# Patient Record
Sex: MALE | Race: WHITE | Employment: OTHER | URBAN - METROPOLITAN AREA
[De-identification: names, ages, dates, MRNs, and addresses within clinical notes are randomized per-mention and may not be internally consistent; named-entity substitution may affect disease eponyms.]

---

## 2017-02-01 ENCOUNTER — TRANSFERRED RECORDS (OUTPATIENT)
Dept: HEALTH INFORMATION MANAGEMENT | Facility: CLINIC | Age: 82
End: 2017-02-01

## 2017-02-02 ENCOUNTER — TELEPHONE (OUTPATIENT)
Dept: FAMILY MEDICINE | Facility: CLINIC | Age: 82
End: 2017-02-02

## 2017-02-02 ENCOUNTER — TRANSFERRED RECORDS (OUTPATIENT)
Dept: FAMILY MEDICINE | Facility: CLINIC | Age: 82
End: 2017-02-02

## 2017-02-17 ENCOUNTER — OFFICE VISIT (OUTPATIENT)
Dept: FAMILY MEDICINE | Facility: CLINIC | Age: 82
End: 2017-02-17
Payer: MEDICARE

## 2017-02-17 VITALS
WEIGHT: 193 LBS | TEMPERATURE: 97 F | BODY MASS INDEX: 28.58 KG/M2 | SYSTOLIC BLOOD PRESSURE: 124 MMHG | DIASTOLIC BLOOD PRESSURE: 70 MMHG | HEART RATE: 60 BPM | HEIGHT: 69 IN

## 2017-02-17 DIAGNOSIS — I10 ESSENTIAL HYPERTENSION: Primary | ICD-10-CM

## 2017-02-17 DIAGNOSIS — R30.0 DYSURIA: ICD-10-CM

## 2017-02-17 DIAGNOSIS — N18.30 CKD (CHRONIC KIDNEY DISEASE) STAGE 3, GFR 30-59 ML/MIN (H): ICD-10-CM

## 2017-02-17 DIAGNOSIS — E11.21 TYPE 2 DIABETES MELLITUS WITH DIABETIC NEPHROPATHY, UNSPECIFIED LONG TERM INSULIN USE STATUS: ICD-10-CM

## 2017-02-17 DIAGNOSIS — Z12.5 SCREENING FOR PROSTATE CANCER: ICD-10-CM

## 2017-02-17 LAB
ALBUMIN UR-MCNC: 30 MG/DL
ALT SERPL W P-5'-P-CCNC: 29 U/L (ref 0–70)
ANION GAP SERPL CALCULATED.3IONS-SCNC: 10 MMOL/L (ref 3–14)
APPEARANCE UR: CLEAR
BACTERIA #/AREA URNS HPF: ABNORMAL /HPF
BILIRUB UR QL STRIP: NEGATIVE
BUN SERPL-MCNC: 18 MG/DL (ref 7–30)
CALCIUM SERPL-MCNC: 8.5 MG/DL (ref 8.5–10.1)
CHLORIDE SERPL-SCNC: 115 MMOL/L (ref 94–109)
CO2 SERPL-SCNC: 19 MMOL/L (ref 20–32)
COLOR UR AUTO: YELLOW
CREAT SERPL-MCNC: 1.34 MG/DL (ref 0.66–1.25)
CREAT UR-MCNC: 222 MG/DL
GFR SERPL CREATININE-BSD FRML MDRD: 51 ML/MIN/1.7M2
GLUCOSE SERPL-MCNC: 128 MG/DL (ref 70–99)
GLUCOSE UR STRIP-MCNC: NEGATIVE MG/DL
HBA1C MFR BLD: 6.7 % (ref 4.3–6)
HGB UR QL STRIP: NEGATIVE
KETONES UR STRIP-MCNC: NEGATIVE MG/DL
LDLC SERPL DIRECT ASSAY-MCNC: 80 MG/DL
LEUKOCYTE ESTERASE UR QL STRIP: NEGATIVE
MICROALBUMIN UR-MCNC: 92 MG/L
MICROALBUMIN/CREAT UR: 41.31 MG/G CR (ref 0–17)
MUCOUS THREADS #/AREA URNS LPF: PRESENT /LPF
NITRATE UR QL: NEGATIVE
PH UR STRIP: 5.5 PH (ref 5–7)
POTASSIUM SERPL-SCNC: 4.2 MMOL/L (ref 3.4–5.3)
PSA SERPL-ACNC: 0.03 UG/L (ref 0–4)
RBC #/AREA URNS AUTO: ABNORMAL /HPF (ref 0–2)
SODIUM SERPL-SCNC: 144 MMOL/L (ref 133–144)
SP GR UR STRIP: 1.02 (ref 1–1.03)
URN SPEC COLLECT METH UR: ABNORMAL
UROBILINOGEN UR STRIP-ACNC: 0.2 EU/DL (ref 0.2–1)
WBC #/AREA URNS AUTO: ABNORMAL /HPF (ref 0–2)

## 2017-02-17 PROCEDURE — 83721 ASSAY OF BLOOD LIPOPROTEIN: CPT | Performed by: FAMILY MEDICINE

## 2017-02-17 PROCEDURE — 99214 OFFICE O/P EST MOD 30 MIN: CPT | Performed by: FAMILY MEDICINE

## 2017-02-17 PROCEDURE — G0103 PSA SCREENING: HCPCS | Performed by: FAMILY MEDICINE

## 2017-02-17 PROCEDURE — 36415 COLL VENOUS BLD VENIPUNCTURE: CPT | Performed by: FAMILY MEDICINE

## 2017-02-17 PROCEDURE — 84460 ALANINE AMINO (ALT) (SGPT): CPT | Performed by: FAMILY MEDICINE

## 2017-02-17 PROCEDURE — 83036 HEMOGLOBIN GLYCOSYLATED A1C: CPT | Performed by: FAMILY MEDICINE

## 2017-02-17 PROCEDURE — 81001 URINALYSIS AUTO W/SCOPE: CPT | Performed by: FAMILY MEDICINE

## 2017-02-17 PROCEDURE — 80048 BASIC METABOLIC PNL TOTAL CA: CPT | Performed by: FAMILY MEDICINE

## 2017-02-17 PROCEDURE — 82043 UR ALBUMIN QUANTITATIVE: CPT | Performed by: FAMILY MEDICINE

## 2017-02-17 NOTE — MR AVS SNAPSHOT
"              After Visit Summary   2/17/2017    Elias Mckee    MRN: [id number]           Patient Information     Date Of Birth          8/10/1931        Visit Information        Provider Department      2/17/2017 9:00 AM Shen Huff MD [facility] [place]        Today's Diagnoses     Essential hypertension    -  1    CKD (chronic kidney disease) stage 3, GFR 30-59 ml/min        Type 2 diabetes mellitus with diabetic nephropathy, unspecified long term insulin use status (H)        Dysuria        Screening for prostate cancer           Follow-ups after your visit        Who to contact     If you have questions or need follow up information about today's clinic visit or your schedule please contact [facility] directly at [phone].  Normal or non-critical lab and imaging results will be communicated to you by [employer], letter or phone within 4 business days after the clinic has received the results. If you do not hear from us within 7 days, please contact the clinic through [employer] or phone. If you have a critical or abnormal lab result, we will notify you by phone as soon as possible.  Submit refill requests through [employer] or call your pharmacy and they will forward the refill request to us. Please allow 3 business days for your refill to be completed.          Additional Information About Your Visit        [employer] Information     [employer] lets you send messages to your doctor, view your test results, renew your prescriptions, schedule appointments and more. To sign up, go to [email or web address] . Click on \"Log in\" on the left side of the screen, which will take you to the Welcome page. Then click on \"Sign up Now\" on the right side of the page.     You will be asked to enter the access code listed below, as well as some personal information. Please follow the directions to create your username and password.     Your access code is: [email or web address]  Expires: 5/18/2017  9:41 AM   " "  Your access code will  in 90 days. If you need help or a new code, please call your [facility] clinic or [phone].        Care EveryWhere ID     This is your Care EveryWhere ID. This could be used by other organizations to access your [facility] medical records  [id number]        Your Vitals Were     Pulse Temperature Height BMI (Body Mass Index)          60 97  F (36.1  C) (Tympanic) 5' 9\" (1.753 m) 28.5 kg/m2         Blood Pressure from Last 3 Encounters:   17 124/70   16 124/62   16 110/62    Weight from Last 3 Encounters:   17 193 lb (87.5 kg)   16 187 lb (84.8 kg)   16 188 lb (85.3 kg)              We Performed the Following     Albumin Random Urine Quantitative     ALT     Basic metabolic panel  (Ca, Cl, CO2, Creat, Gluc, K, Na, BUN)     Hemoglobin A1c     LDL cholesterol direct     PSA, screen     UA with Microscopic reflex to Culture        Primary Care Provider Office Phone # Fax #    Shen Huff [phone]       [address] [address]        Thank you!     Thank you [place] [facility] [place]. Our goal is always to provide you with excellent care. Hearing back from our patients is one way we can continue to improve our services. Please take a few minutes to complete the written survey that you may receive in the mail after your visit with us. Thank you!             Your Updated Medication List - Protect others around you: Learn how to safely use, store and throw away your medicines at www.disposemymeds.org.          This list is accurate as of: 17  9:41 AM.  Always use your most recent med list.                   Brand Name Dispense Instructions for use    acetaZOLAMIDE 500 MG 12 hr capsule    DIAMOX SEQUEL     Take 500 mg by mouth 2 times daily       amoxicillin 250 MG capsule    AMOXIL     Take 1,000 mg by mouth once Prior to dental work       ASPIR-LOW 81 MG " EC tablet   Generic drug:  aspirin      Take 81 mg by mouth daily       blood glucose lancets standard    no brand specified    1 box    appropriate to strips and machine       BLOOD GLUCOSE TEST STRIPS STRP     99 strip    66 strips continuous.       ferrous gluconate 324 (38 FE) MG tablet    FERGON    90 tablet    Take 1 tablet (324 mg) by mouth daily (with breakfast)       fluticasone 50 MCG/ACT spray    FLONASE    1 Package    Spray 1-2 sprays into both nostrils daily       * glimepiride 2 MG tablet    AMARYL    90 tablet    Take 1 tablet (2 mg) by mouth every morning (before breakfast)       * glimepiride 1 MG tablet    AMARYL    90 tablet    Take 1 tablet (1 mg) by mouth every morning (before breakfast)       lisinopril 10 MG tablet    PRINIVIL/ZESTRIL    60 tablet    Take 1 tablet by mouth 2 times daily.       melatonin 1 MG Tabs tablet     30 tablet    Take 1 tablet (1 mg) by mouth At Bedtime       metoprolol 50 MG tablet    LOPRESSOR     Take 150 mg by mouth 2 times daily       multivitamin per tablet     100 tablet    Take 1 tablet by mouth daily.       nitroglycerin 0.4 MG sublingual tablet    NITROSTAT    1 BOTTLE    ONE TABLET UNDER TONGUE, FOR CHEST PAIN, AS DIRECTED       * order for DME     1 Device    Equipment being ordered: Wheelchair       * order for DME     1 Device    Equipment being ordered:  transport wheelchair       * order for DME     1 Device    Equipment being ordered: transport wheelchair.  It has been ruled out that a walker and cane are not sufficient.       * order for DME     1 Device    Equipment being ordered: Walker - basic black non collapseable walker       * order for DME     1 each    Equipment being ordered: basic black non - collapesable cane       * order for DME     1 Device    Equipment being ordered: Cane       PLAVIX 75 MG tablet   Generic drug:  clopidogrel     30    ONE DAILY       polyethylene glycol powder    MIRALAX    510 g    Take 1 tsp with 7-12 oz of water  every 2-3 days for now       simvastatin 20 MG tablet    ZOCOR    90 tablet    1 TABLET AT BEDTIME       tamsulosin 0.4 MG capsule    FLOMAX         TYLENOL 325 MG tablet   Generic drug:  acetaminophen     100 tablet    Take 1-2 tablets (325-650 mg) by mouth every 6 hours as needed for mild pain       vitamin D 2000 UNITS tablet     100 tablet    Take 2,000 Units by mouth daily       * Notice:  This list has 8 medication(s) that are the same as other medications prescribed for you. Read the directions carefully, and ask your doctor or other care provider to review them with you.

## 2017-02-17 NOTE — LETTER
[facility]   [address] [place] [address]   [phone]      February 21, 2017    Elias Mckee  [address] [place] [address] [address] [place] [address]                Dear Elias,     Your lab results including electrolyte balance and glucose/liver and kidney function/LDL cholesterol/prostate specific antigen were all stable at your baseline without clinical deterioration.   Your A1C for diabetes is slightly elevated compared to last year but is still below(7.0). Please keep watching your diet and working on exercise routinely.    Thank you,   Sincerely,   Shen Huff M.D.

## 2017-02-17 NOTE — PROGRESS NOTES
SUBJECTIVE:                                                    Elias Mckee is a 85 year old male who presents to clinic today for the following health issues:      Diabetes Follow-up    Patient is checking blood sugars: once daily.  Results are as follows:         am - 130-150    Diabetic concerns: None     Symptoms of hypoglycemia (low blood sugar): none     Paresthesias (numbness or burning in feet) or sores: No     Date of last diabetic eye exam:        Amount of exercise or physical activity: walk    Problems taking medications regularly: No    Medication side effects: none  Diet: regular (no restrictions)      Problem list and histories reviewed & adjusted, as indicated.  Additional history: as documented    Patient Active Problem List   Diagnosis     Chronic ischemic heart disease     Malignant neoplasm of prostate (H)     Essential hypertension     Personal history of other diseases of circulatory system     HYPERLIPIDEMIA LDL GOAL <100     Type 2 diabetes, HbA1C goal < 8% (H)     Advanced directives, counseling/discussion     CKD (chronic kidney disease) stage 3, GFR 30-59 ml/min     Leg weakness     Ataxia     BPH (benign prostatic hyperplasia)     Type 2 diabetes mellitus with diabetic nephropathy (H)     History of actinic keratoses     History of squamous cell carcinoma     S/P Mohs surgery for basal cell carcinoma     Past Surgical History   Procedure Laterality Date     Seed implantation  2002     prostate cancer     Phacoemulsification clear cornea with standard intraocular lens implant Right 10/7/2014     Procedure: PHACOEMULSIFICATION CLEAR CORNEA WITH STANDARD INTRAOCULAR LENS IMPLANT;  Surgeon: German Thomas MD;  Location: [facility]     Vitrectomy anterior Right 10/7/2014     Procedure: VITRECTOMY ANTERIOR;  Surgeon: German Thomas MD;  Location: [facility]       Social History   Substance Use Topics     Smoking status: Never Smoker     Smokeless tobacco: Never Used     Alcohol use No      No family history on file.      Current Outpatient Prescriptions   Medication Sig Dispense Refill     order for DME Equipment being ordered: Cane 1 Device 0     melatonin 1 MG TABS Take 1 tablet (1 mg) by mouth At Bedtime 30 tablet 1     order for DME Equipment being ordered: basic black non - collapesable cane 1 each 0     order for DME Equipment being ordered: Walker - basic black non collapseable walker 1 Device 0     polyethylene glycol (MIRALAX) powder Take 1 tsp with 7-12 oz of water every 2-3 days for now 510 g 1     acetaminophen (TYLENOL) 325 MG tablet Take 1-2 tablets (325-650 mg) by mouth every 6 hours as needed for mild pain 100 tablet      order for DME Equipment being ordered: transport wheelchair.   It has been ruled out that a walker and cane are not sufficient. 1 Device 0     acetaZOLAMIDE (DIAMOX SEQUEL) 500 MG capsule Take 500 mg by mouth 2 times daily       ferrous gluconate (FERGON) 324 (38 FE) MG tablet Take 1 tablet (324 mg) by mouth daily (with breakfast) 90 tablet 1     ORDER FOR DME Equipment being ordered:  transport wheelchair 1 Device 0     ORDER FOR DME Equipment being ordered: Wheelchair 1 Device 0     Cholecalciferol (VITAMIN D) 2000 UNITS tablet Take 2,000 Units by mouth daily 100 tablet 3     glimepiride (AMARYL) 1 MG tablet Take 1 tablet (1 mg) by mouth every morning (before breakfast) 90 tablet 3     fluticasone (FLONASE) 50 MCG/ACT nasal spray Spray 1-2 sprays into both nostrils daily 1 Package 3     amoxicillin (AMOXIL) 250 MG capsule Take 1,000 mg by mouth once Prior to dental work       tamsulosin (FLOMAX) 0.4 MG 24 hr capsule        glimepiride (AMARYL) 2 MG tablet Take 1 tablet (2 mg) by mouth every morning (before breakfast) 90 tablet 1     simvastatin (ZOCOR) 20 MG tablet 1 TABLET AT BEDTIME 90 tablet 1     aspirin (ASPIR-LOW) 81 MG EC tablet Take 81 mg by mouth daily       metoprolol (LOPRESSOR) 50 MG tablet Take 150 mg by mouth 2 times daily       lisinopril  "(PRINIVIL,ZESTRIL) 10 MG tablet Take 1 tablet by mouth 2 times daily. 60 tablet 1     Multiple Vitamin (MULTIVITAMIN) per tablet Take 1 tablet by mouth daily. 100 tablet 12     Blood Glucose Monitoring Suppl (BLOOD GLUCOSE TEST STRIPS STRP) 66 strips continuous. 99 strip 1 year     NITROGLYCERIN 0.4 MG SL SUBL ONE TABLET UNDER TONGUE, FOR CHEST PAIN, AS DIRECTED 1 BOTTLE 3     PLAVIX 75 MG OR TABS ONE DAILY 30 4     LANCETS MISC. KIT appropriate to strips and machine 1 box 1 year     Allergies   Allergen Reactions     No Known Allergies      Recent Labs   Lab Test  08/11/16   0930  01/12/16   1022  07/13/15   1150  04/20/15   0913  11/28/14   1425  05/29/14   0945  05/01/13   0944   01/21/13   1627   A1C  6.0  6.4*  6.6*   --   7.1*  7.5*  8.2*   < >   --    LDL   --   80   --   73   --   81  83   --   67   HDL   --    --    --    --    --   29*  33*   --   34*   TRIG   --    --    --    --    --   208*  179*   --   178*   ALT  24  25   --   23  36  21  31   --    --    CR  1.23  1.24   --   1.32*  1.31*  1.45*  1.36*   < >   --    GFRESTIMATED  56*  55*   --   52*  52*  47*  50*   < >   --    GFRESTBLACK  68  67   --   63  63  56*  61   < >   --    POTASSIUM  4.0  4.2   --   4.1  4.1  4.9  4.5   < >   --    TSH   --    --    --    --   3.06  4.34   --    --   3.31    < > = values in this interval not displayed.      BP Readings from Last 3 Encounters:   02/17/17 124/70   08/11/16 124/62   01/12/16 110/62    Wt Readings from Last 3 Encounters:   02/17/17 193 lb (87.5 kg)   08/11/16 187 lb (84.8 kg)   01/12/16 188 lb (85.3 kg)                    ROS:  Constitutional, HEENT, cardiovascular, pulmonary, gi and gu systems are negative, except as otherwise noted.    OBJECTIVE:                                                    /70 (Cuff Size: Adult Large)  Pulse 60  Temp 97  F (36.1  C) (Tympanic)  Ht 5' 9\" (1.753 m)  Wt 193 lb (87.5 kg)  BMI 28.5 kg/m2  Body mass index is 28.5 kg/(m^2).  GENERAL: healthy, alert and " no distress  NECK: no adenopathy, no asymmetry, masses, or scars and thyroid normal to palpation  RESP: lungs clear to auscultation - no rales, rhonchi or wheezes  CV: regular rate and rhythm, normal S1 S2, no S3 or S4, no murmur, click or rub, no peripheral edema and peripheral pulses strong  ABDOMEN: soft, nontender, no hepatosplenomegaly, no masses and bowel sounds normal  MS: no gross musculoskeletal defects noted, no edema         ASSESSMENT/PLAN:                                                    ASSESSMENT / PLAN:  (I10) Essential hypertension  (primary encounter diagnosis)  Comment: has been stable with current dose of meds, has no side effect from it, will keep watching sx   Plan: LDL cholesterol direct, Basic metabolic panel          (Ca, Cl, CO2, Creat, Gluc, K, Na, BUN), ALT            (N18.3) CKD (chronic kidney disease) stage 3, GFR 30-59 ml/min  Comment: stable, will keep watching sx   Plan: Albumin Random Urine Quantitative, Hemoglobin         A1c, Basic metabolic panel  (Ca, Cl, CO2,         Creat, Gluc, K, Na, BUN)            (E11.21) Type 2 diabetes mellitus with diabetic nephropathy, unspecified long term insulin use status (H)  Comment: stable   Plan: Albumin Random Urine Quantitative, Hemoglobin         A1c, Basic metabolic panel  (Ca, Cl, CO2,         Creat, Gluc, K, Na, BUN)            (R30.0) Dysuria  Comment: has been having frequency at night and not able to sleep well,   Plan: PSA, screen, UA with Microscopic reflex to         Culture        Encouraged him to limit drinking liquid at evening and night,   Encouraged him to check on his actual med if he is taking flomax or not(seems it hasn't been given from us for last 4-5 years). If not taking, will resume at 0.4mg. If has been taking, will double up the dose of flomax to 0.8mg    (Z12.5) Screening for prostate cancer  Comment: mentioned above   Plan: PSA, screen              FUTURE APPOINTMENTS:       - Follow-up visit in 6 months      Shen Huff MD  [facility] [place]

## 2017-02-17 NOTE — NURSING NOTE
"Chief Complaint   Patient presents with     Diabetes     is fasting       Initial /70 (Cuff Size: Adult Large)  Pulse 60  Temp 97  F (36.1  C) (Tympanic)  Ht 5' 9\" (1.753 m)  Wt 193 lb (87.5 kg)  BMI 28.5 kg/m2 Estimated body mass index is 28.5 kg/(m^2) as calculated from the following:    Height as of this encounter: 5' 9\" (1.753 m).    Weight as of this encounter: 193 lb (87.5 kg).  Medication Reconciliation: complete     Maria A Geronimo CMA      "

## 2017-02-20 ENCOUNTER — APPOINTMENT (OUTPATIENT)
Dept: CT IMAGING | Facility: CLINIC | Age: 82
DRG: 024 | End: 2017-02-20
Attending: EMERGENCY MEDICINE
Payer: MEDICARE

## 2017-02-20 ENCOUNTER — APPOINTMENT (OUTPATIENT)
Dept: INTERVENTIONAL RADIOLOGY/VASCULAR | Facility: CLINIC | Age: 82
DRG: 024 | End: 2017-02-20
Attending: EMERGENCY MEDICINE
Payer: MEDICARE

## 2017-02-20 ENCOUNTER — HOSPITAL ENCOUNTER (INPATIENT)
Facility: CLINIC | Age: 82
LOS: 7 days | Discharge: NURSING, REHAB OR LONG-TERM CARE | DRG: 024 | End: 2017-02-27
Attending: EMERGENCY MEDICINE | Admitting: INTERNAL MEDICINE
Payer: MEDICARE

## 2017-02-20 ENCOUNTER — APPOINTMENT (OUTPATIENT)
Dept: CT IMAGING | Facility: CLINIC | Age: 82
DRG: 024 | End: 2017-02-20
Attending: INTERNAL MEDICINE
Payer: MEDICARE

## 2017-02-20 DIAGNOSIS — I63.411 CEREBRAL INFARCTION DUE TO EMBOLISM OF RIGHT MIDDLE CEREBRAL ARTERY (H): ICD-10-CM

## 2017-02-20 DIAGNOSIS — E11.21 TYPE 2 DIABETES MELLITUS WITH DIABETIC NEPHROPATHY, UNSPECIFIED LONG TERM INSULIN USE STATUS: Primary | ICD-10-CM

## 2017-02-20 DIAGNOSIS — I10 ESSENTIAL HYPERTENSION: ICD-10-CM

## 2017-02-20 PROBLEM — I63.9 CVA (CEREBRAL VASCULAR ACCIDENT) (H): Status: ACTIVE | Noted: 2017-02-20

## 2017-02-20 LAB
ANION GAP SERPL CALCULATED.3IONS-SCNC: 8 MMOL/L (ref 3–14)
APTT PPP: 31 SEC (ref 22–37)
BASOPHILS # BLD AUTO: 0 10E9/L (ref 0–0.2)
BASOPHILS NFR BLD AUTO: 0.3 %
BUN SERPL-MCNC: 28 MG/DL (ref 7–30)
CALCIUM SERPL-MCNC: 8.4 MG/DL (ref 8.5–10.1)
CHLORIDE SERPL-SCNC: 113 MMOL/L (ref 94–109)
CO2 SERPL-SCNC: 20 MMOL/L (ref 20–32)
CREAT SERPL-MCNC: 1.28 MG/DL (ref 0.66–1.25)
DIFFERENTIAL METHOD BLD: ABNORMAL
EOSINOPHIL # BLD AUTO: 0.2 10E9/L (ref 0–0.7)
EOSINOPHIL NFR BLD AUTO: 3.1 %
ERYTHROCYTE [DISTWIDTH] IN BLOOD BY AUTOMATED COUNT: 15.2 % (ref 10–15)
GFR SERPL CREATININE-BSD FRML MDRD: 53 ML/MIN/1.7M2
GLUCOSE BLDC GLUCOMTR-MCNC: 113 MG/DL (ref 70–99)
GLUCOSE SERPL-MCNC: 187 MG/DL (ref 70–99)
HCT VFR BLD AUTO: 36.3 % (ref 40–53)
HGB BLD-MCNC: 12.2 G/DL (ref 13.3–17.7)
IMM GRANULOCYTES # BLD: 0 10E9/L (ref 0–0.4)
IMM GRANULOCYTES NFR BLD: 0.6 %
INR PPP: 0.99 (ref 0.86–1.14)
INTERPRETATION ECG - MUSE: NORMAL
LYMPHOCYTES # BLD AUTO: 1.5 10E9/L (ref 0.8–5.3)
LYMPHOCYTES NFR BLD AUTO: 20.4 %
MCH RBC QN AUTO: 31.4 PG (ref 26.5–33)
MCHC RBC AUTO-ENTMCNC: 33.6 G/DL (ref 31.5–36.5)
MCV RBC AUTO: 93 FL (ref 78–100)
MONOCYTES # BLD AUTO: 1.4 10E9/L (ref 0–1.3)
MONOCYTES NFR BLD AUTO: 19 %
NEUTROPHILS # BLD AUTO: 4.1 10E9/L (ref 1.6–8.3)
NEUTROPHILS NFR BLD AUTO: 56.6 %
NRBC # BLD AUTO: 0 10*3/UL
NRBC BLD AUTO-RTO: 0 /100
PLATELET # BLD AUTO: 136 10E9/L (ref 150–450)
POTASSIUM SERPL-SCNC: 3.9 MMOL/L (ref 3.4–5.3)
RADIOLOGIST FLAGS: ABNORMAL
RBC # BLD AUTO: 3.89 10E12/L (ref 4.4–5.9)
SODIUM SERPL-SCNC: 141 MMOL/L (ref 133–144)
WBC # BLD AUTO: 7.2 10E9/L (ref 4–11)

## 2017-02-20 PROCEDURE — 85025 COMPLETE CBC W/AUTO DIFF WBC: CPT | Performed by: EMERGENCY MEDICINE

## 2017-02-20 PROCEDURE — 85730 THROMBOPLASTIN TIME PARTIAL: CPT | Performed by: EMERGENCY MEDICINE

## 2017-02-20 PROCEDURE — 3E03317 INTRODUCTION OF OTHER THROMBOLYTIC INTO PERIPHERAL VEIN, PERCUTANEOUS APPROACH: ICD-10-PCS | Performed by: EMERGENCY MEDICINE

## 2017-02-20 PROCEDURE — 70450 CT HEAD/BRAIN W/O DYE: CPT | Mod: 77

## 2017-02-20 PROCEDURE — 25000128 H RX IP 250 OP 636: Performed by: EMERGENCY MEDICINE

## 2017-02-20 PROCEDURE — 80048 BASIC METABOLIC PNL TOTAL CA: CPT | Performed by: EMERGENCY MEDICINE

## 2017-02-20 PROCEDURE — 03CG3ZZ EXTIRPATION OF MATTER FROM INTRACRANIAL ARTERY, PERCUTANEOUS APPROACH: ICD-10-PCS | Performed by: RADIOLOGY

## 2017-02-20 PROCEDURE — 25000128 H RX IP 250 OP 636: Performed by: RADIOLOGY

## 2017-02-20 PROCEDURE — C1887 CATHETER, GUIDING: HCPCS

## 2017-02-20 PROCEDURE — 27210804 ZZH SHEATH CR3

## 2017-02-20 PROCEDURE — 27210737 ZZH ACCESSORY CR14

## 2017-02-20 PROCEDURE — 25000125 ZZHC RX 250: Performed by: EMERGENCY MEDICINE

## 2017-02-20 PROCEDURE — C1769 GUIDE WIRE: HCPCS

## 2017-02-20 PROCEDURE — 85610 PROTHROMBIN TIME: CPT | Performed by: EMERGENCY MEDICINE

## 2017-02-20 PROCEDURE — 25000128 H RX IP 250 OP 636: Performed by: INTERNAL MEDICINE

## 2017-02-20 PROCEDURE — 25000128 H RX IP 250 OP 636

## 2017-02-20 PROCEDURE — 27210906 ZZH KIT CR8

## 2017-02-20 PROCEDURE — 25500064 ZZH RX 255 OP 636: Performed by: RADIOLOGY

## 2017-02-20 PROCEDURE — 00000146 ZZHCL STATISTIC GLUCOSE BY METER IP

## 2017-02-20 PROCEDURE — 27210742 ZZH CATH CR1

## 2017-02-20 PROCEDURE — 27210738 ZZH ACCESSORY CR2

## 2017-02-20 PROCEDURE — 70498 CT ANGIOGRAPHY NECK: CPT

## 2017-02-20 PROCEDURE — 99223 1ST HOSP IP/OBS HIGH 75: CPT | Mod: AI | Performed by: INTERNAL MEDICINE

## 2017-02-20 PROCEDURE — 27211192 ZZ H SHEATH CR14

## 2017-02-20 PROCEDURE — 27210886 ZZH ACCESSORY CR5

## 2017-02-20 PROCEDURE — 25500064 ZZH RX 255 OP 636: Performed by: EMERGENCY MEDICINE

## 2017-02-20 PROCEDURE — 99153 MOD SED SAME PHYS/QHP EA: CPT

## 2017-02-20 PROCEDURE — 27210806 ZZH SHEATH CR5

## 2017-02-20 PROCEDURE — 70460 CT HEAD/BRAIN W/DYE: CPT

## 2017-02-20 PROCEDURE — 70470 CT HEAD/BRAIN W/O & W/DYE: CPT | Mod: XS

## 2017-02-20 PROCEDURE — 25000125 ZZHC RX 250: Performed by: RADIOLOGY

## 2017-02-20 PROCEDURE — 27210732 ZZH ACCESSORY CR1

## 2017-02-20 PROCEDURE — 25000125 ZZHC RX 250: Performed by: INTERNAL MEDICINE

## 2017-02-20 PROCEDURE — 20000003 ZZH R&B ICU

## 2017-02-20 RX ORDER — POTASSIUM CHLORIDE 7.45 MG/ML
10 INJECTION INTRAVENOUS
Status: DISCONTINUED | OUTPATIENT
Start: 2017-02-20 | End: 2017-02-27 | Stop reason: HOSPADM

## 2017-02-20 RX ORDER — IOPAMIDOL 755 MG/ML
120 INJECTION, SOLUTION INTRAVASCULAR ONCE
Status: COMPLETED | OUTPATIENT
Start: 2017-02-20 | End: 2017-02-20

## 2017-02-20 RX ORDER — ONDANSETRON 2 MG/ML
4 INJECTION INTRAMUSCULAR; INTRAVENOUS EVERY 6 HOURS PRN
Status: DISCONTINUED | OUTPATIENT
Start: 2017-02-20 | End: 2017-02-27 | Stop reason: HOSPADM

## 2017-02-20 RX ORDER — LABETALOL HYDROCHLORIDE 5 MG/ML
10-20 INJECTION, SOLUTION INTRAVENOUS EVERY 10 MIN PRN
Status: DISCONTINUED | OUTPATIENT
Start: 2017-02-20 | End: 2017-02-20

## 2017-02-20 RX ORDER — NALOXONE HYDROCHLORIDE 0.4 MG/ML
.1-.4 INJECTION, SOLUTION INTRAMUSCULAR; INTRAVENOUS; SUBCUTANEOUS
Status: DISCONTINUED | OUTPATIENT
Start: 2017-02-20 | End: 2017-02-20

## 2017-02-20 RX ORDER — ACETAMINOPHEN 325 MG/1
650 TABLET ORAL EVERY 4 HOURS PRN
Status: DISCONTINUED | OUTPATIENT
Start: 2017-02-20 | End: 2017-02-23

## 2017-02-20 RX ORDER — NICOTINE POLACRILEX 4 MG
15-30 LOZENGE BUCCAL
Status: DISCONTINUED | OUTPATIENT
Start: 2017-02-20 | End: 2017-02-27 | Stop reason: HOSPADM

## 2017-02-20 RX ORDER — POTASSIUM CHLORIDE 1.5 G/1.58G
20-40 POWDER, FOR SOLUTION ORAL
Status: DISCONTINUED | OUTPATIENT
Start: 2017-02-20 | End: 2017-02-27 | Stop reason: HOSPADM

## 2017-02-20 RX ORDER — LABETALOL HYDROCHLORIDE 5 MG/ML
INJECTION, SOLUTION INTRAVENOUS
Status: COMPLETED
Start: 2017-02-20 | End: 2017-02-20

## 2017-02-20 RX ORDER — POTASSIUM CHLORIDE 1500 MG/1
20-40 TABLET, EXTENDED RELEASE ORAL
Status: DISCONTINUED | OUTPATIENT
Start: 2017-02-20 | End: 2017-02-27 | Stop reason: HOSPADM

## 2017-02-20 RX ORDER — ONDANSETRON 4 MG/1
4 TABLET, ORALLY DISINTEGRATING ORAL EVERY 6 HOURS PRN
Status: DISCONTINUED | OUTPATIENT
Start: 2017-02-20 | End: 2017-02-27 | Stop reason: HOSPADM

## 2017-02-20 RX ORDER — POTASSIUM CHLORIDE 29.8 MG/ML
20 INJECTION INTRAVENOUS
Status: DISCONTINUED | OUTPATIENT
Start: 2017-02-20 | End: 2017-02-27 | Stop reason: HOSPADM

## 2017-02-20 RX ORDER — SODIUM CHLORIDE 9 MG/ML
INJECTION, SOLUTION INTRAVENOUS ONCE
Status: COMPLETED | OUTPATIENT
Start: 2017-02-20 | End: 2017-02-20

## 2017-02-20 RX ORDER — PROCHLORPERAZINE MALEATE 5 MG
5 TABLET ORAL EVERY 6 HOURS PRN
Status: DISCONTINUED | OUTPATIENT
Start: 2017-02-20 | End: 2017-02-27 | Stop reason: HOSPADM

## 2017-02-20 RX ORDER — FLUMAZENIL 0.1 MG/ML
0.2 INJECTION, SOLUTION INTRAVENOUS
Status: DISCONTINUED | OUTPATIENT
Start: 2017-02-20 | End: 2017-02-23

## 2017-02-20 RX ORDER — LABETALOL HYDROCHLORIDE 5 MG/ML
20 INJECTION, SOLUTION INTRAVENOUS ONCE
Status: COMPLETED | OUTPATIENT
Start: 2017-02-20 | End: 2017-02-20

## 2017-02-20 RX ORDER — TAMSULOSIN HYDROCHLORIDE 0.4 MG/1
0.8 CAPSULE ORAL DAILY
Status: ON HOLD | COMMUNITY
End: 2017-03-21

## 2017-02-20 RX ORDER — SODIUM CHLORIDE 9 MG/ML
INJECTION, SOLUTION INTRAVENOUS CONTINUOUS
Status: DISCONTINUED | OUTPATIENT
Start: 2017-02-20 | End: 2017-02-21

## 2017-02-20 RX ORDER — SODIUM CHLORIDE 9 MG/ML
INJECTION, SOLUTION INTRAVENOUS CONTINUOUS
Status: DISCONTINUED | OUTPATIENT
Start: 2017-02-20 | End: 2017-02-22

## 2017-02-20 RX ORDER — IOPAMIDOL 612 MG/ML
150 INJECTION, SOLUTION INTRAVASCULAR ONCE
Status: COMPLETED | OUTPATIENT
Start: 2017-02-20 | End: 2017-02-20

## 2017-02-20 RX ORDER — DEXTROSE MONOHYDRATE 25 G/50ML
25-50 INJECTION, SOLUTION INTRAVENOUS
Status: DISCONTINUED | OUTPATIENT
Start: 2017-02-20 | End: 2017-02-27 | Stop reason: HOSPADM

## 2017-02-20 RX ORDER — PROCHLORPERAZINE 25 MG
12.5 SUPPOSITORY, RECTAL RECTAL EVERY 12 HOURS PRN
Status: DISCONTINUED | OUTPATIENT
Start: 2017-02-20 | End: 2017-02-27 | Stop reason: HOSPADM

## 2017-02-20 RX ORDER — LIDOCAINE HYDROCHLORIDE 10 MG/ML
1-30 INJECTION, SOLUTION EPIDURAL; INFILTRATION; INTRACAUDAL; PERINEURAL
Status: COMPLETED | OUTPATIENT
Start: 2017-02-20 | End: 2017-02-20

## 2017-02-20 RX ORDER — FENTANYL CITRATE 50 UG/ML
25-50 INJECTION, SOLUTION INTRAMUSCULAR; INTRAVENOUS EVERY 5 MIN PRN
Status: DISCONTINUED | OUTPATIENT
Start: 2017-02-20 | End: 2017-02-20

## 2017-02-20 RX ORDER — FENTANYL CITRATE 50 UG/ML
INJECTION, SOLUTION INTRAMUSCULAR; INTRAVENOUS
Status: DISCONTINUED
Start: 2017-02-20 | End: 2017-02-20 | Stop reason: HOSPADM

## 2017-02-20 RX ORDER — ACETAMINOPHEN 650 MG/1
650 SUPPOSITORY RECTAL EVERY 4 HOURS PRN
Status: DISCONTINUED | OUTPATIENT
Start: 2017-02-20 | End: 2017-02-27 | Stop reason: HOSPADM

## 2017-02-20 RX ORDER — POLYETHYLENE GLYCOL 3350 17 G/17G
17 POWDER, FOR SOLUTION ORAL DAILY PRN
COMMUNITY

## 2017-02-20 RX ORDER — NALOXONE HYDROCHLORIDE 0.4 MG/ML
.1-.4 INJECTION, SOLUTION INTRAMUSCULAR; INTRAVENOUS; SUBCUTANEOUS
Status: DISCONTINUED | OUTPATIENT
Start: 2017-02-20 | End: 2017-02-27 | Stop reason: HOSPADM

## 2017-02-20 RX ORDER — MAGNESIUM SULFATE HEPTAHYDRATE 40 MG/ML
4 INJECTION, SOLUTION INTRAVENOUS EVERY 4 HOURS PRN
Status: DISCONTINUED | OUTPATIENT
Start: 2017-02-20 | End: 2017-02-27 | Stop reason: HOSPADM

## 2017-02-20 RX ADMIN — LIDOCAINE HYDROCHLORIDE 100 MG: 10 INJECTION, SOLUTION INFILTRATION; PERINEURAL at 21:21

## 2017-02-20 RX ADMIN — SODIUM CHLORIDE 100 ML: 9 INJECTION, SOLUTION INTRAVENOUS at 19:22

## 2017-02-20 RX ADMIN — ALTEPLASE 66.91 MG: KIT at 20:02

## 2017-02-20 RX ADMIN — SODIUM CHLORIDE: 9 INJECTION, SOLUTION INTRAVENOUS at 23:43

## 2017-02-20 RX ADMIN — LABETALOL HYDROCHLORIDE 20 MG: 5 INJECTION, SOLUTION INTRAVENOUS at 19:46

## 2017-02-20 RX ADMIN — SODIUM CHLORIDE 2.5 MG/HR: 900 INJECTION, SOLUTION INTRAVENOUS at 23:40

## 2017-02-20 RX ADMIN — FENTANYL CITRATE 25 MCG: 50 INJECTION, SOLUTION INTRAMUSCULAR; INTRAVENOUS at 20:54

## 2017-02-20 RX ADMIN — MIDAZOLAM HYDROCHLORIDE 0.5 MG: 1 INJECTION, SOLUTION INTRAMUSCULAR; INTRAVENOUS at 21:19

## 2017-02-20 RX ADMIN — FENTANYL CITRATE 25 MCG: 50 INJECTION, SOLUTION INTRAMUSCULAR; INTRAVENOUS at 21:19

## 2017-02-20 RX ADMIN — ALTEPLASE 7.43 MG: KIT at 20:02

## 2017-02-20 RX ADMIN — MIDAZOLAM HYDROCHLORIDE 0.5 MG: 1 INJECTION, SOLUTION INTRAMUSCULAR; INTRAVENOUS at 20:50

## 2017-02-20 RX ADMIN — IOPAMIDOL 65 ML: 612 INJECTION, SOLUTION INTRAVENOUS at 22:14

## 2017-02-20 RX ADMIN — IOPAMIDOL 120 ML: 755 INJECTION, SOLUTION INTRAVENOUS at 19:22

## 2017-02-20 RX ADMIN — SODIUM CHLORIDE: 9 INJECTION, SOLUTION INTRAVENOUS at 19:35

## 2017-02-20 ASSESSMENT — ENCOUNTER SYMPTOMS
WEAKNESS: 1
FACIAL ASYMMETRY: 1

## 2017-02-21 ENCOUNTER — APPOINTMENT (OUTPATIENT)
Dept: SPEECH THERAPY | Facility: CLINIC | Age: 82
DRG: 024 | End: 2017-02-21
Attending: INTERNAL MEDICINE
Payer: MEDICARE

## 2017-02-21 ENCOUNTER — APPOINTMENT (OUTPATIENT)
Dept: CARDIOLOGY | Facility: CLINIC | Age: 82
DRG: 024 | End: 2017-02-21
Attending: INTERNAL MEDICINE
Payer: MEDICARE

## 2017-02-21 ENCOUNTER — APPOINTMENT (OUTPATIENT)
Dept: INTERVENTIONAL RADIOLOGY/VASCULAR | Facility: CLINIC | Age: 82
DRG: 024 | End: 2017-02-21
Payer: MEDICARE

## 2017-02-21 ENCOUNTER — APPOINTMENT (OUTPATIENT)
Dept: MRI IMAGING | Facility: CLINIC | Age: 82
DRG: 024 | End: 2017-02-21
Attending: PSYCHIATRY & NEUROLOGY
Payer: MEDICARE

## 2017-02-21 LAB
ANION GAP SERPL CALCULATED.3IONS-SCNC: 7 MMOL/L (ref 3–14)
BUN SERPL-MCNC: 23 MG/DL (ref 7–30)
CALCIUM SERPL-MCNC: 7.5 MG/DL (ref 8.5–10.1)
CHLORIDE SERPL-SCNC: 117 MMOL/L (ref 94–109)
CHOLEST SERPL-MCNC: 105 MG/DL
CO2 SERPL-SCNC: 19 MMOL/L (ref 20–32)
CREAT SERPL-MCNC: 1.05 MG/DL (ref 0.66–1.25)
CRP SERPL-MCNC: 4.6 MG/L (ref 0–8)
DEPRECATED CALCIDIOL+CALCIFEROL SERPL-MC: 30 UG/L (ref 20–75)
ERYTHROCYTE [DISTWIDTH] IN BLOOD BY AUTOMATED COUNT: 15 % (ref 10–15)
ERYTHROCYTE [SEDIMENTATION RATE] IN BLOOD BY WESTERGREN METHOD: 17 MM/H (ref 0–20)
GFR SERPL CREATININE-BSD FRML MDRD: 67 ML/MIN/1.7M2
GLUCOSE BLDC GLUCOMTR-MCNC: 117 MG/DL (ref 70–99)
GLUCOSE BLDC GLUCOMTR-MCNC: 124 MG/DL (ref 70–99)
GLUCOSE BLDC GLUCOMTR-MCNC: 142 MG/DL (ref 70–99)
GLUCOSE BLDC GLUCOMTR-MCNC: 146 MG/DL (ref 70–99)
GLUCOSE BLDC GLUCOMTR-MCNC: 147 MG/DL (ref 70–99)
GLUCOSE SERPL-MCNC: 149 MG/DL (ref 70–99)
HBA1C MFR BLD: 6.8 % (ref 4.3–6)
HCT VFR BLD AUTO: 31.3 % (ref 40–53)
HDLC SERPL-MCNC: 28 MG/DL
HGB BLD-MCNC: 10.7 G/DL (ref 13.3–17.7)
HGB BLD-MCNC: 11.3 G/DL (ref 13.3–17.7)
LDLC SERPL CALC-MCNC: 49 MG/DL
MAGNESIUM SERPL-MCNC: 1.8 MG/DL (ref 1.6–2.3)
MCH RBC QN AUTO: 31.2 PG (ref 26.5–33)
MCHC RBC AUTO-ENTMCNC: 34.2 G/DL (ref 31.5–36.5)
MCV RBC AUTO: 91 FL (ref 78–100)
NONHDLC SERPL-MCNC: 77 MG/DL
PHOSPHATE SERPL-MCNC: 3.1 MG/DL (ref 2.5–4.5)
PLATELET # BLD AUTO: 124 10E9/L (ref 150–450)
POTASSIUM SERPL-SCNC: 3.7 MMOL/L (ref 3.4–5.3)
RBC # BLD AUTO: 3.43 10E12/L (ref 4.4–5.9)
SODIUM SERPL-SCNC: 143 MMOL/L (ref 133–144)
TRIGL SERPL-MCNC: 140 MG/DL
TROPONIN I SERPL-MCNC: NORMAL UG/L (ref 0–0.04)
TROPONIN I SERPL-MCNC: NORMAL UG/L (ref 0–0.04)
TSH SERPL DL<=0.05 MIU/L-ACNC: 5.96 MU/L (ref 0.4–4)
VIT B12 SERPL-MCNC: 368 PG/ML (ref 193–986)
WBC # BLD AUTO: 8.3 10E9/L (ref 4–11)

## 2017-02-21 PROCEDURE — 84484 ASSAY OF TROPONIN QUANT: CPT | Performed by: INTERNAL MEDICINE

## 2017-02-21 PROCEDURE — 84100 ASSAY OF PHOSPHORUS: CPT | Performed by: INTERNAL MEDICINE

## 2017-02-21 PROCEDURE — 83036 HEMOGLOBIN GLYCOSYLATED A1C: CPT | Performed by: INTERNAL MEDICINE

## 2017-02-21 PROCEDURE — 80061 LIPID PANEL: CPT | Performed by: INTERNAL MEDICINE

## 2017-02-21 PROCEDURE — 85018 HEMOGLOBIN: CPT | Performed by: INTERNAL MEDICINE

## 2017-02-21 PROCEDURE — 83735 ASSAY OF MAGNESIUM: CPT | Performed by: INTERNAL MEDICINE

## 2017-02-21 PROCEDURE — 82306 VITAMIN D 25 HYDROXY: CPT | Performed by: INTERNAL MEDICINE

## 2017-02-21 PROCEDURE — 93306 TTE W/DOPPLER COMPLETE: CPT | Mod: 26 | Performed by: INTERNAL MEDICINE

## 2017-02-21 PROCEDURE — 40000333 IR DISCONTINUE SHEATH

## 2017-02-21 PROCEDURE — 82607 VITAMIN B-12: CPT | Performed by: INTERNAL MEDICINE

## 2017-02-21 PROCEDURE — 25000125 ZZHC RX 250: Performed by: INTERNAL MEDICINE

## 2017-02-21 PROCEDURE — 92610 EVALUATE SWALLOWING FUNCTION: CPT | Mod: GN | Performed by: SPEECH-LANGUAGE PATHOLOGIST

## 2017-02-21 PROCEDURE — 85652 RBC SED RATE AUTOMATED: CPT | Performed by: INTERNAL MEDICINE

## 2017-02-21 PROCEDURE — 80048 BASIC METABOLIC PNL TOTAL CA: CPT | Performed by: INTERNAL MEDICINE

## 2017-02-21 PROCEDURE — 20000003 ZZH R&B ICU

## 2017-02-21 PROCEDURE — 86140 C-REACTIVE PROTEIN: CPT | Performed by: INTERNAL MEDICINE

## 2017-02-21 PROCEDURE — 00000146 ZZHCL STATISTIC GLUCOSE BY METER IP

## 2017-02-21 PROCEDURE — 25000131 ZZH RX MED GY IP 250 OP 636 PS 637: Mod: GY | Performed by: INTERNAL MEDICINE

## 2017-02-21 PROCEDURE — 25000125 ZZHC RX 250

## 2017-02-21 PROCEDURE — 84443 ASSAY THYROID STIM HORMONE: CPT | Performed by: INTERNAL MEDICINE

## 2017-02-21 PROCEDURE — 99232 SBSQ HOSP IP/OBS MODERATE 35: CPT | Performed by: INTERNAL MEDICINE

## 2017-02-21 PROCEDURE — 70551 MRI BRAIN STEM W/O DYE: CPT

## 2017-02-21 PROCEDURE — 25000132 ZZH RX MED GY IP 250 OP 250 PS 637: Mod: GY | Performed by: INTERNAL MEDICINE

## 2017-02-21 PROCEDURE — A9270 NON-COVERED ITEM OR SERVICE: HCPCS | Mod: GY | Performed by: INTERNAL MEDICINE

## 2017-02-21 PROCEDURE — 99232 SBSQ HOSP IP/OBS MODERATE 35: CPT | Performed by: PSYCHIATRY & NEUROLOGY

## 2017-02-21 PROCEDURE — 40000225 ZZH STATISTIC SLP WARD VISIT: Performed by: SPEECH-LANGUAGE PATHOLOGIST

## 2017-02-21 PROCEDURE — 85027 COMPLETE CBC AUTOMATED: CPT | Performed by: INTERNAL MEDICINE

## 2017-02-21 PROCEDURE — 93306 TTE W/DOPPLER COMPLETE: CPT | Mod: TC

## 2017-02-21 PROCEDURE — 36415 COLL VENOUS BLD VENIPUNCTURE: CPT | Performed by: INTERNAL MEDICINE

## 2017-02-21 PROCEDURE — 25000128 H RX IP 250 OP 636: Performed by: INTERNAL MEDICINE

## 2017-02-21 RX ORDER — HYDRALAZINE HYDROCHLORIDE 20 MG/ML
INJECTION INTRAMUSCULAR; INTRAVENOUS
Status: COMPLETED
Start: 2017-02-21 | End: 2017-02-21

## 2017-02-21 RX ORDER — HYDRALAZINE HYDROCHLORIDE 20 MG/ML
10 INJECTION INTRAMUSCULAR; INTRAVENOUS EVERY 4 HOURS PRN
Status: DISCONTINUED | OUTPATIENT
Start: 2017-02-21 | End: 2017-02-27 | Stop reason: HOSPADM

## 2017-02-21 RX ORDER — MAGNESIUM HYDROXIDE 1200 MG/15ML
30 LIQUID ORAL ONCE
Status: DISCONTINUED | OUTPATIENT
Start: 2017-02-21 | End: 2017-02-21 | Stop reason: CLARIF

## 2017-02-21 RX ORDER — HYDRALAZINE HYDROCHLORIDE 20 MG/ML
10 INJECTION INTRAMUSCULAR; INTRAVENOUS EVERY 4 HOURS PRN
Status: DISCONTINUED | OUTPATIENT
Start: 2017-02-21 | End: 2017-02-21

## 2017-02-21 RX ORDER — ATORVASTATIN CALCIUM 40 MG/1
40 TABLET, FILM COATED ORAL DAILY
Status: DISCONTINUED | OUTPATIENT
Start: 2017-02-21 | End: 2017-02-23

## 2017-02-21 RX ORDER — ENALAPRILAT 1.25 MG/ML
1.25 INJECTION INTRAVENOUS EVERY 6 HOURS PRN
Status: DISCONTINUED | OUTPATIENT
Start: 2017-02-21 | End: 2017-02-21

## 2017-02-21 RX ORDER — ENALAPRILAT 1.25 MG/ML
1.25 INJECTION INTRAVENOUS EVERY 6 HOURS PRN
Status: DISCONTINUED | OUTPATIENT
Start: 2017-02-21 | End: 2017-02-27 | Stop reason: HOSPADM

## 2017-02-21 RX ADMIN — HYDRALAZINE HYDROCHLORIDE 10 MG: 20 INJECTION INTRAMUSCULAR; INTRAVENOUS at 22:44

## 2017-02-21 RX ADMIN — HYDRALAZINE HYDROCHLORIDE 10 MG: 20 INJECTION INTRAMUSCULAR; INTRAVENOUS at 11:58

## 2017-02-21 RX ADMIN — INSULIN ASPART 1 UNITS: 100 INJECTION, SOLUTION INTRAVENOUS; SUBCUTANEOUS at 09:17

## 2017-02-21 RX ADMIN — HYDRALAZINE HYDROCHLORIDE 10 MG: 20 INJECTION INTRAMUSCULAR; INTRAVENOUS at 17:09

## 2017-02-21 RX ADMIN — SODIUM CHLORIDE: 9 INJECTION, SOLUTION INTRAVENOUS at 14:27

## 2017-02-21 RX ADMIN — INSULIN ASPART 1 UNITS: 100 INJECTION, SOLUTION INTRAVENOUS; SUBCUTANEOUS at 12:17

## 2017-02-21 RX ADMIN — ACETAMINOPHEN 650 MG: 650 SUPPOSITORY RECTAL at 12:23

## 2017-02-21 NOTE — PROGRESS NOTES
7fr arterial sheath removed. Manual pressure was held for 15 minutes. Site was dry with no hematoma. Sterile guaze and Tegaderm was placed on the site. Pt was given instructions and report was given to  RN. 20 min face to face time with pt.

## 2017-02-21 NOTE — PROGRESS NOTES
Hospitalist update note:    Called regarding 10/10 back pain and concern for bleed. Patient seen at bedside. Lying comfortably in bed. Had received tylenol and pain 3/10 during my evaluation.  Vitals are stable. On exam, abdomen is soft, non-tender, back: no flank tenderness.  At this point, suspicion for retroperitoneal bleed low. Suspect this could be related to him lying on his back for prolonged period of time following procedure. Per daughter who was at bedside reported, he sometimes c/o back pain.  However, will check hemoglobin. If worsening pain or change in hemodynamics, will obtain imaging with non-contrast CT of the abdomen.      Ruben Rosen MD  Hospitalist

## 2017-02-21 NOTE — PROGRESS NOTES
See dictation. Acute stroke with significant weakness left and sensory loss. tpa given and IR coming in for right M2 clot. Risks reviewed with patient and daughter

## 2017-02-21 NOTE — ED NOTES
"[facility]  ED Nurse Handoff Report    ED Chief complaint: One-sided Weakness (Was eating normal and left arm became limp.  last normal 1822.  Also left side facial droop.)      ED Diagnosis:   Final diagnoses:   None       Code Status: Full Code    Allergies:   Allergies   Allergen Reactions     No Known Allergies        Activity level:  Stand with Assist-pt uses cane at NH     Needed?: No    Isolation: No  Infection: Not Applicable    Bariatric?: No      Vital Signs:   Vitals:    02/20/17 1925 02/20/17 1938 02/20/17 1944   BP: (!) 166/110 171/77 174/89   Pulse: 64     Resp: 18 22 14   Temp: 98.7  F (37.1  C)     TempSrc: Oral     SpO2: 98% 99% 99%   Weight: 82.6 kg (182 lb)     Height: 1.778 m (5' 10\")         Cardiac Rhythm: ,        Pain level:      Is this patient confused?: No    Patient Report: Initial Complaint: pt was at NH eating dinner was noted to be \"choking\".  Pt also noted to have left sided facial droop and left arm weakness. Pt continues to have symptoms on arrival to   ED by EMS.  Pt has left arm numbness and weakness.  Pt also has left leg weakness.  Pt has slightly slurred speech.    Focused Assessment: see flowsheets  Tests Performed: CT, labs  Abnormal Results:clot in brain on CT  Treatments provided: labetolol, TPA, will go to IR    Family Comments: pt daughter here    OBS brochure/video discussed/provided to patient: N/A    ED Medications:   Medications   sodium chloride 0.9 % for CT scan flush dose 100 mL (100 mLs Intravenous Given 2/20/17 1922)   iopamidol (ISOVUE-370) solution 120 mL (120 mLs Intravenous Given 2/20/17 1922)   0.9% sodium chloride infusion ( Intravenous New Bag 2/1935)   labetalol (NORMODYNE/TRANDATE) injection 20 mg (20 mg Intravenous Given 2/20/17 1946)       Drips infusing?:  Yes- NS, TPA      ED NURSE PHONE NUMBER: [phone]         "

## 2017-02-21 NOTE — PROVIDER NOTIFICATION
Dr. Boyd notified of neuro changes post IR procedure.  No movement in left upper extremity.  Complete sensation loss on left side including face, arm and leg.  No further orders received.

## 2017-02-21 NOTE — PLAN OF CARE
Problem: Goal Outcome Summary  Goal: Goal Outcome Summary  SLP: Bedside swallow evaluation completed. Patient presents with severe oral and pharyngeal dysphagia at bedside secondary to right MCA stroke. He was able to follow 1 step directions, moderate to severe dysarthria. Deficits include; left facial droop with decreased oral sensation, decreased labial seal, lingual ROM, strength. He demonstrated an incomplete swallow response with small teaspoon amount of water and suspect silent aspiration. Poor bolus contol/coordination and sensation to propel the bolus posteriorly most spilled out of oral cavity or spitting it out. Overt Sx of penetration/aspiration given incomplete and delayed swallow response. Patient is considered a high risk for aspiration. Recommend: 1. NPO overnight and will re-assess swallow function on 2/22/17. 2. Discharge to be determined following further work up.

## 2017-02-21 NOTE — PROGRESS NOTES
02/21/17 1435   General Information   Onset Date 02/20/17   Start of Care Date 02/21/17   Referring Physician    Patient Profile Review/OT: Additional Occupational Profile Info See Profile for full history and prior level of function   Patient/Family Goals Statement Patient did not state.   Swallowing Evaluation Bedside swallow evaluation   Behaviorial Observations Lethargic   Mode of current nutrition NPO   Respiratory Status Room air   Comments Right MCA diffusion restriction area and left cerebellar small area of diffusion restriction. A Mechanical thrombectomy was unsucessful.    Clinical Swallow Evaluation   Oral Musculature anomalies present   Structural Abnormalities present  (Left facial droop/weakness. )   Dentition upper and lower dentures   Secretion Management problems swallowing secretions   Mucosal Quality sticky   Mandibular Strength and Mobility impaired   Oral Labial Strength and Mobility impaired retraction;impaired pursing;impaired seal;impaired coordination   Lingual Strength and Mobility impaired protrusion;impaired anterior elevation;impaired left lateral movement;impaired right lateral movement;impaired coordination   Velar Elevation impaired   Buccal Strength and Mobility impaired   Laryngeal Function Voicing initiated   Oral Musculature Comments Moderate to severe imapirment.    Additional Documentation Yes   Additional evaluation(s) completed today Recommended   Rationale for completing additional evaluation Will likely need a video swallow study.   Swallow Eval   Feeding Assistance dependent   Clinical Swallow Eval: Thin Liquid Texture Trial   Mode of Presentation, Thin Liquids spoon;fed by clinician   Volume of Liquid or Food Presented 3 1/2 teaspoons.   Oral Phase of Swallow Poor AP movement;Residue in oral cavity;Premature pharyngeal entry   Oral Residue left anterior lateral sulci   Pharyngeal Phase of Swallow impaired;reduction in laryngeal movement   Diagnostic Statement  Incomplete swallow response with likely silent aspiration.   Clinical Swallow Eval: Nectar Thick Liquid Texture Trial   Mode of Presentation, Nectar spoon;self-fed   Volume of Nectar Presented 1 teaspoon   Oral Phase, Nectar Premature pharyngeal entry;Residue in oral cavity;Poor AP movement   Oral Residue, Nectar left anterior lateral sulci;left lip drooling   Pharyngeal Phase, Nectar impaired;reduction in laryngeal movement;throat clearing  (Decreased sensation. Spitting it out.)   Diagnostic Statement Suspect silent aspiration.   Clinical Swallow Eval: Honey Thick Liquid Texture Trial   Mode of Presentation, Honey spoon;fed by clinician   Volume of Honey Presented 1 teaspoon   Oral Phase, Honey Poor AP movement;Residue in oral cavity;Premature pharyngeal entry   Oral Residue, Honey mid posterior tongue;left anterior lateral sulci   Pharyngeal Phase, Honey impaired;coughing/choking;reduction in laryngeal movement   Diagnostic Statement Overt Sx of aspiration.   Swallow Compensations   Swallow Compensations Pacing;Reduce amounts;Multiple swallow   Results Suspect silent aspiration;Oral difficulties only   Swallow Eval: Clinical Impressions   Skilled Criteria for Therapy Intervention Skilled criteria met.  Treatment indicated.   Functional Assessment Scale (FAS) 1   Treatment Diagnosis Severe oral and pharyngeal dysphagia   Diet texture recommendations NPO   Therapy Frequency daily   Predicted Duration of Therapy Intervention (days/wks) 1 week.   Anticipated Discharge Disposition inpatient rehabilitation facility   Risks and Benefits of Treatment have been explained. Yes   Patient, family and/or staff in agreement with Plan of Care Yes   Clinical Impression Comments Patient presents with severe oral and pharyngeal dysphagia at bedside secondary to right MCA stroke. He was able to follow 1 step directions, moderate to severe dysarthria. Deficits include; left facial droop with decreased oral sensation, decreased  labial seal, lingual ROM, strength. He demonstrated an incomplete swallow response with small teaspoon amount of water and suspect silent aspiration. Poor bolus contol/coordination and sensation to propel the bolus posteriorly most spilled out of oral cavity or spitting it out. Overt Sx of penetration/aspiration given incomplete and delayed swallow response. Patient is considered a high risk for aspiration. Recommend: 1. NPO overnight and will re-assess swallow function on 2/22/17.    Total Evaluation Time   Total Evaluation Time (Minutes) 20

## 2017-02-21 NOTE — PROGRESS NOTES
SPIRITUAL HEALTH SERVICES  Spiritual Assessment Progress Note  [facility] ICU  PT was in good spirits and named being very pleased with his care. Pt appeared to be coping ok and was comfortable.   Pt is [employer] but is not connected to a [employer]ast[employer] anymore.  Pt requested prayer.     provided encouragement and prayer.     SH continues to be available for support as needed.      Addendum:   came back and met with pt's daughter and  while pt slept.  They processed the incident and pt's health history.  They commented that pt has stated that he doesn't like being old.  He was quite athletic in his younger days and limitations are hard on him.    Pt has 3 sons that live in [place] and one daughter that lives here. Daughter named that she is feeling the weight of pt condition on her since her brothers are not here and involved.      listened and provided emotional support.      SH will continue to follow as needed for support.                                                                                                                                                   Juani Regalado M.Div., [facility]  Staff    Pager [phone]

## 2017-02-21 NOTE — CONSULTS
REQUESTING PHYSICIAN:  None stated.      REASON FOR CONSULTATION:  Mr. Elias Mckee is a pleasant 85-year-old gentleman I am asked to evaluate urgently for code stroke.  He is accompanied by his daughter.  His symptoms began around 6:22 p.m. this evening at his care center.  He developed the acute onset of left-sided weakness.  Apparently he was fine earlier in the day.  When he presented to the emergency room, he was noted to have significant left lower facial droop and severe weakness of his left upper extremity.  He also has some left lower extremity weakness, although his daughter had indicated some of this could be a residual of prior stroke from 9 years earlier.  The facial weakness and upper extremity weakness are clearly new.  As part of his evaluation for code stroke, he did go for a noncontrast head CT which does show several chronic infarcts but no acute abnormalities.  CT angiography revealed occlusion of the right M2 branch of the middle cerebral artery and perfusion deficit corresponding in the right MCA territory as well consistent with ischemia.  Following his imaging after he came back to the emergency room, Dr. Bradshaw noted he initially worsened and actually had more severe dysarthria which did somewhat wax and wane.      PHYSICAL EXAMINATION:  At the time of my evaluation, he is alert and fully oriented.  He does have a blood pressure 160/100, temperature 98.7, pulse 64 and he is in sinus rhythm, respirations 16.  He is alert and oriented.  He demonstrated normal extraocular movements and no definite visual field cut.  He has severe left lower facial weakness.  Extremity examination reveals moderately severe weakness of his left upper extremity diffusely as well as moderately severe left lower extremity weakness.  He did not appreciate light touch on his left arm or left leg, although he did appreciate it in his face.  Finger-to-nose testing was weak and unsteady with his left upper  extremity.      LABORATORY DATA:  Reveal a creatinine of 1.28, and he does have baseline kidney disease.  Glucose was 187, hemoglobin 12.2, platelet count 136, INR 0.99.      PAST MEDICAL HISTORY:  Notable for a stroke 9 years ago with some residual left leg weakness.  He has a history of chronic ischemic heart disease, prostate cancer, hypertension, hyperlipidemia, type 2 diabetes, chronic kidney disease stage III.  He is a never smoker and does not use alcohol.      MEDICATIONS:   1.  Melatonin.   2.  MiraLax.   3.  Acetazolamide b.i.d.    4.  Iron.   5.  Amaryl 1 mg q.a.m.   6.  Flonase.   7.  Tamsulosin 0.4 mg per day.   8.  Simvastatin.   9.  81 mg Aspirin   10.  Metoprolol 50 mcg.   11.  Lisinopril 10 mg.   12.  Multivitamin.   13.  Plavix 75 mg.      ALLERGIES:  No drug allergies.      REVIEW OF SYSTEMS:  He denies any headache or discomfort at this time.      IMPRESSION:  Acute stroke which started approximately 6:22 p.m.  He has significant deficits including left facial weakness, left upper extremity weakness, and sensory loss contributing to an NIH stroke scale of at least 5. There is also left lower extremity weakness, although some of that may be residual from an earlier stroke.  Therapeutic options were reviewed with the patient and daughter prior to my arrival.  When I had spoken to Dr. Bradshaw, on my way in, I had indicated I felt there may be a bleeding risk of approximately 5% given his age, and I did indicate that on rare occasion this can be severe or potentially even fatal.  Due to the severity of his deficits and being well within time window for tPA, the patient and his daughter elected to proceed with tPA infusion.  In addition, because of the presence of clot in his right M2 territory, Interventional Radiology was contacted and they are coming in for clot retrieval.  The patient will be hospitalized in the Intensive Care Unit and watched closely afterwards.      I did spend 45 minutes  critical care time with code stroke and this gentleman is at risk for severe neurological deterioration and the treatment modalities employed are also significant in terms of risks associated with these interventions.         JUSTIN ESPINOZA MD             D: 2017 20:31   T: 2017 21:26   MT: LQ      Name:     MIRANDA BEGUM   MRN:      [id number]        Account:       [id number]   :      08/10/1931           Consult Date:  2017      Document: [id number]       cc: Shen Huff MD

## 2017-02-21 NOTE — PROCEDURES
Interventional Neuroradiology Post Procedure    Patient Name: Elias Mckee  MRN: [id number]  Date of Procedure: February 20, 2017    Procedure: Stroke mechanical thrombectomy  Radiologist: Matthew Sehets    Contrast: 65 ml Isovue  Fluoro Time: 32.6 minutes  Air Kerma: 2825 mGy  Medications: Versed 1 mg IV                       Fentanyl 50 mcg IV  Sedation Time: 86 minutes    EBL: 50 cc  Complications: None evident    Preliminary Report:   (See dictation for full detail)  - TICI1 occlusion of R-MCA M2 branch  - Failure of recanalization due to acute angle of branch and markedly firm/calcified nature of clot    Assess/Plan:  Routine post-procedure monitoring  Sheath out tomorrow (tPA), 4 hours leg straight/bedrest afterwards  Report to ordering    Matthew Sheets MD  [phone]

## 2017-02-21 NOTE — H&P
DATE OF ADMISSION:  02/20/2017      PRIMARY CARE  PHYSICIAN:  Dr. Shen Huff.      CODE STATUS:  Full code which was discussed with the patient and his daughter at bedside.      CHIEF COMPLAINT:  Difficulty eating and a left-sided facial droop and weakness.      HISTORY OF PRESENT ILLNESS:  Mr. Elias Mckee is an 85-year-old male with a past medical history significant for stroke about 9 years ago, coronary artery disease with multiple stents and cardiac arrest, hypertension, dyslipidemia, diabetes and atrial fibrillation who presents to the Emergency Department with the above concerns.  History is obtained through discussion with the ED physician, the patient and his daughter at bedside.  The patient was normal up until about 1820, or about 2 hours ago, when he was eating dinner and was noted to be choking by people he was with.  They noted that he had some left-sided facial droop and he had acute onset of left-sided weakness.  EMS was summoned and the patient was brought to the Emergency Department where he was diagnosed with acute stroke on CTA.  Neurology was summoned and the patient was given IV TPA.  There is evidence of a clot in the right M2 segment and so IR has been asked to come in to perform thrombectomy.      The patient has had a stroke in the past leaving him with some mild left leg weakness.  He does have a history of heart disease as well and has had 10 stents placed at different times and actually had a cardiac arrest during his last stroke.  The patient is not having any chest discomfort or shortness of breath, actually has no other symptoms other than the left-sided weakness at this point.  He has not had any chest discomfort over the preceding weeks or months and has otherwise been in good state of health.  No recent infections.  His PCP recently doubled his Flomax per report, but otherwise no medication changes.  He has been diligent taking the remainder of his medications.  No abdominal  discomfort, nausea, diarrhea or urinary symptoms.      PAST MEDICAL HISTORY:   1.  CVA about 9 years ago with residual left lower extremity weakness.   2.  Coronary artery disease:  The patient has a total of 10 stents placed with last heart catheterization in 2008,  per our records.  At that point he had stenting of the in-stent stenosis of the LAD and distal circumflex with angioplasty of a septal .  Eight stents have been noted prior to that.  Last echocardiogram was in 2014 showing an EF of 55% to 60% with inferoseptal hypokinesis.   3.  Hypertension.   4.  Dyslipidemia.   5.  Atrial fibrillation, not on anticoagulation.   6.  Diabetes type 2.   7.  Chronic kidney disease, stage 3, with a baseline creatinine of 1.2.   8.  BPH.      MEDICATIONS:    Prior to Admission medications    Medication Sig Last Dose Taking? Auth Provider   GLIMEPIRIDE PO Take 3 mg by mouth daily (with breakfast) 2/20/2017 at am Yes Unknown, Entered By History   polyethylene glycol (MIRALAX/GLYCOLAX) powder Take 17 g by mouth every 48 hours as needed for constipation prn Yes Unknown, Entered By History   tamsulosin (FLOMAX) 0.4 MG capsule Take 0.8 mg by mouth daily 2/20/2017 at am Yes Unknown, Entered By History   melatonin 1 MG TABS Take 1 tablet (1 mg) by mouth At Bedtime 2/19/2017 at hs Yes Shen Huff MD   acetaminophen (TYLENOL) 325 MG tablet Take 650 mg by mouth every 4 hours as needed for mild pain  prn Yes Tabatha Cho MD   acetaZOLAMIDE (DIAMOX SEQUEL) 500 MG capsule Take 500 mg by mouth 2 times daily 2/20/2017 at am Yes Reported, Patient   ferrous gluconate (FERGON) 324 (38 FE) MG tablet Take 1 tablet (324 mg) by mouth daily (with breakfast) 2/20/2017 at am Yes Shen Huff MD   Cholecalciferol (VITAMIN D) 2000 UNITS tablet Take 2,000 Units by mouth daily 2/20/2017 at am Yes Shen Huff MD   fluticasone (FLONASE) 50 MCG/ACT nasal spray Spray 1-2 sprays into both nostrils daily 2/20/2017 at am Yes Shen Huff  MD   amoxicillin (AMOXIL) 250 MG capsule Take 2,000 mg by mouth once Prior to dental work  prn Yes Reported, Patient   aspirin (ASPIR-LOW) 81 MG EC tablet Take 81 mg by mouth daily 2/20/2017 at am Yes Reported, Patient   metoprolol (LOPRESSOR) 50 MG tablet Take 150 mg by mouth 2 times daily 2/20/2017 at am Yes Reported, Patient   lisinopril (PRINIVIL,ZESTRIL) 10 MG tablet Take 1 tablet by mouth 2 times daily. 2/20/2017 at am Yes Elijah Ramos MD   Multiple Vitamin (MULTIVITAMIN) per tablet Take 1 tablet by mouth daily. 2/20/2017 at am Yes Elijah Ramos MD   PLAVIX 75 MG OR TABS ONE DAILY 2/20/2017 at am Yes Elijah Ramos MD   order for DME Equipment being ordered: Shen Gray MD   order for DME Equipment being ordered: basic black non - collapesable chhayae   Shen Huff MD   order for DME Equipment being ordered: Walker - basic black non collapseable walker   Shen Huff MD   order for DME Equipment being ordered: transport wheelchair.   It has been ruled out that a walker and cane are not sufficient.   Shen Huff MD   ORDER FOR DME Equipment being ordered:  transport wheelchair   Shen Huff MD   ORDER FOR DME Equipment being ordered: Shen Velazquez MD   Blood Glucose Monitoring Suppl (BLOOD GLUCOSE TEST STRIPS STRP) 66 strips continuous.   Elijah Ramos MD   NITROGLYCERIN 0.4 MG SL SUBL ONE TABLET UNDER TONGUE, FOR CHEST PAIN, AS DIRECTED   Elijah Ramos MD   LANCETS MISC. KIT appropriate to strips and machine   Loli Cotton NP           SOCIAL HISTORY:  The patient denies any use of tobacco or alcohol.      FAMILY HISTORY:  Mother had a stroke.      ALLERGIES:  No known drug allergies.      REVIEW OF SYSTEMS:  The complete review of systems was reviewed and negative, save for the pertinent positives, which are recorded in the HPI.      PHYSICAL EXAMINATION:   VITAL SIGNS:  Show blood pressure of 179/92, heart rate 70, respirations 18, satting 98% on nasal cannula oxygen with a  temperature of 98.7 degrees Fahrenheit.   GENERAL:  The patient is lying in bed and resting comfortably.   HEENT:  Pupils equal, round, reactive to light, extraocular muscle function intact.  No scleral icterus.  Oropharynx is clear.   NECK:  No lymphadenopathy or thyromegaly.   CARDIOVASCULAR:  Heart is regular rate and rhythm without any murmur, rub or gallop.   PULMONARY:  Clear to auscultation bilaterally without wheeze or rhonchi.   GASTROINTESTINAL:  Positive bowel sounds, soft, nontender and nondistended.   SKIN:  No rashes or lesions.   LYMPHATICS:  Trace edema of bilateral extremities.   PSYCHIATRIC:  Alert and oriented x3, normal affect.   NEUROLOGIC:  The patient has left-sided deficits of both the upper and lower extremity.  He has loss of sensation in the arm and the leg and has discoordination of the arm and leg on the left side.  Muscle strength is diminished.      LABORATORY DATA:  CBC shows WBC count of 7.2, hemoglobin 12.2 and platelets of 136.  Sodium 141, potassium 3.9, chloride 113, CO2 20, BUN 28, creatinine 1.28 with a glucose of 187.      CT angiography of the head shows occluded right M2 segment shortly after the origin, correlating with a perfusion defect seen earlier.  There is a chronically diseased left vertebral artery with incomplete filling and a dominant right vertebral artery.      ASSESSMENT AND PLAN:  Mr. Mckee is an 85-year-old male with a past medical history significant for previous stroke, coronary artery disease with cardiac arrest, diabetes, atrial fibrillation, chronic kidney disease who presents to the Emergency Department with left-sided weakness consistent with acute stroke.   1.  Acute right M2 cerebrovascular accident:  The patient is currently getting TPA, will be going to  for thrombectomy.  Neurology has already evaluated the patient.  The patient was in the ICU for close monitoring following the cerebral angiogram with thrombectomy.  We will plan for blood  pressure control, avoid any other anticoagulants, frequent neuro checks per TPA protocol.  Echocardiogram has been ordered.  Neurology will continue to follow.   2.  Coronary artery disease with history of cardiac arrest:  The patient is asymptomatic.  Will monitor on telemetry and follow serial troponins.  Echocardiogram has been ordered.   3.  Previous stroke:  He has an acute stroke and getting TPA as above.  Will be holding aspirin and Plavix given the TPA.   4.  Diabetes:  He will be n.p.o. We will have him on a sliding scale and if he is hyperglycemic, could initiate ICU insulin drip protocol.   5.  Hypertension:  Nipride has been ordered as needed.  Can resume his PTA regimen once taking p.o.   6.  Atrial fibrillation:  Heart is regular at this point.  Will monitor on telemetry.  PRN metoprolol for tachycardia.   7.  Chronic kidney disease, stage 3:  Creatinine appears to be roughly at baseline.  Will hydrate and monitor.   8.  BPH:  Can restart Flomax when taking p.o.   9.  Deep venous thrombosis prophylaxis:  SCDs.  No anticoagulants given the TPA.   10.  Disposition:  He will be admitted as an inpatient.         VILMA CLEANING DO             D: 2017 20:46   T: 2017 21:10   MT: SHERIN      Name:     MIRANDA BEGUM   MRN:      [id number]        Account:      [id number]   :      08/10/1931           Admitted:     [id number]      Document: [id number]       cc: Shen Huff MD

## 2017-02-21 NOTE — PROVIDER NOTIFICATION
"Notified Hospitalist of pt's pain. 10/10 mid back with no relief with tylenol. Repositioned- pt states the pain is \"better\". Pain 4/10 after repositioning. Paged hospitalist due to sudden increase of pain. Hospitalist to come see pt.     Dr. Rosen came to take a look at the pt- no new orders. Will continue to monitor.   "

## 2017-02-21 NOTE — PROGRESS NOTES
"Interventional Neuroradiology    S: \"I'm okay\"    O: /75  Pulse 55  Temp 97.5  F (36.4  C) (Oral)  Resp 27  Ht 1.778 m (5' 10\")  Wt 89 kg (196 lb 3.4 oz)  SpO2 97%  BMI 28.15 kg/m2    Labs: Hgb 10.7, plt 124, creat 1.05    MRI: IMPRESSION:   1. Recent moderate-sized posterior division right MCA territory  infarct corresponding to the known right M2 occlusion noted on the  recent comparison CT angiogram.  2. Tiny recent infarct in the left cerebellar hemisphere.  3. Diffuse cerebral volume loss and cerebral white matter changes  consistent with chronic small vessel ischemic disease    Neuro: A/O x4, Lt facial droop, dysarthria, no movement of LUE, LLE moves to commands but weak, decreased sensation Lt side  Rt femoral sheath site: Sheath pulled today about 0745.  Site is soft, no hematoma    A: Rt MCA stroke  Status post IV thrombolysis  Status post attempted mechanical thrombectomy. Unsuccessful due to angle of branch occlusion and calcified natrure of the clot    P: Bedrest x 4 hours post sheath removal  We will sign off. Please contact us if needed    Tootie Iyer, APRN, CNP          "

## 2017-02-21 NOTE — PROGRESS NOTES
"This a follow up regarding stroke  Patient presented to ER last night with left sided weakness IV tPA was given and mechanical thrombectomy was tried but was unsuccessful. Had some progression after angiogram.  Patient has been stable since admission to ICU  He had no complaints this morning    /75  Pulse 55  Temp 97.5  F (36.4  C) (Oral)  Resp 27  Ht 1.778 m (5' 10\")  Wt 89 kg (196 lb 3.4 oz)  SpO2 97%  BMI 28.15 kg/m2  Awake, alert, answers questions appropriately   Follows commands with no difficulty  Speech:mildly dysarthric  Language:normal  CN: Left facial weakness UMN type, VFF, EOMI, Pupils: R deformed, left pinpoint  Motor : LUE0/5 LLE 4/5 Right side 5/5  Sensory:dense sensory loss over the left side  NIHSS=9    Workup:  MRI brain reviewed: Right MCA diffusion restriction area and left cerebellar small area of diffusion restriction    Crea=1.05  Calcium 7.5  WBC=8.3 Plt 124    A/P Acute stroke:  -S/P IV tPA  -Etiology:workup in progress(Likely cardio-embolic as he has history of Afibrillation )  -TTE pending  -Aspirin to be started tonight: no need for repeat CT as MRI was done 12 hours after tpa and no bleed was seen.  -Lipitor 80 once he passes swallow eval  -Anticoagulation to be restarted before discharge  -Blood pressure parameters:Keep SBP<180 first 24 hours  -Activity:flat head of bed first 24 hours  -DVT prophylaxis:SCDs   -Imaging: will repeat CT with any change in the neuro exam or before starting anti-coagulation        "

## 2017-02-21 NOTE — PLAN OF CARE
Problem: Goal Outcome Summary  Goal: Goal Outcome Summary  PT: Pt to remain on bedrest for 24 hrs post tPA. Pt not appropriate for OOB activity until after 8pm. Will plan for evaluation 2/22.

## 2017-02-21 NOTE — PHARMACY-ADMISSION MEDICATION HISTORY
Admission medication history interview status for the 2/20/2017  admission is complete. See EPIC admission navigator for prior to admission medications     Medication history source reliability:Good    Actions taken by pharmacist (provider contacted, etc): information from [facility] med list and patient.     Additional medication history information not noted on [facility] med list :None    Medication reconciliation/reorder completed by provider prior to medication history? No    Time spent in this activity: 25 min    Prior to Admission medications    Medication Sig Last Dose Taking? Auth Provider   GLIMEPIRIDE PO Take 3 mg by mouth daily (with breakfast) 2/20/2017 at am Yes Unknown, Entered By History   polyethylene glycol (MIRALAX/GLYCOLAX) powder Take 17 g by mouth every 48 hours as needed for constipation prn Yes Unknown, Entered By History   tamsulosin (FLOMAX) 0.4 MG capsule Take 0.8 mg by mouth daily 2/20/2017 at am Yes Unknown, Entered By History   melatonin 1 MG TABS Take 1 tablet (1 mg) by mouth At Bedtime 2/19/2017 at hs Yes Shen Huff MD   acetaminophen (TYLENOL) 325 MG tablet Take 650 mg by mouth every 4 hours as needed for mild pain  prn Yes Marquis, Tabatha Bellamy MD   acetaZOLAMIDE (DIAMOX SEQUEL) 500 MG capsule Take 500 mg by mouth 2 times daily 2/20/2017 at am Yes Reported, Patient   ferrous gluconate (FERGON) 324 (38 FE) MG tablet Take 1 tablet (324 mg) by mouth daily (with breakfast) 2/20/2017 at am Yes Shen Huff MD   Cholecalciferol (VITAMIN D) 2000 UNITS tablet Take 2,000 Units by mouth daily 2/20/2017 at am Yes Shen Huff MD   fluticasone (FLONASE) 50 MCG/ACT nasal spray Spray 1-2 sprays into both nostrils daily 2/20/2017 at am Yes Shen Huff MD   amoxicillin (AMOXIL) 250 MG capsule Take 2,000 mg by mouth once Prior to dental work  prn Yes Reported, Patient   aspirin (ASPIR-LOW) 81 MG EC tablet Take 81 mg by mouth daily 2/20/2017 at am Yes Reported, Patient   metoprolol (LOPRESSOR) 50 MG tablet Take  150 mg by mouth 2 times daily 2/20/2017 at am Yes Reported, Patient   lisinopril (PRINIVIL,ZESTRIL) 10 MG tablet Take 1 tablet by mouth 2 times daily. 2/20/2017 at am Yes Elijah Ramos MD   Multiple Vitamin (MULTIVITAMIN) per tablet Take 1 tablet by mouth daily. 2/20/2017 at am Yes Elijah Ramos MD   PLAVIX 75 MG OR TABS ONE DAILY 2/20/2017 at am Yes Elijah Ramos MD   order for DME Equipment being ordered: Shen Gray MD   order for DME Equipment being ordered: basic black non - collapesable cane   Shen Huff MD   order for DME Equipment being ordered: Walker - basic black non collapseable walker   Shen Huff MD   order for DME Equipment being ordered: transport wheelchair.   It has been ruled out that a walker and cane are not sufficient.   Shen Huff MD   ORDER FOR DME Equipment being ordered:  transport wheelchair   Shen Huff MD   ORDER FOR DME Equipment being ordered: Wheelchair   Shen Huff MD   Blood Glucose Monitoring Suppl (BLOOD GLUCOSE TEST STRIPS STRP) 66 strips continuous.   Elijah Ramos MD   NITROGLYCERIN 0.4 MG SL SUBL ONE TABLET UNDER TONGUE, FOR CHEST PAIN, AS DIRECTED   Elijah Ramos MD   LANCETS MISC. KIT appropriate to strips and machine   Loli Cotton, FADIA

## 2017-02-21 NOTE — PROGRESS NOTES
[facility]    Hospitalist Progress Note      Assessment & Plan   Elias Mckee is a 85 year old male past medical history significant for previous stroke with residual LLE weakness, coronary artery disease with hx of cardiac arrest, diabetes, atrial fibrillation, chronic kidney disease who presents to the Emergency Department with left-sided weakness consistent with acute stroke.     Acute right MCA territory stroke:  CTA showed occluded right M2 segment He received tPA.  Attempt mechanical thrombectomy by IR however, failure of recanalization due to actue angle of branch and markedly frim/calicified nature of clot.    - MRI 2/21 shows recent moderate sized posterior division right MCA territory infarct corresponding to the known right M2 occlusion and tiny recent infarct int he left cerebellar hemisphere  - echo pending  - Lipid panel: HDL 28, LDL 49,   - neurology following  - PT/OT and SLP evaluation    CAD with hx of multiple stent placements and hx of cardiac arrest:  - troponin x 1 negative  - no complaint of chest pain  - resume BB and ACEi once able to take PO  - continue telemetry    Hx of Atrial fibrillation  - admission EKG shows NSR. Not on anticoagulation prior to admission  - continue telemetry  - metoprolol IV prn until PO intake is resumed    Previous stroke:   - management of acute stroke as above.  ASA and Plavix on hold due to tPA    Type II DM:adeqautely controlled at baseline.  A1c 6.8.    - on Glimepride PTA  - hold sulfonylurea until PO intake is resumed and stable  - continue with SSI    Hypertension:   - Nicardipine drip prn  - resume his PTA regimen once taking p.o.      Chronic kidney disease, stage 3:   - Creatinine baseline1.3-1.4  - montior    Subclinical hypothyroidism:  TSH 5.96. Will need follow up TSH in 6-8 weeks once acute issues stable.     BPH: Can restart Flomax when taking p.o.     Deep venous thrombosis prophylaxis: SCDs. No anticoagulants given  the TPA.   Code Status: Full Code     Disposition: Expected discharge in 2-3 days pending hospital course    Ruben Rosen    Interval History   No acute events overnight. No changed in LUE weakness overnight.  Denies chest pain or shortness of breath. He is afebrile.     -Data reviewed today: I reviewed all new labs and imaging results over the last 24 hours. I personally reviewed the brain MRI image(s) showing right MCA stroke.    Physical Exam   Temp: 97.5  F (36.4  C) Temp src: Oral BP: 170/75 Pulse: 55 Heart Rate: 59 Resp: 27 SpO2: 97 % O2 Device: None (Room air) Oxygen Delivery: 2 LPM  Vitals:    02/20/17 1925 02/20/17 2300 02/21/17 0430   Weight: 82.6 kg (182 lb) 89 kg (196 lb 3.4 oz) 89 kg (196 lb 3.4 oz)     Vital Signs with Ranges  Temp:  [97.1  F (36.2  C)-98.7  F (37.1  C)] 97.5  F (36.4  C)  Pulse:  [55-75] 55  Heart Rate:  [47-73] 59  Resp:  [10-27] 27  BP: (118-179)/() 170/75  MAP:  [82 mmHg-121 mmHg] 101 mmHg  Arterial Line BP: (130-189)/(50-82) 160/65  SpO2:  [95 %-99 %] 97 %  I/O last 3 completed shifts:  In: 492.5 [I.V.:492.5]  Out: 1500 [Urine:1500]    Constitutional: Alert, awake and no apparent distress  Respiratory: Clear to ausculation  Cardiovascular: regular rate and rhythm  GI: soft, NT, ND  Skin/Integumen: warm and dry  Neuro:  dysarthria, Left facial droop, significant left sided weakness UE worse than LE    Medications     - MEDICATION INSTRUCTIONS -       - MEDICATION INSTRUCTIONS -       - MEDICATION INSTRUCTIONS -       - MEDICATION INSTRUCTIONS -       NaCl 75 mL/hr at 02/21/17 0905     niCARdipine 40 mg in 200 mL 0.9% NaCl Stopped (02/21/17 0543)     NaCl         insulin aspart  1-6 Units Subcutaneous Q4H       Data     Recent Labs  Lab 02/21/17  0410 02/20/17  1905 02/17/17  0940   WBC 8.3 7.2  --    HGB 10.7* 12.2*  --    MCV 91 93  --    * 136*  --    INR  --  0.99  --     141 144   POTASSIUM 3.7 3.9 4.2   CHLORIDE 117* 113* 115*   CO2 19* 20 19*   BUN  23 28 18   CR 1.05 1.28* 1.34*   ANIONGAP 7 8 10   TRENT 7.5* 8.4* 8.5   * 187* 128*   ALT  --   --  29   TROPI <0.015The 99th percentile for upper reference range is 0.045 ug/L.  Troponin values in the range of 0.045 - 0.120 ug/L may be associated with risks of adverse clinical events.  --   --        Recent Results (from the past 24 hour(s))   CT Head w/o Contrast    Narrative    CT HEAD WITHOUT CONTRAST  2/20/2017 7:14 PM    HISTORY: Stroke.    TECHNIQUE: Scans were obtained through the head without IV contrast.   Radiation dose for this scan was reduced using automated exposure  control, adjustment of the mA and/or kV according to patient size, or  iterative reconstruction technique.    COMPARISON: 1/21/2013.    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 There are  old infarcts in the left mid frontal periventricular region unchanged  in the interval.  No hemorrhage, mass lesion, or focal area of acute  infarction identified. Paranasal sinuses are normal. No bony  abnormality. Mild atrophy.      Impression    IMPRESSION:   1. Old infarcts as described.  2. Atrophy.  3. Nothing clearly acute.  4. CT angiogram will follow.    LEO BENSON MD   CTA Angiogram Head Neck   Result Value    Radiologist flags Right M2 occlusion (AA)    Narrative    CT ANGIOGRAM OF THE HEAD AND NECK WITHOUT AND WITH CONTRAST  2/20/2017  7:23 PM     HISTORY: Right hemispheric stroke symptoms.    TECHNIQUE:  Precontrast localizing scans were followed by CT  angiography with an injection of 70 mL nonionic contrast material IV  with scans through the head and neck.  Images were transferred to a  separate 3-D workstation where multiplanar reformations and 3-D images  were created.  Estimates of carotid stenoses are made relative to the  distal internal carotid artery diameters except as noted.   Radiation  dose for this scan was reduced using  automated exposure control,  adjustment of the mA and/or kV according to patient size, or iterative  reconstruction technique.    COMPARISON: Head CT today.    FINDINGS:  A right M2 branch is occluded shortly after its origin.  Right A1 segment has a short segment moderate stenosis. [place] otherwise normal.    Both carotid arteries appear widely patent. Right vertebral artery is  dominant and patent. The left vertebral artery is incomplete with  portions of it occluded and portions of it minimally patent from  collaterals. This is likely chronic.    Aortic arch vessels not optimally seen but appear patent on source  images.      Impression    IMPRESSION:  1. Occluded right M2 segment shortly after origin, correlating with  the perfusion defect seen on today's perfusion scan.  2. Stenosis right A1 segment.  3. Chronically diseased left vertebral artery with incomplete filling.  Dominant right vertebral artery.  4.   [Critical Result: Right M2 occlusion]    Finding was identified on 2/20/2017 7:35 PM.     Dr. Bradshaw was contacted by me on 2/20/2017 7:36 PM and verbalized  understanding of the critical result.       LEO BENSON MD   CT Head w Contrast    Narrative    CT BRAIN PERFUSION 2/20/2017 7:36 PM    HISTORY: Stroke.    TECHNIQUE: Time sequential axial CT images of the head were acquired  during the administration of intravenous contrast 50 mL [id number]-370.  CTA images of the [place] as well as color perfusion maps of  the brain were created from this time sequential axial source data.  Radiation dose for this scan was reduced using automated exposure  control, adjustment of the mA and/or kV according to patient size, or  iterative reconstruction technique.    COMPARISON: CT angiogram today.    FINDINGS: Small to moderate perfusion abnormality right perisylvian  region best seen on TTP images. This suggests ischemia. Correlates  with the area of small M2 branch asymmetric filling as seen  on the  source images of the CT angiogram. This information was conveyed to  Dr. Bradshaw.      Impression    IMPRESSION: Area of ischemia right perisylvian region in the right M2  branch distribution.     LEO BENSON MD   CT Head w/o Contrast    Narrative    CT HEAD WITHOUT CONTRAST  2/20/2017 10:53 PM    HISTORY: CVA, post intervention.    TECHNIQUE: Scans were obtained through the head without IV contrast.  Radiation dose for this scan was reduced using automated exposure  control, adjustment of the mA and/or kV according to patient size, or  iterative reconstruction technique.    COMPARISON: CT earlier today.    FINDINGS: No hemorrhage post thrombectomy. Vessels are opacified due  to the presence of contrast material. Focal high density area within  an M2 vessel on the anterior right sylvian fissure on series 2 image  13 of indeterminate origin, but it could be intraluminal clot.    Old infarcts right occipital lobe and both mid frontal regions again  noted. Mild atrophy. Mild membrane thickening left antrum. No bony  abnormality.      Impression    IMPRESSION:   1. No intraparenchymal hemorrhage or definite recent infarct.  2. Linear high density area which appears to be intravascular in the  right M2 region could be intraluminal clot.  3. Atrophy and old infarcts as described.   MR Brain w/o Contrast    Narrative    MRI OF THE BRAIN WITHOUT CONTRAST  2/21/2017 8:05 AM     COMPARISON:  Head CT 2/20/2017. Head and neck CT angiogram 2/20/2017.  Head CT perfusion 2/20/2017.    HISTORY:  Stroke.    TECHNIQUE:  Axial diffusion-weighted with ADC map, T2-weighted with  fat saturation, T1-weighted and turboFLAIR and coronal T1-weighted  images of the brain were obtained without intravenous contrast.     FINDINGS: There is a moderate-sized posterior division right MCA  territory infarct corresponding to the known right M2 occlusion noted  on the comparison CT angiogram. There is a tiny infarct in the  posterolateral  aspect of the left cerebellar hemisphere. No other  recent infarcts.    There is moderate diffuse cerebral volume loss. There are patchy  periventricular areas of abnormal T2 signal hyperintensity in the  cerebral white matter bilaterally that are consistent with sequela of  chronic small vessel ischemic disease.     The ventricles and basal cisterns are within normal limits in  configuration given the degree of cerebral volume loss.  There is no  midline shift.  There are no extra-axial fluid collections.  There is  no evidence for acute intracranial hemorrhage.     There is no sinusitis or mastoiditis.       Impression    IMPRESSION:    1. Recent moderate-sized posterior division right MCA territory  infarct corresponding to the known right M2 occlusion noted on the  recent comparison CT angiogram.  2. Tiny recent infarct in the left cerebellar hemisphere.  3. Diffuse cerebral volume loss and cerebral white matter changes  consistent with chronic small vessel ischemic disease.    IR Discontinue Sheath    Narrative    This exam was marked as non-reportable because it will not be read by a   radiologist or a [facility] non-radiologist provider.

## 2017-02-21 NOTE — PLAN OF CARE
Problem: Goal Outcome Summary  Goal: Goal Outcome Summary  Outcome: No Change  Neuro: alert and oriented x 4.  Slurred speech noted.  Left facial droop.  Tongue extension deviates slightly to left.  No movement in left upper extremity even to painful stimuli.  Will move left leg spontaneously but significantly weaker than right leg.  Sensation absent on left side (leg, arm and face).  Right pupil irregular and non-reactive to light (base line) and left pupil 2mm and brisk reaction to light.  Next NIHSS due at 1822.    CV: SB with first degree AV block.  No ectopy noted.  SBP maintained between 100-180.  Right femoral sheath in place-no hematoma and 2+ pedal pulses.  IR to come and pull sheath around 0800 this morning.   Pulm: clear lung sounds, 2 L nc.  GI/: NPO pending swallow evaluation.  Use urinal with assistance-good urine output.   Other: denies pain.

## 2017-02-21 NOTE — ED PROVIDER NOTES
History     Chief Complaint:  One-sided Weakness     HPI   Elias Mckee is a 85 year old male with a past medical history of stroke with minor left sided weakness for which he uses a cane, atrial fibrillation on Plavix which he takes in the morning, diabetes, CAD who presents via EMS for evaluation of left sided weakness. The patient was last seen normal at 1822 in the dinning room at [facility] where he lives, when a resident noticed that the patient was choking on his food. Staff became concerned when the patient's left arm became limp and he was having left sided facial droop and so 911 was called. EMS state the patient had blood glucose of 187 and  systolic and they could notice the left sided weakness, but are not aware of any speech difficulty. The patient denies any shortness of breath, headache, chest pain, abdominal pain, visual deficit, and  is aware of the year and who the patient is.  Although the patient does have a history of atrial fibrillation, he is not in this rhythm at this time and he is not on any anticoagulation.       Allergies:  NKDA     Medications:    Lancets  Plavix  Nitroglycerin  Multivitamins  Lisinopril  Metoprolol  Aspirin  Simvastatin  Flomax  Amaryl  Amoxicillin  Vitamin D  Fergon  Diamox  Tylenol  Miralax     Past Medical History:    Acute  ill-defined cerebrovascular disease  Atrial fibrillation  CAD  Chronic ischemic disease  CVA  Elevated cholesterol  Essential hypertension  Hyperlipidemia  MI  Diabetes    Past Surgical History:    Seed implantation  Phacoemulsification clear cornea with standard intraocular lens implant  Vitrectomy anterior     Family History:    History reviewed.  No significant family history.     Social History:  Marital Status:     Smoking status: Never smoker  Alcohol use: No      Review of Systems   Neurological: Positive for facial asymmetry and weakness.   All other systems reviewed and are negative.    Physical Exam   /77   "Pulse 64  Temp 98.7  F (37.1  C) (Oral)  Resp 22  Ht 1.778 m (5' 10\")  Wt 82.6 kg (182 lb)  SpO2 99%  BMI 26.11 kg/m2         Physical Exam    Physical Exam   Constitutional:  Patient is oriented to person, place, and time. They appear well-developed and well-nourished. Mild distress secondary to left side weakness.   HENT:   Mouth/Throat:   Oropharynx is clear and moist.   Eyes:    Conjunctivae normal and EOM are normal. Pupils are equal, round, and reactive to light.   Neck:    Normal range of motion.   Cardiovascular: Normal rate, regular rhythm and normal heart sounds.  Exam reveals no gallop and no friction rub.  No murmur heard.  Pulmonary/Chest:  Effort normal and breath sounds normal. Patient has no wheezes. Patient has no rales.   Abdominal:   Soft. Bowel sounds are normal. Patient exhibits no mass. There is no tenderness. There is no rebound and no guarding.   Musculoskeletal:  Normal range of motion. Patient exhibits no edema.   Neurological:   Patient is alert and oriented to person, place, and time. Patient has left sided weakness, left sided facial droop. No confusion or speech deficit.  No cranial nerve deficit or sensory deficit. GCS 15  Skin:   Skin is warm and dry. No rash noted. No erythema.   Psychiatric:   Patient has a normal mood and affect. Patient's behavior is normal. Judgment and thought content normal.       National Institutes of Health Stroke Scale  Exam Interval: Baseline   Score    Level of consciousness: (0)   Alert, keenly responsive    LOC questions: (0)   Answers both questions correctly    LOC commands: (0)   Performs both tasks correctly    Best gaze: (0)   Normal    Visual: (0)   No visual loss    Facial palsy: (2)   Partial paralysis (total/near total of lower face)    Motor arm (left): (2)   Some effort against gravity    Motor arm (right): (0)   No drift    Motor leg (left): (0)   No drift    Motor leg (right): (0)   No drift    Limb ataxia: (0)   Absent    Sensory: (0)   " Normal- no sensory loss    Best language: (0)   Normal- no aphasia    Dysarthria: (0)   Normal    Extinction and inattention: (0)   No abnormality        Total Score:  4       Emergency Department Course   ECG @ 1904  Indication: Facial droop  Rate 62 bpm.   CO interval 324 ms.   QRS duration 88 ms.   QT/QTc 420/426 ms.   P-R-T axes 55.  Notes: Sinus rhythm with 1st degree with AV block.   Time read 1922      Imaging:  Radiographic findings were communicated with the patient who voiced understanding of the findings.    CTA Angiogram Head Neck  Final Result  Abnormal  IMPRESSION:  1. Occluded right M2 segment shortly after origin, correlating with  the perfusion defect seen on today's perfusion scan.  2. Stenosis right A1 segment.  3. Chronically diseased left vertebral artery with incomplete filling.  Dominant right vertebral artery.  4.   [Critical Result: Right M2 occlusion]    Finding was identified on 2/20/2017 7:35 PM.     Dr. Bradshaw was contacted by me on 2/20/2017 7:36 PM and verbalized  understanding of the critical result.       LEO BENSON MD    CT Head w Contrast  Final Result  IMPRESSION: Area of ischemia right perisylvian region in the right M2  branch distribution.     LEO BENSON MD    CT Head w/o Contrast  Final Result  IMPRESSION:   1. Old infarcts as described.  2. Atrophy.  3. Nothing clearly acute.  4. CT angiogram will follow.    LEO BENSON MD    IR carotid cerebral angiogram right: Results pending on admission      Laboratory:  CBC: WBC 7.2 (WNL) HGB 12.2 (L)  (L)   BMP: Cr 1.28 (H) Glucose 187 (H) Chloride 113 (H) GFR 53 (L) Calcium 8.4 (L) Rest WNL  INR: 0.99 (WNL)  PTT: 31 (WNL)    Interventions:  1935: Normal saline, 125 ml/hr, IV  1946: Labetalol, 20 mg, IV  2002: Alteplase, 7.43 mg, IV  2002: Activase, 66.91 mg, IV    ED Course:  The patient arrived by ambulance. He was escorted to the ED where his vitals were measured and recorded.   Nursing notes and past medical  history reviewed.   I performed a physical examination of the patient as documented above.  I explained the plan with the patient who consents to this.   The above workups were undertaken.    1901: The patient was evaluated at bedside.  1902: Code stroke was called.  1907: The patient was sent to CT and discussed with Dr. Boyd of neurology. Report was that he was on coumadin by medics, making TPA contraindicated.  1920: Dr. Boyd was called again about the patient being on Plavix and being a candidate for TPA.   1923: The patient returned from CT and was reevaluated. He was having worsening left sided facial droop as well as dysarthria. NIHSS 5  1924: His dysarthria resolved and worsening facial droop improved. He is back to his NIH stroke scale of 4.   1932: Neurology radiology called and state there is no acute findings on imaging, but they could not visualize the left vertebral artery. There is no evidence of dissection.  1940: Results were discussed with Dr. Boyd. TPA is indicated as is IR.  1942: The patient and daughter who is at bedside were updated and I discussed the risks and benefits of TPA. He has an approximately 5% risk for intracranial bleeding/detorioration. They understand these risk factor and did consent to this.   1944: The patient was discussed with Dr. Sheets, neuro IR.   1950: The patient was discussed with Dr. Branham of hospitalist service.   1954: The patient and daughter were updated.   2001: Dr. Boyd presents to the ED to evaluate the patient.   2002: TPA started  2015: Stable neuro exam  I personally reviewed the laboratory and imaging results with the Patient and daugther and answered all related questions prior to admission.  Findings and plan explained to the Patient and daughter who consents to admission. Discussed the patient with Dr. Branham, who will admit the patient to an ICU bed for further monitoring, evaluation, and treatment.     Impression & Plan      CMS Diagnoses: The  patient has stroke symptoms:           ED Stroke specific documentation           NIHSS PDF          Protocol PDF     Patient last known well time: 1822  ED Provider first to bedside at: 1901  CT Results received at: 2012  Patient was treated with TPA.  The risks and benefits of TPA were reviewed using the risk information document.  These risks included bleeding complications such as intracranial bleeding and death. The patient or patient s surrogate s decisional capacity was assessed to be intact. TPA decision was made after this informed consent.    Stroke Mimics were considered (including migraine headache, seizure disorder, hypoglycemia (or hyperglycemia), head or spinal trauma, CNS infection, Toxin ingestion and shock state (e.g. sepsis) .      Medical Decision Making:  Elias Mckee is a 85 year old male presenting with acute onset of stroke symptoms at 1822. He was in a dinning room of his facility when this was noted, primarily because he had some dysphagia. On his examination, he had a left sided facial droop, left sided arm drift, he had no evidence of neglect. He has trace left lower extremity weakness from prior stroke, he had no sensory deficit initially. The patient went to CT as a code stroke was called. I spoke with Dr. Boyd throughout the patient's early course here. The patient does appear to have a right M2 clot. After reviewing these findings, again I spoke with the neurologist and he agreed TPA and neuro interventional both be appropriate. I then spoke with Dr. Sheets of Neurointerventional, reviewed the patient's physical exam and CT results and he agrees this is an interventional candidate. He was made aware that TPA had been consented to and will be started shortly. The patient received TPA however, just before starting TPA, he began to have some sensory deficit on his upper and lower left extremity. His facial droop has remained the same, no further dysarthria. Prior to the TPA being  administered, he was given 20 mg of labetalol as his blood pressure was in the 170s. At this time the patient remains stable, no acute detorioration, no significant change in his symptoms. He was transferred neuro interventional.    Critical care time: 60 minutes.    Diagnosis:    ICD-10-CM   1. 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) I63.411         Disposition:   Admit to an ICU bed under the care of Dr. Branham.       Yuri PHOENIX, am serving as a scribe on 2/20/2017 at 7:15 PM to personally document services performed by Crystal Bradshaw MD, based on my observations and the provider's statements to me.         Crystal Bradshaw MD  02/20/17 2142

## 2017-02-21 NOTE — PLAN OF CARE
Problem: Goal Outcome Summary  Goal: Goal Outcome Summary  OT: Sheath pulled this AM, on 4 hours bedrest after. Cancel OT this AM.

## 2017-02-22 ENCOUNTER — APPOINTMENT (OUTPATIENT)
Dept: OCCUPATIONAL THERAPY | Facility: CLINIC | Age: 82
DRG: 024 | End: 2017-02-22
Attending: INTERNAL MEDICINE
Payer: MEDICARE

## 2017-02-22 ENCOUNTER — APPOINTMENT (OUTPATIENT)
Dept: PHYSICAL THERAPY | Facility: CLINIC | Age: 82
DRG: 024 | End: 2017-02-22
Payer: MEDICARE

## 2017-02-22 ENCOUNTER — APPOINTMENT (OUTPATIENT)
Dept: PHYSICAL THERAPY | Facility: CLINIC | Age: 82
DRG: 024 | End: 2017-02-22
Attending: INTERNAL MEDICINE
Payer: MEDICARE

## 2017-02-22 ENCOUNTER — APPOINTMENT (OUTPATIENT)
Dept: CT IMAGING | Facility: CLINIC | Age: 82
DRG: 024 | End: 2017-02-22
Attending: PSYCHIATRY & NEUROLOGY
Payer: MEDICARE

## 2017-02-22 ENCOUNTER — APPOINTMENT (OUTPATIENT)
Dept: SPEECH THERAPY | Facility: CLINIC | Age: 82
DRG: 024 | End: 2017-02-22
Payer: MEDICARE

## 2017-02-22 LAB
ANION GAP SERPL CALCULATED.3IONS-SCNC: 10 MMOL/L (ref 3–14)
BUN SERPL-MCNC: 16 MG/DL (ref 7–30)
CALCIUM SERPL-MCNC: 7.9 MG/DL (ref 8.5–10.1)
CHLORIDE SERPL-SCNC: 117 MMOL/L (ref 94–109)
CO2 SERPL-SCNC: 16 MMOL/L (ref 20–32)
CREAT SERPL-MCNC: 1.04 MG/DL (ref 0.66–1.25)
ERYTHROCYTE [DISTWIDTH] IN BLOOD BY AUTOMATED COUNT: 15.2 % (ref 10–15)
GFR SERPL CREATININE-BSD FRML MDRD: 68 ML/MIN/1.7M2
GLUCOSE BLDC GLUCOMTR-MCNC: 131 MG/DL (ref 70–99)
GLUCOSE BLDC GLUCOMTR-MCNC: 144 MG/DL (ref 70–99)
GLUCOSE BLDC GLUCOMTR-MCNC: 148 MG/DL (ref 70–99)
GLUCOSE BLDC GLUCOMTR-MCNC: 152 MG/DL (ref 70–99)
GLUCOSE BLDC GLUCOMTR-MCNC: 164 MG/DL (ref 70–99)
GLUCOSE BLDC GLUCOMTR-MCNC: 182 MG/DL (ref 70–99)
GLUCOSE SERPL-MCNC: 177 MG/DL (ref 70–99)
HCT VFR BLD AUTO: 34.9 % (ref 40–53)
HGB BLD-MCNC: 11.9 G/DL (ref 13.3–17.7)
MAGNESIUM SERPL-MCNC: 1.8 MG/DL (ref 1.6–2.3)
MCH RBC QN AUTO: 31.1 PG (ref 26.5–33)
MCHC RBC AUTO-ENTMCNC: 34.1 G/DL (ref 31.5–36.5)
MCV RBC AUTO: 91 FL (ref 78–100)
PHOSPHATE SERPL-MCNC: 2.3 MG/DL (ref 2.5–4.5)
PLATELET # BLD AUTO: 142 10E9/L (ref 150–450)
POTASSIUM SERPL-SCNC: 3.9 MMOL/L (ref 3.4–5.3)
RBC # BLD AUTO: 3.83 10E12/L (ref 4.4–5.9)
SODIUM SERPL-SCNC: 143 MMOL/L (ref 133–144)
WBC # BLD AUTO: 11 10E9/L (ref 4–11)

## 2017-02-22 PROCEDURE — 92526 ORAL FUNCTION THERAPY: CPT | Mod: GN | Performed by: SPEECH-LANGUAGE PATHOLOGIST

## 2017-02-22 PROCEDURE — 97112 NEUROMUSCULAR REEDUCATION: CPT | Mod: GO

## 2017-02-22 PROCEDURE — 93010 ELECTROCARDIOGRAM REPORT: CPT | Performed by: INTERNAL MEDICINE

## 2017-02-22 PROCEDURE — 97530 THERAPEUTIC ACTIVITIES: CPT | Mod: GP | Performed by: PHYSICAL THERAPIST

## 2017-02-22 PROCEDURE — 97530 THERAPEUTIC ACTIVITIES: CPT | Mod: GO

## 2017-02-22 PROCEDURE — 12000000 ZZH R&B MED SURG/OB

## 2017-02-22 PROCEDURE — 40000193 ZZH STATISTIC PT WARD VISIT: Performed by: PHYSICAL THERAPIST

## 2017-02-22 PROCEDURE — 97161 PT EVAL LOW COMPLEX 20 MIN: CPT | Mod: GP | Performed by: PHYSICAL THERAPIST

## 2017-02-22 PROCEDURE — 99231 SBSQ HOSP IP/OBS SF/LOW 25: CPT | Performed by: PSYCHIATRY & NEUROLOGY

## 2017-02-22 PROCEDURE — 93005 ELECTROCARDIOGRAM TRACING: CPT

## 2017-02-22 PROCEDURE — 84100 ASSAY OF PHOSPHORUS: CPT | Performed by: INTERNAL MEDICINE

## 2017-02-22 PROCEDURE — 25000125 ZZHC RX 250: Performed by: INTERNAL MEDICINE

## 2017-02-22 PROCEDURE — 97110 THERAPEUTIC EXERCISES: CPT | Mod: GP | Performed by: PHYSICAL THERAPIST

## 2017-02-22 PROCEDURE — 97167 OT EVAL HIGH COMPLEX 60 MIN: CPT | Mod: GO

## 2017-02-22 PROCEDURE — A9270 NON-COVERED ITEM OR SERVICE: HCPCS | Mod: GY | Performed by: PSYCHIATRY & NEUROLOGY

## 2017-02-22 PROCEDURE — 80048 BASIC METABOLIC PNL TOTAL CA: CPT | Performed by: INTERNAL MEDICINE

## 2017-02-22 PROCEDURE — 85027 COMPLETE CBC AUTOMATED: CPT | Performed by: INTERNAL MEDICINE

## 2017-02-22 PROCEDURE — 40000225 ZZH STATISTIC SLP WARD VISIT: Performed by: SPEECH-LANGUAGE PATHOLOGIST

## 2017-02-22 PROCEDURE — 25000132 ZZH RX MED GY IP 250 OP 250 PS 637: Mod: GY | Performed by: PSYCHIATRY & NEUROLOGY

## 2017-02-22 PROCEDURE — 83735 ASSAY OF MAGNESIUM: CPT | Performed by: INTERNAL MEDICINE

## 2017-02-22 PROCEDURE — 36415 COLL VENOUS BLD VENIPUNCTURE: CPT | Performed by: INTERNAL MEDICINE

## 2017-02-22 PROCEDURE — 00000146 ZZHCL STATISTIC GLUCOSE BY METER IP

## 2017-02-22 PROCEDURE — 40000133 ZZH STATISTIC OT WARD VISIT

## 2017-02-22 PROCEDURE — 97535 SELF CARE MNGMENT TRAINING: CPT | Mod: GO

## 2017-02-22 PROCEDURE — 70450 CT HEAD/BRAIN W/O DYE: CPT

## 2017-02-22 PROCEDURE — 27210995 ZZH RX 272: Performed by: INTERNAL MEDICINE

## 2017-02-22 PROCEDURE — 99233 SBSQ HOSP IP/OBS HIGH 50: CPT | Performed by: INTERNAL MEDICINE

## 2017-02-22 RX ORDER — SODIUM CHLORIDE 450 MG/100ML
INJECTION, SOLUTION INTRAVENOUS CONTINUOUS
Status: DISCONTINUED | OUTPATIENT
Start: 2017-02-22 | End: 2017-02-26

## 2017-02-22 RX ORDER — ASPIRIN 300 MG/1
300 SUPPOSITORY RECTAL DAILY
Status: DISCONTINUED | OUTPATIENT
Start: 2017-02-22 | End: 2017-02-26

## 2017-02-22 RX ADMIN — SODIUM CHLORIDE: 4.5 INJECTION, SOLUTION INTRAVENOUS at 17:02

## 2017-02-22 RX ADMIN — INSULIN ASPART 1 UNITS: 100 INJECTION, SOLUTION INTRAVENOUS; SUBCUTANEOUS at 08:02

## 2017-02-22 RX ADMIN — METOPROLOL TARTRATE 5 MG: 5 INJECTION INTRAVENOUS at 17:03

## 2017-02-22 RX ADMIN — INSULIN ASPART 1 UNITS: 100 INJECTION, SOLUTION INTRAVENOUS; SUBCUTANEOUS at 12:28

## 2017-02-22 RX ADMIN — INSULIN ASPART 1 UNITS: 100 INJECTION, SOLUTION INTRAVENOUS; SUBCUTANEOUS at 00:01

## 2017-02-22 RX ADMIN — INSULIN ASPART 1 UNITS: 100 INJECTION, SOLUTION INTRAVENOUS; SUBCUTANEOUS at 04:55

## 2017-02-22 RX ADMIN — ASPIRIN 300 MG: 300 SUPPOSITORY RECTAL at 12:28

## 2017-02-22 RX ADMIN — METOPROLOL TARTRATE 5 MG: 5 INJECTION INTRAVENOUS at 22:13

## 2017-02-22 RX ADMIN — INSULIN ASPART 1 UNITS: 100 INJECTION, SOLUTION INTRAVENOUS; SUBCUTANEOUS at 17:05

## 2017-02-22 ASSESSMENT — ACTIVITIES OF DAILY LIVING (ADL): PREVIOUS_RESPONSIBILITIES: MEAL PREP

## 2017-02-22 NOTE — PROGRESS NOTES
02/22/17 1345   Quick Adds   Type of Visit Initial Occupational Therapy Evaluation   Living Environment   Lives With alone   Living Arrangements assisted living   Home Accessibility no concerns   Transportation Available car;family or friend will provide;agency transportation  (Pt no longer drives. Family or [employer])   Self-Care   Dominant Hand right   Usual Activity Tolerance good   Current Activity Tolerance fair   Equipment Currently Used at Home cane, straight;grab bar;raised toilet;shower chair   Functional Level Prior   Ambulation 1-->assistive equipment   Transferring 1-->assistive equipment   Toileting 1-->assistive equipment   Bathing 1-->assistive equipment   Dressing 0-->independent   Eating 0-->independent   Communication 0-->understands/communicates without difficulty   Swallowing 0-->swallows foods/liquids without difficulty   Cognition 0 - no cognition issues reported   Fall history within last six months no   General Information   Onset of Illness/Injury or Date of Surgery - Date 02/20/17   Referring Physician BLANE Branham DO   Patient/Family Goals Statement Pt plans to discharge home, but open to rehab   Additional Occupational Profile Info/Pertinent History of Current Problem Admitted for L sided weakness due to R M2 CVA. Received IV tPA at admit and unsuccessful thrombectomy. PMH of CVA with mild L sided weakness.    Precautions/Limitations swallowing precautions;fall precautions   Cognitive Status Examination   Orientation person;time   Level of Consciousness alert;confused   Able to Follow Commands WNL/WFL   Personal Safety (Cognitive) impulsive;decreased insight to deficits   Memory impaired   Visual Perception   Visual Perception Wears glasses  (for reading only)   Visual Perception Comments Denies blurred or doubled vision. Strong R gaze preference.    Sensory Examination   Sensory Comments Denies B UE numbness and tingling. Difficulty to assess due to current cognitive deficit.    Pain  Assessment   Patient Currently in Pain No   Range of Motion (ROM)   ROM Quick Adds Other (describe)   ROM Comment R UE WFL; No active L UE ROM noted. Full PROM of L UE.    Strength   Manual Muscle Testing Quick Adds Other   Strength Comments R UE 5/5 and L UE 2/5 on MMT   Coordination   Upper Extremity Coordination Left UE impaired   Mobility   Bed Mobility Bed mobility skill: Rolling/Turning;Bed mobility skill: Scooting/Bridging;Bed mobility skill: Sit to supine;Bed mobility analysis;Bed mobility skill: Supine to sit   Bed Mobility Skill: Rolling/Turning   Level of [place] - Bed Mobility Skill Rolling Turning moderate assist (50% patients effort)   Physical Assist/Nonphysical Assist 2 persons   Bed Mobility Skill: Scooting/Bridging   Level of [place]: Scooting/Bridging moderate assist (50% patients effort)   Physical Assist/Nonphysical Assist: Scooting/Bridging 2 persons   Bed Mobility Skill: Sit to Supine   Level of [place]: Sit/Supine moderate assist (50% patients effort)   Physical Assist/Nonphysical Assist: Sit/Supine 2 persons   Bed Mobility Skill: Supine to Sit   Level of [place]: Supine/Sit moderate assist (50% patients effort)   Physical Assist/Nonphysical Assist: Supine/Sit 2 persons   Bed Mobility Analysis   Bed Mobility Limitations cognitive deficits;decreased ability to use arms for pushing/pulling   Impairments Contributing to Impaired Bed Mobility impaired balance   Transfer Skill: Bed to Chair/Chair to Bed   Level of [place]: Bed to Chair unable to perform   Transfer Skill: Sit to Stand   Level of [place]: Sit/Stand unable to perform   Transfer Skill: Toilet Transfer   Level of [place]: Toilet unable to perform   Upper Body Dressing   Level of [place]: Dress Upper Body moderate assist (50% patients effort)   Lower Body Dressing   Level of [place]: Dress Lower Body maximum assist (25% patients effort)   Toileting   Level of [place]: Toilet maximum  "assist (25% patients effort)   Grooming   Level of [place]: Grooming moderate assist (50% patients effort)   Instrumental Activities of Daily Living (IADL)   Previous Responsibilities meal prep   IADL Comments Pt states [facility] provides laundry services and med mgmt.    Activities of Daily Living Analysis   Impairments Contributing to Impaired Activities of Daily Living cognition impaired;balance impaired;coordination impaired;strength decreased   General Therapy Interventions   Planned Therapy Interventions ADL retraining;cognition;neuromuscular re-education;strengthening;transfer training   Clinical Impression   Criteria for Skilled Therapeutic Interventions Met yes, treatment indicated   OT Diagnosis Decreased I and safety with ADLs   Influenced by the following impairments L UE weakness and incoordination; Impaired cognitoin and safety; Decreased balance; L side neglect   Assessment of Occupational Performance 5 or more Performance Deficits   Identified Performance Deficits Dressing; Toileting; Safety issues; Previous CVA; New deficits   Clinical Decision Making (Complexity) High complexity   Therapy Frequency 2 times/day   Predicted Duration of Therapy Intervention (days/wks) 4 days   Anticipated Discharge Disposition Acute Rehabilitation Facility   Risks and Benefits of Treatment have been explained. Yes   Patient, Family & other staff in agreement with plan of care Yes   [facility] TM \"6 Clicks\"   2016, Trustees of [facility], under license to [email or web address].  All rights reserved.   6 Clicks Short Forms Daily Activity Inpatient Short Form   [facility]  \"6 Clicks\" Daily Activity Inpatient Short Form   1. Putting on and taking off regular lower body clothing? 1 - Total   2. Bathing (including washing, rinsing, drying)? 1 - Total   3. Toileting, which includes using toilet, bedpan or urinal? 1 - Total   4. Putting on and taking off regular upper body clothing? 2 - A Lot   5. " Taking care of personal grooming such as brushing teeth? 2 - A Lot   6. Eating meals? 2 - A Lot   Daily Activity Raw Score (Score out of 24.Lower scores equate to lower levels of function) 9   Total Evaluation Time   Total Evaluation Time (Minutes) 13

## 2017-02-22 NOTE — PROGRESS NOTES
"This a follow up regarding stroke  He is S/P IV tPA and mechanical thrombectomy trial  No acute events overnight  Fail swallow evaluations    /69 (BP Location: Right arm)  Pulse 55  Temp 97.5  F (36.4  C) (Axillary)  Resp 20  Ht 1.778 m (5' 10\")  Wt 89 kg (196 lb 3.4 oz)  SpO2 96%  BMI 28.15 kg/m2  Awake, alert, answers some simple questions  Follows simple commands   Speech: dysarthric  CN: Left facial weakness UMN type, VFF, EOMI, Pupils: R deformed, left pinpoint  Motor : LUE0/5 LLE 3/5 Right side 5/5      Workup:  MRI brain reviewed: Right MCA diffusion restriction area and left cerebellar small area of diffusion restriction    TTE :unremarkable    A/P Acute stroke:  -S/P IV tPA  -Etiology:workup in progress(Likely cardio-embolic as he has history of Afibrillation )  -Aspirin temporarily :anticoagulation in few days  -Lipitor 80 once he passes swallow eval  -Anticoagulation to be restarted before discharge  -Blood pressure parameters: Normotensive  -Activity:as tolerated   -DVT prophylaxis:SCDs   -Imaging: will repeat CT as clinically somewhat worse than yesterday         "

## 2017-02-22 NOTE — PLAN OF CARE
Problem: Goal Outcome Summary  Goal: Goal Outcome Summary  Outcome: Improving  Pt arrived to floor from ICU at 0245. pt A&O. neuro slurred speech, LUE hemiplegia, LLE hemiparesis, L neglect and field cut. R pupil oval/fixed, L pupil pinpoint/reactive. L facial droop with tongue extension left. NIH score 16. VSS. NPO. bedrest this shift, t/r q2h. c/o back pain, managed with repositoning. Tele Afib with CVR. szr preacutions in place, no szr activity noted. Incontinent x1. Blood sugar checks q4h. D/c pending.

## 2017-02-22 NOTE — PLAN OF CARE
Problem: Goal Outcome Summary  Goal: Goal Outcome Summary  OT: Eval complete and Tx initiated. Pt admitted with R M2 CVA. Prior to admit, pt lives in [facility] alone and reports mod I with ADLs and meal prep. Currently, pt requires mod-max A for seated ADLs. Unable to achieve full standing position with max A x2 and FWW. Pt limited by impaired cognition and safety, decreased balance, L UE weakness and incoordination, and L side neglect; however, pt remains very motivated. Pt demonstrates good participation and would benefit from twice daily OT intervention. Recommend ARU upon discharge.

## 2017-02-22 NOTE — PLAN OF CARE
Problem: Goal Outcome Summary  Goal: Goal Outcome Summary  SLP: Patient was seen swallow treatment. Oral cares complete with a fair amount of dried secretions removed. He was lethargic this morning and had difficulty maintaining alertness. He was trialed with one teaspoon of nectar thick liquid. He had decreased oral sensation, poor bolus coordination/control and AP movement. Delayed incomplete swallow response. No cough response after but can not rule out silent aspiration. Recommend: 1. Continue NPO and should consider alternate means of nutrition at least for the short term. 2. SLP will continue to follow for PO readiness/Video swallow study when warranted. 3. Discharge pending progress ARU/TCU.

## 2017-02-22 NOTE — PROGRESS NOTES
[facility]    Hospitalist Progress Note      Assessment & Plan   Elias Mckee is a 85 year old male past medical history significant for previous stroke with residual LLE weakness, coronary artery disease with hx of cardiac arrest, diabetes, atrial fibrillation, chronic kidney disease who presents to the Emergency Department with left-sided weakness consistent with acute stroke.     Acute right MCA territory stroke:  CTA showed occluded right M2 segment He received tPA.  Attempt mechanical thrombectomy by IR however, failure of recanalization due to actue angle of branch and markedly frim/calicified nature of clot.  Suspect to be cardio-embolic give hx of paroxysmal A. fib  - MRI 2/21 shows recent moderate sized posterior division right MCA territory infarct corresponding to the known right M2 occlusion and tiny recent infarct int he left cerebellar hemisphere  - repeat CT on 2/22 showed evolving mod sized posterior division right  MCA infarct, no new infarct  - TTE shows LVEF of 55-60%, bubble study is negative  - Lipid panel: HDL 28, LDL 49,   - ASA rectally and statin started (but NPO at this time). Plan to start anticoagulation prior to discharge per neurology  - neurology following  - PT/OT eval  - SLP recommends NPO and alternative means of nutrition, discussed with patient options, he would like to discuss with daughter. Also called daughter and discussed with her options of means of nutrition given he is high risk for aspiration at this time, it seems like they are leaning towards TF, however she will discuss with her dad this evening and let us know there decision. I did not place any orders of feeding tube placement and this can be done once they make the final decision    CAD with hx of multiple stent placements and hx of cardiac arrest:  - troponin x 1 negative  - no complaint of chest pain  - resume BB and ACEi once able to take PO  - continue telemetry    Hx of Atrial  fibrillation  - admission EKG shows NSR. Not on anticoagulation prior to admission  - continue telemetry  - metoprolol IV prn until PO intake is resumed    Previous stroke:   - management of acute stroke as above. ASA restarted. Plan for long term anticoagulation as above per neurology    Type II DM:adeqautely controlled at baseline.  A1c 6.8.    - on Glimepride PTA  - hold sulfonylurea until PO intake is resumed and stable  - continue with SSI    Hypertension:   - resume his PTA regimen once taking p.o. BP increasing  - will scheduled metoprolol 5mg QID while NPO   - PRN IV meds available as well     Chronic kidney disease, stage 3:   - Creatinine baseline1.3-1.4  - montior    Subclinical hypothyroidism:  TSH 5.96. Will need follow up TSH in 6-8 weeks once acute issues stable.     BPH: Can restart Flomax when taking p.o.     FEN: NPO. Change IVF to 0.45 NS due hyperchloremia and decreasing bicarb    Deep venous thrombosis prophylaxis: SCDs.   Code Status: Full Code     Disposition: Expected discharge in 2-3 days pending hospital course    Ruben Rosen    Interval History   Continues to have left sided weakness, speech remains slurred. Denies headache, chest pain or shortness of breath.   Discussed with patient regarding nutrition.     -Data reviewed today: I reviewed all new labs and imaging results over the last 24 hours. I personally reviewed the head CT image(s) showing evolving right MCA stroke as noted above and the brain MRI image(s) showing right MCA stroke.    Physical Exam   Temp: 98.5  F (36.9  C) Temp src: Oral BP: (!) 163/98   Heart Rate: 62 Resp: 20 SpO2: 97 % O2 Device: None (Room air)    Vitals:    02/20/17 1925 02/20/17 2300 02/21/17 0430   Weight: 82.6 kg (182 lb) 89 kg (196 lb 3.4 oz) 89 kg (196 lb 3.4 oz)     Vital Signs with Ranges  Temp:  [97.4  F (36.3  C)-98.8  F (37.1  C)] 98.5  F (36.9  C)  Heart Rate:  [62-87] 62  Resp:  [10-22] 20  BP: (123-185)/(64-98) 163/98  SpO2:  [95 %-97 %]  97 %  I/O last 3 completed shifts:  In: 2929 [I.V.:2929]  Out: 1425 [Urine:1425]    Constitutional: Alert, awake and no apparent distress  Respiratory: Clear to ausculation  Cardiovascular: regular rate and rhythm  GI: soft, NT, ND  Skin/Integumen: warm and dry  Neuro:  slurred speech, Left facial droop, significant left sided weakness in UE and LE    Medications     NaCl       - MEDICATION INSTRUCTIONS -       - MEDICATION INSTRUCTIONS -       - MEDICATION INSTRUCTIONS -       - MEDICATION INSTRUCTIONS -         aspirin  300 mg Rectal Daily     atorvastatin  40 mg Oral Daily     insulin aspart  1-6 Units Subcutaneous Q4H       Data     Recent Labs  Lab 02/22/17  0520 02/21/17  1700 02/21/17  1500 02/21/17  0410 02/20/17  1905  02/17/17  0940   WBC 11.0  --   --  8.3 7.2  --   --    HGB 11.9*  --  11.3* 10.7* 12.2*  < >  --    MCV 91  --   --  91 93  --   --    *  --   --  124* 136*  --   --    INR  --   --   --   --  0.99  --   --      --   --  143 141  --  144   POTASSIUM 3.9  --   --  3.7 3.9  --  4.2   CHLORIDE 117*  --   --  117* 113*  --  115*   CO2 16*  --   --  19* 20  --  19*   BUN 16  --   --  23 28  --  18   CR 1.04  --   --  1.05 1.28*  --  1.34*   ANIONGAP 10  --   --  7 8  --  10   TRENT 7.9*  --   --  7.5* 8.4*  --  8.5   *  --   --  149* 187*  --  128*   ALT  --   --   --   --   --   --  29   TROPI  --  <0.015The 99th percentile for upper reference range is 0.045 ug/L.  Troponin values in the range of 0.045 - 0.120 ug/L may be associated with risks of adverse clinical events.  --  <0.015The 99th percentile for upper reference range is 0.045 ug/L.  Troponin values in the range of 0.045 - 0.120 ug/L may be associated with risks of adverse clinical events.  --   --   --    < > = values in this interval not displayed.    Recent Results (from the past 24 hour(s))   CT Head w/o Contrast    Narrative    CT OF THE HEAD WITHOUT CONTRAST 2/22/2017 1:01 PM     COMPARISON: Brain MR  2/21/2017.    HISTORY: Mental status change in a patient with recent stroke status  post tPA.    TECHNIQUE: 5 mm thick axial CT images of the head were acquired  without IV contrast material.    FINDINGS: A chronic right occipital infarct is again noted. An  evolving posterior division right MCA territory infarct is again  noted. No definite new infarcts. There is moderate diffuse cerebral  volume loss. There are subtle patchy areas of decreased density in the  cerebral white matter bilaterally that are consistent with sequela of  chronic small vessel ischemic disease.    The ventricles and basal cisterns are within normal limits in  configuration given the degree of cerebral volume loss.  There is no  midline shift. There are no extra-axial fluid collections.    No intracranial hemorrhage or mass.    The visualized paranasal sinuses are well-aerated. There is no  mastoiditis. There are no fractures of the visualized bones.      Impression    IMPRESSION:   1. Evolving moderate-sized posterior division right MCA territory  infarct again noted.  2. Chronic right occipital infarct again noted.  3. No new infarcts. No intracranial hemorrhage.  4. Diffuse volume loss and cerebral white matter changes consistent  with chronic small vessel ischemic disease.      Radiation dose for this scan was reduced using automated exposure  control, adjustment of the mA and/or kV according to patient size, or  iterative reconstruction technique.    DANIEL PORTILLO MD

## 2017-02-22 NOTE — PROGRESS NOTES
02/22/17 0800   Quick Adds   Type of Visit Initial PT Evaluation   Living Environment   Lives With alone   Living Arrangements house  (Townhouse)   Home Accessibility no concerns   Number of Stairs to Enter Home (Elevator available)   Transportation Available family or friend will provide   Living Environment Comment Per patient report; may be inaccurate secondary to confusion, cognitive deficits.   Self-Care   Usual Activity Tolerance moderate   Current Activity Tolerance fair   Regular Exercise no   Equipment Currently Used at Home cane, straight   Functional Level Prior   Ambulation 1-->assistive equipment   Transferring 1-->assistive equipment   Toileting 1-->assistive equipment   Bathing 1-->assistive equipment   Dressing 0-->independent   Eating 0-->independent   Communication 0-->understands/communicates without difficulty   Swallowing 0-->swallows foods/liquids without difficulty   Cognition 0 - no cognition issues reported   Fall history within last six months no   Which of the above functional risks had a recent onset or change? ambulation;transferring;toileting   General Information   Onset of Illness/Injury or Date of Surgery - Date 02/20/17   Referring Physician Ruben Rosen MD   Patient/Family Goals Statement Patient unable to state PT goal.   Pertinent History of Current Problem (include personal factors and/or comorbidities that impact the POC) Patient is a 85 year old male with past medical history significant for previous stroke with residual LLE weakness, coronary artery disease with hx of cardiac arrest, diabetes, atrial fibrillation, chronic kidney disease who presented to the Emergency Department with left-sided weakness consistent with acute stroke. CTA showed occluded right M2 segment; he is s/p tPA. Attempted mechanical thrombectomy by IR; however, failure of recanalization due to actue angle of branch and markedly frim/calicified nature of clot. MRI on 2/21/17 showed recent  moderate sized posterior division right MCA territory infarct corresponding to the known right M2 occlusion and tiny recent infarct in the left cerebellar hemisphere.   Precautions/Limitations fall precautions   General Info Comments Spoke with RN prior to initiating therapy as patient's most recent activity orders (from admission date) indicate bed rest; she gave approval to proceed with transfer training.   Cognitive Status Examination   Orientation person   Level of Consciousness alert   Follows Commands and Answers Questions 50% of the time;able to follow single-step instructions   Personal Safety and Judgment impaired;at risk behaviors demonstrated   Memory impaired   Pain Assessment   Patient Currently in Pain No   Posture    Posture Forward head position;Protracted shoulders   Range of Motion (ROM)   ROM Comment LE PROM within functional limits.   Strength   Strength Comments Patient demonstrated difficulty following commands for active movements of LEs, left>right. Able to flex/extend right knee and move right ankle, toes through small range of motion. Trace muscle contraction noted with verbal cues to move left ankle, knee, hip; spontaneously noted to flex left knee.   Bed Mobility   Bed Mobility Comments Patient required min to moderate assist x2, particularly at trunk, to transition from supine to sitting.   Transfer Skills   Transfer Comments Patient transferred sit <> stand with moderate assist x2 and FWW.   Gait   Gait Comments Deferred. Patient unable to weight shift over to left LE in standing so did not attempt ambulation.   Balance   Balance Comments Fair sitting balance. With verbal and tactile cues, patient initially able to maintain sitting balance with CGA; as he fatigued, he required moderate assist x1, with a left lateral and forward lean noted. Fair to poor standing balance, relying heavily upon right LE and UE for stability.   Sensory Examination   Sensory Perception no deficits were  "identified   General Therapy Interventions   Planned Therapy Interventions balance training;bed mobility training;gait training;motor coordination training;neuromuscular re-education;ROM;strengthening;stretching;transfer training;home program guidelines;progressive activity/exercise   Clinical Impression   Criteria for Skilled Therapeutic Intervention yes, treatment indicated   PT Diagnosis Impaired functional mobility secondary to decreased strength, balance deficits   Functional limitations due to impairments Impaired bed mobility, transfers, and ambulation   Clinical Presentation Stable/Uncomplicated   Clinical Presentation Rationale Patient's condition is stable, improving.   Clinical Decision Making (Complexity) Low complexity   Therapy Frequency` 2 times/day   Predicted Duration of Therapy Intervention (days/wks) 3 days   Anticipated Equipment Needs at Discharge front wheeled walker  (Will defer to TCU)   Anticipated Discharge Disposition Transitional Care Facility   Risk & Benefits of therapy have been explained Yes   Patient, Family & other staff in agreement with plan of care Yes   Clinical Impression Comments Patient would benefit from skilled PT services to address impairments in functional mobility.   [facility] TM \"6 Clicks\"   2016, Trustees of [facility], under license to [employer].  All rights reserved.   6 Clicks Short Forms Basic Mobility Inpatient Short Form   [facility] AM-PAC  \"6 Clicks\" V.2 Basic Mobility Inpatient Short Form   1. Turning from your back to your side while in a flat bed without using bedrails? 2 - A Lot   2. Moving from lying on your back to sitting on the side of a flat bed without using bedrails? 2 - A Lot   3. Moving to and from a bed to a chair (including a wheelchair)? 1 - Total   4. Standing up from a chair using your arms (e.g., wheelchair, or bedside chair)? 2 - A Lot   5. To walk in hospital room? 1 - Total   6. Climbing 3-5 steps with a " railing? 1 - Total   Basic Mobility Raw Score (Score out of 24.Lower scores equate to lower levels of function) 9   Total Evaluation Time   Total Evaluation Time (Minutes) 8

## 2017-02-22 NOTE — PLAN OF CARE
Problem: Goal Outcome Summary  Goal: Goal Outcome Summary     PT: Order received. Evaluation completed; treatment initiated. Patient is a 85 year old male admitted with left-sided weakness consistent with acute stroke. CTA showed occluded right M2 segment; he is s/p tPA. Attempted mechanical thrombectomy by IR; however, failure of recanalization due to actue angle of branch and markedly frim/calicified nature of clot. At baseline, patient reports that he lives alone in an accessible townhouse and uses a cane with ambulation.     At present, patient transfers supine to sit with HOB elevated and bed rail with min to moderate assist x2, first to bring LEs over edge of bed but primarily at trunk. He sat at EOB for 7 minutes total during session. At beginning of session, with verbal and tactile cues, able to maintain sitting balance with CGA. As he fatigued, patient exhibited increased left lateral and forward lean, requiring moderate assist x1 at trunk to remain upright. Completed sit <> stand transition from bed with moderate assist x2 and FWW. Patient's left UE flaccid; unable to grasp walker or assist with left LE; patient pushed to standing primarily through right side. Patient complete 2 bouts of standing with min to moderate assist x2 and FWW, x2 minutes each. At end of session, utilized universal sling and ceiling lift to transfer patient from bed to chair. PT recommendation: discharge to TCU.     Recommend that nursing staff utilize ceiling lift and universal sling to transfer patient to/from chair.

## 2017-02-22 NOTE — PLAN OF CARE
Problem: Stroke (Ischemic) (Adult)  Goal: Signs and Symptoms of Listed Potential Problems Will be Absent or Manageable (Stroke)  Signs and symptoms of listed potential problems will be absent or manageable by discharge/transition of care (reference Stroke (Ischemic) (Adult) CPG).   Outcome: No Change  A & O x4. Left sided neglect- LUE does not withdraw with pain. Visual neglects in left field. No sensation in LUE and LLE. Pt waxes and wens from having normal sensation on left side of face to no sensation. Pt on RA. Lungs clear diminished throughout. Bedrest till 2/21 2002. Pt urinates per urinal. Hydralazine given x2 for elevated SBP. Pt failed swallow study- remains NPO. Pt's family visiting throughout the day. Pt to transfer to neuroscience floor later this evening.

## 2017-02-22 NOTE — PLAN OF CARE
Problem: Goal Outcome Summary  Goal: Goal Outcome Summary  Outcome: No Change  Nursing note  Pt alert to self and place this morning, at noon pt alert to self only. MD aware and ordered head CT stat. Pt up with ceiling lift. Pt denies any pain. Incontinent to bladder and bowel. Pt had small BM this morning. Neuro wise pt have LUE hemiplegia, LLE weaker than the RLE, L facial droop, and slurred speech. Tele is a-fib/flutter w/cvr. No sz activities on this shift. R pupil is fixed and oval. Will continue to monitor.

## 2017-02-23 ENCOUNTER — APPOINTMENT (OUTPATIENT)
Dept: GENERAL RADIOLOGY | Facility: CLINIC | Age: 82
DRG: 024 | End: 2017-02-23
Attending: INTERNAL MEDICINE
Payer: MEDICARE

## 2017-02-23 ENCOUNTER — APPOINTMENT (OUTPATIENT)
Dept: PHYSICAL THERAPY | Facility: CLINIC | Age: 82
DRG: 024 | End: 2017-02-23
Payer: MEDICARE

## 2017-02-23 ENCOUNTER — APPOINTMENT (OUTPATIENT)
Dept: OCCUPATIONAL THERAPY | Facility: CLINIC | Age: 82
DRG: 024 | End: 2017-02-23
Payer: MEDICARE

## 2017-02-23 ENCOUNTER — APPOINTMENT (OUTPATIENT)
Dept: SPEECH THERAPY | Facility: CLINIC | Age: 82
DRG: 024 | End: 2017-02-23
Payer: MEDICARE

## 2017-02-23 LAB
ANION GAP SERPL CALCULATED.3IONS-SCNC: 11 MMOL/L (ref 3–14)
BUN SERPL-MCNC: 15 MG/DL (ref 7–30)
CALCIUM SERPL-MCNC: 8 MG/DL (ref 8.5–10.1)
CHLORIDE SERPL-SCNC: 115 MMOL/L (ref 94–109)
CO2 SERPL-SCNC: 15 MMOL/L (ref 20–32)
CREAT SERPL-MCNC: 0.93 MG/DL (ref 0.66–1.25)
ERYTHROCYTE [DISTWIDTH] IN BLOOD BY AUTOMATED COUNT: 15.3 % (ref 10–15)
GFR SERPL CREATININE-BSD FRML MDRD: 77 ML/MIN/1.7M2
GLUCOSE BLDC GLUCOMTR-MCNC: 118 MG/DL (ref 70–99)
GLUCOSE BLDC GLUCOMTR-MCNC: 121 MG/DL (ref 70–99)
GLUCOSE BLDC GLUCOMTR-MCNC: 126 MG/DL (ref 70–99)
GLUCOSE BLDC GLUCOMTR-MCNC: 134 MG/DL (ref 70–99)
GLUCOSE BLDC GLUCOMTR-MCNC: 147 MG/DL (ref 70–99)
GLUCOSE BLDC GLUCOMTR-MCNC: 151 MG/DL (ref 70–99)
GLUCOSE BLDC GLUCOMTR-MCNC: 152 MG/DL (ref 70–99)
GLUCOSE SERPL-MCNC: 143 MG/DL (ref 70–99)
HCT VFR BLD AUTO: 33.6 % (ref 40–53)
HGB BLD-MCNC: 11.6 G/DL (ref 13.3–17.7)
MAGNESIUM SERPL-MCNC: 1.8 MG/DL (ref 1.6–2.3)
MCH RBC QN AUTO: 31.3 PG (ref 26.5–33)
MCHC RBC AUTO-ENTMCNC: 34.5 G/DL (ref 31.5–36.5)
MCV RBC AUTO: 91 FL (ref 78–100)
PHOSPHATE SERPL-MCNC: 1.6 MG/DL (ref 2.5–4.5)
PLATELET # BLD AUTO: 127 10E9/L (ref 150–450)
POTASSIUM SERPL-SCNC: 3.5 MMOL/L (ref 3.4–5.3)
RBC # BLD AUTO: 3.71 10E12/L (ref 4.4–5.9)
SODIUM SERPL-SCNC: 141 MMOL/L (ref 133–144)
WBC # BLD AUTO: 10.5 10E9/L (ref 4–11)

## 2017-02-23 PROCEDURE — 27210429 ZZH NUTRITION PRODUCT INTERMEDIATE LITER

## 2017-02-23 PROCEDURE — 25000128 H RX IP 250 OP 636: Performed by: INTERNAL MEDICINE

## 2017-02-23 PROCEDURE — 27211246 XR FEEDING TUBE PLACEMENT

## 2017-02-23 PROCEDURE — 25000125 ZZHC RX 250: Performed by: INTERNAL MEDICINE

## 2017-02-23 PROCEDURE — 40000133 ZZH STATISTIC OT WARD VISIT: Performed by: OCCUPATIONAL THERAPY ASSISTANT

## 2017-02-23 PROCEDURE — 40000193 ZZH STATISTIC PT WARD VISIT: Performed by: PHYSICAL THERAPIST

## 2017-02-23 PROCEDURE — 92526 ORAL FUNCTION THERAPY: CPT | Mod: GN | Performed by: SPEECH-LANGUAGE PATHOLOGIST

## 2017-02-23 PROCEDURE — 83735 ASSAY OF MAGNESIUM: CPT | Performed by: INTERNAL MEDICINE

## 2017-02-23 PROCEDURE — 25000132 ZZH RX MED GY IP 250 OP 250 PS 637: Mod: GY | Performed by: PSYCHIATRY & NEUROLOGY

## 2017-02-23 PROCEDURE — 80048 BASIC METABOLIC PNL TOTAL CA: CPT | Performed by: INTERNAL MEDICINE

## 2017-02-23 PROCEDURE — 25000132 ZZH RX MED GY IP 250 OP 250 PS 637: Mod: GY | Performed by: INTERNAL MEDICINE

## 2017-02-23 PROCEDURE — 97110 THERAPEUTIC EXERCISES: CPT | Mod: GP | Performed by: PHYSICAL THERAPIST

## 2017-02-23 PROCEDURE — 85027 COMPLETE CBC AUTOMATED: CPT | Performed by: INTERNAL MEDICINE

## 2017-02-23 PROCEDURE — 99233 SBSQ HOSP IP/OBS HIGH 50: CPT | Performed by: INTERNAL MEDICINE

## 2017-02-23 PROCEDURE — 00000146 ZZHCL STATISTIC GLUCOSE BY METER IP

## 2017-02-23 PROCEDURE — 97530 THERAPEUTIC ACTIVITIES: CPT | Mod: GP | Performed by: PHYSICAL THERAPIST

## 2017-02-23 PROCEDURE — A9270 NON-COVERED ITEM OR SERVICE: HCPCS | Mod: GY | Performed by: INTERNAL MEDICINE

## 2017-02-23 PROCEDURE — 40000225 ZZH STATISTIC SLP WARD VISIT: Performed by: SPEECH-LANGUAGE PATHOLOGIST

## 2017-02-23 PROCEDURE — 12000000 ZZH R&B MED SURG/OB

## 2017-02-23 PROCEDURE — A9270 NON-COVERED ITEM OR SERVICE: HCPCS | Mod: GY | Performed by: PSYCHIATRY & NEUROLOGY

## 2017-02-23 PROCEDURE — 99231 SBSQ HOSP IP/OBS SF/LOW 25: CPT | Performed by: PSYCHIATRY & NEUROLOGY

## 2017-02-23 PROCEDURE — 36415 COLL VENOUS BLD VENIPUNCTURE: CPT | Performed by: INTERNAL MEDICINE

## 2017-02-23 PROCEDURE — 84100 ASSAY OF PHOSPHORUS: CPT | Performed by: INTERNAL MEDICINE

## 2017-02-23 PROCEDURE — 97110 THERAPEUTIC EXERCISES: CPT | Mod: GO | Performed by: OCCUPATIONAL THERAPY ASSISTANT

## 2017-02-23 PROCEDURE — 27210995 ZZH RX 272: Performed by: INTERNAL MEDICINE

## 2017-02-23 RX ORDER — ATORVASTATIN CALCIUM 40 MG/1
40 TABLET, FILM COATED ORAL DAILY
Status: DISCONTINUED | OUTPATIENT
Start: 2017-02-24 | End: 2017-02-27 | Stop reason: HOSPADM

## 2017-02-23 RX ADMIN — ACETAMINOPHEN 650 MG: 650 SUPPOSITORY RECTAL at 08:13

## 2017-02-23 RX ADMIN — POTASSIUM PHOSPHATE, MONOBASIC AND POTASSIUM PHOSPHATE, DIBASIC 20 MMOL: 224; 236 INJECTION, SOLUTION INTRAVENOUS at 11:56

## 2017-02-23 RX ADMIN — INSULIN ASPART 1 UNITS: 100 INJECTION, SOLUTION INTRAVENOUS; SUBCUTANEOUS at 05:17

## 2017-02-23 RX ADMIN — METOPROLOL TARTRATE 5 MG: 5 INJECTION INTRAVENOUS at 10:14

## 2017-02-23 RX ADMIN — METOPROLOL TARTRATE 5 MG: 5 INJECTION INTRAVENOUS at 22:20

## 2017-02-23 RX ADMIN — SODIUM CHLORIDE: 4.5 INJECTION, SOLUTION INTRAVENOUS at 05:52

## 2017-02-23 RX ADMIN — INSULIN ASPART 1 UNITS: 100 INJECTION, SOLUTION INTRAVENOUS; SUBCUTANEOUS at 01:07

## 2017-02-23 RX ADMIN — LIDOCAINE HYDROCHLORIDE 4 ML: 20 JELLY TOPICAL at 16:10

## 2017-02-23 RX ADMIN — LIDOCAINE HYDROCHLORIDE 4 ML: 20 JELLY TOPICAL at 16:04

## 2017-02-23 RX ADMIN — METOPROLOL TARTRATE 5 MG: 5 INJECTION INTRAVENOUS at 16:38

## 2017-02-23 RX ADMIN — METOPROLOL TARTRATE 5 MG: 5 INJECTION INTRAVENOUS at 05:52

## 2017-02-23 RX ADMIN — ASPIRIN 300 MG: 300 SUPPOSITORY RECTAL at 10:14

## 2017-02-23 NOTE — PROGRESS NOTES
RADIOLOGY PROCEDURE NOTE  Patient name: Elias Mckee  MRN: [id number]  : 8/10/1931    Pre-procedure diagnosis: Dysphagia  Post-procedure diagnosis: Same    Procedure Date/Time: 2017  3:48 PM  Procedure: NJ feeding tube placement  Estimated blood loss: None  Specimen(s) collected with description: none  The patient tolerated the procedure well with no immediate complications.  Significant findings:ready for immediate use    See imaging dictation for procedural details.    Provider name: Sergo Ring  Assistant(s):None

## 2017-02-23 NOTE — PROGRESS NOTES
"This a follow up regarding stroke  He is S/P IV tPA and mechanical thrombectomy trial  Fail swallow evaluation  Did not sleep well last night  Very sleepy this morning    /84 (BP Location: Right arm)  Pulse 55  Temp 98.3  F (36.8  C) (Oral)  Resp 18  Ht 1.778 m (5' 10\")  Wt 83 kg (182 lb 15.7 oz)  SpO2 97%  BMI 26.26 kg/m2  Sleepy, opens eyes briefly to verbal commands  Was able to say his name then fell asleep immediately afterwards  Follows simple commands   Speech: dysarthric  CN: Left facial weakness UMN type, VFF, EOMI, Pupils: R deformed, left pinpoint  Motor : LUE0/5 LLE 3/5 Right side 5/5        A/P Acute stroke:  -S/P IV tPA  -Etiology:workup in progress(Likely cardio-embolic as he has history of Afibrillation )  -Aspirin temporarily :anticoagulation in few days  -Lipitor 80 once he passes swallow eval  -Anticoagulation to be restarted before discharge:coumadin vs NOACs  -Blood pressure parameters: Normotensive  -Activity:as tolerated   -DVT prophylaxis:SCDs     2)sleepy:  -Will try provigil if he sleeps well tonight and stays sleepy tomorrow.         "

## 2017-02-23 NOTE — PLAN OF CARE
Problem: Goal Outcome Summary  Goal: Goal Outcome Summary  PT: Patient lethargic, able to awaken to voice. Pt able to roll to L with Karey, requires maxA to roll to R. Patient able to perform bridging with modA to reposition. Pt participated in LLE ex/ROM, falling asleep at times during exercise. Continue to recommend pt discharge to ARU as pt participates well when able to maintain alertness.

## 2017-02-23 NOTE — PLAN OF CARE
Problem: Goal Outcome Summary  Goal: Goal Outcome Summary  Outcome: No Change  VSS and WNL for this pt. Pt transfers with assist of 2 with lift, LS diminished and tele is Afib with CVR. Pt is A &O, has hemiparesis to LUE and weakness to LLE. Pt denies pain, safety checks completed and has attempted to get out of bed frequently. Sticky note sent to MD regarding presedex and pt having s/s of thrush. Continue with POC, will pass on and continue to monitor.

## 2017-02-23 NOTE — PLAN OF CARE
Problem: Goal Outcome Summary  Goal: Goal Outcome Summary  OT: Attempted to see pt for OT session. Pt leaving the floor for NG tube placement.

## 2017-02-23 NOTE — PLAN OF CARE
Problem: Goal Outcome Summary  Goal: Goal Outcome Summary  Outcome: Improving  A&O x3, disoriented to place, forgetful. Right pupil fixed and oval shaped, left pupil pinpoint, LUE hemiplegia, LLE weaker than right, Left facial droop, otherwise Neuros intact.  Hypertensive but within parameters, otherwise VSS. Tele A-fib with CVR. NPO due to failed swallow study. Up with lift. Incontinent. Denies pain. Plan for possible tube feeding initiation tomorrow, continue monitoring.

## 2017-02-23 NOTE — PLAN OF CARE
Problem: Goal Outcome Summary  Goal: Goal Outcome Summary  OT: Per plan established by the Occupational Therapist, the recommended discharge location is ARU. Pt dependent of 2 to reposition up in bed.  Completed LUE PROM all planes, no active movement noted.

## 2017-02-23 NOTE — CONSULTS
"CLINICAL NUTRITION SERVICES  -  ASSESSMENT NOTE      RECOMMENDATIONS FOR MD/PROVIDER TO ORDER:   No recommendation at this time.   Recommendations Ordered by Registered Dietitian (RD):   Isosource 1.5 @ 30 mL/hr, after 24 hours advance to goal of 60 mL/hr (see details below)   Malnutrition: Patient does not meet two of the criteria necessary for diagnosing malnutrition        REASON FOR ASSESSMENT  Elias Mckee is a 85 year old male seen by dietetic intern for Provider Order - Assess and order TF      NUTRITION HISTORY  - Unable to obtain nutrition history, pt not appropriate for consult.      CURRENT NUTRITION ORDERS  Diet Order:     NPO     PHYSICAL FINDINGS  Observed  No nutrition-related physical findings observed  Obtained from Chart/Interdisciplinary Team  None noted    ANTHROPOMETRICS  Height: 5' 10\"  Weight: 182 lbs 15.71 oz (83 kg)  Body mass index is 26.26 kg/(m^2).  Weight Status:  Overweight BMI 25-29.9  IBW: 75 kg  % IBW: 111%  Weight History:   Wt Readings from Last 5 Encounters:   17 83 kg (182 lb 15.7 oz)   17 87.5 kg (193 lb)   16 84.8 kg (187 lb)   16 85.3 kg (188 lb)   07/29/15 84.3 kg (185 lb 12.8 oz)       LABS  Phos: 1.6 (L)  B (H)    MEDICATIONS  Na IVF @ 75 ml/hr    Dosing Weight 83 kg ( current wt)    ASSESSED NUTRITION NEEDS:  Estimated Energy Needs: 4340-7988 kcals (25-30 Kcal/Kg)  Justification: maintenance  Estimated Protein Needs: grams protein (1-1.2 g pro/Kg)  Justification: CKD stage 3  Estimated Fluid Needs: 1689-0163  mL (1 mL/Kcal)  Justification: per provider pending fluid status    MALNUTRITION:  % Weight Loss:  None noted  % Intake:  Decreased intake does not meet criteria for malnutrition   Subcutaneous Fat Loss:  None observed  Muscle Loss:  None observed  Fluid Retention:  None noted    Malnutrition Diagnosis: Patient does not meet two of the above criteria necessary for diagnosing malnutrition      NUTRITION DIAGNOSIS:  Inadequate " oral intake related to NPO status, as evidenced by pt currently meeting 0% of assessed needs    Recommendations / Nutrition Prescription  Nutrition Support Enteral:  Type of Feeding Tube: Nasoenteric FT tube placement ordered  Enteral Frequency: Continuous  Enteral Regimen: Isosource 1.5 @ goal of 60 mL/hr  Total Enteral Provisions: 2160 kcal ( 26 kcal/kg), 98 gm pro ( 1.2 gm/kg), 253 gm CHO, 1094 mL H2O.  Free Water Flush: Std 60 mL q 4 hours while on IVF     Implementation  Nutrition education: No education needs  EN Schedule - wrote orders to start TF @ 30  mL/hr, after 24 hours advance to goal of 60 mL/hr       Nutrition Goals  TF to reach goal Isosource of 60 mL/hr 24-48 hours after FT placed    MONITORING AND EVALUATION:  Progress towards goals will be monitored and evaluated per protocol and Practice Guidelines      Leona Maria  Dietetic Intern

## 2017-02-23 NOTE — PROGRESS NOTES
[facility]    Hospitalist Progress Note    Assessment & Plan   Elias Mckee is a 85 year old male who was admitted on 2/20/2017.     Elias Mckee is a 85 year old male past medical history significant for previous stroke with residual LLE weakness, coronary artery disease with hx of cardiac arrest, diabetes, atrial fibrillation, chronic kidney disease who presented to the Emergency Department with left-sided weakness consistent with acute stroke.      Acute right MCA territory stroke:  CTA showed occluded right M2 segment He received tPA. Attempt mechanical thrombectomy by IR however, failure of recanalization due to actue angle of branch and markedly frim/calicified nature of clot. Suspect to be cardio-embolic give hx of paroxysmal A. fib  - MRI 2/21 shows recent moderate sized posterior division right MCA territory infarct corresponding to the known right M2 occlusion and tiny recent infarct int he left cerebellar hemisphere  - repeat CT on 2/22 showed evolving mod sized posterior division right MCA infarct, no new infarct  - TTE shows LVEF of 55-60%, bubble study is negative  - Lipid panel: HDL 28, LDL 49,   - ASA rectally and statin started (but NPO at this time). Plan to start anticoagulation prior to discharge per neurology  - neurology following  - PT/OT rec acute rehab  - SLP recommends NPO and alternative means of nutrition, discussed with daughter   -pt failed swallow today, speech recommends npo and possibly a video swallow tomorrow when he is more alert  -nasal feeding tube placed and he will be started on tube feeds for nutrition]         CAD with hx of multiple stent placements and hx of cardiac arrest:  - troponin x 1 negative  - no complaint of chest pain  - resume BB and ACEi once able to take PO  - continue telemetry     Hx of Atrial fibrillation  - admission EKG shows NSR. Not on anticoagulation prior to admission  - continue telemetry  - metoprolol IV prn until  PO intake is resumed     Previous stroke:   - management of acute stroke as above. ASA restarted. Plan for long term anticoagulation as above per neurology     Type II DM:adeqautely controlled at baseline.  A1c 6.8.   - on Glimepride PTA  - hold sulfonylurea until PO intake is resumed and stable  - continue with SSI  -glucose 151, yesterday 164, 144, 131     Hypertension:   - resume his PTA regimen once taking p.o. BP increasing  - will scheduled metoprolol 5mg QID while NPO   - PRN IV meds available as well     Chronic kidney disease, stage 3:   - Creatinine baseline1.3-1.4  - montior     Subclinical hypothyroidism:  TSH 5.96. Will need follow up TSH in 6-8 weeks once acute issues stable.      BPH: Can restart Flomax when taking p.o.      FEN:   -tube feeds starting today  - 2/22-IVF to 0.45 NS due hyperchloremia and decreasing bicarb     Deep venous thrombosis prophylaxis: SCDs.   Code Status: Full Code      Disposition: Expected discharge in 2-3 days pending hospital course.  Therapies rec ARU        Leona Hughes MD    Interval History   No complaints this am.   Discussed with daughter by phone and will see her with pt when she arrives from work  -Data reviewed today: I reviewed all new labs and imaging results over the last 24 hours. I personally reviewed no images or EKG's today.    Physical Exam   Temp: 98  F (36.7  C) Temp src: Oral BP: 172/59   Heart Rate: 66 Resp: 20 SpO2: 97 % O2 Device: None (Room air)    Vitals:    02/20/17 2300 02/21/17 0430 02/23/17 0554   Weight: 89 kg (196 lb 3.4 oz) 89 kg (196 lb 3.4 oz) 83 kg (182 lb 15.7 oz)     Vital Signs with Ranges  Temp:  [97.5  F (36.4  C)-98.8  F (37.1  C)] 98  F (36.7  C)  Heart Rate:  [50-72] 66  Resp:  [20] 20  BP: (138-172)/(59-98) 172/59  SpO2:  [97 %] 97 %  I/O last 3 completed shifts:  In: 1450 [I.V.:1450]  Out: 825 [Urine:825]    Constitutional: alert   Respiratory: clear to auscultation, no wheezing or rhonchi   Cardiovascular: regular  rate and rhythm without murmer or rub   GI:positive bowel sounds, non-tender   Skin/Integumen: no rashes    Other:        Medications     NaCl 75 mL/hr at 02/23/17 0552     - MEDICATION INSTRUCTIONS -       - MEDICATION INSTRUCTIONS -       - MEDICATION INSTRUCTIONS -       - MEDICATION INSTRUCTIONS -         aspirin  300 mg Rectal Daily     metoprolol  5 mg Intravenous Q6H     atorvastatin  40 mg Oral Daily     insulin aspart  1-6 Units Subcutaneous Q4H       Data     Recent Labs  Lab 02/22/17  0520 02/21/17  1700 02/21/17  1500 02/21/17  0410 02/20/17  1905  02/17/17  0940   WBC 11.0  --   --  8.3 7.2  --   --    HGB 11.9*  --  11.3* 10.7* 12.2*  < >  --    MCV 91  --   --  91 93  --   --    *  --   --  124* 136*  --   --    INR  --   --   --   --  0.99  --   --      --   --  143 141  --  144   POTASSIUM 3.9  --   --  3.7 3.9  --  4.2   CHLORIDE 117*  --   --  117* 113*  --  115*   CO2 16*  --   --  19* 20  --  19*   BUN 16  --   --  23 28  --  18   CR 1.04  --   --  1.05 1.28*  --  1.34*   ANIONGAP 10  --   --  7 8  --  10   TRENT 7.9*  --   --  7.5* 8.4*  --  8.5   *  --   --  149* 187*  --  128*   ALT  --   --   --   --   --   --  29   TROPI  --  <0.015The 99th percentile for upper reference range is 0.045 ug/L.  Troponin values in the range of 0.045 - 0.120 ug/L may be associated with risks of adverse clinical events.  --  <0.015The 99th percentile for upper reference range is 0.045 ug/L.  Troponin values in the range of 0.045 - 0.120 ug/L may be associated with risks of adverse clinical events.  --   --   --    < > = values in this interval not displayed.    Recent Results (from the past 24 hour(s))   CT Head w/o Contrast    Narrative    CT OF THE HEAD WITHOUT CONTRAST 2/22/2017 1:01 PM     COMPARISON: Brain MR 2/21/2017.    HISTORY: Mental status change in a patient with recent stroke status  post tPA.    TECHNIQUE: 5 mm thick axial CT images of the head were acquired  without IV contrast  material.    FINDINGS: A chronic right occipital infarct is again noted. An  evolving posterior division right MCA territory infarct is again  noted. No definite new infarcts. There is moderate diffuse cerebral  volume loss. There are subtle patchy areas of decreased density in the  cerebral white matter bilaterally that are consistent with sequela of  chronic small vessel ischemic disease.    The ventricles and basal cisterns are within normal limits in  configuration given the degree of cerebral volume loss.  There is no  midline shift. There are no extra-axial fluid collections.    No intracranial hemorrhage or mass.    The visualized paranasal sinuses are well-aerated. There is no  mastoiditis. There are no fractures of the visualized bones.      Impression    IMPRESSION:   1. Evolving moderate-sized posterior division right MCA territory  infarct again noted.  2. Chronic right occipital infarct again noted.  3. No new infarcts. No intracranial hemorrhage.  4. Diffuse volume loss and cerebral white matter changes consistent  with chronic small vessel ischemic disease.      Radiation dose for this scan was reduced using automated exposure  control, adjustment of the mA and/or kV according to patient size, or  iterative reconstruction technique.    DANIEL PORTILLO MD

## 2017-02-23 NOTE — PLAN OF CARE
Problem: Goal Outcome Summary  Goal: Goal Outcome Summary  Outcome: Improving  Disoriented to situation. Neuros LUE hemiplegia, LLE some movement against gravity. VSS. Tele A-fib. NPO, NG tube to be placed. Bedrest. Incontinent of bowel and bladder, uses urinal occasionally. Pt somnolent, resting most of shift. Attempts to get out of bed, redirectable when staff present. Phosporus 1.8, replacement initiated. Plan: continue to observe.

## 2017-02-23 NOTE — PLAN OF CARE
Problem: Goal Outcome Summary  Goal: Goal Outcome Summary  PT: Attempted to see patient for PT session, pt leaving the floor to get an NG tube placed.

## 2017-02-23 NOTE — PLAN OF CARE
Problem: Goal Outcome Summary  Goal: Goal Outcome Summary  SLP: Patient was seen for swallow treatment while in bedside. Removed dried oral secretions, tongue appears to have a possible thrush. He continues to have decreased awareness/sensation of liquids in his mouth. Though he swallowed an ice chip but remained in the front of his mouth. Incomplete swallow noted. He took several teaspoon amounts of nectar and honey thick liquids with decreased bolus coordination AP movement of the bolus, delayed swallow response and laryngeal elevation. 2 1/2 teaspoon amounts of apple sauce given and he was able to slowly propel the bolus posteriorly delayed swallow and reduce laryngeal elevation. Did clear most of the bolus from the oral cavity. Patient continues to be a high risk for aspiration/nutrition. Patient having difficulty with maintaining alertness. Recommend: 1. Continue NPO. 2. Complete a video swallow study once alertness is better 1-2 days. 3. Consider short term tube feeding for nutritional support. 4. Discharge to ARU pending endurance, when alert participates well.

## 2017-02-24 ENCOUNTER — APPOINTMENT (OUTPATIENT)
Dept: PHYSICAL THERAPY | Facility: CLINIC | Age: 82
DRG: 024 | End: 2017-02-24
Payer: MEDICARE

## 2017-02-24 ENCOUNTER — APPOINTMENT (OUTPATIENT)
Dept: SPEECH THERAPY | Facility: CLINIC | Age: 82
DRG: 024 | End: 2017-02-24
Payer: MEDICARE

## 2017-02-24 ENCOUNTER — APPOINTMENT (OUTPATIENT)
Dept: OCCUPATIONAL THERAPY | Facility: CLINIC | Age: 82
DRG: 024 | End: 2017-02-24
Payer: MEDICARE

## 2017-02-24 LAB
ANION GAP SERPL CALCULATED.3IONS-SCNC: 9 MMOL/L (ref 3–14)
BUN SERPL-MCNC: 17 MG/DL (ref 7–30)
CALCIUM SERPL-MCNC: 7.9 MG/DL (ref 8.5–10.1)
CHLORIDE SERPL-SCNC: 114 MMOL/L (ref 94–109)
CO2 SERPL-SCNC: 17 MMOL/L (ref 20–32)
CREAT SERPL-MCNC: 0.9 MG/DL (ref 0.66–1.25)
ERYTHROCYTE [DISTWIDTH] IN BLOOD BY AUTOMATED COUNT: 15.2 % (ref 10–15)
GFR SERPL CREATININE-BSD FRML MDRD: 80 ML/MIN/1.7M2
GLUCOSE BLDC GLUCOMTR-MCNC: 152 MG/DL (ref 70–99)
GLUCOSE BLDC GLUCOMTR-MCNC: 165 MG/DL (ref 70–99)
GLUCOSE BLDC GLUCOMTR-MCNC: 173 MG/DL (ref 70–99)
GLUCOSE BLDC GLUCOMTR-MCNC: 178 MG/DL (ref 70–99)
GLUCOSE BLDC GLUCOMTR-MCNC: 179 MG/DL (ref 70–99)
GLUCOSE BLDC GLUCOMTR-MCNC: 183 MG/DL (ref 70–99)
GLUCOSE SERPL-MCNC: 179 MG/DL (ref 70–99)
HCT VFR BLD AUTO: 31.4 % (ref 40–53)
HGB BLD-MCNC: 10.8 G/DL (ref 13.3–17.7)
MAGNESIUM SERPL-MCNC: 1.9 MG/DL (ref 1.6–2.3)
MCH RBC QN AUTO: 30.9 PG (ref 26.5–33)
MCHC RBC AUTO-ENTMCNC: 34.4 G/DL (ref 31.5–36.5)
MCV RBC AUTO: 90 FL (ref 78–100)
PHOSPHATE SERPL-MCNC: 1.7 MG/DL (ref 2.5–4.5)
PLATELET # BLD AUTO: 131 10E9/L (ref 150–450)
POTASSIUM SERPL-SCNC: 3.6 MMOL/L (ref 3.4–5.3)
RBC # BLD AUTO: 3.49 10E12/L (ref 4.4–5.9)
SODIUM SERPL-SCNC: 140 MMOL/L (ref 133–144)
WBC # BLD AUTO: 8.5 10E9/L (ref 4–11)

## 2017-02-24 PROCEDURE — 25000125 ZZHC RX 250: Performed by: INTERNAL MEDICINE

## 2017-02-24 PROCEDURE — 80048 BASIC METABOLIC PNL TOTAL CA: CPT | Performed by: INTERNAL MEDICINE

## 2017-02-24 PROCEDURE — 27210995 ZZH RX 272: Performed by: INTERNAL MEDICINE

## 2017-02-24 PROCEDURE — 97530 THERAPEUTIC ACTIVITIES: CPT | Mod: GO | Performed by: OCCUPATIONAL THERAPY ASSISTANT

## 2017-02-24 PROCEDURE — 97530 THERAPEUTIC ACTIVITIES: CPT | Mod: GP | Performed by: PHYSICAL THERAPIST

## 2017-02-24 PROCEDURE — 12000000 ZZH R&B MED SURG/OB

## 2017-02-24 PROCEDURE — 25000132 ZZH RX MED GY IP 250 OP 250 PS 637: Mod: GY | Performed by: INTERNAL MEDICINE

## 2017-02-24 PROCEDURE — A9270 NON-COVERED ITEM OR SERVICE: HCPCS | Mod: GY | Performed by: INTERNAL MEDICINE

## 2017-02-24 PROCEDURE — 99231 SBSQ HOSP IP/OBS SF/LOW 25: CPT | Performed by: PSYCHIATRY & NEUROLOGY

## 2017-02-24 PROCEDURE — 27210429 ZZH NUTRITION PRODUCT INTERMEDIATE LITER

## 2017-02-24 PROCEDURE — 84100 ASSAY OF PHOSPHORUS: CPT | Performed by: INTERNAL MEDICINE

## 2017-02-24 PROCEDURE — 40000225 ZZH STATISTIC SLP WARD VISIT: Performed by: SPEECH-LANGUAGE PATHOLOGIST

## 2017-02-24 PROCEDURE — 40000133 ZZH STATISTIC OT WARD VISIT: Performed by: OCCUPATIONAL THERAPY ASSISTANT

## 2017-02-24 PROCEDURE — 36415 COLL VENOUS BLD VENIPUNCTURE: CPT | Performed by: INTERNAL MEDICINE

## 2017-02-24 PROCEDURE — A9270 NON-COVERED ITEM OR SERVICE: HCPCS | Mod: GY | Performed by: PSYCHIATRY & NEUROLOGY

## 2017-02-24 PROCEDURE — 85027 COMPLETE CBC AUTOMATED: CPT | Performed by: INTERNAL MEDICINE

## 2017-02-24 PROCEDURE — 25000132 ZZH RX MED GY IP 250 OP 250 PS 637: Mod: GY | Performed by: PSYCHIATRY & NEUROLOGY

## 2017-02-24 PROCEDURE — 00000146 ZZHCL STATISTIC GLUCOSE BY METER IP

## 2017-02-24 PROCEDURE — 99232 SBSQ HOSP IP/OBS MODERATE 35: CPT | Performed by: INTERNAL MEDICINE

## 2017-02-24 PROCEDURE — 83735 ASSAY OF MAGNESIUM: CPT | Performed by: INTERNAL MEDICINE

## 2017-02-24 PROCEDURE — 40000193 ZZH STATISTIC PT WARD VISIT: Performed by: PHYSICAL THERAPIST

## 2017-02-24 PROCEDURE — 92526 ORAL FUNCTION THERAPY: CPT | Mod: GN | Performed by: SPEECH-LANGUAGE PATHOLOGIST

## 2017-02-24 PROCEDURE — 25000128 H RX IP 250 OP 636: Performed by: INTERNAL MEDICINE

## 2017-02-24 RX ORDER — LISINOPRIL 2.5 MG/1
5 TABLET ORAL 2 TIMES DAILY
Status: DISCONTINUED | OUTPATIENT
Start: 2017-02-24 | End: 2017-02-25

## 2017-02-24 RX ADMIN — METOPROLOL TARTRATE 5 MG: 5 INJECTION INTRAVENOUS at 22:57

## 2017-02-24 RX ADMIN — METOPROLOL TARTRATE 5 MG: 5 INJECTION INTRAVENOUS at 17:04

## 2017-02-24 RX ADMIN — INSULIN ASPART 1 UNITS: 100 INJECTION, SOLUTION INTRAVENOUS; SUBCUTANEOUS at 20:33

## 2017-02-24 RX ADMIN — SODIUM CHLORIDE: 4.5 INJECTION, SOLUTION INTRAVENOUS at 04:20

## 2017-02-24 RX ADMIN — POTASSIUM PHOSPHATE, MONOBASIC AND POTASSIUM PHOSPHATE, DIBASIC 20 MMOL: 224; 236 INJECTION, SOLUTION INTRAVENOUS at 14:36

## 2017-02-24 RX ADMIN — INSULIN ASPART 1 UNITS: 100 INJECTION, SOLUTION INTRAVENOUS; SUBCUTANEOUS at 11:43

## 2017-02-24 RX ADMIN — INSULIN ASPART 1 UNITS: 100 INJECTION, SOLUTION INTRAVENOUS; SUBCUTANEOUS at 00:37

## 2017-02-24 RX ADMIN — LISINOPRIL 5 MG: 2.5 TABLET ORAL at 14:44

## 2017-02-24 RX ADMIN — ACETAMINOPHEN 650 MG: 160 SOLUTION ORAL at 01:34

## 2017-02-24 RX ADMIN — METOPROLOL TARTRATE 5 MG: 5 INJECTION INTRAVENOUS at 04:34

## 2017-02-24 RX ADMIN — ATORVASTATIN CALCIUM 40 MG: 40 TABLET, FILM COATED ORAL at 08:07

## 2017-02-24 RX ADMIN — INSULIN ASPART 1 UNITS: 100 INJECTION, SOLUTION INTRAVENOUS; SUBCUTANEOUS at 17:07

## 2017-02-24 RX ADMIN — ASPIRIN 300 MG: 300 SUPPOSITORY RECTAL at 08:07

## 2017-02-24 RX ADMIN — INSULIN ASPART 1 UNITS: 100 INJECTION, SOLUTION INTRAVENOUS; SUBCUTANEOUS at 08:35

## 2017-02-24 RX ADMIN — INSULIN ASPART 1 UNITS: 100 INJECTION, SOLUTION INTRAVENOUS; SUBCUTANEOUS at 04:42

## 2017-02-24 RX ADMIN — LISINOPRIL 5 MG: 2.5 TABLET ORAL at 20:33

## 2017-02-24 RX ADMIN — METOPROLOL TARTRATE 5 MG: 5 INJECTION INTRAVENOUS at 10:55

## 2017-02-24 RX ADMIN — ACETAMINOPHEN 650 MG: 160 SOLUTION ORAL at 20:33

## 2017-02-24 ASSESSMENT — VISUAL ACUITY: OU: GLASSES

## 2017-02-24 NOTE — PLAN OF CARE
Problem: Goal Outcome Summary  Goal: Goal Outcome Summary  PT: Patient resting in bed upon arrival, seems more alert today. Pt transfers supine>sit with modA of 1-2. Pt required varied CGA to [place] to sit at EOB ~5 minutes, cues for awareness of LUE. Pt sit>stand with mod-maxA of 2, 1 min trials. Pt unsteady, noting B LE buckling L>R. Pt unable to take side steps to transfer despite maxA of 2. Transferred bed>chair with universal sling via ceiling lift and Ax2. Pt following commands well today. Recommend pt discharge to ARU.

## 2017-02-24 NOTE — PROVIDER NOTIFICATION
Call placed to Dr. Summers to clarify when blood pressure meds should be resumed.  Awaiting return call.    ADDENDUM: Spoke with Dr. Summers. OK to start blood pressure medication since patient is more than 24 hours post TPA.  Text page sent to Dr. Hughes.

## 2017-02-24 NOTE — PROGRESS NOTES
Social Work Progress Note  Pt chart reviewed. Pt discussed in interdisciplinary rounds.     Intervention: Sw met with pt's daughter Jazmin and Jazmin's  Brain. Sw explained that therapies are recommending ARU at this time. Jazmin stated that she would like a referral sent to [facility]U because they [place] to [place]. Sw explained that although therapies are recommending ARU, pt would still need to be assessed to ensure he meets ARU criteria. Jazmin verbalized understanding and stated that she would like [facility] as a second choice. Pt has a video swallow eval tomorrow.     Team members notified: Bedside RN, & CC    Plan:  Anticipated Discharge Placement: ? ARU  Barriers: Pending medical course.  Follow-up plan: Sw to continue to follow.     CHEVY De, [facility]  [phone] [id number]

## 2017-02-24 NOTE — PLAN OF CARE
Problem: Goal Outcome Summary  Goal: Goal Outcome Summary  OT: Pt busy with another discipline when OT attempted for session.

## 2017-02-24 NOTE — PROVIDER NOTIFICATION
Spoke with Dr. Summers, pt should go to ARU on 325mg ASA. Follow up in 1 week with neuro and heat CT. If CT ok then start Eliquis.

## 2017-02-24 NOTE — PROGRESS NOTES
[facility]    Hospitalist Progress Note    Assessment & Plan     Elias Mckee is a 85 year old male who was admitted on 2/20/2017.      Elias Mckee is a 85 year old male past medical history significant for previous stroke with residual LLE weakness, coronary artery disease with hx of cardiac arrest, diabetes, atrial fibrillation, chronic kidney disease who presented to the Emergency Department with left-sided weakness consistent with acute stroke.       Acute right MCA territory stroke:  CTA showed occluded right M2 segment He received tPA. Attempt mechanical thrombectomy by IR however, failure of recanalization due to actue angle of branch and markedly frim/calicified nature of clot. Suspect to be cardio-embolic give hx of paroxysmal A. fib  - MRI 2/21 shows recent moderate sized posterior division right MCA territory infarct corresponding to the known right M2 occlusion and tiny recent infarct int he left cerebellar hemisphere  - repeat CT on 2/22 showed evolving mod sized posterior division right MCA infarct, no new infarct  - TTE shows LVEF of 55-60%, bubble study is negative  - Lipid panel: HDL 28, LDL 49,   - ASA rectally and statin started (but NPO at this time). Plan to start anticoagulation prior to discharge per neurology  - neurology following  - PT/OT rec acute rehab  -pt failed swallow 2/23  -feeding tube placed 2/23, started on tube feeds also  -speech is possibly going to do video swallow today  -can blood pressure meds be started, advice per neurology     Addendum- ok to start BP meds per neuro, will add lisinopril 5 mg down feeding tube bid with parameters.        CAD with hx of multiple stent placements and hx of cardiac arrest:  - troponin x 1 negative  - no complaint of chest pain  - resume BB and ACEi per neurology  - continue telemetry      Hx of Atrial fibrillation  - admission EKG shows NSR. Not on anticoagulation prior to admission  - continue  telemetry  - metoprolol IV prn until PO intake is resumed      Previous stroke:   - management of acute stroke as above. ASA restarted. Plan for long term anticoagulation as above per neurology      Type II DM:adeqautely controlled at baseline.  A1c 6.8.   - on Glimepride PTA  - hold sulfonylurea until PO intake is resumed and stable  - continue with SSI  -glucose 179 today, yesterday 151, 121, 126      Hypertension:   - resume his PTA regimen once taking p.o. BP increasing  - will scheduled metoprolol 5mg QID while NPO   - PRN IV meds available as well      Chronic kidney disease, stage 3:   - Creatinine baseline1.3-1.4  -today creatinine 0.90      Subclinical hypothyroidism:  TSH 5.96. Will need follow up TSH in 6-8 weeks once acute issues stable.       BPH: Can restart Flomax when taking p.o.       FEN:   -tube feeds started 2/23        Deep venous thrombosis prophylaxis: SCDs.   Code Status: Full Code       Disposition: Expected discharge to TCU  When ok'd by neuro and bed available       Leona Hughes MD    Interval History   No new complaints except for some mild back pain.  Denies chest pain, SOb or abd pain    -Data reviewed today: I reviewed all new labs and imaging results over the last 24 hours. I personally reviewed no images or EKG's today.    Physical Exam   Temp: 99.3  F (37.4  C) Temp src: Oral BP: 161/76   Heart Rate: 77 Resp: 16 SpO2: 96 % O2 Device: None (Room air)    Vitals:    02/21/17 0430 02/23/17 0554 02/24/17 0547   Weight: 89 kg (196 lb 3.4 oz) 83 kg (182 lb 15.7 oz) 86.4 kg (190 lb 7.6 oz)     Vital Signs with Ranges  Temp:  [97.3  F (36.3  C)-99.3  F (37.4  C)] 99.3  F (37.4  C)  Heart Rate:  [50-90] 77  Resp:  [16-18] 16  BP: (137-179)/() 161/76  SpO2:  [95 %-99 %] 96 %  I/O last 3 completed shifts:  In: 1505 [I.V.:1235; NG/GT:210]  Out: 1925 [Urine:1925]    Constitutional: alert   Respiratory: clear to auscultation, no wheezing or rhonchi   Cardiovascular: regular rate and  rhythm without murmer or rub   GI:positive bowel sounds, non-tender   Skin/Integumen: no rashes    Other: Unable to move LUE      Medications     IV fluid REPLACEMENT ONLY       NaCl 75 mL/hr at 02/24/17 0803     - MEDICATION INSTRUCTIONS -       - MEDICATION INSTRUCTIONS -       - MEDICATION INSTRUCTIONS -       - MEDICATION INSTRUCTIONS -         atorvastatin  40 mg Oral or Feeding Tube Daily     aspirin  300 mg Rectal Daily     metoprolol  5 mg Intravenous Q6H     insulin aspart  1-6 Units Subcutaneous Q4H       Data     Recent Labs  Lab 02/24/17  0740 02/23/17  0805 02/22/17  0520 02/21/17  1700  02/21/17  0410 02/20/17  1905  02/17/17  0940   WBC 8.5 10.5 11.0  --   --  8.3 7.2  < >  --    HGB 10.8* 11.6* 11.9*  --   < > 10.7* 12.2*  < >  --    MCV 90 91 91  --   --  91 93  < >  --    * 127* 142*  --   --  124* 136*  < >  --    INR  --   --   --   --   --   --  0.99  --   --     141 143  --   --  143 141  --  144   POTASSIUM 3.6 3.5 3.9  --   --  3.7 3.9  --  4.2   CHLORIDE 114* 115* 117*  --   --  117* 113*  --  115*   CO2 17* 15* 16*  --   --  19* 20  --  19*   BUN 17 15 16  --   --  23 28  --  18   CR 0.90 0.93 1.04  --   --  1.05 1.28*  --  1.34*   ANIONGAP 9 11 10  --   --  7 8  --  10   TRENT 7.9* 8.0* 7.9*  --   --  7.5* 8.4*  --  8.5   * 143* 177*  --   --  149* 187*  --  128*   ALT  --   --   --   --   --   --   --   --  29   TROPI  --   --   --  <0.015The 99th percentile for upper reference range is 0.045 ug/L.  Troponin values in the range of 0.045 - 0.120 ug/L may be associated with risks of adverse clinical events.  --  <0.015The 99th percentile for upper reference range is 0.045 ug/L.  Troponin values in the range of 0.045 - 0.120 ug/L may be associated with risks of adverse clinical events.  --   --   --    < > = values in this interval not displayed.    Recent Results (from the past 24 hour(s))   XR Feeding Tube Placement    Narrative    FEEDING TUBE PLACEMENT   2/23/2017 4:16  PM    HISTORY: Nutritional needs.    COMPARISON: None    FINDINGS: 2.8 minutes of fluoroscopy were utilized for placement of a  feeding tube.  At the final position, the feeding tube tip was at the  ligament of Treitz.  25 mL of contrast was injected to confirm  placement.  The tube was marked at the level of the nose at the 104 cm  length.  There were no complications of the procedure.    Medications used: 25mL Omnipaque 240 thru Feeding Tube, 2% Lidocaine  Jelly 4ml topically.    Number of Images: 1    Impression    IMPRESSION: Successful feeding tube placement.    WALLY LANDAVERDE PA-C

## 2017-02-24 NOTE — PLAN OF CARE
Problem: Goal Outcome Summary  Goal: Goal Outcome Summary  SLP: Swallow tx completed this PM. Pt alert and upright in bed. Speech remains dysarthric; ongoing (L) facial droop. Pt assessed with ice chips, thin liquids via spoon and cup, and applesauce. No overt s/sx of aspiration with all PO trials; however, cannot rule out silent aspiration. Pt demonstrates significantly delayed swallow reflex with reduced hyolaryngeal elevation; suspect possible premature spillage over base of tongue with liquids. Mild (L)-sided oral loss with thin liquids and mild oral residue after teaspoons of purees on (L) between lateral sulci. Residue clears with liquid wash. Pt remains at high risk of aspiration given multiple risk factors. Recommend continue NPO with frequent oral cares. Pt okay for free water protocol: pt may have small sips of thin water/ice chips (limit to 5 per hour) when fully alert, sitting upright at 90 degrees, and after excellent oral cares. Plan to complete VFSS tomorrow 2/25/17. Recommend ARU at discharge given good participation and motivation.

## 2017-02-24 NOTE — PLAN OF CARE
Problem: Goal Outcome Summary  Goal: Goal Outcome Summary  Outcome: Improving  AOx4. Neuros hemiplegia LUE, hemiparesis LLE. VSS. Tele SR with 1st degree AVB. NPO, tube feeding. Suctioned oral secretions, provided oral care. Up with room lift. Incontinent of a small amount of bowel, able to use urinal. Denies pain. Plan: determine further care placement.

## 2017-02-24 NOTE — PROGRESS NOTES
"This a follow up regarding stroke  He is S/P IV tPA and mechanical thrombectomy trial  Was more awake as per nurses report this morning    /88  Pulse 55  Temp 98  F (36.7  C) (Oral)  Resp 16  Ht 1.778 m (5' 10\")  Wt 86.4 kg (190 lb 7.6 oz)  SpO2 96%  BMI 27.33 kg/m2  Sleepy, opens eyes briefly to verbal commands  Was able to say his name then fell asleep immediately afterwards  Follows simple commands   Speech: dysarthric  Motor:LUE 0/5  Left facial weakness        A/P Acute stroke:  -S/P IV tPA  -Etiology:workupLikely cardio-embolic as he has history of Afibrillation   -Aspirin temporarily :anticoagulation in few days. If discharged tomorrow would obtain a CT in  a week and needs follow up in neurology afterwards to decide on anticoagulation  -Lipitor 80 once he passes swallow eval  -Blood pressure parameters: Normotensive  -Activity:as tolerated   -DVT prophylaxis:SCDs             "

## 2017-02-24 NOTE — PLAN OF CARE
Problem: Goal Outcome Summary  Goal: Goal Outcome Summary  Outcome: No Change  A& disoriented to time, place, situation. Confuse. Left facial droop, LUE hemiplegia, LLE hemiparesis. AUGIE, some neuro due to not following command. High SBP but still within given parameter. Tele SR with first degree AVB. NPO diet. Tube feeding running at 30ml/hr with q 4hr 60ml flush. Feeding rate to be increased to 60ml/hr at 7pm today. Lift with 2 assist. Incontinent of stool. Able to ask for urinal to void. Pain decreased with tylenol. Plan possible video swallow today.

## 2017-02-24 NOTE — PLAN OF CARE
Problem: Goal Outcome Summary  Goal: Goal Outcome Summary  Outcome: No Change  Alert and oriented, slurred speech, left droop. Unable to move left arm, left leg strength 3/5, right pupil irregular. Blood pressure on the high side, scheduled lopressor and has PRN for SBP>180. Tele NSR with first degree. NPO, started isosource tube feed @ 30 cc/hr @ 1900.  Turns with assist of 2 and ceiling lift. Denies pain. Able to use urinal in bed with staff assist, has been continent.  Plan pending.

## 2017-02-24 NOTE — PLAN OF CARE
Problem: Goal Outcome Summary  Goal: Goal Outcome Summary  OT: Per plan established by the Occupational Therapist, the recommended discharge location is ARU. Pt completed supine to sit EOB mod A of 1 and cues to sequence steps.  Pt sat EOB with WB through LUE while completing ADL task, cues with hand over hand assist needed to initiate task. Pt stood with mod A of 2, pt not able to step to right to reposition up toward HOB, max A of 2 sit to supine.

## 2017-02-25 ENCOUNTER — APPOINTMENT (OUTPATIENT)
Dept: SPEECH THERAPY | Facility: CLINIC | Age: 82
DRG: 024 | End: 2017-02-25
Payer: MEDICARE

## 2017-02-25 ENCOUNTER — APPOINTMENT (OUTPATIENT)
Dept: OCCUPATIONAL THERAPY | Facility: CLINIC | Age: 82
DRG: 024 | End: 2017-02-25
Payer: MEDICARE

## 2017-02-25 ENCOUNTER — APPOINTMENT (OUTPATIENT)
Dept: PHYSICAL THERAPY | Facility: CLINIC | Age: 82
DRG: 024 | End: 2017-02-25
Payer: MEDICARE

## 2017-02-25 ENCOUNTER — APPOINTMENT (OUTPATIENT)
Dept: GENERAL RADIOLOGY | Facility: CLINIC | Age: 82
DRG: 024 | End: 2017-02-25
Attending: INTERNAL MEDICINE
Payer: MEDICARE

## 2017-02-25 LAB
ANION GAP SERPL CALCULATED.3IONS-SCNC: 7 MMOL/L (ref 3–14)
BUN SERPL-MCNC: 20 MG/DL (ref 7–30)
CALCIUM SERPL-MCNC: 7.7 MG/DL (ref 8.5–10.1)
CHLORIDE SERPL-SCNC: 110 MMOL/L (ref 94–109)
CO2 SERPL-SCNC: 21 MMOL/L (ref 20–32)
CREAT SERPL-MCNC: 0.89 MG/DL (ref 0.66–1.25)
ERYTHROCYTE [DISTWIDTH] IN BLOOD BY AUTOMATED COUNT: 15 % (ref 10–15)
GFR SERPL CREATININE-BSD FRML MDRD: 81 ML/MIN/1.7M2
GLUCOSE BLDC GLUCOMTR-MCNC: 177 MG/DL (ref 70–99)
GLUCOSE BLDC GLUCOMTR-MCNC: 196 MG/DL (ref 70–99)
GLUCOSE BLDC GLUCOMTR-MCNC: 220 MG/DL (ref 70–99)
GLUCOSE BLDC GLUCOMTR-MCNC: 221 MG/DL (ref 70–99)
GLUCOSE BLDC GLUCOMTR-MCNC: 235 MG/DL (ref 70–99)
GLUCOSE BLDC GLUCOMTR-MCNC: 264 MG/DL (ref 70–99)
GLUCOSE SERPL-MCNC: 248 MG/DL (ref 70–99)
HCT VFR BLD AUTO: 30.3 % (ref 40–53)
HGB BLD-MCNC: 10.3 G/DL (ref 13.3–17.7)
INTERPRETATION ECG - MUSE: NORMAL
MAGNESIUM SERPL-MCNC: 1.9 MG/DL (ref 1.6–2.3)
MCH RBC QN AUTO: 30.6 PG (ref 26.5–33)
MCHC RBC AUTO-ENTMCNC: 34 G/DL (ref 31.5–36.5)
MCV RBC AUTO: 90 FL (ref 78–100)
PHOSPHATE SERPL-MCNC: 2.1 MG/DL (ref 2.5–4.5)
PLATELET # BLD AUTO: 153 10E9/L (ref 150–450)
POTASSIUM SERPL-SCNC: 3.9 MMOL/L (ref 3.4–5.3)
RBC # BLD AUTO: 3.37 10E12/L (ref 4.4–5.9)
SODIUM SERPL-SCNC: 138 MMOL/L (ref 133–144)
WBC # BLD AUTO: 8.4 10E9/L (ref 4–11)

## 2017-02-25 PROCEDURE — 12000000 ZZH R&B MED SURG/OB

## 2017-02-25 PROCEDURE — 97535 SELF CARE MNGMENT TRAINING: CPT | Mod: GO

## 2017-02-25 PROCEDURE — A9270 NON-COVERED ITEM OR SERVICE: HCPCS | Mod: GY | Performed by: INTERNAL MEDICINE

## 2017-02-25 PROCEDURE — 80048 BASIC METABOLIC PNL TOTAL CA: CPT | Performed by: INTERNAL MEDICINE

## 2017-02-25 PROCEDURE — 25000125 ZZHC RX 250: Performed by: INTERNAL MEDICINE

## 2017-02-25 PROCEDURE — 00000146 ZZHCL STATISTIC GLUCOSE BY METER IP

## 2017-02-25 PROCEDURE — 84100 ASSAY OF PHOSPHORUS: CPT | Performed by: INTERNAL MEDICINE

## 2017-02-25 PROCEDURE — 97110 THERAPEUTIC EXERCISES: CPT | Mod: GP

## 2017-02-25 PROCEDURE — 40000193 ZZH STATISTIC PT WARD VISIT

## 2017-02-25 PROCEDURE — 25000132 ZZH RX MED GY IP 250 OP 250 PS 637: Mod: GY | Performed by: PSYCHIATRY & NEUROLOGY

## 2017-02-25 PROCEDURE — 25000132 ZZH RX MED GY IP 250 OP 250 PS 637: Mod: GY | Performed by: INTERNAL MEDICINE

## 2017-02-25 PROCEDURE — 97116 GAIT TRAINING THERAPY: CPT | Mod: GP

## 2017-02-25 PROCEDURE — 25000128 H RX IP 250 OP 636: Performed by: INTERNAL MEDICINE

## 2017-02-25 PROCEDURE — 97112 NEUROMUSCULAR REEDUCATION: CPT | Mod: GO

## 2017-02-25 PROCEDURE — 25000131 ZZH RX MED GY IP 250 OP 636 PS 637: Mod: GY | Performed by: INTERNAL MEDICINE

## 2017-02-25 PROCEDURE — 83735 ASSAY OF MAGNESIUM: CPT | Performed by: INTERNAL MEDICINE

## 2017-02-25 PROCEDURE — 92526 ORAL FUNCTION THERAPY: CPT | Mod: GN | Performed by: SPEECH-LANGUAGE PATHOLOGIST

## 2017-02-25 PROCEDURE — 74230 X-RAY XM SWLNG FUNCJ C+: CPT

## 2017-02-25 PROCEDURE — 36415 COLL VENOUS BLD VENIPUNCTURE: CPT | Performed by: INTERNAL MEDICINE

## 2017-02-25 PROCEDURE — A9270 NON-COVERED ITEM OR SERVICE: HCPCS | Mod: GY | Performed by: PSYCHIATRY & NEUROLOGY

## 2017-02-25 PROCEDURE — 85027 COMPLETE CBC AUTOMATED: CPT | Performed by: INTERNAL MEDICINE

## 2017-02-25 PROCEDURE — 27210995 ZZH RX 272: Performed by: INTERNAL MEDICINE

## 2017-02-25 PROCEDURE — 40000225 ZZH STATISTIC SLP WARD VISIT: Performed by: SPEECH-LANGUAGE PATHOLOGIST

## 2017-02-25 PROCEDURE — 97530 THERAPEUTIC ACTIVITIES: CPT | Mod: GO

## 2017-02-25 PROCEDURE — 97530 THERAPEUTIC ACTIVITIES: CPT | Mod: GP

## 2017-02-25 PROCEDURE — 27210429 ZZH NUTRITION PRODUCT INTERMEDIATE LITER

## 2017-02-25 PROCEDURE — 99231 SBSQ HOSP IP/OBS SF/LOW 25: CPT | Performed by: PSYCHIATRY & NEUROLOGY

## 2017-02-25 PROCEDURE — 92611 MOTION FLUOROSCOPY/SWALLOW: CPT | Mod: GN | Performed by: SPEECH-LANGUAGE PATHOLOGIST

## 2017-02-25 PROCEDURE — 99233 SBSQ HOSP IP/OBS HIGH 50: CPT | Performed by: INTERNAL MEDICINE

## 2017-02-25 PROCEDURE — 40000133 ZZH STATISTIC OT WARD VISIT

## 2017-02-25 RX ORDER — LISINOPRIL 10 MG/1
10 TABLET ORAL 2 TIMES DAILY
Status: DISCONTINUED | OUTPATIENT
Start: 2017-02-25 | End: 2017-02-25

## 2017-02-25 RX ORDER — NITROGLYCERIN 0.4 MG/1
0.4 TABLET SUBLINGUAL SEE ADMIN INSTRUCTIONS
Status: DISCONTINUED | OUTPATIENT
Start: 2017-02-25 | End: 2017-02-27 | Stop reason: HOSPADM

## 2017-02-25 RX ORDER — LISINOPRIL 2.5 MG/1
5 TABLET ORAL 2 TIMES DAILY
Status: DISCONTINUED | OUTPATIENT
Start: 2017-02-25 | End: 2017-02-26

## 2017-02-25 RX ORDER — METOPROLOL TARTRATE 25 MG/1
25 TABLET, FILM COATED ORAL 2 TIMES DAILY
Status: DISCONTINUED | OUTPATIENT
Start: 2017-02-25 | End: 2017-02-26

## 2017-02-25 RX ORDER — BARIUM SULFATE 400 MG/ML
SUSPENSION ORAL ONCE
Status: DISCONTINUED | OUTPATIENT
Start: 2017-02-25 | End: 2017-02-25 | Stop reason: CLARIF

## 2017-02-25 RX ORDER — ACETAMINOPHEN 325 MG/1
650 TABLET ORAL EVERY 4 HOURS PRN
Status: DISCONTINUED | OUTPATIENT
Start: 2017-02-25 | End: 2017-02-27 | Stop reason: HOSPADM

## 2017-02-25 RX ORDER — BARIUM SULFATE 400 MG/ML
SUSPENSION ORAL ONCE
Status: COMPLETED | OUTPATIENT
Start: 2017-02-25 | End: 2017-02-25

## 2017-02-25 RX ORDER — FERROUS GLUCONATE 324(38)MG
324 TABLET ORAL
Status: DISCONTINUED | OUTPATIENT
Start: 2017-02-26 | End: 2017-02-27 | Stop reason: HOSPADM

## 2017-02-25 RX ORDER — POLYETHYLENE GLYCOL 3350 17 G/17G
17 POWDER, FOR SOLUTION ORAL
Status: DISCONTINUED | OUTPATIENT
Start: 2017-02-25 | End: 2017-02-27 | Stop reason: HOSPADM

## 2017-02-25 RX ORDER — TAMSULOSIN HYDROCHLORIDE 0.4 MG/1
0.8 CAPSULE ORAL DAILY
Status: DISCONTINUED | OUTPATIENT
Start: 2017-02-26 | End: 2017-02-27 | Stop reason: HOSPADM

## 2017-02-25 RX ORDER — FLUTICASONE PROPIONATE 50 MCG
1-2 SPRAY, SUSPENSION (ML) NASAL DAILY
Status: DISCONTINUED | OUTPATIENT
Start: 2017-02-25 | End: 2017-02-27 | Stop reason: HOSPADM

## 2017-02-25 RX ADMIN — FLUTICASONE PROPIONATE 1 SPRAY: 50 SPRAY, METERED NASAL at 19:11

## 2017-02-25 RX ADMIN — POTASSIUM PHOSPHATE, MONOBASIC AND POTASSIUM PHOSPHATE, DIBASIC 15 MMOL: 224; 236 INJECTION, SOLUTION INTRAVENOUS at 14:24

## 2017-02-25 RX ADMIN — INSULIN ASPART 2 UNITS: 100 INJECTION, SOLUTION INTRAVENOUS; SUBCUTANEOUS at 17:11

## 2017-02-25 RX ADMIN — INSULIN ASPART 2 UNITS: 100 INJECTION, SOLUTION INTRAVENOUS; SUBCUTANEOUS at 04:42

## 2017-02-25 RX ADMIN — Medication 1 MG: at 22:06

## 2017-02-25 RX ADMIN — INSULIN ASPART 3 UNITS: 100 INJECTION, SOLUTION INTRAVENOUS; SUBCUTANEOUS at 12:22

## 2017-02-25 RX ADMIN — VITAMIN D, TAB 1000IU (100/BT) 2000 UNITS: 25 TAB at 17:42

## 2017-02-25 RX ADMIN — BARIUM SULFATE 30 ML: 400 SUSPENSION ORAL at 10:28

## 2017-02-25 RX ADMIN — INSULIN ASPART 3 UNITS: 100 INJECTION, SOLUTION INTRAVENOUS; SUBCUTANEOUS at 08:51

## 2017-02-25 RX ADMIN — ASPIRIN 300 MG: 300 SUPPOSITORY RECTAL at 07:15

## 2017-02-25 RX ADMIN — METOPROLOL TARTRATE 25 MG: 25 TABLET, FILM COATED ORAL at 20:42

## 2017-02-25 RX ADMIN — METOPROLOL TARTRATE 5 MG: 5 INJECTION INTRAVENOUS at 04:29

## 2017-02-25 RX ADMIN — SODIUM CHLORIDE: 4.5 INJECTION, SOLUTION INTRAVENOUS at 00:28

## 2017-02-25 RX ADMIN — LISINOPRIL 5 MG: 2.5 TABLET ORAL at 20:42

## 2017-02-25 RX ADMIN — ATORVASTATIN CALCIUM 40 MG: 40 TABLET, FILM COATED ORAL at 08:56

## 2017-02-25 RX ADMIN — LISINOPRIL 5 MG: 2.5 TABLET ORAL at 08:57

## 2017-02-25 RX ADMIN — METOPROLOL TARTRATE 5 MG: 5 INJECTION INTRAVENOUS at 11:11

## 2017-02-25 RX ADMIN — INSULIN ASPART 2 UNITS: 100 INJECTION, SOLUTION INTRAVENOUS; SUBCUTANEOUS at 20:36

## 2017-02-25 RX ADMIN — INSULIN GLARGINE 6 UNITS: 100 INJECTION, SOLUTION SUBCUTANEOUS at 19:11

## 2017-02-25 RX ADMIN — INSULIN ASPART 1 UNITS: 100 INJECTION, SOLUTION INTRAVENOUS; SUBCUTANEOUS at 00:29

## 2017-02-25 RX ADMIN — SODIUM CHLORIDE: 4.5 INJECTION, SOLUTION INTRAVENOUS at 14:24

## 2017-02-25 ASSESSMENT — VISUAL ACUITY
OU: GLASSES
OU: GLASSES
OU: NORMAL ACUITY;GLASSES

## 2017-02-25 NOTE — PLAN OF CARE
Problem: Goal Outcome Summary  Goal: Goal Outcome Summary  PT: Supine to sit with min to mod A x 1. Scooting to EOB in sitting with SBA and increased time. Sit to/from stand with min A x 1 and CGA of another. Ambulates 3 feet and 6 feet x 2 reps with mod A x 2 persons. Limited distance by bilateral LE buckling. Pt sits rapidly when fatigued. Recommend ARU on discharge - patient very motivated to work with therapy.

## 2017-02-25 NOTE — PLAN OF CARE
Problem: Goal Outcome Summary  Goal: Goal Outcome Summary  SLP: VFSS completed this AM. Patient presents with moderate oral and mild pharyngeal dysphagia in setting of recent CVA. Swallowing dysfunctions include reduced labial/lingual strength, coordination, and ROM, inconsistent reduced lingual control, delayed pharyngeal swallow, and mildly reduced tongue base retraction. Oral phase is marked by consistent anterior spillage from lips with liquid consistencies. Pt has slow mastication of solid textures as well as resulting mod-significant oral residue; residue increases with increased viscosity. Liquid wash and subsequent dry swallows reduce but do not completely eliminate oral residue. Pharyngeal phase is marked consistent delayed swallow response to the level of the pyriform sinuses with all liquid consistencies and the level of the valleculae with solid textures. No penetration or aspiration is seen on today's exam; however, pt is at increased risk of aspiration given significantly delayed swallow response. Improved timing of swallow is noted with nectar-thick liquids. Risk of aspiration can further be reduced with use of swallowing strategies. Recommend initiate dysphagia diet level 2 and nectar-thick liquids. Swallow precautions include pt must be awake/alert, small bites/sips, alternate solids/liquids, double swallow per bite of food, check oral cavity for leftover foods/liquid, oral cares after meals, supervision with verbal cueing for strategies. Meds in applesauce/pudding. Pt okay for free water protocol: pt may have small sips of THIN water (limit to 5-10 per hour) when fully alert, sitting upright at 90 degrees, 30 min after meals, and after excellent oral cares. SLP will continue to follow to assess tolerance of diet/appropriateness for upgrade and train on oral-pharyngeal strengthening exercises. Recommend ARU at discharge.

## 2017-02-25 NOTE — PROVIDER NOTIFICATION
ARU would like patient started on anticoagulation prior to discharge.  Call placed to Dr. Summers who ordered CT head without contrast 2/26/17 first thing in the morning.  MD will address anticoagulation after CT results received.

## 2017-02-25 NOTE — PROGRESS NOTES
Social Work Progress Note  Pt chart reviewed. Pt discussed in interdisciplinary rounds.     Intervention: López spoke with Zehra at [facility], and she informed sw that she spoke with Jazmin and she is in agreement with pt d/c to [facility]. Per Zehra, their physician did accept pt but requested that a CT scan is completed prior to pt's d/c in order to have pt's anticoagulation addressed here. Pt metoprolol will also switch from IV to PO.  Pt to tentatively d/c on Monday 2/27/17.     Team members notified: Bedside RN, Charge RN.    Plan:  Anticipated Discharge Placement: [facility]   [address] [place] [id number]  [phone]  Barriers: None identified at this time.   Follow-up plan: Sw to continue to follow. No further plans to follow. Sw available should a need arise.     CHEVY De, [facility]  [phone]  [phone]

## 2017-02-25 NOTE — PROGRESS NOTES
02/25/17 1038   General Information   Onset Date 02/20/17   Start of Care Date 02/21/17   Referring Physician    Patient Profile Review/OT: Additional Occupational Profile Info See Profile for full history and prior level of function   Patient/Family Goals Statement To eat and drink.    Swallowing Evaluation Videofluoroscopic evaluation   Behaviorial Observations WFL (within functional limits)   Mode of current nutrition NG   Respiratory Status Room air   Comments Right MCA diffusion restriction area and left cerebellar small area of diffusion restriction. A Mechanical thrombectomy was unsucessful.    VFSS Evaluation   VFSS Additional Documentation Yes   VFSS Eval: Radiology   Radiologist Dr. Nguyen   Views Taken left lateral   Physical Location of Procedure [facility]   VFSS Eval: Thin Liquid Texture Trial   Mode of Presentation, Thin Liquid spoon;cup   Order of Presentation 1, 2, 3, 4   Preparatory Phase Poor bolus control   Oral Phase, Thin Liquid Premature pharyngeal entry   Pharyngeal Phase, Thin Liquid Delayed swallow reflex   Rosenbek's Penetration Aspiration Scale: Thin Liquid Trial Results 1 - no aspiration, contrast does not enter airway   Successful Strategies Trialed During Procedure, Thin Liquid chin tuck  (reduced but did not eliminate premature spillage/delay)   Diagnostic Statement No penetration or aspiration. However, pt at increased risk of aspiration given significantly delayed swallow reflex.    VFSS Eval: Nectar Thick Liquid Texture Trial   Mode of Presentation, Nectar cup   Order of Presentation 5, 8, 10   Preparatory Phase Poor bolus control   Oral Phase, Nectar Premature pharyngeal entry  (occasional)   Pharyngeal Phase, Nectar Delayed swallow reflex   Rosenbek's Penetration Aspiration Scale: Nectar-Thick Liquid Trial Results 1 - no aspiration, contrast does not enter airway   Diagnostic Statement No penetration or aspiration. However, pt at increased risk of aspiration given delayed  swallow reflex. Delayed swallow response mildly improved compared to thin liquids.    VFSS Eval: Puree Solid Texture Trial   Mode of Presentation, Puree spoon   Order of Presentation 6   Preparatory Phase WFL   Oral Phase, Puree WFL   Pharyngeal Phase, Puree Delayed swallow reflex   Rosenbek's Penetration Aspiration Scale: Puree Food Trial Results 1 - no aspiration, contrast does not enter airway   Diagnostic Statement No penetration or aspiration. No pharyngeal stasis.    VFSS Eval: Semisolid Texture Trial   Mode of Presentation, Semisolid spoon   Order of Presentation 7   Preparatory Phase Insufficient mastication  (slow, prolonged)   Oral Phase, Semisolid Residue in oral cavity  (mod-significant)   Pharyngeal Phase, Semisolid Delayed swallow reflex   Rosenbek's Penetration Aspiration Scale: Semisolid Food Trial Results 1 - no aspiration, contrast does not enter airway   Successful Strategies Trialed During Procedure, Semisolid other (see comments)  (liquid wash - clears some but not all oral residue)   Diagnostic Statement No penetration or aspiration. Mastication slow with mod-significant oral residue. Liquid wash clears but does not eliminate oral residue. No pharyngeal stasis.    VFSS Eval: Solid Food Texture Trial   Mode of Presentation, Solid self-fed   Order of Presentation 9   Preparatory Phase Insufficient mastication   Oral Phase, Solid Residue in oral cavity  (significant)   Pharyngeal Phase, Solid Delayed swallow reflex;Residue in pyriform sinus   Rosenbek's Penetration Aspiration Scale: Solid Food Trial Results 1 - no aspiration, contrast does not enter airway   Successful Strategies Trialed During Procedure, Solid other (see comments)  (double swallow, liquid wash)   Diagnostic Statement No penetration or aspiration. Mastication slow and inadequate. Significant oral residue after swallow. Residue is partially cleared with liquid wash and subsequent dry swallow. Residue in valleculae after liquid  wash. This residue is effectively cleared with use of dry swallow.    Esophageal Phase of Swallow   Patient reports or presents with symptoms of esophageal dysphagia No   Esophageal sweep performed during today s vidofluoroscopic exam  No   General Therapy Interventions   Planned Therapy Interventions Dysphagia Treatment   Dysphagia treatment Oropharyngeal exercise training;Modified diet education;Instruction of safe swallow strategies;Compensatory strategies for swallowing   Intervention Comments Pt educated on normal swallow mechanism, results and recommendations of VFSS, safe swallowing strategies, and POC.    Swallow Eval: Clinical Impressions   Skilled Criteria for Therapy Intervention Skilled criteria met.  Treatment indicated.   Dysphagia Outcome Severity Scale (DEREK) Level 3 - DEREK   Treatment Diagnosis Moderate oral dysphagia and mild pharyngeal dysphagia   Diet texture recommendations Dysphagia diet level 2;Nectar thick liquids   Recommended Feeding/Eating Techniques alternate between small bites and sips of food/liquid;check mouth frequently for oral residue/pocketing;hard swallow w/ each bite or sip;maintain upright posture during/after eating for 30 mins;no straws;small sips/bites;tuck chin during every swallow  (oral cares after meals, alert/awake, sit upright)   Therapy Frequency daily   Predicted Duration of Therapy Intervention (days/wks) 1-2 weeks   Anticipated Discharge Disposition inpatient rehabilitation facility   Risks and Benefits of Treatment have been explained. Yes   Patient, family and/or staff in agreement with Plan of Care Yes   Clinical Impression Comments Patient presents with moderate oral and mild pharyngeal dysphagia in setting of recent CVA. Swallowing dysfunctions include reduced labial/lingual strength, coordination, and ROM, inconsistent reduced lingual control, delayed pharyngeal swallow, and mildly reduced tongue base retraction. Oral phase is marked by consistent anterior  spillage from lips with liquid consistencies. Pt has slow mastication of solid textures as well as resulting mod-significant oral residue; residue increases with increased viscosity. Liquid wash and subsequent dry swallows reduce but do not completely eliminate oral residue. Pharyngeal phase is marked consistent delayed swallow response to the level of the pyriform sinuses with all liquid consistencies and the level of the valleculae with solid textures. No penetration or aspiration is seen on today's exam; however, pt is at increased risk of aspiration given significantly delayed swallow response. Improved timing of swallow is noted with nectar-thick liquids. Risk of aspiration can further be reduced with use of swallowing strategies.     Recommend initiate dysphagia diet level 2 and nectar-thick liquids. Swallow precautions include pt must be awake/alert, small bites/sips, alternate solids/liquids, double swallow per bite of food, check oral cavity for leftover foods/liquid, oral cares after meals, supervision with verbal cueing for strategies. Meds in applesauce/pudding. Pt okay for free water protocol: pt may have small sips of THIN water (limit to 5-10 per hour) when fully alert, sitting upright at 90 degrees, 30 min after meals, and after excellent oral cares.   Total Evaluation Time   Total Evaluation Time (Minutes) 15

## 2017-02-25 NOTE — PROGRESS NOTES
[facility]    Hospitalist Progress Note    Assessment & Plan   Elias Mckee is a 85 year old male who was admitted on 2/20/2017      Elias Mckee is a 85 year old male past medical history significant for previous stroke with residual LLE weakness, coronary artery disease with hx of cardiac arrest, diabetes, atrial fibrillation, chronic kidney disease who presented to the Emergency Department with left-sided weakness consistent with acute stroke.       Acute right MCA territory stroke:  CTA showed occluded right M2 segment He received tPA. Attempt mechanical thrombectomy by IR however, failure of recanalization due to actue angle of branch and markedly frim/calicified nature of clot. Suspect to be cardio-embolic give hx of paroxysmal A. fib  - MRI 2/21 shows recent moderate sized posterior division right MCA territory infarct corresponding to the known right M2 occlusion and tiny recent infarct int he left cerebellar hemisphere  - repeat CT on 2/22 showed evolving mod sized posterior division right MCA infarct, no new infarct  - TTE shows LVEF of 55-60%, bubble study is negative  - Lipid panel: HDL 28, LDL 49,   - ASA rectally and statin started (but NPO at this time). Plan to start anticoagulation prior to discharge per neurology  - neurology following  - PT/OT rec acute rehab  -pt failed swallow 2/23,  But passed today, 2/25  -feeding tube placed 2/23, started on tube feeds  -anticipate head CT tomorrow am, then review by neurology and may be started on oral anticoagulation.            CAD with hx of multiple stent placements and hx of cardiac arrest:  - troponin x 1 negative  - no complaint of chest pain  - continue telemetry  -will recommend follow-up with cardiology after discharge from acute rehab      Hx of Atrial fibrillation  - admission EKG shows NSR. Not on anticoagulation prior to admission  - continue telemetry  - metoprolol IV prn until PO intake is  resumed      Previous stroke:   - management of acute stroke as above. ASA restarted. Plan for long term anticoagulation as above per neurology      Type II DM:adeqautely controlled at baseline.  A1c 6.8.   - on Glimepride PTA  - hold sulfonylurea until PO intake is resumed and stable  - continue with SSI  -glucose glucoses elevated today, but is also getting tube feeds  -will add 6 units of lantus  -mod carb added to dysphagia dieet      Hypertension:   - neurology ok with starting his BP meds  --currently lisinopril 5 mg po bid and add metoprolol 25 mg po bid with parameters    Chronic kidney disease, stage 3:   - Creatinine baseline1.3-1.4  -2/24 creatinine 0.90      Subclinical hypothyroidism:  TSH 5.96. Will need follow up TSH in 6-8 weeks once acute issues stable.       BPH: restart Flomax      FEN:   -tube feeds started 2/23  -now on DD2 with nectar thick liq         Deep venous thrombosis prophylaxis: SCDs.   Code Status: Full Code       Disposition: Expected discharge to ARU  When ok with neuro, bed appears available      Leona Hughes MD    Interval History   No new complaints, has had some coughing noted.  Last night was confused but much better today.  Voice stronger and more articulate    -Data reviewed today: I reviewed all new labs and imaging results over the last 24 hours. I personally reviewed no images or EKG's today.    Physical Exam   Temp: 98.1  F (36.7  C) Temp src: Oral BP: 176/79   Heart Rate: 64 Resp: 18 SpO2: 95 % O2 Device: None (Room air)    Vitals:    02/23/17 0554 02/24/17 0547 02/25/17 0451   Weight: 83 kg (182 lb 15.7 oz) 86.4 kg (190 lb 7.6 oz) 84 kg (185 lb 3 oz)     Vital Signs with Ranges  Temp:  [98.1  F (36.7  C)-100.8  F (38.2  C)] 98.1  F (36.7  C)  Heart Rate:  [64-90] 64  Resp:  [18-20] 18  BP: (135-176)/(63-88) 176/79  SpO2:  [92 %-95 %] 95 %  I/O last 3 completed shifts:  In: 3532 [I.V.:1846; NG/GT:465]  Out: 1500 [Urine:1500]    Constitutional: alert    Respiratory: clear to auscultation, no wheezing or rhonchi   Cardiovascular: regular rate and rhythm without murmer or rub   GI:positive bowel sounds, non-tender   Skin/Integumen: no rashes    Other:  unable to move left arm and left hand.  Facial droop improved today       Medications     IV fluid REPLACEMENT ONLY       NaCl 75 mL/hr at 02/25/17 1424     - MEDICATION INSTRUCTIONS -       - MEDICATION INSTRUCTIONS -       - MEDICATION INSTRUCTIONS -       - MEDICATION INSTRUCTIONS -         lisinopril  5 mg Per Feeding Tube BID     atorvastatin  40 mg Oral or Feeding Tube Daily     aspirin  300 mg Rectal Daily     metoprolol  5 mg Intravenous Q6H     insulin aspart  1-6 Units Subcutaneous Q4H       Data     Recent Labs  Lab 02/25/17  0740 02/24/17  0740 02/23/17  0805  02/21/17  1700  02/21/17  0410 02/20/17  1905   WBC 8.4 8.5 10.5  < >  --   --  8.3 7.2   HGB 10.3* 10.8* 11.6*  < >  --   < > 10.7* 12.2*   MCV 90 90 91  < >  --   --  91 93    131* 127*  < >  --   --  124* 136*   INR  --   --   --   --   --   --   --  0.99    140 141  < >  --   --  143 141   POTASSIUM 3.9 3.6 3.5  < >  --   --  3.7 3.9   CHLORIDE 110* 114* 115*  < >  --   --  117* 113*   CO2 21 17* 15*  < >  --   --  19* 20   BUN 20 17 15  < >  --   --  23 28   CR 0.89 0.90 0.93  < >  --   --  1.05 1.28*   ANIONGAP 7 9 11  < >  --   --  7 8   TRENT 7.7* 7.9* 8.0*  < >  --   --  7.5* 8.4*   * 179* 143*  < >  --   --  149* 187*   TROPI  --   --   --   --  <0.015The 99th percentile for upper reference range is 0.045 ug/L.  Troponin values in the range of 0.045 - 0.120 ug/L may be associated with risks of adverse clinical events.  --  <0.015The 99th percentile for upper reference range is 0.045 ug/L.  Troponin values in the range of 0.045 - 0.120 ug/L may be associated with risks of adverse clinical events.  --    < > = values in this interval not displayed.    No results found for this or any previous visit (from the past 24  hour(s)).

## 2017-02-25 NOTE — PROGRESS NOTES
"This a follow up regarding stroke  He is S/P IV tPA and mechanical thrombectomy trial  More awake  this morning  Passed swallow evaluation    /66 (BP Location: Left arm)  Pulse 55  Temp 100.1  F (37.8  C) (Oral)  Resp 18  Ht 1.778 m (5' 10\")  Wt 84 kg (185 lb 3 oz)  SpO2 95%  BMI 26.57 kg/m2  Awake and alert  Answers questions appropriately   Follows simple commands   Speech: dysarthric  Motor:LUE 0/5  Left facial weakness        A/P Acute stroke:  -S/P IV tPA  -Etiology:Likely cardio-embolic as he has history of atrial fibrillation   -Aspirin temporarily :anticoagulation in few days.   -Lipitor 80 once he passes swallow eval  -Blood pressure parameters: Normotensive  -Activity:as tolerated   -DVT prophylaxis:SCDs             "

## 2017-02-25 NOTE — PLAN OF CARE
Problem: Goal Outcome Summary  Goal: Goal Outcome Summary  OT: Noted improvements with OT intervention this morning.  Patient required min A for sitting balance during ADL tasks at EOB and stood at EOB x2 with min-mod A x2 and FWW. Pt tracking well towards L side by end of session; however, continues to be limited by L UE weakness, decreased balance, and impaired cognition and safety. Pt demonstrates excellent participate and motivation with OT. Recommend discharge ARU.

## 2017-02-25 NOTE — PLAN OF CARE
Problem: Goal Outcome Summary  Goal: Goal Outcome Summary  Outcome: No Change  Pt lethargic and ox4 with VSS and CMS with L side UE absent sensation and 2+ edema. Neuros with LUE hemipelegia, LE paresis, L neglect, L field cut, droop and slurred speech. On tele in SR with 1sg deg AVB. On bedrest this shift with turns q2h and voiding per urinal. TF running at goal, no residuals, tolerating with no complaints. BG covered q4h. NPO with plan for video swallow 2/25. Sz precautions with no sz activity noted. LS dim in all fields and infx loose cough, occasional drooling noted over NOC per pt report and observation with turns.  Mepilex on coccyx CDI. R groin site from thrombectomy MARCELLE with healing ecchymosis. D/c to ARU pending.

## 2017-02-26 ENCOUNTER — APPOINTMENT (OUTPATIENT)
Dept: OCCUPATIONAL THERAPY | Facility: CLINIC | Age: 82
DRG: 024 | End: 2017-02-26
Payer: MEDICARE

## 2017-02-26 ENCOUNTER — APPOINTMENT (OUTPATIENT)
Dept: CT IMAGING | Facility: CLINIC | Age: 82
DRG: 024 | End: 2017-02-26
Attending: PSYCHIATRY & NEUROLOGY
Payer: MEDICARE

## 2017-02-26 ENCOUNTER — APPOINTMENT (OUTPATIENT)
Dept: PHYSICAL THERAPY | Facility: CLINIC | Age: 82
DRG: 024 | End: 2017-02-26
Payer: MEDICARE

## 2017-02-26 ENCOUNTER — APPOINTMENT (OUTPATIENT)
Dept: SPEECH THERAPY | Facility: CLINIC | Age: 82
DRG: 024 | End: 2017-02-26
Payer: MEDICARE

## 2017-02-26 LAB
ANION GAP SERPL CALCULATED.3IONS-SCNC: 9 MMOL/L (ref 3–14)
BUN SERPL-MCNC: 22 MG/DL (ref 7–30)
CALCIUM SERPL-MCNC: 8 MG/DL (ref 8.5–10.1)
CHLORIDE SERPL-SCNC: 108 MMOL/L (ref 94–109)
CO2 SERPL-SCNC: 20 MMOL/L (ref 20–32)
CREAT SERPL-MCNC: 0.92 MG/DL (ref 0.66–1.25)
ERYTHROCYTE [DISTWIDTH] IN BLOOD BY AUTOMATED COUNT: 14.9 % (ref 10–15)
GFR SERPL CREATININE-BSD FRML MDRD: 78 ML/MIN/1.7M2
GLUCOSE BLDC GLUCOMTR-MCNC: 159 MG/DL (ref 70–99)
GLUCOSE BLDC GLUCOMTR-MCNC: 205 MG/DL (ref 70–99)
GLUCOSE BLDC GLUCOMTR-MCNC: 239 MG/DL (ref 70–99)
GLUCOSE BLDC GLUCOMTR-MCNC: 258 MG/DL (ref 70–99)
GLUCOSE BLDC GLUCOMTR-MCNC: 275 MG/DL (ref 70–99)
GLUCOSE BLDC GLUCOMTR-MCNC: 300 MG/DL (ref 70–99)
GLUCOSE SERPL-MCNC: 264 MG/DL (ref 70–99)
HCT VFR BLD AUTO: 30 % (ref 40–53)
HGB BLD-MCNC: 10.1 G/DL (ref 13.3–17.7)
MAGNESIUM SERPL-MCNC: 1.9 MG/DL (ref 1.6–2.3)
MCH RBC QN AUTO: 30.5 PG (ref 26.5–33)
MCHC RBC AUTO-ENTMCNC: 33.7 G/DL (ref 31.5–36.5)
MCV RBC AUTO: 91 FL (ref 78–100)
PHOSPHATE SERPL-MCNC: 2.3 MG/DL (ref 2.5–4.5)
PLATELET # BLD AUTO: 151 10E9/L (ref 150–450)
POTASSIUM SERPL-SCNC: 4.1 MMOL/L (ref 3.4–5.3)
RBC # BLD AUTO: 3.31 10E12/L (ref 4.4–5.9)
SODIUM SERPL-SCNC: 137 MMOL/L (ref 133–144)
WBC # BLD AUTO: 11.4 10E9/L (ref 4–11)

## 2017-02-26 PROCEDURE — 25000125 ZZHC RX 250: Performed by: PSYCHIATRY & NEUROLOGY

## 2017-02-26 PROCEDURE — 80048 BASIC METABOLIC PNL TOTAL CA: CPT | Performed by: INTERNAL MEDICINE

## 2017-02-26 PROCEDURE — A9270 NON-COVERED ITEM OR SERVICE: HCPCS | Mod: GY | Performed by: PSYCHIATRY & NEUROLOGY

## 2017-02-26 PROCEDURE — 85027 COMPLETE CBC AUTOMATED: CPT | Performed by: INTERNAL MEDICINE

## 2017-02-26 PROCEDURE — 97116 GAIT TRAINING THERAPY: CPT | Mod: GP

## 2017-02-26 PROCEDURE — 99232 SBSQ HOSP IP/OBS MODERATE 35: CPT | Performed by: INTERNAL MEDICINE

## 2017-02-26 PROCEDURE — 36415 COLL VENOUS BLD VENIPUNCTURE: CPT | Performed by: INTERNAL MEDICINE

## 2017-02-26 PROCEDURE — 25000132 ZZH RX MED GY IP 250 OP 250 PS 637: Mod: GY | Performed by: INTERNAL MEDICINE

## 2017-02-26 PROCEDURE — 92526 ORAL FUNCTION THERAPY: CPT | Mod: GN | Performed by: SPEECH-LANGUAGE PATHOLOGIST

## 2017-02-26 PROCEDURE — 27210995 ZZH RX 272: Performed by: INTERNAL MEDICINE

## 2017-02-26 PROCEDURE — 99231 SBSQ HOSP IP/OBS SF/LOW 25: CPT | Performed by: PSYCHIATRY & NEUROLOGY

## 2017-02-26 PROCEDURE — 97530 THERAPEUTIC ACTIVITIES: CPT | Mod: GP

## 2017-02-26 PROCEDURE — 25000125 ZZHC RX 250: Performed by: INTERNAL MEDICINE

## 2017-02-26 PROCEDURE — 97112 NEUROMUSCULAR REEDUCATION: CPT | Mod: GO

## 2017-02-26 PROCEDURE — 70450 CT HEAD/BRAIN W/O DYE: CPT

## 2017-02-26 PROCEDURE — 27210429 ZZH NUTRITION PRODUCT INTERMEDIATE LITER

## 2017-02-26 PROCEDURE — 25000131 ZZH RX MED GY IP 250 OP 636 PS 637: Mod: GY | Performed by: INTERNAL MEDICINE

## 2017-02-26 PROCEDURE — 25000128 H RX IP 250 OP 636: Performed by: INTERNAL MEDICINE

## 2017-02-26 PROCEDURE — 40000225 ZZH STATISTIC SLP WARD VISIT: Performed by: SPEECH-LANGUAGE PATHOLOGIST

## 2017-02-26 PROCEDURE — 40000193 ZZH STATISTIC PT WARD VISIT

## 2017-02-26 PROCEDURE — 12000000 ZZH R&B MED SURG/OB

## 2017-02-26 PROCEDURE — 40000133 ZZH STATISTIC OT WARD VISIT

## 2017-02-26 PROCEDURE — 25000132 ZZH RX MED GY IP 250 OP 250 PS 637: Mod: GY | Performed by: PSYCHIATRY & NEUROLOGY

## 2017-02-26 PROCEDURE — 84100 ASSAY OF PHOSPHORUS: CPT | Performed by: INTERNAL MEDICINE

## 2017-02-26 PROCEDURE — 97110 THERAPEUTIC EXERCISES: CPT | Mod: GP

## 2017-02-26 PROCEDURE — A9270 NON-COVERED ITEM OR SERVICE: HCPCS | Mod: GY | Performed by: INTERNAL MEDICINE

## 2017-02-26 PROCEDURE — 00000146 ZZHCL STATISTIC GLUCOSE BY METER IP

## 2017-02-26 PROCEDURE — 83735 ASSAY OF MAGNESIUM: CPT | Performed by: INTERNAL MEDICINE

## 2017-02-26 RX ORDER — METOPROLOL TARTRATE 50 MG
50 TABLET ORAL 2 TIMES DAILY
Status: DISCONTINUED | OUTPATIENT
Start: 2017-02-26 | End: 2017-02-27 | Stop reason: HOSPADM

## 2017-02-26 RX ORDER — LISINOPRIL 10 MG/1
10 TABLET ORAL 2 TIMES DAILY
Status: DISCONTINUED | OUTPATIENT
Start: 2017-02-26 | End: 2017-02-27 | Stop reason: HOSPADM

## 2017-02-26 RX ADMIN — INSULIN ASPART 2 UNITS: 100 INJECTION, SOLUTION INTRAVENOUS; SUBCUTANEOUS at 12:48

## 2017-02-26 RX ADMIN — INSULIN ASPART 2 UNITS: 100 INJECTION, SOLUTION INTRAVENOUS; SUBCUTANEOUS at 16:43

## 2017-02-26 RX ADMIN — POTASSIUM PHOSPHATE, MONOBASIC AND POTASSIUM PHOSPHATE, DIBASIC 15 MMOL: 224; 236 INJECTION, SOLUTION INTRAVENOUS at 13:57

## 2017-02-26 RX ADMIN — Medication 1 MG: at 21:04

## 2017-02-26 RX ADMIN — ACETAMINOPHEN 650 MG: 325 TABLET, FILM COATED ORAL at 21:05

## 2017-02-26 RX ADMIN — INSULIN GLARGINE 6 UNITS: 100 INJECTION, SOLUTION SUBCUTANEOUS at 09:14

## 2017-02-26 RX ADMIN — INSULIN GLARGINE 4 UNITS: 100 INJECTION, SOLUTION SUBCUTANEOUS at 14:46

## 2017-02-26 RX ADMIN — ATORVASTATIN CALCIUM 40 MG: 40 TABLET, FILM COATED ORAL at 09:06

## 2017-02-26 RX ADMIN — FERROUS GLUCONATE 324 MG: 324 TABLET ORAL at 09:06

## 2017-02-26 RX ADMIN — INSULIN ASPART 4 UNITS: 100 INJECTION, SOLUTION INTRAVENOUS; SUBCUTANEOUS at 04:28

## 2017-02-26 RX ADMIN — ASPIRIN 300 MG: 300 SUPPOSITORY RECTAL at 08:03

## 2017-02-26 RX ADMIN — LISINOPRIL 10 MG: 10 TABLET ORAL at 21:04

## 2017-02-26 RX ADMIN — VITAMIN D, TAB 1000IU (100/BT) 2000 UNITS: 25 TAB at 09:06

## 2017-02-26 RX ADMIN — LISINOPRIL 5 MG: 2.5 TABLET ORAL at 09:06

## 2017-02-26 RX ADMIN — ACETAMINOPHEN 650 MG: 160 SOLUTION ORAL at 00:39

## 2017-02-26 RX ADMIN — INSULIN ASPART 3 UNITS: 100 INJECTION, SOLUTION INTRAVENOUS; SUBCUTANEOUS at 07:38

## 2017-02-26 RX ADMIN — METOPROLOL TARTRATE 25 MG: 25 TABLET, FILM COATED ORAL at 09:06

## 2017-02-26 RX ADMIN — INSULIN ASPART 1 UNITS: 100 INJECTION, SOLUTION INTRAVENOUS; SUBCUTANEOUS at 21:04

## 2017-02-26 RX ADMIN — FLUTICASONE PROPIONATE 2 SPRAY: 50 SPRAY, METERED NASAL at 09:13

## 2017-02-26 RX ADMIN — SODIUM CHLORIDE: 4.5 INJECTION, SOLUTION INTRAVENOUS at 09:11

## 2017-02-26 RX ADMIN — METOPROLOL TARTRATE 50 MG: 50 TABLET, FILM COATED ORAL at 21:04

## 2017-02-26 RX ADMIN — INSULIN ASPART 3 UNITS: 100 INJECTION, SOLUTION INTRAVENOUS; SUBCUTANEOUS at 00:30

## 2017-02-26 RX ADMIN — TAMSULOSIN HYDROCHLORIDE 0.8 MG: 0.4 CAPSULE ORAL at 09:04

## 2017-02-26 RX ADMIN — RIVAROXABAN 20 MG: 20 TABLET, FILM COATED ORAL at 18:25

## 2017-02-26 ASSESSMENT — VISUAL ACUITY
OU: NORMAL ACUITY;GLASSES

## 2017-02-26 NOTE — PLAN OF CARE
Problem: Goal Outcome Summary  Goal: Goal Outcome Summary  Outcome: Improving  Advanced to DD2 with nectar thick with safe swallow precautions and assistance with feeding. Coughing towards the end of lunch, able to clear his throat and swallow. Ate around 50% of lunch, has not yet eaten dinner. Dietician alerted to diet orders and will make adjustments to TF rate and flushes as needed tomorrow. Voiding in urinal. Fecal incontinence x2 of small, loose stools.  Up to commode and chair with lift, stood at EOB with 2, belt, walker during therapy. Alert most of day, oriented. Forgetful at times. LUE hemiplegia, mild LLE weakness, L droop, slurred speech, no sensation in LUE. Denies pain. VSS, BP elevated but within parameters. Tele reading SR with 1st degree AV block. Visitors at bedside much of this afternoon.

## 2017-02-26 NOTE — PLAN OF CARE
Problem: Goal Outcome Summary  Goal: Goal Outcome Summary  Outcome: Improving  Pt a/o x4 with VS, runs HTN but in paramaters. CMS with absent LUE sensation and 2+ LUE edema. Neuros with L droop, L field cut and neglect, LUE hemiplegia, LLE hemiparesis, and garbled/slurred speech. On tele in SR with 1st deg AVB and occ PVCs. Sz precautions with no sz activity noted. NG in place, TF at goal, no residuals and tolerating well. BM x3 and + BS. Tolerating DD2 with nectar diet with staff supervision/feed. Up in room with pivot tx to BSC or lift. BG covered q4h,remains high. C/o neck pain relieved with tylenol x1. Mepilex on coccyx CDI and R groin site MARCELLE. Plan for f/u HCT in am 2/26. Plan for d/c to ARU pending.

## 2017-02-26 NOTE — PROGRESS NOTES
Social Work Progress Note  Pt chart reviewed. Pt discussed in interdisciplinary rounds.      Intervention: Neuro requested to know pt's coverage for Eliquis and Xralerto. Sw contacted d/c pharmacy *[id number] and was informed that pt does have coverage for Xralerto but would need prior auth. Sw updated Dr. Summers. López received a voice message from Zehra at [facility] stating that they are able to accept pt tomorrow at 1100. López contacted [facility] and arranged for a 1130 W/C transport with [facility]. Sw updated pt's daughter Jazmin and granddaughter.     Team members notified: Bedside RN, & Charge RN     Plan:  Anticipated Discharge Placement: [facility]   [address] [place] [id number]  [phone]  Barriers: None identified at this time.   Follow-up plan: López to continue to follow.     CHEVY De, LGSW  [phone]  [phone]

## 2017-02-26 NOTE — PLAN OF CARE
Problem: Goal Outcome Summary  Goal: Goal Outcome Summary  PT: Sit to/from stand x 4 reps with variable assist (min A x 2 to mod A x2). Ambulates 6 feet x 2 reps with L UE platform walker and mod A x 2. Pt continues to need constant verbal cues to sequence ambulation. Continue to recommend ARU on discharge. Patient very motivated to participate in therapy.

## 2017-02-26 NOTE — PLAN OF CARE
Problem: Goal Outcome Summary  Goal: Goal Outcome Summary  OT: Pt continues to make improvements with OT intervention; however, limited by impaired safety, L UE weakness and incoordination, and decreased balance. Pt responses well to full sized mirror during all tasks. Pt required min Ax2 with platform walker during transfers and max A for LE dressing task. Recommend ARU upon discharge.

## 2017-02-26 NOTE — PROGRESS NOTES
"CLINICAL NUTRITION SERVICES - REASSESSMENT NOTE      Recommendations Ordered by Registered Dietitian (RD):     1) Change TF to night cycle to encourage po during the day ---> Isosource 1.5 @ 60 mL/hr x 12 hrs (8pm-8am) = 1080 cals, 49 gm pro, 547 mL free water    2) Magic Cup supplement with meals     Future/Additional Recommendations:     If po intake continues to improve (75% meals) can dc TF         EVALUATION OF PROGRESS TOWARD GOALS   Diet:  DD2, NTL, Moderate CHO    Pt continues on TF via ND FT ---> Isosource 1.5 @ 60 mL/hr = 2160 cals (26 terrence/kg), 98 gm pro (1.2 gm/kg), 1094 mL free water,   Standard water flushes of 60 mL every 4 hrs    Pt had VFSS (2/25) ---> DD2, NTL diet initiated    Spoke with pt this morning  He reports eating 75% omelet and 100% fruit and cran juice for breakfast (RN confirmed)  \"Your  does a wonderful job!\"  Pt would like to try the berry Magic Cup supplement with meals    +5 BM noted past 24 hrs      Dosing Weight 83 kg ( current wt)     ASSESSED NUTRITION NEEDS:  Estimated Energy Needs: 9359-2287 kcals (25-30 Kcal/Kg)  Justification: maintenance  Estimated Protein Needs: grams protein (1-1.2 g pro/Kg)  Justification: CKD stage 3  Estimated Fluid Needs: 2632-0461 mL (1 mL/Kcal)  Justification: per provider pending fluid status    NEW FINDINGS:   2/23: FT placement --> LOT    2/26: 82.9 kg    IVF (NaCl) at 75 mL/hr    Per RN, goal is TCU tomorrow (Monday)      Previous Goals (2/23):   TF to reach goal Isosource of 60 mL/hr 24-48 hours after FT placed  Evaluation: Met    Previous Nutrition Diagnosis (2/23):   Inadequate oral intake related to NPO status, as evidenced by pt currently meeting 0% of assessed needs  Evaluation: Improving        CURRENT NUTRITION DIAGNOSIS  No nutrition diagnosis identified at this time     INTERVENTIONS  Recommendations / Nutrition Prescription  DD2, NTL diet  Nutrition supplement with meals    Change TF to night cycle to encourage po during the " day ---> Isosource 1.5 @ 60 mL/hr x 12 hrs (8pm-8am) = 1080 cals, 49 gm pro, 547 mL free water    RN to speak with MD about dc IVF      Implementation  EN Schedule ---> Isosource 1.5 @ 60 mL/hr x 12 hrs (8pm-8am)  Water flushes of 60 mL every 4 hrs  Berry Magic Cup supplement with every meal (each provides 290 terrence, 9 gm pro)    Goals  EN + po intake to meet est needs      MONITORING AND EVALUATION:  Progress towards goals will be monitored and evaluated per protocol and Practice Guidelines

## 2017-02-26 NOTE — PROGRESS NOTES
Left message with [facility] re: plan for patient to discharge to ARU on Monday, Feb. 27th.  Left writers contact info if able to call back by 4pm today or call unit SW or CC on Monday re: update on discharge plan.

## 2017-02-26 NOTE — PROGRESS NOTES
"This a follow up regarding stroke  He is S/P IV tPA and mechanical thrombectomy trial  More awake  this morning  No new complaints  Awaiting transfer to rehab.    /74 (BP Location: Right arm)  Pulse 55  Temp 98.3  F (36.8  C) (Oral)  Resp 16  Ht 1.778 m (5' 10\")  Wt 82.9 kg (182 lb 12.2 oz)  SpO2 96%  BMI 26.22 kg/m2  Awake and alert  Answers questions appropriately   Follows commands   Speech: dysarthric  Motor:LUE 0/5  Left facial weakness      CT head: Right frontal infarct no hemorrhagic transformation, old right occipital infarct, old left BG infarct.      A/P Acute R.MCA distribution stroke:  -S/P IV tPA  -Etiology:Likely cardio-embolic as he has history of atrial fibrillation   -Start Eliquis 2.5 mg BID/xralerto 20mg daily  -Aspirin to be stopped once NOACs started   -Lipitor   -Blood pressure parameters: Normotensive  -Activity:as tolerated   -DVT prophylaxis:SCDs   -Ok to transfer to rehab from neurology perspective            "

## 2017-02-26 NOTE — PLAN OF CARE
Problem: Goal Outcome Summary  Goal: Goal Outcome Summary  SLP: Pt seen for swallowing tx this AM. Pt engaged in education on results/recommendations of VFSS and training on oral strengthening exercises to improve lingual and labial strength for speech and swallowing. Pt assessed during noon meal w/ dysphagia level 2 textures and nectar-thick liquids. No overt s/sx of aspiration. He benefited from cues for use of strategies. Ongoing delayed swallow response. Oral loss noted with liquids and solids. Mild oral residue with solid textures which was generally eliminated with use of liquid wash. Recommend continue dysphagia diet level 2 and nectar-thick liquids. Swallow precautions include pt must be assistance/supervision, awake/alert, small bites/sips, alternate solids/liquids, double swallow per bite of food, check oral cavity for leftover foods/liquid, oral cares after meals, supervision with verbal cueing for strategies. Meds in applesauce/pudding. Pt okay for free water protocol: pt may have small sips of THIN water (limit to 5-10 per hour) when fully alert, sitting upright at 90 degrees, 30 min after meals, and after excellent oral cares. SLP will continue to follow to assess tolerance of diet/appropriateness for upgrade and train on oral-pharyngeal strengthening exercises. Recommend ARU at discharge.

## 2017-02-26 NOTE — PROGRESS NOTES
[facility], [place]    Hospitalist Progress Note    Assessment & Plan   Elias Mckee is a 85 year old male who was admitted on 2/20/2017    Elias Mckee is a 85 year old male past medical history significant for previous stroke with residual LLE weakness, coronary artery disease with hx of cardiac arrest, diabetes, atrial fibrillation, chronic kidney disease who presented to the Emergency Department with left-sided weakness consistent with acute stroke.       Acute right MCA territory stroke:  CTA showed occluded right M2 segment He received tPA. Attempt mechanical thrombectomy by IR however, failure of recanalization due to actue angle of branch and markedly frim/calicified nature of clot. Suspect to be cardio-embolic give hx of paroxysmal A. fib  - MRI 2/21 shows recent moderate sized posterior division right MCA territory infarct corresponding to the known right M2 occlusion and tiny recent infarct int he left cerebellar hemisphere  - repeat CT on 2/22 showed evolving mod sized posterior division right MCA infarct, no new infarct  - TTE shows LVEF of 55-60%, bubble study is negative  - Lipid panel: HDL 28, LDL 49,   - ASA rectally and statin started (but NPO at this time). Plan to start anticoagulation prior to discharge per neurology  - neurology following  - PT/OT rec acute rehab  -pt failed swallow 2/23, But passed 2/25  -feeding tube placed 2/23, started on tube feeds  -head  CT today showed evolving right MCA stroke , no hemorrhage, atrophy and chronic white matter changes, and old infarct.  -neurology has started on xaralto and plan acute rehab tomorrow             CAD with hx of multiple stent placements and hx of cardiac arrest:  - troponin x 1 negative  - no complaint of chest pain  - continue telemetry  -will recommend follow-up with cardiology after discharge from acute rehab      Hx of Atrial fibrillation  - admission EKG shows NSR. Not on anticoagulation prior to  admission  - continue telemetry  -metoprolol inc today to 50 bid  -now on xaralto      Previous stroke:   - management of acute stroke as above. ASA restarted. Plan for long term anticoagulation as above per neurology      Type II DM:adeqautely controlled at baseline.  A1c 6.8.   - on Glimepride PTA  - hold sulfonylurea until PO intake is resumed and stable  - continue with SSI  - glucoses remain elevated today, but is also getting tube feeds  - 6 units of lantus started on 2/25, inc to 10  tomorrow  -mod carb added to dysphagia diet      Hypertension:   - neurology ok with starting his BP meds  --2/25 lisinopril 5 mg po bid and add metoprolol 25 mg po bid with parameters  -today will inc lisinopril t o 10 bid and inc metoprolol to 50 bid     Chronic kidney disease, stage 3:   - Creatinine baseline1.3-1.4  -2/24 creatinine 0.90      Subclinical hypothyroidism:  TSH 5.96. Will need follow up TSH in 6-8 weeks once acute issues stable.       BPH: restart Flomax      FEN:   -tube feeds started 2/23  -now on DD2 with nectar thick liq  -tube feeds will be changed to 8pm to 8 am          Deep venous thrombosis prophylaxis: SCDs.   Code Status: Full Code       Disposition: Expected discharge to ARU  tomorrow      Leona Hughes MD    Interval History   Doing well    -Data reviewed today: I reviewed all new labs and imaging results over the last 24 hours. I personally reviewed the head CT image(s) showing evolving right MCA infarct.    Physical Exam   Temp: 99  F (37.2  C) Temp src: Oral BP: 149/78   Heart Rate: 71 Resp: 18 SpO2: 97 % O2 Device: None (Room air)    Vitals:    02/24/17 0547 02/25/17 0451 02/26/17 0500   Weight: 86.4 kg (190 lb 7.6 oz) 84 kg (185 lb 3 oz) 82.9 kg (182 lb 12.2 oz)     Vital Signs with Ranges  Temp:  [98.3  F (36.8  C)-99.3  F (37.4  C)] 99  F (37.2  C)  Heart Rate:  [71-95] 71  Resp:  [16-20] 18  BP: (146-191)/(72-97) 149/78  SpO2:  [95 %-97 %] 97 %  I/O last 3 completed shifts:  In:  3577.5 [P.O.:310; I.V.:1399.5; NG/GT:720]  Out: 1350 [Urine:1350]    Constitutional: alert   Respiratory: clear to auscultation, no wheezing or rhonchi   Cardiovascular: regular rate and rhythm without murmer or rub   GI:positive bowel sounds, non-tender   Skin/Integumen: no rashes    Other:        Medications     IV fluid REPLACEMENT ONLY       NaCl 75 mL/hr at 02/26/17 0911     - MEDICATION INSTRUCTIONS -       - MEDICATION INSTRUCTIONS -       - MEDICATION INSTRUCTIONS -       - MEDICATION INSTRUCTIONS -         rivaroxaban ANTICOAGULANT  20 mg Oral Daily with supper     cholecalciferol  2,000 Units Oral Daily     ferrous gluconate  324 mg Oral Daily with breakfast     fluticasone  1-2 spray Both Nostrils Daily     melatonin  1 mg Oral At Bedtime     nitroglycerin  0.4 mg Sublingual See Admin Instructions     tamsulosin  0.8 mg Oral Daily     lisinopril  5 mg Oral BID     metoprolol  25 mg Oral BID     insulin glargine  6 Units Subcutaneous Daily     atorvastatin  40 mg Oral or Feeding Tube Daily     insulin aspart  1-6 Units Subcutaneous Q4H       Data     Recent Labs  Lab 02/26/17  0757 02/25/17  0740 02/24/17  0740  02/21/17  1700  02/21/17  0410 02/20/17  1905   WBC 11.4* 8.4 8.5  < >  --   --  8.3 7.2   HGB 10.1* 10.3* 10.8*  < >  --   < > 10.7* 12.2*   MCV 91 90 90  < >  --   --  91 93    153 131*  < >  --   --  124* 136*   INR  --   --   --   --   --   --   --  0.99    138 140  < >  --   --  143 141   POTASSIUM 4.1 3.9 3.6  < >  --   --  3.7 3.9   CHLORIDE 108 110* 114*  < >  --   --  117* 113*   CO2 20 21 17*  < >  --   --  19* 20   BUN 22 20 17  < >  --   --  23 28   CR 0.92 0.89 0.90  < >  --   --  1.05 1.28*   ANIONGAP 9 7 9  < >  --   --  7 8   TRENT 8.0* 7.7* 7.9*  < >  --   --  7.5* 8.4*   * 248* 179*  < >  --   --  149* 187*   TROPI  --   --   --   --  <0.015The 99th percentile for upper reference range is 0.045 ug/L.  Troponin values in the range of 0.045 - 0.120 ug/L may be  associated with risks of adverse clinical events.  --  <0.015The 99th percentile for upper reference range is 0.045 ug/L.  Troponin values in the range of 0.045 - 0.120 ug/L may be associated with risks of adverse clinical events.  --    < > = values in this interval not displayed.    Recent Results (from the past 24 hour(s))   CT Head w/o Contrast    Narrative    CT HEAD WITHOUT CONTRAST  2/26/2017 8:48 AM    HISTORY: Stroke.    TECHNIQUE: Scans were obtained through the head without IV contrast.   Radiation dose for this scan was reduced using automated exposure  control, adjustment of the mA and/or kV according to patient size, or  iterative reconstruction technique.    COMPARISON: 2/22/2017. 2/20/2017.    FINDINGS: Moderate motion artifact. There is a 4 cm x 6 cm evolving  low-density nonhemorrhagic infarct in the right posterior frontal  region just superior to the sylvian fissure. It was not evident on  2/20/2017 but otherwise discernible on 2/22/2017. It is becoming more  demarcated but not appreciably larger than on 2/22/2017.  There is  moderate generalized cerebral atrophy. There is moderate low-density  change in the white matter of both hemispheres consistent with chronic  small vessel ischemic disease. Old infarcts are again noted in the  right occipital lobe and frontal white matter bilaterally.    Paranasal sinuses are normal. No bony abnormality.       Impression    IMPRESSION:   1. Evolving right MCA territory infarct with more well-defined margins  today than on 2/22/2017.  2. No hemorrhage.  3. Atrophy and chronic white matter disease.  4. Old infarcts as previously described.     LEO BENSON MD

## 2017-02-27 ENCOUNTER — APPOINTMENT (OUTPATIENT)
Dept: SPEECH THERAPY | Facility: CLINIC | Age: 82
DRG: 024 | End: 2017-02-27
Payer: MEDICARE

## 2017-02-27 ENCOUNTER — APPOINTMENT (OUTPATIENT)
Dept: PHYSICAL THERAPY | Facility: CLINIC | Age: 82
DRG: 024 | End: 2017-02-27
Payer: MEDICARE

## 2017-02-27 ENCOUNTER — HOSPITAL ENCOUNTER (INPATIENT)
Facility: CLINIC | Age: 82
LOS: 22 days | Discharge: NURSING, REHAB OR LONG-TERM CARE | DRG: 057 | End: 2017-03-21
Attending: PHYSICAL MEDICINE & REHABILITATION | Admitting: PHYSICAL MEDICINE & REHABILITATION
Payer: MEDICARE

## 2017-02-27 ENCOUNTER — APPOINTMENT (OUTPATIENT)
Dept: OCCUPATIONAL THERAPY | Facility: CLINIC | Age: 82
DRG: 024 | End: 2017-02-27
Payer: MEDICARE

## 2017-02-27 VITALS
HEART RATE: 55 BPM | DIASTOLIC BLOOD PRESSURE: 73 MMHG | WEIGHT: 179.01 LBS | SYSTOLIC BLOOD PRESSURE: 156 MMHG | OXYGEN SATURATION: 96 % | BODY MASS INDEX: 25.63 KG/M2 | TEMPERATURE: 97.9 F | HEIGHT: 70 IN | RESPIRATION RATE: 16 BRPM

## 2017-02-27 DIAGNOSIS — G47.00 INSOMNIA, UNSPECIFIED TYPE: Primary | ICD-10-CM

## 2017-02-27 DIAGNOSIS — I63.9 CEREBROVASCULAR ACCIDENT (CVA), UNSPECIFIED MECHANISM (H): ICD-10-CM

## 2017-02-27 DIAGNOSIS — M54.2 CERVICALGIA: ICD-10-CM

## 2017-02-27 DIAGNOSIS — I10 ESSENTIAL HYPERTENSION: ICD-10-CM

## 2017-02-27 DIAGNOSIS — E11.21 TYPE 2 DIABETES MELLITUS WITH DIABETIC NEPHROPATHY, UNSPECIFIED LONG TERM INSULIN USE STATUS: ICD-10-CM

## 2017-02-27 DIAGNOSIS — R21 GROIN RASH: ICD-10-CM

## 2017-02-27 DIAGNOSIS — N40.0 BENIGN PROSTATIC HYPERPLASIA, PRESENCE OF LOWER URINARY TRACT SYMPTOMS UNSPECIFIED, UNSPECIFIED MORPHOLOGY: ICD-10-CM

## 2017-02-27 DIAGNOSIS — R25.2 SPASTICITY: ICD-10-CM

## 2017-02-27 LAB
ANION GAP SERPL CALCULATED.3IONS-SCNC: 8 MMOL/L (ref 3–14)
BUN SERPL-MCNC: 22 MG/DL (ref 7–30)
CALCIUM SERPL-MCNC: 8.3 MG/DL (ref 8.5–10.1)
CHLORIDE SERPL-SCNC: 108 MMOL/L (ref 94–109)
CO2 SERPL-SCNC: 22 MMOL/L (ref 20–32)
CREAT SERPL-MCNC: 0.96 MG/DL (ref 0.66–1.25)
ERYTHROCYTE [DISTWIDTH] IN BLOOD BY AUTOMATED COUNT: 14.9 % (ref 10–15)
GFR SERPL CREATININE-BSD FRML MDRD: 74 ML/MIN/1.7M2
GLUCOSE BLDC GLUCOMTR-MCNC: 168 MG/DL (ref 70–99)
GLUCOSE BLDC GLUCOMTR-MCNC: 188 MG/DL (ref 70–99)
GLUCOSE BLDC GLUCOMTR-MCNC: 213 MG/DL (ref 70–99)
GLUCOSE BLDC GLUCOMTR-MCNC: 229 MG/DL (ref 70–99)
GLUCOSE BLDC GLUCOMTR-MCNC: 264 MG/DL (ref 70–99)
GLUCOSE BLDC GLUCOMTR-MCNC: 284 MG/DL (ref 70–99)
GLUCOSE SERPL-MCNC: 301 MG/DL (ref 70–99)
HCT VFR BLD AUTO: 28.1 % (ref 40–53)
HGB BLD-MCNC: 9.6 G/DL (ref 13.3–17.7)
MAGNESIUM SERPL-MCNC: 1.9 MG/DL (ref 1.6–2.3)
MCH RBC QN AUTO: 30.9 PG (ref 26.5–33)
MCHC RBC AUTO-ENTMCNC: 34.2 G/DL (ref 31.5–36.5)
MCV RBC AUTO: 90 FL (ref 78–100)
PHOSPHATE SERPL-MCNC: 2.5 MG/DL (ref 2.5–4.5)
PLATELET # BLD AUTO: 148 10E9/L (ref 150–450)
POTASSIUM SERPL-SCNC: 4.1 MMOL/L (ref 3.4–5.3)
RBC # BLD AUTO: 3.11 10E12/L (ref 4.4–5.9)
SODIUM SERPL-SCNC: 138 MMOL/L (ref 133–144)
WBC # BLD AUTO: 8.4 10E9/L (ref 4–11)

## 2017-02-27 PROCEDURE — 97110 THERAPEUTIC EXERCISES: CPT | Mod: GP

## 2017-02-27 PROCEDURE — 80048 BASIC METABOLIC PNL TOTAL CA: CPT | Performed by: INTERNAL MEDICINE

## 2017-02-27 PROCEDURE — 40000225 ZZH STATISTIC SLP WARD VISIT: Performed by: SPEECH-LANGUAGE PATHOLOGIST

## 2017-02-27 PROCEDURE — 85027 COMPLETE CBC AUTOMATED: CPT | Performed by: INTERNAL MEDICINE

## 2017-02-27 PROCEDURE — A9270 NON-COVERED ITEM OR SERVICE: HCPCS | Mod: GY | Performed by: INTERNAL MEDICINE

## 2017-02-27 PROCEDURE — 00000146 ZZHCL STATISTIC GLUCOSE BY METER IP

## 2017-02-27 PROCEDURE — 25000132 ZZH RX MED GY IP 250 OP 250 PS 637: Mod: GY | Performed by: INTERNAL MEDICINE

## 2017-02-27 PROCEDURE — 25000131 ZZH RX MED GY IP 250 OP 636 PS 637: Mod: GY | Performed by: INTERNAL MEDICINE

## 2017-02-27 PROCEDURE — A9270 NON-COVERED ITEM OR SERVICE: HCPCS | Mod: GY | Performed by: PHYSICAL MEDICINE & REHABILITATION

## 2017-02-27 PROCEDURE — 83735 ASSAY OF MAGNESIUM: CPT | Performed by: INTERNAL MEDICINE

## 2017-02-27 PROCEDURE — 25000132 ZZH RX MED GY IP 250 OP 250 PS 637: Mod: GY | Performed by: PHYSICAL MEDICINE & REHABILITATION

## 2017-02-27 PROCEDURE — 84100 ASSAY OF PHOSPHORUS: CPT | Performed by: INTERNAL MEDICINE

## 2017-02-27 PROCEDURE — 25000131 ZZH RX MED GY IP 250 OP 636 PS 637: Mod: GY | Performed by: PHYSICAL MEDICINE & REHABILITATION

## 2017-02-27 PROCEDURE — 92526 ORAL FUNCTION THERAPY: CPT | Mod: GN | Performed by: SPEECH-LANGUAGE PATHOLOGIST

## 2017-02-27 PROCEDURE — 40000133 ZZH STATISTIC OT WARD VISIT: Performed by: OCCUPATIONAL THERAPY ASSISTANT

## 2017-02-27 PROCEDURE — 99239 HOSP IP/OBS DSCHRG MGMT >30: CPT | Performed by: INTERNAL MEDICINE

## 2017-02-27 PROCEDURE — 97530 THERAPEUTIC ACTIVITIES: CPT | Mod: GP

## 2017-02-27 PROCEDURE — 12800006 ZZH R&B REHAB

## 2017-02-27 PROCEDURE — 27210429 ZZH NUTRITION PRODUCT INTERMEDIATE LITER

## 2017-02-27 PROCEDURE — 36415 COLL VENOUS BLD VENIPUNCTURE: CPT | Performed by: INTERNAL MEDICINE

## 2017-02-27 PROCEDURE — 97535 SELF CARE MNGMENT TRAINING: CPT | Mod: GO | Performed by: OCCUPATIONAL THERAPY ASSISTANT

## 2017-02-27 PROCEDURE — 40000193 ZZH STATISTIC PT WARD VISIT

## 2017-02-27 RX ORDER — METOPROLOL TARTRATE 50 MG
50 TABLET ORAL 2 TIMES DAILY
Qty: 60 TABLET | Refills: 1 | Status: ON HOLD | DISCHARGE
Start: 2017-02-27 | End: 2017-03-21

## 2017-02-27 RX ORDER — POLYETHYLENE GLYCOL 3350 17 G/17G
17 POWDER, FOR SOLUTION ORAL DAILY PRN
Status: DISCONTINUED | OUTPATIENT
Start: 2017-02-27 | End: 2017-03-21 | Stop reason: HOSPADM

## 2017-02-27 RX ORDER — ATORVASTATIN CALCIUM 40 MG/1
40 TABLET, FILM COATED ORAL DAILY
Qty: 30 TABLET | Refills: 1 | DISCHARGE
Start: 2017-02-27 | End: 2017-06-05

## 2017-02-27 RX ORDER — HYDROCHLOROTHIAZIDE 12.5 MG/1
12.5 CAPSULE ORAL DAILY
Status: DISCONTINUED | OUTPATIENT
Start: 2017-02-28 | End: 2017-03-05

## 2017-02-27 RX ORDER — FERROUS GLUCONATE 324(38)MG
324 TABLET ORAL
Status: DISCONTINUED | OUTPATIENT
Start: 2017-02-28 | End: 2017-03-21 | Stop reason: HOSPADM

## 2017-02-27 RX ORDER — ATORVASTATIN CALCIUM 40 MG/1
40 TABLET, FILM COATED ORAL DAILY
Status: DISCONTINUED | OUTPATIENT
Start: 2017-02-28 | End: 2017-03-21 | Stop reason: HOSPADM

## 2017-02-27 RX ORDER — TAMSULOSIN HYDROCHLORIDE 0.4 MG/1
0.8 CAPSULE ORAL DAILY
Status: DISCONTINUED | OUTPATIENT
Start: 2017-02-28 | End: 2017-03-06

## 2017-02-27 RX ORDER — METOPROLOL TARTRATE 50 MG
50 TABLET ORAL 2 TIMES DAILY
Status: DISCONTINUED | OUTPATIENT
Start: 2017-02-27 | End: 2017-03-21 | Stop reason: HOSPADM

## 2017-02-27 RX ORDER — ACETAMINOPHEN 325 MG/1
650 TABLET ORAL EVERY 4 HOURS PRN
Status: DISCONTINUED | OUTPATIENT
Start: 2017-02-27 | End: 2017-03-21 | Stop reason: HOSPADM

## 2017-02-27 RX ORDER — DEXTROSE MONOHYDRATE 25 G/50ML
25-50 INJECTION, SOLUTION INTRAVENOUS
Status: DISCONTINUED | OUTPATIENT
Start: 2017-02-27 | End: 2017-03-21 | Stop reason: HOSPADM

## 2017-02-27 RX ORDER — NICOTINE POLACRILEX 4 MG
15-30 LOZENGE BUCCAL
Status: DISCONTINUED | OUTPATIENT
Start: 2017-02-27 | End: 2017-03-21 | Stop reason: HOSPADM

## 2017-02-27 RX ORDER — FLUTICASONE PROPIONATE 50 MCG
1-2 SPRAY, SUSPENSION (ML) NASAL DAILY
Status: DISCONTINUED | OUTPATIENT
Start: 2017-02-28 | End: 2017-03-21 | Stop reason: HOSPADM

## 2017-02-27 RX ORDER — LISINOPRIL 10 MG/1
10 TABLET ORAL 2 TIMES DAILY
Status: DISCONTINUED | OUTPATIENT
Start: 2017-02-27 | End: 2017-03-01

## 2017-02-27 RX ORDER — HYDROCHLOROTHIAZIDE 12.5 MG/1
12.5 CAPSULE ORAL DAILY
Status: DISCONTINUED | OUTPATIENT
Start: 2017-02-27 | End: 2017-02-27 | Stop reason: HOSPADM

## 2017-02-27 RX ORDER — HYDROCHLOROTHIAZIDE 12.5 MG/1
12.5 CAPSULE ORAL DAILY
Qty: 30 CAPSULE | Refills: 1 | Status: ON HOLD | DISCHARGE
Start: 2017-02-27 | End: 2017-03-21

## 2017-02-27 RX ADMIN — RIVAROXABAN 20 MG: 10 TABLET, FILM COATED ORAL at 17:50

## 2017-02-27 RX ADMIN — RANITIDINE HYDROCHLORIDE 150 MG: 150 TABLET, FILM COATED ORAL at 20:34

## 2017-02-27 RX ADMIN — ACETAMINOPHEN 650 MG: 325 TABLET, FILM COATED ORAL at 21:27

## 2017-02-27 RX ADMIN — TAMSULOSIN HYDROCHLORIDE 0.8 MG: 0.4 CAPSULE ORAL at 08:38

## 2017-02-27 RX ADMIN — INSULIN ASPART 3 UNITS: 100 INJECTION, SOLUTION INTRAVENOUS; SUBCUTANEOUS at 04:25

## 2017-02-27 RX ADMIN — INSULIN ASPART 1 UNITS: 100 INJECTION, SOLUTION INTRAVENOUS; SUBCUTANEOUS at 18:37

## 2017-02-27 RX ADMIN — Medication 1 MG: at 21:27

## 2017-02-27 RX ADMIN — ACETAMINOPHEN 650 MG: 325 TABLET, FILM COATED ORAL at 16:55

## 2017-02-27 RX ADMIN — INSULIN ASPART 4 UNITS: 100 INJECTION, SOLUTION INTRAVENOUS; SUBCUTANEOUS at 08:33

## 2017-02-27 RX ADMIN — FERROUS GLUCONATE 324 MG: 324 TABLET ORAL at 08:37

## 2017-02-27 RX ADMIN — LISINOPRIL 10 MG: 10 TABLET ORAL at 20:34

## 2017-02-27 RX ADMIN — HYDROCHLOROTHIAZIDE 12.5 MG: 12.5 CAPSULE ORAL at 08:38

## 2017-02-27 RX ADMIN — INSULIN ASPART 2 UNITS: 100 INJECTION, SOLUTION INTRAVENOUS; SUBCUTANEOUS at 00:04

## 2017-02-27 RX ADMIN — VITAMIN D, TAB 1000IU (100/BT) 2000 UNITS: 25 TAB at 08:38

## 2017-02-27 RX ADMIN — METOPROLOL TARTRATE 50 MG: 50 TABLET, FILM COATED ORAL at 20:34

## 2017-02-27 RX ADMIN — FLUTICASONE PROPIONATE 2 SPRAY: 50 SPRAY, METERED NASAL at 08:37

## 2017-02-27 RX ADMIN — LISINOPRIL 10 MG: 10 TABLET ORAL at 08:38

## 2017-02-27 RX ADMIN — INSULIN GLARGINE 10 UNITS: 100 INJECTION, SOLUTION SUBCUTANEOUS at 08:33

## 2017-02-27 RX ADMIN — INSULIN ASPART 1 UNITS: 100 INJECTION, SOLUTION INTRAVENOUS; SUBCUTANEOUS at 15:08

## 2017-02-27 RX ADMIN — ATORVASTATIN CALCIUM 40 MG: 40 TABLET, FILM COATED ORAL at 08:38

## 2017-02-27 RX ADMIN — METOPROLOL TARTRATE 50 MG: 50 TABLET, FILM COATED ORAL at 08:37

## 2017-02-27 ASSESSMENT — VISUAL ACUITY
OU: NORMAL ACUITY;GLASSES

## 2017-02-27 NOTE — PROGRESS NOTES
SW  I: Pt leaving at 11:30am via wc with HE.  Spoke with pt's dtr, and she is agreeable with discharge plan, and has directions to ARU.    A: spoke with pt and pt agreeable with discharge plan.  ARU agreeable with pt leaving at 11:30am.  Brain board updated.   P: No further SW needs.

## 2017-02-27 NOTE — DISCHARGE INSTRUCTIONS
Your risk factors for stroke or TIA (transient ischemic attack):    Your Risk Factors Your Results Normal Ranges   High blood pressure  Continue to take your Blood Pressure medications as prescribed BP Readings from Last 1 Encounters:   02/27/17 156/73    Less than 120/80   Cholesterol              Total    Continue to take your simvastatin as prescribed Lab Results   Component Value Date    CHOL 105 02/21/2017      Less than 150    Triglycerides   Lab Results   Component Value Date    TRIG 140 02/21/2017    Less than 150   LDL Lab Results   Component Value Date    LDL 49 02/21/2017    LDL 80 02/17/2017       Less than 70   HDL Lab Results   Component Value Date    HDL 28 02/21/2017            Greater than 40 (men)  Greater than 50 (women)   Diabetes  Continue to eat a healthy diet and take your glimepiride as prescribed   Recent Labs  Lab 02/27/17  0746   *    Fasting blood glucose    Smoking/tobacco use  Quit smoking and tobacco   Overweight  Lose 1-2 pounds a week   Lack of exercise  30 minutes moderate activity each day   Other risk factors include carotid (neck) artery disease, atrial fibrillation and stress. You may be on new medicine to treat high blood pressure, cholesterol, diabetes or atrial fibrillation.    Understanding Stroke Booklet given to patient. Please refer to booklet for further information.    Stroke warning signs and symptoms - CALL 911 right away for:  - Sudden numbness or weakness in the face, arm or leg (often on one side of the body).  - Sudden confusion or trouble understanding what is going on.  - Sudden blurred or decreased vision in one or both eyes.  - Sudden trouble speaking, loss of balance, dizziness or problems with coordination.  - Sudden, severe headache for no reason.  - Fainting or seizures.  - Symptoms may go away then come back suddenly.

## 2017-02-27 NOTE — IP AVS SNAPSHOT
` `            [facility]: [phone]                 INTERAGENCY TRANSFER FORM - NOTES (H&P, Discharge Summary, Consults, Procedures, Therapies)   2017                    Hospital Admission Date: 2017  ELIAS BEGUM   : 8/10/1931  Sex: Male        Patient PCP Information     Provider PCP Type    Shen Huff MD General         History & Physicals      H&P signed by Kevin Bond MD at 2017  6:09 AM      Author:  Kevin Bond MD Service:  Physical Medicine and Rehabilitation Author Type:  Physician    Filed:  2017  6:09 AM Date of Service:  2017  2:24 PM Note Created:  2017  4:21 PM    Status:  Signed :  Kevin Bond MD (Physician)         DATE OF ADMISSION:  2017      REASON FOR ADMISSION:  Left hemiparesis secondary to right MCA stroke with dysphagia, dysarthria and impaired mobility and ADLs secondary to right MCA cardioembolic stroke.      HISTORY OF PRESENT ILLNESS:  Elias Begum is an 85-year-old male admitted to acute rehab unit on 2017 with a past medical history notable for a stroke 9 years ago with some residual left leg weakness.  He has a history of chronic ischemic heart disease, prostate cancer, hypertension, hyperlipidemia, type 2 diabetes, chronic kidney disease stage 3, atrial fibrillation.  He presented to have significant left lower facial droop, severe weakness of L UE extremity, some left lower extremity weakness.  CT angiography revealed occlusion of the right M2 branch of the middle cerebral artery and perfusion deficit corresponding in the right MCA territory, as well as consistent with ischemia.  MRI shows right MCA diffusion restriction area and left cerebellar small area of diffusion restriction.  The patient did receive IV tPA, and mechanical thrombectomy was attempted but unsuccessful due to angle of branch of occlusion and calcified nature of clot.        Functionally, the patient  is taking steps, 3-6 feet, with moderate assist x2.  Buckling in lower extremities present.  Supine to sit with minimal-to-moderate assist x1, scooting to edge of bed and sitting with standby assist, increased time.  Sit to and from, stand with minimal assist x1 and contact guard assist of another.  Requires minimal assist for sitting balance during ADL tasks at the edge of bed and stood at the edge of bed x2 with minimal/moderate assist x2 on platform front-wheeled walker.  Able to follow 1-step directions, moderate-to-severe dysarthria.  Has oral and pharyngeal dysphagia.  Needs further cognitive linguistic evaluation to address dysarthria and left facial droop and further cognitive evaluation and treatment.      Currently the patient presents with dysarthria, dysphagia, impaired left-sided weakness, greater weakness in left upper extremity and impaired cognition.  The patient is medically appropriate and assessed to have needs and will benefit from inpatient acute rehabilitation comprehensive program, working with Physical Therapy, Occupational Therapy and Speech Therapy and will benefit from supervision management, rehab nursing and rehab physician.      ALLERGIES:  No known drug allergies.      PAST MEDICAL AND SURGICAL HISTORY:  Please see Epic for details and as above in the HPI.      SOCIAL HISTORY:  The patient lives in an assisted-living facility, modified independent with single-end cane use for mobility and basic ADLs with no stairs.  Had some help with cleaning, meds and laundry, and meals were provided.      REVIEW OF SYSTEMS:  A 10-point review of systems negative other than symptoms noted above in the HPI and below.  Denies any headaches, lightheadedness, dizziness, palpitations, chest pain or breathing difficulties.  Has swallowing issues.  Has tube feed supplementation as of this time with NG tube.  Left upper extremity weakness with no significant movement and ongoing left lower extremity  weakness.  Unclear of incontinence with bladder.  Had bowel movements recently.      PHYSICAL EXAMINATION:   VITAL SIGNS:  Blood pressure 177/68, pulse is 72 per minute, temperature 97.4 degrees Fahrenheit, respirations 16 per minute.   GENERAL:  This is an awake individual, alert, cooperative, hard of hearing.  Unclear if some of this is cognitive on top of hard of hearing or how much of it is that.  Reports having hearing aid on 1 ear, but has not worn it today.   HEENT:  Gross EOMs are intact.   NEUROLOGIC:  Left facial symmetry noted when asked to smile with some tongue deviation to the left.  Unlabored breathing overall.     GASTROINTESTINAL:  Globular abdomen.     EXTREMITIES:  Right upper extremity and lower extremity with good strength against resistance.  Some difficulty on occasions with apraxia or motor planning/execution.  Left upper extremity with no significant movements, flaccid with no significant tone.  Nailbeds on both hands and feet have some fungal infection.  Negative Lane's, left hand.  Left lower extremity with overall decent strength, 4/5, in hip flexion, knee extension, ankle dorsiflexion and plantar flexion; however, with minimal range on active range of motion and difficulty executing movements on command with some apraxia noted.      LABORATORIES:  Please see Epic for further details, reviewed.      ASSESSMENT AND MANAGEMENT AND INITIATION PLAN OF CARE:     POST-ADMISSION EVALUATION:  I have evaluated the patient on admission to the Acute [facility] and compared it with the preadmission screen.  No significant differences are identified and remains appropriate for acute rehabilitation.  See below for more details and specifics regarding this admission that supports requiring medical therapy and testing in the acute rehab setting.      ASSESSMENT AND PLAN:  This is an 85-year-old male with a past medical history of stroke 9 years ago with some residual left leg weakness with history  of chronic ischemic heart disease, prostate cancer, hypertension, hyperlipidemia, type 2 diabetes, chronic kidney disease, atrial fibrillation.  Now presents with left hemiparesis, upper extremity, on top of residual left lower extremity weakness with dysarthria, dysphagia, concern for cognitive impairment on top of hard of hearing with impaired mobility, ADLs.  The patient will work with Physical Therapy, Occupational Therapy and Speech Therapy for 60 minutes daily for each discipline to work on mobility, exercises for strengthening, positioning, transfers and  compensatory strategies.  Also for upper and lower body self-care tasks, dressing, toileting, bathing, energy conservation techniques with use of an assistive device as needed and also for compensatory strategies .  Will need dysphagia treatment, evaluation and subsequent management thereof and also cognitive evaluation and treatment strategies with Speech Therapy.  The patient will work with rehab nursing for skin integrity monitoring, blood pressure monitoring, labs, vitals, provide education to patient's caregivers on new medications, monitor blood sugars in patient with diabetes type 2 and altered diet, provide care over new rehab techniques and to daily cares.      MEDICAL MANAGEMENT OVERALL:  Patient will continue with Xarelto for anticoagulation on a daily basis.  Blood pressure control for hypertension.  Optimize secondary stroke management includes hydrochlorothiazide, metoprolol twice a day and lisinopril.  Hyperlipidemia management to continue with Lipitor on a daily basis, also optimize secondary stroke management with vitamin D.  Diabetes type 2 management with glimepiride on a daily basis with supplemental subcutaneous insulin coverage and blood sugar monitoring.  Patient on Flomax on a daily basis.  Bowel and bladder:  Continue to monitor spontaneity of bowel and bladder.  Adjust management as needed.  On Flomax as above.  MiraLax, as-needed  basis.  DVT prophylaxis:  Patient on Xarelto as above.  Will optimize mechanical prophylaxis with PCDs and anti-embolism stockings and progressive mobilization.      CODE STATUS:  Full.      PROGNOSIS FOR MEDICAL IMPROVEMENT:  Stabilization of medical issues for discharge to home is good.        ESTIMATED LENGTH OF STAY:  About 2-3 weeks.  Progress toward making gains for modified independence.  Will likely need more assistance on discharge at assisted-living facility.      TOTAL TIME SPENT:  55 minutes with more than half the time spent in counseling and coordination of care.         KEVIN MURRIETA MD             D: 2017 14:24   T: 2017 16:13   MT: BARBARA      Name:     MIRANDA MCKEE   MRN:      [id number]        Account:      [id number]   :      08/10/1931           Admitted:     [id number]      Document: [id number][RS1.1]         Revision History        User Key Date/Time User Provider Type Action    > RS1.1 2017  6:09 AM Kevin Murrieta MD Physician Sign     [N/A] 2017  4:21 PM Kevin Murrieta MD Physician Edit            H&P by Kevin Murrieta MD at 2017  2:11 PM     Author:  Kevin Murrieta MD Service:  Physical Medicine and Rehabilitation Author Type:  Physician    Filed:  2017  2:11 PM Date of Service:  2017  2:11 PM Note Created:  2017  2:07 PM    Status:  Signed :  Kevin Murrieta MD (Physician)         Post Admission Physician Evaluation:    I have compared Miranda Mckee's condition on admission to acute rehabilitation to that outlined in the preadmission screen. History and physical exam performed by me. No significant differences are identified and the patient remains appropriate for an inpatient rehabilitation facility level of care to manage medical issues and address functional impairments due to (rehabilitation diagnosis) stroke.    Comorbid medical conditions being managed: chronic  ischemic heart disease, prostate CA, HTN, HLD, type 2 DM, CKD     Prior functional level: mod ind with SEC for basic mobility and ADLs at assistive living facility    Present function: steps 3-6 ft with mod A x 2, buckling of LEs present, min to mod A x 1, scooting EOB in sitting with SBA and increased time, sit / stand with min A x 1 and CGA of another. Min A sitting balance. Dysphagia with TF via NG tube and PO diet for now.     Anticipated rehabilitation course: ancipate to progress towards mod independent with mobility using AD and additional assistance at the assistive living facility     Will benefit from intensive rehabilitation includin minutes each of PT, OT and SLP  Rehabilitation nursing  Close management by physiatry    Prognosis: good    Estimated length of stay: 2-3 weeks[RS1.1]       Revision History        User Key Date/Time User Provider Type Action    > RS1.1 2017  2:11 PM Kevin Bond MD Physician Sign                     Discharge Summaries      Discharge Summaries by Kevin Bond MD at 3/21/2017  9:00 AM     Author:  Kevin Bond MD Service:  Physical Medicine and Rehabilitation Author Type:  Physician    Filed:  3/21/2017  9:44 AM Date of Service:  3/21/2017  9:00 AM Note Created:  3/21/2017  9:00 AM    Status:  Signed :  Kevin Bond MD (Physician)         [facility] Acute Rehabilitation Unit   Discharge Summary     Date Admitted: 2017  Date Discharge:[RS1.1] 3/21/17[RS1.2]    Discharge Diagnosis:[RS1.1] This is an 85-year-old male with a past medical history of stroke 9 years ago with some residual left leg weakness with history of chronic ischemic heart disease, prostate cancer, hypertension, hyperlipidemia, type 2 diabetes, chronic kidney disease, atrial fibrillation. Now presents with left hemiparesis, upper extremity, on top of residual left lower extremity weakness with  "dysarthria, dysphagia, concern for cognitive impairment on top of hard of hearing with impaired mobility, ADLs.[RS1.2]    Brief History of Presenting Illness and Hospital Course:  This is a 85 year old male admitted to the acute rehabilitation unit on 2/27/2017 per HPI \"[RS1.1]Elias Mckee is an 85-year-old male admitted to acute rehab unit on 02/27/2017 with a past medical history notable for a stroke 9 years ago with some residual left leg weakness. He has a history of chronic ischemic heart disease, prostate cancer, hypertension, hyperlipidemia, type 2 diabetes, chronic kidney disease stage 3, atrial fibrillation. He presented to have significant left lower facial droop, severe weakness of L UE extremity, some left lower extremity weakness. CT angiography revealed occlusion of the right M2 branch of the middle cerebral artery and perfusion deficit corresponding in the right MCA territory, as well as consistent with ischemia. MRI shows right MCA diffusion restriction area and left cerebellar small area of diffusion restriction. The patient did receive IV tPA, and mechanical thrombectomy was attempted but unsuccessful due to angle of branch of occlusion and calcified nature of clot.       Functionally, the patient is taking steps, 3-6 feet, with moderate assist x2. Buckling in lower extremities present. Supine to sit with minimal-to-moderate assist x1, scooting to edge of bed and sitting with standby assist, increased time. Sit to and from, stand with minimal assist x1 and contact guard assist of another. Requires minimal assist for sitting balance during ADL tasks at the edge of bed and stood at the edge of bed x2 with minimal/moderate assist x2 on platform front-wheeled walker. Able to follow 1-step directions, moderate-to-severe dysarthria. Has oral and pharyngeal dysphagia. Needs further cognitive linguistic evaluation to address dysarthria and left facial droop and further cognitive evaluation and " "treatment.       Currently the patient presents with dysarthria, dysphagia, impaired left-sided weakness, greater weakness in left upper extremity and impaired cognition. The patient is medically appropriate and assessed to have needs and will benefit from inpatient acute rehabilitation comprehensive program, working with Physical Therapy, Occupational Therapy and Speech \"[RS1.2]        During the acute rehab course,[RS1.1] he has made improvements and progress but still has a lot more needs for overall recovery from functional deficits.    - optimize secondary stroke management, on xarelto for anticoagulation, BP management, DM / BS control, lipid management  - HTN, on HCTZ, metoprolol and lisinopril increase dose 3/1 and 3/3, has prn hydralazine, added HCTZ 3/6 for better BP control, adjust as needed, f/u with PCP on discharge, eventual goal post stroke and those with DM < / = 130 / 80  - DM / BS, endocrinology helped with BS control and adjusting management while in rehab, now on lantus 10 units daily and glipizide 7.5 mg daily, f/u with PCP on discharge   - HLD, on lipitor  - on vit D, ferrous gluconate, flonase  - melatonin for sleep  - L ankle swelling initially, some pain, reports twisting ankle, able to move aROM on own w/o sig discomfort, fx suspicion low, did ice pack and voltaren gel now improved and better  - spasticity on L UE, cont ROM and stretching, started on baclofen 5 mg tid recently, may need to further titrate, f/u with PM&&R on discharge  - dietitian following with terrence counts now off TF since 3/7 overnight, follow po intake, d/fortino nasal feeding tube 3/8, doing well with po food intake  - at risk for dehydration d/t restricted po on thickened liquids, i/o monitoring, encourage fluids, IVF supplementation done on 3/13 and on 3/20 with improvement, consider i/o monitoring and encourage po fluid intake to maintain hydration      Bladder and Bowel - monitor spont voids and BM, on flomax decreased to " "0.4 mg 3/20 as may contribute to orthostatis, miralax prn  GI ppx - on zantac  DVT ppx - optimize mech ppx with PCDs and antiembolism stockings, on xarelto, progressive mobilization[RS1.2]          Functionally at discharge[RS1.1]:  \"Patient presenting with silent aspiration during study that was not immediately idenfied during the live study but noted upon review. Patient presenting with consistent premature spillage to the level of pyriforms and requiring a second swallow to clear remaining residue. When controlled for bolus size and with use of chin tuck patient having flash penetration. Flash penetration also noted with nectar thick liquids though signficance of swallow delay and residue not as much as with thin liquids. High level of cueing required to correctly utilize chin tuck maneuver. No difficulties noted with single solid food texture observed. Recommend continue with current NDD-3 food textures and nectar thick liquids. Increased emphasis on use of chin tuck maneuver and use of a double (\"dry\") swallow to improve safety. Ongoing therapy upon discharge to TCU to advance diet as appropriate and for additional referral for repeat VFSS if appropriate. \"  \"Patient presenting with silent aspiration during study that was not immediately idenfied during the live study but noted upon review. Patient presenting with consistent premature spillage to the level of pyriforms and requiring a second swallow to clear remaining residue. When controlled for bolus size and with use of chin tuck patient having flash penetration. Flash penetration also noted with nectar thick liquids though signficance of swallow delay and residue not as much as with thin liquids. High level of cueing required to correctly utilize chin tuck maneuver. No difficulties noted with single solid food texture observed. Recommend continue with current NDD-3 food textures and nectar thick liquids. Increased emphasis on use of chin tuck maneuver and " "use of a double (\"dry\") swallow to improve safety. Ongoing therapy upon discharge to TCU to advance diet as appropriate and for additional referral for repeat VFSS if appropriate\"  \"PT: Pt with difficulty tolerating standing position today while checking orthostatic vitals. Pt with lightheadedness, fatigue in standing, BP 84/64 (was 136/84 in supine). See vitals section for details. RN and MD notified. PT continues to need cuing and A for safe positioning and efficient use with transfers and scooting in bed. Pt needing Karey with wc mobility due to decreased awareness L side, decreased scanning. Cues to locate room on way back from loop with wc propulsion. \"  \"SLP--VFSS scheduled for 3/20/17 to rule out silent aspiration and assess upgrade to thin liquids prior to discharge on Tuesday. Family was present and educated regarding VFSS process and purpose. \"[RS1.2]  Upon discharge it is recommended to continue with PT / OT / SLP[RS1.1] at TCU[RS1.2]            Pertinent Latest Lab Results:   Last Basic Metabolic Panel:  Lab Results   Component Value Date     03/20/2017      Lab Results   Component Value Date    POTASSIUM 4.3 03/20/2017     Lab Results   Component Value Date    CHLORIDE 104 03/20/2017     Lab Results   Component Value Date    TRENT 8.9 03/20/2017     Lab Results   Component Value Date    CO2 25 03/20/2017     Lab Results   Component Value Date    BUN 23 03/20/2017     Lab Results   Component Value Date    CR 1.08 03/20/2017     Lab Results   Component Value Date     03/20/2017       Lab Results   Component Value Date    WBC 5.8 03/20/2017     Lab Results   Component Value Date    RBC 3.70 03/20/2017     Lab Results   Component Value Date    HGB 11.3 03/20/2017     Lab Results   Component Value Date    HCT 33.7 03/20/2017     No components found for: MCT  Lab Results   Component Value Date    MCV 91 03/20/2017     Lab Results   Component Value Date    MCH 30.5 03/20/2017     Lab Results "   Component Value Date    MCHC 33.5 03/20/2017     Lab Results   Component Value Date    RDW 14.2 03/20/2017     Lab Results   Component Value Date     03/20/2017           Discharge Medications:   Elias Mckee   Home Medication Instructions SCOTTIE:[id number]    Printed on:03/21/17 0900   Medication Information                      acetaminophen (TYLENOL) 325 MG tablet  Take 650 mg by mouth every 4 hours as needed for mild pain              atorvastatin (LIPITOR) 40 MG tablet  1 tablet (40 mg) by Oral or Feeding Tube route daily             baclofen (LIORESAL) 10 MG tablet  Take 0.5 tablets (5 mg) by mouth 3 times daily             Blood Glucose Monitoring Suppl (BLOOD GLUCOSE TEST STRIPS STRP)  66 strips continuous.             Cholecalciferol (VITAMIN D) 2000 UNITS tablet  Take 2,000 Units by mouth daily             ferrous gluconate (FERGON) 324 (38 FE) MG tablet  Take 1 tablet (324 mg) by mouth daily (with breakfast)             fluticasone (FLONASE) 50 MCG/ACT nasal spray  Spray 1-2 sprays into both nostrils daily             glipiZIDE (GLUCOTROL) 5 MG tablet  Take 1.5 tablets (7.5 mg) by mouth daily (with breakfast)             hydrochlorothiazide (MICROZIDE) 12.5 MG capsule  Take 1 capsule (12.5 mg) by mouth daily Hold for SBP < 110             insulin glargine (LANTUS) 100 UNIT/ML injection  Inject 10 Units Subcutaneous every 24 hours             LANCETS MISC. KIT  appropriate to strips and machine             lidocaine (LIDODERM) 5 % Patch  Place 1-2 patches onto the skin every 24 hours Apply patch(s) to neck. To prevent lidocaine toxicity, patient should be patch free for 12 hrs daily. Patches may be cut to smaller size prior to removing release liner.             lisinopril (PRINIVIL/ZESTRIL) 20 MG tablet  Take 1 tablet (20 mg) by mouth 2 times daily Hold for SBP < 100             melatonin 3 MG tablet  Take 1 tablet (3 mg) by mouth At Bedtime             metoprolol (LOPRESSOR) 50 MG  "tablet  Take 1 tablet (50 mg) by mouth 2 times daily Hold for SBP < 100 or HR < 55 / min             miconazole (MICATIN; MICRO GUARD) 2 % powder  Apply topically 2 times daily Apply to groin             ORDER FOR DME  Equipment being ordered: Wheelchair             ORDER FOR DME  Equipment being ordered:  transport wheelchair             order for DME  Equipment being ordered: transport wheelchair.   It has been ruled out that a walker and cane are not sufficient.             order for DME  Equipment being ordered: Walker - basic black non collapseable walker             order for DME  Equipment being ordered: basic black non - collapesable cane             order for DME  Equipment being ordered: Cane             polyethylene glycol (MIRALAX/GLYCOLAX) powder  Take 17 g by mouth every 48 hours as needed for constipation             ranitidine (ZANTAC) 150 MG tablet  Take 1 tablet (150 mg) by mouth 2 times daily             rivaroxaban ANTICOAGULANT (XARELTO) 20 MG TABS tablet  Take 1 tablet (20 mg) by mouth daily (with dinner)             tamsulosin (FLOMAX) 0.4 MG capsule  Take 1 capsule (0.4 mg) by mouth daily                     Physical Examination:   /55 (BP Location: Right arm)  Pulse 58  Temp 96.9  F (36.1  C) (Oral)  Resp 16  Ht 1.778 m (5' 10\")  Wt 83.4 kg (183 lb 14.4 oz)  SpO2 96%  BMI 26.39 kg/m2[RS1.1]  Sitting at chair this am   Conversant and cooperative   R pupil deformed - previously documented   L hand and toe nails with fungal infection changes appears chronic   L UE MAS 1+ to 2/4 on L elbow flexors and pronators - variable during day and before / after ROM / stretching  L LE with antigravity hip flex and knee ext but limited range 3+/5  Limited antigravity ankle DF / PF with limited range   Left sided neglect improving[RS1.2]       Active Diet Order      Diet      Dysphagia Diet Level 3 Advanced Nectar Thickened Liquids (pre-thickened or use instant food thickener)       Follow up " Appointments:[RS1.1]  Follow up   - PCP in 1-2 weeks s/p stroke with Dr Shen Huff     - Neurology with Dr Manuel Summers in 4 weeks s/p stroke     - Physical Medicine [facility] Rehabilitation Clinic in 8 weeks with Dr Kevin Bond or Dr Verenice Garcia at [phone] [facility][RS1.2]     Discharge Disposition:[RS1.1] TCU[RS1.2]    Total Discharge time spent is > 30 minutes.[RS1.1]          Revision History        User Key Date/Time User Provider Type Action    > RS1.2 3/21/2017  9:44 AM Kevin Bond MD Physician Sign     RS1.1 3/21/2017  9:00 AM Kevin Bond MD Physician                      Consult Notes      Consults by Emily Mcclure APRN CNP at 2/28/2017  9:10 AM     Author:  Emily Mcclure APRN CNP Service:  Endocrinology Author Type:  Nurse Practitioner    Filed:  2/28/2017  4:38 PM Date of Service:  2/28/2017  9:10 AM Note Created:  2/28/2017  9:09 AM    Status:  Signed :  Emily Mcclure APRN CNP (Nurse Practitioner)     Consult Orders:    1. Endocrinology IP Consult: Patient to be seen: Routine - within 24 hours; DM / BS management; Consultant may enter orders: Yes [[id number]] ordered by Kevin Bond MD at 02/28/17 0829                Diabetes/Hyperglycemia Management Consult    Chief Complaint[JM1.1] glycemic management[JM1.2]  Consult requested by: Dr. 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 Admitted to ARU for therapies     Mr. Mckee presented to the ED with difficulty eating and left-sided facial weakness and facial droop.he had TPA in  "the ED and IR for a thrombectomy, however was failure of recannulization due to acute angle of the branch and markedly calcified nature of the clot. Per documentation, neurology suspected that the stroke was cardioembolic with hx of paroxysmal atrial fibrillation.    Initially failed swallow study, but was re-evaluated by speech and was started on a dysphagia diet level 2 with nectar thick liquids along with cycled TF from 8740-5832.    He was started on lantus[JM1.3] and dose increased along with correction scale. On discharge, discharge summary recommended d/c lantus and starting metformin,  continue with sliding scale and glimepiride 3 mg daily.[JM1.2]     Cycled TF: Isosource 1.5 at 60 cc per hour from 2943-4024[JM1.4]  Last dose of Lantus 10 units (2/27, AM) blood sugars in the high 200s with cycled TF and 301 at end of cycle, glucose off cycle -188 requiring 1 unit of novolog.  Lantus 10 units d/c on (2/28) and glimepiride was given. Glucose at end of cycle , followed by glucose of 140 and 139.[JM1.2]    Mr. Mckee is limited to what he can recall regarding his past medical history regarding diabetes.  Per review of [facility], was last seen for diabetes (2/17/2016). Per documentation, was checking glucose once daily in am with readings 130-150.[JM1.4]    Recent Labs  Lab 02/28/17  0800 02/27/17  2101 02/27/17  1730 02/27/17  1437 02/27/17  0746 02/27/17  0738 02/27/17  0408  02/26/17  0757  02/25/17  0740  02/24/17  0740  02/23/17  0805  02/22/17  0520   GLC  --   --   --   --  301*  --   --   --  264*  --  248*  --  179*  --  143*  --  177*   * 229* 168* 188*  --  284* 264*  < >  --   < >  --   < >  --   < >  --   < >  --    < > = values in this interval not displayed.      Diabetes Type:[JM1.1] type 2[JM1.3]  Diabetes Duration:[JM1.1] \"years\"[JM1.4]  Usual Diabetes Regimen:[JM1.1] glimepiride[JM1.3] 3 mg am[JM1.4]  Ability to [place] Prescribed Regimen:[JM1.1] ? Lives at an assisted " living, may have had medication support[JM1.4]  Diabetes Control:   Lab Results   Component Value Date    A1C 6.8 2017    A1C 6.7 2017    A1C 6.0 2016    A1C 6.4 2016    A1C 6.6 2015     Diabetes Complications:[JM1.1] CKD, nephropathy[JM1.4]  Able to Detect Hypoglycemia:[JM1.1] unknown[JM1.4]  Usual Diabetes Care Provider:[JM1.1] Dr. Huff,  [facility][JM1.4]  Factors Impacting Glucose Control:[JM1.1] TF[JM1.3]      Review of Systems  10 point ROS completed with pertinent positives and negatives noted in the HPI    Past medical, family and social histories are reviewed and updated.    Past Medical History[JM1.1], reviewed and updated as indicated[JM1.3]  Past Medical History   Diagnosis Date     Acute, but ill-defined, cerebrovascular disease      Left hemiparesis, left side neglect     Atrial fibrillation (H)      Chronic ischemic heart disease, unspecified      Coronary artery disease      CVA (cerebral infarction)      Elevated cholesterol      Essential hypertension, benign      Hyperlipidaemia      MEDICAL HISTORY OF - 2008     V fib arrest      Myocardial infarction (H)      Other and unspecified hyperlipidemia      Type II or unspecified type diabetes mellitus without mention of complication, not stated as uncontrolled        Family History[JM1.1]  Son with type 1 diabetes[JM1.2]    Social History[JM1.1]  Lives in an assisted living[JM1.2]  Social History     Social History     Marital status: [JM1.1], wife  [JM1.2]     Spouse name: N/A     Number of children:[JM1.1] 3 sons and 1 daughter[JM1.2]     Years of education: N/A     Social History Main Topics     Smoking status: Never Smoker     Smokeless tobacco: Never Used     Alcohol use No     Drug use: No     Sexual activity: No     Other Topics Concern     Sleep Concern Yes     pt using sleep aid     Stress Concern No     Weight Concern No     Special Diet No     Exercise Yes      "everyday     Seat Belt Yes     Social History Narrative         Physical Exam[JM1.1]  /67 (BP Location: Right arm)  Pulse 77  Temp 98.3  F (36.8  C) (Oral)  Resp 18  Ht 1.778 m (5' 10\")  Wt 85 kg (187 lb 6.4 oz)  SpO2 97%  BMI 26.89 kg/m2[JM1.5]    General:  Pleasant[JM1.1],[JM1.2] resting in bed, in no distress.   HEENT: NC/AT, PER and anicteric, non-injected, oral mucous membranes moist.[JM1.1] Left facial droop, feeding tube in place[JM1.2]  Lungs:[JM1.1] unlabored[JM1.2]  ABD:[JM1.1] soft[JM1.2]  Skin: warm and dry, no obvious lesions[JM1.1] to exposed areas[JM1.2]  MSK:[JM1.1] moving right arm, left arm  without movement[JM1.2]  Mental status: alert, oriented[JM1.1] person and place ( date not assessed)[JM1.2], communicating clearly  Psych:  calm, even mood    Laboratory  Recent Labs   Lab Test  02/27/17   0746  02/26/17   0757   NA  138  137   POTASSIUM  4.1  4.1   CHLORIDE  108  108   CO2  22  20   ANIONGAP  8  9   GLC  301*  264*   BUN  22  22   CR  0.96  0.92   TRENT  8.3*  8.0*     CBC RESULTS:   Recent Labs   Lab Test  02/27/17   0746   WBC  8.4   RBC  3.11*   HGB  9.6*   HCT  28.1*   MCV  90   MCH  30.9   MCHC  34.2   RDW  14.9   PLT  148*       Liver Function Studies -   Recent Labs   Lab Test  02/17/17   0940  08/11/16   0930   PROTTOTAL   --   7.1   ALBUMIN   --   3.7   BILITOTAL   --   0.3   ALKPHOS   --   80   AST   --   13   ALT  29  24       Active Medications  Current Facility-Administered Medications   Medication     acetaminophen (TYLENOL) tablet 650 mg     atorvastatin (LIPITOR) tablet 40 mg     cholecalciferol (vitamin D) tablet 2,000 Units     ferrous gluconate (FERGON) tablet 324 mg     fluticasone (FLONASE) 50 MCG/ACT spray 1-2 spray     hydrochlorothiazide (MICROZIDE) capsule 12.5 mg     lisinopril (PRINIVIL/ZESTRIL) tablet 10 mg     melatonin tablet 1 mg     metoprolol (LOPRESSOR) tablet 50 mg     polyethylene glycol (MIRALAX/GLYCOLAX) powder 17 g     rivaroxaban ANTICOAGULANT " (XARELTO) tablet 20 mg     tamsulosin (FLOMAX) capsule 0.8 mg     glucose 40 % gel 15-30 g    Or     dextrose 50 % injection 25-50 mL    Or     glucagon injection 1 mg     insulin aspart (NovoLOG) inj (RAPID ACTING)     insulin aspart (NovoLOG) inj (RAPID ACTING)     ranitidine (ZANTAC) tablet 150 mg     No current outpatient prescriptions on file.       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 Admitted to ARU for therapies[JM1.3]     Type 2 diabetic: PTA on glimepiride 3 mg daily,   Cycled TF: Isosource 1.5 at 60 cc per hour from 4510-5985[JM1.4] ( total of 127 grams of CHO)[JM1.6]    Plan  -d/c glimepiride[JM1.1] ([JM1.4] too[JM1.6] long acting[JM1.4] of medication with risk of hypoglycemia if not eating[JM1.2])  -NPH[JM1.4] 10 units at the start of TF  -Aspart medium correction every 4 hours  -d/c HS correction  - monitor glucose every 4 hours    Will continue to follow[JM1.6]    Emily LUIS -[id number]  Diabetes Management Team job code: [id number][JM1.1]     Revision History        User Key Date/Time User Provider Type Action    > JM1.5 2/28/2017  4:38 PM Emily Mcclure APRN CNP Nurse Practitioner Sign     JM1.2 2/28/2017  4:25 PM Emily Mcclure APRN CNP Nurse Practitioner      JM1.6 2/28/2017  3:23 PM Emily Mcclure APRN CNP Nurse Practitioner      JM1.4 2/28/2017  3:09 PM Emily Mcclure APRN CNP  "Nurse Practitioner      JM1.3 2/28/2017 11:47 AM Emily Mcclure APRN CNP Nurse Practitioner      JM1.1 2/28/2017  9:09 AM Emily Mcclure APRN CNP Nurse Practitioner                      Progress Notes - Physician (Notes from 03/18/17 through 03/21/17)      Progress Notes by Kevin Bond MD at 3/20/2017  7:11 PM     Author:  Kevin Bond MD Service:  Physical Medicine and Rehabilitation Author Type:  Physician    Filed:  3/20/2017  7:20 PM Date of Service:  3/20/2017  7:11 PM Note Created:  3/20/2017  7:11 PM    Status:  Signed :  Kevin Bodn MD (Physician)         [facility] Acute Rehabilitation Unit  Progress Note    Interval Hx  TCU transfers tomorrow  Some orthostatic hypotension today  Likely not drinking enough on top of nectar thick liquid d/t dysphagia  Will give IV bolus today of 1 L and cont to encourage po fluid intake repeat labs ok  Videoswallow today         Assessment and Plan of Care: This is an 85-year-old male with a past medical history of stroke 9 years ago with some residual left leg weakness with history of chronic ischemic heart disease, prostate cancer, hypertension, hyperlipidemia, type 2 diabetes, chronic kidney disease, atrial fibrillation. Now presents with left hemiparesis, upper extremity, on top of residual left lower extremity weakness with dysarthria, dysphagia, concern for cognitive impairment on top of hard of hearing with impaired mobility, ADLs.    Functionally:  \"Patient presenting with silent aspiration during study that was not immediately idenfied during the live study but noted upon review. Patient presenting with consistent premature spillage to the level of pyriforms and requiring a second swallow to clear remaining residue. When controlled for bolus size and with use of chin tuck patient having flash penetration. Flash penetration also noted with nectar thick liquids though " "signficance of swallow delay and residue not as much as with thin liquids. High level of cueing required to correctly utilize chin tuck maneuver. No difficulties noted with single solid food texture observed. Recommend continue with current NDD-3 food textures and nectar thick liquids. Increased emphasis on use of chin tuck maneuver and use of a double (\"dry\") swallow to improve safety. Ongoing therapy upon discharge to TCU to advance diet as appropriate and for additional referral for repeat VFSS if appropriate. \"  \"Patient presenting with silent aspiration during study that was not immediately idenfied during the live study but noted upon review. Patient presenting with consistent premature spillage to the level of pyriforms and requiring a second swallow to clear remaining residue. When controlled for bolus size and with use of chin tuck patient having flash penetration. Flash penetration also noted with nectar thick liquids though signficance of swallow delay and residue not as much as with thin liquids. High level of cueing required to correctly utilize chin tuck maneuver. No difficulties noted with single solid food texture observed. Recommend continue with current NDD-3 food textures and nectar thick liquids. Increased emphasis on use of chin tuck maneuver and use of a double (\"dry\") swallow to improve safety. Ongoing therapy upon discharge to TCU to advance diet as appropriate and for additional referral for repeat VFSS if appropriate\"  \"PT: Pt with difficulty tolerating standing position today while checking orthostatic vitals. Pt with lightheadedness, fatigue in standing, BP 84/64 (was 136/84 in supine). See vitals section for details. RN and MD notified. PT continues to need cuing and A for safe positioning and efficient use with transfers and scooting in bed. Pt needing Karey with wc mobility due to decreased awareness L side, decreased scanning. Cues to locate room on way back from loop with wc " "propulsion. \"  \"SLP--VFSS scheduled for 3/20/17 to rule out silent aspiration and assess upgrade to thin liquids prior to discharge on Tuesday. Family was present and educated regarding VFSS process and purpose. \"  Continue therapies and plan of care.      Overall Management:   - optimize secondary stroke management, on xarelto for anticoagulation, BP management, DM / BS control, lipid management  - HTN, on HCTZ, metoprolol and lisinopril increase dose 3/1 and 3/3, has prn hydralazine, added HCTZ 3/6, adjust as needed  - DM / BS, endocrinology managing BS control and adjusting management as needed   - HLD, on lipitor  - on vit D, ferrous gluconate, flonase  - melatonin for sleep  - L ankle swelling, some pain, reports twisting ankle, able to move aROM on own w/o sig discomfort, fx suspicion low, will do ice pack and voltaren gel for now improved, monitor   - spasticity on L UE, cont ROM and stretching, started on baclofen 5 mg tid, may need to further titrate   - dietitian following with terrence counts now off TF since 3/7 overnight, follow po intake, d/fortino nasal feeding tube 3/8  - at risk for dehydration d/t restricted po on thickened liquids, i/o monitoring, encourage fluids, IVF supplementation done on 3/13 and on 3/20 with improvement    Bladder and Bowel - monitor spont voids and BM, on flomax decreased to 0.4 mg 3/20 as may contribute to orthostatis, miralax prn  GI ppx - on zantac  DVT ppx - optimize mech ppx with PCDs and antiembolism stockings, on xarelto, progressive mobilization         Active Diet Order      Diet      Dysphagia Diet Level 3 Advanced Nectar Thickened Liquids (pre-thickened or use instant food thickener)    Labs  Lab Results   Component Value Date    WBC 5.8 03/20/2017     Lab Results   Component Value Date    RBC 3.70 03/20/2017     Lab Results   Component Value Date    HGB 11.3 03/20/2017     Lab Results   Component Value Date    HCT 33.7 03/20/2017     No components found for: MCT  Lab " Results   Component Value Date    MCV 91 03/20/2017     Lab Results   Component Value Date    MCH 30.5 03/20/2017     Lab Results   Component Value Date    MCHC 33.5 03/20/2017     Lab Results   Component Value Date    RDW 14.2 03/20/2017     Lab Results   Component Value Date     03/20/2017       Last Basic Metabolic Panel:  Lab Results   Component Value Date     03/20/2017      Lab Results   Component Value Date    POTASSIUM 4.3 03/20/2017     Lab Results   Component Value Date    CHLORIDE 104 03/20/2017     Lab Results   Component Value Date    TRENT 8.9 03/20/2017     Lab Results   Component Value Date    CO2 25 03/20/2017     Lab Results   Component Value Date    BUN 23 03/20/2017     Lab Results   Component Value Date    CR 1.08 03/20/2017     Lab Results   Component Value Date     03/20/2017       Videoswallow 3/20  Impression: Deep laryngeal penetration with thin consistency barium  improved by chin tuck maneuver. Transient penetration with nectar  consistency barium. Please see the speech pathologist report for  further details    Medications:  Current Facility-Administered Medications   Medication     diclofenac (VOLTAREN) 1 % topical gel 2 g     melatonin tablet 3 mg     baclofen (LIORESAL) half-tab 5 mg     tamsulosin (FLOMAX) capsule 0.4 mg     hydrochlorothiazide (MICROZIDE) capsule 12.5 mg     sodium chloride (PF) 0.9% PF flush 3 mL     sodium chloride (PF) 0.9% PF flush 3 mL     lidocaine (LIDODERM) 5 % Patch 1-2 patch     lidocaine (LIDODERM) patch REMOVAL     lidocaine (LIDODERM) Patch in Place     glipiZIDE (GLUCOTROL) half-tab 7.5 mg     insulin glargine (LANTUS) injection 10 Units     insulin aspart (NovoLOG) inj (RAPID ACTING)     hydrALAZINE (APRESOLINE) tablet 10 mg     insulin aspart (NovoLOG) inj (RAPID ACTING)     lisinopril (PRINIVIL/ZESTRIL) tablet 20 mg     miconazole (MICATIN; MICRO GUARD) 2 % powder     acetaminophen (TYLENOL) tablet 650 mg     atorvastatin (LIPITOR)  "tablet 40 mg     cholecalciferol (vitamin D) tablet 2,000 Units     ferrous gluconate (FERGON) tablet 324 mg     fluticasone (FLONASE) 50 MCG/ACT spray 1-2 spray     metoprolol (LOPRESSOR) tablet 50 mg     polyethylene glycol (MIRALAX/GLYCOLAX) powder 17 g     rivaroxaban ANTICOAGULANT (XARELTO) tablet 20 mg     glucose 40 % gel 15-30 g    Or     dextrose 50 % injection 25-50 mL    Or     glucagon injection 1 mg     ranitidine (ZANTAC) tablet 150 mg         Physical Examination:   /55 (BP Location: Left arm)  Pulse 55  Temp 96.7  F (35.9  C) (Oral)  Resp 18  Ht 1.778 m (5' 10\")  Wt 83.4 kg (183 lb 14.4 oz)  SpO2 99%  BMI 26.39 kg/o8Rxepb, in room, about to eat lunch  In bed, awake, and pleasant   Conversant and cooperative   R pupil deformed - previously documented   L hand and toe nails with fungal infection changes appears chronic   L UE MAS 2/4 on L elbow flexors and pronators  L LE with antigravity hip flex and knee ext but limited range 3-/5  Limited antigravity ankle DF / PF with limited range   Left neglect improving       More than 25 minutes spent with at least half on care coordination[RS1.1]         Revision History        User Key Date/Time User Provider Type Action    > RS1.1 3/20/2017  7:20 PM Kevin Bond MD Physician Sign            Progress Notes by Rosa Meeks LSW at 3/20/2017  2:50 PM     Author:  Rosa Meeks LSW Service:  (none) Author Type:      Filed:  3/20/2017  4:47 PM Date of Service:  3/20/2017  2:50 PM Note Created:  3/20/2017  2:50 PM    Status:  Signed :  Rosa Meeks LSW ()         Discharge planning  D/I:  Patient has been confirmed by Matthew at [facility] [phone], fax [phone] to be admitted there on Tuesday 3/21.  Writer spoke with patient about having transportation arranged which he agrees.  Writer set up [facility] (borrowing wheelchair) for  Tuesday at 1:15.  Updated patient[LH1.1] and daughter, Jazmin, " by phone[LH1.2] about transport.[LH1.1]  Completed/faxed PAS to [facility], provided fax cover sheet to [facility] for faxing orders on 3/21.  Reviewed Important Message from Medicare which patient understood and signed.[LH1.3]  A/P:  SW[LH1.1] following[LH1.3].[LH1.1]     Revision History        User Key Date/Time User Provider Type Action    > LH1.2 3/20/2017  4:47 PM Held, SAL [place]  Sign     LH1.3 3/20/2017  4:43 PM Held, SAL [place]       LH1.1 3/20/2017  2:50 PM Held, SAL [place]              Progress Notes by Yvette Burks RD at 3/20/2017  2:09 PM     Author:  Yvette Burks RD Service:  Nutrition Author Type:  Registered Dietitian    Filed:  3/20/2017  4:03 PM Date of Service:  3/20/2017  2:09 PM Note Created:  3/20/2017  2:09 PM    Status:  Signed :  Yvette Burks RD (Registered Dietitian)         CLINICAL NUTRITION SERVICES - REASSESSMENT NOTE     Nutrition Prescription    RECOMMENDATIONS FOR MDs/PROVIDERS TO ORDER:[MR1.1]  None at this time.[MR1.2]    Malnutrition Status:[MR1.1]    Patient does not meet two of the above criteria necessary for diagnosing malnutrition[MR1.2]    Recommendations already ordered by Registered Dietitian (RD):[MR1.1]  -[KR1.1]Continue with DD3 + NTL per SLP recommendation.[MR1.2]    Future/Additional Recommendations:[MR1.1]  -[KR1.1]Continue to encourage PO food and fluid intake. If intake falls below 75%, would consider additional supplements to meet nutrition needs[MR1.2].  -Continued diet advancement as medically appropriate per SLP evaluations.[KR1.1]     EVALUATION OF THE PROGRESS TOWARD GOALS   Diet: DD3 + NTL, NT Cranberry juice TID  Nutrition Support: Magic Cup with lunch and dinner  Intake:[MR1.1] Per RN and Aide, patient tolerating DD3 + NTL without N/V/abdominal pain.     -Per Healtht[facility]: 7 day average intake of 2463 kcal (35 kcal/kg) and 72 gm protein (1 gm/kg) per dosing wt of 71 kg, which meets  100% of estimated energy and protein needs.[MR1.2]   -P[KR1.1]t consuming[MR1.2] % of[MR1.1] most recorded meals over past week per nursing documentation-occasionally 50% (x 2 meals).[KR1.1]       NEW FINDINGS   -Per chart review pt with hypotension during therapy x2 days and Resident ordered continuous  mL/hr for 8 hrs[MR1.1]. Last IVF received 3/18/17  - Per SLP note:[MR1.2] pt noted to have silent aspiration on VFSS-recommend continue with NDD3/Nectar Thick Liquids.[KR1.1]    MALNUTRITION  % Intake: No decreased intake noted  % Weight Loss: Unable to assess - last weight from 3/12, however[MR1.1],[KR1.1] wt stable for 7 days prior   Subcutaneous Fat Loss: None observed  Muscle Loss: Thoracic region (clavicle, acromium bone, deltoid, trapezius, pectoral):  mild  Fluid Accumulation/Edema: None noted  Malnutrition Diagnosis:[MR1.1] Patient does not meet two of the above criteria necessary for diagnosing malnutrition[MR1.2]    Previous Goals   Patient to consume % of nutritionally adequate meal trays TID, or the equivalent with supplements/snacks.  Evaluation:[MR1.1] Met[MR1.2]    Pt to remain hydration via POs alone  Evaluation:[MR1.1] Not met[MR1.2]    Previous Nutrition Diagnosis  Inadequate oral intake related to swallowing difficulties, ?reduced appetite as evidenced by terrence cts meeting <70% est needs however anticipate improvements in PO since cts taken and requiring IVF to meet hydration over past week   Evaluation:[MR1.1] Improving[MR1.2]    CURRENT NUTRITION DIAGNOSIS[MR1.1]  Predicted inadequate nutrient intake (energy/protein/fluid)[MR1.2] related to[MR1.1] swallowing difficulties and need for IVF to maintain hydration.[MR1.2]    INTERVENTIONS  Implementation[MR1.1]  Collaboration with other providers - discussed pt with RN and Aide[MR1.2]    Ordered weight recheck.[KR1.2]      Goals[MR1.1]  1. Patient to consume % of nutritionally adequate meal trays TID, or the equivalent with  supplements/snacks.  2. Pt to maintain adequate hydration by PO[MR1.2] alone[KR1.1].[MR1.2]    Monitoring/Evaluation  Progress toward goals will be monitored and evaluated per protocol.      Evan Kenny Dietetic Intern  Pager: [phone][MR1.1]    I have reviewed and agree with above nutrition assessment and plan of care.    Yvette Burks RD, LD[KR1.1]           Revision History        User Key Date/Time User Provider Type Action    > KR1.1 3/20/2017  4:03 PM Yvette Burks RD Registered Dietitian Sign     KR1.2 3/20/2017  3:54 PM Yvette Burks RD Registered Dietitian      MR1.2 3/20/2017  3:44 PM Evan Kenny Dietetic Intern Pend     MR1.1 3/20/2017  2:09 PM Evan Kenny Intern             Progress Notes by Socrates Mcmullen SLP at 3/20/2017  3:27 PM     Author:  Socrates Mcmullen SLP Service:  (none) Author Type:  Speech Therapist    Filed:  3/20/2017  3:27 PM Date of Service:  3/20/2017  3:27 PM Note Created:  3/20/2017  3:27 PM    Status:  Signed :  Socrates Mcmullen SLP (Speech Therapist)            03/20/17 1500   General Information   Onset Date 02/20/17   Start of Care Date 03/20/17   Referring Physician Dr. Bond   Patient Profile Review/OT: Additional Occupational Profile Info See Profile for full history and prior level of function   Patient/Family Goals Statement get back to thin liquids   Swallowing Evaluation Videofluoroscopic evaluation   Behaviorial Observations WFL (within functional limits)   Mode of current nutrition Oral diet   Type of oral diet Dysphagia diet level 3;Nectar - thick liquid   Respiratory Status Room air   Comments has been seen by SLP during course of ARU stay. Overall positive improvement noted in level of awareness and engagement. Swallowing function has improved and diet upgraded to NDD-3 and NTL. Found helpful strategies to include more frequent drinks ensure dentures are placed and making sure patient upright and alert for all PO intake.  "   VFSS Eval: Thin Liquid Texture Trial   Mode of Presentation, Thin Liquid cup;spoon;self-fed   Order of Presentation 1, 2, 3, 4, 9, 10, 11, 12   Preparatory Phase Poor bolus control   Oral Phase, Thin Liquid Premature pharyngeal entry   Pharyngeal Phase, Thin Liquid Delayed swallow reflex;Residue in valleculae;Residue in pyriform sinus   Rosenbek's Penetration Aspiration Scale: Thin Liquid Trial Results 8 - contrast passes glottis, visible subglottic residue remains, absent patient response (aspiration)   Response to Aspiration absent response, silent aspiration   Successful Strategies Trialed During Procedure, Thin Liquid chin tuck;other (see comments)  (double swallow)   Diagnostic Statement Patient with normal sized single swallow having silent aspiration not immediately identified during the study but observed on frame #230 of series #2. Aspiration was fully silent. Subsequent swallows controlled quantities to approximate teaspoon sized boluses. Flash penetration noted with use of chin tuck maneuver. Persistant delay in swallow trigger to the level of the pyriforms with base of tongue residue also evident with patient benefitting from a second \"dry swallow\" to clear the residue.    VFSS Eval: Nectar Thick Liquid Texture Trial   Mode of Presentation, Nectar cup;self-fed   Order of Presentation 5, 6, 7   Preparatory Phase WFL   Oral Phase, Nectar Premature pharyngeal entry   Pharyngeal Phase, Nectar Delayed swallow reflex;Residue in valleculae;Residue in pyriform sinus   Rosenbek's Penetration Aspiration Scale: Nectar-Thick Liquid Trial Results 2 - contrast enters airway, remains above the vocal cords, no residue remains (penetration)   Diagnostic Statement Flash penetration noted with swallows of NTL without any use off compensatory swallowing strategies. Very mild amount of vallecular and pyriform residue. Severity of swallow trigger delay not as severe.    VFSS Eval: Solid Food Texture Trial   Mode of " Presentation, Solid spoon;self-fed   Order of Presentation 8   Preparatory Phase WFL   Oral Phase, Solid Premature pharyngeal entry   Pharyngeal Phase, Solid WFL   Rosenbek's Penetration Aspiration Scale: Solid Food Trial Results 1 - no aspiration, contrast does not enter airway   Diagnostic Statement Tolerated solid food texture without any significant difficulties noted.    Esophageal Phase of Swallow   Patient reports or presents with symptoms of esophageal dysphagia No   Esophageal comments No deficits noted on upper esphagus and no complaints of discomfort.   General Therapy Interventions   Planned Therapy Interventions Dysphagia Treatment   Dysphagia treatment Compensatory strategies for swallowing   Swallow Eval: Clinical Impressions   Skilled Criteria for Therapy Intervention Skilled criteria met.  Treatment indicated.   Functional Assessment Scale (FAS) 4   Treatment Diagnosis mild-moderate oropharyngeal dysphagia   Diet texture recommendations Dysphagia diet level 3;Nectar thick liquids   Recommended Feeding/Eating Techniques alternate between small bites and sips of food/liquid;maintain upright posture during/after eating for 30 mins;small sips/bites;tuck chin during every swallow   Therapy Frequency daily   Predicted Duration of Therapy Intervention (days/wks) Ongoing upon facility discharge on 3/21   Anticipated Discharge Disposition inpatient rehabilitation facility   Risks and Benefits of Treatment have been explained. Yes   Patient, family and/or staff in agreement with Plan of Care Yes   Clinical Impression Comments Patient presenting with silent aspiration during study that was not immediately idenfied during the live study but noted upon review. Patient presenting with consistent premature spillage to the level of pyriforms and requiring a second swallow to clear remaining residue. When controlled for bolus size and with use of chin tuck patient having flash penetration. Flash penetration also  "noted with nectar thick liquids though signficance of swallow delay and residue not as much as with thin liquids. High level of cueing required to correctly utilize chin tuck maneuver. No difficulties noted with single solid food texture observed. Recommend continue with current NDD-3 food textures and nectar thick liquids. Increased emphasis on use of chin tuck maneuver and use of a double (\"dry\") swallow to improve safety. Ongoing therapy upon discharge to TCU to advance diet as appropriate and for additional referral for repeat VFSS if appropriate.    Total Evaluation Time   Total Evaluation Time (Minutes) 30[EG1.1]        Revision History        User Key Date/Time User Provider Type Action    > EG1.1 3/20/2017  3:27 PM Socrates Mcmullen, SLP Speech Therapist Sign            Progress Notes by Zak Fontanez MD at 3/19/2017 12:42 PM     Author:  Zak Fontanez MD Service:  Physical Medicine and Rehabilitation Author Type:  Resident    Filed:  3/19/2017 12:45 PM Date of Service:  3/19/2017 12:42 PM Note Created:  3/19/2017 12:42 PM    Status:  Attested :  Zak Fontanez MD (Resident)    Cosigner:  Rupert Rodriguez MD at 3/19/2017  6:35 PM        Attestation signed by Rupert Rodriguez MD at 3/19/2017  6:35 PM        Attestation:  Physician Attestation   IRupert, personally examined and evaluated this patient.  I discussed the patient with the resident and care team, and agree with the assessment and plan of care as documented in the resident s note.      I personally reviewed vital signs and medications.    Key findings: persisting difficulties with lower blood pressures. Some seems more random though has orthostatic component. Had lowered HCTZ. Will decrease tamsulosin dose form 0.8 to 0.4 as that in particular can exacerbate orhtostasis.  Follow for increased difficulties with voiding.  Starting low dose baclofen.    Rupert Rodriguez  Date of Service (when I saw the patient): 03/19/17      "                          [facility], [place]   Physical Medicine and Rehabilitation Daily Note         Assessment and Plan of Care:   Mr. 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 Fib who was admitted to the ARU on 2/27 after having a right MCA stroke. His new deficits include dysphagia, dysarthria, left sided weakness and coordination difficulties.    -Continue therapies and plan of care  -Encouraing PO fluids  -BP improved but still somewhat variable, orthostatics ordered  -Will start baclofen at 5mg TID as he has discomfort from spasticity in the left calf and left arm. Team to monitor for fatigue.          Interval History:   Elias Mckee was seen and examined at bedside. He is feeling well today, participated in therapies this morning without dizziness or fatigue.     Discussed muscle stiffness and cramping and he stats that this has been painful in his left arm and left leg. Discussed a trail of baclofen and he was agreeable to this.    Denies new weakness or numbness. Denies headaches, fevers, chills, vision changes, chest pain, dyspnea or nausea.         Physical Exam:     Vitals:    03/19/17 0700 03/19/17 0900 03/19/17 1019 03/19/17 1216   BP: 119/74 92/46 90/46 138/70   BP Location: Right arm Right arm Right arm Right arm   Pulse: 63      Resp: 16 16     Temp: 97.4  F (36.3  C) 96.8  F (36  C)     TempSrc: Oral Oral     SpO2:  94%     Weight:       Height:         General: awake, alert, cooperative, no apparent distress.  Pulmonary: Clear to auscultation bilaterally.  Cardiovascular: Regular rate and rhythm.  Abdominal: Normal bowel sounds, soft, non-distended, non-tender.  Neurologic: Awake, alert. Left sided hemiparesis. Spasticity noted at the left wrist, left elbow (flexion) and left ankle (dorsiflexion) 1-2/4 on MAS scale.         Data:   Scheduled meds    melatonin  3  mg Oral At Bedtime     hydrochlorothiazide  12.5 mg Oral Daily     sodium chloride (PF)  3 mL Intracatheter Q8H     lidocaine  1-2 patch Transdermal Q24H     lidocaine   Transdermal Q24h     lidocaine   Transdermal Q8H     glipiZIDE  7.5 mg Oral Daily with breakfast     tamsulosin  0.8 mg Oral Daily     insulin glargine  10 Units Subcutaneous Q24H     insulin aspart  1-5 Units Subcutaneous At Bedtime     insulin aspart  1-7 Units Subcutaneous TID AC     lisinopril  20 mg Oral BID     miconazole   Topical BID     diclofenac   Topical 4x Daily     atorvastatin  40 mg Oral or Feeding Tube Daily     cholecalciferol  2,000 Units Oral Daily     ferrous gluconate  324 mg Oral Daily with breakfast     fluticasone  1-2 spray Both Nostrils Daily     metoprolol  50 mg Oral BID     rivaroxaban ANTICOAGULANT  20 mg Oral Daily with supper     ranitidine  150 mg Oral BID       PRN meds:  sodium chloride (PF), hydrALAZINE, acetaminophen, polyethylene glycol, glucose **OR** dextrose **OR** glucagon    Zak Fontaenz DO  Resident Physician, PGY4  Physical Medicine and Rehabilitation Service      Staffed with Dr. Rodriguez[RN1.1]     Revision History        User Key Date/Time User Provider Type Action    > RN1.1 3/19/2017 12:45 PM Zak oFntanez MD Resident Sign            Progress Notes by Palma Zapata PT at 3/19/2017  9:58 AM     Author:  Palma Zapata PT Service:  (none) Author Type:  Physical Therapist    Filed:  3/19/2017 10:02 AM Date of Service:  3/19/2017  9:58 AM Note Created:  3/19/2017  9:58 AM    Status:  Signed :  Palma Zapata PT (Physical Therapist)           Skilled set up on :  Pt dependent for proper placement of electrodes on L gastroc, anterior tibialis, quad, glutes, hams for optimum muscle contraction, safe positioning on w/c within frame and cushion for prevention of skin breakdown, feet secured to foot pedals, w/c secured to  w/ Q-straints.  Passive motion assessed to ensure  proper positioning.  Determined appropriate FES parameters for each muscle group based off of strong tetanic response of muscle test. Adjusted L quad and gastroc for PW and intensity due to decreased comfort at one point.     Pt performed 14 minutes of active FES ergometry with max = min stimulation applied to above muscles at  35 pm with 0.50nm resistance.  This PT adjusted e-stim and cycling parameters in real-time to ensure palpable muscle contractions throughout session.  Please see [email or web address] for further details on patient's stimulation parameters and ergometry outcomes.  Patient ID:[id number], PIN: [id number].[PF1.1]       Revision History        User Key Date/Time User Provider Type Action    > PF1.1 3/19/2017 10:02 AM Palma Zapata, PT Physical Therapist Sign            Progress Notes by Zak Fontanez MD at 3/18/2017 12:52 PM     Author:  Zak Fontanez MD Service:  Physical Medicine and Rehabilitation Author Type:  Resident    Filed:  3/18/2017  4:21 PM Date of Service:  3/18/2017 12:52 PM Note Created:  3/18/2017 12:52 PM    Status:  Attested :  Zak Fontanez MD (Resident)    Cosigner:  Rupert Rodriguez MD at 3/18/2017  8:45 PM        Attestation signed by Rupert Rodriguez MD at 3/18/2017  8:45 PM        Attestation:  Physician Attestation   IRupert, personally examined and evaluated this patient.  I discussed the patient with the resident and care team, and agree with the assessment and plan of care as documented in the resident s note.      I personally reviewed vital signs.medications    Key findings: some lower pressures still occur though other times feeling better. Will give some additional fluids. Stop HCTZ. Continue to encourage PO fluids. Cognition and low initiation needing this to put a glass in his hand and encourage drink, not just mention to him and leave.  PASS 19/36, increased from 14.  Rupert Rodriguez  Date of Service (when I saw the patient): 03/18/17                                [facility], [place]   Physical Medicine and Rehabilitation Daily Note         Assessment and Plan of Care:   Mr. 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 Fib who was admitted to the ARU on 2/27 after having a right MCA stroke. His new deficits include dysphagia, dysarthria, left sided weakness and coordination difficulties.    -Continue therapies and plan of care  -Ordered 1 L NaCl 0.9% to run at 125ml/hr  -Patient was seen later in the day and was feeling much better, follow up BP[RN1.1] much improved[RN1.2]  -Decreased HCTZ to 12.5mg as he has been having hypotensive blood pressure readings over the last few days. Will continue to monitor bp closely[RN1.1] and consider DC'ing the HCTZ.[RN1.2]         Interval History:   Elias Mckee was seen and examined at bedside. He states that he was feeling dizzy and fatigued after therapy. His blood presure was checked and found to be hypotensive. Discussed if he was drinking enough fluids, he states that he has been trying but it was difficult with the thickened liquids. He was agreeable to getting some IV fluids today.     Denies new weakness or numbness. Denies headaches, fevers, chills, vision changes, chest pain, dyspnea or nausea.         Physical Exam:[RN1.1]     Vitals:    03/18/17 1130 03/18/17 1132 03/18/17 1136 03/18/17 1534   BP: (!) 67/38 118/47 (!) 81/29 104/50   BP Location: Right arm Right arm Right arm Right arm   Pulse:    68   Resp:    16   Temp:    97.9  F (36.6  C)   TempSrc:    Oral   SpO2:    96%   Weight:       Height:[RN1.2]         General: awake, alert, cooperative, no apparent distress, and appears stated age  Pulmonary: No increased work of breathing, good air exchange, clear to auscultation bilaterally.  Cardiovascular: Regular rate and rhythm.  Abdominal: Normal bowel sounds, soft,  non-distended, non-tender.  Neurologic: Awake, alert. Left facial droop noted.          Data:   Scheduled meds[RN1.1]    [START ON 3/19/2017] hydrochlorothiazide  12.5 mg Oral Daily     lidocaine  1-2 patch Transdermal Q24H     lidocaine   Transdermal Q24h     lidocaine   Transdermal Q8H     glipiZIDE  7.5 mg Oral Daily with breakfast     tamsulosin  0.8 mg Oral Daily     insulin glargine  10 Units Subcutaneous Q24H     insulin aspart  1-5 Units Subcutaneous At Bedtime     insulin aspart  1-7 Units Subcutaneous TID AC     lisinopril  20 mg Oral BID     miconazole   Topical BID     diclofenac   Topical 4x Daily     atorvastatin  40 mg Oral or Feeding Tube Daily     cholecalciferol  2,000 Units Oral Daily     ferrous gluconate  324 mg Oral Daily with breakfast     fluticasone  1-2 spray Both Nostrils Daily     melatonin  1 mg Oral At Bedtime     metoprolol  50 mg Oral BID     rivaroxaban ANTICOAGULANT  20 mg Oral Daily with supper     ranitidine  150 mg Oral BID[RN1.2]       PRN meds:[RN1.1]  hydrALAZINE, acetaminophen, polyethylene glycol, glucose **OR** dextrose **OR** glucagon[RN1.2]    Zak Fontanez DO  Resident Physician, PGY4  Physical Medicine and Rehabilitation Service      Staffed with Dr. Rodriguez[RN1.1]     Revision History        User Key Date/Time User Provider Type Action    > RN1.2 3/18/2017  4:21 PM Zak Fontanez MD Resident Sign     RN1.1 3/18/2017 12:52 PM Zak Fontanez MD Resident             Progress Notes by Palma Zapata PT at 3/18/2017  9:28 AM     Author:  Palma Zapata PT Service:  (none) Author Type:  Physical Therapist    Filed:  3/18/2017 10:08 AM Date of Service:  3/18/2017  9:28 AM Note Created:  3/18/2017  9:28 AM    Status:  Signed :  Palma Zapata PT (Physical Therapist)         Postural Assessment Scale for Stroke    Maintaining a posture  1. Sitting without support:3  2. Standing with support:[PF1.1]3[PF1.2]  3. Standing without  support:[PF1.1]1[PF1.2]  4. Standing on nonparetic leg:[PF1.1]0[PF1.2]  5. Standing on paretic le    Changing posture  6. Supine to affected side lateral:[PF1.1]3[PF1.2]  7. Supine to nonaffected side lateral:[PF1.1]1[PF1.2]  8. Supine to sitting up on the edge of the table:[PF1.1]1[PF1.2]  9. Sitting on the edge of the table to supine:[PF1.1]1[PF1.2]  10. Sitting to standing up:[PF1.1]2[PF1.2]  11. Standing up to sitting down:[PF1.1]2[PF1.2]  12. Standing, picking up a pencil from the floor:[PF1.1]2[PF1.2]    Total[PF1.1] 19[PF1.3]: /36[PF1.1]       Revision History        User Key Date/Time User Provider Type Action    > PF1.3 3/18/2017 10:08 AM Palma Zapata, PT Physical Therapist Sign     PF1.2 3/18/2017  9:39 AM Palma Zapata, PT Physical Therapist      PF1.1 3/18/2017  9:28 AM Palma Zapata, PT Physical Therapist                   Procedure Notes     No notes of this type exist for this encounter.         Progress Notes - Therapies (Notes from 17 through 17)      Progress Notes by Socrates Mcmullen SLP at 3/20/2017  3:27 PM     Author:  Socrates Mcmullen SLP Service:  (none) Author Type:  Speech Therapist    Filed:  3/20/2017  3:27 PM Date of Service:  3/20/2017  3:27 PM Note Created:  3/20/2017  3:27 PM    Status:  Signed :  Socrates Mcmullen SLP (Speech Therapist)            17 1500   General Information   Onset Date 17   Start of Care Date 17   Referring Physician Dr. Bond   Patient Profile Review/OT: Additional Occupational Profile Info See Profile for full history and prior level of function   Patient/Family Goals Statement get back to thin liquids   Swallowing Evaluation Videofluoroscopic evaluation   Behaviorial Observations WFL (within functional limits)   Mode of current nutrition Oral diet   Type of oral diet Dysphagia diet level 3;Nectar - thick liquid   Respiratory Status Room air   Comments has been seen by SLP during course of ARU stay. Overall  "positive improvement noted in level of awareness and engagement. Swallowing function has improved and diet upgraded to NDD-3 and NTL. Found helpful strategies to include more frequent drinks ensure dentures are placed and making sure patient upright and alert for all PO intake.    VFSS Eval: Thin Liquid Texture Trial   Mode of Presentation, Thin Liquid cup;spoon;self-fed   Order of Presentation 1, 2, 3, 4, 9, 10, 11, 12   Preparatory Phase Poor bolus control   Oral Phase, Thin Liquid Premature pharyngeal entry   Pharyngeal Phase, Thin Liquid Delayed swallow reflex;Residue in valleculae;Residue in pyriform sinus   Rosenbek's Penetration Aspiration Scale: Thin Liquid Trial Results 8 - contrast passes glottis, visible subglottic residue remains, absent patient response (aspiration)   Response to Aspiration absent response, silent aspiration   Successful Strategies Trialed During Procedure, Thin Liquid chin tuck;other (see comments)  (double swallow)   Diagnostic Statement Patient with normal sized single swallow having silent aspiration not immediately identified during the study but observed on frame #230 of series #2. Aspiration was fully silent. Subsequent swallows controlled quantities to approximate teaspoon sized boluses. Flash penetration noted with use of chin tuck maneuver. Persistant delay in swallow trigger to the level of the pyriforms with base of tongue residue also evident with patient benefitting from a second \"dry swallow\" to clear the residue.    VFSS Eval: Nectar Thick Liquid Texture Trial   Mode of Presentation, Nectar cup;self-fed   Order of Presentation 5, 6, 7   Preparatory Phase WFL   Oral Phase, Nectar Premature pharyngeal entry   Pharyngeal Phase, Nectar Delayed swallow reflex;Residue in valleculae;Residue in pyriform sinus   Rosenbek's Penetration Aspiration Scale: Nectar-Thick Liquid Trial Results 2 - contrast enters airway, remains above the vocal cords, no residue remains (penetration) "   Diagnostic Statement Flash penetration noted with swallows of NTL without any use off compensatory swallowing strategies. Very mild amount of vallecular and pyriform residue. Severity of swallow trigger delay not as severe.    VFSS Eval: Solid Food Texture Trial   Mode of Presentation, Solid spoon;self-fed   Order of Presentation 8   Preparatory Phase WFL   Oral Phase, Solid Premature pharyngeal entry   Pharyngeal Phase, Solid WFL   Rosenbek's Penetration Aspiration Scale: Solid Food Trial Results 1 - no aspiration, contrast does not enter airway   Diagnostic Statement Tolerated solid food texture without any significant difficulties noted.    Esophageal Phase of Swallow   Patient reports or presents with symptoms of esophageal dysphagia No   Esophageal comments No deficits noted on upper esphagus and no complaints of discomfort.   General Therapy Interventions   Planned Therapy Interventions Dysphagia Treatment   Dysphagia treatment Compensatory strategies for swallowing   Swallow Eval: Clinical Impressions   Skilled Criteria for Therapy Intervention Skilled criteria met.  Treatment indicated.   Functional Assessment Scale (FAS) 4   Treatment Diagnosis mild-moderate oropharyngeal dysphagia   Diet texture recommendations Dysphagia diet level 3;Nectar thick liquids   Recommended Feeding/Eating Techniques alternate between small bites and sips of food/liquid;maintain upright posture during/after eating for 30 mins;small sips/bites;tuck chin during every swallow   Therapy Frequency daily   Predicted Duration of Therapy Intervention (days/wks) Ongoing upon facility discharge on 3/21   Anticipated Discharge Disposition inpatient rehabilitation facility   Risks and Benefits of Treatment have been explained. Yes   Patient, family and/or staff in agreement with Plan of Care Yes   Clinical Impression Comments Patient presenting with silent aspiration during study that was not immediately idenfied during the live study but  "noted upon review. Patient presenting with consistent premature spillage to the level of pyriforms and requiring a second swallow to clear remaining residue. When controlled for bolus size and with use of chin tuck patient having flash penetration. Flash penetration also noted with nectar thick liquids though signficance of swallow delay and residue not as much as with thin liquids. High level of cueing required to correctly utilize chin tuck maneuver. No difficulties noted with single solid food texture observed. Recommend continue with current NDD-3 food textures and nectar thick liquids. Increased emphasis on use of chin tuck maneuver and use of a double (\"dry\") swallow to improve safety. Ongoing therapy upon discharge to TCU to advance diet as appropriate and for additional referral for repeat VFSS if appropriate.    Total Evaluation Time   Total Evaluation Time (Minutes) 30[EG1.1]        Revision History        User Key Date/Time User Provider Type Action    > EG1.1 3/20/2017  3:27 PM Socrates Mcmullen, SLP Speech Therapist Sign            Progress Notes by Palma Zapata PT at 3/19/2017  9:58 AM     Author:  Palma Zapata PT Service:  (none) Author Type:  Physical Therapist    Filed:  3/19/2017 10:02 AM Date of Service:  3/19/2017  9:58 AM Note Created:  3/19/2017  9:58 AM    Status:  Signed :  Palma Zapata PT (Physical Therapist)           Skilled set up on :  Pt dependent for proper placement of electrodes on L gastroc, anterior tibialis, quad, glutes, hams for optimum muscle contraction, safe positioning on w/c within frame and cushion for prevention of skin breakdown, feet secured to foot pedals, w/c secured to  w/ Q-straints.  Passive motion assessed to ensure proper positioning.  Determined appropriate FES parameters for each muscle group based off of strong tetanic response of muscle test. Adjusted L quad and gastroc for PW and intensity due to decreased comfort at one point. "     Pt performed 14 minutes of active FES ergometry with max = min stimulation applied to above muscles at  35 pm with 0.50nm resistance.  This PT adjusted e-stim and cycling parameters in real-time to ensure palpable muscle contractions throughout session.  Please see [email or web address] for further details on patient's stimulation parameters and ergometry outcomes.  Patient ID:[id number], PIN: [id number].[PF1.1]       Revision History        User Key Date/Time User Provider Type Action    > PF1.1 3/19/2017 10:02 AM Palma Zapata PT Physical Therapist Sign            Progress Notes by Palma Zapata PT at 3/18/2017  9:28 AM     Author:  Palma Zapata PT Service:  (none) Author Type:  Physical Therapist    Filed:  3/18/2017 10:08 AM Date of Service:  3/18/2017  9:28 AM Note Created:  3/18/2017  9:28 AM    Status:  Signed :  Palma Zapata PT (Physical Therapist)         Postural Assessment Scale for Stroke    Maintaining a posture  1. Sitting without support:3  2. Standing with support:[PF1.1]3[PF1.2]  3. Standing without support:[PF1.1]1[PF1.2]  4. Standing on nonparetic leg:[PF1.1]0[PF1.2]  5. Standing on paretic le    Changing posture  6. Supine to affected side lateral:[PF1.1]3[PF1.2]  7. Supine to nonaffected side lateral:[PF1.1]1[PF1.2]  8. Supine to sitting up on the edge of the table:[PF1.1]1[PF1.2]  9. Sitting on the edge of the table to supine:[PF1.1]1[PF1.2]  10. Sitting to standing up:[PF1.1]2[PF1.2]  11. Standing up to sitting down:[PF1.1]2[PF1.2]  12. Standing, picking up a pencil from the floor:[PF1.1]2[PF1.2]    Total[PF1.1] 19[PF1.3]: /36[PF1.1]       Revision History        User Key Date/Time User Provider Type Action    > PF1.3 3/18/2017 10:08 AM Palma Zapata, PT Physical Therapist Sign     PF1.2 3/18/2017  9:39 AM Palma Zapata, PT Physical Therapist      PF1.1 3/18/2017  9:28 AM Palma Zapata, PT Physical Therapist

## 2017-02-27 NOTE — PLAN OF CARE
Problem: Goal Outcome Summary  Goal: Goal Outcome Summary  Outcome: Adequate for Discharge Date Met:  02/27/17  A&O, forgetful. 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 Slurred speech, L facial droop. R pupil irregular and fixed-baseline. VSS. Tele SR with 1st degree AV block. DD2 diet with nectar-thick liquids. TF to run [id number], 60ml free water flush q4h. Up with assist of 2, GB, walker or sera-steady. Incontinent at times. Denies pain. Plan to d/c to  [facility] at 1130, report given to Zohra at [facility].

## 2017-02-27 NOTE — IP AVS SNAPSHOT
` `     [facility]: [phone]            Medication Administration Report for Elias Mckee as of 03/21/17 1140   Legend:    Given Hold Not Given Due Canceled Entry Other Actions    Time Time (Time) Time  Time-Action       Inactive    Active    Linked        Medications 03/15/17 03/16/17 03/17/17 03/18/17 03/19/17 03/20/17 03/21/17    acetaminophen (TYLENOL) tablet 650 mg  Dose: 650 mg Freq: EVERY 4 HOURS PRN Route: PO  PRN Reason: mild pain  Start: 02/27/17 1336   Admin Instructions: Maximum acetaminophen dose from all sources = 75 mg/kg/day not to exceed 4 grams/day.         0349 (650 mg)-Given        0212 (650 mg)-Given            atorvastatin (LIPITOR) tablet 40 mg  Dose: 40 mg Freq: DAILY Route: ORAL OR FEED  Start: 02/28/17 0800    0838 (40 mg)-Given        0914 (40 mg)-Given        0754 (40 mg)-Given        1045 (40 mg)-Given        0913 (40 mg)-Given        0859 (40 mg)-Given        0917 (40 mg)-Given           baclofen (LIORESAL) half-tab 5 mg  Dose: 5 mg Freq: 3 TIMES DAILY Route: PO  Start: 03/19/17 1400        1341 (5 mg)-Given       2053 (5 mg)-Given        0902 (5 mg)-Given       1459 (5 mg)-Given       2039 (5 mg)-Given        0917 (5 mg)-Given       [ ] 1400       [ ] 2000           cholecalciferol (vitamin D) tablet 2,000 Units  Dose: 2,000 Units Freq: DAILY Route: PO  Start: 02/28/17 0800    0837 (2,000 Units)-Given        0913 (2,000 Units)-Given        0754 (2,000 Units)-Given        0748 (2,000 Units)-Given        0913 (2,000 Units)-Given        0900 (2,000 Units)-Given        0918 (2,000 Units)-Given           diclofenac (VOLTAREN) 1 % topical gel 2 g  Dose: 2 g Freq: 4 TIMES DAILY PRN Route: Top  PRN Reason: moderate pain  PRN Comment: left ankle pain  Start: 03/20/17 1115   Admin Instructions: Apply to L ankle  Send dosing card with product.           0917 (2 g)-Given           ferrous gluconate (FERGON) tablet 324 mg  Dose: 324 mg Freq: DAILY WITH BREAKFAST Route: PO  Start:  02/28/17 0800   Admin Instructions: DO NOT CRUSH. Absorbed best on an empty stomach. If stomach upset occurs, can take with meals.     0838 (324 mg)-Given        0913 (324 mg)-Given        0754 (324 mg)-Given        0749 (324 mg)-Given        0913 (324 mg)-Given        0900 (324 mg)-Given        0918 (324 mg)-Given           fluticasone (FLONASE) 50 MCG/ACT spray 1-2 spray  Dose: 1-2 spray Freq: DAILY Route: BOTH NOSTRIL  Start: 02/28/17 0800    0836 (1 spray)-Given        0912 (1 spray)-Given        0814 (1 spray)-Given        0750 (1 spray)-Given        1031 (2 spray)-Given        0900 (2 spray)-Given        0916 (2 spray)-Given           glipiZIDE (GLUCOTROL) half-tab 7.5 mg  Dose: 7.5 mg Freq: DAILY WITH BREAKFAST Route: PO  Start: 03/11/17 0800   Admin Instructions: Take before or with meals.     0837 (7.5 mg)-Given        0914 (7.5 mg)-Given        0753 (7.5 mg)-Given        0749 (7.5 mg)-Given        0913 (7.5 mg)-Given        0900 (7.5 mg)-Given        0917 (7.5 mg)-Given           glucose 40 % gel 15-30 g  Dose: 15-30 g Freq: EVERY 15 MIN PRN Route: PO  PRN Reason: low blood sugar  Start: 02/27/17 1340   Admin Instructions: Give 15 g for BG 51 to 69 mg/dL IF patient is conscious and able to swallow. Give 30 g for BG less than or equal to 50 mg/dL IF patient is conscious and able to swallow. Do NOT give glucose gel via enteral tube.  IF patient has enteral tube: give apple juice 120 mL (4 oz or 15 g of CHO) via enteral tube for BG 51 to 69 mg/dL.  Give apple juice 240 mL (8 oz or 30 g of CHO) via enteral tube for BG less than or equal to 50 mg/dL.              Or  dextrose 50 % injection 25-50 mL  Dose: 25-50 mL Freq: EVERY 15 MIN PRN Route: IV  PRN Reason: low blood sugar  Start: 02/27/17 1340   Admin Instructions: Use if have IV access, BG less than 70 mg/dL and meet dose criteria below:  Dose if conscious and alert (or disorientated) and NPO = 25 mL  Dose if unconscious / not alert = 50 mL  Vesicant.               Or  glucagon injection 1 mg  Dose: 1 mg Freq: EVERY 15 MIN PRN Route: SC  PRN Reason: low blood sugar  PRN Comment: May repeat x 1 only  Start: 02/27/17 1340   Admin Instructions: May give SQ or IM. IF BG less than or equal to 50 mg/dL and no IV access.  ONLY use glucagon IF patient has NO IV access AND is UNABLE to swallow.               hydrALAZINE (APRESOLINE) tablet 10 mg  Dose: 10 mg Freq: 2 TIMES DAILY PRN Route: PO  PRN Comment: high blood pressure  Start: 03/04/17 1627   Admin Instructions: For SBP > 160.               hydrochlorothiazide (MICROZIDE) capsule 12.5 mg  Dose: 12.5 mg Freq: DAILY Route: PO  Start: 03/19/17 0800   Admin Instructions: Hold for SBP < 110         0913 (12.5 mg)-Given        0859 (12.5 mg)-Given        0917 (12.5 mg)-Given           insulin aspart (NovoLOG) inj (RAPID ACTING)  Dose: 1-5 Units Freq: AT BEDTIME Route: SC  Start: 03/09/17 2100   Admin Instructions: MEDIUM INSULIN RESISTANCE DOSING    Do Not give Bedtime Correction Insulin if BG less than  200.   For  - 249 give 1 units.   For  - 299 give 2 units.   For  - 349 give 3 units.   For  -399 give 4 units.   For BG greater than or equal to 400 give 5 units.  Notify provider if glucose greater than or equal to 350 mg/dL after administration of correction dose.  If given at mealtime, must be administered 5 min before meal or immediately after.     (2108)-Not Given [C]        2122 (2 Units)-Given        (2226)-Not Given [C]        (2140)-Not Given [C]        (2149)-Not Given [C]        2201 (1 Units)-Given        [ ] 2100           insulin aspart (NovoLOG) inj (RAPID ACTING)  Dose: 1-7 Units Freq: 3 TIMES DAILY BEFORE MEALS Route: SC  Start: 03/04/17 1700   Admin Instructions: Correction Scale - MEDIUM INSULIN RESISTANCE DOSING     Do Not give Correction Insulin if Pre-Meal BG less than 140.   For Pre-Meal  - 189 give 1 unit.   For Pre-Meal  - 239 give 2 units.   For Pre-Meal  -  289 give 3 units.   For Pre-Meal  - 339 give 4 units.   For Pre-Meal - 399 give 5 units.   For Pre-Meal -449 give 6 units  For Pre-Meal BG greater than or equal to 450 give 7 units.   To be given with prandial insulin, and based on pre-meal blood glucose.    Notify provider if glucose greater than or equal to 350 mg/dL after administration of correction dose.  If given at mealtime, must be administered 5 min before meal or immediately after.     0841 (1 Units)-Given [C]       1247 (1 Units)-Given [C]       (1754)-Not Given [C]        (0906)-Not Given [C]       1324 (1 Units)-Given [C]       1826 (1 Units)-Given [C]        0815 (1 Units)-Given       1242 (2 Units)-Given       1820 (2 Units)-Given [C]        0749 (1 Units)-Given       1230 (1 Units)-Given       1736 (1 Units)-Given [C]        0925 (1 Units)-Given       1223 (1 Units)-Given [C]       (1733)-Not Given [C]        (0858)-Not Given       1135 (2 Units)-Given [C]       (1930)-Not Given        (0848)-Not Given       [ ] 1200       [ ] 1730           insulin glargine (LANTUS) injection 10 Units  Dose: 10 Units Freq: EVERY 24 HOURS Route: SC  Start: 03/11/17 0900   Admin Instructions: See E MAR for dosing directions     0840 (10 Units)-Given        0909 (10 Units)-Given        0815 (10 Units)-Given        0800 (10 Units)-Given        0925 (10 Units)-Given        0901 (10 Units)-Given        0919 (10 Units)-Given           lidocaine (LIDODERM) 5 % Patch 1-2 patch  Dose: 1-2 patch Freq: EVERY 24 HOURS 0800 Route: TD  Start: 03/13/17 1045   Admin Instructions: Apply patch(s) to neck. To prevent lidocaine toxicity, patient should be patch free for 12 hrs daily. Patches may be cut to smaller size prior to removing release liner.  NEVER APPLY HEAT OVER PATCH which will increase absorption and may lead to risk of local anesthetic toxicity. Do not apply over area where liposomal bupivacaine was injected for 96 hours post injection.     0836 (2  patch)-Given [C]        0908 (2 patch)-Given [C]        0754 (2 patch)-Given        0750 (2 patch)-Given        (1031)-Not Given        0902 (2 patch)-Given [C]        0914 (2 patch)-Given           lidocaine (LIDODERM) Patch in Place  Freq: EVERY 8 HOURS Route: TD  Start: 03/13/17 1045   Admin Instructions: Chart every shift, confirming that patch is still in place on patient (no barcode scan needed). See patch order for dose information.  NEVER APPLY HEAT OVER PATCH which will increase absorption and may lead to risk of local anesthetic toxicity. Do not apply over area where liposomal bupivacaine injected for 96 hours.            1141 (2 each)-Patch in Place [C]       1754 ( )-Patch Removed [C]               0914 (2 each)-Patch in Place [C]       1806 ( )-Patch in Place        0134 ( )-Patch Removed [C]       1036 ( )-Patch in Place       1832 ( )-Positive        0219 ( )-Negative [C]       1046 ( )-Patch in Place       1802 ( )-Positive [C]        0214 ( )-Negative [C]       1031 ( )-Negative       1731 ( )-Negative        0145 ( )-Negative       0903 ( )-Patch in Place       1931 ( )-Patch in Place               0918 ( )-Patch in Place       [ ] 1800           lidocaine (LIDODERM) patch REMOVAL  Freq: EVERY 24 HOURS 2000 Route: TD  Start: 03/13/17 2000   Admin Instructions: Patient should have a 12 hour patch free interval     2110 ( )-Patch Removed        2041 ( )-Patch Removed        2150 ( )-Patch Removed        2001 ( )-Patch Removed        2054 ( )-Negative        2039 ( )-Patch Removed        [ ] 2000           lisinopril (PRINIVIL/ZESTRIL) tablet 20 mg  Dose: 20 mg Freq: 2 TIMES DAILY Route: PO  Start: 03/03/17 2000   Admin Instructions: Hold for SBP < 100     0838 (20 mg)-Given       2109 (20 mg)-Given        0912 (20 mg)-Given       2034 (20 mg)-Given        0754 (20 mg)-Given       2144 (20 mg)-Given        0749 (20 mg)-Given       1959 (20 mg)-Given        (0914)-Not Given       2053 (20 mg)-Given  [C]        0902 (20 mg)-Given       2039 (20 mg)-Given        0918 (20 mg)-Given       [ ] 2000           melatonin tablet 3 mg  Dose: 3 mg Freq: AT BEDTIME Route: PO  Start: 03/19/17 2100        2053 (3 mg)-Given        2039 (3 mg)-Given        [ ] 2100           metoprolol (LOPRESSOR) tablet 50 mg  Dose: 50 mg Freq: 2 TIMES DAILY Route: PO  Start: 02/27/17 2000   Admin Instructions: Hold for SBP < 100 or HR < 55 / min     0839 (50 mg)-Given       2109 (50 mg)-Given        0916 (50 mg)-Given       2034 (50 mg)-Given        0754 (50 mg)-Given       2144 (50 mg)-Given        0749 (50 mg)-Given       1959 (50 mg)-Given        (0914)-Not Given [C]       2053 (50 mg)-Given        0902 (50 mg)-Given       2039 (50 mg)-Given        0917 (50 mg)-Given       [ ] 2000           miconazole (MICATIN; MICRO GUARD) 2 % powder  Freq: 2 TIMES DAILY Route: Top  Start: 02/28/17 1045   Admin Instructions: Apply to groin     0840 ( )-Given       2110 ( )-Given        0908 ( )-Given       2120 ( )-Given        0804 ( )-Given       2144 ( )-Given        0800 ( )-Given       1959 ( )-Given        1032 ( )-Given       2052 ( )-Given        0902 ( )-Given       2044 ( )-Given        0917 ( )-Given       [ ] 2000           polyethylene glycol (MIRALAX/GLYCOLAX) powder 17 g  Dose: 17 g Freq: DAILY PRN Route: PO  PRN Reason: constipation  Start: 02/27/17 1338   Admin Instructions: 1 Packet = 17 grams. Mixed prescribed dose in 8 ounces of water.               ranitidine (ZANTAC) tablet 150 mg  Dose: 150 mg Freq: 2 TIMES DAILY Route: PO  Start: 02/27/17 2000    0838 (150 mg)-Given       2109 (150 mg)-Given        0914 (150 mg)-Given       2034 (150 mg)-Given        0753 (150 mg)-Given       2144 (150 mg)-Given        0749 (150 mg)-Given       1959 (150 mg)-Given        0914 (150 mg)-Given       2053 (150 mg)-Given        0859 (150 mg)-Given       2039 (150 mg)-Given        0917 (150 mg)-Given       [ ] 2000           rivaroxaban ANTICOAGULANT  (XARELTO) tablet 20 mg  Dose: 20 mg Freq: DAILY WITH SUPPER Route: PO  Start: 02/27/17 1700    1755 (20 mg)-Given        1832 (20 mg)-Given        1616 (20 mg)-Given        1735 (20 mg)-Given        1609 (20 mg)-Given        1655 (20 mg)-Given        [ ] 1700           sodium chloride (PF) 0.9% PF flush 3 mL  Dose: 3 mL Freq: EVERY 8 HOURS Route: IK  Start: 03/19/17 0600   Admin Instructions: And Q1H PRN, to lock peripheral IV dormant line.         0606 (3 mL)-Given       1225 (3 mL)-Given              2106 (3 mL)-Given        (0535)-Not Given       (1500)-Not Given       (2331)-Not Given [C]        0615 (3 mL)-Given       [ ] 1400       [ ] 2200           sodium chloride (PF) 0.9% PF flush 3 mL  Dose: 3 mL Freq: EVERY 1 HOUR PRN Route: IK  PRN Reason: line flush  PRN Comment: for peripheral IV flush post IV meds  Start: 03/18/17 2048              tamsulosin (FLOMAX) capsule 0.4 mg  Dose: 0.4 mg Freq: DAILY Route: PO  Start: 03/20/17 0800   Admin Instructions: Administer 30 minutes after the same meal each day.          0900 (0.4 mg)-Given        0917 (0.4 mg)-Given          Completed Medications  Medications 03/15/17 03/16/17 03/17/17 03/18/17 03/19/17 03/20/17 03/21/17         Dose: 1,000 mL Freq: ONCE Route: IV  Last Dose: 1,000 mL (03/20/17 1127)  Start: 03/20/17 1130   End: 03/20/17 1527         1127 (1,000 mL)-New Bag              Dose: 90 mL Freq: ONCE Route: PO  Start: 03/20/17 1430   End: 03/20/17 1417         1417 (36 g)-Given by Other Clinician [C]              Dose: 5 mL Freq: ONCE Route: PO  Start: 03/20/17 1430   End: 03/20/17 1418         1418 (5 mL)-Given by Other Clinician [C]              Freq: ONCE Route: PO  Start: 03/20/17 1430   End: 03/20/17 1418         1418 (10 mL)-Given by Other Clinician [C]           Discontinued Medications  Medications 03/15/17 03/16/17 03/17/17 03/18/17 03/19/17 03/20/17 03/21/17         Rate: 125 mL/hr Freq: CONTINUOUS Route: IV  Start: 03/18/17 1200   End: 03/18/17  1959   Admin Instructions: Total of 1 liter        1231 ( )-New Bag       1959-Med Discontinued            Freq: 4 TIMES DAILY Route: Top  Start: 02/28/17 1200   End: 03/20/17 1109   Admin Instructions: Apply to L ankle  Send dosing card with product.     0836 ( )-Given       1247 ( )-Given       1627 ( )-Given       2109 ( )-Given        0908 ( )-Given       1323 ( )-Given       (1640)-Not Given       2035 ( )-Given        0755 ( )-Given       1241 ( )-Given       1616 ( )-Given       2144 ( )-Given        0753 ( )-Given       1230 ( )-Given       1552 ( )-Given       1958 ( )-Given        1031 ( )-Given       (1207)-Not Given       1609 ( )-Given       2053 ( )-Given        0900 ( )-Given       1109-Med Discontinued          Dose: 25 mg Freq: DAILY Route: PO  Start: 03/06/17 0800   End: 03/18/17 1252   Admin Instructions: Hold for SBP < 110     0837 (25 mg)-Given        0912 (25 mg)-Given        0753-Hold [C]        0749 (25 mg)-Given       1252-Med Discontinued            Dose: 1 mg Freq: AT BEDTIME Route: PO  Start: 02/27/17 2100   End: 03/19/17 1211    2109 (1 mg)-Given        2119 (1 mg)-Given        2144 (1 mg)-Given        2000 (1 mg)-Given        1211-Med Discontinued           Dose: 0.8 mg Freq: DAILY Route: PO  Start: 03/11/17 0930   End: 03/19/17 1832   Admin Instructions: Administer 30 minutes after the same meal each day.     0838 (0.8 mg)-Given        0914 (0.8 mg)-Given        0753 (0.8 mg)-Given        0749 (0.8 mg)-Given        0913 (0.8 mg)-Given       1832-Med Discontinued      Medications 03/15/17 03/16/17 03/17/17 03/18/17 03/19/17 03/20/17 03/21/17

## 2017-02-27 NOTE — IP AVS SNAPSHOT
"    [facility]: [phone]                                              INTERAGENCY TRANSFER FORM - PHYSICIAN ORDERS   2017                    Hospital Admission Date: 2017  MIRANDA BEGUM   : 8/10/1931  Sex: Male        Attending Provider: Kevin Bond MD     Allergies:  No Known Allergies    Infection:  None   Service:  ACUTE IP TEOFILO    Ht:  1.778 m (5' 10\")   Wt:  83.4 kg (183 lb 14.4 oz)   Admission Wt:  87.1 kg (192 lb)    BMI:  26.39 kg/m 2   BSA:  2.03 m 2            Patient PCP Information     Provider PCP Type    Shen Huff MD General      ED Clinical Impression     Diagnosis Description Comment Added By Time Added    Insomnia, unspecified type [G47.00] Insomnia, unspecified type [G47.00]  Kevin Bond MD 3/21/2017  8:52 AM    Type 2 diabetes mellitus with diabetic nephropathy, unspecified long term insulin use status (H) [E11.21] Type 2 diabetes mellitus with diabetic nephropathy, unspecified long term insulin use status (H) [E11.21]  Kevin Bond MD 3/21/2017  8:52 AM    Essential hypertension [I10] Essential hypertension [I10]  Kevin Bond MD 3/21/2017  8:55 AM    Cervicalgia [M54.2] Cervicalgia [M54.2]  Kevin Bond MD 3/21/2017  8:56 AM    Groin rash [R21] Groin rash [R21]  Kevin Bond MD 3/21/2017  8:57 AM    Cerebrovascular accident (CVA), unspecified mechanism (H) [I63.9] Cerebrovascular accident (CVA), unspecified mechanism (H) [I63.9]  Kevin Bond MD 3/21/2017  8:57 AM    Benign prostatic hyperplasia, presence of lower urinary tract symptoms unspecified, unspecified morphology [N40.0] Benign prostatic hyperplasia, presence of lower urinary tract symptoms unspecified, unspecified morphology [N40.0]  Kevin Bond MD 3/21/2017  8:58 AM    Spasticity [R25.2] Spasticity [R25.2]  Kevin Bond MD 3/21/2017  8:58 AM      Hospital Problems as of 3/21/2017     "          Priority Class Noted POA    Type 2 diabetes mellitus with diabetic nephropathy (H) Medium  10/31/2015 Yes    Stroke (H) Medium  2/27/2017 Yes      Non-Hospital Problems as of 3/21/2017              Priority Class Noted    Chronic ischemic heart disease   2/26/2008    Malignant neoplasm of prostate (H)   2/26/2008    Essential hypertension   2/26/2008    Personal history of other diseases of circulatory system   2/26/2008    HYPERLIPIDEMIA LDL GOAL <100   10/31/2010    Type 2 diabetes, HbA1C goal < 8% (H)   5/26/2011    Advanced directives, counseling/discussion   11/8/2011    CKD (chronic kidney disease) stage 3, GFR 30-59 ml/min   2/29/2012    Leg weakness   1/21/2013    Ataxia   4/10/2013    BPH (benign prostatic hyperplasia)   5/29/2014    History of actinic keratoses Medium  7/6/2016    History of squamous cell carcinoma Medium  7/6/2016    S/P Mohs surgery for basal cell carcinoma Medium  7/6/2016    CVA (cerebral vascular accident) (H) Medium  2/20/2017      Code Status History     Date Active Date Inactive Code Status Order ID Comments User Context    2/20/2017 11:11 PM 2/27/2017  1:43 PM Full Code [id number]  Juan Diego Branham DO Inpatient    1/22/2013  5:24 PM 2/20/2017 11:11 PM Full Code [id number]  Maria A Moon PA Outpatient    1/21/2013  3:50 PM 1/22/2013  5:24 PM Full Code [id number]  Shaunna Woo RN Inpatient         Medication Review      START taking        Dose / Directions Comments    baclofen 10 MG tablet   Commonly known as:  LIORESAL   Used for:  Spasticity, Cerebrovascular accident (CVA), unspecified mechanism (H)        Dose:  5 mg   Take 0.5 tablets (5 mg) by mouth 3 times daily   Quantity:  90 tablet   Refills:  0        glipiZIDE 5 MG tablet   Commonly known as:  GLUCOTROL        Dose:  7.5 mg   Take 1.5 tablets (7.5 mg) by mouth daily (with breakfast)   Quantity:  60 tablet   Refills:  0        insulin glargine 100 UNIT/ML injection   Commonly known as:  LANTUS         Dose:  10 Units   Inject 10 Units Subcutaneous every 24 hours   Quantity:  50 mL   Refills:  0        lidocaine 5 % Patch   Commonly known as:  LIDODERM   Used for:  Cervicalgia        Dose:  1-2 patch   Place 1-2 patches onto the skin every 24 hours Apply patch(s) to neck. To prevent lidocaine toxicity, patient should be patch free for 12 hrs daily. Patches may be cut to smaller size prior to removing release liner.   Quantity:  60 patch   Refills:  0        miconazole 2 % powder   Commonly known as:  MICATIN; MICRO GUARD   Used for:  Groin rash        Apply topically 2 times daily Apply to groin   Quantity:  100 g   Refills:  0        ranitidine 150 MG tablet   Commonly known as:  ZANTAC   Used for:  Cerebrovascular accident (CVA), unspecified mechanism (H)        Dose:  150 mg   Take 1 tablet (150 mg) by mouth 2 times daily   Quantity:  60 tablet   Refills:  0          CONTINUE these medications which may have CHANGED, or have new prescriptions. If we are uncertain of the size of tablets/capsules you have at home, strength may be listed as something that might have changed.        Dose / Directions Comments    hydrochlorothiazide 12.5 MG capsule   Commonly known as:  MICROZIDE   This may have changed:  additional instructions   Used for:  Essential hypertension, Cerebrovascular accident (CVA), unspecified mechanism (H)        Dose:  12.5 mg   Take 1 capsule (12.5 mg) by mouth daily Hold for SBP < 110   Quantity:  30 capsule   Refills:  0        lisinopril 20 MG tablet   Commonly known as:  PRINIVIL/ZESTRIL   This may have changed:    - medication strength  - how much to take  - additional instructions   Used for:  Essential hypertension        Dose:  20 mg   Take 1 tablet (20 mg) by mouth 2 times daily Hold for SBP < 100   Quantity:  30 tablet   Refills:  0        melatonin 3 MG tablet   This may have changed:    - medication strength  - how much to take   Used for:  Insomnia, unspecified type        Dose:  3 mg    Take 1 tablet (3 mg) by mouth At Bedtime   Quantity:  60 tablet   Refills:  0        metoprolol 50 MG tablet   Commonly known as:  LOPRESSOR   This may have changed:  additional instructions   Used for:  Essential hypertension        Dose:  50 mg   Take 1 tablet (50 mg) by mouth 2 times daily Hold for SBP < 100 or HR < 55 / min   Quantity:  60 tablet   Refills:  0        tamsulosin 0.4 MG capsule   Commonly known as:  FLOMAX   This may have changed:  how much to take   Used for:  Benign prostatic hyperplasia, presence of lower urinary tract symptoms unspecified, unspecified morphology        Dose:  0.4 mg   Take 1 capsule (0.4 mg) by mouth daily   Quantity:  60 capsule   Refills:  0          CONTINUE these medications which have NOT CHANGED        Dose / Directions Comments    atorvastatin 40 MG tablet   Commonly known as:  LIPITOR   Used for:  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   Quantity:  99 strip   Refills:  1 year        ferrous gluconate 324 (38 FE) MG tablet   Commonly known as:  FERGON   Used for:  Anemia        Dose:  324 mg   Take 1 tablet (324 mg) by mouth daily (with breakfast)   Quantity:  90 tablet   Refills:  1        fluticasone 50 MCG/ACT spray   Commonly known as:  FLONASE   Used for:  Acute sinusitis with symptoms > 10 days        Dose:  1-2 spray   Spray 1-2 sprays into both  nostrils daily   Quantity:  1 Package   Refills:  3        * order for DME   Used for:  Leg weakness        Equipment being ordered: Wheelchair   Quantity:  1 Device   Refills:  0        * order for DME   Used for:  Memory loss, Leg weakness        Equipment being ordered:  transport wheelchair   Quantity:  1 Device   Refills:  0    Please fax to Boni Hassan at [phone]       * order for DME   Used for:  Memory loss, Other musculoskeletal symptoms referable to limbs(060.56)        Equipment being ordered: transport wheelchair.  It has been ruled out that a walker and cane are not sufficient.   Quantity:  1 Device   Refills:  0        * order for DME   Used for:  Weakness of both lower extremities, Imbalance, Fall, subsequent encounter        Equipment being ordered: Walker - basic black non collapseable walker   Quantity:  1 Device   Refills:  0        * order for DME   Used for:  Falls frequently, Imbalance        Equipment being ordered: basic black non - collapesable cane   Quantity:  1 each   Refills:  0        * order for DME   Used for:  Ataxia, Weakness of both lower extremities, Imbalance        Equipment being ordered: Cane   Quantity:  1 Device   Refills:  0        polyethylene glycol powder   Commonly known as:  MIRALAX/GLYCOLAX        Dose:  17 g   Take 17 g by mouth every 48 hours as needed for constipation   Refills:  0        rivaroxaban ANTICOAGULANT 20 MG Tabs tablet   Commonly known as:  XARELTO   Used for:  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)        Dose:  20 mg   Take 1 tablet (20 mg) by mouth daily (with dinner)   Quantity:  30 tablet   Refills:  1        TYLENOL 325 MG tablet   Used for:  Pain in joint involving ankle and foot, left   Generic drug:  acetaminophen        Dose:  650 mg   Take 650 mg by mouth every 4 hours as needed for mild pain   Quantity:  100 tablet   Refills:  0        vitamin D 2000 UNITS tablet   Used for:  Unspecified  vitamin D deficiency        Dose:  2000 Units   Take 2,000 Units by mouth daily   Quantity:  100 tablet   Refills:  3        * Notice:  This list has 6 medication(s) that are the same as other medications prescribed for you. Read the directions carefully, and ask your doctor or other care provider to review them with you.      STOP taking     amoxicillin 250 MG capsule   Commonly known as:  AMOXIL           GLIMEPIRIDE PO           insulin aspart 100 UNIT/ML injection   Commonly known as:  NovoLOG FLEXPEN           multivitamin per tablet           nitroglycerin 0.4 MG sublingual tablet   Commonly known as:  NITROSTAT                     Further instructions from your care team       Follow-Up Appointments:     - PCP in 1-2 weeks s/p stroke with Dr Shen Huff    - Neurology with Dr Manuel Summers in 4 weeks s/p stroke    - [facility] in 8 weeks with Dr Kevin Bond or Dr Verenice Garcia    Address  Dr. Kevin Bond/Dr. Garcia                          [facility]                          [facility]                          3rd Floor    [address] [place] [address]    Phone   [place] [phone]                          [place] [phone]                          [place] [phone]        Summary of Visit     Reason for your hospital stay       This is an 85-year-old male with a past medical history of stroke 9 years ago with some residual left leg weakness with history of chronic ischemic heart disease, prostate cancer, hypertension, hyperlipidemia, type 2 diabetes, chronic kidney disease, atrial fibrillation. Now presents with left hemiparesis, upper extremity, on top of residual left lower extremity weakness with dysarthria, dysphagia, concern for cognitive impairment on top of hard of hearing with impaired mobility, ADLs.    To reduce the risk of subsequent stroke there are several important  "factors including optimal management of anticoagulants, blood pressure, cholesterol, diabetes and smoking abstinence.    Anticoagulation:  You are on Xarelto    Blood Pressure:  Keeping your blood pressures less than 130/80 has been shown to reduce risk of recurrent stroke. Recording your blood pressure and heart rate twice daily in a log book can help you and your providers make decisions on optimal management. You are encouraged to bring your log book with you to your primary physician and/or cardiology doctor visits.    You are currently on LISINOPRIL, METOPROLOL to help control your blood pressure. Several lifestyle modifications have been associated with blood pressure reduction and are an important part of a comprehensive plan. These include: weight loss (if over-weight); a diet low in salt and cholesterol and rich in fruits and vegetables; regular aerobic physical activity and limited alcohol consumption.    Diabetes:  Optimal management of diabetes not only reduces risk of another stroke, it can help with healing process from your recent stroke. Blood glucose levels measure how well you're controlling your diabetes. An additional test \"hemoglobin A1C\" reflects how you have been overall with glucose control in the prior few months. This should be less than 7 though even lower below 6 is considered normal. Your recent Hgb A1C was [insert A1C]. This should be followed up with your primary provider and/or endocrinologist.    Your present plan is to check blood glucose consistently Before Meals and at Bedtime. These should be recorded along with date/time and any relevant notes such as food consumed, insulin/medication taken etc. This helps you and your providers make decisions on optimal management. You're encouraged to bring you log book with to your primary and/or endocrinology doctor visits.  Your current medications include Glipizide, lantus insulin. Specific doses and frequencies listed elsewhere. " "    Diet:  Diet and exercise are very important for diabetes regardless of being on medication or not. Be aware of and moderate your carbohydrate intake as instructed by doctor, dietitian or nurse.  Regular physical activity may be more difficult since your stroke though doing what you can on a daily basis is helpful.     Cholesterol:  Traditional target levels for LDL cholesterol or \"bad cholesterol\" is less than 130 however once you have had a stroke, your target LDL level is now less than 70. Additional recommendations such as increasing your HDL or \"good\" cholesterol and lowering your triglyceride level can also be important.    Your most recent lipid panel was   Lab Results       Component                Value               Date                       CHOL                     105                 02/21/2017            Lab Results       Component                Value               Date                       HDL                      28                  02/21/2017            Lab Results       Component                Value               Date                       LDL                      49                  02/21/2017                 LDL                      80                  02/17/2017            Lab Results       Component                Value               Date                       TRIG                     140                 02/21/2017            Lab Results       Component                Value               Date                       CHOLHDLRATIO             5.2                 05/29/2014              This should be followed up in 2-3 months with your primary provider.    Smoking:  Finally one of the most important modifiable risk factors is to not smoke. This includes cigarettes, pipes, cigars, chewing tobacco and second hand smoke. Support through counseling, nicotine replacement, and oral smoking-cessation medications may all be helpful. Often people have been able to quit during their hospitalization but " once returning to their familiar environment, the urges can be stronger. If this is the case, we encourage you to get support. There are numerous options, start by talking with your doctor.             After Care     Activity       Your activity upon discharge: assist with transfers and mobility using wheelchair, assist with self cares, initiation of activities       Activity - Up with assistive device           Activity - Up with nursing assistance           Diet       Follow this diet upon discharge: Dysphagia Diet Level 3 Advanced Nectar Thickened Liquids (pre-thickened or use instant food thickener)       Fall precautions           General info for SNF       Length of Stay Estimate: Short Term Care: Estimated # of Days <30  Condition at Discharge: Improving  Level of care:skilled   Rehabilitation Potential: Good  Admission H&P remains valid and up-to-date: Yes  Recent Chemotherapy: N/A  Use Nursing Home Standing Orders: Yes       Glucose monitor nursing POCT       Before meals and at bedtime       Mantoux instructions       Give two-step Mantoux (PPD) Per Facility Policy Yes             Referrals     Occupational Therapy Adult Consult       Evaluate and treat as clinically indicated.    Reason:  Impaired ADLs and IADLs s/p stroke with neglect and hemiparesis       Physical Therapy Adult Consult       Evaluate and treat as clinically indicated.    Reason:  Impaired mobility and transfers s/p stroke with hemiparesis and neglect       Speech Language Path Adult Consult       Evaluate and treat as clinically indicated.    Reason:  Dysphagia and cognition             Supplies     AntiEmbolism Stockings       Bilateral below knee length.On in the morning, off at night       Pneumatic Compression Device        Bilateral calf. Remove 30 mins BID.             Statement of Approval     Ordered          03/21/17 0900  I have reviewed and agree with all the recommendations and orders detailed in this document.  EFFECTIVE  NOW     Approved and electronically signed by:  Kevin Bond MD

## 2017-02-27 NOTE — IP AVS SNAPSHOT
"` `     [facility]: [phone]                                              INTERAGENCY TRANSFER FORM - NURSING   2017                    Hospital Admission Date: 2017  MIRANDA BEGUM   : 8/10/1931  Sex: Male        Attending Provider: Kevin Bond MD     Allergies:  No Known Allergies    Infection:  None   Service:  ACUTE IP TEOFILO    Ht:  1.778 m (5' 10\")   Wt:  83.4 kg (183 lb 14.4 oz)   Admission Wt:  87.1 kg (192 lb)    BMI:  26.39 kg/m 2   BSA:  2.03 m 2            Patient PCP Information     Provider PCP Type    Shen Huff MD General      Current Code Status     Date Active Code Status Order ID Comments User Context       2017  1:43 PM Full Code [id number]  Kevin Bodn MD Inpatient       Code Status History     Date Active Date Inactive Code Status Order ID Comments User Context    2017 11:11 PM 2017  1:43 PM Full Code [id number]  Juan Diego Branham DO Inpatient    2013  5:24 PM 2017 11:11 PM Full Code [id number]  Maria A Moon PA Outpatient    2013  3:50 PM 2013  5:24 PM Full Code [id number]  Shaunna Woo, CORNELIO Inpatient      Advance Directives        Does patient have a scanned Advance Directive/ACP document in EPIC?           No        Hospital Problems as of 3/21/2017              Priority Class Noted POA    Type 2 diabetes mellitus with diabetic nephropathy (H) Medium  10/31/2015 Yes    Stroke (H) Medium  2017 Yes      Non-Hospital Problems as of 3/21/2017              Priority Class Noted    Chronic ischemic heart disease   2008    Malignant neoplasm of prostate (H)   2008    Essential hypertension   2008    Personal history of other diseases of circulatory system   2008    HYPERLIPIDEMIA LDL GOAL <100   10/31/2010    Type 2 diabetes, HbA1C goal < 8% (H)   2011    Advanced directives, counseling/discussion   2011    CKD (chronic kidney disease) stage 3, GFR 30-59 ml/min   " 2/29/2012    Leg weakness   1/21/2013    Ataxia   4/10/2013    BPH (benign prostatic hyperplasia)   5/29/2014    History of actinic keratoses Medium  7/6/2016    History of squamous cell carcinoma Medium  7/6/2016    S/P Mohs surgery for basal cell carcinoma Medium  7/6/2016    CVA (cerebral vascular accident) (H) Medium  2/20/2017      Immunizations     Name Date      Influenza (IIV3) 01/22/13     Pneumococcal 23 valent 09/10/08     TD (ADULT, 7+) 09/10/08          END      ASSESSMENT     Discharge Profile Flowsheet     EXPECTED DISCHARGE     SKIN      Expected Discharge Date  -- (TCU) 03/15/17 0859   Inspection  Full 03/21/17 1056    DISCHARGE NEEDS ASSESSMENT     Skin areas NOT inspected  Hip, left;Elbow, left;Scapula, left;Ear, left 03/21/17 0609    Readmission Within The Last 30 Days  no previous admission in last 30 days 02/28/17 1118   Skin WDL  ex 03/21/17 1056    Equipment Currently Used at Home  cane, straight;grab bar;raised toilet;shower chair 02/28/17 1118   Skin Color/Characteristics  bruised (ecchymotic) 03/21/17 1056    Transportation Available  car;family or friend will provide;agency transportation 02/28/17 1118   Skin Temperature  warm 03/20/17 1919    Equipment Used at Home  straight cane 01/22/13 1257   Skin Moisture  dry 03/20/17 1919    GASTROINTESTINAL (ADULT,PEDIATRIC,OB)     Skin Elasticity  quick return to original state 03/20/17 1919    GI WDL  WD 03/21/17 1050   Skin Integrity  drain/device(s) 03/20/17 1919    Last Bowel Movement  03/19/17 03/20/17 1919   Additional Documentation  -- (Pruritis noted to pt's chest with irritation of the skin.) 03/09/17 2154    GI Signs/Symptoms  fecal incontinence 03/19/17 0601   SAFETY      COMMUNICATION ASSESSMENT     Safety WD  WD 03/21/17 1056    Patient's communication style  spoken language (English or Bilingual) 02/20/17 1902   Safety Factors  patient up in chair;bed in low position;wheels locked;call light in reach;upper side rails raised x  "2;ID band on 03/21/17 1056                 Assessment WDL (Within Defined Limits) Definitions           Safety WDL     Effective: 09/28/15    Row Information: <b>WDL Definition:</b> Bed in low position, wheels locked; call light in reach; upper side rails up x 2; ID band on<br> <font color=\"gray\"><i>Item=AS safety wdl>>List=AS safety wdl>>Version=F14</i></font>      Skin WDL     Effective: 09/28/15    Row Information: <b>WDL Definition:</b> Warm; dry; intact; elastic; without discoloration; pressure points without redness<br> <font color=\"gray\"><i>Item=AS skin wdl>>List=AS skin wdl>>Version=F14</i></font>      Vitals     Vital Signs Flowsheet     VITAL SIGNS     SpO2  96 % 03/21/17 0813    Temp  96.9  F (36.1  C) 03/21/17 0813   O2 Device  None (Room air) 03/21/17 0813    Temp src  Oral 03/21/17 0813   PAIN/COMFORT      Resp  16 03/21/17 0813   Patient Currently in Pain  denies 03/20/17 1731    Pulse  73 03/21/17 1041   Preferred Pain Scale  CAPA (Clinically Aligned Pain Assessment) ([facility], [facility] and [facility] Adults Only) 03/20/17 1731    Heart Rate  62 03/20/17 0901   Pain Location  Groin 03/19/17 0415    Pulse/Heart Rate Source  Brachial 03/21/17 1041   Pain Orientation  Right 03/14/17 1045    BP  99/57 03/21/17 1041   Pain Descriptors  Crushing (pat stated we were crushing his balls while doing castro cares) 03/18/17 0312    BP Location  Right arm 03/21/17 1041   Pain Intervention(s)  Medication (See eMAR) 03/19/17 0415    POSITIONING     Response to Interventions  Absence of nonverbal indicators of pain 03/19/17 0525    Body Position  -- (seated) 03/21/17 1041   Additional Documentation  -- (pt with sore lateral ankle. ) 02/28/17 0939    Head of Bed (HOB)  HOB at 15 degrees 03/21/17 0153   CLINICALLY ALIGNED PAIN ASSESSMENT (CAPA) ([facility], [facility] AND [facility] ADULTS ONLY)      Chair  Upright in chair 03/21/17 1056   Comfort  comfortably manageable 03/14/17 1605    Positioning/Transfer Devices  pillows 03/21/17 1056  " " Change in Pain  getting better 03/14/17 1605    DAILY CARE     Pain Control  partially effective 03/14/17 1605    Activity Type  activity adjusted per tolerance;up in chair 03/21/17 1056   Functioning  can do most things, but pain gets in the way of some 03/14/17 1605    Activity Level of Assistance  assistance, 1 person 03/21/17 1056   Sleep  awake with occasional pain 03/14/17 1605    Activity Assistive Device  gait belt;walker 03/21/17 1056   ANALGESIA SIDE EFFECTS MONITORING      LYING ORTHOSTATIC BP     Side Effects Monitoring: Respiratory Quality  R 03/14/17 1605    Lying Orthostatic BP  136/70 03/20/17 1059   Side Effects Monitoring: Respiratory Depth  N 03/14/17 1605    Lying Orthostatic Pulse  65 bpm 03/20/17 1059   Side Effects Monitoring: Sedation Level  1 03/14/17 1605    SITTING ORTHOSTATIC BP     HEIGHT AND WEIGHT      Sitting Orthostatic BP  113/54 03/20/17 1125   Height  1.778 m (5' 10\") 02/27/17 1222    Sitting Orthostatic Pulse  66 bpm 03/20/17 1125   Weight  83.4 kg (183 lb 14.4 oz) (bed weight) 03/12/17 0628    STANDING ORTHOSTATIC BP     BSA (Calculated - sq m)  2.07 02/27/17 1222    Standing Orthostatic BP  84/65 03/20/17 1108   BMI (Calculated)  27.61 02/27/17 1222    Standing Orthostatic Pulse  87 bpm 03/20/17 1108   COMMENTS      OXYGEN THERAPY     Comments  seated at EOB prior to therapy session with OT 03/19/17 1402            Patient Lines/Drains/Airways Status    Active LINES/DRAINS/AIRWAYS     Name: Placement date: Placement time: Site: Days: Last dressing change:    Peripheral IV 03/18/17 Right Lower forearm 03/18/17   1230   Lower forearm   2     Pressure Injury 02/23/17 Coccyx 02/23/17       26     Wound 02/27/17 Coccyx 02/27/17   1530   Coccyx   21     Wound 02/27/17 Groin 02/27/17   1530      21     Incision/Surgical Site 02/24/17 Right Groin 02/24/17 2000    24             Patient Lines/Drains/Airways Status    Active PICC/CVC     None            Intake/Output Detail Report     " Date Intake         Output   Net    Shift P.O. I.V. NG/GT IV Piggyback Enteral Total Urine Emesis/NG output Total       Day 03/20/17 0000 - 03/20/17 0659 -- -- -- -- -- -- 825 -- 825 -825    Jennifer 03/20/17 0700 - 03/20/17 1459 360 -- -- -- -- 360 525 -- 525 -165    Noc 03/20/17 1500 - 03/20/17 2359 600 -- -- -- -- 600 800 -- 800 -200    Day 03/21/17 0000 - 03/21/17 0659 -- -- -- -- -- -- 250 -- 250 -250    Jennifer 03/21/17 0700 - 03/21/17 1459 600 -- -- -- -- 600 300 -- 300 300      Last Void/BM       Most Recent Value    Urine Occurrence 1 at 03/20/2017 0400    Stool Occurrence 1 at 03/19/2017 0345      Case Management/Discharge Planning     Case Management/Discharge Planning Flowsheet     LIVING ENVIRONMENT     Readmission Within The Last 30 Days  no previous admission in last 30 days 02/28/17 1118    Lives With  alone 02/28/17 1118   Transportation Available  car;family or friend will provide;agency transportation 02/28/17 1118    Living Arrangements  assisted living 02/28/17 1118   Equipment Used at Home  straight cane 01/22/13 1257    Able to Return to Prior Living Arrangements  yes 02/28/17 1118   FINAL RESOURCES      HOME SAFETY     Equipment Currently Used at Home  cane, straight;grab bar;raised toilet;shower chair 02/28/17 1118    Patient Feels Safe Living in Home?  yes 02/28/17 1118   ABUSE RISK SCREEN      COPING/STRESS     QUESTION TO PATIENT:  Has a member of your family or a partner(now or in the past) intimidated, hurt, manipulated, or controlled you in any way?  no 02/28/17 1118    Major Change/Loss/Stressor  none 02/28/17 1118   QUESTION TO PATIENT: Do you feel safe going back to the place where you are living?  yes 02/28/17 1118    EXPECTED DISCHARGE     OBSERVATION: Is there reason to believe there has been maltreatment of a vulnerable adult (ie. Physical/Sexual/Emotional abuse, self neglect, lack of adequate food, shelter, medical care, or financial exploitation)?  no 02/28/17 1118    Expected  Discharge Date  -- (U) 03/15/17 0859   (R) MENTAL HEALTH SUICIDE RISK      DISCHARGE PLANNING     Are you depressed or being treated for depression?  No 02/27/17 7059

## 2017-02-27 NOTE — H&P
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      HISTORY OF PRESENT ILLNESS:  Elias Mckee is an 85-year-old male admitted to acute rehab unit on 02/27/2017 with a past medical history notable for a stroke 9 years ago with some residual left leg weakness.  He has a history of chronic ischemic heart disease, prostate cancer, hypertension, hyperlipidemia, type 2 diabetes, chronic kidney disease stage 3, atrial fibrillation.  He presented to have significant left lower facial droop, severe weakness of L UE extremity, some left lower extremity weakness.  CT angiography revealed occlusion of the right M2 branch of the middle cerebral artery and perfusion deficit corresponding in the right MCA territory, as well as consistent with ischemia.  MRI shows right MCA diffusion restriction area and left cerebellar small area of diffusion restriction.  The patient did receive IV tPA, and mechanical thrombectomy was attempted but unsuccessful due to angle of branch of occlusion and calcified nature of clot.        Functionally, the patient is taking steps, 3-6 feet, with moderate assist x2.  Buckling in lower extremities present.  Supine to sit with minimal-to-moderate assist x1, scooting to edge of bed and sitting with standby assist, increased time.  Sit to and from, stand with minimal assist x1 and contact guard assist of another.  Requires minimal assist for sitting balance during ADL tasks at the edge of bed and stood at the edge of bed x2 with minimal/moderate assist x2 on platform front-wheeled walker.  Able to follow 1-step directions, moderate-to-severe dysarthria.  Has oral and pharyngeal dysphagia.  Needs further cognitive linguistic evaluation to address dysarthria and left facial droop and further cognitive evaluation and treatment.      Currently the  patient presents with dysarthria, dysphagia, impaired left-sided weakness, greater weakness in left upper extremity and impaired cognition.  The patient is medically appropriate and assessed to have needs and will benefit from inpatient acute rehabilitation comprehensive program, working with Physical Therapy, Occupational Therapy and Speech Therapy and will benefit from supervision management, rehab nursing and rehab physician.      ALLERGIES:  No known drug allergies.      PAST MEDICAL AND SURGICAL HISTORY:  Please see Epic for details and as above in the HPI.      SOCIAL HISTORY:  The patient lives in an assisted-living facility, modified independent with single-end cane use for mobility and basic ADLs with no stairs.  Had some help with cleaning, meds and laundry, and meals were provided.      REVIEW OF SYSTEMS:  A 10-point review of systems negative other than symptoms noted above in the HPI and below.  Denies any headaches, lightheadedness, dizziness, palpitations, chest pain or breathing difficulties.  Has swallowing issues.  Has tube feed supplementation as of this time with NG tube.  Left upper extremity weakness with no significant movement and ongoing left lower extremity weakness.  Unclear of incontinence with bladder.  Had bowel movements recently.      PHYSICAL EXAMINATION:   VITAL SIGNS:  Blood pressure 177/68, pulse is 72 per minute, temperature 97.4 degrees Fahrenheit, respirations 16 per minute.   GENERAL:  This is an awake individual, alert, cooperative, hard of hearing.  Unclear if some of this is cognitive on top of hard of hearing or how much of it is that.  Reports having hearing aid on 1 ear, but has not worn it today.   HEENT:  Gross EOMs are intact.   NEUROLOGIC:  Left facial symmetry noted when asked to smile with some tongue deviation to the left.  Unlabored breathing overall.     GASTROINTESTINAL:  Globular abdomen.     EXTREMITIES:  Right upper extremity and lower extremity with good  strength against resistance.  Some difficulty on occasions with apraxia or motor planning/execution.  Left upper extremity with no significant movements, flaccid with no significant tone.  Nailbeds on both hands and feet have some fungal infection.  Negative Lane's, left hand.  Left lower extremity with overall decent strength, 4/5, in hip flexion, knee extension, ankle dorsiflexion and plantar flexion; however, with minimal range on active range of motion and difficulty executing movements on command with some apraxia noted.      LABORATORIES:  Please see Epic for further details, reviewed.      ASSESSMENT AND MANAGEMENT AND INITIATION PLAN OF CARE:     POST-ADMISSION EVALUATION:  I have evaluated the patient on admission to the Acute Rehab Center and compared it with the preadmission screen.  No significant differences are identified and remains appropriate for acute rehabilitation.  See below for more details and specifics regarding this admission that supports requiring medical therapy and testing in the acute rehab setting.      ASSESSMENT AND PLAN:  This is an 85-year-old male with a past medical history of stroke 9 years ago with some residual left leg weakness with history of chronic ischemic heart disease, prostate cancer, hypertension, hyperlipidemia, type 2 diabetes, chronic kidney disease, atrial fibrillation.  Now presents with left hemiparesis, upper extremity, on top of residual left lower extremity weakness with dysarthria, dysphagia, concern for cognitive impairment on top of hard of hearing with impaired mobility, ADLs.  The patient will work with Physical Therapy, Occupational Therapy and Speech Therapy for 60 minutes daily for each discipline to work on mobility, exercises for strengthening, positioning, transfers and  compensatory strategies.  Also for upper and lower body self-care tasks, dressing, toileting, bathing, energy conservation techniques with use of an assistive device as needed  and also for compensatory strategies .  Will need dysphagia treatment, evaluation and subsequent management thereof and also cognitive evaluation and treatment strategies with Speech Therapy.  The patient will work with rehab nursing for skin integrity monitoring, blood pressure monitoring, labs, vitals, provide education to patient's caregivers on new medications, monitor blood sugars in patient with diabetes type 2 and altered diet, provide care over new rehab techniques and to daily cares.      MEDICAL MANAGEMENT OVERALL:  Patient will continue with Xarelto for anticoagulation on a daily basis.  Blood pressure control for hypertension.  Optimize secondary stroke management includes hydrochlorothiazide, metoprolol twice a day and lisinopril.  Hyperlipidemia management to continue with Lipitor on a daily basis, also optimize secondary stroke management with vitamin D.  Diabetes type 2 management with glimepiride on a daily basis with supplemental subcutaneous insulin coverage and blood sugar monitoring.  Patient on Flomax on a daily basis.  Bowel and bladder:  Continue to monitor spontaneity of bowel and bladder.  Adjust management as needed.  On Flomax as above.  MiraLax, as-needed basis.  DVT prophylaxis:  Patient on Xarelto as above.  Will optimize mechanical prophylaxis with PCDs and anti-embolism stockings and progressive mobilization.      CODE STATUS:  Full.      PROGNOSIS FOR MEDICAL IMPROVEMENT:  Stabilization of medical issues for discharge to home is good.        ESTIMATED LENGTH OF STAY:  About 2-3 weeks.  Progress toward making gains for modified independence.  Will likely need more assistance on discharge at assisted-living facility.      TOTAL TIME SPENT:  55 minutes with more than half the time spent in counseling and coordination of care.         ELA MURRIETA MD             D: 02/27/2017 14:24   T: 02/27/2017 16:13   MT: BARBARA      Name:     MIRANDA BEGUM   MRN:      [id number]         Account:      [id number]   :      08/10/1931           Admitted:     [id number]      Document: [id number]

## 2017-02-27 NOTE — PLAN OF CARE
Problem: Goal Outcome Summary  Goal: Goal Outcome Summary  Outcome: Improving  Up with 2, belt, Maria A Steady to chair and commode. Also took steps with 2 and a belt during therapy. Ate 75%, 75%, and 50% of his trays; on a DD2 with nectar thick diet. Requires feeding and prompts to sweep L cheek for food and liquids. L droop, LUE hemiplegia and lack of sensation, LLE hemiparesis, slurred speech. A and O x4, forgetful at times. No periods of confusion today. VSS, tele reading SR with 1st degree AV block. Continent/incontinent of urine and small amount of soft stool. Started on Xarelto tonight, swallows pills whole with nectar thick liquid. Daughter given new medication education sheet. Will have TF running at 60 mL/hr between 2000 and 0800, continued flushes with 60 mL water q4 hours. Likely DC to ARU tomorrow

## 2017-02-27 NOTE — PLAN OF CARE
Problem: Goal Outcome Summary  Goal: Goal Outcome Summary  OT: Per plan established by the Occupational Therapist, the recommended discharge location is ARU. Pt sitting up in chair, max A of 1-2 sit to stands, mod to max A for standing balance as pt has heavy left lateral lean during clothing management up which required max A. LB dressing mod/max A except dependent socks and shoes. UB dressing with one hand dressing tech. Max A.       Pt to d/c to ARU today, GOALS NOT MET, see discharge summary

## 2017-02-27 NOTE — PLAN OF CARE
Problem: Goal Outcome Summary  Goal: Goal Outcome Summary  PT: Supine to sit with mod A x 1 person and HOB maximally elevated. Sit to/from stand with mod A x 2 persons. Ambulates 8 feet with L platform walker, mod A x1 and min A of another. Tolerates seated L LE exercises. Pt continues to be very motivated to participate in therapy. Continue to recommend discharge to ARU.     Physical Therapy Discharge Summary    Reason for therapy discharge:    Discharged to acute rehabilitation facility.    Progress towards therapy goal(s). See goals on Care Plan in [facility] electronic health record for goal details.  Goals not met.  Barriers to achieving goals:   discharge from facility.    Therapy recommendation(s):    Continued therapy is recommended.  Rationale/Recommendations:  Decreased independence with functional mobility.

## 2017-02-27 NOTE — PROGRESS NOTES
[facility]    Hospitalist Progress Note    Assessment & Plan   Elias Mckee is a 85 year old male who was admitted on 2/20/2017.    Elias Mckee is a 85 year old male past medical history significant for previous stroke with residual LLE weakness, coronary artery disease with hx of cardiac arrest, diabetes, atrial fibrillation, chronic kidney disease who presented to the Emergency Department with left-sided weakness consistent with acute stroke.       Acute right MCA territory stroke:  CTA showed occluded right M2 segment He received tPA. Attempt mechanical thrombectomy by IR however, failure of recanalization due to actue angle of branch and markedly frim/calicified nature of clot. Suspect to be cardio-embolic give hx of paroxysmal A. fib  - MRI 2/21 shows recent moderate sized posterior division right MCA territory infarct corresponding to the known right M2 occlusion and tiny recent infarct int he left cerebellar hemisphere  - repeat CT on 2/22 showed evolving mod sized posterior division right MCA infarct, no new infarct  - TTE shows LVEF of 55-60%, bubble study is negative  - Lipid panel: HDL 28, LDL 49,   - ASA rectally and statin started (but NPO at this time). Plan to start anticoagulation prior to discharge per neurology  - neurology following  - PT/OT rec acute rehab  -pt failed swallow 2/23, But passed 2/25  -feeding tube placed 2/23, started on tube feeds  -head  CT today showed evolving right MCA stroke , no hemorrhage, atrophy and chronic white matter changes, and old infarct.  -neurology has started on xaralto and plan acute rehab today               CAD with hx of multiple stent placements and hx of cardiac arrest:  - troponin x 1 negative  - no complaint of chest pain  - continue telemetry  -will recommend follow-up with cardiology after discharge from acute rehab      Hx of Atrial fibrillation  - admission EKG shows NSR. Not on anticoagulation prior to  admission  - continue telemetry  -metoprolol inc 2/26  to 50 bid  -now on xaralto      Previous stroke:   - management of acute stroke as above. ASA restarted. Plan for long term anticoagulation as above per neurology      Type II DM:adeqautely controlled at baseline.  A1c 6.8.   - on Glimepride PTA  - hold sulfonylurea until PO intake is resumed and stable  - continue with SSI  - glucoses remain elevated today, but is also getting tube feeds  - 6 units of lantus started on 2/25, inc to 10 2/26  -mod carb added to dysphagia diet    -will restart glimiparide on discharge to ARU  Hypertension:   - neurology ok with starting his BP meds  --2/25 lisinopril 5 mg po bid and add metoprolol 25 mg po bid with parameters  -2/26 inc lisinopril t o 10 bid and inc metoprolol to 50 bid  -BP still elevated at the 150-160 range, will add HCTZ        Chronic kidney disease, stage 3:   - Creatinine baseline1.3-1.4  -2/24 creatinine 0.90  Today creat 0.96      Subclinical hypothyroidism:  TSH 5.96. Will need follow up TSH in 6-8 weeks once acute issues stable.       BPH: restart Flomax      FEN:   -tube feeds started 2/23  -now on DD2 with nectar thick liq  -tube feeds will be changed to 8pm to 8 am          Deep venous thrombosis prophylaxis: SCDs.   Code Status: Full Code       Disposition: Expected discharge to ARU today    .    Leona Hughes MD    Interval History   No new problems, eager to go to acute rehab today    -Data reviewed today: I reviewed all new labs and imaging results over the last 24 hours. I personally reviewed no images or EKG's today.    Physical Exam   Temp: 97.9  F (36.6  C) Temp src: Oral BP: 161/74   Heart Rate: 69 Resp: 16 SpO2: 95 % O2 Device: None (Room air)    Vitals:    02/25/17 0451 02/26/17 0500 02/27/17 0623   Weight: 84 kg (185 lb 3 oz) 82.9 kg (182 lb 12.2 oz) 81.2 kg (179 lb 0.2 oz)     Vital Signs with Ranges  Temp:  [97.9  F (36.6  C)-99.1  F (37.3  C)] 97.9  F (36.6  C)  Heart Rate:   [69-83] 69  Resp:  [16-20] 16  BP: (145-167)/(67-90) 161/74  SpO2:  [95 %-97 %] 95 %  I/O last 3 completed shifts:  In: 2062 [P.O.:1000; I.V.:672; NG/GT:390]  Out: 1385 [Urine:1385]    Constitutional: alert   Respiratory: clear to auscultation, no wheezing or rhonchi   Cardiovascular: regular rate and rhythm without murmer or rub   GI:positive bowel sounds, non-tender   Skin/Integumen: no rashes   Other:        Medications     IV fluid REPLACEMENT ONLY       - MEDICATION INSTRUCTIONS -       - MEDICATION INSTRUCTIONS -       - MEDICATION INSTRUCTIONS -       - MEDICATION INSTRUCTIONS -         rivaroxaban ANTICOAGULANT  20 mg Oral Daily with supper     insulin glargine  10 Units Subcutaneous Daily     lisinopril  10 mg Oral BID     metoprolol  50 mg Oral BID     cholecalciferol  2,000 Units Oral Daily     ferrous gluconate  324 mg Oral Daily with breakfast     fluticasone  1-2 spray Both Nostrils Daily     melatonin  1 mg Oral At Bedtime     nitroglycerin  0.4 mg Sublingual See Admin Instructions     tamsulosin  0.8 mg Oral Daily     atorvastatin  40 mg Oral or Feeding Tube Daily     insulin aspart  1-6 Units Subcutaneous Q4H       Data     Recent Labs  Lab 02/26/17  0757 02/25/17  0740 02/24/17  0740  02/21/17  1700  02/21/17  0410 02/20/17  1905   WBC 11.4* 8.4 8.5  < >  --   --  8.3 7.2   HGB 10.1* 10.3* 10.8*  < >  --   < > 10.7* 12.2*   MCV 91 90 90  < >  --   --  91 93    153 131*  < >  --   --  124* 136*   INR  --   --   --   --   --   --   --  0.99    138 140  < >  --   --  143 141   POTASSIUM 4.1 3.9 3.6  < >  --   --  3.7 3.9   CHLORIDE 108 110* 114*  < >  --   --  117* 113*   CO2 20 21 17*  < >  --   --  19* 20   BUN 22 20 17  < >  --   --  23 28   CR 0.92 0.89 0.90  < >  --   --  1.05 1.28*   ANIONGAP 9 7 9  < >  --   --  7 8   TRENT 8.0* 7.7* 7.9*  < >  --   --  7.5* 8.4*   * 248* 179*  < >  --   --  149* 187*   TROPI  --   --   --   --  <0.015The 99th percentile for upper reference  range is 0.045 ug/L.  Troponin values in the range of 0.045 - 0.120 ug/L may be associated with risks of adverse clinical events.  --  <0.015The 99th percentile for upper reference range is 0.045 ug/L.  Troponin values in the range of 0.045 - 0.120 ug/L may be associated with risks of adverse clinical events.  --    < > = values in this interval not displayed.    Recent Results (from the past 24 hour(s))   CT Head w/o Contrast    Narrative    CT HEAD WITHOUT CONTRAST  2/26/2017 8:48 AM    HISTORY: Stroke.    TECHNIQUE: Scans were obtained through the head without IV contrast.   Radiation dose for this scan was reduced using automated exposure  control, adjustment of the mA and/or kV according to patient size, or  iterative reconstruction technique.    COMPARISON: 2/22/2017. 2/20/2017.    FINDINGS: Moderate motion artifact. There is a 4 cm x 6 cm evolving  low-density nonhemorrhagic infarct in the right posterior frontal  region just superior to the sylvian fissure. It was not evident on  2/20/2017 but otherwise discernible on 2/22/2017. It is becoming more  demarcated but not appreciably larger than on 2/22/2017.  There is  moderate generalized cerebral atrophy. There is moderate low-density  change in the white matter of both hemispheres consistent with chronic  small vessel ischemic disease. Old infarcts are again noted in the  right occipital lobe and frontal white matter bilaterally.    Paranasal sinuses are normal. No bony abnormality.       Impression    IMPRESSION:   1. Evolving right MCA territory infarct with more well-defined margins  today than on 2/22/2017.  2. No hemorrhage.  3. Atrophy and chronic white matter disease.  4. Old infarcts as previously described.     LEO BENSON MD

## 2017-02-27 NOTE — PLAN OF CARE
Problem: Goal Outcome Summary  Goal: Goal Outcome Summary  Outcome: Improving  A&Ox4. Neuros: L droop, slurred speech, LUE hemipegia with absent sensation, and LLE hemiparesis. VSS. Tele SR with 1st degree AVB. DD2/nectar thick.  Frequent oral cares. TF started at 2030 at 60ml/hr.  Up with A2-wilfrid steady. Voiding adequately. 2 BM on nights. C/o generalized discomfort, decreased with tylenol. Plan for discharge to ARU today, will continue to monitor.

## 2017-02-27 NOTE — PLAN OF CARE
Problem: Goal/Outcome  Goal: Goal Outcome Summary  FOCUS/GOAL  Bowel management, Bladder management, Nutrition/Feeding/Swallowing precautions, Pain management, Mobility, Skin integrity and Safety management     ASSESSMENT, INTERVENTIONS AND CONTINUING PLAN FOR GOAL:  New admit of today. Pt presented with stroke, left sided weakness, LUE hemiplegia, LLE hemiparesis. Hx of diabetes, CKD, A fib. Pt reported left ankle soreness. Denied tylenol, denied hot or cold pack; repositioned and elevated, decrease in pain. Pt ate 50% of lunch, , 1 unit of insulin given. Alert and oriented x4, forgetful at times per report. Slurred speech, left sided facial droop. NG tube, tube feeding running at 60 mL/hr from 8 PM - 8 AM. DD2 diet, nectar liquids. Uses urinal for voiding, can be incontinent at times of urine at night, continent of bowel. LBM 2/26. Takes pills whole with applesauce, sometimes chews pills, using nectar thickened liquids as well per nurse at [facility]. No skin issues per report, some bruising. Mepilex was on coccyx for non-blanchable redness; as of 2/26, no noted skin ussue, mepilex removed. Continue to monitor. Reposition q 2 hours. Pt uses call light appropriately to express needs, alarms on for safety purposes. Uses liko lift with nursing for transfers. Pt reported having family come to visit later today. Continue with POC.

## 2017-02-27 NOTE — IP AVS SNAPSHOT
MRN:[id number]                      After Visit Summary   2/27/2017    Elias Mckee    MRN: [id number]           Thank you!     Thank you for choosing [facility] for your care. Our goal is always to provide you with excellent care. Hearing back from our patients is one way we can continue to improve our services. Please take a few minutes to complete the written survey that you may receive in the mail after you visit with us. Thank you!        Patient Information     Date Of Birth          8/10/1931        About your hospital stay     You were admitted on:  February 27, 2017 You last received care in the:   [facility]    You were discharged on:  March 21, 2017        Reason for your hospital stay       This is an 85-year-old male with a past medical history of stroke 9 years ago with some residual left leg weakness with history of chronic ischemic heart disease, prostate cancer, hypertension, hyperlipidemia, type 2 diabetes, chronic kidney disease, atrial fibrillation. Now presents with left hemiparesis, upper extremity, on top of residual left lower extremity weakness with dysarthria, dysphagia, concern for cognitive impairment on top of hard of hearing with impaired mobility, ADLs.    To reduce the risk of subsequent stroke there are several important factors including optimal management of anticoagulants, blood pressure, cholesterol, diabetes and smoking abstinence.    Anticoagulation:  You are on Xarelto    Blood Pressure:  Keeping your blood pressures less than 130/80 has been shown to reduce risk of recurrent stroke. Recording your blood pressure and heart rate twice daily in a log book can help you and your providers make decisions on optimal management. You are encouraged to bring your log book with you to your primary physician and/or cardiology doctor visits.    You are currently on LISINOPRIL, METOPROLOL to help control your blood pressure. Several lifestyle modifications have  "been associated with blood pressure reduction and are an important part of a comprehensive plan. These include: weight loss (if over-weight); a diet low in salt and cholesterol and rich in fruits and vegetables; regular aerobic physical activity and limited alcohol consumption.    Diabetes:  Optimal management of diabetes not only reduces risk of another stroke, it can help with healing process from your recent stroke. Blood glucose levels measure how well you're controlling your diabetes. An additional test \"hemoglobin A1C\" reflects how you have been overall with glucose control in the prior few months. This should be less than 7 though even lower below 6 is considered normal. Your recent Hgb A1C was [insert A1C]. This should be followed up with your primary provider and/or endocrinologist.    Your present plan is to check blood glucose consistently Before Meals and at Bedtime. These should be recorded along with date/time and any relevant notes such as food consumed, insulin/medication taken etc. This helps you and your providers make decisions on optimal management. You're encouraged to bring you log book with to your primary and/or endocrinology doctor visits.  Your current medications include Glipizide, lantus insulin. Specific doses and frequencies listed elsewhere.     Diet:  Diet and exercise are very important for diabetes regardless of being on medication or not. Be aware of and moderate your carbohydrate intake as instructed by doctor, dietitian or nurse.  Regular physical activity may be more difficult since your stroke though doing what you can on a daily basis is helpful.     Cholesterol:  Traditional target levels for LDL cholesterol or \"bad cholesterol\" is less than 130 however once you have had a stroke, your target LDL level is now less than 70. Additional recommendations such as increasing your HDL or \"good\" cholesterol and lowering your triglyceride level can also be important.    Your most " recent lipid panel was   Lab Results       Component                Value               Date                       CHOL                     105                 02/21/2017            Lab Results       Component                Value               Date                       HDL                      28                  02/21/2017            Lab Results       Component                Value               Date                       LDL                      49                  02/21/2017                 LDL                      80                  02/17/2017            Lab Results       Component                Value               Date                       TRIG                     140                 02/21/2017            Lab Results       Component                Value               Date                       CHOLHDLRATIO             5.2                 05/29/2014              This should be followed up in 2-3 months with your primary provider.    Smoking:  Finally one of the most important modifiable risk factors is to not smoke. This includes cigarettes, pipes, cigars, chewing tobacco and second hand smoke. Support through counseling, nicotine replacement, and oral smoking-cessation medications may all be helpful. Often people have been able to quit during their hospitalization but once returning to their familiar environment, the urges can be stronger. If this is the case, we encourage you to get support. There are numerous options, start by talking with your doctor.                  Who to Call     For medical emergencies, please call 911.  For non-urgent questions about your medical care, please call your primary care provider or clinic, [phone]          Attending Provider     Provider Specialty    Kevin Bond MD [facility]       Primary Care Provider Office Phone # Fax #    Shen Huff [phone]       [address]  [place] [address]        After Care Instructions     Activity       Your activity upon discharge: assist with transfers and mobility using wheelchair, assist with self cares, initiation of activities            Activity - Up with assistive device           Activity - Up with nursing assistance           Diet       Follow this diet upon discharge: Dysphagia Diet Level 3 Advanced Nectar Thickened Liquids (pre-thickened or use instant food thickener)            Fall precautions           General info for SNF       Length of Stay Estimate: Short Term Care: Estimated # of Days <30  Condition at Discharge: Improving  Level of care:skilled   Rehabilitation Potential: Good  Admission H&P remains valid and up-to-date: Yes  Recent Chemotherapy: N/A  Use Nursing Home Standing Orders: Yes            Glucose monitor nursing POCT       Before meals and at bedtime            Mantoux instructions       Give two-step Mantoux (PPD) Per Facility Policy Yes                  Additional Services     Occupational Therapy Adult Consult       Evaluate and treat as clinically indicated.    Reason:  Impaired ADLs and IADLs s/p stroke with neglect and hemiparesis            Physical Therapy Adult Consult       Evaluate and treat as clinically indicated.    Reason:  Impaired mobility and transfers s/p stroke with hemiparesis and neglect            Speech Language Path Adult Consult       Evaluate and treat as clinically indicated.    Reason:  Dysphagia and cognition                  Future tests that were ordered for you     AntiEmbolism Stockings       Bilateral below knee length.On in the morning, off at night            Pneumatic Compression Device        Bilateral calf. Remove 30 mins BID.                  Further instructions from your care team       Follow-Up Appointments:     - PCP in 1-2 weeks s/p stroke with Dr Shen Huff    - Neurology with Dr Manuel Summers in 4 weeks s/p stroke    - [facility] in 8 weeks with  "Dr Kevin Bond or Dr Verenice Garcia    Address  Dr. Kevin Bond/Dr. Garcia                           of [facility]                          [facility]                          3rd Floor    [address] [place] [address]    Phone   [place] [phone]                          [place] [phone]                          [place] [phone]        Pending Results     No orders found from 2017 to 2017.            Statement of Approval     Ordered          17 0900  I have reviewed and agree with all the recommendations and orders detailed in this document.  EFFECTIVE NOW     Approved and electronically signed by:  Kevin Bond MD             Admission Information     Date & Time Provider Department Dept. Phone    2017 Kevin Bond MD  [facility] [phone]      Your Vitals Were     Blood Pressure Pulse Temperature Respirations Height Weight    99/57 (BP Location: Right arm) 73 96.9  F (36.1  C) (Oral) 16 1.778 m (5' 10\") 83.4 kg (183 lb 14.4 oz)    Pulse Oximetry BMI (Body Mass Index)                96% 26.39 kg/m2          [employer] Information     [employer] lets you send messages to your doctor, view your test results, renew your prescriptions, schedule appointments and more. To sign up, go to [email or web address] . Click on \"Log in\" on the left side of the screen, which will take you to the Welcome page. Then click on \"Sign up Now\" on the right side of the page.     You will be asked to enter the access code listed below, as well as some personal information. Please follow the directions to create your username and password.     Your access code is: [email or web address]  Expires: 2017 10:41 AM     Your access code will  in 90 days. If you need help or a new code, please call your [facility] clinic or [phone].        Care EveryWhere ID     This is your Care EveryWhere ID. " This could be used by other organizations to access your [facility] medical records  [id number]           Review of your medicines      START taking        Dose / Directions    baclofen 10 MG tablet   Commonly known as:  LIORESAL   Used for:  Spasticity, Cerebrovascular accident (CVA), unspecified mechanism (H)        Dose:  5 mg   Take 0.5 tablets (5 mg) by mouth 3 times daily   Quantity:  90 tablet   Refills:  0       glipiZIDE 5 MG tablet   Commonly known as:  GLUCOTROL        Dose:  7.5 mg   Take 1.5 tablets (7.5 mg) by mouth daily (with breakfast)   Quantity:  60 tablet   Refills:  0       insulin glargine 100 UNIT/ML injection   Commonly known as:  LANTUS        Dose:  10 Units   Inject 10 Units Subcutaneous every 24 hours   Quantity:  50 mL   Refills:  0       lidocaine 5 % Patch   Commonly known as:  LIDODERM   Used for:  Cervicalgia        Dose:  1-2 patch   Place 1-2 patches onto the skin every 24 hours Apply patch(s) to neck. To prevent lidocaine toxicity, patient should be patch free for 12 hrs daily. Patches may be cut to smaller size prior to removing release liner.   Quantity:  60 patch   Refills:  0       miconazole 2 % powder   Commonly known as:  MICATIN; MICRO GUARD   Used for:  Groin rash        Apply topically 2 times daily Apply to groin   Quantity:  100 g   Refills:  0       ranitidine 150 MG tablet   Commonly known as:  ZANTAC   Used for:  Cerebrovascular accident (CVA), unspecified mechanism (H)        Dose:  150 mg   Take 1 tablet (150 mg) by mouth 2 times daily   Quantity:  60 tablet   Refills:  0         CONTINUE these medicines which may have CHANGED, or have new prescriptions. If we are uncertain of the size of tablets/capsules you have at home, strength may be listed as something that might have changed.        Dose / Directions    hydrochlorothiazide 12.5 MG capsule   Commonly known as:  MICROZIDE   This may have changed:  additional instructions   Used for:  Essential hypertension,  Cerebrovascular accident (CVA), unspecified mechanism (H)        Dose:  12.5 mg   Take 1 capsule (12.5 mg) by mouth daily Hold for SBP < 110   Quantity:  30 capsule   Refills:  0       lisinopril 20 MG tablet   Commonly known as:  PRINIVIL/ZESTRIL   This may have changed:    - medication strength  - how much to take  - additional instructions   Used for:  Essential hypertension        Dose:  20 mg   Take 1 tablet (20 mg) by mouth 2 times daily Hold for SBP < 100   Quantity:  30 tablet   Refills:  0       melatonin 3 MG tablet   This may have changed:    - medication strength  - how much to take   Used for:  Insomnia, unspecified type        Dose:  3 mg   Take 1 tablet (3 mg) by mouth At Bedtime   Quantity:  60 tablet   Refills:  0       metoprolol 50 MG tablet   Commonly known as:  LOPRESSOR   This may have changed:  additional instructions   Used for:  Essential hypertension        Dose:  50 mg   Take 1 tablet (50 mg) by mouth 2 times daily Hold for SBP < 100 or HR < 55 / min   Quantity:  60 tablet   Refills:  0       tamsulosin 0.4 MG capsule   Commonly known as:  FLOMAX   This may have changed:  how much to take   Used for:  Benign prostatic hyperplasia, presence of lower urinary tract symptoms unspecified, unspecified morphology        Dose:  0.4 mg   Take 1 capsule (0.4 mg) by mouth daily   Quantity:  60 capsule   Refills:  0         CONTINUE these medicines which have NOT CHANGED        Dose / Directions    atorvastatin 40 MG tablet   Commonly known as:  LIPITOR   Used for:  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   Quantity:  99 strip   Refills:  1 year       ferrous gluconate 324 (38 FE) MG tablet   Commonly known as:  FERGON   Used for:  Anemia        Dose:  324 mg   Take 1 tablet (324 mg) by mouth daily (with breakfast)   Quantity:  90 tablet   Refills:  1       fluticasone 50 MCG/ACT spray   Commonly known as:  FLONASE   Used for:  Acute sinusitis with symptoms > 10 days        Dose:  1-2 spray   Spray 1-2 sprays into both nostrils daily   Quantity:  1 Package   Refills:  3       * order for DME   Used for:  Leg weakness        Equipment being ordered: Wheelchair   Quantity:  1 Device   Refills:  0       * order for DME   Used for:  Memory loss, Leg weakness        Equipment being ordered:  transport wheelchair   Quantity:  1 Device   Refills:  0       * order for DME   Used for:  Memory loss, Other musculoskeletal symptoms referable to limbs(729.89)        Equipment being ordered: transport wheelchair.  It has been ruled out that a walker and cane are not sufficient.   Quantity:  1 Device   Refills:  0       * order for DME   Used for:  Weakness of both lower extremities, Imbalance, Fall, subsequent encounter        Equipment being ordered: Walker - basic black non collapseable walker   Quantity:  1 Device   Refills:  0       * order for DME   Used for:  Falls frequently, Imbalance        Equipment being ordered: basic black non - collapesable cane   Quantity:  1 each   Refills:  0       * order for DME   Used for:  Ataxia, Weakness of both lower extremities, Imbalance        Equipment being ordered: Cane   Quantity:  1 Device   Refills:  0       polyethylene glycol powder   Commonly known as:  MIRALAX/GLYCOLAX        Dose:  17 g   Take 17 g by mouth every 48 hours as needed for constipation   Refills:   0       rivaroxaban ANTICOAGULANT 20 MG Tabs tablet   Commonly known as:  XARELTO   Used for:  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)        Dose:  20 mg   Take 1 tablet (20 mg) by mouth daily (with dinner)   Quantity:  30 tablet   Refills:  1       TYLENOL 325 MG tablet   Used for:  Pain in joint involving ankle and foot, left   Generic drug:  acetaminophen        Dose:  650 mg   Take 650 mg by mouth every 4 hours as needed for mild pain   Quantity:  100 tablet   Refills:  0       vitamin D 2000 UNITS tablet   Used for:  Unspecified vitamin D deficiency        Dose:  2000 Units   Take 2,000 Units by mouth daily   Quantity:  100 tablet   Refills:  3       * Notice:  This list has 6 medication(s) that are the same as other medications prescribed for you. Read the directions carefully, and ask your doctor or other care provider to review them with you.      STOP taking     amoxicillin 250 MG capsule   Commonly known as:  AMOXIL           GLIMEPIRIDE PO           insulin aspart 100 UNIT/ML injection   Commonly known as:  NovoLOG FLEXPEN           multivitamin per tablet           nitroglycerin 0.4 MG sublingual tablet   Commonly known as:  NITROSTAT                Where to get your medicines      Some of these will need a paper prescription and others can be bought over the counter. Ask your nurse if you have questions.     You don't need a prescription for these medications     baclofen 10 MG tablet    glipiZIDE 5 MG tablet    hydrochlorothiazide 12.5 MG capsule    insulin glargine 100 UNIT/ML injection    lidocaine 5 % Patch    lisinopril 20 MG tablet    melatonin 3 MG tablet    metoprolol 50 MG tablet    miconazole 2 % powder    ranitidine 150 MG tablet    tamsulosin 0.4 MG capsule                Protect others around you: Learn how to safely use, store and throw away your medicines at www.disposemymeds.org.             Medication List: This is a list of all your medications and  when to take them. Check marks below indicate your daily home schedule. Keep this list as a reference.      Medications           Morning Afternoon Evening Bedtime As Needed    atorvastatin 40 MG tablet   Commonly known as:  LIPITOR   1 tablet (40 mg) by Oral or Feeding Tube route daily   Last time this was given:  40 mg on 3/21/2017  9:17 AM                                baclofen 10 MG tablet   Commonly known as:  LIORESAL   Take 0.5 tablets (5 mg) by mouth 3 times daily   Last time this was given:  5 mg on 3/21/2017  9:17 AM                                blood glucose lancets standard   Commonly known as:  no brand specified   appropriate to strips and machine                                BLOOD GLUCOSE TEST STRIPS STRP   66 strips continuous.                                ferrous gluconate 324 (38 FE) MG tablet   Commonly known as:  FERGON   Take 1 tablet (324 mg) by mouth daily (with breakfast)   Last time this was given:  324 mg on 3/21/2017  9:18 AM                                fluticasone 50 MCG/ACT spray   Commonly known as:  FLONASE   Spray 1-2 sprays into both nostrils daily   Last time this was given:  2 sprays on 3/21/2017  9:16 AM                                glipiZIDE 5 MG tablet   Commonly known as:  GLUCOTROL   Take 1.5 tablets (7.5 mg) by mouth daily (with breakfast)   Last time this was given:  7.5 mg on 3/21/2017  9:17 AM                                hydrochlorothiazide 12.5 MG capsule   Commonly known as:  MICROZIDE   Take 1 capsule (12.5 mg) by mouth daily Hold for SBP < 110   Last time this was given:  12.5 mg on 3/21/2017  9:17 AM                                insulin glargine 100 UNIT/ML injection   Commonly known as:  LANTUS   Inject 10 Units Subcutaneous every 24 hours   Last time this was given:  10 Units on 3/21/2017  9:19 AM                                lidocaine 5 % Patch   Commonly known as:  LIDODERM   Place 1-2 patches onto the skin every 24 hours Apply patch(s) to neck.  To prevent lidocaine toxicity, patient should be patch free for 12 hrs daily. Patches may be cut to smaller size prior to removing release liner.   Last time this was given:  2 patches on 3/21/2017  9:14 AM                                lisinopril 20 MG tablet   Commonly known as:  PRINIVIL/ZESTRIL   Take 1 tablet (20 mg) by mouth 2 times daily Hold for SBP < 100   Last time this was given:  20 mg on 3/21/2017  9:18 AM                                melatonin 3 MG tablet   Take 1 tablet (3 mg) by mouth At Bedtime   Last time this was given:  3 mg on 3/20/2017  8:39 PM                                metoprolol 50 MG tablet   Commonly known as:  LOPRESSOR   Take 1 tablet (50 mg) by mouth 2 times daily Hold for SBP < 100 or HR < 55 / min   Last time this was given:  50 mg on 3/21/2017  9:17 AM                                miconazole 2 % powder   Commonly known as:  MICATIN; MICRO GUARD   Apply topically 2 times daily Apply to groin   Last time this was given:  3/21/2017  9:17 AM                                * order for DME   Equipment being ordered: Wheelchair                                * order for DME   Equipment being ordered:  transport wheelchair                                * order for DME   Equipment being ordered: transport wheelchair.  It has been ruled out that a walker and cane are not sufficient.                                * order for DME   Equipment being ordered: Walker - basic black non collapseable walker                                * order for DME   Equipment being ordered: basic black non - collapesable cane                                * order for DME   Equipment being ordered: Cane                                polyethylene glycol powder   Commonly known as:  MIRALAX/GLYCOLAX   Take 17 g by mouth every 48 hours as needed for constipation                                ranitidine 150 MG tablet   Commonly known as:  ZANTAC   Take 1 tablet (150 mg) by mouth 2 times daily   Last time  this was given:  150 mg on 3/21/2017  9:17 AM                                rivaroxaban ANTICOAGULANT 20 MG Tabs tablet   Commonly known as:  XARELTO   Take 1 tablet (20 mg) by mouth daily (with dinner)   Last time this was given:  20 mg on 3/20/2017  4:55 PM                                tamsulosin 0.4 MG capsule   Commonly known as:  FLOMAX   Take 1 capsule (0.4 mg) by mouth daily   Last time this was given:  0.4 mg on 3/21/2017  9:17 AM                                TYLENOL 325 MG tablet   Take 650 mg by mouth every 4 hours as needed for mild pain   Last time this was given:  650 mg on 3/20/2017  2:12 AM   Generic drug:  acetaminophen                                vitamin D 2000 UNITS tablet   Take 2,000 Units by mouth daily   Last time this was given:  2,000 Units on 3/21/2017  9:18 AM                                * Notice:  This list has 6 medication(s) that are the same as other medications prescribed for you. Read the directions carefully, and ask your doctor or other care provider to review them with you.

## 2017-02-27 NOTE — PLAN OF CARE
Problem: Goal Outcome Summary  Goal: Goal Outcome Summary     SLP - Swallow Tx was provided this am.  Nursing reports patient was coughing with nectar thick liquids and soft solids during breakfast this am.  Patient was seen briefly to check tolerance of nectar thick liquids prior to discharge.  Patient produced delayed swallows to presented nectar thick liquids by spoon.  Wet voice was noted after the initial trial.  Mod cues were given to use a chin tuck and double swallows with additional nectar trials.  No wet voice/signs of aspiration were observed after trials with use of strategies.  Recommend 1:1 supervision with increased use of swallow precautions/strategies with a dysphagia diet level 2 and nectar-thick liquids. Swallow precautions include: pt must be assistance/supervision, awake/alert, chin tuck with nectar by spoon, small bites/sips, alternate solids/liquids, double swallow per bite of food, check oral cavity for leftover foods/liquid, oral cares after meals, supervision with verbal cueing for strategies. Meds in applesauce/pudding. Hold po if aspiration signs are observed despite increased use of precautions. Recommend ongoing SLP swallow assessment of diet tolerance and continued Tx for strategy training and exercises upon discharge to ARU.  Recommend continue TF until diet tolerance demonstrated over multiple sessions.     Speech Language Therapy Discharge Summary     Reason for therapy discharge:    Discharged to acute rehabilitation facility.     Progress towards therapy goal(s). See goals on Care Plan in [facility] electronic health record for goal details.  Goals partially met.  Barriers to achieving goals:   discharge from facility.   Swallow Tx goals active at discharge.     Therapy recommendation(s):    Continued therapy is recommended.  Rationale/Recommendations:  See note above.

## 2017-02-27 NOTE — IP AVS SNAPSHOT
"          [facility]: [id number]                                              INTERAGENCY TRANSFER FORM - LAB / IMAGING / EKG / EMG RESULTS   2017                    Hospital Admission Date: 2017  MIRANDA BEGUM   : 8/10/1931  Sex: Male        Attending Provider: Kevin Bond MD     Allergies:  No Known Allergies    Infection:  None   Service:  ACUTE IP TEOFILO    Ht:  1.778 m (5' 10\")   Wt:  83.4 kg (183 lb 14.4 oz)   Admission Wt:  87.1 kg (192 lb)    BMI:  26.39 kg/m 2   BSA:  2.03 m 2            Patient PCP Information     Provider PCP Type    Shen Huff MD General         Lab Results - 3 Days      Glucose by meter [[id number]]  Resulted: 17 183, Result status: Final result    Ordering provider: Kevin Bond MD  17 182 Resulting lab: POINT OF CARE TEST, GLUCOSE    Specimen Information    Type Source Collected On     17 1829          Components       Value Reference Range Flag Lab   Glucose 91 70 - 99 mg/dL  170            Basic metabolic panel [[id number]] (Abnormal)  Resulted: 17 1301, Result status: Final result    Ordering provider: Kevin Bond MD  17 1117 Resulting lab: [facility]    Specimen Information    Type Source Collected On   Blood  17 1225          Components       Value Reference Range Flag Lab   Sodium 138 133 - 144 mmol/L  13   Potassium 4.3 3.4 - 5.3 mmol/L  13   Chloride 104 94 - 109 mmol/L  13   Carbon Dioxide 25 20 - 32 mmol/L  13   Anion Gap 9 3 - 14 mmol/L  13   Glucose 241 70 - 99 mg/dL H 13   Urea Nitrogen 23 7 - 30 mg/dL  13   Creatinine 1.08 0.66 - 1.25 mg/dL  13   GFR Estimate 65 >60 mL/min/1.7m2  13   Comment:  Non  GFR Calc   GFR Estimate If Black 79 >60 mL/min/1.7m2  13   Comment:  African American GFR Calc   Calcium 8.9 8.5 - 10.1 mg/dL  13            CBC with platelets [[id number]] (Abnormal)  Resulted: 17 1244, Result status: " Final result    Ordering provider: Kevin Bond MD  03/20/17 1117 Resulting lab: [facility]    Specimen Information    Type Source Collected On   Blood  03/20/17 1225          Components       Value Reference Range Flag Lab   WBC 5.8 4.0 - 11.0 10e9/L  13   RBC Count 3.70 4.4 - 5.9 10e12/L L 13   Hemoglobin 11.3 13.3 - 17.7 g/dL L 13   Hematocrit 33.7 40.0 - 53.0 % L 13   MCV 91 78 - 100 fl  13   MCH 30.5 26.5 - 33.0 pg  13   MCHC 33.5 31.5 - 36.5 g/dL  13   RDW 14.2 10.0 - 15.0 %  13   Platelet Count 139 150 - 450 10e9/L L 13            Glucose by meter [[id number]] (Abnormal)  Resulted: 03/20/17 1135, Result status: Final result    Ordering provider: Kevin Bond MD  03/20/17 1133 Resulting lab: POINT OF CARE TEST, GLUCOSE    Specimen Information    Type Source Collected On     03/20/17 1133          Components       Value Reference Range Flag Lab   Glucose 228 70 - 99 mg/dL H 170            Glucose by meter [[id number]] (Abnormal)  Resulted: 03/20/17 0816, Result status: Final result    Ordering provider: Kevin Bond MD  03/20/17 0813 Resulting lab: POINT OF CARE TEST, GLUCOSE    Specimen Information    Type Source Collected On     03/20/17 0813          Components       Value Reference Range Flag Lab   Glucose 138 70 - 99 mg/dL H 170            Glucose by meter [[id number]] (Abnormal)  Resulted: 03/20/17 0210, Result status: Final result    Ordering provider: Kevin Bond MD  03/20/17 0202 Resulting lab: POINT OF CARE TEST, GLUCOSE    Specimen Information    Type Source Collected On     03/20/17 0202          Components       Value Reference Range Flag Lab   Glucose 137 70 - 99 mg/dL H 170   Comment:  /RN Notified            Glucose by meter [[id number]] (Abnormal)  Resulted: 03/19/17 2115, Result status: Final result    Ordering provider: Kevin Bond MD  03/19/17 2110 Resulting lab: POINT OF CARE TEST, GLUCOSE     Specimen Information    Type Source Collected On     03/19/17 2110          Components       Value Reference Range Flag Lab   Glucose 150 70 - 99 mg/dL H 170            Glucose by meter [[id number]] (Abnormal)  Resulted: 03/19/17 1735, Result status: Final result    Ordering provider: Kevin Bond MD  03/19/17 1729 Resulting lab: POINT OF CARE TEST, GLUCOSE    Specimen Information    Type Source Collected On     03/19/17 1729          Components       Value Reference Range Flag Lab   Glucose 121 70 - 99 mg/dL H 170            Glucose by meter [[id number]] (Abnormal)  Resulted: 03/19/17 1720, Result status: Final result    Ordering provider: Kevin Bond MD  03/19/17 1221 Resulting lab: POINT OF CARE TEST, GLUCOSE    Specimen Information    Type Source Collected On     03/19/17 1221          Components       Value Reference Range Flag Lab   Glucose 145 70 - 99 mg/dL H 170            Glucose by meter [[id number]] (Abnormal)  Resulted: 03/19/17 1720, Result status: Final result    Ordering provider: Kevin Bond MD  03/19/17 0827 Resulting lab: POINT OF CARE TEST, GLUCOSE    Specimen Information    Type Source Collected On     03/19/17 0827          Components       Value Reference Range Flag Lab   Glucose 147 70 - 99 mg/dL H 170            Glucose by meter [[id number]] (Abnormal)  Resulted: 03/19/17 0210, Result status: Final result    Ordering provider: Kevin Bond MD  03/19/17 0145 Resulting lab: POINT OF CARE TEST, GLUCOSE    Specimen Information    Type Source Collected On     03/19/17 0145          Components       Value Reference Range Flag Lab   Glucose 142 70 - 99 mg/dL H 170            Glucose by meter [[id number]] (Abnormal)  Resulted: 03/18/17 2135, Result status: Final result    Ordering provider: Kevin Bond MD  03/18/17 2101 Resulting lab: POINT OF CARE TEST, GLUCOSE    Specimen Information    Type Source Collected On     03/18/17 2101           Components       Value Reference Range Flag Lab   Glucose 162 70 - 99 mg/dL H 170            Glucose by meter [[id number]] (Abnormal)  Resulted: 03/18/17 1720, Result status: Final result    Ordering provider: Kevin Bond MD  03/18/17 1708 Resulting lab: POINT OF CARE TEST, GLUCOSE    Specimen Information    Type Source Collected On     03/18/17 1708          Components       Value Reference Range Flag Lab   Glucose 152 70 - 99 mg/dL H 170            Glucose by meter [[id number]] (Abnormal)  Resulted: 03/18/17 1215, Result status: Final result    Ordering provider: Kevin Bond MD  03/18/17 1212 Resulting lab: POINT OF CARE TEST, GLUCOSE    Specimen Information    Type Source Collected On     03/18/17 1212          Components       Value Reference Range Flag Lab   Glucose 164 70 - 99 mg/dL H 170            Glucose by meter [[id number]] (Abnormal)  Resulted: 03/18/17 0745, Result status: Final result    Ordering provider: Kevin Bond MD  03/18/17 0740 Resulting lab: POINT OF CARE TEST, GLUCOSE    Specimen Information    Type Source Collected On     03/18/17 0740          Components       Value Reference Range Flag Lab   Glucose 163 70 - 99 mg/dL H 170            Glucose by meter [[id number]] (Abnormal)  Resulted: 03/18/17 0210, Result status: Final result    Ordering provider: Kevin Bond MD  03/18/17 0156 Resulting lab: POINT OF CARE TEST, GLUCOSE    Specimen Information    Type Source Collected On     03/18/17 0156          Components       Value Reference Range Flag Lab   Glucose 195 70 - 99 mg/dL H 170            Testing Performed By     Lab - Abbreviation Name Director Address Valid Date Range    13 - Unknown [facility] Unknown [id number] [facility] [id number] 01/15/15 0916 - Present    170 - Unknown POINT OF CARE TEST, GLUCOSE Unknown Unknown 10/31/11 1114 - Present            Unresulted Labs     None         Imaging  Results - 3 Days      XR Video Swallow w/o Esophagram [[id number]]  Resulted: 03/20/17 1740, Result status: Final result    Ordering provider: Rupert Rodriguez MD  03/19/17 1308 Resulted by: Socrates Ivory MD Faizi, Nauroze, MD    Performed: 03/20/17 1350 - 03/20/17 1416 Resulting lab: RADIOLOGY RESULTS    Narrative:       Examination:  Modified Barium Swallow Study with Speech Pathology,  3/20/2017    Comparison: 2/25/2017    History: Dysphagia    Fluoroscopy time: 2 minutes 1 second.    Findings: Under fluoroscopic guidance, the patient was given orally  administered barium of varying consistencies in the presence of the  speech pathology service including thin consistency, nectar  consistency and cracker coated with barium   The oral phase was delayed. There is deep laryngeal  penetration with  thin consistency of barium. Multiple episodes of penetration without  aspiration were demonstrated with liquid barium. With a chin tuck  maneuver, there is decrease in the penetration. No aspiration with  thin consistency, nectar thickened barium, or barium coated cracker.      Impression:       Impression: Deep laryngeal penetration with thin consistency barium  improved by chin tuck maneuver. Transient penetration with nectar  consistency barium.  Please see the speech pathologist report for  further details.    I have personally reviewed the examination and initial interpretation  and I agree with the findings.    SOCRATES IVORY MD      Testing Performed By     Lab - Abbreviation Name Director Address Valid Date Range    104 - Rad Rslts RADIOLOGY RESULTS Unknown Unknown 02/16/05 1553 - Present            Encounter-Level Documents:     There are no encounter-level documents.      Order-Level Documents:     There are no order-level documents.

## 2017-02-27 NOTE — H&P
Post Admission Physician Evaluation:    I have compared Elias Mckee's condition on admission to acute rehabilitation to that outlined in the preadmission screen. History and physical exam performed by me. No significant differences are identified and the patient remains appropriate for an inpatient rehabilitation facility level of care to manage medical issues and address functional impairments due to (rehabilitation diagnosis) stroke.    Comorbid medical conditions being managed: chronic ischemic heart disease, prostate CA, HTN, HLD, type 2 DM, CKD     Prior functional level: mod ind with SEC for basic mobility and ADLs at assistive living facility    Present function: steps 3-6 ft with mod A x 2, buckling of LEs present, min to mod A x 1, scooting EOB in sitting with SBA and increased time, sit / stand with min A x 1 and CGA of another. Min A sitting balance. Dysphagia with TF via NG tube and PO diet for now.     Anticipated rehabilitation course: ancipate to progress towards mod independent with mobility using AD and additional assistance at the assistive living facility     Will benefit from intensive rehabilitation includin minutes each of PT, OT and SLP  Rehabilitation nursing  Close management by physiatry    Prognosis: good    Estimated length of stay: 2-3 weeks

## 2017-02-27 NOTE — PLAN OF CARE
Problem: Goal Outcome Summary  Goal: Goal Outcome Summary  Occupational Therapy Discharge Summary     Reason for therapy discharge:    Discharged to acute rehabilitation facility.     Progress towards therapy goal(s). See goals on Care Plan in [facility] electronic health record for goal details.  Goals not met.  Barriers to achieving goals:   discharge from facility.     Therapy recommendation(s):    Continued therapy is recommended.  Rationale/Recommendations:  To maximize I in ADL/IADL.

## 2017-02-28 LAB
GLUCOSE BLDC GLUCOMTR-MCNC: 139 MG/DL (ref 70–99)
GLUCOSE BLDC GLUCOMTR-MCNC: 140 MG/DL (ref 70–99)
GLUCOSE BLDC GLUCOMTR-MCNC: 147 MG/DL (ref 70–99)
GLUCOSE BLDC GLUCOMTR-MCNC: 156 MG/DL (ref 70–99)
GLUCOSE BLDC GLUCOMTR-MCNC: 345 MG/DL (ref 70–99)

## 2017-02-28 PROCEDURE — 97166 OT EVAL MOD COMPLEX 45 MIN: CPT | Mod: GO

## 2017-02-28 PROCEDURE — 97535 SELF CARE MNGMENT TRAINING: CPT | Mod: GO

## 2017-02-28 PROCEDURE — 25000131 ZZH RX MED GY IP 250 OP 636 PS 637: Mod: GY | Performed by: NURSE PRACTITIONER

## 2017-02-28 PROCEDURE — 25000132 ZZH RX MED GY IP 250 OP 250 PS 637: Mod: GY | Performed by: PHYSICAL MEDICINE & REHABILITATION

## 2017-02-28 PROCEDURE — 97530 THERAPEUTIC ACTIVITIES: CPT | Mod: GP | Performed by: PHYSICAL THERAPIST

## 2017-02-28 PROCEDURE — 92610 EVALUATE SWALLOWING FUNCTION: CPT | Mod: GN

## 2017-02-28 PROCEDURE — 97163 PT EVAL HIGH COMPLEX 45 MIN: CPT | Mod: GP | Performed by: PHYSICAL THERAPIST

## 2017-02-28 PROCEDURE — 00000146 ZZHCL STATISTIC GLUCOSE BY METER IP

## 2017-02-28 PROCEDURE — 40000133 ZZH STATISTIC OT WARD VISIT

## 2017-02-28 PROCEDURE — 12800006 ZZH R&B REHAB

## 2017-02-28 PROCEDURE — 40000193 ZZH STATISTIC PT WARD VISIT: Performed by: PHYSICAL THERAPIST

## 2017-02-28 PROCEDURE — 40000225 ZZH STATISTIC SLP WARD VISIT

## 2017-02-28 PROCEDURE — A9270 NON-COVERED ITEM OR SERVICE: HCPCS | Mod: GY | Performed by: PHYSICAL MEDICINE & REHABILITATION

## 2017-02-28 PROCEDURE — 92526 ORAL FUNCTION THERAPY: CPT | Mod: GN

## 2017-02-28 PROCEDURE — 27210429 ZZH NUTRITION PRODUCT INTERMEDIATE LITER

## 2017-02-28 RX ADMIN — GLIMEPIRIDE 3 MG: 2 TABLET ORAL at 08:02

## 2017-02-28 RX ADMIN — DICLOFENAC SODIUM: 10 GEL TOPICAL at 12:53

## 2017-02-28 RX ADMIN — FERROUS GLUCONATE 324 MG: 324 TABLET ORAL at 08:02

## 2017-02-28 RX ADMIN — METOPROLOL TARTRATE 50 MG: 50 TABLET, FILM COATED ORAL at 08:02

## 2017-02-28 RX ADMIN — INSULIN ASPART 1 UNITS: 100 INJECTION, SOLUTION INTRAVENOUS; SUBCUTANEOUS at 11:59

## 2017-02-28 RX ADMIN — ACETAMINOPHEN 650 MG: 325 TABLET, FILM COATED ORAL at 15:49

## 2017-02-28 RX ADMIN — LISINOPRIL 10 MG: 10 TABLET ORAL at 20:31

## 2017-02-28 RX ADMIN — Medication 1 MG: at 21:41

## 2017-02-28 RX ADMIN — INSULIN HUMAN 10 UNITS: 100 INJECTION, SUSPENSION SUBCUTANEOUS at 20:31

## 2017-02-28 RX ADMIN — HYDROCHLOROTHIAZIDE 12.5 MG: 12.5 CAPSULE ORAL at 08:03

## 2017-02-28 RX ADMIN — TAMSULOSIN HYDROCHLORIDE 0.8 MG: 0.4 CAPSULE ORAL at 08:02

## 2017-02-28 RX ADMIN — MICONAZOLE NITRATE: 2 POWDER TOPICAL at 22:00

## 2017-02-28 RX ADMIN — DICLOFENAC SODIUM: 10 GEL TOPICAL at 15:45

## 2017-02-28 RX ADMIN — RANITIDINE HYDROCHLORIDE 150 MG: 150 TABLET, FILM COATED ORAL at 08:03

## 2017-02-28 RX ADMIN — FLUTICASONE PROPIONATE 1 SPRAY: 50 SPRAY, METERED NASAL at 08:04

## 2017-02-28 RX ADMIN — INSULIN ASPART 5 UNITS: 100 INJECTION, SOLUTION INTRAVENOUS; SUBCUTANEOUS at 08:03

## 2017-02-28 RX ADMIN — DICLOFENAC SODIUM: 10 GEL TOPICAL at 20:31

## 2017-02-28 RX ADMIN — METOPROLOL TARTRATE 50 MG: 50 TABLET, FILM COATED ORAL at 20:31

## 2017-02-28 RX ADMIN — VITAMIN D, TAB 1000IU (100/BT) 2000 UNITS: 25 TAB at 08:02

## 2017-02-28 RX ADMIN — ATORVASTATIN CALCIUM 40 MG: 40 TABLET, FILM COATED ORAL at 08:02

## 2017-02-28 RX ADMIN — RANITIDINE HYDROCHLORIDE 150 MG: 150 TABLET, FILM COATED ORAL at 20:31

## 2017-02-28 RX ADMIN — LISINOPRIL 10 MG: 10 TABLET ORAL at 08:02

## 2017-02-28 RX ADMIN — RIVAROXABAN 20 MG: 10 TABLET, FILM COATED ORAL at 17:10

## 2017-02-28 ASSESSMENT — ACTIVITIES OF DAILY LIVING (ADL): PREVIOUS_RESPONSIBILITIES: MEDICATION MANAGEMENT

## 2017-02-28 NOTE — PLAN OF CARE
Problem: Goal/Outcome  Goal: Goal Outcome Summary  PT: PT eval completed.  Pt with flaccid LUE, paretic LLE with decreased coordination, decreased activity tolerance and pain L ankle.  Will use aircast on ankle for weightbearing activities on 3/1. Pt fatigues quickly, needed A of 2 for stand pivot tx wc to bed.  Will work towards increased balance, increased independence with mobility.  Pt may need 4 weeks ELOS, and back to AL, may need increased services at d/c.  Pt owns a cane, may need a quad cane, will continue to determine equipment needs.

## 2017-02-28 NOTE — PLAN OF CARE
Problem: Goal/Outcome  Goal: Goal Outcome Summary  OT: initial eval completed and tx initiated. Pt needs to have chair and bed alarm. Pt is to sit up for all meals. Pt has flaccid LUE and an order for sling is being placed today for transfer. Pt completing simulated toilet transfer with therapist only at this time and will train CNA's as appropriate. It is estimated that pt will stay for 4 weeks in ARU and anticipation of possible TCU or back to assisted living facility with assistance as needed. PT has all DME at this time and will evaluate for further as needed.

## 2017-02-28 NOTE — PLAN OF CARE
Problem: Goal/Outcome  Goal: Goal Outcome Summary  Outcome: No Change  Pt presents as alert and oriented, intermittently confused and is [place]. Pt needs assistance with positioning urinal, staff empties. Had BM on bed pan this shift. Requires a2 for respositioning and uses LIKO for transfers.  Pt moans loudly while repositioning. Pt repositioned q 2-3 hours throughout shift. Pt has blanchable pink areas on coccyx and L abdomen.  and 140 today.  Pt c/o L foot pain, put voltaren gel on.  TF turned off at 9 am.Heels elevated off bed, continue with POC

## 2017-02-28 NOTE — PROGRESS NOTES
CLINICAL NUTRITION SERVICES - ASSESSMENT NOTE     Nutrition Prescription    RECOMMENDATIONS FOR MDs/PROVIDERS TO ORDER:  -None at this time.    Malnutrition Status:    -Unable to determine due to incomplete nutrition status validation.      Recommendations already ordered by Registered Dietitian (RD):  -Ordered Magic Cup with meals  -Continue with current TF:  Isosource 1.5@60 mL/hr x 12 hours over night (8 pm to 8 am daily) plus standard water flush 60 mLs every 4 hrs.  TF plus flushes provide:  1080 kcals, 49 g protein, 127 g CHO, 11 g fiber, 907 mLs water.      Future/Additional Recommendations:  -Encourage intakes of tid meals and supplements.  Follow on Calorie Counts to aid in TF adjustments.  -Continued diet advancement as medically appropriate per SLP evaluations.  -PO intakes to consistently meet  >2/3 assessed needs to discontinue TF (>1350 kcals and >57 g protein).     REASON FOR ASSESSMENT  Elias Mckee is a/an 85 year old male assessed by the dietitian for Provider Order - Registered Dietitian to Assess and Order TF per Medical Nutrition Therapy Protocol    NUTRITION HISTORY  Per H&P hx of stroke 9 years ago with some residual left leg weakness, chronic ischemic heart disease, prostate cancer, hypertension, hyperlipidemia, type 2 diabetes, chronic kidney disease and atrial fibrillation.    Per chart review, pt resides in an assisted living facility.  Unable to obtain nutrition hx today--pt sleeping.    Pt was followed by RD during hospitalization initiated on TF:  Isosource 1.5 on 2/24.  VFSS (2/25) with diet advancement to NDD2/NT, Moderate Consistent Carbohydrate Diet plus started on Berry Magic Cup with meals.  On 2/26, TF changed to cycled to encourage po during the day:   Isosource 1.5 at 60 mL/hr x 12 hours over night (8 pm to 8 am) daily.    CURRENT NUTRITION ORDERS  Diet: Moderate Consistent Carbohydrate, NDD2/Nectar Thick Liquids  Per  Room Service Appropriate with Assist  Calorie  "Counts    Intake/Tolerance: PO intakes 50% of lunch and dinner yesterday per nursing flow sheets.  Intakes today so far not available.  Per nursing notes, pt needs supervision with meals and to be up in chair.      Nutrition Support:  Isosource 1.5@60 mL/hr x 12 hours over night (8 pm to 8 am daily) plus standard water flush 60 mLs every 4 hrs.  TF plus flushes provide:  1080 kcals, 49 g protein, 907 mLs water.  This meets ~50% of assessed nutritional needs.    LABS  Labs reviewed  (2/27)  -301  (2/28)  345    **Endocrine consult pending-    MEDICATIONS  Medications reviewed  Novolog Sliding Scale (medium resistance dosing) for meals and HS  Vitamin D  Ferrous Gluconate    ANTHROPOMETRICS  Height: 177.8 cm (5' 10\")  Most Recent Weight (2/28): 85 kg (187 lb 6.4 oz) (bed weight, one pillow, PCDs on (PCD machine off))    ARU Admission Weight (2/27)  192 lbs  IBW: 166 lbs  BMI: Overweight BMI 25-29.9  Weight History:   Per chart review, hospital admission weight (2/20)  182 lbs vs. 196 lbs.  Last weight prior to ARU transfer (2/27) 179 lbs 0.2 oz.  ?UBW:  Unable to obtain today due to pt sleeping and no family in room.    Wt Readings from Last 10 Encounters:   02/28/17 85 kg (187 lb 6.4 oz)   02/27/17 81.2 kg (179 lb 0.2 oz)   02/17/17 87.5 kg (193 lb)   08/11/16 84.8 kg (187 lb)   01/12/16 85.3 kg (188 lb)   07/29/15 84.3 kg (185 lb 12.8 oz)   07/13/15 84.4 kg (186 lb)   07/07/15 84.4 kg (186 lb)   05/12/15 84.4 kg (186 lb)   04/20/15 84.4 kg (186 lb)       Dosing Weight: 81 kg (driest weight over hospitalization)    ASSESSED NUTRITION NEEDS  Estimated Energy Needs: 0585-6560 kcals/day (25 - 30 kcals/kg)  Justification: Maintenance  Estimated Protein Needs: 81-97 grams protein/day (1 - 1.2 grams of pro/kg)  Justification: Maintenance  Estimated Fluid Needs:  (1 mL/kcal)   Justification: Per provider pending fluid status    PHYSICAL FINDINGS  See malnutrition section below.    -dysarthria, dysphagia, impaired " left-sided weakness, greater weakness in left upper extremity and impaired cognition.  -[place]    -dentures uppers and lowers    MALNUTRITION  % Intake: No decreased intake noted (supported on TF plus po)  % Weight Loss: Unable to assess  Subcutaneous Fat Loss: Unable to assess-pt sleeping  Muscle Loss: Unable to assess-pt sleeping  Fluid Accumulation/Edema: None noted  Malnutrition Diagnosis: Unable to determine due to incomplete nutrition status validation.      NUTRITION DIAGNOSIS  Inadequate oral intakes related to dysphagia and modified diet as evidenced by po intakes 50% of meals so far, continues on TF to meet ~50% of assessed nutritional needs.    INTERVENTIONS  Implementation  Nutrition Education: Unable to complete due to pt sleeping.     Goals  1. Total avg nutritional intake to meet a minimum of 25 kcal/kg and 1.0 g PRO/kg daily (per dosing wt 81 kg).  2. PO intakes to consistently meet  >2/3 assessed needs to discontinue TF (>1350 kcals and >57 g protein).     Monitoring/Evaluation  Progress toward goals will be monitored and evaluated per protocol.    Yvette Burks RD, LD

## 2017-02-28 NOTE — PROGRESS NOTES
02/28/17 0900   General Information   Onset Date 02/20/17   Start of Care Date 02/28/17   Referring Physician Dr. Kevin Bond   Patient Profile Review/OT: Additional Occupational Profile Info See Profile for full history and prior level of function   Patient/Family Goals Statement To get back to normal.   Swallowing Evaluation Bedside swallow evaluation   Behaviorial Observations Distractible   Mode of current nutrition Oral diet;NG  (Nasoduodenal tube)   Type of oral diet Dysphagia diet level 2;Nectar - thick liquid   Respiratory Status Room air   Comments Pt was followed by speech therapy during inpatient stay. Discharged with the following recommendations: Recommend 1:1 supervision with increased use of swallow precautions/strategies with a dysphagia diet level 2 and nectar-thick liquids. Swallow precautions include: pt must be assistance/supervision, awake/alert, chin tuck with nectar by spoon, small bites/sips, alternate solids/liquids, double swallow per bite of food, check oral cavity for leftover foods/liquid, oral cares after meals, supervision with verbal cueing for strategies. Meds in applesauce/pudding. Hold po if aspiration signs are observed despite increased use of precautions. Recommend ongoing SLP swallow assessment of diet tolerance and continued Tx for strategy training and exercises upon discharge to ARU.  Recommend continue TF until diet tolerance demonstrated over multiple sessions.   Clinical Swallow Evaluation   Oral Musculature anomalies present   Structural Abnormalities present   Dentition upper dentures;lower dentures  (upper full, lower partial)   Secretion Management left corner drooling   Mucosal Quality good   Mandibular Strength and Mobility impaired  (mild-to-moderate weakness)   Oral Labial Strength and Mobility impaired retraction;impaired pursing;impaired seal  (moderate-to-severe weakness)   Lingual Strength and Mobility impaired protrusion;impaired  coordination  (tremor, mild weakness)   Velar Elevation impaired  (Drooping/weakness on left side)   Buccal Strength and Mobility impaired  (weakness, low tone on right side)   Laryngeal Function Cough;Throat clear;Swallow;Voicing initiated;Dry swallow palpated  (very slow swallow initiation, weak cough)   Oral Musculature Comments Generalized low tone and weakness on right side. Reduced sensation on L mandibular and maxillary areas.    Additional Documentation Yes   Clinical Swallow Eval: Nectar Thick Liquid Texture Trial   Mode of Presentation, Nectar cup;self-fed   Volume of Nectar Presented small sips   Oral Phase, Nectar WFL   Pharyngeal Phase, [place] intact   Diagnostic Statement No overt s/sx of aspiration with small sips of nectar-thick liquid from a cup. Adequate airway protection. Pt did not independently use chin tuck strategy, and not trialed during this session.   Clinical Swallow Eval: Puree Solid Texture Trial   Mode of Presentation, Puree spoon;self-fed   Volume of Puree Presented 1-2 tsp - medium sized bite  (Simultaneous filing. User may not have seen previous data.)   Oral Phase, Puree WFL   Pharyngeal Phase, Puree intact   Diagnostic Statement No overt s/sx of aspiration with medium-sized bites of puree.    Clinical Swallow Eval: Solid Food Texture Trial   Mode of Presentation, Solid self-fed   Volume of Solid Food Presented small bites   Oral Phase, Solid Poor AP movement;Residue in oral cavity   Oral Residue, Solid mid posterior tongue;left anterior lateral sulci  (moderate amount)   Pharyngeal Phase, Solid intact   Diagnostic Statement Moderate oral phase dysphagia. Moderate amount of oral residue that did not clear with liquid wash secondary to reduced sensation and strength on left side.    Swallow Eval: Clinical Impressions   Skilled Criteria for Therapy Intervention Skilled criteria met.  Treatment indicated.   Functional Assessment Scale (FAS) 3   Treatment Diagnosis Moderate oral  dysphagia, mild pharyngeal dysphagia   Diet texture recommendations Dysphagia diet level 2;Nectar thick liquids   Recommended Feeding/Eating Techniques alternate between small bites and sips of food/liquid;small sips/bites   Therapy Frequency daily   Predicted Duration of Therapy Intervention (days/wks) 3 weeks   Anticipated Discharge Disposition home w/ home health   Risks and Benefits of Treatment have been explained. Yes   Patient, family and/or staff in agreement with Plan of Care Yes   Clinical Impression Comments SLP: Bedside swallow evaluation completed. Pt presents with moderate oral dysphagia and mild pharyngeal dysphagia characterized by moderate amounts of oral residue/pocketing on the left side of pt's mouth secondary to weakness and reduced sensation on left side. Due to these oral-motor deficits, highly suspect premature spillage with liquids that is remediated through use of nectar-thickened liquids. Recommend cautiously continuing with DD2 texture diet and nectar-thin liquids and consider downgrade if pt demonstrates any s/sx of aspiration with DD2 textures. Also recommend upright positioning, small bites, alternating bites and sips, and thorough oral cares before and after meals. Pt is currently functioning below baseline and would benefit from skilled speech therapy to increase independence and return to baseline.    Total Evaluation Time   Total Evaluation Time (Minutes) 30

## 2017-02-28 NOTE — PLAN OF CARE
Problem: Goal/Outcome  Goal: Goal Outcome Summary     Bedside swallow evaluation completed. Pt presents with moderate oral dysphagia and mild pharyngeal dysphagia characterized by moderate amounts of oral residue/pocketing on the left side of pt's mouth secondary to weakness and reduced sensation on left side. Due to these oral-motor deficits, highly suspect premature spillage with liquids that is remediated through use of nectar-thickened liquids. Recommend cautiously continuing with DD2 texture diet and nectar-thin liquids and consider downgrade if pt demonstrates any s/sx of aspiration with DD2 textures. Also recommend upright positioning, small bites, alternating bites and sips, and thorough oral cares before and after meals.

## 2017-02-28 NOTE — PLAN OF CARE
Problem: Goal/Outcome  Goal: Goal Outcome Summary  Outcome: No Change  FOCUS/GOAL  Bladder management, Pain management and Medical management     ASSESSMENT, INTERVENTIONS AND CONTINUING PLAN FOR GOAL:  Pt appeared to sleep well during rounds and following cares. Pt is [place].  Pt used call light for needs and would shout out down the schultz loud moaning noises for staff to hear in order for staff to be prompt as pt has voiding urgency. Pt needs assistance with positioning urinal, staff empties.  Pt found to be slightly incontinent x1, also spilled cranberry cup on arm and gown, requiring a bed change, ao2.  Pt moans loudly while repositioning. Pt repositioned q 2-3 hours throughout shift. Pt has blanchable pink areas on coccyx and L abdomen.  Pt c/o L foot pain, reported ice pack to be effective, declined tylenol.  TF  running 60cc/hr, 60cc flushes q 4h, to be off at 0900 as RN reported starting an hour late.  Wore PCDs throughout shift. Pt has fair cough, productive with thin mucous.

## 2017-02-28 NOTE — PLAN OF CARE
Problem: Goal/Outcome  Goal: Goal Outcome Summary  FOCUS/GOAL  Nutrition/Feeding/Swallowing precautions, Pain management and Skin integrity     ASSESSMENT, INTERVENTIONS AND CONTINUING PLAN FOR GOAL:  Pt alert and oriented, forgetful and hard of hearing, has 1 hearing aid for right ear, poor vision with glasses, upper and lower dentures. Weak nonproductive cough, encouraging deep breathing, given IS. Liko lift and assist 2 to boost up in bed. Tube feeding started around 2100 as first pump sent from [facility] not working. ND tube to left nostril. Needs supervision with meals and to be up in chair. Left facial droop, garbled speech at times. Left sided weakness. On calorie counts. DtrJazmin, visited this evening, states he has improved, that Xarelto is new. Has been continent, needing assistance with urinal. Last BM 2/27 AM. Groin red, painful during pericares, drainage. Powder applied. Received PRN tylenol x2 and ice for left ankle pain which is swollen and red. Dtr and pt unable to explain cause. Left note for Dr. Lucio on. Continue POC.

## 2017-02-28 NOTE — PROVIDER NOTIFICATION
Social Work: Initial Assessment with Discharge Plan    Patient Name: Elias Mckee  : 8/10/1931  Age: 85 year old  MRN: [id number]  Completed assessment with: patient  Admitted to ARU: 17    Presenting Information   Date of SW assessment: 2017  Health Care Directive: Patient considering completing  Primary Health Care Agent: default to next of kin  Secondary Health Care Agent: NA  Living Situation: resides alone in an assisted living, [facility]  Previous Functional Status: previously independent of personal cares and received support with cleaning, meds, laundry and meals  DME available: see therapy notes  Patient and family understanding of hospitalization: yes  Cultural/Language/Spiritual Considerations: English speaking      Physical Health  Reason for admission: Stroke    Provider Information   Primary Care Physician:Shen Huff  : none    Mental Health/Chemical Dependency:   Diagnosis: denied  Alcohol/Tobacco/Narcotis: denied  Support/Services in Place: none  Services Needed/Recommended: none  Sexuality/Intimacy: denied concerns    Support System  Marital Status:   Family support: pt reported adult children as supportive, four children in total  Other support available: none    Community Resources  Current in home services: receiving services from the assisted living  Previous services: none    Financial/Employment/Education  Employment Status: retired; sales  Income Source:   Education: unknown  Financial Concerns:  denied  Insurance: Medicare; [employer]      Discharge Plan   Patient and family discharge goal: Goal to return home with resumption of services  Provided Education on discharge plan: Yes  Patient agreeable to discharge plan:  Yes  Provided education and attained signature for Medicare IM and IRF Patient Rights and Privacy Information provided to patient : Yes  Provided patient with [place] [facility] Resources: No  Barriers to  discharge: medically stable and progress with therapies    Discharge Recommendations   Disposition: Goal to return home with continued support  Transportation Needs: family will provide  Name of Transportation Company and Phone: NA    Additional comments   Pt is an 85 yr old male admitted for an acute rehab stay following a stroke. Pt previously resided in an [facility]. He received support with cleaning, meds, meals and laundry. Pts goal to return home. Team working towards a safe d/c plan.    Please invite to Care Conference:  Jazmin (daughter) - [phone]      CHEVY Ruff, [facility]  ARU   Phone: [phone]  Pager: [phone]         02/28/17 1116   Living Arrangements   Lives With alone   Living Arrangements assisted living   Able to Return to Prior Living Arrangements yes   Home Safety   Patient Feels Safe Living in Home? yes   Discharge Planning   Anticipated Discharge Disposition other (see comments)  ([facility])   Discharge Needs Assessment   Readmission Within The Last 30 Days no previous admission in last 30 days   Equipment Currently Used at Home cane, straight;grab bar;raised toilet;shower chair   Transportation Available car;family or friend will provide;agency transportation   Values Beliefs and Spiritual Care   C: Community: In support of your spiritual health, is there someone we may contact for you? (identify all that apply) no thank you   Abuse Risk Screen   QUESTION TO PATIENT:  Has a member of your family or a partner(now or in the past) intimidated, hurt, manipulated, or controlled you in any way? no   QUESTION TO PATIENT: Do you feel safe going back to the place where you are living? yes   OBSERVATION: Is there reason to believe there has been maltreatment of a vulnerable adult (ie. Physical/Sexual/Emotional abuse, self neglect, lack of adequate food, shelter, medical care, or financial exploitation)? no   Mental Health Suicide Risk    Have you ever thought about hurting yourself now or in the past? no   Coping/Stress   Major Change/Loss/Stressor none

## 2017-02-28 NOTE — DISCHARGE SUMMARY
DATE OF ADMISSION:  02/20/2017.      DATE OF DISCHARGE:  02/27/2017.      DISCHARGE DIAGNOSES:   1.  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   2.  Dysphagia secondary to stroke.   3.  History of coronary artery disease with history of multiple stents and history of cardiac arrest.   4.  History of atrial fibrillation.   5.  History of prior stroke.   6.  Type 2 diabetes.   7.  History of hypertension.   8.  Chronic kidney disease, stage III, with a baseline creatinine of 1.3 to 1.4.   9.  History of subclinical hypothyroidism.   10.  History of benign prostatic hypertrophy.   11.  Benign prostatic hypertrophy, on Flomax.      DISCHARGE MEDICATIONS:      NEW MEDICATIONS:   1.  Atorvastatin 40 mg p.o. daily.   2.  Hydrochlorothiazide 12.5 mg daily.   3.  Insulin sliding scale.  If glucose 140-189, 1 unit; glucose 190-239, 2 units; glucose 240-289, 3 units; glucose 290-339, 4 units; greater than 340, give 5 units.  For bedtime glucose 200-239, 1 unit, 240-289, 1.5 units; 290-339, 2 units; greater than 340, 2.5 units.   4.  Xarelto 20 mg daily with dinner.      MEDICATIONS WHICH HAVE CHANGED:  Metoprolol 50 mg p.o. b.i.d., prior dose was 150 mg p.o. b.i.d.      MEDICATIONS WHICH HAVE NOT CHANGED:   1.  Amoxicillin 2000 mg prior to dental work.   2.  Ferrous gluconate 324 mg p.o. daily.   3.  Flomax 0.8 mg daily.   4.  Flonase 50 mcg per actuation 1-2 sprays both nostrils daily.   5.  Glimepiride 3 mg p.o. daily.   6.  Lisinopril 10 mg p.o. b.i.d.   7.  Melatonin 1 mg at bedtime.   8.  Multivitamin 1 tab daily.   9.  Nitroglycerin 0.4 mg sublingually p.r.n. chest pain.   10.  MiraLax 17 grams every 48 hours for constipation.   11.  Tylenol 650 mg every 4 hours p.r.n. mild pain.   12.  Vitamin D 2000 units p.o. daily.      Elias Rylee has stopped taking Plavix, stopped taking aspirin, stopped taking Diamox.      DIET:  Isosource 1.5 at 60 mL an hour from 8 p.m. to 8 a.m.  Dysphagia diet 2 with nectar  thick and moderate carbohydrate.      The patient is to follow up with the acute rehab physician for hypertension and diabetes management.  Follow up with Neurology in 1 month on discharge from acute care facility.      PENDING TEST RESULTS:  None.      PERTINENT LABORATORY DATA AND IMAGING STUDIES:  During this hospitalization, labs on the day of discharge, sodium 138, potassium 4.1, chloride 105, bicarbonate 22, BUN 22, creatinine 0.96, calcium 8.3, magnesium 1.9, phosphorus 2.5, glucose 301, white count 8.4, hemoglobin 9.6, platelet count 148.      CT of the head 02/20/2017 showed no intraparenchymal hemorrhage or definite recent infarct, linear high density area which appears to be intravascular in the right M2 region, could be a luminal clot, atrophy and old infarcts described.  MRI of the brain 02/21/2017 showed recent moderate-sized posterior division of the right MCA territory infarct corresponding to known M2 occlusion noted on recent comparison CT angiogram.  Tiny recent infarct left cerebellar hemisphere, diffuse cerebral volume loss and cerebral white matter changes consistent with chronic small vessel ischemic disease.      CT of the head without contrast 02/22/2017 showed evolving moderate size posterior division right MCA territory infarct.  Again noted no new infarcts.  Chronic right occipital infarct again noted, diffuse volume loss with cerebral white matter changes consistent with chronic small vessel ischemic disease.  CT of the head without contrast 02/26/2017 showed evolving right MCA territory infarct with more well-defined margins than on 02/22/2017.  No hemorrhage, atrophy and chronic white matter disease, old infarcts previously described.      CONSULTATIONS DURING THIS HOSPITALIZATION:  Speech Therapy, Physical Therapy, Occupational Therapy and Neurology.      HOSPITAL COURSE:  Please see dictated history and physical for patient's initial presentation.  Elias is an 85-year-old male with a  past history significant for stroke about 9 years prior to admission, coronary artery disease with multiple stents and a history of cardiac arrest, hypertension, dyslipidemia, diabetes and atrial fibrillation who presented to the emergency room with difficulty eating and left-sided facial weakness and facial droop.  The patient does have a history of a stroke in the past which left him with some mild left leg weakness.  He has also had a history of heart disease and has had 10 stents placed at different times and actually had a cardiac arrest during his prior stroke.  He did not have any chest pain during his current admission.  The patient had TPA in the emergency room.  He did go to Interventional Radiology for a thrombectomy; however, there was a failure of recannulization due to acute angle of the branch and markedly calcified nature of the clot.  Neurology suspected that his stroke was cardioembolic and history of paroxysmal atrial fibrillation.  The patient had a transthoracic echocardiogram which showed LVEF of 55% to 60% with a negative bubble study.  His lipid panel showed an LDL of 49, HDL 28, triglycerides of 140.  The patient was started on aspirin.  The patient had his aspirin stopped when he was started on Xarelto on 02/26/2017.  Neurology did see the patient and okay the transfer to acute rehab.  The patient did have a CT of his head the day prior to discharge which did not show any evidence of hemorrhage and therefore, Neurology was okay with starting Xarelto.  Aspirin was stopped and the patient was not on Plavix during this hospitalization.      Dysphagia.  The patient did initially fail his swallowing study, but later was seen by Speech and was able to start on a dysphagia diet 2 with nectar thick.  The patient also was on tube feeds and currently getting these from 8 p.m. to 8 a.m.      History of coronary artery disease.  The patient had a negative troponin.  He will follow up with Cardiology  after discharge from acute rehab.      History of atrial fibrillation.  This is intermittent.  The patient was in sinus rhythm while he was hospitalized.  He was started on Xarelto on discharge.  It was also continued on metoprolol which was increased to 50 b.i.d. on .      History of type 2 diabetes.  The patient's last A1c was 6.8.  He was on glimepiride prior to admission.  He said his sulfanuria was held and will be restarted on discharge.  In addition, the patient is on some Lantus.  Could consider getting him off Lantus and starting him on metformin, but this can be followed up at acute rehab.      Hypertension.  The patient had elevated blood pressures on .  His lisinopril was added 5 mg b.i.d. and metoprolol 25 mg b.i.d.  On the , lisinopril was increased to 10 b.i.d. and his metoprolol was increased to 50 b.i.d.  His blood pressure on the day of transfer to acute rehab was still elevated around 156/73 and hydrochlorothiazide 12.5 mg daily was added.      Chronic kidney disease, stage III.  His baseline creatinine was about 1.3 to 1.4.  However, this improved and his creatinine was 0.96 on the day of discharge.      Subclinical hypothyroidism.  The patient's TSH was 5.96 and we recommend a followup TSH in 6-8 weeks once acute issues are resolved and stable.      Benign prostatic hypertrophy.  The patient was restarted on his Flomax.      Fluids and electrolytes.  The patient's tube feeds were started on .  He is now on a dysphagia diet 2 with nectar thick liquids and his tube feeds have been changed from 8:00 p.m. to 8:00 a.m.      CODE STATUS:  Full code.      Time for this discharge approximately 40 minutes.         ELIUD CHEN MD             D: 2017 10:54   T: 2017 06:52   MT: SK      Name:     MIRANDA BEGUM   MRN:      [id number]        Account:        [id number]   :      08/10/1931           Admit Date:                                        Discharge Date: 02/27/2017      Document: [id number]

## 2017-02-28 NOTE — PLAN OF CARE
Problem: Goal/Outcome  Goal: Goal Outcome Summary     Patient able to feed self with tray setup. Beverage was intentionally placed on his right side so he would take frequent sips. Able to tolerate NDD-2 textures and NTL without any overt s/sx of aspiration. Patient having mild-moderate amounts of oral residue but kept at a very manageable level. Encouragement needed to place his dentures for the meal. Recommend continue with NDD-2 textures and NTL.

## 2017-02-28 NOTE — CONSULTS
Diabetes/Hyperglycemia Management Consult    Chief Complaint glycemic management  Consult requested by: Dr. 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 Admitted to ARU for therapies     Mr. Mckee presented to the ED with difficulty eating and left-sided facial weakness and facial droop.he had TPA in the ED and IR for a thrombectomy, however was failure of recannulization due to acute angle of the branch and markedly calcified nature of the clot. Per documentation, neurology suspected that the stroke was cardioembolic with hx of paroxysmal atrial fibrillation.    Initially failed swallow study, but was re-evaluated by speech and was started on a dysphagia diet level 2 with nectar thick liquids along with cycled TF from 8089-4524.    He was started on lantus and dose increased along with correction scale. On discharge, discharge summary recommended d/c lantus and starting metformin,  continue with sliding scale and glimepiride 3 mg daily.     Cycled TF: Isosource 1.5 at 60 cc per hour from 2053-6078  Last dose of Lantus 10 units (2/27, AM) blood sugars in the high 200s with cycled TF and 301 at end of cycle, glucose off cycle -188 requiring 1 unit of novolog.  Lantus 10 units d/c on (2/28) and glimepiride was given. Glucose at end of cycle , followed by glucose of 140 and 139.    Mr. Mckee is limited to what he can recall regarding his past medical history regarding diabetes.  Per review of Epic, was last seen for diabetes (2/17/2016). Per documentation, was checking glucose once daily in am with readings 130-150.    Recent Labs  Lab  "02/28/17  0800 02/27/17  2101 02/27/17  1730 02/27/17  1437 02/27/17  0746 02/27/17  0738 02/27/17  0408  02/26/17  0757  02/25/17  0740  02/24/17  0740  02/23/17  0805  02/22/17  0520   GLC  --   --   --   --  301*  --   --   --  264*  --  248*  --  179*  --  143*  --  177*   * 229* 168* 188*  --  284* 264*  < >  --   < >  --   < >  --   < >  --   < >  --    < > = values in this interval not displayed.      Diabetes Type: type 2  Diabetes Duration: \"years\"  Usual Diabetes Regimen: glimepiride 3 mg am  Ability to Delta Prescribed Regimen: ? Lives at an assisted living, may have had medication support  Diabetes Control:   Lab Results   Component Value Date    A1C 6.8 02/21/2017    A1C 6.7 02/17/2017    A1C 6.0 08/11/2016    A1C 6.4 01/12/2016    A1C 6.6 07/13/2015     Diabetes Complications: CKD, nephropathy  Able to Detect Hypoglycemia: unknown  Usual Diabetes Care Provider: Dr. Huff,  [facility]  Factors Impacting Glucose Control: TF      Review of Systems  10 point ROS completed with pertinent positives and negatives noted in the HPI    Past medical, family and social histories are reviewed and updated.    Past Medical History, reviewed and updated as indicated  Past Medical History   Diagnosis Date     Acute, but ill-defined, cerebrovascular disease 1-2008     Left hemiparesis, left side neglect     Atrial fibrillation (H)      Chronic ischemic heart disease, unspecified      Coronary artery disease      CVA (cerebral infarction)      Elevated cholesterol      Essential hypertension, benign      Hyperlipidaemia      MEDICAL HISTORY OF - 1- 2008     V fib arrest      Myocardial infarction (H)      Other and unspecified hyperlipidemia      Type II or unspecified type diabetes mellitus without mention of complication, not stated as uncontrolled 1-2008       Family History  Son with type 1 diabetes    Social History  Lives in an assisted living  Social History     Social History     Marital " "status: , wife       Spouse name: N/A     Number of children: 3 sons and 1 daughter     Years of education: N/A     Social History Main Topics     Smoking status: Never Smoker     Smokeless tobacco: Never Used     Alcohol use No     Drug use: No     Sexual activity: No     Other Topics Concern     Sleep Concern Yes     pt using sleep aid     Stress Concern No     Weight Concern No     Special Diet No     Exercise Yes     everyday     Seat Belt Yes     Social History Narrative         Physical Exam  /67 (BP Location: Right arm)  Pulse 77  Temp 98.3  F (36.8  C) (Oral)  Resp 18  Ht 1.778 m (5' 10\")  Wt 85 kg (187 lb 6.4 oz)  SpO2 97%  BMI 26.89 kg/m2    General:  Pleasant, resting in bed, in no distress.   HEENT: NC/AT, PER and anicteric, non-injected, oral mucous membranes moist. Left facial droop, feeding tube in place  Lungs: unlabored  ABD: soft  Skin: warm and dry, no obvious lesions to exposed areas  MSK: moving right arm, left arm  without movement  Mental status: alert, oriented person and place ( date not assessed), communicating clearly  Psych:  calm, even mood    Laboratory  Recent Labs   Lab Test  17   0746  17   0757   NA  138  137   POTASSIUM  4.1  4.1   CHLORIDE  108  108   CO2  22  20   ANIONGAP  8  9   GLC  301*  264*   BUN  22  22   CR  0.96  0.92   TRENT  8.3*  8.0*     CBC RESULTS:   Recent Labs   Lab Test  17   0746   WBC  8.4   RBC  3.11*   HGB  9.6*   HCT  28.1*   MCV  90   MCH  30.9   MCHC  34.2   RDW  14.9   PLT  148*       Liver Function Studies -   Recent Labs   Lab Test  17   0940  16   0930   PROTTOTAL   --   7.1   ALBUMIN   --   3.7   BILITOTAL   --   0.3   ALKPHOS   --   80   AST   --   13   ALT  29  24       Active Medications  Current Facility-Administered Medications   Medication     acetaminophen (TYLENOL) tablet 650 mg     atorvastatin (LIPITOR) tablet 40 mg     cholecalciferol (vitamin D) tablet 2,000 Units     ferrous gluconate " (FERGON) tablet 324 mg     fluticasone (FLONASE) 50 MCG/ACT spray 1-2 spray     hydrochlorothiazide (MICROZIDE) capsule 12.5 mg     lisinopril (PRINIVIL/ZESTRIL) tablet 10 mg     melatonin tablet 1 mg     metoprolol (LOPRESSOR) tablet 50 mg     polyethylene glycol (MIRALAX/GLYCOLAX) powder 17 g     rivaroxaban ANTICOAGULANT (XARELTO) tablet 20 mg     tamsulosin (FLOMAX) capsule 0.8 mg     glucose 40 % gel 15-30 g    Or     dextrose 50 % injection 25-50 mL    Or     glucagon injection 1 mg     insulin aspart (NovoLOG) inj (RAPID ACTING)     insulin aspart (NovoLOG) inj (RAPID ACTING)     ranitidine (ZANTAC) tablet 150 mg     No current outpatient prescriptions on file.       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 Admitted to ARU for therapies     Type 2 diabetic: PTA on glimepiride 3 mg daily,   Cycled TF: Isosource 1.5 at 60 cc per hour from 8408-8371 ( total of 127 grams of CHO)    Plan  -d/c glimepiride ( too long acting of medication with risk of hypoglycemia if not eating)  -NPH 10 units at the start of TF  -Aspart medium correction every 4 hours  -d/c HS correction  - monitor glucose every 4 hours    Will continue to follow    Emily LUIS -[phone]  Diabetes Management Team job code: [id number]

## 2017-02-28 NOTE — PROGRESS NOTES
" 02/28/17 0741   Quick Adds   Type of Visit Initial PT Evaluation   Living Environment   Lives With alone   Living Arrangements assisted living   Home Accessibility no concerns   Number of Stairs to Enter Home (Elevator available)   Number of Stairs Within Home 0   Transportation Available car;family or friend will provide;agency transportation  (Pt no longer drives. Family or [employer])   Living Environment Comment PT: Pt lives in an assisted living facility for the last 9 years. Pt uses a straight cane in the house and uses a RW for outside mobility. Pt has a walk in shower with shower chair and grab bars.    Self-Care   Dominant Hand right   Usual Activity Tolerance good   Current Activity Tolerance fair   Regular Exercise no   Activity/Exercise Type strength training;other (see comments)   Exercise Amount/Frequency 30 mins;2 times/wk   Equipment Currently Used at Home cane, straight;grab bar;raised toilet;shower chair   Activity/Exercise/Self-Care Comment PT: pt reports being active at [facility].    Functional Level Prior   Ambulation 1-->assistive equipment   Transferring 1-->assistive equipment   Toileting 1-->assistive equipment   Bathing 1-->assistive equipment   Dressing 1-->assistive equipment   Eating 0-->independent   Communication 0-->understands/communicates without difficulty   Swallowing 0-->swallows foods/liquids without difficulty   Cognition 0 - no cognition issues reported   Fall history within last six months no   Which of the above functional risks had a recent onset or change? ambulation;transferring;toileting;bathing;dressing;eating;swallowing;cognition;communication/speech   Prior Functional Level Comment PT: pt was walking OK until 4 days prior to admit when knee buckling at times.   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    General Information   Onset of Illness/Injury or Date of Surgery - Date 02/20/17   Referring Physician " "Varun   Patient/Family Goals Statement PT: Pt wants to walk again, \"get back to normal\"   Pertinent History of Current Problem (include personal factors and/or comorbidities that impact the POC) PT: Pt admitted with L side hemipareisis, with dysphagia, dysarthria, and impaired mobility , ADLs due to R MCA cardioembolic stroke. Pt with h/o stroke 9 years ago with L side weakness. MRI R MCA territory and L cerebellar small area of diffucion restriction, Pt with chronic ischemic heart disease, prostate CA, HTN, HLD, type 2 DM, CKD .     Precautions/Limitations fall precautions;swallowing precautions   Heart Disease Risk Factors Medical history;Age   General Observations Pt with NG tube, flaccid LUE, eating breakfast. , L side facial weakness   General Info Comments PT: DD2 nectar diet.    Cognitive Status Examination   Orientation orientation to person, place and time   Level of Consciousness alert   Follows Commands and Answers Questions 75% of the time   Personal Safety and Judgment impaired   Cognitive Comment PT: pt able to state place, thought it was Feb 27 th (it is 2/28).   Pt needing cues for sequencing activity, and fatigued this am.   Pain Assessment   Patient Currently in Pain Yes, see Vital Sign flowsheet   Integumentary/Edema   Integumentary/Edema Comments PT; L arm flaccid. some redness on L arm.    Posture    Posture Forward head position   MMT: Hip, Rehab Eval   Hip Flexion - Left Side (2/5) poor, left   Hip ABduction - Left Side (2/5) poor, left   Hip ADduction - Left Side (2/5) poor, left   Hip Flexion - Right Side (4/5) good, right   Hip ABduction - Right Side (4-/5) good minus, right   Hip ADduction - Right Side (4-/5) good minus, right   MMT: Knee, Rehab Eval   Knee Flexion - Left Side (2+/5) poor plus, left   Knee Extension - Left Side (2+/5) poor plus, left   Knee Flexion - Right Side (4/5) good, right   Knee Extension - Right Side (4/5) good, right   MMT: Ankle, Rehab Eval   Ankle " Dorsiflexion - Left Side (2/5) poor, left   Ankle Plantarflexion - Left Side (2/5) poor, left   Ankle Inversion - Left Side (2/5) poor, left   Ankle Eversion - Left Side (2/5) poor, left   Ankle Dorsiflexion - Right Side 5/5) normal,left   Ankle Plantarflexion - Right Side 5/5) normal,left   Ankle Inversion - Right Side (4/5) good, left   Ankle Eversion - Right Side (4/5) good, left   ARC Assessment Only   Acute Rehab FIM See FIM scores for Mobility/ADL Assessment   Balance   Balance other (describe)   Sitting Balance: Static fair balance   Sitting Balance: Dynamic fair balance   Sit-to-Stand Balance poor balance   Standing Balance: Static other (see comments)   Standing Balance: Dynamic poor balance   Systems Impairment Contributing to Balance Disturbance cognitive;somatosensory;neuromuscular;musculoskeletal   Identified Impairments Contributing to Balance Disturbance impaired coordination;impaired motor control;pain;decreased sensation;impaired sensory feedback;decreased strength   Balance Comments PT: standing , needs support for B UEs, and pt with pain L ankle, difficulty tolerating standing, and L knee buckling in standing.    Sensory Examination   Sensory Perception other (describe)   Sensation Light Touch   LLE moderate impairment   RLE w/i normal limits   Trunk w/i normal limits   Proprioception    LLE mild impairment   RLE w/i normal limits   Coordination   Coordination other (see comments)   Coordination Comments PT: flaccid LUE, LLE with decreased coordination with ankle DF/PF and weakness noted.  Apraxia noted LLE.   Muscle Tone   Muscle Tone other (describe)   Muscle Tone Comments PT: flaccid LUE.    Modality Interventions   Planned Modality Interventions Electrical Stimulation/Russion Stimulation;Cryotherapy   Planned Modality Interventions Comments FES biking.    General Therapy Interventions   Planned Therapy Interventions ADL retraining;balance training;bed mobility training;cognition;gait  training;groups;manual therapy;motor coordination training;neuromuscular re-education;orthotic fitting/training;ROM;strengthening;stretching;transfer training;wheelchair management/propulsion training;risk factor education;home program guidelines;progressive activity/exercise   Clinical Impression   Criteria for Skilled Therapeutic Intervention yes, treatment indicated   PT Diagnosis PT: impaired mobility, ADls, swallow due to stroke.    Influenced by the following impairments PT: Pt with pain L ankle and neck this morning, decreased activity tolerance, impaired sensation and proprioception  LLE, L UE is flaccid, L LE weakness, impaired standing balance, posible cognitive impairment.   Pt with high BP and previous stroke.    Functional limitations due to impairments PT: Pt with difficulty with bed mobility, transfers and unable to ambulate household or community distances.    Clinical Presentation Evolving/Changing   Clinical Presentation Rationale Pt with significant hemiparesis and difficulty tolerating activity.    Clinical Decision Making (Complexity) High complexity   Therapy Frequency` daily   Predicted Duration of Therapy Intervention (days/wks) 4 weeks   Anticipated Equipment Needs at Discharge shower chair;wheelchair   Anticipated Discharge Disposition Home with Assist;Transitional Care Facility;Other (see comments)   Risk & Benefits of therapy have been explained Yes   Patient, Family & other staff in agreement with plan of care Yes   Clinical Impression Comments PT: Pt with significant impairements and lives alone.  Pt may need extended rehab stay , depending on progress, see impairments above.    Total Evaluation Time   Total Evaluation Time (Minutes) 37

## 2017-02-28 NOTE — PROGRESS NOTES
02/28/17 0702   Quick Adds   Type of Visit Initial Occupational Therapy Evaluation   Living Environment   Lives With alone   Living Arrangements assisted living   Home Accessibility no concerns   Number of Stairs to Enter Home 0   Number of Stairs Within Home 0   Stair Railings at Home none   Transportation Available family or friend will provide   Living Environment Comment OT: Pt lives in an assisted living facility for the last 9 years. Pt uses a straight cane in the house and uses a RW for outside mobility. Pt has a walk in shower with shower chair and grab bars.    Self-Care   Dominant Hand right   Usual Activity Tolerance fair   Current Activity Tolerance poor   Regular Exercise yes   Activity/Exercise Type strength training;other (see comments)  (Nu step)   Exercise Amount/Frequency 30 mins;2 times/wk   Equipment Currently Used at Home walker, rolling;wheelchair;cane, straight;shower chair   Activity/Exercise/Self-Care Comment OT: Pt completed NU step and theraband exercises. pt able to complete all adls with mod I, no assistance from assisted living facility staff.    Functional Level Prior   Ambulation 1-->assistive equipment   Transferring 1-->assistive equipment   Toileting 1-->assistive equipment   Bathing 1-->assistive equipment   Dressing 1-->assistive equipment   Eating 0-->independent   Communication 0-->understands/communicates without difficulty   Swallowing 0-->swallows foods/liquids without difficulty   Cognition 0 - no cognition issues reported   Fall history within last six months no   Which of the above functional risks had a recent onset or change? ambulation;transferring;toileting;bathing;dressing;eating;swallowing;cognition;communication/speech   Prior Functional Level Comment OT: Pt stated that He was mod I (using straight cane) with adls. Pt had breakfast and dinner at the facility and skipped lunch. Pt had the facility do the cleaning and laundry. Pt likes to play cards with his  "friends. 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        Present no   General Information   Onset of Illness/Injury or Date of Surgery - Date 02/20/17   Referring Physician Dr. Bond   Patient/Family Goals Statement OT: per pt \"to walk and go back home\"    Additional Occupational Profile Info/Pertinent History of Current Problem OT: Per MD chart \" This is an 85-year-old male with a past medical history of stroke 9 years ago with some residual left leg weakness with history of chronic ischemic heart disease, prostate cancer, hypertension, hyperlipidemia, type 2 diabetes, chronic kidney disease, atrial fibrillation.  Now presents with left hemiparesis, upper extremity, on top of residual left lower extremity weakness with dysarthria, dysphagia, concern for cognitive impairment on top of hard of hearing with impaired mobility, ADLs.\"   Precautions/Limitations fall precautions;swallowing precautions;other (see comments)  (LUE flaccid, LE buckling)   Weight-Bearing Status - LUE other (see comments)  (flaccid)   General Observations OT: pt is lethargic but responsive. Pt tends to drool and cough. Pt able to answer all questions and cooperative throughout the session.    Cognitive Status Examination   Orientation orientation to person, place and time   Level of Consciousness alert;lethargic/somnolent   Able to Follow Commands success, 1-step commands   Personal Safety (Cognitive) WNL/WFL  (appears aware and does not seem impulsive, monitor)   Memory intact   Attention Quiet environment required   Cognitive Comment Pt closes eyes during session but attentive when asked questions and able to respond. At this time no significant cognitive deficits noted but screen/eval needed to further assess.    Visual Perception   Visual Perception Wears glasses  (reading)   Visual Attention OT: Head turn to the R side but when cued able to stay in midline and look at therapist.   Sensory Examination "   Sensory Comments OT: not formally tested. need to assess LUE   Pain Assessment   Patient Currently in Pain No   Integumentary/Edema   Integumentary/Edema other (describe)  (L hand)   Posture   Posture forward head position;protracted shoulders   Range of Motion (ROM)   ROM Comment OT: LUE: flaccid with WFL for PROM. RUE: WFL for shoulder flexion, abduction, IR, ER and elbow flexion/extension.    Strength   Strength Comments OT: LUE: flaccid. RUE: 5/5   Hand Strength   Hand Strength Comments OT: LUE: no hand movement. RUE: grossly intact with informal testing.    Coordination   Upper Extremity Coordination Left UE impaired   Gross Motor Coordination OT: LUE impaired, flaccid   Fine Motor Coordination OT: RUE intact. LUE flaccid   Functional Limitations Fine motor ADL performance impaired;Impaired ability to perform bilateral tasks;Object transport impaired   ARC Assessment Only   Acute Rehab FIM See FIM scores for Mobility/ADL Assessment   Instrumental Activities of Daily Living (IADL)   Previous Responsibilities medication management   Activities of Daily Living Analysis   Impairments Contributing to Impaired Activities of Daily Living balance impaired;cognition impaired;coordination impaired;flexibility decreased;ROM decreased;strength decreased   General Therapy Interventions   Planned Therapy Interventions ADL retraining;IADL retraining;balance training;cognition;fine motor coordination training;ROM;strengthening;transfer training   Clinical Impression   Criteria for Skilled Therapeutic Interventions Met yes, treatment indicated   OT Diagnosis OT: generalized weakness, impaired adls, iadls   Influenced by the following impairments OT: Impaired ability to complete adls, iadls, decreased sitting balance, impaired standing tolerance and mobility. Decreased ability to use LUE fine motor/gross motor skills and for bilateral task d/t flaccidity. Possible vision deficits and cognitive deficits.    Assessment of  Occupational Performance 3-5 Performance Deficits   Identified Performance Deficits OT: Adls, unable to engage in social activity such as playing cards, mobility in the apartment, unable to use bilateral hands.    Clinical Decision Making (Complexity) Moderate complexity   Therapy Frequency daily   Predicted Duration of Therapy Intervention (days/wks) 4 weeks   Anticipated Discharge Disposition Home with Outpatient Therapy;Home with Assist   Risks and Benefits of Treatment have been explained. Yes   Patient, Family & other staff in agreement with plan of care Yes   Clinical Impression Comments OT: Pt currently demonstrates decreased ability to complete adls, iadls, decreased  Use of LUE, mobility, standing tolerance. Skilled OT to improve above mentioned deficits in order to return to assisted living facility with some assistance as needed.    Total Evaluation Time   Total Evaluation Time (Minutes) 20  (evaluation)

## 2017-03-01 LAB
GLUCOSE BLDC GLUCOMTR-MCNC: 119 MG/DL (ref 70–99)
GLUCOSE BLDC GLUCOMTR-MCNC: 179 MG/DL (ref 70–99)
GLUCOSE BLDC GLUCOMTR-MCNC: 186 MG/DL (ref 70–99)
GLUCOSE BLDC GLUCOMTR-MCNC: 203 MG/DL (ref 70–99)
GLUCOSE BLDC GLUCOMTR-MCNC: 242 MG/DL (ref 70–99)
GLUCOSE BLDC GLUCOMTR-MCNC: 277 MG/DL (ref 70–99)

## 2017-03-01 PROCEDURE — 12800006 ZZH R&B REHAB

## 2017-03-01 PROCEDURE — 40000133 ZZH STATISTIC OT WARD VISIT: Performed by: OCCUPATIONAL THERAPIST

## 2017-03-01 PROCEDURE — 00000146 ZZHCL STATISTIC GLUCOSE BY METER IP

## 2017-03-01 PROCEDURE — A9270 NON-COVERED ITEM OR SERVICE: HCPCS | Mod: GY | Performed by: PHYSICAL MEDICINE & REHABILITATION

## 2017-03-01 PROCEDURE — 40000225 ZZH STATISTIC SLP WARD VISIT: Performed by: SPEECH-LANGUAGE PATHOLOGIST

## 2017-03-01 PROCEDURE — 25000132 ZZH RX MED GY IP 250 OP 250 PS 637: Mod: GY | Performed by: PHYSICAL MEDICINE & REHABILITATION

## 2017-03-01 PROCEDURE — 97112 NEUROMUSCULAR REEDUCATION: CPT | Mod: GP

## 2017-03-01 PROCEDURE — 27210429 ZZH NUTRITION PRODUCT INTERMEDIATE LITER

## 2017-03-01 PROCEDURE — 92523 SPEECH SOUND LANG COMPREHEN: CPT | Mod: GN | Performed by: SPEECH-LANGUAGE PATHOLOGIST

## 2017-03-01 PROCEDURE — 97110 THERAPEUTIC EXERCISES: CPT | Mod: GP

## 2017-03-01 PROCEDURE — 40000193 ZZH STATISTIC PT WARD VISIT: Performed by: PHYSICAL THERAPIST

## 2017-03-01 PROCEDURE — 40000193 ZZH STATISTIC PT WARD VISIT

## 2017-03-01 PROCEDURE — 25000132 ZZH RX MED GY IP 250 OP 250 PS 637: Mod: GY | Performed by: NURSE PRACTITIONER

## 2017-03-01 PROCEDURE — A9270 NON-COVERED ITEM OR SERVICE: HCPCS | Mod: GY | Performed by: NURSE PRACTITIONER

## 2017-03-01 PROCEDURE — 97530 THERAPEUTIC ACTIVITIES: CPT | Mod: GP | Performed by: PHYSICAL THERAPIST

## 2017-03-01 PROCEDURE — 97535 SELF CARE MNGMENT TRAINING: CPT | Mod: GO | Performed by: OCCUPATIONAL THERAPIST

## 2017-03-01 PROCEDURE — 92526 ORAL FUNCTION THERAPY: CPT | Mod: GN | Performed by: SPEECH-LANGUAGE PATHOLOGIST

## 2017-03-01 RX ORDER — HYDRALAZINE HYDROCHLORIDE 25 MG/1
25 TABLET, FILM COATED ORAL EVERY 6 HOURS PRN
Status: DISCONTINUED | OUTPATIENT
Start: 2017-03-01 | End: 2017-03-04

## 2017-03-01 RX ADMIN — METOPROLOL TARTRATE 50 MG: 50 TABLET, FILM COATED ORAL at 20:29

## 2017-03-01 RX ADMIN — Medication 1 MG: at 20:29

## 2017-03-01 RX ADMIN — VITAMIN D, TAB 1000IU (100/BT) 2000 UNITS: 25 TAB at 08:02

## 2017-03-01 RX ADMIN — ATORVASTATIN CALCIUM 40 MG: 40 TABLET, FILM COATED ORAL at 08:02

## 2017-03-01 RX ADMIN — MICONAZOLE NITRATE: 2 POWDER TOPICAL at 20:34

## 2017-03-01 RX ADMIN — DICLOFENAC SODIUM: 10 GEL TOPICAL at 08:15

## 2017-03-01 RX ADMIN — ACETAMINOPHEN 650 MG: 325 TABLET, FILM COATED ORAL at 11:43

## 2017-03-01 RX ADMIN — DICLOFENAC SODIUM: 10 GEL TOPICAL at 16:53

## 2017-03-01 RX ADMIN — FLUTICASONE PROPIONATE 1 SPRAY: 50 SPRAY, METERED NASAL at 08:12

## 2017-03-01 RX ADMIN — DICLOFENAC SODIUM: 10 GEL TOPICAL at 20:33

## 2017-03-01 RX ADMIN — RANITIDINE HYDROCHLORIDE 150 MG: 150 TABLET, FILM COATED ORAL at 20:29

## 2017-03-01 RX ADMIN — TAMSULOSIN HYDROCHLORIDE 0.8 MG: 0.4 CAPSULE ORAL at 08:02

## 2017-03-01 RX ADMIN — RIVAROXABAN 20 MG: 10 TABLET, FILM COATED ORAL at 16:53

## 2017-03-01 RX ADMIN — LISINOPRIL 15 MG: 10 TABLET ORAL at 20:29

## 2017-03-01 RX ADMIN — HYDROCHLOROTHIAZIDE 12.5 MG: 12.5 CAPSULE ORAL at 08:02

## 2017-03-01 RX ADMIN — LISINOPRIL 10 MG: 10 TABLET ORAL at 08:01

## 2017-03-01 RX ADMIN — FERROUS GLUCONATE 324 MG: 324 TABLET ORAL at 08:01

## 2017-03-01 RX ADMIN — MICONAZOLE NITRATE: 2 POWDER TOPICAL at 08:14

## 2017-03-01 RX ADMIN — Medication 5 MG: at 11:43

## 2017-03-01 RX ADMIN — INSULIN HUMAN 10 UNITS: 100 INJECTION, SUSPENSION SUBCUTANEOUS at 20:30

## 2017-03-01 RX ADMIN — METOPROLOL TARTRATE 50 MG: 50 TABLET, FILM COATED ORAL at 09:30

## 2017-03-01 RX ADMIN — RANITIDINE HYDROCHLORIDE 150 MG: 150 TABLET, FILM COATED ORAL at 08:02

## 2017-03-01 RX ADMIN — DICLOFENAC SODIUM: 10 GEL TOPICAL at 11:43

## 2017-03-01 NOTE — PROGRESS NOTES
Nutrition Services Progress Note    Calorie Count  (2/28)  483 kcals and 16 g protein, 1 meal (dinner).        Yvette Burks RD, LD

## 2017-03-01 NOTE — PLAN OF CARE
"Problem: Goal/Outcome  Goal: Goal Outcome Summary  Outcome: No Change  FOCUS/GOAL  Bowel management, Bladder management, Pain management and Medical management     ASSESSMENT, INTERVENTIONS AND CONTINUING PLAN FOR GOAL:  Pt slept well during rounds and through cares. Pt was turned and repositioned.   Pt c/o pain in lower extremities, declined ice and tylenol. Stated \"there's nothing you can do.\" Writer believes repositioning helped as pt cried out less following turning.  Pt was incontinent of med, soft BM, black/green. Writer assisted pt to use urinal while in room to administer insulin. Barrier cream and powder applied to castro areas. ao2 to change and turn in bed. Pt did not use call light for any needs.  , 242, coverage given per parameters.  TF 60cc/hr, 60 flushes q 4h, 8p-8a. Tolerated well.          "

## 2017-03-01 NOTE — PROGRESS NOTES
Postural Assessment Scale for Stroke    Maintaining a posture  1. Sitting without support:3  2. Standing with support:2  3. Standing without support:0  4. Standing on nonparetic le  5. Standing on paretic le    Changing posture  6. Supine to affected side lateral:1  7. Supine to nonaffected side lateral:1  8. Supine to sitting up on the edge of the table:0  9. Sitting on the edge of the table to supine:0  10. Sitting to standing up:1  11. Standing up to sitting down:1  12. Standing, picking up a pencil from the floor:0    Total score:  9 /36

## 2017-03-01 NOTE — PLAN OF CARE
Problem: Goal/Outcome  Goal: Goal Outcome Summary  OT: Pt OK to complete STS from w/c with A x 2 for toileting with commode. Recommend Liko lift A x 2 w/c <-> bed due to safety concerns with pivot transfers.

## 2017-03-01 NOTE — PLAN OF CARE
"Problem: Goal/Outcome  Goal: Goal Outcome Summary  FOCUS/GOAL  Medication management, Pain management, Mobility and Safety management     ASSESSMENT, INTERVENTIONS AND CONTINUING PLAN FOR GOAL:  Pt reported left ankle soreness, voltaren gel x2 applied. Tylenol x1 given for soreness/discomfort right lower back from \"sitting in chair\", pt moved back into bed after finished meal with SLP. Tube feeding was stopped at 0800. Had shower this morning with OT. Bm x1. Transferred with AO2 using bed side rails for steadying assistance, removing the wc, placing bsc behind pt. Pt steady on shift, per PT having difficulty with pivot transfers, nursing can continue to use liko lift getting back into bed. Pt ate 50% of meals, continue with calorie counts. Takes pills whole one-by-one with nectar thickened liquid. Alarms on, frequent check-ins. Continent of B/B today. Continue with POC.            "

## 2017-03-01 NOTE — PLAN OF CARE
Problem: Goal/Outcome  Goal: Goal Outcome Summary  SLP:  Pt seen for meal f/u DD2/[place] and trial of DD3 item.  Pt with occasional spillage out left side and with frequent pocketing of solids, Pt not consistently checking for pocketing so required reminders to use lingual sweep and/or finger following every couple bites to clear.  Did note some additional residue on mid-tongue, liquid wash was able to clear.  Pt does eat at a slow rate with small single bites, does needed frequent reminders to use liquid wash every couple bites.  On trial of DD3 item, cough noted on 1 out of 3 bites (sugar cookie), Pt was taking small bites.  Not yet appropriate for upgrade as Pt would benefit from further education and follow through of swallow strategies.  Will plan to f/u tomorrow for tolerance application of strategies.  Observed with pills, Pt able to tolerate whole, one at a time.  Recommendations:  1. Continue DD2/Nectar, 2. Safe swallow strategies- small bites/sips, alternate consistencies, check for pocketing, sitting up in chair for meals.  Continue pills (whole) one at a time.  Will plan to f/u tomorrow for tolerance application of strategies and trial of DD3 solid.

## 2017-03-01 NOTE — PLAN OF CARE
Problem: Goal/Outcome  Goal: Goal Outcome Summary  OT: Completed full shower on dependent shower chair. Requiring A x 2 EOB <-> shower chair towards R side, assist to block L knee/ankle and support LUE. Max A TB bathing. Pt requiring Max A LB/UB dressing and Mod A g/h tasks, focusing on one handed techniques due to LUE flaccidity.

## 2017-03-01 NOTE — PHARMACY-CONSULT NOTE
Pharmacy Tube Feeding Consult    Medication reviewed for administration by feeding tube and for potential food/drug interactions.    Recommendation: No changes are needed at this time. Patient takes medications by mouth. If this changes, please let the pharmacist know.     Pharmacy will continue to follow as new medications are ordered.     Vee Boyd, Pharm.D.

## 2017-03-01 NOTE — PROGRESS NOTES
"[facility] Acute Rehabilitation Unit  Progress Note    Interval Hx  In room, sitting at chair  Working with therapies  BP elevated recently, will increase lisinopril and add prn hydralazine   Endocrinology consulted and following for BS management       Assessment and Plan of Care: This is an 85-year-old male with a past medical history of stroke 9 years ago with some residual left leg weakness with history of chronic ischemic heart disease, prostate cancer, hypertension, hyperlipidemia, type 2 diabetes, chronic kidney disease, atrial fibrillation. Now presents with left hemiparesis, upper extremity, on top of residual left lower extremity weakness with dysarthria, dysphagia, concern for cognitive impairment on top of hard of hearing with impaired mobility, ADLs.    Functionally:  \"OT: Pt OK to complete STS from w/c with A x 2 for toileting with commode. Recommend Liko lift A x 2 w/c <-> bed due to safety concerns with pivot transfers. \"  \"OT: Completed full shower on dependent shower chair. Requiring A x 2 EOB <-> shower chair towards R side, assist to block L knee/ankle and support LUE. Max A TB bathing. Pt requiring Max A LB/UB dressing and Mod A g/h tasks, focusing on one handed techniques due to LUE flaccidity.\"  PASS with PT 9 / 36.   Continue therapies and plan of care.      Overall Management:   - optimize secondary stroke management, on xarelto for anticoagulation, BP management, DM / BS control, lipid management  - HTN, on HCTZ, metoprolol and lisinopril increase dose 3/1, added prn hydralazine, adjust as needed  - DM / BS, endocrinology managing BS control and adjusting management as needed   - HLD, on lipitor  - on vit D, ferrous gluconate, flonase  - melatonin for sleep  - L ankle swelling, some pain, reports twisting ankle, able to move aROM on own w/o sig discomfort, fx suspicion low, will do ice pack and voltaren gel for now, monitor     Bladder and Bowel - " monitor spont voids and BM, on flomax, miralax prn  GI ppx - on zantac  [facility] and antiembolism stockings, on xarelto, progressive mobilization         Active Diet Order      Combination Diet 2957-2801 Calories: Moderate Consistent CHO (4-6 CHO units/meal); Dysphagia Diet Level 2: Mechan Altered; Nectar Thickened Liquids (pre-thickened or use instant food thickener)    Labs    Recent Labs  Lab 03/01/17  1150 03/01/17  0756 03/01/17  0429 03/01/17  0028 02/28/17  2044 02/28/17  1642  02/27/17  0746  02/26/17  0757  02/25/17  0740  02/24/17  0740  02/23/17  0805   GLC  --   --   --   --   --   --   --  301*  --  264*  --  248*  --  179*  --  143*   * 277* 242* 186* 156* 147*  < >  --   < >  --   < >  --   < >  --   < >  --    < > = values in this interval not displayed.      Medications:  Current Facility-Administered Medications   Medication     glipiZIDE (GLUCOTROL) half-tab 5 mg     insulin aspart (NovoLOG) inj (RAPID ACTING)     insulin aspart (NovoLOG) inj (RAPID ACTING)     lisinopril (PRINIVIL/ZESTRIL) tablet 15 mg     hydrALAZINE (APRESOLINE) tablet 25 mg     miconazole (MICATIN; MICRO GUARD) 2 % powder     diclofenac (VOLTAREN) 1 % topical gel     insulin isophane human (HumuLIN N PEN) injection 10 Units     acetaminophen (TYLENOL) tablet 650 mg     atorvastatin (LIPITOR) tablet 40 mg     cholecalciferol (vitamin D) tablet 2,000 Units     ferrous gluconate (FERGON) tablet 324 mg     fluticasone (FLONASE) 50 MCG/ACT spray 1-2 spray     hydrochlorothiazide (MICROZIDE) capsule 12.5 mg     melatonin tablet 1 mg     metoprolol (LOPRESSOR) tablet 50 mg     polyethylene glycol (MIRALAX/GLYCOLAX) powder 17 g     rivaroxaban ANTICOAGULANT (XARELTO) tablet 20 mg     tamsulosin (FLOMAX) capsule 0.8 mg     glucose 40 % gel 15-30 g    Or     dextrose 50 % injection 25-50 mL    Or     glucagon injection 1 mg     ranitidine (ZANTAC) tablet 150 mg         Physical Examination:   BP  "186/61  Pulse 67  Temp 98.3  F (36.8  C) (Oral)  Resp 18  Ht 1.778 m (5' 10\")  Wt 82.2 kg (181 lb 4.8 oz)  SpO2 97%  BMI 26.01 kg/m2  Awake, sitting at chair  Pleasant and cooperative  Some L inattention   NG tube in place  Some L facial droop  L hand and toe nails with fungal infection changes appears chronic   L UE flaccid w/o gross movement   L LE 4/5 hip flex, knee ext, but with limited aROM of ankle DF / PF     More than 25 minutes spent with at least half on care coordination       "

## 2017-03-01 NOTE — PLAN OF CARE
Problem: Goal/Outcome  Goal: Goal Outcome Summary  FOCUS/GOAL  Pain management and Skin integrity     ASSESSMENT, INTERVENTIONS AND CONTINUING PLAN FOR GOAL:  Pt alert and oriented, hard of hearing, slides down in bed needing repositioning, assist of 2 and liko lift. Received PRN tylenol x1 and voltaren x2 as scheduled for left ankle, scrotal/groin and generalized pain which helped. Groin is moist, pt has been continent of bladder using urinal. Penis has red/painful area. SBP has been elevated, received scheduled meds, updating Doctor. Incontinent of small BM and smear. Dtr visited at bedtime. TF stared around 2000. ND tube hooked behind left ear as it gets in the way when he eats/drinks, please continue to monitor skin.

## 2017-03-01 NOTE — PROGRESS NOTES
Diabetes Consult Daily  Progress Note          Assessment/Plan:   Elias Mckee is a 85 year old male with history of CAD ( stents and cardiac arrest), stroke (9 years ago), hypertension, hyperlipidemia, type 2 diabetes , CKD stage 3 ( baseline Cr 1.3-1.4), subclinical hypothyroidism, and atrial fibrillation was admitted to [facility] from (2/20-2/27/2017) with acute right middle cerebral artery territory stroke. Admitted to ARU for therapies      Type 2 diabetic: PTA on glimepiride 3 mg daily,   Cycled TF: Isosource 1.5 at 60 cc per hour from 8824-7279 ( total of 127 grams of CHO)     Plan  - glipizide 5 mg in am ( shorter half life then glimepiride)  -NPH 10 units at the start of TF  -Aspart medium correction before meals  -Aspart medium correction 2000,0000, 0400)   - monitor glucose before meals and tid with cycled TF     Will continue to follow                 Interval History:   Blood sugars improved with addition of NPH with cycled TF  Glucose during cycle 156-->186-->242  Glucose at end of cycle 277  Is on calorie counts, may need encouragement to eat  Tolerating cycled TF    Recent Labs  Lab 03/01/17  0756 03/01/17  0429 03/01/17  0028 02/28/17  2044 02/28/17  1642 02/28/17  1210  02/27/17  0746  02/26/17  0757  02/25/17  0740  02/24/17  0740  02/23/17  0805   GLC  --   --   --   --   --   --   --  301*  --  264*  --  248*  --  179*  --  143*   * 242* 186* 156* 147* 139*  < >  --   < >  --   < >  --   < >  --   < >  --    < > = values in this interval not displayed.            Review of Systems:      please see interval history       Medications:       Active Diet Order      Combination Diet [id number] Calories: Moderate Consistent CHO (4-6 CHO units/meal); Dysphagia Diet Level 2: Mechan Altered; Nectar Thickened Liquids (pre-thickened or use instant food thickener)     Physical Exam:  Gen: Alert, resting in bed, in NAD   HEENT: NC/AT, mucous membranes are  "moist  Resp: Unlabored  Ext: moving right upper arm  Neuro:oriented person and place ( date not assessed), communicating clearly  /64  Pulse 72  Temp 98.3  F (36.8  C) (Oral)  Resp 18  Ht 1.778 m (5' 10\")  Wt 82.2 kg (181 lb 4.8 oz)  SpO2 97%  BMI 26.01 kg/m2           Data:     Lab Results   Component Value Date    A1C 6.8 02/21/2017    A1C 6.7 02/17/2017    A1C 6.0 08/11/2016    A1C 6.4 01/12/2016    A1C 6.6 07/13/2015              CBC RESULTS:   Recent Labs   Lab Test  02/27/17   0746   WBC  8.4   RBC  3.11*   HGB  9.6*   HCT  28.1*   MCV  90   MCH  30.9   MCHC  34.2   RDW  14.9   PLT  148*     Recent Labs   Lab Test  02/27/17   0746  02/26/17   0757   NA  138  137   POTASSIUM  4.1  4.1   CHLORIDE  108  108   CO2  22  20   ANIONGAP  8  9   GLC  301*  264*   BUN  22  22   CR  0.96  0.92   TRENT  8.3*  8.0*     Liver Function Studies -   Recent Labs   Lab Test  02/17/17   0940  08/11/16   0930   PROTTOTAL   --   7.1   ALBUMIN   --   3.7   BILITOTAL   --   0.3   ALKPHOS   --   80   AST   --   13   ALT  29  24     Lab Results   Component Value Date    INR 0.99 02/20/2017    INR 0.97 06/13/2008           Emily Mcclure, CNP pager [phone]  Diabetes Management Job Code [id number]          "

## 2017-03-02 LAB
GLUCOSE BLDC GLUCOMTR-MCNC: 140 MG/DL (ref 70–99)
GLUCOSE BLDC GLUCOMTR-MCNC: 188 MG/DL (ref 70–99)
GLUCOSE BLDC GLUCOMTR-MCNC: 189 MG/DL (ref 70–99)
GLUCOSE BLDC GLUCOMTR-MCNC: 205 MG/DL (ref 70–99)
GLUCOSE BLDC GLUCOMTR-MCNC: 249 MG/DL (ref 70–99)
GLUCOSE BLDC GLUCOMTR-MCNC: 268 MG/DL (ref 70–99)

## 2017-03-02 PROCEDURE — A9270 NON-COVERED ITEM OR SERVICE: HCPCS | Mod: GY | Performed by: PHYSICAL MEDICINE & REHABILITATION

## 2017-03-02 PROCEDURE — 97112 NEUROMUSCULAR REEDUCATION: CPT | Mod: GP | Performed by: PHYSICAL THERAPIST

## 2017-03-02 PROCEDURE — 40000193 ZZH STATISTIC PT WARD VISIT: Performed by: PHYSICAL THERAPIST

## 2017-03-02 PROCEDURE — 12800006 ZZH R&B REHAB

## 2017-03-02 PROCEDURE — 27210429 ZZH NUTRITION PRODUCT INTERMEDIATE LITER

## 2017-03-02 PROCEDURE — 97530 THERAPEUTIC ACTIVITIES: CPT | Mod: GP | Performed by: PHYSICAL THERAPIST

## 2017-03-02 PROCEDURE — 25000132 ZZH RX MED GY IP 250 OP 250 PS 637: Mod: GY | Performed by: NURSE PRACTITIONER

## 2017-03-02 PROCEDURE — 40000225 ZZH STATISTIC SLP WARD VISIT

## 2017-03-02 PROCEDURE — 92526 ORAL FUNCTION THERAPY: CPT | Mod: GN | Performed by: SPEECH-LANGUAGE PATHOLOGIST

## 2017-03-02 PROCEDURE — 40000133 ZZH STATISTIC OT WARD VISIT: Performed by: OCCUPATIONAL THERAPIST

## 2017-03-02 PROCEDURE — 40000225 ZZH STATISTIC SLP WARD VISIT: Performed by: SPEECH-LANGUAGE PATHOLOGIST

## 2017-03-02 PROCEDURE — 92523 SPEECH SOUND LANG COMPREHEN: CPT | Mod: GN

## 2017-03-02 PROCEDURE — A9270 NON-COVERED ITEM OR SERVICE: HCPCS | Mod: GY | Performed by: NURSE PRACTITIONER

## 2017-03-02 PROCEDURE — 97535 SELF CARE MNGMENT TRAINING: CPT | Mod: GO | Performed by: OCCUPATIONAL THERAPIST

## 2017-03-02 PROCEDURE — 25000132 ZZH RX MED GY IP 250 OP 250 PS 637: Mod: GY | Performed by: PHYSICAL MEDICINE & REHABILITATION

## 2017-03-02 PROCEDURE — 00000146 ZZHCL STATISTIC GLUCOSE BY METER IP

## 2017-03-02 PROCEDURE — 97110 THERAPEUTIC EXERCISES: CPT | Mod: GP | Performed by: PHYSICAL THERAPIST

## 2017-03-02 RX ADMIN — ACETAMINOPHEN 650 MG: 325 TABLET, FILM COATED ORAL at 04:20

## 2017-03-02 RX ADMIN — METOPROLOL TARTRATE 50 MG: 50 TABLET, FILM COATED ORAL at 20:31

## 2017-03-02 RX ADMIN — DICLOFENAC SODIUM: 10 GEL TOPICAL at 20:30

## 2017-03-02 RX ADMIN — DICLOFENAC SODIUM: 10 GEL TOPICAL at 18:05

## 2017-03-02 RX ADMIN — Medication 1 MG: at 20:30

## 2017-03-02 RX ADMIN — FERROUS GLUCONATE 324 MG: 324 TABLET ORAL at 08:14

## 2017-03-02 RX ADMIN — RANITIDINE HYDROCHLORIDE 150 MG: 150 TABLET, FILM COATED ORAL at 08:13

## 2017-03-02 RX ADMIN — VITAMIN D, TAB 1000IU (100/BT) 2000 UNITS: 25 TAB at 08:13

## 2017-03-02 RX ADMIN — LISINOPRIL 15 MG: 10 TABLET ORAL at 08:13

## 2017-03-02 RX ADMIN — METOPROLOL TARTRATE 50 MG: 50 TABLET, FILM COATED ORAL at 08:13

## 2017-03-02 RX ADMIN — ATORVASTATIN CALCIUM 40 MG: 40 TABLET, FILM COATED ORAL at 08:13

## 2017-03-02 RX ADMIN — LISINOPRIL 15 MG: 10 TABLET ORAL at 20:30

## 2017-03-02 RX ADMIN — TAMSULOSIN HYDROCHLORIDE 0.8 MG: 0.4 CAPSULE ORAL at 08:13

## 2017-03-02 RX ADMIN — RANITIDINE HYDROCHLORIDE 150 MG: 150 TABLET, FILM COATED ORAL at 20:30

## 2017-03-02 RX ADMIN — FLUTICASONE PROPIONATE 1 SPRAY: 50 SPRAY, METERED NASAL at 08:10

## 2017-03-02 RX ADMIN — HYDROCHLOROTHIAZIDE 12.5 MG: 12.5 CAPSULE ORAL at 08:14

## 2017-03-02 RX ADMIN — Medication 5 MG: at 06:38

## 2017-03-02 RX ADMIN — RIVAROXABAN 20 MG: 10 TABLET, FILM COATED ORAL at 18:05

## 2017-03-02 RX ADMIN — MICONAZOLE NITRATE: 2 POWDER TOPICAL at 08:11

## 2017-03-02 RX ADMIN — DICLOFENAC SODIUM: 10 GEL TOPICAL at 13:14

## 2017-03-02 RX ADMIN — MICONAZOLE NITRATE: 2 POWDER TOPICAL at 20:56

## 2017-03-02 RX ADMIN — DICLOFENAC SODIUM: 10 GEL TOPICAL at 08:08

## 2017-03-02 NOTE — CARE CONFERENCE
"Acute Rehab Care Conference/Team Rounds      Type: Team Rounds    Present:  Dr. Kevin Bond, Keren Marti SW, Babita Rosales OT, Arlyn Grullon OT,  Vee Nice PT, Socrates Argueta SLP, Neda Bartlett , Cyndi Rocha Dietician, Flora Bustos, Radha Nathan RN.        Discharge Barriers/Treatment/Education    Rehab Diagnosis: This is an 85-year-old male with a past medical history of stroke 9 years ago with some residual left leg weakness with history of chronic ischemic heart disease, prostate cancer, hypertension, hyperlipidemia, type 2 diabetes, chronic kidney disease, atrial fibrillation. Now presents with left hemiparesis, upper extremity, on top of residual left lower extremity weakness with dysarthria, dysphagia, concern for cognitive impairment on top of hard of hearing with impaired mobility, ADLs.    Active Medical Co-morbidities/Prognosis: Prognosis for medical improvement and stabilization of the above medical issues is good.       Safety: Patient was disoriented to place and situation. Requires minimal assist with bed mobility, but total lift to boost up in bed. He  Transfers out  of bed and back using ceiling lift. Swallowing precaution.    Pain: Back and left ankle pain relieff with Tylenol and Voltaren cream to ankle.     Medications, Skin, Tubes/Lines: Assess need for MAP closer to D/C. Groin is slightly red, needs to be monitored. NG tube for tube feeding. May need education on managing this if discharging home with it. He had been managing BG checks and insulin at home but he will need his skills validated by nursing or he may need assist with managing insulin if he can't do this by discharge.    Swallowing/Nutrition: Patient tolerating current diet of NDD-2 and nectar thick liquids. Patient showing some difficulties with more solid textures due to inconsistent use of compensatory swallowing strategy (sip every 1-2 bites). Prior to hospitalization was on \"free water " "protocol\" for limited quantities. Anticipate ability to upgrade diet to NDD-3 or regular textures with thin liquids prior to discharge.     Bowel/Bladder: Continent of bowel. Using  commode. LBM 3/1. Continent and incontinent of urine, using urinal and commode.    Psychosocial: Pt resides alone in an assisted living. Pts goal to return home with a resumption or increased services. Team working towards a safe d/c plan.    ADLs/IADLs: OT: Initial OT eval completed 2/28. Pt limited by L hemiparesis, LUE flaccidity, sensory deficits, L inattention, and LE and cervical pain. Pt requiring Max A bathing, Max-Total A LB dressing, Max A UB dressing, and Mod A g/h. Pt completing STS from w/c A x 2 with commode being positioned behind for toileting, Total A clothing management and toilet hygiene. Pt requiring A x 2 squat pivot transfer w/c <-> toilet with use of grab bar. Focusing on one handed techniques, postural awareness, and L attention until pt has made more functional return in LUE. At baseline, pt living at [facility] Mod IND ADLs and mobility, receiving assist for IADLs. Pt will require extended rehab stay to address goals in POC, may benefit from TCU setting prior to return to [facility]. Recommend ongoing IP OT and will continue to assess functional progression.     Mobility: Pt with significant L side hemiparesis, L LE weakness, L flaccid UE,  sensory deficits, pain in L ankle and neck at times. Pt needing A of 2 for squat pivot to R side, with c/o L ankle pain at times. Pt seems to have decreased awareness /attention to L side at times, noted when scanning for rooms/ room #s in hallway.  Pt scored a 9/36 on the PASS on 3/1/17, needing A with all transfers and bed mobility. Aircast on L ankle for standing practice, due to soreness from acute care hospital.  Practiced bed <-> wc to R side , with use of rail or wc armrest for RUE anchor with generally modA, continuing to work on technique with therapy.  Pt has been able to stand " with RUE assist (bars or bedrail) and min to modA about 2-3 minutes at a time, cues for L knee and hip ext, fatigues quickly.  Pt has started to practice w/c mobility with use of RUE and RLE , min A , short distances. Pt will need continued rehab to improve function and activity tolerance. Pt had been living at [facility] with cane, and is far off his baseline now. Pt may need TCU for longer rehab journey, will cont to assess.     Cognition/Language: Cognitive linguistic evaluation initiated but not yet completed due to time being spent on dysphagia management. Mild dysarthria and comprehension deficits noted. Goals to be added PRN. Likely to require ongoing speech therapy at discharge to maximize cognitive linguistic function and return to baseline function.     Community Re-Entry:Pt would need w/c at this time, and pt with decreased activity, sitting tolerance at this time.     Transportation:Pt has not practiced car tx due to balance, mobility challenges at this time.     Decision maker: self    Plan of Care and goals reviewed and updated.    Discharge Plan/Recommendations    Fall Precautions: continue    Overall plan for the patient:   On TF, has po intake as well.   Nutrition following.   Still low level.   Working with therapies.   ELOS 2 weeks.       Utilization Review and Continued Stay Justification    Medical Necessity Criteria:    For any criteria that is not met, please document reason and plan for discharge, transfer, or modification of plan of care to address.    Requires intensive rehabilitation program to treat functional deficits?: Yes    Requires 3x per week or greater involvement of rehabilitation physician to oversee rehabilitation program?: Yes    Requires rehabilitation nursing interventions?: Yes    Patient is making functional progress?: Yes    There is a potential for additional functional progress? Yes    Patient is participating in therapy 3 hours per day a minimum of 5 days per week or 15  hours per week in 7 day period?:Yes    Has discharge needs that require coordinated discharge planning approach?:Yes          Final Physician Sign off    Statement of Approval: I agree with all the recommendations detailed in this document.   Kevin Bond MD      Patient Goals        1. Pt will d/c home/community setting upon completion of therapy/RN goals.  2. Pt and family will be involved in d/c planning and will be made aware of services available to meet their needs.         OT target date for goal attainment: 03/28/17  OT Frequency: daily 4 weeks  OT goal: hygiene/grooming: modified independent  OT goal: upper body dressing: Modified independent  OT goal: lower body dressing: Minimal assist  OT goal: upper body bathing: Modified independent  OT goal: lower body bathing: Minimal assist, Supervision/stand-by assist, Modified independent  OT goal: toilet transfer/toileting: Supervision/stand-by assist  OT goal: home management: Minimal assist  OT goal: perform aerobic activity with stable cardiovascular response: intermittent activity  Edema Management (OT): With caregiver assist  OT goal 1: OT: pt will complete wet walk in shower transfer supervision for safety.     PT target date for goal attainment: 03/28/17  PT Frequency: daily , 60-75 min per day.  PT goal: bed mobility: Modified independent, Supine to/from sit, Rolling, Bridging (with rail, if needed. )  PT goal: transfers: Modified independent, Sit to/from stand, Bed to/from chair, Assistive device (with cane or walker. )  PT goal: gait: Modified independent, Quad cane, 50 feet (may need L afo. )     PT goal: perform aerobic activity with stable cardiovascular response: continuous activity, NuStep, 10 minutes     PT goal 1: Pt able to perform car tx with AD and sba.   PT Goal 2: Pt able to score > 24/36 on the PASS for improved functional mobility, safety at home.  PT Goal 3: Pt able to state 3 fall prevention techniques for increased safety at home.    PT Goal 4: Floor recovery tx with furniture and sba.   PT Goal 5: Pt I with HEP for improved balance, endurance, strength for increased function a t home.       SLP target date for goal attainment: 03/21/17  SLP Frequency: daily  SLP Swallow Goal:  Safely tolerate diet without signs/symptoms of aspiration: regular diet, thin liquids, with use of swallow precautions, independently    Patient Goal:  Pain Management: Patient will advocate for pain and demonstrate understanding on nonpharmacological pain interventions before discharge.        Goal: Nutrition/Feeding/Swallowing Precautions: Patient will maintain adequate nutrition with scheduled tube feedings and  progress with diet as indicated before discharge.                 Goal: Mobility: Patient will demonstrate understanding on safe transfers before discharge.  Goal: Skin Integrity: Patient's skin will remain intact with adequate incontinence management and will demonstrate understanding on prevention of skin breakdown before discharge.   Individualized Goal 1: Pt will be able to demonstrate ability to manage BG checks using own glucometer and insulin management before discharge.  Individualized Goal 2: Pt will be able to demonstrate ability to manage TF safely if discharging home with this.   Individualized Goal 3: Pt and family member will be able to enumerate s/sx of stroke, risk factors for stroke and healthy lifestyle patterns to maintain to prevent another stroke.

## 2017-03-02 NOTE — PROGRESS NOTES
"[facility] Acute Rehabilitation Unit  Progress Note    Interval Hx  In room, working with OT  R tripp deformed, documented previously at some neurology notes at acute hospital  Some elevated BP, cont to monitor, adjusted lisinopril yesterday - may need further adjustment  Feels overall well this am, denies any needs this am   Endocrinology consulted and following for BS management       Assessment and Plan of Care: This is an 85-year-old male with a past medical history of stroke 9 years ago with some residual left leg weakness with history of chronic ischemic heart disease, prostate cancer, hypertension, hyperlipidemia, type 2 diabetes, chronic kidney disease, atrial fibrillation. Now presents with left hemiparesis, upper extremity, on top of residual left lower extremity weakness with dysarthria, dysphagia, concern for cognitive impairment on top of hard of hearing with impaired mobility, ADLs.    Functionally:  \"OT: Pt OK to complete STS from w/c with A x 2 for toileting with commode. Recommend Liko lift A x 2 w/c <-> bed due to safety concerns with pivot transfers. \"  \"OT: Completed full shower on dependent shower chair. Requiring A x 2 EOB <-> shower chair towards R side, assist to block L knee/ankle and support LUE. Max A TB bathing. Pt requiring Max A LB/UB dressing and Mod A g/h tasks, focusing on one handed techniques due to LUE flaccidity.\"  PASS with PT 9 / 36.   Continue therapies and plan of care.      Overall Management:   - optimize secondary stroke management, on xarelto for anticoagulation, BP management, DM / BS control, lipid management  - HTN, on HCTZ, metoprolol and lisinopril increase dose 3/1, added prn hydralazine, adjust as needed  - DM / BS, endocrinology managing BS control and adjusting management as needed   - HLD, on lipitor  - on vit D, ferrous gluconate, flonase  - melatonin for sleep  - L ankle swelling, some pain, reports twisting ankle, able to " move aROM on own w/o sig discomfort, fx suspicion low, will do ice pack and voltaren gel for now, monitor     Bladder and Bowel - monitor spont voids and BM, on flomax, miralax prn  GI ppx - on zantac  DVT ppx - optimize mech ppx with PCDs and antiembolism stockings, on xarelto, progressive mobilization         Active Diet Order      Combination Diet 0910-6165 Calories: Moderate Consistent CHO (4-6 CHO units/meal); Dysphagia Diet Level 2: Mechan Altered; Nectar Thickened Liquids (pre-thickened or use instant food thickener)    Labs    Recent Labs  Lab 03/02/17  0804 03/02/17  0404 03/02/17  0011 03/01/17  2028 03/01/17  1709 03/01/17  1150  02/27/17  0746  02/26/17  0757  02/25/17  0740  02/24/17  0740   GLC  --   --   --   --   --   --   --  301*  --  264*  --  248*  --  179*   * 205* 249* 179* 119* 203*  < >  --   < >  --   < >  --   < >  --    < > = values in this interval not displayed.      Medications:  Current Facility-Administered Medications   Medication     glipiZIDE (GLUCOTROL) half-tab 5 mg     insulin aspart (NovoLOG) inj (RAPID ACTING)     insulin aspart (NovoLOG) inj (RAPID ACTING)     lisinopril (PRINIVIL/ZESTRIL) tablet 15 mg     hydrALAZINE (APRESOLINE) tablet 25 mg     miconazole (MICATIN; MICRO GUARD) 2 % powder     diclofenac (VOLTAREN) 1 % topical gel     insulin isophane human (HumuLIN N PEN) injection 10 Units     acetaminophen (TYLENOL) tablet 650 mg     atorvastatin (LIPITOR) tablet 40 mg     cholecalciferol (vitamin D) tablet 2,000 Units     ferrous gluconate (FERGON) tablet 324 mg     fluticasone (FLONASE) 50 MCG/ACT spray 1-2 spray     hydrochlorothiazide (MICROZIDE) capsule 12.5 mg     melatonin tablet 1 mg     metoprolol (LOPRESSOR) tablet 50 mg     polyethylene glycol (MIRALAX/GLYCOLAX) powder 17 g     rivaroxaban ANTICOAGULANT (XARELTO) tablet 20 mg     tamsulosin (FLOMAX) capsule 0.8 mg     glucose 40 % gel 15-30 g    Or     dextrose 50 % injection 25-50 mL    Or     glucagon  "injection 1 mg     ranitidine (ZANTAC) tablet 150 mg         Physical Examination:   /76 (BP Location: Right arm)  Pulse 70  Temp 97.2  F (36.2  C) (Oral)  Resp 16  Ht 1.778 m (5' 10\")  Wt 82.2 kg (181 lb 4.8 oz)  SpO2 95%  BMI 26.01 kg/m2  In bed, working with OT this am   Some L inattention   NG tube in place  R pupil deformed - previously documented   Some L facial droop   L hand and toe nails with fungal infection changes appears chronic   L UE flaccid w/o gross movement   L LE 4/5 hip flex, knee ext, but with limited aROM of ankle DF / PF       More than 35 minutes spent with at least half on care coordination and team meeting       "

## 2017-03-02 NOTE — PROGRESS NOTES
03/01/17 0900   General Information, SLP   Type of Evaluation Speech and Language   Type of Visit Initial   Start of Care Date 03/01/17   Onset of Illness/Injury or Date of Surgery - Date 03/20/17   Referring Physician Dr. Bond   Patient/Family Goals Statement To return home.   Pertinent History of Current Problem Right ischemic MCA, Left cerebellar diffusion restriction   General Observations Pleasant and cooperative, hears better on Right side (wears HA)   General Info Comments Pt lives in assisted living center.   Oral Motor Sensory Function   Completed on Swallow Evaluation Completed Clinical Bedside Swallow Evaluation   Speech   Deficits in Articulation Flaccid dysarthria   Language: Auditory Comprehension (understanding of spoken language)   Tests were administered at the following levels Basic (rote activities);Moderate (routine daily activities)   Y/N Simple Questions (out of 10 total) 10   Two Step Commands (out of 5 total) 3   Yes/No Sentence and Simple Paragraph; [place] Diagnostic Aphasia Exam 3 short (out of 6 total) 5   Functional Assessment Scale (Auditory Comprehension) Mild Impairment   Language: Verbal Expression (use of spoken language to express information)   Tests were administered at the following levels Basic (rote activities);Moderate (routine daily activities)   Responsive Naming; [place] Diagnostic Aphasia Exam 3 (out of 20 total) 20   Generate Sentences; [place] Test for Differential Diagnosis Of Aphasia (out of 6 total) 6   Functional Assessment Scale (Verbal Expression) No Impairment   Reading Comprehension (understanding of written language)   Tests were administered at the following levels Basic (rote activities)   Match Letters/Match Words (out of 8 total) 7   Simple Phrase/Sentence (out of 15 total) 0   Sentences and Paragraphs; [place] Diagnostic Aphasia Exam (out of 10 total) 0   Functional Assessment Scale (Reading Comprehension) Moderate to Severe Impairment   Comments  (Reading Comprehension) Able to identify single words but due to neglect unable to read full portions to make any sense.   Written Expression (use of writing to express information)   Functional Assessment Scale (Written Expression) Moderate Impairment   Comments (Written Expression) Poor penmanship and multiple mispellings in context of adding extra letters at the end (repeating the last letter or number multiple times). Also writing over his own handwriting. Sentences were grammatically correct.    Cognitive Status Examination   Attention impaired   Visual Impairments Include localization of objects in the visual field   Short Term Memory impaired   Reasoning impaired   Executive Function Deficits Noted insight/awareness   Additional cognitive-linguistic evaluation indicated  yes   Cognitive Status Exam Comments May need to hold off on formal assessment to allow vision compensations to improve.    Clinical Impression, SLP Eval   Criteria for Skilled Therapeutic Interventions Met Yes   SLP Diagnosis mild dysarthria;    Therapy Frequency Daily   Predicted Duration of Therapy Intervention (days/wks) ~14 days   Anticipated Discharge Disposition Home with Assist;Home with Home Therapy   Risks and Benefits of Treatment have been explained. Yes   Patient, Family & other staff in agreement with plan of care Yes   Clinical Impression Comments Patient presenting with mild dysarthria that does impact intelligibility at times. Vision impairments significantly impacting function on reading/writing. Formal assessment may be appropriate in the future but not at this time given severity of vision deficits though will complete as compensations for vsion improve. Patient does also present with some deficits in memory, attention, insight and problem solving. Current level of function is below baseline and would benefit from skilled intervention to maximize function and level of independence for potential return home.    Total  Evaluation Time      Total Evaluation Time (Minutes) 65

## 2017-03-02 NOTE — PLAN OF CARE
Problem: Goal/Outcome  Goal: Goal Outcome Summary  FOCUS/GOAL  Bowel management, Bladder management, Mobility, Skin integrity and Safety management     ASSESSMENT, INTERVENTIONS AND CONTINUING PLAN FOR GOAL:  Pt reported no pain during shift. Disoriented to situation and place at start of shift, reoriented patient. No neuro changes present, MD completed assessment as well. Incontinence of urine x1 at start of shift. Continent of b/b the rest of the shift. Left hand  strength absent, no change. Continue to reposition pt, pink bottom, barrier cream applied. Pt working on squat pivot transfers with therapy, nursing continues using liko lift or having pt use bed side rails to stand while placing bsc behind pt, AO2 for clean-up and clothing management. Alarms on. Continue with POC.

## 2017-03-02 NOTE — PLAN OF CARE
Problem: Goal/Outcome  Goal: Goal Outcome Summary  Outcome: Therapy, progress toward functional goals as expected  SLP; pt demonstrated ability to take small bites/sips, slow rate, took drinks of nectar liquids between bites and with DD3 (cookie);  required cues to check L cheek for residue.  DD3 trial of sugar cookie - took small bite and took sip of nectar juice immediately after mastication to assist in swallowing of cookie (two times). Pt coughed once at end of meal, stated it was from his cold.

## 2017-03-02 NOTE — PLAN OF CARE
Problem: Discharge Planning  Goal: Discharge Planning (Adult, OB, Behavioral, Peds)  03/02/17 9190     Lashawn Elise-Registered Nurse (Nursing)  Patient and daughter attended stroke and anticoagulation class. Received written material related to both content taught. Pt. Answered all questions appropriately but daughter states he usually doesn't remember what was told to him. Currently lives in assisted living. Daughter hopes to discuss discharge plans for more rehab at a care conference soon.

## 2017-03-02 NOTE — PLAN OF CARE
Problem: Goal/Outcome  Goal: Goal Outcome Summary  FOCUS/GOAL  Medical management     ASSESSMENT, INTERVENTIONS AND CONTINUING PLAN FOR GOAL:  Patient was continent this shift using urinal at bedside with assistance. Minimal discomfort to left inner ankle and was tolerated by using voltaren cream. Coccyx/groin is pink. Patient did have a coughing spell during dinner and did have white sputum output. Lungs were assessed and they were more clear after coughing spell then at the beginning of the shift. Patient ceiling lift for transfers. Continue POC.

## 2017-03-02 NOTE — PROGRESS NOTES
Diabetes Consult Daily  Progress Note          Assessment/Plan:   Elias Mckee is a 85 year old male with history of CAD ( stents and cardiac arrest), stroke (9 years ago), hypertension, hyperlipidemia, type 2 diabetes , CKD stage 3 ( baseline Cr 1.3-1.4), subclinical hypothyroidism, and atrial fibrillation was admitted to [facility] from (2/20-2/27/2017) with acute right middle cerebral artery territory stroke. Admitted to ARU for therapies       Type 2 diabetic: PTA on glimepiride 3 mg daily,   Cycled TF: Isosource 1.5 at 60 cc per hour from 3773-6641 ( total of 127 grams of CHO)      Plan  - glipizide 5 mg in am ( shorter half life then glimepiride)  - increase NPH 12 units at the start of TF  - d/c Aspart medium correction before meals  -Aspart medium correction 2000, 0000, 0400)   - monitor glucose before meals and tid with cycled TF      Will continue to follow                 Interval History:     Blood sugars are moderately controlled  Glucose during cycle TF in the 200s  Glucose decreased to 140 at 1317  Oral intake is ok, is on calorie counts  Tolerating TF, no reports of N/V/D        Recent Labs  Lab 03/02/17  1317 03/02/17  0804 03/02/17  0404 03/02/17  0011 03/01/17  2028 03/01/17  1709  02/27/17  0746  02/26/17  0757  02/25/17  0740  02/24/17  0740   GLC  --   --   --   --   --   --   --  301*  --  264*  --  248*  --  179*   * 268* 205* 249* 179* 119*  < >  --   < >  --   < >  --   < >  --    < > = values in this interval not displayed.            Review of Systems:      please see interval history       Medications:       Active Diet Order      Combination Diet 3712-6453 Calories: Moderate Consistent CHO (4-6 CHO units/meal); Dysphagia Diet Level 2: Mechan Altered; Nectar Thickened Liquids (pre-thickened or use instant food thickener)     Physical Exam:  Gen:  NAD   HEENT: mucous membranes are moist  Resp: Unlabored  Neuro: sleeping  /72 (BP  "Location: Right arm)  Pulse 68  Temp 97.2  F (36.2  C) (Oral)  Resp 16  Ht 1.778 m (5' 10\")  Wt 82.2 kg (181 lb 4.8 oz)  SpO2 95%  BMI 26.01 kg/m2           Data:     Lab Results   Component Value Date    A1C 6.8 02/21/2017    A1C 6.7 02/17/2017    A1C 6.0 08/11/2016    A1C 6.4 01/12/2016    A1C 6.6 07/13/2015              CBC RESULTS:   Recent Labs   Lab Test  02/27/17   0746   WBC  8.4   RBC  3.11*   HGB  9.6*   HCT  28.1*   MCV  90   MCH  30.9   MCHC  34.2   RDW  14.9   PLT  148*     Recent Labs   Lab Test  02/27/17   0746  02/26/17   0757   NA  138  137   POTASSIUM  4.1  4.1   CHLORIDE  108  108   CO2  22  20   ANIONGAP  8  9   GLC  301*  264*   BUN  22  22   CR  0.96  0.92   TRENT  8.3*  8.0*     Liver Function Studies -   Recent Labs   Lab Test  02/17/17   0940  08/11/16   0930   PROTTOTAL   --   7.1   ALBUMIN   --   3.7   BILITOTAL   --   0.3   ALKPHOS   --   80   AST   --   13   ALT  29  24     Lab Results   Component Value Date    INR 0.99 02/20/2017    INR 0.97 06/13/2008           Emily Mcclure, CNP pager [phone]  Diabetes Management Job Code [id number]          "

## 2017-03-02 NOTE — PROGRESS NOTES
Nutrition Services    Calorie Counts  3/1: 908 kcal, 32 g protein (3 meals, 1.25 magic cup)      Jyotsna Chang, RD

## 2017-03-03 LAB
GLUCOSE BLDC GLUCOMTR-MCNC: 137 MG/DL (ref 70–99)
GLUCOSE BLDC GLUCOMTR-MCNC: 186 MG/DL (ref 70–99)
GLUCOSE BLDC GLUCOMTR-MCNC: 201 MG/DL (ref 70–99)
GLUCOSE BLDC GLUCOMTR-MCNC: 204 MG/DL (ref 70–99)
GLUCOSE BLDC GLUCOMTR-MCNC: 283 MG/DL (ref 70–99)

## 2017-03-03 PROCEDURE — 25000132 ZZH RX MED GY IP 250 OP 250 PS 637: Mod: GY | Performed by: PHYSICAL MEDICINE & REHABILITATION

## 2017-03-03 PROCEDURE — 97530 THERAPEUTIC ACTIVITIES: CPT | Mod: GP

## 2017-03-03 PROCEDURE — 97110 THERAPEUTIC EXERCISES: CPT | Mod: GP

## 2017-03-03 PROCEDURE — 40000225 ZZH STATISTIC SLP WARD VISIT: Performed by: SPEECH-LANGUAGE PATHOLOGIST

## 2017-03-03 PROCEDURE — 92526 ORAL FUNCTION THERAPY: CPT | Mod: GN | Performed by: SPEECH-LANGUAGE PATHOLOGIST

## 2017-03-03 PROCEDURE — A9270 NON-COVERED ITEM OR SERVICE: HCPCS | Mod: GY | Performed by: PHYSICAL MEDICINE & REHABILITATION

## 2017-03-03 PROCEDURE — 27210429 ZZH NUTRITION PRODUCT INTERMEDIATE LITER

## 2017-03-03 PROCEDURE — 97532 ZZHC SP COGNITIVE SKILLS EA 15 MIN: CPT | Mod: GN | Performed by: SPEECH-LANGUAGE PATHOLOGIST

## 2017-03-03 PROCEDURE — 40000133 ZZH STATISTIC OT WARD VISIT: Performed by: OCCUPATIONAL THERAPIST

## 2017-03-03 PROCEDURE — 00000146 ZZHCL STATISTIC GLUCOSE BY METER IP

## 2017-03-03 PROCEDURE — 97530 THERAPEUTIC ACTIVITIES: CPT | Mod: GO | Performed by: OCCUPATIONAL THERAPIST

## 2017-03-03 PROCEDURE — 12800006 ZZH R&B REHAB

## 2017-03-03 PROCEDURE — 25000132 ZZH RX MED GY IP 250 OP 250 PS 637: Mod: GY | Performed by: NURSE PRACTITIONER

## 2017-03-03 PROCEDURE — 40000193 ZZH STATISTIC PT WARD VISIT

## 2017-03-03 PROCEDURE — 97112 NEUROMUSCULAR REEDUCATION: CPT | Mod: GO | Performed by: OCCUPATIONAL THERAPIST

## 2017-03-03 PROCEDURE — A9270 NON-COVERED ITEM OR SERVICE: HCPCS | Mod: GY | Performed by: NURSE PRACTITIONER

## 2017-03-03 RX ORDER — LISINOPRIL 20 MG/1
20 TABLET ORAL 2 TIMES DAILY
Status: DISCONTINUED | OUTPATIENT
Start: 2017-03-03 | End: 2017-03-21 | Stop reason: HOSPADM

## 2017-03-03 RX ADMIN — METOPROLOL TARTRATE 50 MG: 50 TABLET, FILM COATED ORAL at 20:01

## 2017-03-03 RX ADMIN — DICLOFENAC SODIUM: 10 GEL TOPICAL at 20:01

## 2017-03-03 RX ADMIN — FLUTICASONE PROPIONATE 2 SPRAY: 50 SPRAY, METERED NASAL at 07:55

## 2017-03-03 RX ADMIN — DICLOFENAC SODIUM: 10 GEL TOPICAL at 12:52

## 2017-03-03 RX ADMIN — TAMSULOSIN HYDROCHLORIDE 0.8 MG: 0.4 CAPSULE ORAL at 07:55

## 2017-03-03 RX ADMIN — VITAMIN D, TAB 1000IU (100/BT) 2000 UNITS: 25 TAB at 07:55

## 2017-03-03 RX ADMIN — HYDROCHLOROTHIAZIDE 12.5 MG: 12.5 CAPSULE ORAL at 07:55

## 2017-03-03 RX ADMIN — Medication 5 MG: at 06:56

## 2017-03-03 RX ADMIN — METOPROLOL TARTRATE 50 MG: 50 TABLET, FILM COATED ORAL at 07:55

## 2017-03-03 RX ADMIN — LISINOPRIL 15 MG: 10 TABLET ORAL at 07:54

## 2017-03-03 RX ADMIN — RIVAROXABAN 20 MG: 10 TABLET, FILM COATED ORAL at 16:59

## 2017-03-03 RX ADMIN — RANITIDINE HYDROCHLORIDE 150 MG: 150 TABLET, FILM COATED ORAL at 20:01

## 2017-03-03 RX ADMIN — MICONAZOLE NITRATE: 2 POWDER TOPICAL at 08:02

## 2017-03-03 RX ADMIN — DICLOFENAC SODIUM: 10 GEL TOPICAL at 16:55

## 2017-03-03 RX ADMIN — RANITIDINE HYDROCHLORIDE 150 MG: 150 TABLET, FILM COATED ORAL at 07:54

## 2017-03-03 RX ADMIN — HYDRALAZINE HYDROCHLORIDE 25 MG: 25 TABLET ORAL at 20:06

## 2017-03-03 RX ADMIN — FERROUS GLUCONATE 324 MG: 324 TABLET ORAL at 10:47

## 2017-03-03 RX ADMIN — DICLOFENAC SODIUM: 10 GEL TOPICAL at 07:55

## 2017-03-03 RX ADMIN — Medication 1 MG: at 20:01

## 2017-03-03 RX ADMIN — LISINOPRIL 20 MG: 20 TABLET ORAL at 20:01

## 2017-03-03 RX ADMIN — ATORVASTATIN CALCIUM 40 MG: 40 TABLET, FILM COATED ORAL at 07:55

## 2017-03-03 RX ADMIN — MICONAZOLE NITRATE: 2 POWDER TOPICAL at 20:08

## 2017-03-03 NOTE — PLAN OF CARE
Problem: Goal/Outcome  Goal: Goal Outcome Summary  Outcome: Therapy, progress toward functional goals as expected  PTA: OT plans to take pictures of squat pivot transfer bed<>w/c to post for nursing to aide pt in transferring, to get away from using lift. Pt sleeping upon PT entry, agreeable to PT session. Assisted back to bed at end of session, almost sleeping immediately. Pt happy with the activity he focused on this session. Pt's daughter and grand-daughter present for PT session, reported pt is [place] with hearing aide only in R ear.

## 2017-03-03 NOTE — PLAN OF CARE
Problem: Goal/Outcome  Goal: Goal Outcome Summary  Outcome: No Change  Patient was assisted up to chair for supper using the liko lift.  He ate about 50%, calorie count completed.  Patient coughed once and he was given cues and education for safe swallow strategies.  No further coughing noted.  His daughter supervised patient while he ate.  Lung sounds clear.  HOB remained elevated after eating and while tube feeding was running.    Patient was incontinent of urine once, contained in pad.  Total assist of two staff to clean up and change into new attend.  Required assist of two to boost up in bed multiple times this shift as he scoots himself down frequently.    BP elevated 160 and 170's systolic throughout the shift.  It appears to be consistent with prior shifts and patient remains asymptomatic.  Neuros remain unchanged from previous exams.  Did not meet parameter to receive PRN hydralazine this evening.  Continue to monitor BP's.

## 2017-03-03 NOTE — PLAN OF CARE
Problem: Goal/Outcome  Goal: Goal Outcome Summary  OT: Facilitated progression with functional transfers. Recommend A x 2 squat pivot transfer towards R side EOB <-> wheelchair. Use sling to support LUE and remove wheelchair arm rest during transfer. White board updated and pictures of transfer set up/sequence posted next to white board. D/c use of liko lift.

## 2017-03-03 NOTE — PLAN OF CARE
Problem: Goal/Outcome  Goal: Goal Outcome Summary  Outcome: Therapy, progress toward functional goals as expected  SLP: pt seen for dysphagia.  Dtr and granddaughter present.  Dtr. Reported incident (Monday?) regarding pt coughing at meal. They have concerns about him eating alone, being far away from , and difficulty knowing when/how to use call light.  Discussed options - at this time recommend pt eat near /or be supervised (ok family or SLP ).  Dentures in each am, assist to add adhesive, out at night.  Pt in chair for meals. We will help monitor next few days to better determine if pt tolerating NDD2 diet, level of supervision, or if diet needs be downgraded. Pt has appeared to tolerate NDD2 within SLP sessions last few days.

## 2017-03-03 NOTE — PLAN OF CARE
Problem: Goal/Outcome  Goal: Goal Outcome Summary  Outcome: No Change  FOCUS/GOAL  Medical management     ASSESSMENT, INTERVENTIONS AND CONTINUING PLAN FOR GOAL:  Patient slept between cares. No c/o pain. He was incontinent of 1 loose stool and urine twice. He was continent of urine once, he called for assistance to use a urinal. He has NG with tube feeding infusing to be stopped at 0800.  He requires maximal assistance of 2 with bed mobility.

## 2017-03-03 NOTE — PLAN OF CARE
Problem: Goal/Outcome  Goal: Goal Outcome Summary  Pt took medications two at a time in applesauce w/no issues.  Coughed while eating a bite of a muffin, appeared to clear easily.  No more issues eating breakfast.  Devi, SLP, recommends that pt be sitting upright in w/c for meals with supervision.  If staff is not available to be present in pt's room during meals, then pt is to eat at nurse's station.  Dentures (both bottom & lower) need to be in place with adhesive.

## 2017-03-03 NOTE — PROGRESS NOTES
Diabetes Consult Daily  Progress Note          Assessment/Plan:   Elias Mckee is a 85 year old male with history of CAD ( stents and cardiac arrest), stroke (9 years ago), hypertension, hyperlipidemia, type 2 diabetes , CKD stage 3 ( baseline Cr 1.3-1.4), subclinical hypothyroidism, and atrial fibrillation was admitted to [facility] from (2/20-2/27/2017) with acute right middle cerebral artery territory stroke. Admitted to ARU for therapies       Type 2 diabetic: PTA on glimepiride 3 mg daily,   Cycled TF: Isosource 1.5 at 60 cc per hour from 7455-7172 ( total of 127 grams of CHO)      Plan  - glipizide 5 mg in am ( shorter half life then glimepiride)  -  NPH 12 units at the start of TF  -Aspart medium correction 2000, 0000, 0400)   - monitor glucose before meals and tid with cycled TF      Will continue to follow     Family will leave updates on how he ate on the white board in the room             Interval History:     Blood sugars with cycled --> 201--> 186  End of   Appetite is poor, is not hungry, ? Needing reduction in TF  Family present, reviewed plan of care.  Elias is more engaged in conversation today    Recent Labs  Lab 03/03/17  1113 03/03/17  0746 03/03/17  0413 03/03/17  0004 03/02/17  2035 03/02/17  1709  02/27/17  0746  02/26/17  0757  02/25/17  0740   GLC  --   --   --   --   --   --   --  301*  --  264*  --  248*   * 283* 186* 201* 188* 189*  < >  --   < >  --   < >  --    < > = values in this interval not displayed.            Review of Systems:             Medications:       Active Diet Order      Combination Diet 3091-6755 Calories: Moderate Consistent CHO (4-6 CHO units/meal); Dysphagia Diet Level 2: Mechan Altered; Nectar Thickened Liquids (pre-thickened or use instant food thickener)     Physical Exam:  Gen: Alert, resting in bed, in NAD   HEENT: NC/AT, mucous membranes are moist  Resp: Unlabored  Ext: No lower extremity edema  "  Neuro:oriented x3, communicating clearly  /67 (BP Location: Right arm)  Pulse 82  Temp 98.1  F (36.7  C) (Oral)  Resp 16  Ht 1.778 m (5' 10\")  Wt 85.4 kg (188 lb 4.8 oz)  SpO2 96%  BMI 27.02 kg/m2           Data:     Lab Results   Component Value Date    A1C 6.8 02/21/2017    A1C 6.7 02/17/2017    A1C 6.0 08/11/2016    A1C 6.4 01/12/2016    A1C 6.6 07/13/2015              CBC RESULTS:   Recent Labs   Lab Test  02/27/17   0746   WBC  8.4   RBC  3.11*   HGB  9.6*   HCT  28.1*   MCV  90   MCH  30.9   MCHC  34.2   RDW  14.9   PLT  148*     Recent Labs   Lab Test  02/27/17   0746  02/26/17   0757   NA  138  137   POTASSIUM  4.1  4.1   CHLORIDE  108  108   CO2  22  20   ANIONGAP  8  9   GLC  301*  264*   BUN  22  22   CR  0.96  0.92   TRENT  8.3*  8.0*     Liver Function Studies -   Recent Labs   Lab Test  02/17/17   0940  08/11/16   0930   PROTTOTAL   --   7.1   ALBUMIN   --   3.7   BILITOTAL   --   0.3   ALKPHOS   --   80   AST   --   13   ALT  29  24     Lab Results   Component Value Date    INR 0.99 02/20/2017    INR 0.97 06/13/2008           Emily Mcclure, CNP pager [phone]  Diabetes Management Job Code 0243          "

## 2017-03-03 NOTE — PROGRESS NOTES
"[facility] Acute Rehabilitation Unit  Progress Note    Interval Hx  About to work with OT this afternoon   Feels well overall, denies any needs  Some emerging tone on L UE   Endocrinology consulted and following for BS management       Assessment and Plan of Care: This is an 85-year-old male with a past medical history of stroke 9 years ago with some residual left leg weakness with history of chronic ischemic heart disease, prostate cancer, hypertension, hyperlipidemia, type 2 diabetes, chronic kidney disease, atrial fibrillation. Now presents with left hemiparesis, upper extremity, on top of residual left lower extremity weakness with dysarthria, dysphagia, concern for cognitive impairment on top of hard of hearing with impaired mobility, ADLs.    Functionally:  \"OT: Facilitated progression with functional transfers. Recommend A x 2 squat pivot transfer towards R side EOB <-> wheelchair. Use sling to support LUE and remove wheelchair arm rest during transfer. White board updated and pictures of transfer set up/sequence posted next to white board. D/c use of liko lift. \"  Continue therapies and plan of care.      Overall Management:   - optimize secondary stroke management, on xarelto for anticoagulation, BP management, DM / BS control, lipid management  - HTN, on HCTZ, metoprolol and lisinopril increase dose 3/1 and 3/3, added prn hydralazine, adjust as needed  - DM / BS, endocrinology managing BS control and adjusting management as needed   - HLD, on lipitor  - on vit D, ferrous gluconate, flonase  - melatonin for sleep  - L ankle swelling, some pain, reports twisting ankle, able to move aROM on own w/o sig discomfort, fx suspicion low, will do ice pack and voltaren gel for now, monitor     Bladder and Bowel - monitor spont voids and BM, on flomax, miralax prn  GI ppx - on zantac  DVT ppx - optimize mech ppx with PCDs and antiembolism stockings, on xarelto, progressive " "mobilization         Active Diet Order      Combination Diet 9377-1823 Calories: Moderate Consistent CHO (4-6 CHO units/meal); Dysphagia Diet Level 2: Mechan Altered; Nectar Thickened Liquids (pre-thickened or use instant food thickener)    Labs    Recent Labs  Lab 03/03/17  1113 03/03/17  0746 03/03/17  0413 03/03/17  0004 03/02/17  2035 03/02/17  1709  02/27/17  0746  02/26/17  0757  02/25/17  0740   GLC  --   --   --   --   --   --   --  301*  --  264*  --  248*   * 283* 186* 201* 188* 189*  < >  --   < >  --   < >  --    < > = values in this interval not displayed.      Medications:  Current Facility-Administered Medications   Medication     [START ON 3/4/2017] glipiZIDE (GLUCOTROL) half-tab 5 mg     insulin isophane human (HumuLIN N PEN) injection 12 Units     insulin aspart (NovoLOG) inj (RAPID ACTING)     lisinopril (PRINIVIL/ZESTRIL) tablet 15 mg     hydrALAZINE (APRESOLINE) tablet 25 mg     miconazole (MICATIN; MICRO GUARD) 2 % powder     diclofenac (VOLTAREN) 1 % topical gel     acetaminophen (TYLENOL) tablet 650 mg     atorvastatin (LIPITOR) tablet 40 mg     cholecalciferol (vitamin D) tablet 2,000 Units     ferrous gluconate (FERGON) tablet 324 mg     fluticasone (FLONASE) 50 MCG/ACT spray 1-2 spray     hydrochlorothiazide (MICROZIDE) capsule 12.5 mg     melatonin tablet 1 mg     metoprolol (LOPRESSOR) tablet 50 mg     polyethylene glycol (MIRALAX/GLYCOLAX) powder 17 g     rivaroxaban ANTICOAGULANT (XARELTO) tablet 20 mg     tamsulosin (FLOMAX) capsule 0.8 mg     glucose 40 % gel 15-30 g    Or     dextrose 50 % injection 25-50 mL    Or     glucagon injection 1 mg     ranitidine (ZANTAC) tablet 150 mg         Physical Examination:   /67 (BP Location: Right arm)  Pulse 82  Temp 98.1  F (36.7  C) (Oral)  Resp 16  Ht 1.778 m (5' 10\")  Wt 85.4 kg (188 lb 4.8 oz)  SpO2 96%  BMI 27.02 kg/m2  In bed, feels well overlal  NG tube in place  R pupil deformed - previously documented   Some L facial droop "   L hand and toe nails with fungal infection changes appears chronic   L UE with emerging tone on L elbow flex MAS 1+/4  L LE 4/5 hip flex, knee ext, but with limited aROM of ankle DF / PF       More than 25 minutes spent with at least half on care coordination

## 2017-03-04 LAB
GLUCOSE BLDC GLUCOMTR-MCNC: 198 MG/DL (ref 70–99)
GLUCOSE BLDC GLUCOMTR-MCNC: 240 MG/DL (ref 70–99)
GLUCOSE BLDC GLUCOMTR-MCNC: 244 MG/DL (ref 70–99)
GLUCOSE BLDC GLUCOMTR-MCNC: 252 MG/DL (ref 70–99)
GLUCOSE BLDC GLUCOMTR-MCNC: 257 MG/DL (ref 70–99)
GLUCOSE BLDC GLUCOMTR-MCNC: 260 MG/DL (ref 70–99)
GLUCOSE BLDC GLUCOMTR-MCNC: 291 MG/DL (ref 70–99)

## 2017-03-04 PROCEDURE — 25000132 ZZH RX MED GY IP 250 OP 250 PS 637: Mod: GY | Performed by: PHYSICAL MEDICINE & REHABILITATION

## 2017-03-04 PROCEDURE — 97112 NEUROMUSCULAR REEDUCATION: CPT | Mod: GO

## 2017-03-04 PROCEDURE — 12800006 ZZH R&B REHAB

## 2017-03-04 PROCEDURE — A9270 NON-COVERED ITEM OR SERVICE: HCPCS | Mod: GY | Performed by: NURSE PRACTITIONER

## 2017-03-04 PROCEDURE — A9270 NON-COVERED ITEM OR SERVICE: HCPCS | Mod: GY | Performed by: PHYSICAL MEDICINE & REHABILITATION

## 2017-03-04 PROCEDURE — 25000132 ZZH RX MED GY IP 250 OP 250 PS 637: Mod: GY | Performed by: NURSE PRACTITIONER

## 2017-03-04 PROCEDURE — 97530 THERAPEUTIC ACTIVITIES: CPT | Mod: GP

## 2017-03-04 PROCEDURE — 92526 ORAL FUNCTION THERAPY: CPT | Mod: GN | Performed by: SPEECH-LANGUAGE PATHOLOGIST

## 2017-03-04 PROCEDURE — 40000193 ZZH STATISTIC PT WARD VISIT

## 2017-03-04 PROCEDURE — 40000133 ZZH STATISTIC OT WARD VISIT

## 2017-03-04 PROCEDURE — 97532 ZZHC SP COGNITIVE SKILLS EA 15 MIN: CPT | Mod: GN | Performed by: SPEECH-LANGUAGE PATHOLOGIST

## 2017-03-04 PROCEDURE — 97110 THERAPEUTIC EXERCISES: CPT | Mod: GO

## 2017-03-04 PROCEDURE — 97535 SELF CARE MNGMENT TRAINING: CPT | Mod: GO

## 2017-03-04 PROCEDURE — 40000225 ZZH STATISTIC SLP WARD VISIT: Performed by: SPEECH-LANGUAGE PATHOLOGIST

## 2017-03-04 PROCEDURE — 00000146 ZZHCL STATISTIC GLUCOSE BY METER IP

## 2017-03-04 RX ORDER — HYDRALAZINE HYDROCHLORIDE 10 MG/1
10 TABLET, FILM COATED ORAL 2 TIMES DAILY PRN
Status: DISCONTINUED | OUTPATIENT
Start: 2017-03-04 | End: 2017-03-21 | Stop reason: HOSPADM

## 2017-03-04 RX ADMIN — HYDROCHLOROTHIAZIDE 12.5 MG: 12.5 CAPSULE ORAL at 07:59

## 2017-03-04 RX ADMIN — METOPROLOL TARTRATE 50 MG: 50 TABLET, FILM COATED ORAL at 20:01

## 2017-03-04 RX ADMIN — Medication 1 MG: at 20:01

## 2017-03-04 RX ADMIN — MICONAZOLE NITRATE: 2 POWDER TOPICAL at 08:00

## 2017-03-04 RX ADMIN — LISINOPRIL 20 MG: 20 TABLET ORAL at 07:59

## 2017-03-04 RX ADMIN — RANITIDINE HYDROCHLORIDE 150 MG: 150 TABLET, FILM COATED ORAL at 07:58

## 2017-03-04 RX ADMIN — LISINOPRIL 20 MG: 20 TABLET ORAL at 20:01

## 2017-03-04 RX ADMIN — METOPROLOL TARTRATE 50 MG: 50 TABLET, FILM COATED ORAL at 07:59

## 2017-03-04 RX ADMIN — DICLOFENAC SODIUM: 10 GEL TOPICAL at 16:08

## 2017-03-04 RX ADMIN — FERROUS GLUCONATE 324 MG: 324 TABLET ORAL at 07:59

## 2017-03-04 RX ADMIN — ATORVASTATIN CALCIUM 40 MG: 40 TABLET, FILM COATED ORAL at 07:58

## 2017-03-04 RX ADMIN — DICLOFENAC SODIUM: 10 GEL TOPICAL at 08:00

## 2017-03-04 RX ADMIN — MICONAZOLE NITRATE: 2 POWDER TOPICAL at 20:11

## 2017-03-04 RX ADMIN — Medication 5 MG: at 07:58

## 2017-03-04 RX ADMIN — DICLOFENAC SODIUM: 10 GEL TOPICAL at 20:01

## 2017-03-04 RX ADMIN — HYDRALAZINE HYDROCHLORIDE 10 MG: 10 TABLET, FILM COATED ORAL at 20:05

## 2017-03-04 RX ADMIN — DICLOFENAC SODIUM: 10 GEL TOPICAL at 12:01

## 2017-03-04 RX ADMIN — RANITIDINE HYDROCHLORIDE 150 MG: 150 TABLET, FILM COATED ORAL at 20:01

## 2017-03-04 RX ADMIN — VITAMIN D, TAB 1000IU (100/BT) 2000 UNITS: 25 TAB at 07:58

## 2017-03-04 RX ADMIN — RIVAROXABAN 20 MG: 10 TABLET, FILM COATED ORAL at 16:08

## 2017-03-04 RX ADMIN — TAMSULOSIN HYDROCHLORIDE 0.8 MG: 0.4 CAPSULE ORAL at 07:58

## 2017-03-04 RX ADMIN — FLUTICASONE PROPIONATE 2 SPRAY: 50 SPRAY, METERED NASAL at 07:59

## 2017-03-04 NOTE — PROGRESS NOTES
IRF-RAFFAELE CLARIFICATION NOTE FOR ADMIT ASSESSMENT PERIOD  Lowest score for each FIM item supported by available charting    Eating: FIM 1: Pt on dysphagia diet requires cues for safety with precautions; tube feeding managed by RN  Grooming: FIM 5: Charting describes set up /supervision for grooming with use of R UE  Bathing: FIM 1: Charting describes pt requiring assist for approx 80% of bathing task  Upper Body Dressing: FIM 1: Charting indicates pt required assist of 2 people to complete upper body dressing on day 1  Lower Body Dressing: FIM  1: Charting indicates pt required assist of 2 people to complete lower body dressing on day 1  Toileting: FIM 1: Charting indicates pt required total assist for clothing mgmt and hygiene  Bladder:  FIM 1: Charting describes pt requiring assist for positioning of urinal; assist of 2 for change of brief and one episode/accident requiring total assist linen change  Bladder Number of Accidents: 1  Bowel: FIM 1: Charting indicates pt used bed pan and required assist of 2 staff for positioning; total assist to change brief (assist of 2 staff).   Bowel Number of Accidents: 0  Transfer: FIM 1: Assist of 2 people or mechanical lift  Toilet transfer: FIM 1: Assist of 2 people for transfer to toilet  Tub/shower transfer: FIM 1: Use of dependent shower chair for safety  Locomotion distance:  150 ft via w/c  Locomotion: FIM 1: Charting indicates pt unsafe for ambulation; use of w/c and staff provide total assist for to/from destination during therapy.  Stairs: FIM 0: Charting indicates unsafe for stairs at this time  Comprehension: FIM 5: charting describes pt requiring repetition for basic comprehension   Expression: FIM 5: Charting indicates pt with mod dysarthria, able to make basic needs known  Social Interaction: FIM 6: Charting describes pt calling out for assist due to urgency to void, and not using call light for assist; encouragement required; pleasant and cooperative.   Problem  solving: FIM 4: Charting describes pt requiring assist due to L inattention, cues provided throughout.  Assist for basic problem solving noted approx 25% of time  Memory: FIM 5: Charting indicates pt forgetful at times; assist provided less than 10% of time.    Section GG Clarification:  Pt has goals for short distance ambulation (50 feet) but currently is unsafe.  Pt uses w/c for locomotion and requires total assist from staff for all w/c locomotion at this time of admission assessment.

## 2017-03-04 NOTE — PLAN OF CARE
Problem: Goal/Outcome  Goal: Goal Outcome Summary  Outcome: No Change  Pt took medications two at a time in applesauce without issues, but coughed up phlegm during ST while on DD2 diet. Diet changed to DD1. If staff is not available to be present in pt's room during meals, then pt is to eat at nurse's station. Dentures (both bottom & lower) need to be in place with adhesive. BG was 291 this morning. 1200 BG did not upload but was around 250.  Pt's daughter randee would like to speak with MD about BGs. Her phone number is [phone]. Coccyx looks good, no intervention needed, continue with POC

## 2017-03-04 NOTE — PROGRESS NOTES
Diabetes Consult Daily  Progress Note          Assessment/Plan:   Elias Mckee is a 85 year old male with history of CAD ( stents and cardiac arrest), stroke (9 years ago), hypertension, hyperlipidemia, type 2 diabetes , CKD stage 3 ( baseline Cr 1.3-1.4), subclinical hypothyroidism, and atrial fibrillation was admitted to [facility] from (2/20-2/27/2017) with acute right middle cerebral artery territory stroke. Admitted to ARU for therapies       Type 2 diabetic: PTA on glimepiride 3 mg daily,   Cycled TF: Isosource 1.5 at 60 cc per hour from 7782-5161 ( total of 127 grams of CHO)<--- decreased to 41 cc per hr tonight (86g CHO)    Glucoses continue to run in the mid to high 200s overnight and in the morning.      Plan  - glipizide 5 mg in am ( shorter half life then glimepiride)  -  NPH adjusted to 10 units qPM- to be given at start of TFs.  -Aspart medium correction 2000, 0000, 0400)   - monitor glucose before meals and tid with cycled TF      Will continue to follow                Interval History:     Elias feels like appetite is improving. RD reviewed calorie counts and is reducing rate of TFs: he was on Isosource 1.5 at 60ml/h x 12h, now decreasing rate to ~41ml/h x 12h (2 cans) starting tonight.    Recent Labs  Lab 03/04/17  0757 03/04/17  0418 03/04/17  0014 03/03/17  2116 03/03/17  1708 03/03/17  1113  02/27/17  0746  02/26/17  0757   GLC  --   --   --   --   --   --   --  301*  --  264*   * 252* 244* 257* 137* 204*  < >  --   < >  --    < > = values in this interval not displayed.            Review of Systems:      see interval hx       Medications:       Active Diet Order      Combination Diet [id number] Calories: Moderate Consistent CHO (4-6 CHO units/meal); Dysphagia Diet Level 2: Mechan Altered; Nectar Thickened Liquids (pre-thickened or use instant food thickener)     Physical Exam:  Gen: Alert, sitting up in chair, in NAD   HEENT: NC/AT, mucous  "membranes are moist, NJ tube   Resp: Unlabored  Neuro:oriented x3, communicating clearly  /65 (BP Location: Right arm)  Pulse 63  Temp 97.6  F (36.4  C) (Oral)  Resp 16  Ht 1.778 m (5' 10\")  Wt 85 kg (187 lb 6.4 oz)  SpO2 96%  BMI 26.89 kg/m2           Data:     Lab Results   Component Value Date    A1C 6.8 02/21/2017    A1C 6.7 02/17/2017    A1C 6.0 08/11/2016    A1C 6.4 01/12/2016    A1C 6.6 07/13/2015            Recent Labs   Lab Test  02/27/17   0746  02/26/17   0757   NA  138  137   POTASSIUM  4.1  4.1   CHLORIDE  108  108   CO2  22  20   ANIONGAP  8  9   GLC  301*  264*   BUN  22  22   CR  0.96  0.92   TRENT  8.3*  8.0*     CBC RESULTS:   Recent Labs   Lab Test  02/27/17   0746   WBC  8.4   RBC  3.11*   HGB  9.6*   HCT  28.1*   MCV  90   MCH  30.9   MCHC  34.2   RDW  14.9   PLT  148*     Kerri Moon PA-C [phone]  Diabetes Management Job Code [id number]          "

## 2017-03-04 NOTE — PLAN OF CARE
Problem: Goal/Outcome  Goal: Goal Outcome Summary  Outcome: No Change  FOCUS/GOAL  Bladder management, Nutrition/Feeding/Swallowing precautions and Mobility     ASSESSMENT, INTERVENTIONS AND CONTINUING PLAN FOR GOAL:  Pt a&ox2 this shift. Denied pain. Pt was found trying to get out of bed. AO2 from bed to wc. Golvo lift from wc to shower chair. Ate dinner at nurses station to be observed. Pt received a shower. Lotion and creams all applied per orders. TF running at 60mL/hr until 0800. BP was elevated at HS-Hydralazine administered, BP lowered when rechecked. Hearing aids and dentures removed at hs.

## 2017-03-04 NOTE — PLAN OF CARE
"Problem: Goal/Outcome  Goal: Goal Outcome Summary  Outcome: No Change  FOCUS/GOAL  Bowel management, Bladder management and Medical management     ASSESSMENT, INTERVENTIONS AND CONTINUING PLAN FOR GOAL:  Pt appeared to sleep well between cares and rounds. Pt did not use call light for any needs, is disoriented to time and place.  Pt had 2 incontinent BMs, one requiring a total bed change. Pt was incontinent of urine x1, requiring bed change.  Pt was repo q 2-3hrs. Ao2 for cares and repo.  Pt on TF 60ccs q hr, 60cc flushes q 4h, to be off at 0800. Pt tolerated well.  Pt voiced much discomfort during pericares, scrotal area red. Barrier cream and powder applied each incontinent episode.  After one care, pt reached out to touch writer on leg, writer inquired what pt needed. Pt stated, \"I want to touch your butt.\" Writer told pt that was inappropriate to which pt replied, \"I don't care. It will make me feel better.\"  Pt later asked writer and aide for a hug, which both declined. Continue to monitor for inappropriate behavior and following POC.          "

## 2017-03-04 NOTE — PROGRESS NOTES
"Nutrition Services Brief Note  Received MD consult \"please reassess TF amount, patient reports he is not hungy, thank you\" 3/4    New Findings:  -Simone cts 772 kcal / 26 gm pro over past 3 days meeting 38% est kcal needs, 32% est pro needs.  Pt reports lack of appetite limiting intakes   -TF providing only 1080 kcal / 49 gm pro (~50% est needs)    Interventions  -Discussed current intakes and goal PO intakes to allow for d/c of TF.  Reviewed that if TF vols are reduced, pt must consume more with meals to meet needs.  Pt agreeable to eating 2 magic cups daily vs just 1  -Reviewed POC with endocrinology.  Agreeable to changes to TF rate and will adjust insulin accordingly  -Decrease TF to 2 cans Isosource 1.5 @ ~41 ml/hr x 12 hrs overnight (or until full vol depleted).  This will provide 750 kcal (9 kcal/kg), 34 gm pro (0.4 gm/kg) and 88 gm CHO daily    RD to continue to follow per protocol    Neda Watson RD LD  Wkd Pgr [phone]    "

## 2017-03-04 NOTE — PLAN OF CARE
Problem: Goal/Outcome  Goal: Goal Outcome Summary  Swallow:  Pt seen for meal f/u DD2/nectar with dentures in.  Pt had significant coughing episode on 3rd bite of ground chicken and macaronit and cheese, Pt ended up coughing up food.  Reportedly no difficulty with breakfast tray DD2 items this morning, SLP was able to observe final bites and no overt s/sx of aspiration were observed.  Family did report coughing episode earlier in the week and since Pt has had 1:1 supervision (OK with family, SLP, or staff) with meals.  Recommendations:  1. Downgrade to DD1 textures, continue [place] liquids.  2.  Safe swallow strategies- Dentures in for all meals (use adhesive), 1:1 supervison either at desk or in room with Pt upright in chair, small bites/sips, alternate consistencies.  SLP to f/u tomorrow for diet tolerance.

## 2017-03-04 NOTE — PROGRESS NOTES
[facility], [place]   Physical Medicine and Rehabilitation Daily Note           Assessment and Plan of Care:   This is an 85-year-old male with a past medical history of stroke 9 years ago with some residual left leg weakness with history of chronic ischemic heart disease, prostate cancer, hypertension, hyperlipidemia, type 2 diabetes, chronic kidney disease, atrial fibrillation. Now presents with left hemiparesis, upper extremity, on top of residual left lower extremity weakness with dysarthria, dysphagia, concern for cognitive impairment on top of hard of hearing with impaired mobility, ADLs. Admitted to ARU on 2/27.    --Vitals: BP elevated. lisinopril dose was increased yesterday; added hydralazine prn and will monitor.   --No lab today.  --Continue ongoing medical management.    -Ordered heating pad for neck pain.    -TF rate was decreased to help with appetite; magic cups were ordered.    -appreciate endo team assistance; insulin dose was adjusted based on TF rate as above.   -his diet was down graded to DD1 due to cough and signs of aspiration with DD2.   --Continue therapies and plan of care. Making slow and steady progress. Transfers with SB and needs A for most of his ADLs.              Review of Systems:   Couldn't sleep well last night. Denies fever, chills, CP, SOB, N/V, or abdominal pain. Reports right sided neck pain.             Physical Exam:     Vitals:    03/03/17 2214 03/04/17 0600 03/04/17 0742 03/04/17 1531   BP: 142/66  165/65 170/64   BP Location: Right arm  Right arm Right arm   Pulse: 85  63 75   Resp:   16 16   Temp:   97.6  F (36.4  C) 98  F (36.7  C)   TempSrc:   Oral Oral   SpO2:   96% 96%   Weight:  85 kg (187 lb 6.4 oz)     Height:         Gen: NAD, sitting in WC - forward flexion posture, neck rotated to the left   Heart: RRR  Lungs: clear breath sounds b/l  Abd: soft and non-tender  Ext: wwp, trace edema in BLE, no tenderness in calves  MSK/neuro: alert  and oriented. speech is slow and mildly dysarthric. Left hemiparesis.          Data:   Scheduled meds    insulin isophane human  10 Units Subcutaneous Q24H     glipiZIDE  5 mg Oral Daily with breakfast     lisinopril  20 mg Oral BID     insulin aspart  1-6 Units Subcutaneous TID     miconazole   Topical BID     diclofenac   Topical 4x Daily     atorvastatin  40 mg Oral or Feeding Tube Daily     cholecalciferol  2,000 Units Oral Daily     ferrous gluconate  324 mg Oral Daily with breakfast     fluticasone  1-2 spray Both Nostrils Daily     hydrochlorothiazide  12.5 mg Oral Daily     melatonin  1 mg Oral At Bedtime     metoprolol  50 mg Oral BID     rivaroxaban ANTICOAGULANT  20 mg Oral Daily with supper     tamsulosin  0.8 mg Oral Daily     ranitidine  150 mg Oral BID       PRN meds:  hydrALAZINE, acetaminophen, polyethylene glycol, glucose **OR** dextrose **OR** glucagon      Bianca Ho MD  Physical Medicine and Rehabilitation     I spent a total of 15 minutes face-to-face or managing the care of Elias Mckee. Over 50% of my time on the unit was spent counseling the patient and coordinating care. See note for details.

## 2017-03-04 NOTE — PROGRESS NOTES
Brief Diabetes Management Team note    Full daily progress note completed earlier today.  To update: I called pt's daughter, Jazmin, who requested we call regarding glucoses.  She expressed concern about overall higher glucoses.  I reviewed the changes I made today: increasing ratio of units insulin per gram carbohydrate for cycled TFs tonight.  I will also restart medium intensity correction during the day, TID before meals.  Recommend we continue with glipizide 5mg qAM at this time.    Kerri Moon PA-C  [phone]

## 2017-03-04 NOTE — PLAN OF CARE
Problem: Goal/Outcome  Goal: Goal Outcome Summary  Outcome: Therapy, progress toward functional goals as expected   OT: While seated in w/c pt focused on L UE neuro re-education. Pt. daughter present throughout therapy session. Pt also completed transfer from stand pivot transfer from w/c to bed.      See POC.

## 2017-03-05 LAB
GLUCOSE BLDC GLUCOMTR-MCNC: 226 MG/DL (ref 70–99)
GLUCOSE BLDC GLUCOMTR-MCNC: 240 MG/DL (ref 70–99)
GLUCOSE BLDC GLUCOMTR-MCNC: 242 MG/DL (ref 70–99)
GLUCOSE BLDC GLUCOMTR-MCNC: 251 MG/DL (ref 70–99)
GLUCOSE BLDC GLUCOMTR-MCNC: 258 MG/DL (ref 70–99)
GLUCOSE BLDC GLUCOMTR-MCNC: 299 MG/DL (ref 70–99)
GLUCOSE BLDC GLUCOMTR-MCNC: 312 MG/DL (ref 70–99)

## 2017-03-05 PROCEDURE — 40000133 ZZH STATISTIC OT WARD VISIT

## 2017-03-05 PROCEDURE — 25000132 ZZH RX MED GY IP 250 OP 250 PS 637: Mod: GY | Performed by: PHYSICAL MEDICINE & REHABILITATION

## 2017-03-05 PROCEDURE — 97532 ZZHC SP COGNITIVE SKILLS EA 15 MIN: CPT | Mod: GN | Performed by: SPEECH-LANGUAGE PATHOLOGIST

## 2017-03-05 PROCEDURE — 97530 THERAPEUTIC ACTIVITIES: CPT | Mod: GO

## 2017-03-05 PROCEDURE — 40000225 ZZH STATISTIC SLP WARD VISIT: Performed by: SPEECH-LANGUAGE PATHOLOGIST

## 2017-03-05 PROCEDURE — 92526 ORAL FUNCTION THERAPY: CPT | Mod: GN | Performed by: SPEECH-LANGUAGE PATHOLOGIST

## 2017-03-05 PROCEDURE — 97535 SELF CARE MNGMENT TRAINING: CPT | Mod: GO

## 2017-03-05 PROCEDURE — 25000132 ZZH RX MED GY IP 250 OP 250 PS 637: Mod: GY | Performed by: NURSE PRACTITIONER

## 2017-03-05 PROCEDURE — 40000193 ZZH STATISTIC PT WARD VISIT

## 2017-03-05 PROCEDURE — 97112 NEUROMUSCULAR REEDUCATION: CPT | Mod: GO

## 2017-03-05 PROCEDURE — A9270 NON-COVERED ITEM OR SERVICE: HCPCS | Mod: GY | Performed by: NURSE PRACTITIONER

## 2017-03-05 PROCEDURE — A9270 NON-COVERED ITEM OR SERVICE: HCPCS | Mod: GY | Performed by: PHYSICAL MEDICINE & REHABILITATION

## 2017-03-05 PROCEDURE — 12800006 ZZH R&B REHAB

## 2017-03-05 PROCEDURE — 97530 THERAPEUTIC ACTIVITIES: CPT | Mod: GP

## 2017-03-05 PROCEDURE — 00000146 ZZHCL STATISTIC GLUCOSE BY METER IP

## 2017-03-05 PROCEDURE — 25000131 ZZH RX MED GY IP 250 OP 636 PS 637: Mod: GY | Performed by: PHYSICIAN ASSISTANT

## 2017-03-05 RX ORDER — HYDROCHLOROTHIAZIDE 12.5 MG/1
25 CAPSULE ORAL DAILY
Status: DISCONTINUED | OUTPATIENT
Start: 2017-03-06 | End: 2017-03-18

## 2017-03-05 RX ADMIN — DICLOFENAC SODIUM: 10 GEL TOPICAL at 19:19

## 2017-03-05 RX ADMIN — METOPROLOL TARTRATE 50 MG: 50 TABLET, FILM COATED ORAL at 08:09

## 2017-03-05 RX ADMIN — TAMSULOSIN HYDROCHLORIDE 0.8 MG: 0.4 CAPSULE ORAL at 08:09

## 2017-03-05 RX ADMIN — FERROUS GLUCONATE 324 MG: 324 TABLET ORAL at 08:08

## 2017-03-05 RX ADMIN — DICLOFENAC SODIUM: 10 GEL TOPICAL at 16:09

## 2017-03-05 RX ADMIN — RANITIDINE HYDROCHLORIDE 150 MG: 150 TABLET, FILM COATED ORAL at 19:19

## 2017-03-05 RX ADMIN — VITAMIN D, TAB 1000IU (100/BT) 2000 UNITS: 25 TAB at 08:08

## 2017-03-05 RX ADMIN — LISINOPRIL 20 MG: 20 TABLET ORAL at 19:19

## 2017-03-05 RX ADMIN — RIVAROXABAN 20 MG: 10 TABLET, FILM COATED ORAL at 16:09

## 2017-03-05 RX ADMIN — HYDROCHLOROTHIAZIDE 12.5 MG: 12.5 CAPSULE ORAL at 08:09

## 2017-03-05 RX ADMIN — LISINOPRIL 20 MG: 20 TABLET ORAL at 08:09

## 2017-03-05 RX ADMIN — Medication 1 MG: at 20:38

## 2017-03-05 RX ADMIN — ATORVASTATIN CALCIUM 40 MG: 40 TABLET, FILM COATED ORAL at 08:09

## 2017-03-05 RX ADMIN — METOPROLOL TARTRATE 50 MG: 50 TABLET, FILM COATED ORAL at 19:19

## 2017-03-05 RX ADMIN — FLUTICASONE PROPIONATE 2 SPRAY: 50 SPRAY, METERED NASAL at 08:09

## 2017-03-05 RX ADMIN — Medication 5 MG: at 08:09

## 2017-03-05 RX ADMIN — RANITIDINE HYDROCHLORIDE 150 MG: 150 TABLET, FILM COATED ORAL at 08:09

## 2017-03-05 RX ADMIN — INSULIN ASPART 5 UNITS: 100 INJECTION, SOLUTION INTRAVENOUS; SUBCUTANEOUS at 18:26

## 2017-03-05 NOTE — PROGRESS NOTES
Diabetes Consult Daily  Progress Note          Assessment/Plan:   Elias Mckee is a 85 year old male with history of CAD ( stents and cardiac arrest), stroke (9 years ago), hypertension, hyperlipidemia, type 2 diabetes , CKD stage 3 ( baseline Cr 1.3-1.4), subclinical hypothyroidism, and atrial fibrillation was admitted to [facility] from (2/20-2/27/2017) with acute right middle cerebral artery territory stroke. Admitted to ARU for therapies       Type 2 diabetic: PTA on glimepiride 3 mg daily,   Cycled TF: Isosource 1.5 at 41 cc per hr x 12h (86g CHO)    Glucoses still in the 200s overnight and stayed in the 200s for much of the day yesterday.      Plan  - glipizide 5 mg in am -will increased to 7.5mg tomorrow morning if glucoses during the day today stay high  -  NPH increased from 10 to 13 units qPM- please give 1 hour prior to start of cycled TFs  -Aspart correction increased to high intensity at 2000, 0000, 0400.  Medium intensity started TID with meals yesterday evening.  - monitor glucose before meals and tid with cycled TF    White board updated with these changes so Elias's daughter, Jazmin, is aware.      Will continue to follow   ADDENDUM: Glucose midday up to 312.  Glucoses all high but not consistent on when they are trending higher or coming down, which I think is likely due to variable carb intake.  Per RNFarhana, pt is eating very well.  We will start aspart 1unit/15g CHO now, glipizide dc'd (he got this morning, but dc'd for tomorrow), we will likely need to increase insulin to carb ratio tomorrow.  I am increasing evening NPH further to 15 units qPM.             Interval History:     Cycled TFs: Isosource 1.5 at 41cc/h x 12h (8-8)  Elias has no complaints today, he reports that his appetite is good.    Only minimal improvement in glucoses overnight with increase in insulin to carb ratio for TFs.  BG stayed elevated during the day yesterday- no correction  "insulin ordered until the evening.    Recent Labs  Lab 03/05/17  0807 03/05/17  0416 03/04/17  2358 03/04/17  2059 03/04/17  1653 03/04/17  1204  02/27/17  0746   GLC  --   --   --   --   --   --   --  301*   * 258* 299* 260* 198* 240*  < >  --    < > = values in this interval not displayed.            Review of Systems:      see interval hx       Medications:       Active Diet Order      Dysphagia Diet Level 1 Pureed Nectar Thickened Liquids (pre-thickened or use instant food thickener)     Physical Exam:  Gen: Alert, sitting up in chair, in NAD   HEENT: NC/AT, mucous membranes are moist, NJ tube   Resp: Unlabored  Neuro:oriented x3, communicating clearly  /59 (BP Location: Right arm)  Pulse 74  Temp 98.1  F (36.7  C) (Oral)  Resp 16  Ht 1.778 m (5' 10\")  Wt 81.9 kg (180 lb 9.6 oz)  SpO2 96%  BMI 25.91 kg/m2           Data:     Lab Results   Component Value Date    A1C 6.8 02/21/2017    A1C 6.7 02/17/2017    A1C 6.0 08/11/2016    A1C 6.4 01/12/2016    A1C 6.6 07/13/2015            Recent Labs   Lab Test  02/27/17   0746  02/26/17   0757   NA  138  137   POTASSIUM  4.1  4.1   CHLORIDE  108  108   CO2  22  20   ANIONGAP  8  9   GLC  301*  264*   BUN  22  22   CR  0.96  0.92   TRENT  8.3*  8.0*     CBC RESULTS:   Recent Labs   Lab Test  02/27/17   0746   WBC  8.4   RBC  3.11*   HGB  9.6*   HCT  28.1*   MCV  90   MCH  30.9   MCHC  34.2   RDW  14.9   PLT  148*       Kerri Moon PA-C [phone]  Diabetes Management Job Code [id number]          "

## 2017-03-05 NOTE — PLAN OF CARE
Problem: Goal/Outcome  Goal: Goal Outcome Summary  Outcome: No Change  Pt took meds crushed in applesauce, is on DD1 diet.  If staff is not available to be present in pt's room during meals, then pt is to eat at nurse's station. Dentures (both bottom & lower) need to be in place with adhesive. BGs have been high; 240 and 312 today. Spoke with Kerri from endocrine about starting carb coverage which will begin tonight. PT recommended squat pivot transfer to  and commode, this writer needed A2 and sandoval rodriguezo to move pt who appeared very tired today. Discussed this is in rounds.

## 2017-03-05 NOTE — PLAN OF CARE
Problem: Goal/Outcome  Goal: Goal Outcome Summary  SLP:  Shortened session as Pt needed 2 staff to transfer and 3 to reposition.  Pt was quite fatigued at initial part of session.  Pt seen for meal f/u DD1/nectar, downgraded yesterday.  Pt observed with mild cough x1 during course of the meal, otherwise no difficulty noted.  Pt took appropriate sized bites and alternated consistencies every couple bites.  Recommendations:  1. Continue DD1/nectar, 2. Safe swallow strategies- all meals upright in chair, sitting near /be supervised (OK with family or SLP), dentures in each AM (will need assist to add adhesive).  SLP will plan to see for DD2 snack tomorrow.

## 2017-03-05 NOTE — PLAN OF CARE
Problem: Goal/Outcome  Goal: Goal Outcome Summary  Outcome: No Change  FOCUS/GOAL  Bowel management, Bladder management and Medical management     ASSESSMENT, INTERVENTIONS AND CONTINUING PLAN FOR GOAL:  Pt appeared to sleep well during rounds and between cares. Pt repositioned self slightly after being repositioned by staff.  Pt was incontinent of bowel x1, requiring total bed change. Pt used call light to void in urinal, continues to have urgency.  Groin area very tender, barrier cream and powder applied.  TF running at 41cc/hr, 60cc flushes q 4h, to be off at 0800. Pt tolerated well.  , 258 coverage given per parameter orders.

## 2017-03-05 NOTE — PLAN OF CARE
Problem: Goal/Outcome  Goal: Goal Outcome Summary  Outcome: No Change  FOCUS/GOAL  Bladder management, Nutrition/Feeding/Swallowing precautions and Mobility     ASSESSMENT, INTERVENTIONS AND CONTINUING PLAN FOR GOAL:  Pt a&ox2. Ao2 squat pivot from wc,<>bed. [place]- only has 1 hearing aid (removed at hs). Elevated Bp's-received Hydralazine x1. Lowered below parameters. Pt had no coughing incidents at dinner per pt's daughter. Ate 100%. Complained of back discomfort at hs, repositioned and offered heat/cold. [place] cream applied to feet, complains of some tenderness on left ankle, Voltaren gel applied per MAR. Alarms on. Continue with poc.

## 2017-03-05 NOTE — PLAN OF CARE
Problem: Goal/Outcome  Goal: Goal Outcome Summary  OT: Continuing to work on squat pivot transfer from bed to chair and back. Pt. Demos carryover from previous session for hand placement. Required Mod A x2 this date. Continuing to address UE neuro-re ed and therapeutic activities to promote coordination.

## 2017-03-06 LAB
GLUCOSE BLDC GLUCOMTR-MCNC: 183 MG/DL (ref 70–99)
GLUCOSE BLDC GLUCOMTR-MCNC: 193 MG/DL (ref 70–99)
GLUCOSE BLDC GLUCOMTR-MCNC: 220 MG/DL (ref 70–99)
GLUCOSE BLDC GLUCOMTR-MCNC: 224 MG/DL (ref 70–99)
GLUCOSE BLDC GLUCOMTR-MCNC: 228 MG/DL (ref 70–99)
GLUCOSE BLDC GLUCOMTR-MCNC: 231 MG/DL (ref 70–99)

## 2017-03-06 PROCEDURE — 97112 NEUROMUSCULAR REEDUCATION: CPT | Mod: GO

## 2017-03-06 PROCEDURE — 97032 APPL MODALITY 1+ESTIM EA 15: CPT | Mod: GP | Performed by: PHYSICAL THERAPIST

## 2017-03-06 PROCEDURE — 97530 THERAPEUTIC ACTIVITIES: CPT | Mod: GO

## 2017-03-06 PROCEDURE — 40000133 ZZH STATISTIC OT WARD VISIT

## 2017-03-06 PROCEDURE — 40000193 ZZH STATISTIC PT WARD VISIT: Performed by: PHYSICAL THERAPIST

## 2017-03-06 PROCEDURE — 00000146 ZZHCL STATISTIC GLUCOSE BY METER IP

## 2017-03-06 PROCEDURE — 97530 THERAPEUTIC ACTIVITIES: CPT | Mod: GP | Performed by: PHYSICAL THERAPIST

## 2017-03-06 PROCEDURE — A9270 NON-COVERED ITEM OR SERVICE: HCPCS | Mod: GY | Performed by: PHYSICAL MEDICINE & REHABILITATION

## 2017-03-06 PROCEDURE — 97535 SELF CARE MNGMENT TRAINING: CPT | Mod: GO

## 2017-03-06 PROCEDURE — 92526 ORAL FUNCTION THERAPY: CPT | Mod: GN

## 2017-03-06 PROCEDURE — 12800006 ZZH R&B REHAB

## 2017-03-06 PROCEDURE — 25000132 ZZH RX MED GY IP 250 OP 250 PS 637: Mod: GY | Performed by: PHYSICAL MEDICINE & REHABILITATION

## 2017-03-06 PROCEDURE — 92507 TX SP LANG VOICE COMM INDIV: CPT | Mod: GN

## 2017-03-06 PROCEDURE — 97110 THERAPEUTIC EXERCISES: CPT | Mod: GP | Performed by: PHYSICAL THERAPIST

## 2017-03-06 PROCEDURE — 40000225 ZZH STATISTIC SLP WARD VISIT

## 2017-03-06 RX ADMIN — DICLOFENAC SODIUM: 10 GEL TOPICAL at 16:28

## 2017-03-06 RX ADMIN — VITAMIN D, TAB 1000IU (100/BT) 2000 UNITS: 25 TAB at 08:23

## 2017-03-06 RX ADMIN — FERROUS GLUCONATE 324 MG: 324 TABLET ORAL at 08:21

## 2017-03-06 RX ADMIN — MICONAZOLE NITRATE: 2 POWDER TOPICAL at 20:05

## 2017-03-06 RX ADMIN — RIVAROXABAN 20 MG: 10 TABLET, FILM COATED ORAL at 16:28

## 2017-03-06 RX ADMIN — LISINOPRIL 20 MG: 20 TABLET ORAL at 08:23

## 2017-03-06 RX ADMIN — LISINOPRIL 20 MG: 20 TABLET ORAL at 20:06

## 2017-03-06 RX ADMIN — MICONAZOLE NITRATE: 2 POWDER TOPICAL at 08:21

## 2017-03-06 RX ADMIN — DICLOFENAC SODIUM: 10 GEL TOPICAL at 08:26

## 2017-03-06 RX ADMIN — METOPROLOL TARTRATE 50 MG: 50 TABLET, FILM COATED ORAL at 20:05

## 2017-03-06 RX ADMIN — Medication 1 MG: at 20:05

## 2017-03-06 RX ADMIN — ATORVASTATIN CALCIUM 40 MG: 40 TABLET, FILM COATED ORAL at 08:23

## 2017-03-06 RX ADMIN — FLUTICASONE PROPIONATE 2 SPRAY: 50 SPRAY, METERED NASAL at 08:26

## 2017-03-06 RX ADMIN — TAMSULOSIN HYDROCHLORIDE 800 MCG: 0.4 CAPSULE ORAL at 09:42

## 2017-03-06 RX ADMIN — RANITIDINE HYDROCHLORIDE 150 MG: 150 TABLET, FILM COATED ORAL at 08:23

## 2017-03-06 RX ADMIN — RANITIDINE HYDROCHLORIDE 150 MG: 150 TABLET, FILM COATED ORAL at 20:06

## 2017-03-06 RX ADMIN — HYDROCHLOROTHIAZIDE 25 MG: 12.5 CAPSULE ORAL at 08:23

## 2017-03-06 RX ADMIN — DICLOFENAC SODIUM: 10 GEL TOPICAL at 12:24

## 2017-03-06 RX ADMIN — METOPROLOL TARTRATE 50 MG: 50 TABLET, FILM COATED ORAL at 08:23

## 2017-03-06 NOTE — PLAN OF CARE
Problem: Goal/Outcome  Goal: Goal Outcome Summary  OT: pt engaged in transfers from bed to chair to mat, as well as neuro re-ed, standing tolerance.

## 2017-03-06 NOTE — PLAN OF CARE
Problem: Goal/Outcome  Goal: Goal Outcome Summary     During lunch meal, beverage placed on his right side and then patient was independent in taking frequent drinks with the meal which was helpful for swallowing function. Tolerated NDD-1 and NDD-2 textures without any overt s/sx of aspiration for the duration of the meal and showing overall improved appetite. Recommend continue with current NDD-1/NTL due to inconsistencies with diet tolerance.      Timo to respond to direct verbal cues to use visual scanning strategies but without the max verbal cues, not utilizing the strategies and not carrying them over.

## 2017-03-06 NOTE — PLAN OF CARE
Problem: Goal/Outcome  Goal: Goal Outcome Summary  Outcome: No Change  FOCUS/GOAL  Bladder management, Medication management, Pain management and Mobility     ASSESSMENT, INTERVENTIONS AND CONTINUING PLAN FOR GOAL:  Pt a&o to person and situation. Elevated Bgs this shift-received insulin coverage for dinner sliding scale, carb counts, tube feeding, and hs sliding scale. Pt up in chair while eating. Daughter sat with patient-reports no coughing, ate 90% of meal. Requesting to be off TF due to appetite returning. Pt utilized hot packs for back/neck discomfort. Uses urinal with staff assist. Dentures and HA removed at hs. Alarms on, Continue with poc.

## 2017-03-06 NOTE — PROGRESS NOTES
Diabetes Consult Daily  Progress Note          Assessment/Plan:   Elias Mckee is a 85 year old male with history of CAD ( stents and cardiac arrest), stroke (9 years ago), hypertension, hyperlipidemia, type 2 diabetes , CKD stage 3 ( baseline Cr 1.3-1.4), subclinical hypothyroidism, and atrial fibrillation was admitted to [facility] from (-2017) with acute right middle cerebral artery territory stroke. Admitted to ARU for therapies       Type 2 diabetic: PTA on glimepiride 3 mg daily,   Cycled TF: Isosource 1.5 at 41 cc per hr x 12h (86g CHO)    Glucoses slightly improved, but still consistently in the 200s with switch to meal aspart.      Plan  - glipizide 5 mg dc'd this morning  -  Continue evening NPH 15 units qPM - please give 1 hr prior to start of cycled TFs  -Tomorrow we will start some NPH in the mornin units qAM  -meal aspart increased from 1 unit/10g CHO to 1unit/7g CHO today.  -Aspart correction increased to high intensity at 2000, 0000, 0400.  Medium intensity started TID with meals.  - monitor glucose before meals and tid with cycled TF    White board updated with these changes so Elias's daughter, Jazmin, is aware.      Will continue to follow              Interval History:     Cycled TFs: Isosource 1.5 at 41cc/h x 12h (8-8)  Elias was sleeping soundly when I visited.  Yesterday glucoses stayed in the 200s despite adding 1 unit/15g CHO with meals in addition to the glipzide 5mg he got in the morning.  This morning I dc'd glipizide and increased meal aspart to 1unit/10g.  On this pt stayed in the 220s midday.    Recent Labs  Lab 17  1213 17  0815 17  0359 17  0009 17  1953 17  1608   * 224* 193* 183* 242* 226*               Review of Systems:      see interval hx       Medications:       Active Diet Order      Dysphagia Diet Level 1 Pureed Nectar Thickened Liquids (pre-thickened or use instant food  "thickener)     Physical Exam:  Gen: Sleeping soundly, in NAD   HEENT: NC/AT, mucous membranes are moist, NJ tube   Resp: Unlabored  Neuro:sleeping  /73 (BP Location: Right arm)  Pulse 82  Temp 97.5  F (36.4  C) (Oral)  Resp 18  Ht 1.778 m (5' 10\")  Wt 83.7 kg (184 lb 9.6 oz)  SpO2 95%  BMI 26.49 kg/m2           Data:     Lab Results   Component Value Date    A1C 6.8 02/21/2017    A1C 6.7 02/17/2017    A1C 6.0 08/11/2016    A1C 6.4 01/12/2016    A1C 6.6 07/13/2015            Recent Labs   Lab Test  02/27/17   0746  02/26/17   0757   NA  138  137   POTASSIUM  4.1  4.1   CHLORIDE  108  108   CO2  22  20   ANIONGAP  8  9   GLC  301*  264*   BUN  22  22   CR  0.96  0.92   TRENT  8.3*  8.0*     CBC RESULTS:   Recent Labs   Lab Test  02/27/17   0746   WBC  8.4   RBC  3.11*   HGB  9.6*   HCT  28.1*   MCV  90   MCH  30.9   MCHC  34.2   RDW  14.9   PLT  148*       Kerri Moon PA-C [phone]  Diabetes Management Job Code [id number]          "

## 2017-03-06 NOTE — PROGRESS NOTES
Nutrition Services Progress Note    Calorie Count  (3/3)  925 kcals and 47 g protein, 2 meals (breakfast intake not recorded).  (3/4)  1238 kcals and 46 g protein, 2 meals (lunch intake not recorded).  (3/5) 1411 kcals and 50 g protein, 2 meals and 3/4 Magic Cup.  PO intake 50% of lunch per nursing flow sheets which was not included in totals.      Incomplete calorie counts over the past 3 days, but appear to be improving since TF decrease on 3/4 (see RD note for details).  Average per available records:  1191 kcals and 48 g protein with goal of >1350 kcals and >37 g protein to discontinue TF.      Interventions:  -Discussed with RN need for complete calorie count records to aid in TF weaning.  -Discussed Calorie Count results with MD.  -Reordered Calorie Counts.    Follow Up/Monitoring:  Will continue to follow per nutrition plan of care.    Yvette Burks, RD, LD

## 2017-03-06 NOTE — PROGRESS NOTES
Skilled set up on :  Pt dependent for proper placement of electrodes on  L quads, hamstrings, anterior tibialis, gastrocs, gluts for optimum muscle contraction, safe positioning on w/c within frame and cushion for prevention of skin breakdown, feet secured to foot pedals, w/c secured to  w/ Q-straints.  Passive motion assessed to ensure proper positioning.  Determined appropriate FES parameters for each muscle group based off of strong tetanic response of muscle test.    Pt performed 13 minutes of active FES ergometry with 100% stimulation applied to above muscles at  35 rpm with  0.5 nm resistance.  This PT adjusted e-stim and cycling parameters in real-time to ensure palpable muscle contractions throughout session.  Please see [email or web address] for further details on patient's stimulation parameters and ergometry outcomes.  Patient ID:[id number], PIN: [phone].

## 2017-03-06 NOTE — PROGRESS NOTES
"[facility] Acute Rehabilitation Unit  Progress Note    Interval Hx  Working with RN and PT this am getting ready for the day   Working with PT for LE FES at gym   Following BP, further adjustment recently with addition of HCTZ, follow   Endocrinology following for BS management   Dietitian following with terrence counts and TF management - discuss if we can cut back further vs dc pending adequacy of po intake       Assessment and Plan of Care: This is an 85-year-old male with a past medical history of stroke 9 years ago with some residual left leg weakness with history of chronic ischemic heart disease, prostate cancer, hypertension, hyperlipidemia, type 2 diabetes, chronic kidney disease, atrial fibrillation. Now presents with left hemiparesis, upper extremity, on top of residual left lower extremity weakness with dysarthria, dysphagia, concern for cognitive impairment on top of hard of hearing with impaired mobility, ADLs.    Functionally:  \"OT: Continuing to work on squat pivot transfer from bed to chair and back. Pt. Hannahos carryover from previous session for hand placement. Required Mod A x2 this date. Continuing to address UE neuro-re ed and therapeutic activities to promote coordination.  \"  Continue therapies and plan of care.      Overall Management:   - optimize secondary stroke management, on xarelto for anticoagulation, BP management, DM / BS control, lipid management  - HTN, on HCTZ, metoprolol and lisinopril increase dose 3/1 and 3/3, has prn hydralazine, added HCTZ 3/6, adjust as needed  - DM / BS, endocrinology managing BS control and adjusting management as needed   - HLD, on lipitor  - on vit D, ferrous gluconate, flonase  - melatonin for sleep  - L ankle swelling, some pain, reports twisting ankle, able to move aROM on own w/o sig discomfort, fx suspicion low, will do ice pack and voltaren gel for now, monitor   - dietitian following with terrence counts and TF management " "- discuss if we can cut back further vs dc pending adequacy of po intake     Bladder and Bowel - monitor spont voids and BM, on flomax, miralax prn  GI ppx - on zantac  DVT ppx - optimize mech ppx with PCDs and antiembolism stockings, on xarelto, progressive mobilization         Active Diet Order      Dysphagia Diet Level 1 Pureed Nectar Thickened Liquids (pre-thickened or use instant food thickener)    Labs    Recent Labs  Lab 03/06/17  0815 03/06/17  0359 03/06/17  0009 03/05/17  1953 03/05/17  1608 03/05/17  1504   * 193* 183* 242* 226* 251*         Medications:  Current Facility-Administered Medications   Medication     insulin aspart (NovoLOG) inj (RAPID ACTING)     tamsulosin (FLOMAX) suspension 800 mcg     insulin aspart (NovoLOG) inj (RAPID ACTING)     insulin aspart (NovoLOG) inj (RAPID ACTING)     insulin isophane human (HumuLIN N PEN) injection 15 Units     hydrochlorothiazide (MICROZIDE) capsule 25 mg     hydrALAZINE (APRESOLINE) tablet 10 mg     insulin aspart (NovoLOG) inj (RAPID ACTING)     lisinopril (PRINIVIL/ZESTRIL) tablet 20 mg     miconazole (MICATIN; MICRO GUARD) 2 % powder     diclofenac (VOLTAREN) 1 % topical gel     acetaminophen (TYLENOL) tablet 650 mg     atorvastatin (LIPITOR) tablet 40 mg     cholecalciferol (vitamin D) tablet 2,000 Units     ferrous gluconate (FERGON) tablet 324 mg     fluticasone (FLONASE) 50 MCG/ACT spray 1-2 spray     melatonin tablet 1 mg     metoprolol (LOPRESSOR) tablet 50 mg     polyethylene glycol (MIRALAX/GLYCOLAX) powder 17 g     rivaroxaban ANTICOAGULANT (XARELTO) tablet 20 mg     glucose 40 % gel 15-30 g    Or     dextrose 50 % injection 25-50 mL    Or     glucagon injection 1 mg     ranitidine (ZANTAC) tablet 150 mg         Physical Examination:   /73 (BP Location: Right arm)  Pulse 82  Temp 97.5  F (36.4  C) (Oral)  Resp 18  Ht 1.778 m (5' 10\")  Wt 83.7 kg (184 lb 9.6 oz)  SpO2 95%  BMI 26.49 kg/m2  At chair this am, working with PT and RN " staff  NG tube in place  R pupil deformed - previously documented   Some L facial droop   L hand and toe nails with fungal infection changes appears chronic   L UE with emerging tone on L elbow flex, cont to follow  L LE limited aROM of ankle DF / PF       More than 15 minutes spent with at least half on care coordination

## 2017-03-06 NOTE — PROGRESS NOTES
[facility], [place]   Physical Medicine and Rehabilitation Daily Note           Assessment and Plan of Care:   This is an 85-year-old male with a past medical history of stroke 9 years ago with some residual left leg weakness with history of chronic ischemic heart disease, prostate cancer, hypertension, hyperlipidemia, type 2 diabetes, chronic kidney disease, atrial fibrillation. Now presents with left hemiparesis, upper extremity, on top of residual left lower extremity weakness with dysarthria, dysphagia, concern for cognitive impairment on top of hard of hearing with impaired mobility, ADLs. Admitted to ARU on 2/27.    --Vitals: BP elevated.   --No lab today. BGs in high range 200+.   --Continue ongoing medical management.    -Ordered heating pad and aqua-k for neck and upper back pain.    -lisinopril dose was increased 3/3 per primary team; added hydralazine prn on 3/4 and increased HCTZ to 25 on 3/5 as BP remained persistently elevated.    -TF rate was decreased on 3/4 to help with appetite; magic cups were ordered. Consider to hold TF.    -appreciate endo team assistance; insulin dose was adjusted based on TF rate as above.   -his diet was down graded to DD1 due to cough and signs of aspiration with DD2.   --Continue therapies and plan of care. Making slow and steady progress. Transfers with SB and needs A for most of his ADLs. His function seems to be variable based on his fatigue.              Review of Systems:   Slept better last night. Neck pain is the same. Denies any CP, SOB or palpitation. Likes to try aqua-K pad for pain.             Physical Exam:     Vitals:    03/05/17 0150 03/05/17 0630 03/05/17 1543 03/05/17 1814   BP: 150/59  161/73 126/66   BP Location: Right arm  Right arm Right arm   Pulse: 74  77 88   Resp:   18    Temp:   97.5  F (36.4  C)    TempSrc:   Oral    SpO2:   98%    Weight:  81.9 kg (180 lb 9.6 oz)     Height:         Gen: NAD, sitting in WC - forward  flexion posture, neck rotated to the left   Heart: RRR  Lungs: clear breath sounds b/l  Abd: soft and non-tender  Ext: wwp, trace edema in BLE, no tenderness in calves  MSK/neuro: alert and oriented. speech is slow and mildly dysarthric. Left hemiparesis, worse in LUE.          Data:   Scheduled meds    insulin aspart  1-9 Units Subcutaneous TID     insulin aspart   Subcutaneous TID w/meals     insulin isophane human  15 Units Subcutaneous Q24H     [START ON 3/6/2017] hydrochlorothiazide  25 mg Oral Daily     insulin aspart  1-7 Units Subcutaneous TID AC     lisinopril  20 mg Oral BID     miconazole   Topical BID     diclofenac   Topical 4x Daily     atorvastatin  40 mg Oral or Feeding Tube Daily     cholecalciferol  2,000 Units Oral Daily     ferrous gluconate  324 mg Oral Daily with breakfast     fluticasone  1-2 spray Both Nostrils Daily     melatonin  1 mg Oral At Bedtime     metoprolol  50 mg Oral BID     rivaroxaban ANTICOAGULANT  20 mg Oral Daily with supper     tamsulosin  0.8 mg Oral Daily     ranitidine  150 mg Oral BID       PRN meds:  insulin aspart, hydrALAZINE, acetaminophen, polyethylene glycol, glucose **OR** dextrose **OR** glucagon      Bianca Ho MD  Physical Medicine and Rehabilitation     I spent a total of 15 minutes face-to-face or managing the care of Elias Mckee. Over 50% of my time on the unit was spent counseling the patient and coordinating care. See note for details.

## 2017-03-06 NOTE — PLAN OF CARE
Problem: Goal/Outcome  Goal: Goal Outcome Summary  Outcome: Therapy, progress toward functional goals as expected  Pt is on cycled NG tube feeding, 8 pm to 8 am, he has been tolerating that. He also eats by mouth and needs meal set up. We need to nuria and save his meal tickets as he is on calorie count. He had about 50% breakfast and 25% lunch this shift. His flomax was changed to liquid as the capsule can not be opened or crushed. His blood sugar was 224 in the morning and 228 at lunch, he was covered with Keily log insulin based on SSI and carb count order. We will continue to monitor his blood sugar closely while assisting with activity of daily livings.

## 2017-03-06 NOTE — PLAN OF CARE
"Problem: Goal/Outcome  Goal: Goal Outcome Summary  Outcome: No Change  FOCUS/GOAL  Bowel management, Bladder management and Medical management     ASSESSMENT, INTERVENTIONS AND CONTINUING PLAN FOR GOAL:  Pt was repositioned, used call light at times for needs. Pt had 2 large BMs, incontinent, requiring 2 total bed changes. Last BM was with incontinent of urine. Pt stated \"I needed to let it go\" when writer questioned if pt called staff.  Pt tolerated TF well, 41cc/hr, to be turned off at 0800. , 193, coverage given per orders.          "

## 2017-03-06 NOTE — PLAN OF CARE
Problem: Goal/Outcome  Goal: Goal Outcome Summary  PT: Pt with intermittent R sided upper trap and neck discomfort.  SPoke with nursing about using heating pad, and will continue with ROM, stretching, STM to neck, may add k-tape on 3/7/17. Pt participated in squat pivot tx to wc to R and FES biking today.  Cont to work towards improving strength LLE and improving safety , independence with transfers.

## 2017-03-07 LAB
GLUCOSE BLDC GLUCOMTR-MCNC: 138 MG/DL (ref 70–99)
GLUCOSE BLDC GLUCOMTR-MCNC: 170 MG/DL (ref 70–99)
GLUCOSE BLDC GLUCOMTR-MCNC: 187 MG/DL (ref 70–99)
GLUCOSE BLDC GLUCOMTR-MCNC: 224 MG/DL (ref 70–99)
GLUCOSE BLDC GLUCOMTR-MCNC: 242 MG/DL (ref 70–99)
GLUCOSE BLDC GLUCOMTR-MCNC: 254 MG/DL (ref 70–99)

## 2017-03-07 PROCEDURE — 40000193 ZZH STATISTIC PT WARD VISIT

## 2017-03-07 PROCEDURE — 25000132 ZZH RX MED GY IP 250 OP 250 PS 637: Mod: GY | Performed by: PHYSICAL MEDICINE & REHABILITATION

## 2017-03-07 PROCEDURE — 92526 ORAL FUNCTION THERAPY: CPT | Mod: GN

## 2017-03-07 PROCEDURE — A9270 NON-COVERED ITEM OR SERVICE: HCPCS | Mod: GY | Performed by: PHYSICAL MEDICINE & REHABILITATION

## 2017-03-07 PROCEDURE — 97112 NEUROMUSCULAR REEDUCATION: CPT | Mod: GO | Performed by: OCCUPATIONAL THERAPIST

## 2017-03-07 PROCEDURE — 97530 THERAPEUTIC ACTIVITIES: CPT | Mod: GP

## 2017-03-07 PROCEDURE — 00000146 ZZHCL STATISTIC GLUCOSE BY METER IP

## 2017-03-07 PROCEDURE — 97532 ZZHC SP COGNITIVE SKILLS EA 15 MIN: CPT | Mod: GN

## 2017-03-07 PROCEDURE — 40000133 ZZH STATISTIC OT WARD VISIT: Performed by: OCCUPATIONAL THERAPIST

## 2017-03-07 PROCEDURE — 40000225 ZZH STATISTIC SLP WARD VISIT

## 2017-03-07 PROCEDURE — 12800006 ZZH R&B REHAB

## 2017-03-07 RX ADMIN — MICONAZOLE NITRATE: 2 POWDER TOPICAL at 08:42

## 2017-03-07 RX ADMIN — HYDROCHLOROTHIAZIDE 25 MG: 12.5 CAPSULE ORAL at 08:42

## 2017-03-07 RX ADMIN — MICONAZOLE NITRATE: 2 POWDER TOPICAL at 20:30

## 2017-03-07 RX ADMIN — FERROUS GLUCONATE 324 MG: 324 TABLET ORAL at 08:41

## 2017-03-07 RX ADMIN — RANITIDINE HYDROCHLORIDE 150 MG: 150 TABLET, FILM COATED ORAL at 08:41

## 2017-03-07 RX ADMIN — DICLOFENAC SODIUM: 10 GEL TOPICAL at 13:59

## 2017-03-07 RX ADMIN — TAMSULOSIN HYDROCHLORIDE 800 MCG: 0.4 CAPSULE ORAL at 08:41

## 2017-03-07 RX ADMIN — ACETAMINOPHEN 650 MG: 325 TABLET, FILM COATED ORAL at 13:59

## 2017-03-07 RX ADMIN — ATORVASTATIN CALCIUM 40 MG: 40 TABLET, FILM COATED ORAL at 08:31

## 2017-03-07 RX ADMIN — METOPROLOL TARTRATE 50 MG: 50 TABLET, FILM COATED ORAL at 20:29

## 2017-03-07 RX ADMIN — VITAMIN D, TAB 1000IU (100/BT) 2000 UNITS: 25 TAB at 08:39

## 2017-03-07 RX ADMIN — LISINOPRIL 20 MG: 20 TABLET ORAL at 08:42

## 2017-03-07 RX ADMIN — METOPROLOL TARTRATE 50 MG: 50 TABLET, FILM COATED ORAL at 08:31

## 2017-03-07 RX ADMIN — DICLOFENAC SODIUM: 10 GEL TOPICAL at 08:41

## 2017-03-07 RX ADMIN — FLUTICASONE PROPIONATE 2 SPRAY: 50 SPRAY, METERED NASAL at 08:41

## 2017-03-07 RX ADMIN — RANITIDINE HYDROCHLORIDE 150 MG: 150 TABLET, FILM COATED ORAL at 20:29

## 2017-03-07 RX ADMIN — RIVAROXABAN 20 MG: 10 TABLET, FILM COATED ORAL at 16:35

## 2017-03-07 RX ADMIN — DICLOFENAC SODIUM: 10 GEL TOPICAL at 16:35

## 2017-03-07 RX ADMIN — Medication 1 MG: at 20:30

## 2017-03-07 RX ADMIN — LISINOPRIL 20 MG: 20 TABLET ORAL at 20:29

## 2017-03-07 NOTE — PLAN OF CARE
Problem: Goal/Outcome  Goal: Goal Outcome Summary     Showing improvement in level of independence for utilizing frequent sips during his meal. Cough x2 during the meal with both occurrences happening when distracted. Continue to recommend NDD-1 with NTL but likely close to returning diet to NDD-2 textures.      During PM session, very fatigued and difficult to get needed time. Able to complete a connect the dot activity with mod-max cues with improvement noted with repetition. Most difficulty when task required attention to the left.

## 2017-03-07 NOTE — PLAN OF CARE
FOCUS/GOAL  Bowel management, Bladder management, Skin integrity and Safety management    ASSESSMENT, INTERVENTIONS AND CONTINUING PLAN FOR GOAL:  incontinent bowel and bladder did use urinal when placed not putting on call light have to anticipate needs. Tolerating tube feeding. Resting between cares. BG Q 4 hours and coverage given per parameters. Turned Q 2 hours has pink area on coccyx and barrier cream has been applied

## 2017-03-07 NOTE — PROGRESS NOTES
"[facility] Acute Rehabilitation Unit  Progress Note    Interval Hx  BP overall better recently, cont to follow  Awake and in bed this am, looking forward to therapies this am   Feels well overall  Discussed about plan for TF / and increase in po intake  Endocrinology following for BS management   Dietitian following with terrence counts and TF management, likely can cut back TF soon     Calorie Count  (3/3) 925 kcals and 47 g protein, 2 meals (breakfast intake not recorded).  (3/4) 1238 kcals and 46 g protein, 2 meals (lunch intake not recorded).  (3/5) 1411 kcals and 50 g protein, 2 meals and 3/4 Magic Cup. PO intake 50% of lunch per nursing flow sheets which was not included in totals.      Assessment and Plan of Care: This is an 85-year-old male with a past medical history of stroke 9 years ago with some residual left leg weakness with history of chronic ischemic heart disease, prostate cancer, hypertension, hyperlipidemia, type 2 diabetes, chronic kidney disease, atrial fibrillation. Now presents with left hemiparesis, upper extremity, on top of residual left lower extremity weakness with dysarthria, dysphagia, concern for cognitive impairment on top of hard of hearing with impaired mobility, ADLs.    Functionally:  \"OT: pt engaged in transfers from bed to chair to mat, as well as neuro re-ed, standing tolerance.  \"  \"During lunch meal, beverage placed on his right side and then patient was independent in taking frequent drinks with the meal which was helpful for swallowing function. Tolerated NDD-1 and NDD-2 textures without any overt s/sx of aspiration for the duration of the meal and showing overall improved appetite. Recommend continue with current NDD-1/NTL due to inconsistencies with diet tolerance.       Timo to respond to direct verbal cues to use visual scanning strategies but without the max verbal cues, not utilizing the strategies and not carrying them over.\"  \"PT: Pt " "with intermittent R sided upper trap and neck discomfort. SPoke with nursing about using heating pad, and will continue with ROM, stretching, STM to neck, may add k-tape on 3/7/17. Pt participated in squat pivot tx to wc to R and FES biking today. Cont to work towards improving strength LLE and improving safety , independence with transfers. \"  Continue therapies and plan of care.      Overall Management:   - optimize secondary stroke management, on xarelto for anticoagulation, BP management, DM / BS control, lipid management  - HTN, on HCTZ, metoprolol and lisinopril increase dose 3/1 and 3/3, has prn hydralazine, added HCTZ 3/6, adjust as needed  - DM / BS, endocrinology managing BS control and adjusting management as needed   - HLD, on lipitor  - on vit D, ferrous gluconate, flonase  - melatonin for sleep  - L ankle swelling, some pain, reports twisting ankle, able to move aROM on own w/o sig discomfort, fx suspicion low, will do ice pack and voltaren gel for now, monitor   - dietitian following with terrence counts and TF management - likely can cut back TF soon     Bladder and Bowel - monitor spont voids and BM, on flomax, miralax prn  GI ppx - on zantac  DVT ppx - optimize mech ppx with PCDs and antiembolism stockings, on xarelto, progressive mobilization         Active Diet Order      Dysphagia Diet Level 1 Pureed Nectar Thickened Liquids (pre-thickened or use instant food thickener)    Labs    Recent Labs  Lab 03/07/17  0752 03/07/17  0416 03/07/17  0035 03/06/17 2003 03/06/17  1642 03/06/17  1213   * 242* 254* 220* 231* 228*         Medications:  Current Facility-Administered Medications   Medication     insulin aspart (NovoLOG) inj (RAPID ACTING)     tamsulosin (FLOMAX) suspension 800 mcg     insulin isophane human (HumuLIN N PEN) injection 5 Units     insulin aspart (NovoLOG) inj (RAPID ACTING)     insulin aspart (NovoLOG) inj (RAPID ACTING)     insulin isophane human (HumuLIN N PEN) injection 15 Units " "    hydrochlorothiazide (MICROZIDE) capsule 25 mg     hydrALAZINE (APRESOLINE) tablet 10 mg     insulin aspart (NovoLOG) inj (RAPID ACTING)     lisinopril (PRINIVIL/ZESTRIL) tablet 20 mg     miconazole (MICATIN; MICRO GUARD) 2 % powder     diclofenac (VOLTAREN) 1 % topical gel     acetaminophen (TYLENOL) tablet 650 mg     atorvastatin (LIPITOR) tablet 40 mg     cholecalciferol (vitamin D) tablet 2,000 Units     ferrous gluconate (FERGON) tablet 324 mg     fluticasone (FLONASE) 50 MCG/ACT spray 1-2 spray     melatonin tablet 1 mg     metoprolol (LOPRESSOR) tablet 50 mg     polyethylene glycol (MIRALAX/GLYCOLAX) powder 17 g     rivaroxaban ANTICOAGULANT (XARELTO) tablet 20 mg     glucose 40 % gel 15-30 g    Or     dextrose 50 % injection 25-50 mL    Or     glucagon injection 1 mg     ranitidine (ZANTAC) tablet 150 mg         Physical Examination:   /55  Pulse 89  Temp 98.2  F (36.8  C) (Oral)  Resp 16  Ht 1.778 m (5' 10\")  Wt 83.7 kg (184 lb 8 oz)  SpO2 95%  BMI 26.47 kg/m2  Upright in bed, cooperative   NG tube in place  R pupil deformed - previously documented   L hand and toe nails with fungal infection changes appears chronic   L UE with emerging tone on L elbow flexors MAS 2/4  L LE with 4/5 hip flex and knee ext but with limited range, limited some antigravity ankle DF / PF with limited range       More than 25 minutes spent with at least half on care coordination      "

## 2017-03-07 NOTE — PROGRESS NOTES
Nutrition Services Progress Note    Calorie Count  (3/6/17)  1913 kcals and 70 g protein, 3 meals and 1.5 Magic Cups.    Discussed  Calorie Count results with MD and Endocrine. Per agreement, plan to hold tube feeding for now, but leave nasal feeding tube in place and continue Calorie Counts.   Endocrine will adjust insulin accordingly.      Yvette Burks RD, LD

## 2017-03-07 NOTE — PLAN OF CARE
Problem: Goal/Outcome  Goal: Goal Outcome Summary  PT: pt reports heating pad is helping with back/upper trap pain. Continued with squat pivot transfers to the R, needing mod A.

## 2017-03-07 NOTE — PROGRESS NOTES
CLINICAL NUTRITION SERVICES - REASSESSMENT NOTE     Nutrition Prescription    RECOMMENDATIONS FOR MDs/PROVIDERS TO ORDER:  -None at this time.      Malnutrition Status:    -Unable to determine due to incomplete nutrition status validation.    Recommendations already ordered by Registered Dietitian (RD):  -Continue Calorie Counts  -Continue Magic Cups with lunch and dinner daily.    Future/Additional Recommendations:  -Diet advancement as medically appropriate per SLP evaluations.  -Continue to encourage intakes of tid meals and supplements.  -PO intakes to consistently meet >2/3 assessed needs to remain off TF (>1350 kcals and >57 g protein).  -Monitor weight trends.     EVALUATION OF THE PROGRESS TOWARD GOALS   Diet: NDD1/Nectar Thick Liquids  Magic Cup with lunch and dinner daily  Calorie Counts  Nutrition Support:  TF now on hold due to improving oral intakes.      Intake: Per Calorie Counts 3/4-3/6 average intakes 1521 kcals and 55 g protein meeting 75% of assessed calorie needs and 67% of protein needs.  This meets calorie goal and nearly meets protein goal of  >2/3 assessed needs to discontinue TF (>1350 kcals and >57 g protein).    Per discussion with MD will leave nasal tube in for now and continue Calorie Counts to ensure continued good intakes on modified diet.       NEW FINDINGS   Current Weight (2/7)  184 lbs 8 oz  ARU Admission Weight (2/22)  192 lbs    Per chart review, hospital admission weight (2/20) 182 lbs vs. 196 lbs. Last weight prior to ARU transfer (2/27) 179 lbs 0.2 oz which is not consistent with ARU admission weight.  ? Accuracy of weights over LOS vs. True loss of LBM.  Will continue to monitor.      Endocrine continues to follow-adjusting insulin for holding TF beginning tonight.    SLP trialing NDD2 textures.  Per last note recommend continuing with NDD1/Nectar Thick liquids due to inconsistencies with diet tolerance.      pink area on coccyx and barrier cream has been  applied      MALNUTRITION  % Intake: No decreased intake noted-supported on TF plus oral intakes.  % Weight Loss: unable to determine due to varying weights over LOS  Subcutaneous Fat Loss: unable to assess-pt currently meeting with SLP  Muscle Loss: unable to assess  Fluid Accumulation/Edema: None noted  Malnutrition Diagnosis: Unable to determine due to incomplete nutrition status validation.    Previous Goals   1. Total avg nutritional intake to meet a minimum of 25 kcal/kg and 1.0 g PRO/kg daily (per dosing wt 81 kg).  Evaluation: Met    2. PO intakes to consistently meet >2/3 assessed needs to discontinue TF (>1350 kcals and >57 g protein).  Evaluation: Met    Previous Nutrition Diagnosis  Inadequate oral intakes related to dysphagia and modified diet as evidenced by po intakes 50% of meals so far, continues on TF to meet ~50% of assessed nutritional needs.     Evaluation: Improving/updated below    CURRENT NUTRITION DIAGNOSIS  Inadequate oral intake related to only recently improving appetite as evidenced by 3 day Calorie Counts show average intakes 1521 kcals and 55 g protein with assessed needs of 0999-6792 kcals/day and 81-97 g protein.    INTERVENTIONS  Implementation  Nutrition Education:  Encouraged intakes of tid meals and supplements.      Goals  Total average nutrition intake to meet a minimum of 25 kcals per kg and 1.0 g protein per kg (per dose wt 81 kg).    Monitoring/Evaluation  Progress toward goals will be monitored and evaluated per protocol.    Yvette Burks RD, TINA

## 2017-03-07 NOTE — PLAN OF CARE
Problem: Goal/Outcome  Goal: Goal Outcome Summary  Occupational Therapy treatment session with use of the FES UE Ergometer :   Session # 1  Skilled set-up on the : patient dependent for proper placement of electrodes on left wrist flexors/extensors, biceps, triceps, deltoids, and scapular musculature for optimum muscle contraction, safe positioning in WC and alignment of patient to  ergometer. Passive motion was assessed to ensure proper positioning and joint integrity during cycling. Determined appropriate FES parameters for each muscle group based off of strong tetanic response of muscle test.  Pt tolerating 25 min of active FES ergometry with 0-100% stimulation applied to above muscles at 30-35 rpm with .5 nm resistance. Good muscle contraction was observed in all muscle groups (please refer to [email or web address] for stimulation parameters). OT adjusted e-stim and cycling parameters in real-time to ensure appropriate targeted muscle contraction/response throughout session. Patient tolerated cycling well with no adverse response noted. Utilizing  aid and arm trough to support LUE during cyling. Functionally, pt able to demonstrate minimal shoulder elevation but not able to purposely utilize UE for tasks or mobility.   Plan for future use: 3/9/17-3/10/17  For full cycling report, please refer to [facility] ([facility]), patient ID [id number] and pin [id number].

## 2017-03-07 NOTE — PLAN OF CARE
Problem: Goal/Outcome  Goal: Goal Outcome Summary  Outcome: Improving  FOCUS/GOAL  Bladder management and Medical management     ASSESSMENT, INTERVENTIONS AND CONTINUING PLAN FOR GOAL:  Pt c/o pain on his rt neck and shoulder, Aqua K applied with some relief and Tylenol given with some relief. Pt used the urinal and staff assisted with holding the urinal and emptying it . Pt was inct of bladder this AM, assist of 2 done with cleaning and adjusting his pants. Pt ate good and TF on hold for now, flushed per order and his BG coverage done. Pt needs assist with meal set ups.   Nursing will cont POC

## 2017-03-07 NOTE — PROGRESS NOTES
"                     Diabetes Consult Daily  Progress Note          Assessment/Plan:      Elias Mckee is a 85 year old male with history of CAD ( stents and cardiac arrest), stroke (9 years ago), hypertension, hyperlipidemia, type 2 diabetes , CKD stage 3 ( baseline Cr 1.3-1.4), subclinical hypothyroidism, and atrial fibrillation was admitted to [facility] from (2/20-2/27/2017) with acute right middle cerebral artery territory stroke. Admitted to ARU for therapies       Type 2 diabetic: PTA on glimepiride 3 mg daily,   Cycled TF: Isosource 1.5 at 41 cc per hr x 12h (86g CHO)        Plan  - NPH 5 units AM  -will reassess in am if patient will need basal insulin at HS  - d/c Continue evening NPH 15 units qPM - please give 1 hr prior to start of cycled TFs  -meal aspart 1unit/7g CHO .  -Aspart before meals and HS  - monitor glucose before meals, HS and 0200     .      Will continue to follow            Interval History:   Discussed with RD, TF discontinued  Patient seen in therapy  Appetite is good, denies nausea and or vomiting    Recent Labs  Lab 03/07/17  0752 03/07/17  0416 03/07/17  0035 03/06/17  2003 03/06/17  1642 03/06/17  1213   * 242* 254* 220* 231* 228*               Review of Systems:      please see interval history       Medications:       Active Diet Order      Dysphagia Diet Level 1 Pureed Nectar Thickened Liquids (pre-thickened or use instant food thickener)     Physical Exam:  Gen: Alert,  NAD   HEENT: NC/AT, mucous membranes are moist  Resp: Unlabored  Ext: moving right upper extremity, minimal movement with left upper extremity   Neuro:oriented x3, communicating clearly, has left neglect  /55  Pulse 89  Temp 98.2  F (36.8  C) (Oral)  Resp 16  Ht 1.778 m (5' 10\")  Wt 83.7 kg (184 lb 8 oz)  SpO2 95%  BMI 26.47 kg/m2           Data:     Lab Results   Component Value Date    A1C 6.8 02/21/2017    A1C 6.7 02/17/2017    A1C 6.0 08/11/2016    A1C 6.4 01/12/2016    A1C " 6.6 07/13/2015              CBC RESULTS:   Recent Labs   Lab Test  02/27/17   0746   WBC  8.4   RBC  3.11*   HGB  9.6*   HCT  28.1*   MCV  90   MCH  30.9   MCHC  34.2   RDW  14.9   PLT  148*     Recent Labs   Lab Test  02/27/17   0746  02/26/17   0757   NA  138  137   POTASSIUM  4.1  4.1   CHLORIDE  108  108   CO2  22  20   ANIONGAP  8  9   GLC  301*  264*   BUN  22  22   CR  0.96  0.92   TRENT  8.3*  8.0*     Liver Function Studies -   Recent Labs   Lab Test  02/17/17   0940  08/11/16   0930   PROTTOTAL   --   7.1   ALBUMIN   --   3.7   BILITOTAL   --   0.3   ALKPHOS   --   80   AST   --   13   ALT  29  24     Lab Results   Component Value Date    INR 0.99 02/20/2017    INR 0.97 06/13/2008           Emily Mcclure, CNP pager [phone]  Diabetes Management Job Code [id number]

## 2017-03-07 NOTE — PLAN OF CARE
Problem: Goal/Outcome  Goal: Goal Outcome Summary  Outcome: No Change  FOCUS/GOAL  Bowel management, Bladder management, Nutrition/Feeding/Swallowing precautions, Pain management and Mobility     ASSESSMENT, INTERVENTIONS AND CONTINUING PLAN FOR GOAL:  Pt confused at times, oriented to person and situation this shift. Complains of back and neck discomfort repositioned and utilized Aqua K pad. Pt refused Voltaren gel, heat, and cold packs for Left ankle but complained of discomfort- especially in middle toe.  TF running through NJ at 41mL/hr through 0800. BG elevated-received insulin throughout shift. Ate 90%-states appetite is increasing. On carb and calorie counts. Uses urinal with staff placement. Ao2 pivot squat to bsc. Bm this shift. Repositioned q2h, alarms on continue with poc.

## 2017-03-08 LAB
GLUCOSE BLDC GLUCOMTR-MCNC: 111 MG/DL (ref 70–99)
GLUCOSE BLDC GLUCOMTR-MCNC: 117 MG/DL (ref 70–99)
GLUCOSE BLDC GLUCOMTR-MCNC: 162 MG/DL (ref 70–99)
GLUCOSE BLDC GLUCOMTR-MCNC: 170 MG/DL (ref 70–99)
GLUCOSE BLDC GLUCOMTR-MCNC: 212 MG/DL (ref 70–99)

## 2017-03-08 PROCEDURE — 00000146 ZZHCL STATISTIC GLUCOSE BY METER IP

## 2017-03-08 PROCEDURE — 92526 ORAL FUNCTION THERAPY: CPT | Mod: GN

## 2017-03-08 PROCEDURE — 92507 TX SP LANG VOICE COMM INDIV: CPT | Mod: GN

## 2017-03-08 PROCEDURE — 97032 APPL MODALITY 1+ESTIM EA 15: CPT | Mod: GP | Performed by: PHYSICAL THERAPIST

## 2017-03-08 PROCEDURE — 97530 THERAPEUTIC ACTIVITIES: CPT | Mod: GP | Performed by: PHYSICAL THERAPIST

## 2017-03-08 PROCEDURE — 25000132 ZZH RX MED GY IP 250 OP 250 PS 637: Mod: GY | Performed by: PHYSICAL MEDICINE & REHABILITATION

## 2017-03-08 PROCEDURE — 40000193 ZZH STATISTIC PT WARD VISIT: Performed by: PHYSICAL THERAPIST

## 2017-03-08 PROCEDURE — 40000225 ZZH STATISTIC SLP WARD VISIT

## 2017-03-08 PROCEDURE — 97535 SELF CARE MNGMENT TRAINING: CPT | Mod: GO

## 2017-03-08 PROCEDURE — 40000133 ZZH STATISTIC OT WARD VISIT

## 2017-03-08 PROCEDURE — A9270 NON-COVERED ITEM OR SERVICE: HCPCS | Mod: GY | Performed by: PHYSICAL MEDICINE & REHABILITATION

## 2017-03-08 PROCEDURE — 25000131 ZZH RX MED GY IP 250 OP 636 PS 637: Mod: GY | Performed by: NURSE PRACTITIONER

## 2017-03-08 PROCEDURE — 12800006 ZZH R&B REHAB

## 2017-03-08 PROCEDURE — 97110 THERAPEUTIC EXERCISES: CPT | Mod: GP | Performed by: PHYSICAL THERAPIST

## 2017-03-08 RX ADMIN — DICLOFENAC SODIUM 4 G: 10 GEL TOPICAL at 16:33

## 2017-03-08 RX ADMIN — ATORVASTATIN CALCIUM 40 MG: 40 TABLET, FILM COATED ORAL at 10:27

## 2017-03-08 RX ADMIN — DICLOFENAC SODIUM: 10 GEL TOPICAL at 12:40

## 2017-03-08 RX ADMIN — HYDROCHLOROTHIAZIDE 25 MG: 12.5 CAPSULE ORAL at 10:27

## 2017-03-08 RX ADMIN — FERROUS GLUCONATE 324 MG: 324 TABLET ORAL at 10:28

## 2017-03-08 RX ADMIN — LISINOPRIL 20 MG: 20 TABLET ORAL at 10:31

## 2017-03-08 RX ADMIN — DICLOFENAC SODIUM: 10 GEL TOPICAL at 10:27

## 2017-03-08 RX ADMIN — HYDRALAZINE HYDROCHLORIDE 10 MG: 10 TABLET, FILM COATED ORAL at 16:32

## 2017-03-08 RX ADMIN — VITAMIN D, TAB 1000IU (100/BT) 2000 UNITS: 25 TAB at 10:27

## 2017-03-08 RX ADMIN — INSULIN GLARGINE 10 UNITS: 100 INJECTION, SOLUTION SUBCUTANEOUS at 14:31

## 2017-03-08 RX ADMIN — TAMSULOSIN HYDROCHLORIDE 800 MCG: 0.4 CAPSULE ORAL at 10:29

## 2017-03-08 RX ADMIN — DICLOFENAC SODIUM: 10 GEL TOPICAL at 21:18

## 2017-03-08 RX ADMIN — RANITIDINE HYDROCHLORIDE 150 MG: 150 TABLET, FILM COATED ORAL at 10:30

## 2017-03-08 RX ADMIN — LISINOPRIL 20 MG: 20 TABLET ORAL at 21:18

## 2017-03-08 RX ADMIN — RIVAROXABAN 20 MG: 10 TABLET, FILM COATED ORAL at 16:33

## 2017-03-08 RX ADMIN — METOPROLOL TARTRATE 50 MG: 50 TABLET, FILM COATED ORAL at 21:18

## 2017-03-08 RX ADMIN — RANITIDINE HYDROCHLORIDE 150 MG: 150 TABLET, FILM COATED ORAL at 21:18

## 2017-03-08 RX ADMIN — METOPROLOL TARTRATE 50 MG: 50 TABLET, FILM COATED ORAL at 10:30

## 2017-03-08 RX ADMIN — FLUTICASONE PROPIONATE 2 SPRAY: 50 SPRAY, METERED NASAL at 10:29

## 2017-03-08 RX ADMIN — Medication 1 MG: at 21:18

## 2017-03-08 RX ADMIN — ACETAMINOPHEN 650 MG: 325 TABLET, FILM COATED ORAL at 00:19

## 2017-03-08 RX ADMIN — MICONAZOLE NITRATE: 2 POWDER TOPICAL at 21:18

## 2017-03-08 RX ADMIN — MICONAZOLE NITRATE: 2 POWDER TOPICAL at 10:29

## 2017-03-08 NOTE — PLAN OF CARE
Problem: Goal/Outcome  Goal: Goal Outcome Summary  Outcome: Therapy, progress towards functional goals is fair     Patient improved level of awareness this date as compared to yesterday but patient having difficulties completing attention tasks as well as basic reading tasks. During meal observation, cough x1 with carrots but no other overt s/sx of aspiration. Discussed with patient upgrading his diet but in conversation with RN shortly afterwards, patient was having hard coughing episode. Will hold off on diet upgraded.

## 2017-03-08 NOTE — PROGRESS NOTES
Postural Assessment Scale for Stroke    Maintaining a posture  1. Sitting without support:3  2. Standing with support:3  3. Standing without support:1  4. Standing on nonparetic le  5. Standing on paretic le    Changing posture  6. Supine to affected side lateral:2  7. Supine to nonaffected side lateral:1  8. Supine to sitting up on the edge of the table:1  9. Sitting on the edge of the table to supine:1  10. Sitting to standing up:1  11. Standing up to sitting down:1  12. Standing, picking up a pencil from the floor:0  Total score:  14  /36

## 2017-03-08 NOTE — PLAN OF CARE
Problem: Goal/Outcome  Goal: Goal Outcome Summary  Outcome: No Change  FOCUS/GOAL  Bowel management, Nutrition/Feeding/Swallowing precautions and Medication management     ASSESSMENT, INTERVENTIONS AND CONTINUING PLAN FOR GOAL:  Pt has been continent and incontinent of bladder. Continent of BM x1 this shift using BSC. Pt continues on calorie counts. NJ tube flushed, patent, and clamped. Pt's Humulin was discontinued this morning, and Lantus was started this afternoon. Pt c/o R neck pain this morning and was given scheduled voltaren gel with some relief. Denies pain at this time. Continue with POC.

## 2017-03-08 NOTE — PROGRESS NOTES
Nutrition Services Progress Note    Calorie Count  (3/8/17)  1971 kcals and 68 g protein, 2 meals and 1 Magic Cup.  Per nursing flow sheets, po intake 75% of lunch not included in total.      Yvette Burks RD, LD

## 2017-03-08 NOTE — PROGRESS NOTES
Skilled set up on :  Pt dependent for proper placement of electrodes on L quad, hams, glutes, anterior tibialis, gastroc  for optimum muscle contraction, safe positioning on w/c within frame and cushion for prevention of skin breakdown, feet secured to foot pedals, w/c secured to  w/ Q-straints.  Passive motion assessed to ensure proper positioning.  Determined appropriate FES parameters for each muscle group based off of strong tetanic response of muscle test.    Pt performed 17 minutes of active FES ergometry with max = min% stimulation applied to above muscles at 35 rpm with  0.5nm resistance.  This PT adjusted e-stim and cycling parameters in real-time to ensure palpable muscle contractions throughout session.  Please see [email or web address] for further details on patient's stimulation parameters and ergometry outcomes.  Patient ID:[id number], PIN: [id number].

## 2017-03-08 NOTE — PLAN OF CARE
Problem: Goal/Outcome  Goal: Goal Outcome Summary  PT: Pt still with decreased awareness L side, and decreased awareness of environment in general.  PASS completed , 14/36 today, was 9/36 on 3/1. Pt still needing A with all mobility. Will cont to work on squat pivot transfers to improve consistency. FES biking with stim to LLE in am, see progress note.

## 2017-03-08 NOTE — PLAN OF CARE
Problem: Goal/Outcome  Goal: Goal Outcome Summary  Outcome: No Change  Patient is here with cardio embolic stroke, disoriented to place and time, Use the urinal x 1, staff assist with positioning the urinal and voiding, also incontinent of bladder contained in his diaiper. Assist of 2 to  Change diaper and to boost up in bed, slept most of the night, will continue POC

## 2017-03-08 NOTE — PROGRESS NOTES
"                     Diabetes Consult Daily  Progress Note          Assessment/Plan:      Elias Mckee is a 85 year old male with history of CAD ( stents and cardiac arrest), stroke (9 years ago), hypertension, hyperlipidemia, type 2 diabetes , CKD stage 3 ( baseline Cr 1.3-1.4), subclinical hypothyroidism, and atrial fibrillation was admitted to [facility] from (2/20-2/27/2017) with acute right middle cerebral artery territory stroke. Admitted to ARU for therapies       Type 2 diabetic: PTA on glimepiride 3 mg daily,     Plan  - d/c NPH 5 units AM  -meal aspart 1unit/7g CHO .  -lantus 10 units every 24 hours ( started 3/8)  -Aspart before meals and HS  - monitor glucose before meals, HS and 0200      Will continue to follow              Interval History:       NPH d/c last evening when TF were d/c  Glucose at 0200 162 and fasting 173  Held morning NPH to see if needing basal insulin, glucose at lunch 212  Blood sugars in better control with NPH 5 units in am, will add small amount of basal insulin  Appetite is great, ate 107 grams of CHO for breakfast  Is working with therapies      Recent Labs  Lab 03/08/17  0202 03/07/17  2056 03/07/17  1720 03/07/17  1153 03/07/17  0752 03/07/17  0416   * 138* 224* 187* 170* 242*               Review of Systems:      please see interval history       Medications:       Active Diet Order      Dysphagia Diet Level 1 Pureed Nectar Thickened Liquids (pre-thickened or use instant food thickener)     Physical Exam:  Gen: Alert,  NAD   HEENT: NC/AT, mucous membranes are moist  Resp: Unlabored  Ext: moving right upper arm , left upper arm in a sling  Neuro:oriented x3, communicating clearly  /61 (BP Location: Right arm)  Pulse 68  Temp 96.1  F (35.6  C) (Oral)  Resp 16  Ht 1.778 m (5' 10\")  Wt 83.7 kg (184 lb 8 oz)  SpO2 91%  BMI 26.47 kg/m2           Data:     Lab Results   Component Value Date    A1C 6.8 02/21/2017    A1C 6.7 02/17/2017    A1C 6.0 " 08/11/2016    A1C 6.4 01/12/2016    A1C 6.6 07/13/2015              CBC RESULTS:   Recent Labs   Lab Test  02/27/17   0746   WBC  8.4   RBC  3.11*   HGB  9.6*   HCT  28.1*   MCV  90   MCH  30.9   MCHC  34.2   RDW  14.9   PLT  148*     Recent Labs   Lab Test  02/27/17   0746  02/26/17   0757   NA  138  137   POTASSIUM  4.1  4.1   CHLORIDE  108  108   CO2  22  20   ANIONGAP  8  9   GLC  301*  264*   BUN  22  22   CR  0.96  0.92   TRENT  8.3*  8.0*     Liver Function Studies -   Recent Labs   Lab Test  02/17/17   0940  08/11/16   0930   PROTTOTAL   --   7.1   ALBUMIN   --   3.7   BILITOTAL   --   0.3   ALKPHOS   --   80   AST   --   13   ALT  29  24     Lab Results   Component Value Date    INR 0.99 02/20/2017    INR 0.97 06/13/2008           Emily Mcclure, CNP pager [phone]  Diabetes Management Job Code [id number]

## 2017-03-08 NOTE — PLAN OF CARE
Problem: Goal/Outcome  Goal: Goal Outcome Summary  Pt completed x3 stands at sink at min A for sit to stand and CGA while standing to progress standing balance/tolerance to prepare for grooming tasks. Pt completed stand pivot w/c < > toilet at min A with max verbal/tactile cues for hand placement. Pt pleased with transfers today however presented with fatigue at end of session.

## 2017-03-08 NOTE — PROGRESS NOTES
"[facility] Unit  Progress Note    Interval Hx  Now off TF since overnight  Feels well overall  Trying to call daughter but unable to recall number  Endocrinology following for BS management   Dietitian following with terrence shavon  Will remove nasal feeding tube as po intake ok    Calorie Count  (3/8/17) 1971 kcals and 68 g protein, 2 meals and 1 Magic Cup. Per nursing flow sheets, po intake 75% of lunch not included in total.    Assessment and Plan of Care: This is an 85-year-old male with a past medical history of stroke 9 years ago with some residual left leg weakness with history of chronic ischemic heart disease, prostate cancer, hypertension, hyperlipidemia, type 2 diabetes, chronic kidney disease, atrial fibrillation. Now presents with left hemiparesis, upper extremity, on top of residual left lower extremity weakness with dysarthria, dysphagia, concern for cognitive impairment on top of hard of hearing with impaired mobility, ADLs.    Functionally:  PASS 14 / 36 with PT.     \"PT: Pt still with decreased awareness L side, and decreased awareness of environment in general. PASS completed , 14/36 today, was 9/36 on 3/1. Pt still needing A with all mobility. Will cont to work on squat pivot transfers to improve consistency. FES biking with stim to LLE in am, see progress note.\"  \"Patient improved level of awareness this date as compared to yesterday but patient having difficulties completing attention tasks as well as basic reading tasks. During meal observation, cough x1 with carrots but no other overt s/sx of aspiration. Discussed with patient upgrading his diet but in conversation with RN shortly afterwards, patient was having hard coughing episode. Will hold off on diet upgraded. \"  Continue therapies and plan of care.      Overall Management:   - optimize secondary stroke management, on xarelto for anticoagulation, BP management, DM / BS control, lipid " management  - HTN, on HCTZ, metoprolol and lisinopril increase dose 3/1 and 3/3, has prn hydralazine, added HCTZ 3/6, adjust as needed  - DM / BS, endocrinology managing BS control and adjusting management as needed   - HLD, on lipitor  - on vit D, ferrous gluconate, flonase  - melatonin for sleep  - L ankle swelling, some pain, reports twisting ankle, able to move aROM on own w/o sig discomfort, fx suspicion low, will do ice pack and voltaren gel for now, monitor   - dietitian following with terrence counts now off TF since 3/7 overnight, follow po intake, d/fortino nasal feeding tube 3/8    Bladder and Bowel - monitor spont voids and BM, on flomax, miralax prn  GI ppx - on zantac  DVT ppx - optimize mech ppx with PCDs and antiembolism stockings, on xarelto, progressive mobilization         Active Diet Order      Dysphagia Diet Level 1 Pureed Nectar Thickened Liquids (pre-thickened or use instant food thickener)    Labs    Recent Labs  Lab 03/08/17  1243 03/08/17  0753 03/08/17  0202 03/07/17  2056 03/07/17  1720 03/07/17  1153   * 170* 162* 138* 224* 187*         Medications:  Current Facility-Administered Medications   Medication     insulin glargine (LANTUS) injection 10 Units     insulin aspart (NovoLOG) inj (RAPID ACTING)     insulin aspart (NovoLOG) inj (RAPID ACTING)     tamsulosin (FLOMAX) suspension 800 mcg     insulin aspart (NovoLOG) inj (RAPID ACTING)     hydrochlorothiazide (MICROZIDE) capsule 25 mg     hydrALAZINE (APRESOLINE) tablet 10 mg     insulin aspart (NovoLOG) inj (RAPID ACTING)     lisinopril (PRINIVIL/ZESTRIL) tablet 20 mg     miconazole (MICATIN; MICRO GUARD) 2 % powder     diclofenac (VOLTAREN) 1 % topical gel     acetaminophen (TYLENOL) tablet 650 mg     atorvastatin (LIPITOR) tablet 40 mg     cholecalciferol (vitamin D) tablet 2,000 Units     ferrous gluconate (FERGON) tablet 324 mg     fluticasone (FLONASE) 50 MCG/ACT spray 1-2 spray     melatonin tablet 1 mg     metoprolol (LOPRESSOR)  "tablet 50 mg     polyethylene glycol (MIRALAX/GLYCOLAX) powder 17 g     rivaroxaban ANTICOAGULANT (XARELTO) tablet 20 mg     glucose 40 % gel 15-30 g    Or     dextrose 50 % injection 25-50 mL    Or     glucagon injection 1 mg     ranitidine (ZANTAC) tablet 150 mg         Physical Examination:   /61 (BP Location: Right arm)  Pulse 72  Temp 97  F (36.1  C) (Oral)  Resp 16  Ht 1.778 m (5' 10\")  Wt 83.7 kg (184 lb 8 oz)  SpO2 91%  BMI 26.47 kg/m2  In bed, cooperative   R pupil deformed - previously documented   L hand and toe nails with fungal infection changes appears chronic   L UE with emerging tone on L elbow flexors MAS 2/4  L LE with 4/5 hip flex and knee ext but with limited range, limited some antigravity ankle DF / PF with limited range       More than 25 minutes spent with at least half on care coordination      "

## 2017-03-09 LAB
GLUCOSE BLDC GLUCOMTR-MCNC: 117 MG/DL (ref 70–99)
GLUCOSE BLDC GLUCOMTR-MCNC: 161 MG/DL (ref 70–99)
GLUCOSE BLDC GLUCOMTR-MCNC: 200 MG/DL (ref 70–99)
GLUCOSE BLDC GLUCOMTR-MCNC: 204 MG/DL (ref 70–99)
GLUCOSE BLDC GLUCOMTR-MCNC: 95 MG/DL (ref 70–99)

## 2017-03-09 PROCEDURE — 25000132 ZZH RX MED GY IP 250 OP 250 PS 637: Mod: GY | Performed by: NURSE PRACTITIONER

## 2017-03-09 PROCEDURE — 40000193 ZZH STATISTIC PT WARD VISIT: Performed by: PHYSICAL THERAPIST

## 2017-03-09 PROCEDURE — A9270 NON-COVERED ITEM OR SERVICE: HCPCS | Mod: GY | Performed by: PHYSICAL MEDICINE & REHABILITATION

## 2017-03-09 PROCEDURE — 92526 ORAL FUNCTION THERAPY: CPT | Mod: GN | Performed by: SPEECH-LANGUAGE PATHOLOGIST

## 2017-03-09 PROCEDURE — 97112 NEUROMUSCULAR REEDUCATION: CPT | Mod: GP | Performed by: PHYSICAL THERAPIST

## 2017-03-09 PROCEDURE — 40000133 ZZH STATISTIC OT WARD VISIT

## 2017-03-09 PROCEDURE — 12800006 ZZH R&B REHAB

## 2017-03-09 PROCEDURE — 25000132 ZZH RX MED GY IP 250 OP 250 PS 637: Mod: GY | Performed by: PHYSICAL MEDICINE & REHABILITATION

## 2017-03-09 PROCEDURE — A9270 NON-COVERED ITEM OR SERVICE: HCPCS | Mod: GY | Performed by: NURSE PRACTITIONER

## 2017-03-09 PROCEDURE — 97535 SELF CARE MNGMENT TRAINING: CPT | Mod: GO

## 2017-03-09 PROCEDURE — 40000225 ZZH STATISTIC SLP WARD VISIT

## 2017-03-09 PROCEDURE — 97110 THERAPEUTIC EXERCISES: CPT | Mod: GP | Performed by: PHYSICAL THERAPIST

## 2017-03-09 PROCEDURE — 00000146 ZZHCL STATISTIC GLUCOSE BY METER IP

## 2017-03-09 PROCEDURE — 97532 ZZHC SP COGNITIVE SKILLS EA 15 MIN: CPT | Mod: GN

## 2017-03-09 PROCEDURE — 97530 THERAPEUTIC ACTIVITIES: CPT | Mod: GO

## 2017-03-09 PROCEDURE — 97530 THERAPEUTIC ACTIVITIES: CPT | Mod: GP | Performed by: PHYSICAL THERAPIST

## 2017-03-09 PROCEDURE — 40000225 ZZH STATISTIC SLP WARD VISIT: Performed by: SPEECH-LANGUAGE PATHOLOGIST

## 2017-03-09 RX ADMIN — METOPROLOL TARTRATE 50 MG: 50 TABLET, FILM COATED ORAL at 20:59

## 2017-03-09 RX ADMIN — DICLOFENAC SODIUM: 10 GEL TOPICAL at 20:58

## 2017-03-09 RX ADMIN — Medication 1 MG: at 20:59

## 2017-03-09 RX ADMIN — ATORVASTATIN CALCIUM 40 MG: 40 TABLET, FILM COATED ORAL at 08:11

## 2017-03-09 RX ADMIN — HYDROCHLOROTHIAZIDE 25 MG: 12.5 CAPSULE ORAL at 08:12

## 2017-03-09 RX ADMIN — ACETAMINOPHEN 650 MG: 325 TABLET, FILM COATED ORAL at 23:59

## 2017-03-09 RX ADMIN — FLUTICASONE PROPIONATE 2 SPRAY: 50 SPRAY, METERED NASAL at 08:12

## 2017-03-09 RX ADMIN — RANITIDINE HYDROCHLORIDE 150 MG: 150 TABLET, FILM COATED ORAL at 08:09

## 2017-03-09 RX ADMIN — MICONAZOLE NITRATE: 2 POWDER TOPICAL at 08:09

## 2017-03-09 RX ADMIN — DICLOFENAC SODIUM: 10 GEL TOPICAL at 12:33

## 2017-03-09 RX ADMIN — DICLOFENAC SODIUM: 10 GEL TOPICAL at 16:50

## 2017-03-09 RX ADMIN — RIVAROXABAN 20 MG: 10 TABLET, FILM COATED ORAL at 17:17

## 2017-03-09 RX ADMIN — RANITIDINE HYDROCHLORIDE 150 MG: 150 TABLET, FILM COATED ORAL at 20:58

## 2017-03-09 RX ADMIN — LISINOPRIL 20 MG: 20 TABLET ORAL at 08:11

## 2017-03-09 RX ADMIN — VITAMIN D, TAB 1000IU (100/BT) 2000 UNITS: 25 TAB at 08:11

## 2017-03-09 RX ADMIN — DICLOFENAC SODIUM: 10 GEL TOPICAL at 08:09

## 2017-03-09 RX ADMIN — FERROUS GLUCONATE 324 MG: 324 TABLET ORAL at 08:09

## 2017-03-09 RX ADMIN — LISINOPRIL 20 MG: 20 TABLET ORAL at 20:59

## 2017-03-09 RX ADMIN — METOPROLOL TARTRATE 50 MG: 50 TABLET, FILM COATED ORAL at 08:11

## 2017-03-09 RX ADMIN — TAMSULOSIN HYDROCHLORIDE 800 MCG: 0.4 CAPSULE ORAL at 08:12

## 2017-03-09 RX ADMIN — Medication 5 MG: at 08:24

## 2017-03-09 RX ADMIN — MICONAZOLE NITRATE: 2 POWDER TOPICAL at 20:58

## 2017-03-09 NOTE — PLAN OF CARE
Problem: Goal/Outcome  Goal: Goal Outcome Summary  FOCUS/GOAL  Nutrition/Feeding/Swallowing precautions, Mobility and Reinforcement of self-care/ADL     ASSESSMENT, INTERVENTIONS AND CONTINUING PLAN FOR GOAL:  Pt is alert and cooperative on this shift, uses call light appropriately. Up in chair for meal, tolerates well. Eats 100% of meal without incident. Pt pivot transfers from bed to chair, and chair to bed with x2 assist. Pt is given shower on this shift, tolerates activity well. Family member in for visit, patient interacts clearly and appropriately with her. Hydralazine given per PRN orders x1 for BP of 163/70, was 114/53 upon followup. Will continue POC.

## 2017-03-09 NOTE — PROGRESS NOTES
Nutrition Services    Calorie Counts  3/8: 970 kcal, 36 g protein (3 meals, 0 magic cup)      Jyotsna Rocha RD

## 2017-03-09 NOTE — PLAN OF CARE
Problem: Goal/Outcome  Goal: Goal Outcome Summary  Outcome: Therapy, progress toward functional goals as expected  SLP: pt seen at meal, trialed NDD2 diet again .  Did not note any episodes of coughing on semi solids or nectar.  Pt frequently reported neck pain.  Will need further assess across several meals before advancing again to NDD2.  Noted pt did not have dentures in his mouth, assisted with adhesive and placing in mouth.  PT should have dentures in am, out at bedtime , recommend sitting up in chair, and at this time does not appear to need 1:1 supervision at meals (since he has been downgraded).

## 2017-03-09 NOTE — PLAN OF CARE
Acute Rehab Care Conference/Team Rounds      Type: Team Rounds    Present: Dr. Flor, Keren Marti SW, Babita Rosales OT, Liliana Grullon OT,  Vee Nice PT, Socrates Mcmullen SLP, Neda Bartlett , Cyndi Rocha Dietician, Jaun-DINH Bustos, Lilo Figueroa RN.      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    Swallowing/Nutrition: Patient's diet has been downgraded to NDD-1 and NTL due to positive s/sx of aspiration. Patient having inconsistent tolerance of NDD-2 food textures. NG tube has been removed as patient having good appetite regardless of food consistency being provided. Patient likely on verge of being able to upgrade to NDD-2 food textures. Does best with placement of liquid textures on his right side. Patient currently does not require direct supervision but if diet is upgraded, will likely recommend direct supervision to monitor safety.     Bowel/Bladder:Continent and incontinent of bowel and bladder, need max assist of 1-2 for castro-cares and to turn him from side to side when incontinence occurs. LB 3/9/2017    Psychosocial: Pt resides alone in an assisted living, [place]. Disposition is unclear due to level of support needed at this time. Team working towards a safe d/c plan.    ADLs/IADLs: Pt is progressing nicely in therapy. Continuing to work on toilet transfer and squat pivot transfers in therapy. LUE continues to be flaccid without any indication of subluxation. Continues to improve with standing at the sink or grab bar with min A for sit to  stand and while sitting CGA with intermittent standing unsupported. Pt continues to demonstrate decreased ability to complete adls, iadls, decreased standing tolerance, endurance, decreased ability to use LUE for functional activities, L sided neglect. Continued skilled OT needed to address above mentioned deficits in order for a safe return home to assisted living and caregiver assistance. It is recommended that pt have commode over the toilet, grab bars in shower and by the toilet. Pt will need occupational therapy services upon d/c.     Mobility: Pt has made gradual progress with PT. Pt with intermittent c/o L ankle and R sided neck discomfort. TIme has been spent addressing neck pain with STM, trigger point release, postural caro, AROM, AAROM and heat. .  Pt tends to gaze R, hold neck in rotation, SB towards R due to L neglect which seems to be causing this neck pain.  Pt with L side decreased attention, decreased sensation L LE,  decreased problem solving, awareness of environment. Pt's PASS has improved from 9/36 on 3/1/17 to 14/36 on 3/8/17. Pt now performing squat pivot transfers to the R with generally modA, cues and A to set up the transfers. Pt needing min to modA with transfers generally and cues for set up and awareness of LUE and LLE positioning.  Pt has been able to propel w/c short distances with Karey LEs and RUE.  Pt has bene participating with FES biking for LLE for coordinaiton and stimulation to LLE.  Pt will benefit from continued rehab in ARU, although rehab journey will likely be long, may need TCU stay prior to dc to  longterm.Pt's cognitive impairments and L neglect impair his safety.  Pt may need wc, cane at d/c, home d/c if safe to return to [facility].     Cognition/Language: Overall very lethargic during sessions limiting success during structured tasks. Very limited insight into attending to his left side. Patient having significant deficits with reading/writing, problem solving, memory and  attention. Currently would require 24-7 support for safety and anticipation of needs. Ongoing therapy with potential discharge to TCU setting.     Community Re-Entry:Pt     Transportation: Car tx NT yet due to hemiparesis, difficulty with transfers.     Decision maker: self and child    Plan of Care and goals reviewed and updated.    Discharge Plan/Recommendations    Fall Precautions: continue    Overall plan for the patient: Elias has been participating in therapies but has not had any significant and measurable functional gains since admission. Anticipate that he will most likely require A for mobility and most of his ADLs at discharge. Will call her daughter and clarify any available services at his [facility]; if not might need TCU placement. Medically remains stable; working on neck pain, nutrition (now off TF and NJ was removed), and neurogenic bowel/bladder. Will schedule a CC next week to discuss discharge planning.         Utilization Review and Continued Stay Justification    Medical Necessity Criteria:    For any criteria that is not met, please document reason and plan for discharge, transfer, or modification of plan of care to address.    Requires intensive rehabilitation program to treat functional deficits?: Yes    Requires 3x per week or greater involvement of rehabilitation physician to oversee rehabilitation program?: Yes    Requires rehabilitation nursing interventions?: Yes    Patient is making functional progress?: Yes    There is a potential for additional functional progress? Yes    Patient is participating in therapy 3 hours per day a minimum of 5 days per week or 15 hours per week in 7 day period?:Yes    Has discharge needs that require coordinated discharge planning approach?:Yes      Final Physician Sign off    Statement of Approval: I agree with all the recommendations detailed in this document.      Patient Goals     1. Pt will d/c home/community setting upon completion of therapy/RN goals.  2.  Pt and family will be involved in d/c planning and will be made aware of services available to meet pts needs.         OT target date for goal attainment: 03/28/17  OT Frequency: daily 4 weeks  OT goal: hygiene/grooming: modified independent  OT goal: upper body dressing: Modified independent  OT goal: lower body dressing: Minimal assist  OT goal: upper body bathing: Modified independent  OT goal: lower body bathing: Minimal assist, Supervision/stand-by assist, Modified independent  OT goal: toilet transfer/toileting: Supervision/stand-by assist  OT goal: home management: Minimal assist  OT goal: perform aerobic activity with stable cardiovascular response: intermittent activity  Edema Management (OT): With caregiver assist  OT goal 1: OT: pt will complete wet walk in shower transfer supervision for safety.      PT target date for goal attainment: 03/28/17  PT Frequency: daily , 60-75 min per day.  PT goal: bed mobility: Modified independent, Supine to/from sit, Rolling, Bridging (with rail, if needed. )  PT goal: transfers: Modified independent, Sit to/from stand, Bed to/from chair, Assistive device (with cane or walker. )  PT goal: gait: Modified independent, Quad cane, 50 feet (may need L afo. )  PT goal: perform aerobic activity with stable cardiovascular response: continuous activity, NuStep, 10 minutes  PT goal 1: Pt able to perform car tx with AD and sba.   PT Goal 2: Pt able to score > 24/36 on the PASS for improved functional mobility, safety at home.  PT Goal 3: Pt able to state 3 fall prevention techniques for increased safety at home.   PT Goal 4: Floor recovery tx with furniture and sba.   PT Goal 5: pT and caregiver I with HEP for balance, endurance, and strength for increased function at home.     SLP target date for goal attainment: 03/21/17  SLP Frequency: daily  SLP Swallow Goal:  Safely tolerate diet without signs/symptoms of aspiration: regular diet, thin liquids, with use of swallow precautions,  independently  SLP goal 1: SLP: pt will complete multi sentence reading, min assist for visual perceptual compensations 90% accuracy  SLP goal 2: SLP: pt will complete written tasks - short paragraphs 90% accuracy (min cues for visual perceptual deficits/compensations.  SLP goal 3: SLP: pt will complete verbal/written reasoning tasks moderate complex level 90% accuracy  SLP goal 4: SLP: pt will complete moderate complex attention, recent/working memory tasks 90% accuracy, min assist.    Nursing Goal: Patient will advocate for pain and demonstrate understanding on nonpharmacological pain interventions before discharge.  Nursing Goal: Patient will maintain adequate nutrition with scheduled tube feedings and  progress with diet as indicated before discharge.  Nursing Goal: Patient will demonstrate understanding on safe transfers before discharge.  Nursing Goal: Patient's skin will remain intact with adequate incontinence management and will demonstrate understanding on prevention of skin breakdown before discharge.  Nursing Goal: Pt will be able to demonstrate ability to manage BG checks using own glucometer and insulin management before discharge.  Nursing Goal: Pt will be able to demonstrate ability to manage TF safely if discharging home with this.   Nursing Goal: Pt and family member will be able to enumerate s/sx of stroke, risk factors for stroke and healthy lifestyle patterns to maintain to prevent another stroke.

## 2017-03-09 NOTE — PROGRESS NOTES
"                     Diabetes Consult Daily  Progress Note          Assessment/Plan:   Elias Mckee is a 85 year old male with history of CAD ( stents and cardiac arrest), stroke (9 years ago), hypertension, hyperlipidemia, type 2 diabetes , CKD stage 3 ( baseline Cr 1.3-1.4), subclinical hypothyroidism, and atrial fibrillation was admitted to [facility] from (2/20-2/27/2017) with acute right middle cerebral artery territory stroke. Admitted to ARU for therapies       Type 2 diabetic: PTA on glimepiride 3 mg daily,      Plan  -lantus 10 units every 24 hours ( adjusting time to be given in am, 1200 today)  - d/c meal aspart from  1unit/7g CHO   -Glipizide 5 mg ( 3/9 ).  -Aspart medium correction before meals and HS  - monitor glucose before meals, HS and 0200      Will continue to follow               Interval History:   Glucose at ~ 0200, 117  Glucose at 0814, 161  Blood sugars have improved with the addition of basal insulin  Will adjust dose to be given in am  Unclear what/when discharge will take place, but will start moving in that direction with diabetes regime.  Previously was on Amaryl, would prefer not to resume 2/2 long half life.  Appetite is good        Recent Labs  Lab 03/09/17  0158 03/08/17  2208 03/08/17  1714 03/08/17  1243 03/08/17  0753 03/08/17  0202   * 117* 111* 212* 170* 162*               Review of Systems:      please see interval history       Medications:       Active Diet Order      Dysphagia Diet Level 1 Pureed Nectar Thickened Liquids (pre-thickened or use instant food thickener)     Physical Exam:  Gen: Alert, resting in bed, in NAD   HEENT: NC/AT, mucous membranes are moist  Resp: Unlabored  Ext: moving right arm and right leg   Neuro:oriented x3, communicating clearly. Left neglect  /53 (BP Location: Right arm)  Pulse 68  Temp 97.1  F (36.2  C) (Oral)  Resp 18  Ht 1.778 m (5' 10\")  Wt 83.6 kg (184 lb 4.8 oz)  SpO2 95%  BMI 26.44 kg/m2           Data: "     Lab Results   Component Value Date    A1C 6.8 02/21/2017    A1C 6.7 02/17/2017    A1C 6.0 08/11/2016    A1C 6.4 01/12/2016    A1C 6.6 07/13/2015              CBC RESULTS:   Recent Labs   Lab Test  02/27/17   0746   WBC  8.4   RBC  3.11*   HGB  9.6*   HCT  28.1*   MCV  90   MCH  30.9   MCHC  34.2   RDW  14.9   PLT  148*     Recent Labs   Lab Test  02/27/17   0746  02/26/17   0757   NA  138  137   POTASSIUM  4.1  4.1   CHLORIDE  108  108   CO2  22  20   ANIONGAP  8  9   GLC  301*  264*   BUN  22  22   CR  0.96  0.92   TRENT  8.3*  8.0*     Liver Function Studies -   Recent Labs   Lab Test  02/17/17   0940  08/11/16   0930   PROTTOTAL   --   7.1   ALBUMIN   --   3.7   BILITOTAL   --   0.3   ALKPHOS   --   80   AST   --   13   ALT  29  24     Lab Results   Component Value Date    INR 0.99 02/20/2017    INR 0.97 06/13/2008           Emily Mcclure, CNP pager [phone]  Diabetes Management Job Code [id number]

## 2017-03-09 NOTE — PLAN OF CARE
Problem: Goal/Outcome  Goal: Goal Outcome Summary  Outcome: No Change  FOCUS/GOAL  Nutrition/Feeding/Swallowing precautions and Medication management     ASSESSMENT, INTERVENTIONS AND CONTINUING PLAN FOR GOAL:  Pt continent and incontinent of bladder. No BM this shift. LBM 3/9. Pt's carb coverage insulin was discontinued this shift, and an order was placed to continue to document pt's carbohydrate intake. Pt was also started on glipizide. Educated pt on new medication. No PRN hydralazine was needed this shift. Per Rounds, care conference will be scheduled for Tuesday 3/14. See Rounds note for more information. Denies pain at this time. Continue with POC.

## 2017-03-10 LAB
GLUCOSE BLDC GLUCOMTR-MCNC: 137 MG/DL (ref 70–99)
GLUCOSE BLDC GLUCOMTR-MCNC: 163 MG/DL (ref 70–99)
GLUCOSE BLDC GLUCOMTR-MCNC: 171 MG/DL (ref 70–99)
GLUCOSE BLDC GLUCOMTR-MCNC: 211 MG/DL (ref 70–99)
GLUCOSE BLDC GLUCOMTR-MCNC: 231 MG/DL (ref 70–99)

## 2017-03-10 PROCEDURE — 97530 THERAPEUTIC ACTIVITIES: CPT | Mod: GP

## 2017-03-10 PROCEDURE — 40000225 ZZH STATISTIC SLP WARD VISIT: Performed by: SPEECH-LANGUAGE PATHOLOGIST

## 2017-03-10 PROCEDURE — 40000225 ZZH STATISTIC SLP WARD VISIT

## 2017-03-10 PROCEDURE — A9270 NON-COVERED ITEM OR SERVICE: HCPCS | Mod: GY | Performed by: NURSE PRACTITIONER

## 2017-03-10 PROCEDURE — 97532 ZZHC SP COGNITIVE SKILLS EA 15 MIN: CPT | Mod: GN

## 2017-03-10 PROCEDURE — 97112 NEUROMUSCULAR REEDUCATION: CPT | Mod: GO

## 2017-03-10 PROCEDURE — 92526 ORAL FUNCTION THERAPY: CPT | Mod: GN | Performed by: SPEECH-LANGUAGE PATHOLOGIST

## 2017-03-10 PROCEDURE — 25000132 ZZH RX MED GY IP 250 OP 250 PS 637: Mod: GY | Performed by: NURSE PRACTITIONER

## 2017-03-10 PROCEDURE — 40000133 ZZH STATISTIC OT WARD VISIT

## 2017-03-10 PROCEDURE — 97530 THERAPEUTIC ACTIVITIES: CPT | Mod: GO

## 2017-03-10 PROCEDURE — 40000193 ZZH STATISTIC PT WARD VISIT

## 2017-03-10 PROCEDURE — 12800006 ZZH R&B REHAB

## 2017-03-10 PROCEDURE — 97535 SELF CARE MNGMENT TRAINING: CPT | Mod: GO

## 2017-03-10 PROCEDURE — A9270 NON-COVERED ITEM OR SERVICE: HCPCS | Mod: GY | Performed by: PHYSICAL MEDICINE & REHABILITATION

## 2017-03-10 PROCEDURE — 25000132 ZZH RX MED GY IP 250 OP 250 PS 637: Mod: GY | Performed by: PHYSICAL MEDICINE & REHABILITATION

## 2017-03-10 PROCEDURE — 00000146 ZZHCL STATISTIC GLUCOSE BY METER IP

## 2017-03-10 RX ADMIN — DICLOFENAC SODIUM: 10 GEL TOPICAL at 09:07

## 2017-03-10 RX ADMIN — VITAMIN D, TAB 1000IU (100/BT) 2000 UNITS: 25 TAB at 09:08

## 2017-03-10 RX ADMIN — FLUTICASONE PROPIONATE 2 SPRAY: 50 SPRAY, METERED NASAL at 09:06

## 2017-03-10 RX ADMIN — LISINOPRIL 20 MG: 20 TABLET ORAL at 20:49

## 2017-03-10 RX ADMIN — MICONAZOLE NITRATE: 2 POWDER TOPICAL at 09:06

## 2017-03-10 RX ADMIN — DICLOFENAC SODIUM: 10 GEL TOPICAL at 16:29

## 2017-03-10 RX ADMIN — DICLOFENAC SODIUM: 10 GEL TOPICAL at 20:48

## 2017-03-10 RX ADMIN — RANITIDINE HYDROCHLORIDE 150 MG: 150 TABLET, FILM COATED ORAL at 20:48

## 2017-03-10 RX ADMIN — METOPROLOL TARTRATE 50 MG: 50 TABLET, FILM COATED ORAL at 20:49

## 2017-03-10 RX ADMIN — ATORVASTATIN CALCIUM 40 MG: 40 TABLET, FILM COATED ORAL at 09:07

## 2017-03-10 RX ADMIN — RANITIDINE HYDROCHLORIDE 150 MG: 150 TABLET, FILM COATED ORAL at 09:07

## 2017-03-10 RX ADMIN — RIVAROXABAN 20 MG: 10 TABLET, FILM COATED ORAL at 16:29

## 2017-03-10 RX ADMIN — DICLOFENAC SODIUM: 10 GEL TOPICAL at 11:50

## 2017-03-10 RX ADMIN — FERROUS GLUCONATE 324 MG: 324 TABLET ORAL at 09:07

## 2017-03-10 RX ADMIN — MICONAZOLE NITRATE: 2 POWDER TOPICAL at 20:48

## 2017-03-10 RX ADMIN — ACETAMINOPHEN 650 MG: 325 TABLET, FILM COATED ORAL at 22:09

## 2017-03-10 RX ADMIN — TAMSULOSIN HYDROCHLORIDE 800 MCG: 0.4 CAPSULE ORAL at 09:07

## 2017-03-10 RX ADMIN — Medication 1 MG: at 20:48

## 2017-03-10 RX ADMIN — Medication 5 MG: at 09:07

## 2017-03-10 NOTE — PLAN OF CARE
Problem: Goal/Outcome  Goal: Goal Outcome Summary  -40 mins PT this afternoon d/t somnolence. Pt refused to participate further after initial transfer training.

## 2017-03-10 NOTE — PLAN OF CARE
Problem: Goal/Outcome  Goal: Goal Outcome Summary  OT: pt engaged in toilet transfer using weighted jaclyn cane multiple trials with min A to mod A. Stand pivot from w.c to edge of mat CGA/min A.

## 2017-03-10 NOTE — PLAN OF CARE
Problem: Goal/Outcome  Goal: Goal Outcome Summary  FOCUS/GOAL  Nutrition/Feeding/Swallowing precautions, Mobility and Skin integrity     ASSESSMENT, INTERVENTIONS AND CONTINUING PLAN FOR GOAL:  Pt is alert and cooperative. Denies c/o pain. Pt transfers from bed to w/c with gait belt and x2 assist, tolerates well. VS WNL, no PRN hydralazine administered on this shift. BG 95 and 200. Tray was removed from room before carbs and calories could be documented, family reports that patient ate most of his meal. Sween cream applied to bilateral lower extremities, skin on bottom of feet is dry and cracked. Will continue POC.

## 2017-03-10 NOTE — PLAN OF CARE
Problem: Goal/Outcome  Goal: Goal Outcome Summary  Outcome: Improving  SLP: pt seen for meal, did well with NDD2 diet, no episodes of coughing on semi solids or nectar fluids.  Will plan one more meal assessment, and anticipate if doing well, to advance again to NDD2.  Also will discuss with nursing regarding trying medications whole, if diet advanced.

## 2017-03-10 NOTE — PROGRESS NOTES
Nutrition Services    Calorie Counts  3/9: 1334 kcal, 46 g protein (3 meals)      Jyotsna Rocha, RD

## 2017-03-10 NOTE — PROGRESS NOTES
"                     Diabetes Consult Daily  Progress Note          Assessment/Plan:   Elias Mckee is a 85 year old male with history of CAD ( stents and cardiac arrest), stroke (9 years ago), hypertension, hyperlipidemia, type 2 diabetes , CKD stage 3 ( baseline Cr 1.3-1.4), subclinical hypothyroidism, and atrial fibrillation was admitted to [facility] from (2/20-2/27/2017) with acute right middle cerebral artery territory stroke. Admitted to ARU for therapies       Type 2 diabetic: PTA on glimepiride 3 mg daily.    BG 90s-200s with glipizide restarted yesterday.  Anticipate more fluctuations in glucoses on glipizide given that pt's carb intake will vary between meals.     Plan- continue current plan.  -lantus 10 units every 24 hours - will move up to 1100 today.  -Glipizide 5 mg ( 3/9 ).  -Aspart medium correction before meals and HS  - monitor glucose before meals, HS and 0200      Will continue to follow               Interval History:   Elias has no complaints or concerns today.  Glipizide was restarted yesteday-- daytime BG fluctuating between 90s and low 200s on this.        Recent Labs  Lab 03/10/17  0813 03/10/17  0159 03/09/17  2059 03/09/17  1719 03/09/17  1115 03/09/17  0814   * 163* 200* 95 204* 161*               Review of Systems:      please see interval history       Medications:       Active Diet Order      Dysphagia Diet Level 1 Pureed Nectar Thickened Liquids (pre-thickened or use instant food thickener)     Physical Exam:  Gen: Alert, sitting up in chair playing cards with SLP, in NAD   HEENT: NC/AT, mucous membranes are moist  Resp: Unlabored  Neuro:oriented x3, communicating clearly. Left neglect  BP 90/57 (BP Location: Right arm)  Pulse 68  Temp 96.9  F (36.1  C) (Oral)  Resp 18  Ht 1.778 m (5' 10\")  Wt 78.7 kg (173 lb 6.4 oz)  SpO2 96%  BMI 24.88 kg/m2           Data:     Lab Results   Component Value Date    A1C 6.8 02/21/2017    A1C 6.7 02/17/2017    A1C 6.0 " 08/11/2016    A1C 6.4 01/12/2016    A1C 6.6 07/13/2015            Recent Labs   Lab Test  02/27/17   0746  02/26/17   0757   NA  138  137   POTASSIUM  4.1  4.1   CHLORIDE  108  108   CO2  22  20   ANIONGAP  8  9   GLC  301*  264*   BUN  22  22   CR  0.96  0.92   TRENT  8.3*  8.0*     CBC RESULTS:   Recent Labs   Lab Test  02/27/17   0746   WBC  8.4   RBC  3.11*   HGB  9.6*   HCT  28.1*   MCV  90   MCH  30.9   MCHC  34.2   RDW  14.9   PLT  148*     Kerri Moon PA-C [phone]  Diabetes Management Job Code [id number]

## 2017-03-10 NOTE — PROGRESS NOTES
[facility]   Acute Rehabilitation Unit  Daily progress note    interval history  Elias Mckee was seen and examined at bedside. No issues overnight; neck pain improved with aqua-k.     Continues to be incontinent with bowel and bladder; pt notes that he has no control on bladder function and usually does not feel any urge to void prior to incontinent episodes. Feels the urge with bowel movements but limited control. Discussed with nursing; will start time toileting and repeat PVRs to ensure emptying. Consider to check UA if symptomatic.     Team rounds held today; please see separate document in Plan of Care tab for full details. Briefly, Elias has been participating in therapies but has not had any significant and measurable functional gains since admission. Anticipate that he will most likely require A for mobility and most of his ADLs at discharge. Will call her daughter and clarify any available services at his detention; if not might need TCU placement. Medically remains stable; working on neck pain, nutrition (now off TF and NJ was removed), and neurogenic bowel/bladder. Will schedule a CC next week to discuss discharge planning.       medications  Scheduled meds    insulin glargine  10 Units Subcutaneous Q24H     glipiZIDE  5 mg Oral Daily with breakfast     insulin aspart  1-5 Units Subcutaneous At Bedtime     tamsulosin  800 mcg Oral Daily     hydrochlorothiazide  25 mg Oral Daily     insulin aspart  1-7 Units Subcutaneous TID AC     lisinopril  20 mg Oral BID     miconazole   Topical BID     diclofenac   Topical 4x Daily     atorvastatin  40 mg Oral or Feeding Tube Daily     cholecalciferol  2,000 Units Oral Daily     ferrous gluconate  324 mg Oral Daily with breakfast     fluticasone  1-2 spray Both Nostrils Daily     melatonin  1 mg Oral At Bedtime     metoprolol  50 mg Oral BID     rivaroxaban ANTICOAGULANT  20 mg Oral Daily with supper     ranitidine  150 mg Oral  "BID       PRN meds:  hydrALAZINE, acetaminophen, polyethylene glycol, glucose **OR** dextrose **OR** glucagon      physical exam  /63  Pulse 68  Temp 97.8  F (36.6  C) (Oral)  Resp 18  Ht 1.778 m (5' 10\")  Wt 83.6 kg (184 lb 4.8 oz)  SpO2 96%  BMI 26.44 kg/m2  Gen: NAD, resting in bed   HEENT: left facial drop   Cardio: RRR  Pulm: clear breath sounds anteriorly   Abd: soft and non-tender   Ext: wwp, no tenderness   Neuro/MSK: left facial droop, left neglect, left hemiparesis with no volitional movement - mild dysarthria     labs  BGs variable     assessment and plan  This is an 85-year-old male with a past medical history of stroke 9 years ago with some residual left leg weakness with history of chronic ischemic heart disease, prostate cancer, hypertension, hyperlipidemia, type 2 diabetes, chronic kidney disease, and atrial fibrillation, who was admitted on 2/20 with acute right MCA stroke. Functional deficits include dysarthria, dysphagia, aphasia, left neglect, left hemiparesis, and cognitive deficits. Admitted to ARU on 2/27.     Rehabilitation - continue comprehensive acute inpatient rehabilitation stay from a multidisciplinary approach with all modalities involved including therapies, rehab nursing, and physiatry following. See interval history for updates.       Medical Conditions  - optimize secondary stroke management, on xarelto for anticoagulation, BP management, DM / BS control, lipid management  - HTN, on HCTZ, metoprolol and lisinopril increase dose 3/1 and 3/3, has prn hydralazine, added HCTZ 3/6, adjust as needed.  - DM / BS, endocrinology managing BS control and adjusting management as needed   - HLD, on lipitor  - on vit D, ferrous gluconate, flonase  - melatonin for sleep  - L ankle swelling, some pain, reports twisting ankle, able to move aROM on own w/o sig discomfort, fx suspicion low, will do ice pack and voltaren gel for now, monitor   - dietitian following with terrence counts now off TF " since 3/7 overnight, follow po intake, d/fortino nasal feeding tube 3/8  - neurogenic bowel/bladder: incontinent with both. Will start time toileting and check PVRs again.      GI ppx - on zantac  DVT ppx - optimize mech ppx with PCDs and antiembolism stockings, on xarelto, progressive mobilization        Bainca Ho MD  Physical Medicine & Rehabilitation    I spent a total of 30 minutes face-to-face or managing the care of Elias Mckee. Over 50% of my time on the unit was spent counseling the patient and coordinating care. See note for details.

## 2017-03-10 NOTE — PLAN OF CARE
Problem: Goal/Outcome  Goal: Goal Outcome Summary     Patient engaged in familiar card game task ([email or web address]). Patient was much more alert in the AM session than he has been in previous days and completed the task WFL. Difficulties with short-term memory with ability to keep track of points being assigned for the winner. Able to independently insert his hearing aids though reminded of need for it.

## 2017-03-10 NOTE — PLAN OF CARE
Problem: Goal/Outcome  Goal: Goal Outcome Summary  Outcome: No Change  Alert to self. Disoriented to place and time.  Does not use call light, calls out. [place] IRA. At beginning of shift was moaning and calling out. C/O neck pain. Repositioned for comfort and received Tylenol 650 mg PO with + relief. Able to swallow whole pills in applesauce. BG at 0200 was 163. Continent of bowel and bladder so far this shift.  Up to BSC with assist of 1 and gait belt. Had a medium soft formed  Using urinal with staff assist. Appears to be sleeping during rounds. Bed alarm on for safety. Continue with plan of care.

## 2017-03-10 NOTE — PLAN OF CARE
Problem: Goal/Outcome  Goal: Goal Outcome Summary  Outcome: Improving  Alert & oriented x4.  BP 90/57.  Fluids encouraged.  New orders to initiate bladder program: toilet every 3 hours.  Called for assistance to use bathroom. Large BM.  Using call light & makes needs known.

## 2017-03-10 NOTE — PLAN OF CARE
Problem: Goal/Outcome  Goal: Goal Outcome Summary  Outcome: No Change  Alert, oriented to self,  disoriented to time and place. [place]. Was moaning and calling out at beginning of shift. . C/O neck pain. Received Tylenol and repositioned for comfort. Has been continent of urine. Staff assist with urinal.. No BM this shift. BG at 0200 was 163.  Appears to be sleeping during rounds. Bed alarm on. Continue with plan of care.

## 2017-03-11 LAB
GLUCOSE BLDC GLUCOMTR-MCNC: 146 MG/DL (ref 70–99)
GLUCOSE BLDC GLUCOMTR-MCNC: 156 MG/DL (ref 70–99)
GLUCOSE BLDC GLUCOMTR-MCNC: 158 MG/DL (ref 70–99)
GLUCOSE BLDC GLUCOMTR-MCNC: 159 MG/DL (ref 70–99)
GLUCOSE BLDC GLUCOMTR-MCNC: 160 MG/DL (ref 70–99)
GLUCOSE BLDC GLUCOMTR-MCNC: 218 MG/DL (ref 70–99)

## 2017-03-11 PROCEDURE — A9270 NON-COVERED ITEM OR SERVICE: HCPCS | Mod: GY | Performed by: PHYSICIAN ASSISTANT

## 2017-03-11 PROCEDURE — 40000133 ZZH STATISTIC OT WARD VISIT: Performed by: OCCUPATIONAL THERAPIST

## 2017-03-11 PROCEDURE — A9270 NON-COVERED ITEM OR SERVICE: HCPCS | Mod: GY | Performed by: PHYSICAL MEDICINE & REHABILITATION

## 2017-03-11 PROCEDURE — 97530 THERAPEUTIC ACTIVITIES: CPT | Mod: GP | Performed by: PHYSICAL THERAPIST

## 2017-03-11 PROCEDURE — 97112 NEUROMUSCULAR REEDUCATION: CPT | Mod: GO | Performed by: OCCUPATIONAL THERAPIST

## 2017-03-11 PROCEDURE — 25000132 ZZH RX MED GY IP 250 OP 250 PS 637: Mod: GY | Performed by: PHYSICAL MEDICINE & REHABILITATION

## 2017-03-11 PROCEDURE — 00000146 ZZHCL STATISTIC GLUCOSE BY METER IP

## 2017-03-11 PROCEDURE — 40000225 ZZH STATISTIC SLP WARD VISIT

## 2017-03-11 PROCEDURE — 97532 ZZHC SP COGNITIVE SKILLS EA 15 MIN: CPT | Mod: GN

## 2017-03-11 PROCEDURE — 25000132 ZZH RX MED GY IP 250 OP 250 PS 637: Mod: GY | Performed by: PHYSICIAN ASSISTANT

## 2017-03-11 PROCEDURE — 97110 THERAPEUTIC EXERCISES: CPT | Mod: GP | Performed by: PHYSICAL THERAPIST

## 2017-03-11 PROCEDURE — 40000193 ZZH STATISTIC PT WARD VISIT: Performed by: PHYSICAL THERAPIST

## 2017-03-11 PROCEDURE — 12800006 ZZH R&B REHAB

## 2017-03-11 PROCEDURE — 92526 ORAL FUNCTION THERAPY: CPT | Mod: GN

## 2017-03-11 RX ORDER — TAMSULOSIN HYDROCHLORIDE 0.4 MG/1
0.8 CAPSULE ORAL DAILY
Status: DISCONTINUED | OUTPATIENT
Start: 2017-03-11 | End: 2017-03-19

## 2017-03-11 RX ADMIN — DICLOFENAC SODIUM: 10 GEL TOPICAL at 08:57

## 2017-03-11 RX ADMIN — LISINOPRIL 20 MG: 20 TABLET ORAL at 08:46

## 2017-03-11 RX ADMIN — Medication 7.5 MG: at 08:43

## 2017-03-11 RX ADMIN — METOPROLOL TARTRATE 50 MG: 50 TABLET, FILM COATED ORAL at 21:01

## 2017-03-11 RX ADMIN — RIVAROXABAN 20 MG: 10 TABLET, FILM COATED ORAL at 16:40

## 2017-03-11 RX ADMIN — Medication 1 MG: at 21:01

## 2017-03-11 RX ADMIN — MICONAZOLE NITRATE: 2 POWDER TOPICAL at 21:04

## 2017-03-11 RX ADMIN — HYDROCHLOROTHIAZIDE 25 MG: 12.5 CAPSULE ORAL at 08:43

## 2017-03-11 RX ADMIN — MICONAZOLE NITRATE: 2 POWDER TOPICAL at 12:21

## 2017-03-11 RX ADMIN — FLUTICASONE PROPIONATE 1 SPRAY: 50 SPRAY, METERED NASAL at 08:46

## 2017-03-11 RX ADMIN — DICLOFENAC SODIUM: 10 GEL TOPICAL at 16:41

## 2017-03-11 RX ADMIN — DICLOFENAC SODIUM: 10 GEL TOPICAL at 12:27

## 2017-03-11 RX ADMIN — LISINOPRIL 20 MG: 20 TABLET ORAL at 21:01

## 2017-03-11 RX ADMIN — HYDRALAZINE HYDROCHLORIDE 10 MG: 10 TABLET, FILM COATED ORAL at 21:47

## 2017-03-11 RX ADMIN — VITAMIN D, TAB 1000IU (100/BT) 2000 UNITS: 25 TAB at 08:46

## 2017-03-11 RX ADMIN — RANITIDINE HYDROCHLORIDE 150 MG: 150 TABLET, FILM COATED ORAL at 08:43

## 2017-03-11 RX ADMIN — FERROUS GLUCONATE 324 MG: 324 TABLET ORAL at 08:43

## 2017-03-11 RX ADMIN — RANITIDINE HYDROCHLORIDE 150 MG: 150 TABLET, FILM COATED ORAL at 21:01

## 2017-03-11 RX ADMIN — TAMSULOSIN HYDROCHLORIDE 800 MCG: 0.4 CAPSULE ORAL at 08:57

## 2017-03-11 RX ADMIN — DICLOFENAC SODIUM: 10 GEL TOPICAL at 21:04

## 2017-03-11 RX ADMIN — TAMSULOSIN HYDROCHLORIDE 0.8 MG: 0.4 CAPSULE ORAL at 10:49

## 2017-03-11 RX ADMIN — ATORVASTATIN CALCIUM 40 MG: 40 TABLET, FILM COATED ORAL at 08:43

## 2017-03-11 RX ADMIN — METOPROLOL TARTRATE 50 MG: 50 TABLET, FILM COATED ORAL at 08:43

## 2017-03-11 NOTE — PROGRESS NOTES
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 Admitted to ARU for therapies       Type 2 diabetic: PTA on glimepiride 3 mg daily.    Glucoses still up to the 200s yesterday midday and at HS.  Otherwise running in the mid 100s.     Plan- continue current plan.  -lantus 10 units every 24 hours - will move up to 0900 today.  -Glipizide increased from 5 mg to 7.5mg qAM starting this morning..  -Aspart medium correction before meals and HS  - monitor glucose before meals, HS and 0200      Will continue to follow               Interval History:   Elias reports feeling tired midday following therapy sessions this morning.  Somewhat forgetful- thought he had already eaten lunch but RN reminded him that he hadn't yet.  Care conference planned for Monday.        Recent Labs  Lab 03/11/17  1153 03/11/17  0750 03/11/17  0241 03/10/17  2154 03/10/17  1726 03/10/17  1146   * 160* 156* 231* 137* 211*               Review of Systems:      please see interval history       Medications:       Active Diet Order      Dysphagia Diet Level 1 Pureed Nectar Thickened Liquids (pre-thickened or use instant food thickener)     Physical Exam:  Gen: Alert, sitting up in bed, NAD.  HEENT: NC/AT, mucous membranes are moist  Resp: Unlabored  Neuro:oriented x3, communicating clearly. Left neglect  /68 (BP Location: Right arm)  Pulse 92  Temp 95.8  F (35.4  C) (Oral)  Resp 18  Ht 1.778 m (5' 10\")  Wt 78.7 kg (173 lb 6.4 oz)  SpO2 96%  BMI 24.88 kg/m2           Data:     Lab Results   Component Value Date    A1C 6.8 02/21/2017    A1C " 6.7 02/17/2017    A1C 6.0 08/11/2016    A1C 6.4 01/12/2016    A1C 6.6 07/13/2015            Recent Labs   Lab Test  02/27/17   0746  02/26/17   0757   NA  138  137   POTASSIUM  4.1  4.1   CHLORIDE  108  108   CO2  22  20   ANIONGAP  8  9   GLC  301*  264*   BUN  22  22   CR  0.96  0.92   TRENT  8.3*  8.0*     CBC RESULTS:   Recent Labs   Lab Test  02/27/17   0746   WBC  8.4   RBC  3.11*   HGB  9.6*   HCT  28.1*   MCV  90   MCH  30.9   MCHC  34.2   RDW  14.9   PLT  148*     Kerri Moon PA-C [phone]  Diabetes Management Job Code [id number]

## 2017-03-11 NOTE — PLAN OF CARE
Problem: Goal/Outcome  Goal: Goal Outcome Summary  FOCUS/GOAL  Bladder management, Nutrition/Feeding/Swallowing precautions, Pain management and Mobility     ASSESSMENT, INTERVENTIONS AND CONTINUING PLAN FOR GOAL:     Pt used the urinal and was also incontinent of urine contained on his pad. Took meds whole with applesauce and nectar thick liquid, bigger pills need to be cut into half. C/o back pain, gave PRN Tylenol and back rub was done per pt request. Using Aqua k pad also. Up with assist of 2 with transfers from bed <> w/c.

## 2017-03-11 NOTE — PLAN OF CARE
Problem: Goal/Outcome  Goal: Goal Outcome Summary     As compared to yesterday's session, patient having more fatigue very evident. More errors in judgment this session as compared to yesterday during identical task. Better performance this date in ability to attend to his left side though does continue to benefit from occasional verbal cues. Daughter present and able to see the increased difficulties he was having.

## 2017-03-11 NOTE — PLAN OF CARE
Problem: Patient Care: Nursing Focus  Goal: Pain Management  FOCUS/GOAL  Bladder management     ASSESSMENT, INTERVENTIONS AND CONTINUING PLAN FOR GOAL:  Does not use call light even when placed in hand.Needs anticipated et met. Requested urinal x 3 et voided in urinal. Up to commode for BM. Transfers with 2 staff with belt. Food et fluids encouraged et takes nectar thick liquid without difficulty.

## 2017-03-11 NOTE — PLAN OF CARE
Problem: Goal/Outcome  Goal: Goal Outcome Summary  Outcome: Therapy, progress toward functional goals as expected     Pt seen for dysphagia tx at meal. Pt with left neglect during meal, required 1x visual/verbal cue to locate items on left. Pt attempted to put vanilla Magic Cup on vegetables as butter, redirected easily with verbal cues. Pt independently alternated textures and solids and liquids, and took appropriate bite sizes consuming at an appropriate rate. Pt demonstrated 2x weak cough during 45 minute session. Pt with moderately wet vocal quality with consecutive bites of solids that cleared with liquid wash. Recommend upgrading diet to DD2/nectar-thick liquids. Patient to be up in chair for all meals.

## 2017-03-11 NOTE — PLAN OF CARE
Problem: Goal/Outcome  Goal: Goal Outcome Summary  Outcome: No Change  FOCUS/GOAL  Medical management     ASSESSMENT, INTERVENTIONS AND CONTINUING PLAN FOR GOAL:  Patient was tasteless at times. He did not uses call light, he was calling out. No c/o pain. Incontinent of urine once and used urinal once. He needs bed and chair alarm at all times.

## 2017-03-11 NOTE — PLAN OF CARE
Problem: Goal/Outcome  Goal: Goal Outcome Summary  Occupational Therapy treatment session with use of the FES UE Ergometer :   Session # 2  Skilled set-up on the : patient dependent for proper placement of electrodes on left wrist flexors/extensors, biceps, triceps, deltoids, and scapular musculature for optimum muscle contraction, safe positioning in WC and alignment of patient to  ergometer. Passive motion was assessed to ensure proper positioning and joint integrity during cycling. Determined appropriate FES parameters for each muscle group based off of strong tetanic response of muscle test.  Pt tolerating 30 min of active FES ergometry with 0-100% stimulation applied to above muscles at 35 rpm with .5-.91 nm resistance. Improving with tolerance, resistance, and rate. Good muscle contraction was observed in all muscle groups (please refer to [email or web address] for stimulation parameters). OT adjusted e-stim and cycling parameters in real-time to ensure appropriate targeted muscle contraction/response throughout session. Patient tolerated cycling well with no adverse response noted. Utilizing  aid and arm trough to support LUE during cyling. Functionally, pt demonstrating increased sh elevation, internal rotation, and scapular retraction. Limited elbow and wrist movements at this time.  Plan for future use: 3/13/17-3/14/17  For full cycling report, please refer to [email or web address], patient ID [id number] and pin [phone].

## 2017-03-11 NOTE — PROGRESS NOTES
"  [facility]   Acute Rehabilitation Unit  Daily progress note    interval history  Elias Mckee was seen and examined at bedside. No issues overnight; ongoing bladder incontinence but improved with timed toileting. Denied any pain this morning; nursing reports intermittent low back and neck pain.     BP remains well controlled; BGs variable - appreciate endo team recs.     Called his daughter but no answer; CC is scheduled on Monday. Should clarify available services at his longterm; can potentially go home but will require A for some ADLs and medication management vs TCU to continue his rehab course.       medications  Scheduled meds    insulin glargine  10 Units Subcutaneous Q24H     glipiZIDE  5 mg Oral Daily with breakfast     insulin aspart  1-5 Units Subcutaneous At Bedtime     tamsulosin  800 mcg Oral Daily     hydrochlorothiazide  25 mg Oral Daily     insulin aspart  1-7 Units Subcutaneous TID AC     lisinopril  20 mg Oral BID     miconazole   Topical BID     diclofenac   Topical 4x Daily     atorvastatin  40 mg Oral or Feeding Tube Daily     cholecalciferol  2,000 Units Oral Daily     ferrous gluconate  324 mg Oral Daily with breakfast     fluticasone  1-2 spray Both Nostrils Daily     melatonin  1 mg Oral At Bedtime     metoprolol  50 mg Oral BID     rivaroxaban ANTICOAGULANT  20 mg Oral Daily with supper     ranitidine  150 mg Oral BID       PRN meds:  hydrALAZINE, acetaminophen, polyethylene glycol, glucose **OR** dextrose **OR** glucagon      physical exam  /43 (BP Location: Right arm)  Pulse 78  Temp 96.4  F (35.8  C) (Oral)  Resp 18  Ht 1.778 m (5' 10\")  Wt 78.7 kg (173 lb 6.4 oz)  SpO2 96%  BMI 24.88 kg/m2  Gen: NAD, resting in bed   HEENT: left facial drop   Cardio: RRR  Pulm: clear breath sounds anteriorly   Abd: soft and non-tender   Ext: wwp, no tenderness   Neuro/MSK: left facial droop, left neglect, left hemiparesis with no volitional movement in LUE " but 3-4/5 in LLE - mild dysarthria     labs  BGs variable     assessment and plan  This is an 85-year-old male with a past medical history of stroke 9 years ago with some residual left leg weakness with history of chronic ischemic heart disease, prostate cancer, hypertension, hyperlipidemia, type 2 diabetes, chronic kidney disease, and atrial fibrillation, who was admitted on 2/20 with acute right MCA stroke. Functional deficits include dysarthria, dysphagia, aphasia, left neglect, left hemiparesis, and cognitive deficits. Admitted to ARU on 2/27.     Rehabilitation - continue comprehensive acute inpatient rehabilitation stay from a multidisciplinary approach with all modalities involved including therapies, rehab nursing, and physiatry following. See interval history for updates.       Medical Conditions  - optimize secondary stroke management, on xarelto for anticoagulation, BP management, DM / BS control, lipid management  - HTN, on HCTZ, metoprolol and lisinopril increase dose 3/1 and 3/3, has prn hydralazine, added HCTZ 3/6, adjust as needed. 3/10 NP well controlled.   - DM / BS, endocrinology managing BS control and adjusting management as needed   - HLD, on lipitor  - on vit D, ferrous gluconate, flonase  - melatonin for sleep  - L ankle swelling, some pain, reports twisting ankle, able to move aROM on own w/o sig discomfort, fx suspicion low, will do ice pack and voltaren gel for now, monitor. 3/10 no pain or swelling reported.   - dietitian following with terrence counts now off TF since 3/7 overnight, follow po intake, d/fortino nasal feeding tube 3/8. 3/10 on calorie counts and doing well.   - neurogenic bowel/bladder: incontinent with both. 3/9 started timed toileting and will check PVRs again.    Diet: DD1 with nectar thick liquids   GI ppx - on zantac  DVT ppx - [facility] p[facility] with PCDs and antiembolism stockings, on xarelto, progressive mobilization        Bianca Ho MD  Physical Medicine &  Rehabilitation    I spent a total of 15 minutes face-to-face or managing the care of Elias Mckee. Over 50% of my time on the unit was spent counseling the patient and coordinating care. See note for details.

## 2017-03-12 LAB
ANION GAP SERPL CALCULATED.3IONS-SCNC: 8 MMOL/L (ref 3–14)
BUN SERPL-MCNC: 28 MG/DL (ref 7–30)
CALCIUM SERPL-MCNC: 9.2 MG/DL (ref 8.5–10.1)
CHLORIDE SERPL-SCNC: 102 MMOL/L (ref 94–109)
CO2 SERPL-SCNC: 29 MMOL/L (ref 20–32)
CREAT SERPL-MCNC: 1.18 MG/DL (ref 0.66–1.25)
GFR SERPL CREATININE-BSD FRML MDRD: 59 ML/MIN/1.7M2
GLUCOSE BLDC GLUCOMTR-MCNC: 117 MG/DL (ref 70–99)
GLUCOSE BLDC GLUCOMTR-MCNC: 154 MG/DL (ref 70–99)
GLUCOSE BLDC GLUCOMTR-MCNC: 165 MG/DL (ref 70–99)
GLUCOSE BLDC GLUCOMTR-MCNC: 179 MG/DL (ref 70–99)
GLUCOSE BLDC GLUCOMTR-MCNC: 190 MG/DL (ref 70–99)
GLUCOSE SERPL-MCNC: 196 MG/DL (ref 70–99)
POTASSIUM SERPL-SCNC: 4.7 MMOL/L (ref 3.4–5.3)
SODIUM SERPL-SCNC: 139 MMOL/L (ref 133–144)

## 2017-03-12 PROCEDURE — A9270 NON-COVERED ITEM OR SERVICE: HCPCS | Mod: GY | Performed by: PHYSICIAN ASSISTANT

## 2017-03-12 PROCEDURE — 12800006 ZZH R&B REHAB

## 2017-03-12 PROCEDURE — 25000132 ZZH RX MED GY IP 250 OP 250 PS 637: Mod: GY | Performed by: PHYSICAL MEDICINE & REHABILITATION

## 2017-03-12 PROCEDURE — 97532 ZZHC SP COGNITIVE SKILLS EA 15 MIN: CPT | Mod: GN

## 2017-03-12 PROCEDURE — 97530 THERAPEUTIC ACTIVITIES: CPT | Mod: GP | Performed by: PHYSICAL THERAPIST

## 2017-03-12 PROCEDURE — 40000193 ZZH STATISTIC PT WARD VISIT

## 2017-03-12 PROCEDURE — 36415 COLL VENOUS BLD VENIPUNCTURE: CPT | Performed by: PHYSICAL MEDICINE & REHABILITATION

## 2017-03-12 PROCEDURE — 40000225 ZZH STATISTIC SLP WARD VISIT

## 2017-03-12 PROCEDURE — 97110 THERAPEUTIC EXERCISES: CPT | Mod: GP

## 2017-03-12 PROCEDURE — 97112 NEUROMUSCULAR REEDUCATION: CPT | Mod: GO | Performed by: OCCUPATIONAL THERAPIST

## 2017-03-12 PROCEDURE — 25000132 ZZH RX MED GY IP 250 OP 250 PS 637: Mod: GY | Performed by: PHYSICIAN ASSISTANT

## 2017-03-12 PROCEDURE — 40000133 ZZH STATISTIC OT WARD VISIT: Performed by: OCCUPATIONAL THERAPIST

## 2017-03-12 PROCEDURE — 97535 SELF CARE MNGMENT TRAINING: CPT | Mod: GO | Performed by: OCCUPATIONAL THERAPIST

## 2017-03-12 PROCEDURE — A9270 NON-COVERED ITEM OR SERVICE: HCPCS | Mod: GY | Performed by: PHYSICAL MEDICINE & REHABILITATION

## 2017-03-12 PROCEDURE — 97530 THERAPEUTIC ACTIVITIES: CPT | Mod: GP

## 2017-03-12 PROCEDURE — 80048 BASIC METABOLIC PNL TOTAL CA: CPT | Performed by: PHYSICAL MEDICINE & REHABILITATION

## 2017-03-12 PROCEDURE — 00000146 ZZHCL STATISTIC GLUCOSE BY METER IP

## 2017-03-12 PROCEDURE — 40000193 ZZH STATISTIC PT WARD VISIT: Performed by: PHYSICAL THERAPIST

## 2017-03-12 PROCEDURE — 92507 TX SP LANG VOICE COMM INDIV: CPT | Mod: GN

## 2017-03-12 RX ADMIN — RIVAROXABAN 20 MG: 10 TABLET, FILM COATED ORAL at 18:53

## 2017-03-12 RX ADMIN — LISINOPRIL 20 MG: 20 TABLET ORAL at 20:26

## 2017-03-12 RX ADMIN — DICLOFENAC SODIUM: 10 GEL TOPICAL at 20:25

## 2017-03-12 RX ADMIN — VITAMIN D, TAB 1000IU (100/BT) 2000 UNITS: 25 TAB at 08:47

## 2017-03-12 RX ADMIN — METOPROLOL TARTRATE 50 MG: 50 TABLET, FILM COATED ORAL at 20:26

## 2017-03-12 RX ADMIN — DICLOFENAC SODIUM: 10 GEL TOPICAL at 12:23

## 2017-03-12 RX ADMIN — RANITIDINE HYDROCHLORIDE 150 MG: 150 TABLET, FILM COATED ORAL at 08:48

## 2017-03-12 RX ADMIN — Medication 7.5 MG: at 08:47

## 2017-03-12 RX ADMIN — RANITIDINE HYDROCHLORIDE 150 MG: 150 TABLET, FILM COATED ORAL at 20:26

## 2017-03-12 RX ADMIN — MICONAZOLE NITRATE: 2 POWDER TOPICAL at 08:47

## 2017-03-12 RX ADMIN — ACETAMINOPHEN 650 MG: 325 TABLET, FILM COATED ORAL at 12:20

## 2017-03-12 RX ADMIN — TAMSULOSIN HYDROCHLORIDE 0.8 MG: 0.4 CAPSULE ORAL at 08:47

## 2017-03-12 RX ADMIN — DICLOFENAC SODIUM 4 G: 10 GEL TOPICAL at 16:24

## 2017-03-12 RX ADMIN — Medication 1 MG: at 20:26

## 2017-03-12 RX ADMIN — METOPROLOL TARTRATE 50 MG: 50 TABLET, FILM COATED ORAL at 08:47

## 2017-03-12 RX ADMIN — DICLOFENAC SODIUM: 10 GEL TOPICAL at 08:47

## 2017-03-12 RX ADMIN — MICONAZOLE NITRATE: 2 POWDER TOPICAL at 20:27

## 2017-03-12 RX ADMIN — LISINOPRIL 20 MG: 20 TABLET ORAL at 08:48

## 2017-03-12 RX ADMIN — HYDROCHLOROTHIAZIDE 25 MG: 12.5 CAPSULE ORAL at 08:48

## 2017-03-12 RX ADMIN — ATORVASTATIN CALCIUM 40 MG: 40 TABLET, FILM COATED ORAL at 08:47

## 2017-03-12 RX ADMIN — FLUTICASONE PROPIONATE 2 SPRAY: 50 SPRAY, METERED NASAL at 08:47

## 2017-03-12 RX ADMIN — FERROUS GLUCONATE 324 MG: 324 TABLET ORAL at 08:47

## 2017-03-12 NOTE — PLAN OF CARE
Problem: Goal/Outcome  Goal: Goal Outcome Summary  FOCUS/GOAL  Nutrition/Feeding/Swallowing precautions, Pain management and Mobility     ASSESSMENT, INTERVENTIONS AND CONTINUING PLAN FOR GOAL:  Pt Alert & oriented, Bed alarm is ON and active for safety. Pt takes medications with apple souse. /67 and hydralazine given then BP to 136/64. Lungs- Clear bilaterally with both anterior and posterior. Bowels- Hyperactive in all four quadrants. CMS and Neuro's are intact. Denies nausea, shortness of breath, and chest pain. No c/o of pain. Voids spontaneously without difficulty in urinal, had BM this shift. Pt up to the chair with Assist X 2 for dinner, appetite was fair. PCD on both the RLE ad LLE. Bilateral heels are elevated off the bed. Pt is able to make needs known and the call light is within the pt's reach. Continue to monitor.

## 2017-03-12 NOTE — PROGRESS NOTES
"                     Diabetes Consult Daily  Progress Note          Assessment/Plan:   Elias Mckee is a 85 year old male with history of CAD ( stents and cardiac arrest), stroke (9 years ago), hypertension, hyperlipidemia, type 2 diabetes , CKD stage 3 ( baseline Cr 1.3-1.4), subclinical hypothyroidism, and atrial fibrillation was admitted to [facility] from (2/20-2/27/2017) with acute right middle cerebral artery territory stroke. Admitted to ARU for therapies       Type 2 diabetic: PTA on glimepiride 3 mg daily.    Glucoses mostly in the mid 100s- did come up to low 200s at bedtime last night.  Occasional BG in 200s acceptable given pt's age.     Plan- continue current plan.  -lantus 10 units every 24 hours - will move up to 0900 today.  -Glipizide 7.5mg qAM  -Aspart medium correction before meals and HS  - monitor glucose before meals, HS and 0200      Will continue to follow     Notes from ARU team and RN staff from past 24 h reviewed.              Interval History:   Elias has no complaints today.  Appetite remains good.  CC planned for tomorrow.        Recent Labs  Lab 03/12/17  0744 03/12/17  0317 03/11/17  2103 03/11/17  1727 03/11/17  1640 03/11/17  1153   * 154* 218* 158* 146* 159*               Review of Systems:      please see interval history       Medications:       Active Diet Order      Dysphagia Diet Level 2 [facility] Altered Nectar Thickened Liquids (pre-thickened or use instant food thickener)     Physical Exam:  Gen: Alert, sitting up in bed, NAD.  HEENT: NC/AT, mucous membranes are moist  Resp: Unlabored  Neuro:oriented x3, communicating clearly. Left neglect  /74 (BP Location: Right arm)  Pulse 67  Temp 96.4  F (35.8  C) (Oral)  Resp 18  Ht 1.778 m (5' 10\")  Wt 83.4 kg (183 lb 14.4 oz)  SpO2 95%  BMI 26.39 kg/m2           Data:     Lab Results   Component Value Date    A1C 6.8 02/21/2017    A1C 6.7 02/17/2017    A1C 6.0 08/11/2016    A1C 6.4 01/12/2016    A1C 6.6 " 07/13/2015            Recent Labs   Lab Test  02/27/17   0746  02/26/17   0757   NA  138  137   POTASSIUM  4.1  4.1   CHLORIDE  108  108   CO2  22  20   ANIONGAP  8  9   GLC  301*  264*   BUN  22  22   CR  0.96  0.92   TRENT  8.3*  8.0*     CBC RESULTS:   Recent Labs   Lab Test  02/27/17   0746   WBC  8.4   RBC  3.11*   HGB  9.6*   HCT  28.1*   MCV  90   MCH  30.9   MCHC  34.2   RDW  14.9   PLT  148*     Kerri Moon PA-C [phone]  Diabetes Management Job Code [id number]

## 2017-03-12 NOTE — PLAN OF CARE
Problem: Goal/Outcome  Goal: Goal Outcome Summary  PT- pt very sleepy at start of session-pt's daughter present and able to rouse him.  Pt agreeable to nu-step activity and did well with SPT.

## 2017-03-12 NOTE — PLAN OF CARE
Problem: Goal/Outcome  Goal: Goal Outcome Summary  OT: Facilitated toilet transfer with use of jaclyn cane. Pt completing transfer with Mod A and step by step cues to sequence. Incorporating clothing management with RUE, needing assist to manage L side and back of clothing.   Discussion with patient s daughter regarding CC Tues and d/c planning. Pt is making slow gains in rehab and will benefit from an extended rehab course to reach goals in POC. Daughter strongly prefers discharge to TCU, pt has been to TCU in past and had a good experience. Daughter also planning to call [facility] to find out what increased services they can provide in future.

## 2017-03-12 NOTE — PLAN OF CARE
Problem: Goal/Outcome  Goal: Goal Outcome Summary  Outcome: Therapy, progress toward functional goals as expected  Patient fully participant in therapy today, able to perform stand pivot transfers with jaclyn walker and min-A. Will continue to work on stand balance activities in therapy tomorrow.

## 2017-03-12 NOTE — PLAN OF CARE
Problem: Goal/Outcome  Goal: Goal Outcome Summary  Outcome: Improving  FOCUS/GOAL  Medical management     ASSESSMENT, INTERVENTIONS AND CONTINUING PLAN FOR GOAL:  Patient slept well. No c/o pain. He was assisted to use a urinal but was incontinent of a small BM. He requires maximal assistance of 2 with bed mobility and transfers. He used call light and  called   for assistance, and some other times he yelled out, staff anticipated his needs. Bed and chair alarm on at all times. He has an indentation on his lower back and hips from using a bedpan. Next time, please use the large pink bedpan.

## 2017-03-12 NOTE — PLAN OF CARE
Problem: Patient Care: Nursing Focus  Goal: Mobility  FOCUS/GOAL  Bladder management and Nutrition/Feeding/Swallowing precautions     ASSESSMENT, INTERVENTIONS AND CONTINUING PLAN FOR GOAL:  Continent of bladder this shift, requesting urinal. C/o scrotum discomfort from padding, underwear applied et patient stating is better when sitting in chair. Taking food et fluids well. Calls nurse by name et has not set off alarms. Had medium BM in toilet.

## 2017-03-12 NOTE — PLAN OF CARE
Problem: Goal/Outcome  Goal: Goal Outcome Summary  Outcome: Therapy, progress toward functional goals as expected     Pt seen during DD2/NTL meal, pt's daughter present t/o. Pt required assistance to open Magic Cup, but independent with remainder of meal. Pt's daughter verbalized concern that he takes large bites and recalled event prior to stroke where large bites led to possible aspiration/choking event. During this meal, pt taking medium-large sized bites with no over s/sx of aspiration but large bite sizes should be monitored in considering diet upgrade. Pt appropriately alternating between bites of solids and bites of liquids with min verbal cues. Recommend continuing DD2 diet with trials of DD3 with SLP.

## 2017-03-12 NOTE — PROGRESS NOTES
Patient Active Problem List   Diagnosis     Chronic ischemic heart disease     Malignant neoplasm of prostate (H)     Essential hypertension     Personal history of other diseases of circulatory system     HYPERLIPIDEMIA LDL GOAL <100     Type 2 diabetes, HbA1C goal < 8% (H)     Advanced directives, counseling/discussion     CKD (chronic kidney disease) stage 3, GFR 30-59 ml/min     Leg weakness     Ataxia     BPH (benign prostatic hyperplasia)     Type 2 diabetes mellitus with diabetic nephropathy (H)     History of actinic keratoses     History of squamous cell carcinoma     S/P Mohs surgery for basal cell carcinoma     CVA (cerebral vascular accident) (H)     Stroke (H)       Patient seen and examined today. Accompanied by his daughter Laurie today, who is very involved in his care. Coordinated with team.      HPI/ACTIVE ISSUES   Elias Mckee is a 85 year old male, admitted to [facility] ARU on 2/27/2017    Main issues today are   Left hemiparesis from new stroke in the back ground of old stroke. He resides in a AL, where he was getting assist with medications/housekeepoing. Discussed at length with daughter Laurie, who states that she was given the impression before admit to the ARU that he would be discharged to another facility following ARU stay. Clarified services available at the AL with the daughter, who stated that she will look at it however seemed somewhat reluctant. She asked what kind of services would be expected, the daughter was counseled on these services likely services at the time of discharge.     Secondary to the new stroke, he has new onset Left UE weakness which on my exam is quitye flaccid, there is an element of neglect as well as sensory deficits. Discussed the approach f providing sensory and proprioception input with the daughter which is based jaqueline new research being done at [employer]. She is encouraged to participate init after 6 months following the stroke. I also encouraged her to  "have her father follow up with a PM&R doctor. He has had a previous stroke, but at that time, he had LLE weakness, but was able to walk with a cane, and could perform adl's independently.   Dysphagia on nectar thickened liquids: He has not been drinking fluids much. Will check a stat BMP to assess BUN/Cr ratio. Orders placed.   Tone: he does mild spasticity in the LLE hamstrings though I am able to bring the knee to full extension. Recommend closely monitor the patient mobility and transfers in the therapy sessions, and initiate baclofen if needed. He is fatigued, and thus caution should be exercised.     REVIEW OF THE SYSTEMS   Total of ten systems reviewed, pertinent positives and negatives as follows  Denies chest pain fever chills rigors  Denies any shortness of breath   Denies any nausea or vomiting   Appetite poor  Denies any lightheadedness or dizziness   Reports left sided weakness   On dysphagia diet with nectar thickened liquids   Denies any pain   Denies any sob or cough   Denies any new rashes   Fatigued   Incontinent with bowel and bladder  Slept well last night   Remainder of the review of the systems was negative      Physical exam    Vital signs:  Temp: 96.4  F (35.8  C) Temp src: Oral BP: 127/74 Pulse: 67   Resp: 18 SpO2: 95 % O2 Device: None (Room air)   Height: 177.8 cm (5' 10\") Weight: 83.4 kg (183 lb 14.4 oz) (bed weight)  Estimated body mass index is 26.39 kg/(m^2) as calculated from the following:    Height as of this encounter: 1.778 m (5' 10\").    Weight as of this encounter: 83.4 kg (183 lb 14.4 oz).  HEENT NC AT PRTL EOM good  Left facial weakness   Fatigued   Left neglect   Left flaccid UE   Left LE weakness antigravity, but there is tone at 1+/4 in the hamstrings    Decreased sensation to light touch on the left UE   Neck supple  Heart S1S2  Lungs CTA  Abdomen  benign BS positive NT NR   LE no edema            REVIEWED THE MEDICATIONS BELOW   Current Facility-Administered Medications "   Medication     glipiZIDE (GLUCOTROL) half-tab 7.5 mg     tamsulosin (FLOMAX) capsule 0.8 mg     insulin glargine (LANTUS) injection 10 Units     insulin aspart (NovoLOG) inj (RAPID ACTING)     hydrochlorothiazide (MICROZIDE) capsule 25 mg     hydrALAZINE (APRESOLINE) tablet 10 mg     insulin aspart (NovoLOG) inj (RAPID ACTING)     lisinopril (PRINIVIL/ZESTRIL) tablet 20 mg     miconazole (MICATIN; MICRO GUARD) 2 % powder     diclofenac (VOLTAREN) 1 % topical gel     acetaminophen (TYLENOL) tablet 650 mg     atorvastatin (LIPITOR) tablet 40 mg     cholecalciferol (vitamin D) tablet 2,000 Units     ferrous gluconate (FERGON) tablet 324 mg     fluticasone (FLONASE) 50 MCG/ACT spray 1-2 spray     melatonin tablet 1 mg     metoprolol (LOPRESSOR) tablet 50 mg     polyethylene glycol (MIRALAX/GLYCOLAX) powder 17 g     rivaroxaban ANTICOAGULANT (XARELTO) tablet 20 mg     glucose 40 % gel 15-30 g    Or     dextrose 50 % injection 25-50 mL    Or     glucagon injection 1 mg     ranitidine (ZANTAC) tablet 150 mg         Total of 35 min spent in the encounter, more than 50% in coordination and counseling on topics listed in the HPI

## 2017-03-13 LAB
ANION GAP SERPL CALCULATED.3IONS-SCNC: 10 MMOL/L (ref 3–14)
BUN SERPL-MCNC: 33 MG/DL (ref 7–30)
CALCIUM SERPL-MCNC: 8.7 MG/DL (ref 8.5–10.1)
CHLORIDE SERPL-SCNC: 101 MMOL/L (ref 94–109)
CO2 SERPL-SCNC: 26 MMOL/L (ref 20–32)
CREAT SERPL-MCNC: 1.46 MG/DL (ref 0.66–1.25)
GFR SERPL CREATININE-BSD FRML MDRD: 46 ML/MIN/1.7M2
GLUCOSE BLDC GLUCOMTR-MCNC: 135 MG/DL (ref 70–99)
GLUCOSE BLDC GLUCOMTR-MCNC: 155 MG/DL (ref 70–99)
GLUCOSE BLDC GLUCOMTR-MCNC: 181 MG/DL (ref 70–99)
GLUCOSE BLDC GLUCOMTR-MCNC: 189 MG/DL (ref 70–99)
GLUCOSE SERPL-MCNC: 228 MG/DL (ref 70–99)
POTASSIUM SERPL-SCNC: 4.8 MMOL/L (ref 3.4–5.3)
SODIUM SERPL-SCNC: 137 MMOL/L (ref 133–144)

## 2017-03-13 PROCEDURE — 25000132 ZZH RX MED GY IP 250 OP 250 PS 637: Mod: GY | Performed by: PHYSICIAN ASSISTANT

## 2017-03-13 PROCEDURE — 80048 BASIC METABOLIC PNL TOTAL CA: CPT | Performed by: PHYSICAL MEDICINE & REHABILITATION

## 2017-03-13 PROCEDURE — 25000132 ZZH RX MED GY IP 250 OP 250 PS 637: Mod: GY | Performed by: PHYSICAL MEDICINE & REHABILITATION

## 2017-03-13 PROCEDURE — 97112 NEUROMUSCULAR REEDUCATION: CPT | Mod: GP | Performed by: PHYSICAL THERAPIST

## 2017-03-13 PROCEDURE — 40000225 ZZH STATISTIC SLP WARD VISIT

## 2017-03-13 PROCEDURE — 97112 NEUROMUSCULAR REEDUCATION: CPT | Mod: GO

## 2017-03-13 PROCEDURE — 25000128 H RX IP 250 OP 636: Performed by: PHYSICAL MEDICINE & REHABILITATION

## 2017-03-13 PROCEDURE — 97535 SELF CARE MNGMENT TRAINING: CPT | Mod: GO

## 2017-03-13 PROCEDURE — 92526 ORAL FUNCTION THERAPY: CPT | Mod: GN

## 2017-03-13 PROCEDURE — A9270 NON-COVERED ITEM OR SERVICE: HCPCS | Mod: GY | Performed by: PHYSICAL MEDICINE & REHABILITATION

## 2017-03-13 PROCEDURE — 36415 COLL VENOUS BLD VENIPUNCTURE: CPT | Performed by: PHYSICAL MEDICINE & REHABILITATION

## 2017-03-13 PROCEDURE — 97530 THERAPEUTIC ACTIVITIES: CPT | Mod: GP | Performed by: PHYSICAL THERAPIST

## 2017-03-13 PROCEDURE — 97116 GAIT TRAINING THERAPY: CPT | Mod: GP | Performed by: PHYSICAL THERAPIST

## 2017-03-13 PROCEDURE — 40000193 ZZH STATISTIC PT WARD VISIT: Performed by: PHYSICAL THERAPIST

## 2017-03-13 PROCEDURE — 40000133 ZZH STATISTIC OT WARD VISIT

## 2017-03-13 PROCEDURE — 92507 TX SP LANG VOICE COMM INDIV: CPT | Mod: GN

## 2017-03-13 PROCEDURE — 97530 THERAPEUTIC ACTIVITIES: CPT | Mod: GO

## 2017-03-13 PROCEDURE — 97110 THERAPEUTIC EXERCISES: CPT | Mod: GP | Performed by: PHYSICAL THERAPIST

## 2017-03-13 PROCEDURE — A9270 NON-COVERED ITEM OR SERVICE: HCPCS | Mod: GY | Performed by: PHYSICIAN ASSISTANT

## 2017-03-13 PROCEDURE — 00000146 ZZHCL STATISTIC GLUCOSE BY METER IP

## 2017-03-13 PROCEDURE — 12800006 ZZH R&B REHAB

## 2017-03-13 RX ORDER — LIDOCAINE 50 MG/G
1-2 PATCH TOPICAL
Status: DISCONTINUED | OUTPATIENT
Start: 2017-03-13 | End: 2017-03-21 | Stop reason: HOSPADM

## 2017-03-13 RX ORDER — SODIUM CHLORIDE 9 MG/ML
INJECTION, SOLUTION INTRAVENOUS CONTINUOUS
Status: DISCONTINUED | OUTPATIENT
Start: 2017-03-13 | End: 2017-03-14

## 2017-03-13 RX ADMIN — MICONAZOLE NITRATE: 2 POWDER TOPICAL at 20:50

## 2017-03-13 RX ADMIN — LIDOCAINE 1 PATCH: 50 PATCH CUTANEOUS at 12:18

## 2017-03-13 RX ADMIN — TAMSULOSIN HYDROCHLORIDE 0.8 MG: 0.4 CAPSULE ORAL at 10:16

## 2017-03-13 RX ADMIN — Medication 1 MG: at 20:49

## 2017-03-13 RX ADMIN — RANITIDINE HYDROCHLORIDE 150 MG: 150 TABLET, FILM COATED ORAL at 20:50

## 2017-03-13 RX ADMIN — FLUTICASONE PROPIONATE 2 SPRAY: 50 SPRAY, METERED NASAL at 10:23

## 2017-03-13 RX ADMIN — METOPROLOL TARTRATE 50 MG: 50 TABLET, FILM COATED ORAL at 20:49

## 2017-03-13 RX ADMIN — FERROUS GLUCONATE 324 MG: 324 TABLET ORAL at 10:17

## 2017-03-13 RX ADMIN — ATORVASTATIN CALCIUM 40 MG: 40 TABLET, FILM COATED ORAL at 10:16

## 2017-03-13 RX ADMIN — RANITIDINE HYDROCHLORIDE 150 MG: 150 TABLET, FILM COATED ORAL at 10:16

## 2017-03-13 RX ADMIN — DICLOFENAC SODIUM: 10 GEL TOPICAL at 12:19

## 2017-03-13 RX ADMIN — DICLOFENAC SODIUM: 10 GEL TOPICAL at 10:23

## 2017-03-13 RX ADMIN — RIVAROXABAN 20 MG: 10 TABLET, FILM COATED ORAL at 17:31

## 2017-03-13 RX ADMIN — VITAMIN D, TAB 1000IU (100/BT) 2000 UNITS: 25 TAB at 10:17

## 2017-03-13 RX ADMIN — Medication 7.5 MG: at 10:16

## 2017-03-13 RX ADMIN — LISINOPRIL 20 MG: 20 TABLET ORAL at 20:50

## 2017-03-13 RX ADMIN — MICONAZOLE NITRATE: 2 POWDER TOPICAL at 10:28

## 2017-03-13 RX ADMIN — SODIUM CHLORIDE: 9 INJECTION, SOLUTION INTRAVENOUS at 17:30

## 2017-03-13 NOTE — PLAN OF CARE
Problem: Goal/Outcome  Goal: Goal Outcome Summary  Outcome: Therapy, progress toward functional goals as expected     Addressed reading during this session. Pt initially scanning from bottom to top, right to left likely secondary to left neglect and resulting in reading single words incorrectly x3. Provided salient visual cues (green corner, purple border) and verbal cues pt demonstrated top-bottom, left-right scanning. Pt with accurate performance that continued with successive fading of cues. Note that task also required pt to recall a word and match it to a category (i.e. higher-level scanning).

## 2017-03-13 NOTE — PLAN OF CARE
Problem: Goal/Outcome  Goal: Goal Outcome Summary  FOCUS/GOAL  Safety management     ASSESSMENT, INTERVENTIONS AND CONTINUING PLAN FOR GOAL:  Pt here w/ stroke and Lt sided body involvement especially in LLE.  He requires A2 for pivot transfers and set-up and min assist/supervision for meals.  He ate 100% of his dinner and consumed over 1500cc's during adelia shift.  He appeared very thirsty as he would consume 480cc's within 5min on several occasions.  He received a shower this evening.  I'm not sure if pt was just tired but he was not himself this evening.  He is usually energetic and joking with writer but almost lethargic and somnolent during evening shift.  The only abnormal thing noted was severe pain to scrotum (unusual pain as I've never got this response from a fungal rash).  He got a shower this adelia and after writer applied lotion to bilateral feet (peeling).

## 2017-03-13 NOTE — PLAN OF CARE
Problem: Goal/Outcome  Goal: Goal Outcome Summary  Pt experiencing hypotension in the AM.  BP meds held.  Fluids encouraged. After pt drank 300cc of water, BP improved. Provider notified.  IV fluids ordered, sodium chloride 0.9% at 100/mL hour.   Pt cued to drink fluids. Needs prompting to drink fluids.

## 2017-03-13 NOTE — PLAN OF CARE
Problem: Goal/Outcome  Goal: Goal Outcome Summary  PT: Pt is continuing to need verbal , manual cues to turn head, and gaze to L side to see people/signs/ his room number in schultz. Adjusted wc for better back support and lower cushion so pt can propel wc, RUE, RLE Karey , about 50 ft x 2, aguayo by fatigue, dec L side awareness.Pt amb about 7 ft x 2 with mod to max A, wc follow rail on R side, A to help unweight LUE and for advancing LLE/awareness L LE positioning.  Cont toward goals.

## 2017-03-13 NOTE — PROGRESS NOTES
"[facility] Acute Rehabilitation Unit  Progress Note    Interval Hx  Some low BP this am   Not quite drinking well   I/o monitoring, BMP today with elevated BUN / Crea  Will start IVF for today, recheck BMP tomorrow   Get lidoderm patch at neck as well         Assessment and Plan of Care: This is an 85-year-old male with a past medical history of stroke 9 years ago with some residual left leg weakness with history of chronic ischemic heart disease, prostate cancer, hypertension, hyperlipidemia, type 2 diabetes, chronic kidney disease, atrial fibrillation. Now presents with left hemiparesis, upper extremity, on top of residual left lower extremity weakness with dysarthria, dysphagia, concern for cognitive impairment on top of hard of hearing with impaired mobility, ADLs.    Functionally:    \"Pt seen for dysphagia tx during meal. Pt tolerated DD2 texture foods with nectar-thick liquids with 1x coughing episode, no increase in wet vocal quality noted after coughing spell. Pt independently using strategy of alternating sips of liquid with bites of food, with moderately large bite sizes noted. Trialed small bites of DD3 texture (sugar cookie), pt tolerated with adequate mastication, no excess residue, and no overt s/sx of aspiration. Considering pt's history of variable performance, recommend continuing with DD2/NTL diet, trialing DD3 with SLP only. \"  \"PT- pt very sleepy at start of session-pt's daughter present and able to rouse him.  Pt agreeable to nu-step activity and did well with SPT.\"  Continue therapies and plan of care.      Overall Management:   - optimize secondary stroke management, on xarelto for anticoagulation, BP management, DM / BS control, lipid management  - HTN, on HCTZ, metoprolol and lisinopril increase dose 3/1 and 3/3, has prn hydralazine, added HCTZ 3/6, adjust as needed  - DM / BS, endocrinology managing BS control and adjusting management as needed   - HLD, " on lipitor  - on vit D, ferrous gluconate, flonase  - melatonin for sleep  - L ankle swelling, some pain, reports twisting ankle, able to move aROM on own w/o sig discomfort, fx suspicion low, will do ice pack and voltaren gel for now, monitor   - dietitian following with terrence counts now off TF since 3/7 overnight, follow po intake, d/fortino nasal feeding tube 3/8  - at risk for dehydration d/t restricted po on thickened liquids, i/o monitoring, encourage fluids, IVF supplementation 3/13    Bladder and Bowel - monitor spont voids and BM, on flomax, miralax prn  GI ppx - on zantac  DVT ppx - optimize [facility] ppx with PCDs and antiembolism stockings, on xarelto, progressive mobilization         Active Diet Order      Dysphagia Diet Level 2 [facility] Altered Nectar Thickened Liquids (pre-thickened or use instant food thickener)    Labs    Recent Labs  Lab 03/13/17  1111 03/13/17  0807 03/13/17  0204 03/12/17  2207 03/12/17  1722 03/12/17  1209 03/12/17  1121 03/12/17  0744   *  --   --   --   --   --  196*  --    BGM  --  155* 135* 117* 190* 179*  --  165*     Last Basic Metabolic Panel:  Lab Results   Component Value Date     03/13/2017      Lab Results   Component Value Date    POTASSIUM 4.8 03/13/2017     Lab Results   Component Value Date    CHLORIDE 101 03/13/2017     Lab Results   Component Value Date    TERRENCE 8.7 03/13/2017     Lab Results   Component Value Date    CO2 26 03/13/2017     Lab Results   Component Value Date    BUN 33 03/13/2017     Lab Results   Component Value Date    CR 1.46 03/13/2017     Lab Results   Component Value Date     03/13/2017             Medications:  Current Facility-Administered Medications   Medication     lidocaine (LIDODERM) 5 % Patch 1-2 patch     lidocaine (LIDODERM) patch REMOVAL     lidocaine (LIDODERM) Patch in Place     0.9% sodium chloride infusion     glipiZIDE (GLUCOTROL) half-tab 7.5 mg     tamsulosin (FLOMAX) capsule 0.8 mg     insulin glargine (LANTUS)  "injection 10 Units     insulin aspart (NovoLOG) inj (RAPID ACTING)     hydrochlorothiazide (MICROZIDE) capsule 25 mg     hydrALAZINE (APRESOLINE) tablet 10 mg     insulin aspart (NovoLOG) inj (RAPID ACTING)     lisinopril (PRINIVIL/ZESTRIL) tablet 20 mg     miconazole (MICATIN; MICRO GUARD) 2 % powder     diclofenac (VOLTAREN) 1 % topical gel     acetaminophen (TYLENOL) tablet 650 mg     atorvastatin (LIPITOR) tablet 40 mg     cholecalciferol (vitamin D) tablet 2,000 Units     ferrous gluconate (FERGON) tablet 324 mg     fluticasone (FLONASE) 50 MCG/ACT spray 1-2 spray     melatonin tablet 1 mg     metoprolol (LOPRESSOR) tablet 50 mg     polyethylene glycol (MIRALAX/GLYCOLAX) powder 17 g     rivaroxaban ANTICOAGULANT (XARELTO) tablet 20 mg     glucose 40 % gel 15-30 g    Or     dextrose 50 % injection 25-50 mL    Or     glucagon injection 1 mg     ranitidine (ZANTAC) tablet 150 mg         Physical Examination:   /58  Pulse 69  Temp 96.6  F (35.9  C) (Oral)  Resp 16  Ht 1.778 m (5' 10\")  Wt 83.4 kg (183 lb 14.4 oz)  SpO2 100%  BMI 26.39 kg/m2  Awake, sitting at chair, transferred to bed, cooperative  R pupil deformed - previously documented   L hand and toe nails with fungal infection changes appears chronic   L UE with tone on L elbow flexors  L LE with 4/5 hip flex and knee ext but with limited range  limited antigravity ankle DF / PF with limited range       More than 25 minutes spent with at least half on care coordination      "

## 2017-03-13 NOTE — PLAN OF CARE
Problem: Goal/Outcome  Goal: Goal Outcome Summary  OT: pt engaged in jaclyn technique dressing, toilet transfer. Soft cervical collar has been ordered.

## 2017-03-13 NOTE — PROGRESS NOTES
"                     Diabetes Consult Daily  Progress Note          Assessment/Plan:   Elias Mckee is a 85 year old male with history of CAD ( stents and cardiac arrest), stroke (9 years ago), hypertension, hyperlipidemia, type 2 diabetes , CKD stage 3 ( baseline Cr 1.3-1.4), subclinical hypothyroidism, and atrial fibrillation was admitted to [facility] from (2/20-2/27/2017) with acute right middle cerebral artery territory stroke. Admitted to ARU for therapies       Type 2 diabetic: PTA on glimepiride 3 mg daily.    Glucoses remain in acceptable range.     Plan- continue current plan.  -lantus 10 units every 24 hours - at 0900 (got late today)  -Glipizide 7.5mg qAM (recommend not going back on glimepiride given duration of action and risk for hypoglycemia)  -Aspart medium correction before meals and HS- this can discontinue at discharge to simplify plan.  - monitor glucose before meals, HS and 0200      Given adequate glucose control, DM team will sign off.   Please contact our team if further assistance needed with glucose management.              Interval History:   Elias lutz has no complaints.  CC now scheduled for Tuesday.  Creatinine up a little today.        Recent Labs  Lab 03/13/17  1111 03/13/17  0807 03/13/17  0204 03/12/17  2207 03/12/17  1722 03/12/17  1209 03/12/17  1121 03/12/17  0744   *  --   --   --   --   --  196*  --    BGM  --  155* 135* 117* 190* 179*  --  165*               Review of Systems:      please see interval history       Medications:       Active Diet Order      Dysphagia Diet Level 2 [facility] Altered Nectar Thickened Liquids (pre-thickened or use instant food thickener)     Physical Exam:  Gen: Alert, sitting up in chair, NAD.  HEENT: NC/AT, mucous membranes are moist  Resp: Unlabored  Neuro:oriented x3, communicating clearly. Left neglect  /56 (BP Location: Right arm)  Pulse 69  Temp 96.6  F (35.9  C) (Oral)  Resp 16  Ht 1.778 m (5' 10\")  Wt 83.4 kg " (183 lb 14.4 oz)  SpO2 100%  BMI 26.39 kg/m2           Data:     Lab Results   Component Value Date    A1C 6.8 02/21/2017    A1C 6.7 02/17/2017    A1C 6.0 08/11/2016    A1C 6.4 01/12/2016    A1C 6.6 07/13/2015            Recent Labs   Lab Test  03/13/17   1111  03/12/17   1121   NA  137  139   POTASSIUM  4.8  4.7   CHLORIDE  101  102   CO2  26  29   ANIONGAP  10  8   GLC  228*  196*   BUN  33*  28   CR  1.46*  1.18   TRENT  8.7  9.2     CBC RESULTS:   Recent Labs   Lab Test  02/27/17   0746   WBC  8.4   RBC  3.11*   HGB  9.6*   HCT  28.1*   MCV  90   MCH  30.9   MCHC  34.2   RDW  14.9   PLT  148*         Kerri Moon PA-C [phone]  Diabetes Management Job Code [id number]

## 2017-03-13 NOTE — PLAN OF CARE
Problem: Goal/Outcome  Goal: Goal Outcome Summary  Outcome: Improving  FOCUS/GOAL  Medical management     ASSESSMENT, INTERVENTIONS AND CONTINUING PLAN FOR GOAL:  Patient slept off and on between cares. He used call light for assistance and able to verbalize his needs, he did not yell out tonight, he was appropriate. He used urinal, no bladder incontinence. He was incontinent of BM once at the beginning of the shift, later on he felt he was going to have a BM, he called, placed a bedpan but he had no BM. Slightly red coccyx. applied barrier cream.  He denied pain except very sensitive scrotum.  Continue bed and chair alarm.

## 2017-03-14 LAB
ANION GAP SERPL CALCULATED.3IONS-SCNC: 7 MMOL/L (ref 3–14)
BUN SERPL-MCNC: 26 MG/DL (ref 7–30)
CALCIUM SERPL-MCNC: 8 MG/DL (ref 8.5–10.1)
CHLORIDE SERPL-SCNC: 105 MMOL/L (ref 94–109)
CO2 SERPL-SCNC: 27 MMOL/L (ref 20–32)
CREAT SERPL-MCNC: 1.22 MG/DL (ref 0.66–1.25)
GFR SERPL CREATININE-BSD FRML MDRD: 56 ML/MIN/1.7M2
GLUCOSE BLDC GLUCOMTR-MCNC: 134 MG/DL (ref 70–99)
GLUCOSE BLDC GLUCOMTR-MCNC: 135 MG/DL (ref 70–99)
GLUCOSE BLDC GLUCOMTR-MCNC: 138 MG/DL (ref 70–99)
GLUCOSE BLDC GLUCOMTR-MCNC: 144 MG/DL (ref 70–99)
GLUCOSE BLDC GLUCOMTR-MCNC: 177 MG/DL (ref 70–99)
GLUCOSE SERPL-MCNC: 117 MG/DL (ref 70–99)
POTASSIUM SERPL-SCNC: 4.3 MMOL/L (ref 3.4–5.3)
SODIUM SERPL-SCNC: 139 MMOL/L (ref 133–144)

## 2017-03-14 PROCEDURE — 25000132 ZZH RX MED GY IP 250 OP 250 PS 637: Mod: GY | Performed by: PHYSICAL MEDICINE & REHABILITATION

## 2017-03-14 PROCEDURE — 12800006 ZZH R&B REHAB

## 2017-03-14 PROCEDURE — 99366 TEAM CONF W/PAT BY HC PROF: CPT

## 2017-03-14 PROCEDURE — 80048 BASIC METABOLIC PNL TOTAL CA: CPT | Performed by: PHYSICAL MEDICINE & REHABILITATION

## 2017-03-14 PROCEDURE — 00000146 ZZHCL STATISTIC GLUCOSE BY METER IP

## 2017-03-14 PROCEDURE — 99366 TEAM CONF W/PAT BY HC PROF: CPT | Performed by: PHYSICAL THERAPIST

## 2017-03-14 PROCEDURE — 97530 THERAPEUTIC ACTIVITIES: CPT | Mod: GO

## 2017-03-14 PROCEDURE — 25000128 H RX IP 250 OP 636: Performed by: PHYSICAL MEDICINE & REHABILITATION

## 2017-03-14 PROCEDURE — A9270 NON-COVERED ITEM OR SERVICE: HCPCS | Mod: GY | Performed by: PHYSICAL MEDICINE & REHABILITATION

## 2017-03-14 PROCEDURE — 92526 ORAL FUNCTION THERAPY: CPT | Mod: GN | Performed by: SPEECH-LANGUAGE PATHOLOGIST

## 2017-03-14 PROCEDURE — 40000225 ZZH STATISTIC SLP WARD VISIT: Performed by: SPEECH-LANGUAGE PATHOLOGIST

## 2017-03-14 PROCEDURE — 36415 COLL VENOUS BLD VENIPUNCTURE: CPT | Performed by: PHYSICAL MEDICINE & REHABILITATION

## 2017-03-14 PROCEDURE — 97532 ZZHC SP COGNITIVE SKILLS EA 15 MIN: CPT | Mod: GN | Performed by: SPEECH-LANGUAGE PATHOLOGIST

## 2017-03-14 PROCEDURE — 40000133 ZZH STATISTIC OT WARD VISIT

## 2017-03-14 PROCEDURE — 97530 THERAPEUTIC ACTIVITIES: CPT | Mod: GP | Performed by: PHYSICAL THERAPIST

## 2017-03-14 PROCEDURE — 40000225 ZZH STATISTIC SLP WARD VISIT

## 2017-03-14 PROCEDURE — 25000132 ZZH RX MED GY IP 250 OP 250 PS 637: Mod: GY | Performed by: PHYSICIAN ASSISTANT

## 2017-03-14 PROCEDURE — 97535 SELF CARE MNGMENT TRAINING: CPT | Mod: GO

## 2017-03-14 PROCEDURE — 92526 ORAL FUNCTION THERAPY: CPT | Mod: GN

## 2017-03-14 PROCEDURE — 97110 THERAPEUTIC EXERCISES: CPT | Mod: GP | Performed by: PHYSICAL THERAPIST

## 2017-03-14 PROCEDURE — 40000193 ZZH STATISTIC PT WARD VISIT: Performed by: PHYSICAL THERAPIST

## 2017-03-14 PROCEDURE — A9270 NON-COVERED ITEM OR SERVICE: HCPCS | Mod: GY | Performed by: PHYSICIAN ASSISTANT

## 2017-03-14 PROCEDURE — 97112 NEUROMUSCULAR REEDUCATION: CPT | Mod: GO

## 2017-03-14 RX ORDER — SODIUM CHLORIDE 9 MG/ML
INJECTION, SOLUTION INTRAVENOUS
Status: DISPENSED
Start: 2017-03-14 | End: 2017-03-14

## 2017-03-14 RX ADMIN — MICONAZOLE NITRATE: 2 POWDER TOPICAL at 08:10

## 2017-03-14 RX ADMIN — SODIUM CHLORIDE: 9 INJECTION, SOLUTION INTRAVENOUS at 03:42

## 2017-03-14 RX ADMIN — VITAMIN D, TAB 1000IU (100/BT) 2000 UNITS: 25 TAB at 08:09

## 2017-03-14 RX ADMIN — LIDOCAINE 2 PATCH: 50 PATCH CUTANEOUS at 08:03

## 2017-03-14 RX ADMIN — LISINOPRIL 20 MG: 20 TABLET ORAL at 08:09

## 2017-03-14 RX ADMIN — Medication 1 MG: at 20:41

## 2017-03-14 RX ADMIN — MICONAZOLE NITRATE: 2 POWDER TOPICAL at 20:30

## 2017-03-14 RX ADMIN — ACETAMINOPHEN 650 MG: 325 TABLET, FILM COATED ORAL at 01:41

## 2017-03-14 RX ADMIN — FERROUS GLUCONATE 324 MG: 324 TABLET ORAL at 08:08

## 2017-03-14 RX ADMIN — RANITIDINE HYDROCHLORIDE 150 MG: 150 TABLET, FILM COATED ORAL at 08:09

## 2017-03-14 RX ADMIN — METOPROLOL TARTRATE 50 MG: 50 TABLET, FILM COATED ORAL at 08:09

## 2017-03-14 RX ADMIN — RIVAROXABAN 20 MG: 10 TABLET, FILM COATED ORAL at 16:30

## 2017-03-14 RX ADMIN — FLUTICASONE PROPIONATE 1 SPRAY: 50 SPRAY, METERED NASAL at 08:08

## 2017-03-14 RX ADMIN — Medication 7.5 MG: at 08:09

## 2017-03-14 RX ADMIN — LISINOPRIL 20 MG: 20 TABLET ORAL at 20:38

## 2017-03-14 RX ADMIN — ATORVASTATIN CALCIUM 40 MG: 40 TABLET, FILM COATED ORAL at 08:09

## 2017-03-14 RX ADMIN — DICLOFENAC SODIUM: 10 GEL TOPICAL at 08:07

## 2017-03-14 RX ADMIN — METOPROLOL TARTRATE 50 MG: 50 TABLET, FILM COATED ORAL at 20:38

## 2017-03-14 RX ADMIN — RANITIDINE HYDROCHLORIDE 150 MG: 150 TABLET, FILM COATED ORAL at 20:30

## 2017-03-14 RX ADMIN — DICLOFENAC SODIUM: 10 GEL TOPICAL at 12:44

## 2017-03-14 RX ADMIN — HYDROCHLOROTHIAZIDE 25 MG: 12.5 CAPSULE ORAL at 08:08

## 2017-03-14 RX ADMIN — DICLOFENAC SODIUM: 10 GEL TOPICAL at 16:26

## 2017-03-14 RX ADMIN — DICLOFENAC SODIUM: 10 GEL TOPICAL at 20:30

## 2017-03-14 RX ADMIN — TAMSULOSIN HYDROCHLORIDE 0.8 MG: 0.4 CAPSULE ORAL at 08:08

## 2017-03-14 NOTE — CARE CONFERENCE
Acute Rehab Care Conference/Team Rounds      Type: Patient Conference    Present: Dr Kevin Bond, Keren Marti LICSW, Juanita Zapata PT, Liliana Grullon OT, Socrates Mcmullen SLP, Lilo Figueroa RN.      Discharge Barriers/Treatment/Education    Rehab Diagnosis: This is an 85-year-old male with a past medical history of stroke 9 years ago with some residual left leg weakness with history of chronic ischemic heart disease, prostate cancer, hypertension, hyperlipidemia, type 2 diabetes, chronic kidney disease, atrial fibrillation. Now presents with left hemiparesis, upper extremity, on top of residual left lower extremity weakness with dysarthria, dysphagia, concern for cognitive impairment on top of hard of hearing with impaired mobility, ADLs.    Active Medical Co-morbidities/Prognosis: Prognosis for medical improvement and stabilization of the above medical issues is good.       Safety: Bed alarm activated, not consistent with using the call light, calls out when he needs help.    Pain:had been c/o right toe pain, no redness or signs of trauma noted, tylenol effective.    Medications, Skin, Tubes/Lines: Takes med with applesauce and with oversight, Piv intact on left arm and patent    Swallowing/Nutrition: Pt is currently tolerating DD2 diet with nectar-thick liquids with no signs or symptoms of aspiration or airway compromise. Note that pt has varied in tolerance for DD2 textures, demonstrating s/sx of aspiration with food textures that have improved since removal of NG tube. Pt benefits from min verbal cues to alternate textures and take smaller bites. No overt s/sx of aspiration with NTL. Pt's performance has been influenced by fatigue. Recommend ongoing speech therapy to assist with determining when upgrades to diet may be appropriate.     Bowel/Bladder: Had episodes of both continent and incontinence  Bowel and  bladder, continent of 3 loose BM so far, LBM 3/14, needs max assist castro-cares after bowel movements.  Patient had an episode of being inappropriate with female caregiver, asking caregiver to get in bed with him.    Psychosocial: Pt resides alone in an assisted living. Disposition unclear due to level of support needed upon d/c. Team working towards a safe d/c plan.    ADLs/IADLs: Pt is making progress in therapy. Pt is able to use a weighted jaclyn cane to complete stand pivot transfer to toilet, stable to stand for 3-4 minutes supported and unsupported with assistance. Pt has a strong preference to the R side and requires max vcs to incorporate L side of the body.  Pt requires assistance for other aspects of dressing d/t decreased ability to use LUE as well as neglect. A soft collar has been ordered to manage cervical pain and for positioning.  Pt continues to demonstrate decreased ability to complete adls, iadls, decreased standing tolerance, decreased mobility, decreased use of RUE. Skilled OT needed to improve above mentioned deficits in order for a safe d/c. It is recommended that pt eventually transfer to TCU and continued services. DME: commode, shower chair.     Mobility: Pt is continuing to need A for all bed mobility, transfers, and has initiated short distance amb with A.  Pt still with flaccid LUE, significant L neglect, needing cuing and manual guidance to turn head and gaze towards L side to see people or room number in schultz. Pt with decreased sensation and awareness LLE and hemiparesis LLE.  Pt's PASS 3/17 was 9/36, increased to 14/36 on 3/8/17. Pt has been able to perform stand pivot transfer with sling for LUE, hemicane and modA. Pt also c/o R sided neck pain, and pt with strong preference for positioning neck rotated and SB to R side, so needing A with stretching and postural awareness, STM, heat to help manage tight musculature and postural deficits. We are continuing to work on increasing use/weight bearing through LLE in stance and with transfers. Pt will need more time for rehabilitation due to  significant impairments s/p stroke.     Cognition/Language: Pt currently completes moderate level complex attention/working memory tasks with min-mod assist. Pt is progressing towards writing/reading goals, which again are impacted by left neglect in addition to pt's compromised vision. Pt has demonstrated learning for compensatory strategies to re-establish appropriate scanning patterns for reading. Performance on tasks has been influenced by left neglect and fatigue. Patient currently would require higher level of supervision for safety and anticipation of needs. May benefit from TCU stay prior to return to [facility]. Recommend ongoing speech therapy to address moderate level cognition (attention, memory, problem solving), and strategies to complete writing/reading/visual tasks in presence of left neglect.     Community Re-Entry: Pt is not ambulatory, will need w/c for locomotion at this time.     Transportation: Pt needs to continue to work on car tx, would need significant assistance to complete.     Decision maker: self and family     Plan of Care and goals reviewed and updated.    Discharge Plan/Recommendations    Fall Precautions: continue    Overall plan for the patient:   Making gains and progress with ongoing deficits and will need more time at TCU for recovery prior to d/c to home setting eventually.       Utilization Review and Continued Stay Justification    Medical Necessity Criteria:    For any criteria that is not met, please document reason and plan for discharge, transfer, or modification of plan of care to address.    Requires intensive rehabilitation program to treat functional deficits?: Yes    Requires 3x per week or greater involvement of rehabilitation physician to oversee rehabilitation program?: Yes    Requires rehabilitation nursing interventions?: Yes    Patient is making functional progress?: Yes    There is a potential for additional functional progress? Yes    Patient is participating in  therapy 3 hours per day a minimum of 5 days per week or 15 hours per week in 7 day period?:Yes    Has discharge needs that require coordinated discharge planning approach?:Yes          Final Physician Sign off    Statement of Approval:   I agree with all the recommendations detailed in this document.   Kevin Bond MD      Patient Goals             OT target date for goal attainment: 03/28/17  OT Frequency: daily 4 weeks  OT goal: hygiene/grooming: modified independent  OT goal: upper body dressing: Modified independent  OT goal: lower body dressing: Minimal assist  OT goal: upper body bathing: Modified independent  OT goal: lower body bathing: Minimal assist, Supervision/stand-by assist, Modified independent  OT goal: toilet transfer/toileting: Supervision/stand-by assist  OT goal: home management: Minimal assist  OT goal: perform aerobic activity with stable cardiovascular response: intermittent activity  Edema Management (OT): With caregiver assist  OT goal 1: OT: pt will complete wet walk in shower transfer supervision for safety.                 PT target date for goal attainment: 03/28/17  PT Frequency: daily , 60-75 min per day.  PT goal: bed mobility: Modified independent, Supine to/from sit, Rolling, Bridging (with rail, if needed. )  PT goal: transfers: Modified independent, Sit to/from stand, Bed to/from chair, Assistive device (with cane or walker. )  PT goal: gait: Modified independent, Quad cane, 50 feet (may need L afo. )     PT goal: perform aerobic activity with stable cardiovascular response: continuous activity, NuStep, 10 minutes     PT goal 1: Pt able to perform car tx with AD and sba.   PT Goal 2: Pt able to score > 24/36 on the PASS for improved functional mobility, safety at home.  PT Goal 3: Pt able to state 3 fall prevention techniques for increased safety at home.   PT Goal 4: Floor recovery tx with furniture and sba.   PT Goal 5: pT and caregiver I with HEP for balance, endurance,  and strength for increased function at home.       SLP target date for goal attainment: 03/21/17  SLP Frequency: daily  SLP Swallow Goal:  Safely tolerate diet without signs/symptoms of aspiration: regular diet, thin liquids, with use of swallow precautions, independently. GOAL PROGRESSING.   SLP goal 1: SLP: pt will complete multi sentence reading, min assist for visual perceptual compensations 90% accuracy. GOAL PROGRESSING.  SLP goal 2: SLP: pt will complete written tasks - short paragraphs 90% accuracy (min cues for visual perceptual deficits/compensations. GOAL PROGRESSING.   SLP goal 3: SLP: pt will complete verbal/written reasoning tasks moderate complex level 90% accuracy. GOAL PROGRESSING.   SLP goal 4: SLP: pt will complete moderate complex attention, recent/working memory tasks 90% accuracy, min assist. GOAL PROGRESSING.      Nursing Goal: Patient will advocate for pain and demonstrate understanding on nonpharmacological pain interventions before discharge.  Nursing Goal: Patient will maintain adequate nutrition with scheduled tube feedings and  progress with diet as indicated before discharge.  Nursing Goal: Patient will demonstrate understanding on safe transfers before discharge.  Nursing Goal: Patient's skin will remain intact with adequate incontinence management and will demonstrate understanding on prevention of skin breakdown before discharge.  Nursing Goal: Pt will be able to demonstrate ability to manage BG checks using own glucometer and insulin management before discharge.  Nursing Goal: Pt will be able to demonstrate ability to manage TF safely if discharging home with this.   Nursing Goal: Pt and family member will be able to enumerate s/sx of stroke, risk factors for stroke and healthy lifestyle patterns to maintain to prevent another stroke.

## 2017-03-14 NOTE — PLAN OF CARE
Problem: Goal/Outcome  Goal: Goal Outcome Summary  Outcome: No Change  Patient is A&O, [place],  able to make needs known, using call light appropriately. Patient was up with an assist of 1, gait belt to pivot transfer. Manages clothing and castro cares with assist of 1. Bowel sounds present, last BM 03/13, incontinent bm soiling linen, also continent of small loose bm on BSC. Voiding spontaneously in the urinal, called for assistance. No complaint of dizziness, chest pain or SOB. Tolerating DD2 diet, no Nausea, drinking nectar thick liquids, PO intake good, encouraged fluids. IV fluids running @ 100ml/hr, blood pressures good, lisinopril given as within orders BP improved. Continue with bed alarms for safety. Continue POC.

## 2017-03-14 NOTE — PROGRESS NOTES
"[facility] Acute Rehabilitation Unit  Progress Note    Interval Hx  BP and bun / crea better with IVF  D/c IVF today  Cont to encourage po fluids  Care conference today, will need more time for overall recovery and rec for TCU as next steps        Assessment and Plan of Care: This is an 85-year-old male with a past medical history of stroke 9 years ago with some residual left leg weakness with history of chronic ischemic heart disease, prostate cancer, hypertension, hyperlipidemia, type 2 diabetes, chronic kidney disease, atrial fibrillation. Now presents with left hemiparesis, upper extremity, on top of residual left lower extremity weakness with dysarthria, dysphagia, concern for cognitive impairment on top of hard of hearing with impaired mobility, ADLs.    Functionally:    \"ADLs/IADLs: Pt is making progress in therapy. Pt is able to use a weighted jaclyn cane to complete stand pivot transfer to toilet, stable to stand for 3-4 minutes supported and unsupported with assistance. Pt has a strong preference to the R side and requires max vcs to incorporate L side of the body. Pt requires assistance for other aspects of dressing d/t decreased ability to use LUE as well as neglect. A soft collar has been ordered to manage cervical pain and for positioning. Pt continues to demonstrate decreased ability to complete adls, iadls, decreased standing tolerance, decreased mobility, decreased use of RUE. Skilled OT needed to improve above mentioned deficits in order for a safe d/c. It is recommended that pt eventually transfer to TCU and continued services. DME: commode, shower chair.      Mobility: Pt is continuing to need A for all bed mobility, transfers, and has initiated short distance amb with A. Pt still with flaccid LUE, significant L neglect, needing cuing and manual guidance to turn head and gaze towards L side to see people or room number in schultz. Pt with decreased sensation and " "awareness LLE and hemiparesis LLE. Pt's PASS 3/17 was 9/36, increased to 14/36 on 3/8/17. Pt has been able to perform stand pivot transfer with sling for LUE, hemicane and modA. Pt also c/o R sided neck pain, and pt with strong preference for positioning neck rotated and SB to R side, so needing A with stretching and postural awareness, STM, heat to help manage tight musculature and postural deficits. We are continuing to work on increasing use/weight bearing through LLE in stance and with transfers. Pt will need more time for rehabilitation due to significant impairments s/p stroke.      Cognition/Language: Pt currently completes moderate level complex attention/working memory tasks with min-mod assist. Pt is progressing towards writing/reading goals, which again are impacted by left neglect in addition to pt's compromised vision. Pt has demonstrated learning for compensatory strategies to re-establish appropriate scanning patterns for reading. Performance on tasks has been influenced by left neglect and fatigue. Patient currently would require higher level of supervision for safety and anticipation of needs. May benefit from TCU stay prior to return to [facility]. Recommend ongoing speech therapy to address moderate level cognition (attention, memory, problem solving), and strategies to complete writing/reading/visual tasks in presence of left neglect. \"  Continue therapies and plan of care.      Overall Management:   - optimize secondary stroke management, on xarelto for anticoagulation, BP management, DM / BS control, lipid management  - HTN, on HCTZ, metoprolol and lisinopril increase dose 3/1 and 3/3, has prn hydralazine, added HCTZ 3/6, adjust as needed  - DM / BS, endocrinology managing BS control and adjusting management as needed   - HLD, on lipitor  - on vit D, ferrous gluconate, flonase  - melatonin for sleep  - L ankle swelling, some pain, reports twisting ankle, able to move aROM on own w/o sig discomfort, fx " suspicion low, will do ice pack and voltaren gel for now, monitor   - dietitian following with terrence counts now off TF since 3/7 overnight, follow po intake, d/fortino nasal feeding tube 3/8  - at risk for dehydration d/t restricted po on thickened liquids, i/o monitoring, encourage fluids, IVF supplementation 3/13 with improvement     Bladder and Bowel - monitor spont voids and BM, on flomax, miralax prn  GI ppx - on zantac  DVT ppx - optimize mech ppx with PCDs and antiembolism stockings, on xarelto, progressive mobilization         Active Diet Order      Dysphagia Diet Level 2 [facility] Altered Nectar Thickened Liquids (pre-thickened or use instant food thickener)    Labs    Recent Labs  Lab 03/14/17  0828 03/14/17  0730 03/14/17  0203 03/13/17  2127 03/13/17  1716 03/13/17  1111 03/13/17  0807 03/13/17  0204  03/12/17  1121   GLC  --  117*  --   --   --  228*  --   --   --  196*   *  --  134* 181* 189*  --  155* 135*  < >  --    < > = values in this interval not displayed.      Last Basic Metabolic Panel:  Lab Results   Component Value Date     03/14/2017      Lab Results   Component Value Date    POTASSIUM 4.3 03/14/2017     Lab Results   Component Value Date    CHLORIDE 105 03/14/2017     Lab Results   Component Value Date    TERRENCE 8.0 03/14/2017     Lab Results   Component Value Date    CO2 27 03/14/2017     Lab Results   Component Value Date    BUN 26 03/14/2017     Lab Results   Component Value Date    CR 1.22 03/14/2017     Lab Results   Component Value Date     03/14/2017             Medications:  Current Facility-Administered Medications   Medication     NaCl 0.9 % infusion     lidocaine (LIDODERM) 5 % Patch 1-2 patch     lidocaine (LIDODERM) patch REMOVAL     lidocaine (LIDODERM) Patch in Place     glipiZIDE (GLUCOTROL) half-tab 7.5 mg     tamsulosin (FLOMAX) capsule 0.8 mg     insulin glargine (LANTUS) injection 10 Units     insulin aspart (NovoLOG) inj (RAPID ACTING)     hydrochlorothiazide  "(MICROZIDE) capsule 25 mg     hydrALAZINE (APRESOLINE) tablet 10 mg     insulin aspart (NovoLOG) inj (RAPID ACTING)     lisinopril (PRINIVIL/ZESTRIL) tablet 20 mg     miconazole (MICATIN; MICRO GUARD) 2 % powder     diclofenac (VOLTAREN) 1 % topical gel     acetaminophen (TYLENOL) tablet 650 mg     atorvastatin (LIPITOR) tablet 40 mg     cholecalciferol (vitamin D) tablet 2,000 Units     ferrous gluconate (FERGON) tablet 324 mg     fluticasone (FLONASE) 50 MCG/ACT spray 1-2 spray     melatonin tablet 1 mg     metoprolol (LOPRESSOR) tablet 50 mg     polyethylene glycol (MIRALAX/GLYCOLAX) powder 17 g     rivaroxaban ANTICOAGULANT (XARELTO) tablet 20 mg     glucose 40 % gel 15-30 g    Or     dextrose 50 % injection 25-50 mL    Or     glucagon injection 1 mg     ranitidine (ZANTAC) tablet 150 mg         Physical Examination:   /59 (BP Location: Right arm)  Pulse 64  Temp 96.9  F (36.1  C) (Oral)  Resp 20  Ht 1.778 m (5' 10\")  Wt 83.4 kg (183 lb 14.4 oz)  SpO2 96%  BMI 26.39 kg/m2  Sitting at chair, feels well overall   R pupil deformed - previously documented   L hand and toe nails with fungal infection changes appears chronic   L UE with tone on L elbow flexors  L LE with antigravity hip flex and knee ext some limited range  Limited antigravity ankle DF / PF with limited range   Some left sided neglect       More than 45 minutes spent with at least half on care coordination and care conference, see separate note for details      "

## 2017-03-14 NOTE — PLAN OF CARE
Problem: Goal/Outcome  Goal: Goal Outcome Summary  Outcome: No Change  FOCUS/GOAL  Bowel management, Bladder management, Medication management and Medical management     ASSESSMENT, INTERVENTIONS AND CONTINUING PLAN FOR GOAL:  Pt able to be continent of bladder with q2h toileting offered. No BM this shift. LBM 3/14 with NOC shift. Continuous NS discontinued this morning. Educated pt on this change. L PIV remains patent and saline locked. Per care conference, pt will discharge to TCU pending placement. C/o R neck pain and was given scheduled pain management with some relief. Denies pain at this time. Continue with POC.

## 2017-03-14 NOTE — PLAN OF CARE
Problem: Goal/Outcome  Goal: Goal Outcome Summary  Following pts care conference, pt unable to return to Assisted Living due to level of support needed. Placed referral to [facility] for TCU placement. Currently awaiting an available bed. Sw will continue to assist as needed.

## 2017-03-14 NOTE — PLAN OF CARE
Problem: Goal/Outcome  Goal: Goal Outcome Summary  Pt needed increasd cueing- mod assist for visual attentin and problem solving- categorizing words into correct categoires- needed mod cues to aid in visual scanning to the left- used a fold left upper corner of page and a purple border to try to aid in visual scanning but even with this- pt was very slow to scan and needed increased cueing to aid in where to place the words as well as to remember where he was in the task.      Completed oral cares with pt and trialed thin water in isolation- had pt use chin tuck strategy- pt had mild wet vocal quaolity 1x out of 4 trials but he other 3 were clear of aspiration s/s. for now to continue iwth DD2 with nectar but will continue ot trial thin water in isolation- may consider free water program following review- but 2/2 comprehension/memory lmitiations - would need to have 1;1 supervision if this is implemented

## 2017-03-14 NOTE — PLAN OF CARE
Problem: Goal/Outcome  Goal: Goal Outcome Summary  OT: facilitated CNA training using jaclyn cane for stand pivot transfer. Care conference completed and plan is TCU after acute rehab for further care. Family has a  Facility picked out.

## 2017-03-14 NOTE — DISCHARGE INSTRUCTIONS
Follow-Up Appointments:     - PCP in 1-2 weeks s/p stroke with Dr Shen Huff    - Neurology with Dr Manuel Summers in 4 weeks s/p stroke    - Physical Medicine & Rehabilitation Clinic in 8 weeks with Dr Kevin Bond or Dr Verenice Garcia    Address  Dr. Kevin Bond/Dr. Garcia                          [facility]                          Physical Medicine and Rehabilitation Clinic                          3rd Floor    [address] [place] [address]    Phone   [place] [phone]                          [place] [phone]                          [place] [phone]

## 2017-03-14 NOTE — PLAN OF CARE
Problem: Goal/Outcome  Goal: Goal Outcome Summary  Outcome: Therapy, progress toward functional goals as expected     Pt seen for dysphagia tx during breakfast. Pt observed taking medications in applesauce with nursing, no overt s/sx of aspiration noted. Pt demonstrating increased sensation on left side of face evidenced by asking nursing to help clean L side of face/lips. Pt required assistance with opening and spreading butter/syrup, as well as cutting up [place] toast. Pt tolerated small-medium bites of DD2 solid as well as puree with no overt s/sx of aspiration. Trialed DD3 chopped peaches - with small bites no overt s/sx of aspiration, with large bite towards end of meal pt demonstrated overt s/sx of aspiration. Pt has reduced strength in cough leading to extended period of coughing, pt expectorated small amounts of peach x2. Also trialed thin ice water with cues to take small sips. Pt tolerated in isolation with no overt s/sx of aspiration, as well as 1x with food. Recommend continuing DD2 texture with NTL.

## 2017-03-14 NOTE — PROGRESS NOTES
CLINICAL NUTRITION SERVICES - REASSESSMENT NOTE     Nutrition Prescription    RECOMMENDATIONS FOR MDs/PROVIDERS TO ORDER:  None at this time    Malnutrition Status:    Non-severe malnutrition in the context of acute illness    Recommendations already ordered by Registered Dietitian (RD):  DD2/NTL + magic cups per SLP discretion    NT Cran juice TID in between meals     Future/Additional Recommendations:  None at this time    Neda Walter RD LD  Unit Pgr [phone]     EVALUATION OF THE PROGRESS TOWARD GOALS   Diet: DD2/NTL (adv 3/11), magic cups with meals    Intake: Simone cts 3/7-9  avg 1425 kcal / 50 gm pro meeting only 70% est kcal needs an d62% est pro needs.  Pt reports eating % of meals TID and supplements (some of which were not included in above cts).  RN documentation showing good appetite and intakes of meals >75% over past 3 days.  Per [facility] review, would anticipate Pos adequate to meet needs at this time.  Did receive IVF 3/13-14 d/t concern for dehydration     NEW FINDINGS   -Hypotension noted 3/13, rise in Cr and BUN - improved following IVF infusion  -SLP trying DD3/thins today  -Weight decreasing to 78.7 kg 3/10 however question accuracy of this as previous weights before and after this trending ~83 kg.  Admission weight 2/20 of 82.7 kg    MALNUTRITION  % Intake: < 75% for > 7 days (non-severe)  % Weight Loss: None noted  Subcutaneous Fat Loss: None observed  Muscle Loss: Thoracic region (clavicle, acromium bone, deltoid, trapezius, pectoral):  mild  Fluid Accumulation/Edema: None noted  Malnutrition Diagnosis: Non-severe malnutrition in the context of acute illness    Previous Goals   Total average nutrition intake to meet a minimum of 25 kcals per kg and 1.0 g protein per kg (per dose wt 81 kg).  Evaluation: Not met per simone cts however suspect met over past 3-4 days    Previous Nutrition Diagnosis  Inadequate oral intake related to only recently improving appetite as evidenced by 3 day  Calorie Counts show average intakes 1521 kcals and 55 g protein with assessed needs of 9950-5623 kcals/day and 81-97 g protein.  Evaluation: Improving    CURRENT NUTRITION DIAGNOSIS  Inadequate oral intake related to swallowing difficulties, ?reduced appetite as evidenced by terrence cts meeting <70% est needs however anticipate improvements in PO since cts taken and requiring IVF to meet hydration over past week      INTERVENTIONS  Implementation  Nutrition education - pt reports good appetite and intakes of meals.  Encouraged fluid intakes and suggested NTL between meals as reminder to drink.  Pt agreeable    Goals  Patient to consume % of nutritionally adequate meal trays TID, or the equivalent with supplements/snacks.  Pt to remain hydration via POs alone    Monitoring/Evaluation  Progress toward goals will be monitored and evaluated per protocol.

## 2017-03-15 LAB
GLUCOSE BLDC GLUCOMTR-MCNC: 110 MG/DL (ref 70–99)
GLUCOSE BLDC GLUCOMTR-MCNC: 113 MG/DL (ref 70–99)
GLUCOSE BLDC GLUCOMTR-MCNC: 125 MG/DL (ref 70–99)
GLUCOSE BLDC GLUCOMTR-MCNC: 162 MG/DL (ref 70–99)
GLUCOSE BLDC GLUCOMTR-MCNC: 163 MG/DL (ref 70–99)
GLUCOSE BLDC GLUCOMTR-MCNC: 182 MG/DL (ref 70–99)
GLUCOSE BLDC GLUCOMTR-MCNC: 191 MG/DL (ref 70–99)

## 2017-03-15 PROCEDURE — 25000132 ZZH RX MED GY IP 250 OP 250 PS 637: Mod: GY | Performed by: PHYSICIAN ASSISTANT

## 2017-03-15 PROCEDURE — 00000146 ZZHCL STATISTIC GLUCOSE BY METER IP

## 2017-03-15 PROCEDURE — 40000133 ZZH STATISTIC OT WARD VISIT: Performed by: OCCUPATIONAL THERAPIST

## 2017-03-15 PROCEDURE — 97116 GAIT TRAINING THERAPY: CPT | Mod: GP | Performed by: PHYSICAL THERAPIST

## 2017-03-15 PROCEDURE — 97112 NEUROMUSCULAR REEDUCATION: CPT | Mod: GP | Performed by: PHYSICAL THERAPIST

## 2017-03-15 PROCEDURE — A9270 NON-COVERED ITEM OR SERVICE: HCPCS | Mod: GY | Performed by: PHYSICIAN ASSISTANT

## 2017-03-15 PROCEDURE — A9270 NON-COVERED ITEM OR SERVICE: HCPCS | Mod: GY | Performed by: PHYSICAL MEDICINE & REHABILITATION

## 2017-03-15 PROCEDURE — 97112 NEUROMUSCULAR REEDUCATION: CPT | Mod: GO | Performed by: OCCUPATIONAL THERAPIST

## 2017-03-15 PROCEDURE — 40000225 ZZH STATISTIC SLP WARD VISIT: Performed by: SPEECH-LANGUAGE PATHOLOGIST

## 2017-03-15 PROCEDURE — 25000132 ZZH RX MED GY IP 250 OP 250 PS 637: Mod: GY | Performed by: PHYSICAL MEDICINE & REHABILITATION

## 2017-03-15 PROCEDURE — 97530 THERAPEUTIC ACTIVITIES: CPT | Mod: GP | Performed by: PHYSICAL THERAPIST

## 2017-03-15 PROCEDURE — 40000193 ZZH STATISTIC PT WARD VISIT: Performed by: PHYSICAL THERAPIST

## 2017-03-15 PROCEDURE — 97110 THERAPEUTIC EXERCISES: CPT | Mod: GP | Performed by: PHYSICAL THERAPIST

## 2017-03-15 PROCEDURE — 12800006 ZZH R&B REHAB

## 2017-03-15 PROCEDURE — 92526 ORAL FUNCTION THERAPY: CPT | Mod: GN | Performed by: SPEECH-LANGUAGE PATHOLOGIST

## 2017-03-15 PROCEDURE — 92507 TX SP LANG VOICE COMM INDIV: CPT | Mod: GN | Performed by: SPEECH-LANGUAGE PATHOLOGIST

## 2017-03-15 PROCEDURE — 97532 ZZHC SP COGNITIVE SKILLS EA 15 MIN: CPT | Mod: GN | Performed by: SPEECH-LANGUAGE PATHOLOGIST

## 2017-03-15 RX ADMIN — TAMSULOSIN HYDROCHLORIDE 0.8 MG: 0.4 CAPSULE ORAL at 08:38

## 2017-03-15 RX ADMIN — FLUTICASONE PROPIONATE 1 SPRAY: 50 SPRAY, METERED NASAL at 08:36

## 2017-03-15 RX ADMIN — DICLOFENAC SODIUM: 10 GEL TOPICAL at 21:09

## 2017-03-15 RX ADMIN — FERROUS GLUCONATE 324 MG: 324 TABLET ORAL at 08:38

## 2017-03-15 RX ADMIN — METOPROLOL TARTRATE 50 MG: 50 TABLET, FILM COATED ORAL at 08:39

## 2017-03-15 RX ADMIN — DICLOFENAC SODIUM: 10 GEL TOPICAL at 08:36

## 2017-03-15 RX ADMIN — DICLOFENAC SODIUM: 10 GEL TOPICAL at 16:27

## 2017-03-15 RX ADMIN — DICLOFENAC SODIUM: 10 GEL TOPICAL at 12:47

## 2017-03-15 RX ADMIN — MICONAZOLE NITRATE: 2 POWDER TOPICAL at 08:40

## 2017-03-15 RX ADMIN — VITAMIN D, TAB 1000IU (100/BT) 2000 UNITS: 25 TAB at 08:37

## 2017-03-15 RX ADMIN — RIVAROXABAN 20 MG: 10 TABLET, FILM COATED ORAL at 17:55

## 2017-03-15 RX ADMIN — RANITIDINE HYDROCHLORIDE 150 MG: 150 TABLET, FILM COATED ORAL at 21:09

## 2017-03-15 RX ADMIN — Medication 7.5 MG: at 08:37

## 2017-03-15 RX ADMIN — RANITIDINE HYDROCHLORIDE 150 MG: 150 TABLET, FILM COATED ORAL at 08:38

## 2017-03-15 RX ADMIN — MICONAZOLE NITRATE: 2 POWDER TOPICAL at 21:10

## 2017-03-15 RX ADMIN — LISINOPRIL 20 MG: 20 TABLET ORAL at 21:09

## 2017-03-15 RX ADMIN — LISINOPRIL 20 MG: 20 TABLET ORAL at 08:38

## 2017-03-15 RX ADMIN — HYDROCHLOROTHIAZIDE 25 MG: 12.5 CAPSULE ORAL at 08:37

## 2017-03-15 RX ADMIN — ATORVASTATIN CALCIUM 40 MG: 40 TABLET, FILM COATED ORAL at 08:38

## 2017-03-15 RX ADMIN — METOPROLOL TARTRATE 50 MG: 50 TABLET, FILM COATED ORAL at 21:09

## 2017-03-15 RX ADMIN — Medication 1 MG: at 21:09

## 2017-03-15 RX ADMIN — LIDOCAINE 2 PATCH: 50 PATCH CUTANEOUS at 08:36

## 2017-03-15 NOTE — PROGRESS NOTES
"[facility] Acute Rehabilitation Unit  Progress Note    Interval Hx  At gym, working with PT   Feels well overall  [place]  Cont to encourage po fluids  Still pending TCU     Assessment and Plan of Care: This is an 85-year-old male with a past medical history of stroke 9 years ago with some residual left leg weakness with history of chronic ischemic heart disease, prostate cancer, hypertension, hyperlipidemia, type 2 diabetes, chronic kidney disease, atrial fibrillation. Now presents with left hemiparesis, upper extremity, on top of residual left lower extremity weakness with dysarthria, dysphagia, concern for cognitive impairment on top of hard of hearing with impaired mobility, ADLs.    Functionally:  \"PT: Pt participating with neuro caro for increased use/stability of LLE with standing activities (likes beach ball volleyball activity in stance) needing A and cues to maintain balance at times. Pt amb with rail on R and mod A, wc follow. COnt toward goals.  \"  \"SLP: Pt seen for meal f/u DD2/nectar + trials of DD3 textured item. Pt taking larger bites of sandwich (DD3 item) resulting in significantly longer mastication time and moderate amount of oral residue. Delayed mild cough (x1) following 4th bite of sandwich. Continue DD2/nectar and will f/u tomorrow for tolerance and additional trials of DD3.   Cognition: Pt quite somnolent for PM session. Completed visual scanning/reading with reasoning task. pt able to complete with 90% accuracy given minimal verbal cues for scanning. \"  Continue therapies and plan of care.      Overall Management:   - optimize secondary stroke management, on xarelto for anticoagulation, BP management, DM / BS control, lipid management  - HTN, on HCTZ, metoprolol and lisinopril increase dose 3/1 and 3/3, has prn hydralazine, added HCTZ 3/6, adjust as needed  - DM / BS, endocrinology managing BS control and adjusting management as needed   - HLD, on lipitor  - " on vit D, ferrous gluconate, flonase  - melatonin for sleep  - L ankle swelling, some pain, reports twisting ankle, able to move aROM on own w/o sig discomfort, fx suspicion low, will do ice pack and voltaren gel for now, monitor   - dietitian following with terrence counts now off TF since 3/7 overnight, follow po intake, d/fortino nasal feeding tube 3/8  - at risk for dehydration d/t restricted po on thickened liquids, i/o monitoring, encourage fluids, IVF supplementation done on 3/13 with improvement     Bladder and Bowel - monitor spont voids and BM, on flomax, miralax prn  GI ppx - on zantac  DVT ppx - optimize [facility] ppx with PCDs and antiembolism stockings, on xarelto, progressive mobilization         Active Diet Order      Dysphagia Diet Level 2 [facility] Altered Nectar Thickened Liquids (pre-thickened or use instant food thickener)      Diet    Labs    Recent Labs  Lab 03/15/17  1705 03/15/17  1138 03/15/17  0834 03/15/17  0210 03/14/17  2103 03/14/17  1740  03/14/17  0730  03/13/17  1111  03/12/17  1121   GLC  --   --   --   --   --   --   --  117*  --  228*  --  196*   * 163* 162* 125* 177* 144*  < >  --   < >  --   < >  --    < > = values in this interval not displayed.          Medications:  Current Facility-Administered Medications   Medication     lidocaine (LIDODERM) 5 % Patch 1-2 patch     lidocaine (LIDODERM) patch REMOVAL     lidocaine (LIDODERM) Patch in Place     glipiZIDE (GLUCOTROL) half-tab 7.5 mg     tamsulosin (FLOMAX) capsule 0.8 mg     insulin glargine (LANTUS) injection 10 Units     insulin aspart (NovoLOG) inj (RAPID ACTING)     hydrochlorothiazide (MICROZIDE) capsule 25 mg     hydrALAZINE (APRESOLINE) tablet 10 mg     insulin aspart (NovoLOG) inj (RAPID ACTING)     lisinopril (PRINIVIL/ZESTRIL) tablet 20 mg     miconazole (MICATIN; MICRO GUARD) 2 % powder     diclofenac (VOLTAREN) 1 % topical gel     acetaminophen (TYLENOL) tablet 650 mg     atorvastatin (LIPITOR) tablet 40 mg      "cholecalciferol (vitamin D) tablet 2,000 Units     ferrous gluconate (FERGON) tablet 324 mg     fluticasone (FLONASE) 50 MCG/ACT spray 1-2 spray     melatonin tablet 1 mg     metoprolol (LOPRESSOR) tablet 50 mg     polyethylene glycol (MIRALAX/GLYCOLAX) powder 17 g     rivaroxaban ANTICOAGULANT (XARELTO) tablet 20 mg     glucose 40 % gel 15-30 g    Or     dextrose 50 % injection 25-50 mL    Or     glucagon injection 1 mg     ranitidine (ZANTAC) tablet 150 mg         Physical Examination:   BP (!) 84/44 (BP Location: Right arm)  Pulse 80  Temp 97.3  F (36.3  C) (Oral)  Resp 18  Ht 1.778 m (5' 10\")  Wt 83.4 kg (183 lb 14.4 oz)  SpO2 96%  BMI 26.39 kg/m2  At wheelchair, propels independently  R pupil deformed - previously documented   L hand and toe nails with fungal infection changes appears chronic   L UE with tone on L elbow flexors  L LE with antigravity hip flex and knee ext some limited range  Limited antigravity ankle DF / PF with limited range   Some left sided neglect       More than 15 minutes spent with at least half on care coordination      "

## 2017-03-15 NOTE — PLAN OF CARE
Problem: Goal/Outcome  Goal: Goal Outcome Summary  PT: Pt participating with neuro caro for increased use/stability of LLE with standing activities (likes beach ball volleyball activity in stance) needing A and cues to maintain balance at times. Pt amb with rail on R and mod A, wc follow.  COnt toward goals.

## 2017-03-15 NOTE — PLAN OF CARE
Problem: Goal/Outcome  Goal: Goal Outcome Summary  SLP:  Pt seen for meal f/u DD2/nectar + trials of DD3 textured item.  Pt taking larger bites of sandwich (DD3 item) resulting in significantly longer mastication time and moderate amount of oral residue.  Delayed mild cough (x1) following 4th bite of sandwich.  Continue DD2/nectar and will f/u tomorrow for tolerance and additional trials of DD3.    Cognition:  Pt quite somnolent for PM session.  Completed visual scanning/reading with reasoning task.  pt able to complete with 90% accuracy given minimal verbal cues for scanning.

## 2017-03-15 NOTE — PLAN OF CARE
Problem: Goal/Outcome  Goal: Goal Outcome Summary  Occupational Therapy treatment session with use of the FES UE Ergometer :   Session # 3  Skilled set-up on the : patient dependent for proper placement of electrodes on left wrist flexors/extensors, biceps, triceps, deltoids, and scapular musculature for optimum muscle contraction, safe positioning in WC and alignment of patient to  ergometer. Passive motion was assessed to ensure proper positioning and joint integrity during cycling. Pt tolerating 20 min of active FES ergometry with 0-100% stimulation applied to above muscles at 30-35 rpm with .5-.91 nm resistance. Good muscle contraction was observed in all muscle groups (please refer to [email or web address] for stimulation parameters). OT adjusted e-stim and cycling parameters in real-time to ensure appropriate targeted muscle contraction/response throughout session. Pt very fatigued today, requiring 2 rest breaks throughout FES ergometry, first at 10 min and then again at 15 min. Unable to reach full 30 min due to fatigue. Completed cervical stretches and R upper trapezius trigger pt release with good results noted. Utilizing  aid and arm trough to support LUE during cyling. Functionally, pt demonstrating increased sh elevation, internal rotation, and scapular retraction. Continues to have limited elbow and wrist movements at this time.  Plan for future use: 3/17/17-3/19/17  For full cycling report, please refer to MessageOne, patient ID [id number] and pin [id number].

## 2017-03-15 NOTE — PLAN OF CARE
Problem: Goal/Outcome  Goal: Goal Outcome Summary  Outcome: No Change  FOCUS/GOAL  Bowel management, Bladder management and Medical management     ASSESSMENT, INTERVENTIONS AND CONTINUING PLAN FOR GOAL:  Pt continent of bowel and bladder this shift. Toileting offered q2h. No PRN hydralazine needed this shift. Pt denies pain at this time. Continue with POC.

## 2017-03-15 NOTE — PLAN OF CARE
Problem: Goal/Outcome  Goal: Goal Outcome Summary  Outcome: No Change  FOCUS/GOAL  Medical management     ASSESSMENT, INTERVENTIONS AND CONTINUING PLAN FOR GOAL:  Patient is alert and oriented, he used call light appropriately for assistance, able to verbalize his needs. Painful rash on his back, right leg and feet, some relief with massage. He requires maximal assistance of one with turning and repositioning, and two to boost him up in bed.  Continent of urine, used a urinal, staff assisted him to hold urinal and emptied it. No BM.

## 2017-03-15 NOTE — PROGRESS NOTES
Pt up in chair for supper. Daughter here to visit around that time and went to [employer] and they played cards. Using urinal with placement by nurse but at times incontinent. Sonja area sl reddened. Has medicated powder which was applied. Had BM this shift. Transfers with 2 assist to wheelchair. Pneumoboots applied at hs but pt shortly after asked me to remove them saying he couldn't sleep with them on.

## 2017-03-16 LAB
GLUCOSE BLDC GLUCOMTR-MCNC: 136 MG/DL (ref 70–99)
GLUCOSE BLDC GLUCOMTR-MCNC: 142 MG/DL (ref 70–99)
GLUCOSE BLDC GLUCOMTR-MCNC: 175 MG/DL (ref 70–99)
GLUCOSE BLDC GLUCOMTR-MCNC: 180 MG/DL (ref 70–99)
GLUCOSE BLDC GLUCOMTR-MCNC: 273 MG/DL (ref 70–99)

## 2017-03-16 PROCEDURE — 12800006 ZZH R&B REHAB

## 2017-03-16 PROCEDURE — A9270 NON-COVERED ITEM OR SERVICE: HCPCS | Mod: GY | Performed by: PHYSICIAN ASSISTANT

## 2017-03-16 PROCEDURE — 40000133 ZZH STATISTIC OT WARD VISIT

## 2017-03-16 PROCEDURE — 00000146 ZZHCL STATISTIC GLUCOSE BY METER IP

## 2017-03-16 PROCEDURE — 40000133 ZZH STATISTIC OT WARD VISIT: Performed by: OCCUPATIONAL THERAPIST

## 2017-03-16 PROCEDURE — 97532 ZZHC SP COGNITIVE SKILLS EA 15 MIN: CPT | Mod: GN

## 2017-03-16 PROCEDURE — A9270 NON-COVERED ITEM OR SERVICE: HCPCS | Mod: GY | Performed by: PHYSICAL MEDICINE & REHABILITATION

## 2017-03-16 PROCEDURE — 25000132 ZZH RX MED GY IP 250 OP 250 PS 637: Mod: GY | Performed by: PHYSICIAN ASSISTANT

## 2017-03-16 PROCEDURE — 40000225 ZZH STATISTIC SLP WARD VISIT

## 2017-03-16 PROCEDURE — 97530 THERAPEUTIC ACTIVITIES: CPT | Mod: GP | Performed by: PHYSICAL THERAPIST

## 2017-03-16 PROCEDURE — 97150 GROUP THERAPEUTIC PROCEDURES: CPT | Mod: GO | Performed by: OCCUPATIONAL THERAPIST

## 2017-03-16 PROCEDURE — 25000132 ZZH RX MED GY IP 250 OP 250 PS 637: Mod: GY | Performed by: PHYSICAL MEDICINE & REHABILITATION

## 2017-03-16 PROCEDURE — 92526 ORAL FUNCTION THERAPY: CPT | Mod: GN

## 2017-03-16 PROCEDURE — 40000193 ZZH STATISTIC PT WARD VISIT: Performed by: PHYSICAL THERAPIST

## 2017-03-16 PROCEDURE — 97116 GAIT TRAINING THERAPY: CPT | Mod: GP | Performed by: PHYSICAL THERAPIST

## 2017-03-16 PROCEDURE — 97535 SELF CARE MNGMENT TRAINING: CPT | Mod: GO

## 2017-03-16 RX ADMIN — DICLOFENAC SODIUM: 10 GEL TOPICAL at 20:35

## 2017-03-16 RX ADMIN — VITAMIN D, TAB 1000IU (100/BT) 2000 UNITS: 25 TAB at 09:13

## 2017-03-16 RX ADMIN — HYDROCHLOROTHIAZIDE 25 MG: 12.5 CAPSULE ORAL at 09:12

## 2017-03-16 RX ADMIN — RIVAROXABAN 20 MG: 10 TABLET, FILM COATED ORAL at 18:32

## 2017-03-16 RX ADMIN — FERROUS GLUCONATE 324 MG: 324 TABLET ORAL at 09:13

## 2017-03-16 RX ADMIN — ATORVASTATIN CALCIUM 40 MG: 40 TABLET, FILM COATED ORAL at 09:14

## 2017-03-16 RX ADMIN — Medication 1 MG: at 21:19

## 2017-03-16 RX ADMIN — FLUTICASONE PROPIONATE 1 SPRAY: 50 SPRAY, METERED NASAL at 09:12

## 2017-03-16 RX ADMIN — MICONAZOLE NITRATE: 2 POWDER TOPICAL at 21:20

## 2017-03-16 RX ADMIN — Medication 7.5 MG: at 09:14

## 2017-03-16 RX ADMIN — LISINOPRIL 20 MG: 20 TABLET ORAL at 09:12

## 2017-03-16 RX ADMIN — LIDOCAINE 2 PATCH: 50 PATCH CUTANEOUS at 09:08

## 2017-03-16 RX ADMIN — MICONAZOLE NITRATE: 2 POWDER TOPICAL at 09:08

## 2017-03-16 RX ADMIN — DICLOFENAC SODIUM: 10 GEL TOPICAL at 13:23

## 2017-03-16 RX ADMIN — METOPROLOL TARTRATE 50 MG: 50 TABLET, FILM COATED ORAL at 20:34

## 2017-03-16 RX ADMIN — DICLOFENAC SODIUM: 10 GEL TOPICAL at 09:08

## 2017-03-16 RX ADMIN — TAMSULOSIN HYDROCHLORIDE 0.8 MG: 0.4 CAPSULE ORAL at 09:14

## 2017-03-16 RX ADMIN — LISINOPRIL 20 MG: 20 TABLET ORAL at 20:34

## 2017-03-16 RX ADMIN — METOPROLOL TARTRATE 50 MG: 50 TABLET, FILM COATED ORAL at 09:16

## 2017-03-16 RX ADMIN — RANITIDINE HYDROCHLORIDE 150 MG: 150 TABLET, FILM COATED ORAL at 20:34

## 2017-03-16 RX ADMIN — RANITIDINE HYDROCHLORIDE 150 MG: 150 TABLET, FILM COATED ORAL at 09:14

## 2017-03-16 NOTE — PLAN OF CARE
"Problem: Goal/Outcome  Goal: Goal Outcome Summary  OT: Pt participated in the established \"Transitions Group\" for stroke pts. Pt late to group due to toileting needs -30 min OT. Provided some of information 1:1. Also issued  Understanding Stroke  Booklet. Highlighting topics such as return to meaningful activities, health/wellness, fatigue, depression/anxiety, bowel/bladder considerations w/ community re-integration and caregiver wellness/support. Pt reports personal goals after discharge are to improve mobility, increase strength, and eventually return home. Pt planning to d/c to TCU.       "

## 2017-03-16 NOTE — PLAN OF CARE
Problem: Goal/Outcome  Goal: Goal Outcome Summary  Outcome: No Change  FOCUS/GOAL  Medical management     ASSESSMENT, INTERVENTIONS AND CONTINUING PLAN FOR GOAL:  Patient slept between cares. He used call light for assistance, and also yelled out for assistance. He was continent of urine, used urinal, incontinent of BM once. He required total assistance with incontinence cares. Plan to d/c to a TCU.

## 2017-03-16 NOTE — PROGRESS NOTES
"[facility] Acute Rehabilitation Unit  Progress Note    Interval Hx  Feels well overall   Cont to participate in therapies  [facility] [place]  Cont to encourage po fluids  Pending TCU, team meeting today       Assessment and Plan of Care: This is an 85-year-old male with a past medical history of stroke 9 years ago with some residual left leg weakness with history of chronic ischemic heart disease, prostate cancer, hypertension, hyperlipidemia, type 2 diabetes, chronic kidney disease, atrial fibrillation. Now presents with left hemiparesis, upper extremity, on top of residual left lower extremity weakness with dysarthria, dysphagia, concern for cognitive impairment on top of hard of hearing with impaired mobility, ADLs.    Functionally:  \"SLP: Pt seen for dysphagia tx during meal. Trial of dysphagia diet level 3 textures, nectar thick liquids and thin liquid trials. Pt demonstrated adequate tolerance of advanced textures. Pt exhibited x1 cough following thin liquid trial from cup, pt noted to also be still consuming previous bite of solids during this sip. Cued to have one bite or sip at a time and ensure clear oral cavity each time prior to having additional bite/sip. No other overt s/s of aspiration presented. Continue to trial thin liquid at meals/isolation prior to advancing liquids Recommend advance to dysphagia diet level 3 and nectar thick liquids. Pt should sit upright, take small bites/sips and alternate consistencies.\"  \"OT: Pt participated in the established \"Transitions Group\" for stroke pts. Pt late to group due to toileting needs -30 min OT. Provided some of information 1:1. Also issued  Understanding Stroke  Booklet. Highlighting topics such as return to meaningful activities, health/wellness, fatigue, depression/anxiety, bowel/bladder considerations w/ community re-integration and caregiver wellness/support. Pt reports personal goals after discharge are to improve mobility, " "increase strength, and eventually return home. Pt planning to d/c to TCU. \"  Continue therapies and plan of care.      Overall Management:   - optimize secondary stroke management, on xarelto for anticoagulation, BP management, DM / BS control, lipid management  - HTN, on HCTZ, metoprolol and lisinopril increase dose 3/1 and 3/3, has prn hydralazine, added HCTZ 3/6, adjust as needed  - DM / BS, endocrinology managing BS control and adjusting management as needed   - HLD, on lipitor  - on vit D, ferrous gluconate, flonase  - melatonin for sleep  - L ankle swelling, some pain, reports twisting ankle, able to move aROM on own w/o sig discomfort, fx suspicion low, will do ice pack and voltaren gel for now, monitor   - dietitian following with terrence counts now off TF since 3/7 overnight, follow po intake, d/fortino nasal feeding tube 3/8  - at risk for dehydration d/t restricted po on thickened liquids, i/o monitoring, encourage fluids, IVF supplementation done on 3/13 with improvement     Bladder and Bowel - monitor spont voids and BM, on flomax, miralax prn  GI ppx - on zantac  [facility] with [facility] and antiembolism stockings, on xarelto, progressive mobilization         Active Diet Order      Dysphagia Diet Level 2 [facility] Altered Nectar Thickened Liquids (pre-thickened or use instant food thickener)      Diet    Labs    Recent Labs  Lab 03/16/17  1227 03/16/17  0839 03/16/17  0206 03/15/17  2129 03/15/17  2104 03/15/17  1751  03/14/17  0730  03/13/17  1111  03/12/17  1121   GLC  --   --   --   --   --   --   --  117*  --  228*  --  196*   * 136* 142* 191* 182* 110*  < >  --   < >  --   < >  --    < > = values in this interval not displayed.          Medications:  Current Facility-Administered Medications   Medication     lidocaine (LIDODERM) 5 % Patch 1-2 patch     lidocaine (LIDODERM) patch REMOVAL     lidocaine (LIDODERM) Patch in Place     glipiZIDE (GLUCOTROL) half-tab 7.5 mg     tamsulosin (FLOMAX) " "capsule 0.8 mg     insulin glargine (LANTUS) injection 10 Units     insulin aspart (NovoLOG) inj (RAPID ACTING)     hydrochlorothiazide (MICROZIDE) capsule 25 mg     hydrALAZINE (APRESOLINE) tablet 10 mg     insulin aspart (NovoLOG) inj (RAPID ACTING)     lisinopril (PRINIVIL/ZESTRIL) tablet 20 mg     miconazole (MICATIN; MICRO GUARD) 2 % powder     diclofenac (VOLTAREN) 1 % topical gel     acetaminophen (TYLENOL) tablet 650 mg     atorvastatin (LIPITOR) tablet 40 mg     cholecalciferol (vitamin D) tablet 2,000 Units     ferrous gluconate (FERGON) tablet 324 mg     fluticasone (FLONASE) 50 MCG/ACT spray 1-2 spray     melatonin tablet 1 mg     metoprolol (LOPRESSOR) tablet 50 mg     polyethylene glycol (MIRALAX/GLYCOLAX) powder 17 g     rivaroxaban ANTICOAGULANT (XARELTO) tablet 20 mg     glucose 40 % gel 15-30 g    Or     dextrose 50 % injection 25-50 mL    Or     glucagon injection 1 mg     ranitidine (ZANTAC) tablet 150 mg         Physical Examination:   /57 (BP Location: Right arm)  Pulse 55  Temp 97.4  F (36.3  C) (Oral)  Resp 18  Ht 1.778 m (5' 10\")  Wt 83.4 kg (183 lb 14.4 oz)  SpO2 95%  BMI 26.39 kg/m2  Taking nap, arousable, feels well overall   R pupil deformed - previously documented   L hand and toe nails with fungal infection changes appears chronic   L UE with tone on L elbow flexors  L LE with antigravity hip flex and knee ext but limited range 3-/5  Limited antigravity ankle DF / PF with limited range   Left neglect       More than 25 minutes spent with at least half on care coordination and team meeting       "

## 2017-03-16 NOTE — PLAN OF CARE
Problem: Goal/Outcome  Goal: Goal Outcome Summary  Outcome: No Change  Pt was incontinent of bowel during and after taking a shower this evening, incontinent pad applied afterwards. Urinal offered, staff helps to hold urinal. Assist of 1 transfer from chair to bed and vice versa. Ambulated short distance within the room with a jaclyn-walker and 1 assist. from the commod Pt consumed 100% of dinner by self after set up. Daughter came to visit during dinner. Microguard powder applied to groin area, minimal redness noted. Rash on upper mid back, no complaints of itching.

## 2017-03-16 NOTE — PLAN OF CARE
Problem: Goal/Outcome  Goal: Goal Outcome Summary     SLP: Pt seen for dysphagia tx during meal. Trial of dysphagia diet level 3 textures, nectar thick liquids and thin liquid trials. Pt demonstrated adequate tolerance of advanced textures. Pt exhibited x1 cough following thin liquid trial from cup, pt noted to also be still consuming previous bite of solids during this sip. Cued to have one bite or sip at a time and ensure clear oral cavity each time prior to having additional bite/sip. No other overt s/s of aspiration presented. Continue to trial thin liquid at meals/isolation prior to advancing liquids Recommend advance to dysphagia diet level 3 and nectar thick liquids. Pt should sit upright, take small bites/sips and alternate consistencies.

## 2017-03-16 NOTE — PROGRESS NOTES
Acute Rehab Care Conference/Team Rounds      Type: Team Rounds    Present: Dr. Kevin Bond, Babita Rosales OT, Vee Nice PT, Neda Catalan , Cyndi Rocha Dietician, lFora Bustos,  Lilo Figueroa RN.      Discharge Barriers/Treatment/Education    Rehab Diagnosis: This is an 85-year-old male with a past medical history of stroke 9 years ago with some residual left leg weakness with history of chronic ischemic heart disease, prostate cancer, hypertension, hyperlipidemia, type 2 diabetes, chronic kidney disease, atrial fibrillation. Now presents with left hemiparesis, upper extremity, on top of residual left lower extremity weakness with dysarthria, dysphagia, concern for cognitive impairment on top of hard of hearing with impaired mobility, ADLs.    Active Medical Co-morbidities/Prognosis: Prognosis for medical improvement and stabilization of the above medical issues is good.       Safety: pt does not consistently use call light. Bed and chair alarms on at all times.     Pain: lidoderm patches for R neck and R shoulder pain. Voltaren gel for L ankle pain.    Medications, Skin, Tubes/Lines: Pt able to take pills whole with applesauce.     Swallowing/Nutrition: Pt is currently tolerating DD2/NTL with no overt s/sx of aspiration. With trials of DD3, pt tends to take large bites and show s/sx of aspiration. Majority of limited trials of thin liquid with no s/sx of aspiration, but due to occasional s/sx of aspiration and pt's compromised cognitive status recommend continuing NTL. Note that pt has had improved sensation on left side of face as demonstrated by asking for help with wiping lips, and that patient has increased independence with finding items on the left side of tray. Anticipate pt will require continuing speech therapy to assist with upgrading diet as tolerated.     Bowel/Bladder: Continent and incontinent of bowel. LBM 3/16. Continent of bladder with q2h  toileting offered. Pt does experience some dribbling and has had incontinence of bladder without scheduled toileting.     Psychosocial:  TCU placement pending    ADLs/IADLs: Pt is making progress in therapy. Pt requiring Mod A UB dressing, Max A LB dressing, Max-Total A toilet hygiene, and Min A g/h. Pt requiring Mod A toilet transfer with weighted jaclyn cane. Mod A shower transfer and Mod-Max A bathing. Pt limited by LUE/LLE hemiparesis (minimal sh movements, flaccid elbow/wrist/hand), L neglect, R gaze preference, fatigue, and cervical tightness/pain. Treatment focus on one handed ADLs, LUE neuro re-ed, visual/perceptual attention, and functional transfers.  At baseline, pt lives at [facility] with [facility] support for IADLs. Family unable to provide the care he currently needs. Anticipate pt will require extended rehab course to address goals in POC and to safely discharge back to [facility]. Recommend ongoing skilled OT in IP ARU and discharge to TCU setting.     Mobility: Pt with significant L hemiparesis, yanci L UE, with decreased sensation/awareness LUe and LLE.  Pt is needing min to modA with sit ot sup and stand pivot transfers, with weighted hemicane.  Pt is amb in schultz with rail on R side and modA of 1, A and cuing, facilitation for LLe advancement and stability LLE with stepping RLE, about 20 ft, aguayo by fatigue. Wc follow from another helper with amb. Pt is propelling wc with RUE and RLE sba to K[place]y for awareness L side, about 100-150 ft, cues for path finding.  Pt with intermittent c/o R neck pain, tends to keep head/neck/gaze towards R side, working on ROM, posture, support for neck when resting and use of heat , STM as pt can tolerate. Plan for TCU for longer term rehab.     Cognition/Language: Pt is making steady gains with utilizing compensatory strategies during reading/scanning tasks, although performance varies with fatigue. Pt is currently writing single words accurately, although occasionally requires reminders as  to which word he is writing and where he is to write it. Pt has demonstrated good gains during moderate level card game tasks designed to require attention to left hemispace, attention to process of game, mathematical reasoning, and problem solving. Currently require oversight/anticipation of needs for all moderate and higher level cognitive tasks. Anticipate that pt will require ongoing speech therapy to address perceptual and cognitive deficits as related to language. Anticipate d/c to TCU.    Progress has been limited by pt's compromised vision     Community Re-Entry: Pt would need manual wc at this time. Decreased activity tolerance.     Transportation: WIll work on car tx with quad cane.     Decision maker: self and family    Plan of Care and goals reviewed and updated.    Discharge Plan/Recommendations    Fall Precautions: continue    Overall plan for the patient:   Making gradual gains, still pending TCU transfer.   Continent of bladder as of late.         Utilization Review and Continued Stay Justification    Medical Necessity Criteria:    For any criteria that is not met, please document reason and plan for discharge, transfer, or modification of plan of care to address.    Requires intensive rehabilitation program to treat functional deficits?: Yes    Requires 3x per week or greater involvement of rehabilitation physician to oversee rehabilitation program?: Yes    Requires rehabilitation nursing interventions?: Yes    Patient is making functional progress?: Yes    There is a potential for additional functional progress? Yes    Patient is participating in therapy 3 hours per day a minimum of 5 days per week or 15 hours per week in 7 day period?:Yes    Has discharge needs that require coordinated discharge planning approach?:Yes        Final Physician Sign off    Statement of Approval: I agree with all the recommendations detailed in this document.   Kevin Bond MD      Patient Goals             OT  target date for goal attainment: 03/28/17  OT Frequency: daily 4 weeks  OT goal: hygiene/grooming: modified independent  OT goal: upper body dressing: Modified independent  OT goal: lower body dressing: Minimal assist  OT goal: upper body bathing: Modified independent  OT goal: lower body bathing: Minimal assist, Supervision/stand-by assist, Modified independent  OT goal: toilet transfer/toileting: Supervision/stand-by assist  OT goal: home management: Minimal assist  OT goal: perform aerobic activity with stable cardiovascular response: intermittent activity  Edema Management (OT): With caregiver assist  OT goal 1: OT: pt will complete wet walk in shower transfer supervision for safety.      PT target date for goal attainment: 03/28/17  PT Frequency: daily , 60-75 min per day.  PT goal: bed mobility: Modified independent, Supine to/from sit, Rolling, Bridging (with rail, if needed. )  PT goal: transfers: Modified independent, Sit to/from stand, Bed to/from chair, Assistive device (with cane or walker. )  PT goal: gait: Modified independent, Quad cane, 50 feet (may need L afo. )     PT goal: perform aerobic activity with stable cardiovascular response: continuous activity, NuStep, 10 minutes     PT goal 1: Pt able to perform car tx with AD and sba.   PT Goal 2: Pt able to score > 24/36 on the PASS for improved functional mobility, safety at home.  PT Goal 3: Pt able to state 3 fall prevention techniques for increased safety at home.   PT Goal 4: Floor recovery tx with furniture and sba.   PT Goal 5: pT and caregiver I with HEP for balance, endurance, and strength for increased function at home.     SLP target date for goal attainment: 03/21/17  SLP Frequency: daily  SLP Swallow Goal:  Safely tolerate diet without signs/symptoms of aspiration: regular diet, thin liquids, with use of swallow precautions, independently. GOAL PROGRESSING.   SLP goal 1: SLP: pt will complete multi sentence reading, min assist for visual  perceptual compensations 90% accuracy. GOAL PROGRESSING.  SLP goal 2: SLP: pt will complete written tasks - short paragraphs 90% accuracy (min cues for visual perceptual deficits/compensations.). GOAL PROGRESSING.  SLP goal 3: SLP: pt will complete verbal/written reasoning tasks moderate complex level 90% accuracy. GOAL PROGRESSING.   SLP goal 4: SLP: pt will complete moderate complex attention, recent/working memory tasks 90% accuracy, min assist. GOAL PROGRESSING.      Nursing Goal: Patient will advocate for pain and demonstrate understanding on nonpharmacological pain interventions before discharge.  Nursing Goal: Patient will maintain adequate nutrition with scheduled tube feedings and  progress with diet as indicated before discharge.  Nursing Goal: Patient will demonstrate understanding on safe transfers before discharge.  Nursing Goal: Patient's skin will remain intact with adequate incontinence management and will demonstrate understanding on prevention of skin breakdown before discharge.  Nursing Goal: Pt will be able to demonstrate ability to manage BG checks using own glucometer and insulin management before discharge.  Nursing Goal: Pt will be able to demonstrate ability to manage TF safely if discharging home with this.   Nursing Goal: Pt and family member will be able to enumerate s/sx of stroke, risk factors for stroke and healthy lifestyle patterns to maintain to prevent another stroke.

## 2017-03-16 NOTE — PLAN OF CARE
Problem: Goal/Outcome  Goal: Goal Outcome Summary  Outcome: No Change  FOCUS/GOAL  Bowel management, Bladder management and Pain management     ASSESSMENT, INTERVENTIONS AND CONTINUING PLAN FOR GOAL:  Pt able to be continent of bowel and bladder this shift with toileting offered q2h. Pt c/o R neck and shoulder pain and was given scheduled lidoderm patches with some relief. Continue with POC.

## 2017-03-17 LAB
GLUCOSE BLDC GLUCOMTR-MCNC: 150 MG/DL (ref 70–99)
GLUCOSE BLDC GLUCOMTR-MCNC: 180 MG/DL (ref 70–99)
GLUCOSE BLDC GLUCOMTR-MCNC: 191 MG/DL (ref 70–99)
GLUCOSE BLDC GLUCOMTR-MCNC: 199 MG/DL (ref 70–99)
GLUCOSE BLDC GLUCOMTR-MCNC: 231 MG/DL (ref 70–99)

## 2017-03-17 PROCEDURE — 12800006 ZZH R&B REHAB

## 2017-03-17 PROCEDURE — 40000193 ZZH STATISTIC PT WARD VISIT: Performed by: STUDENT IN AN ORGANIZED HEALTH CARE EDUCATION/TRAINING PROGRAM

## 2017-03-17 PROCEDURE — 40000225 ZZH STATISTIC SLP WARD VISIT

## 2017-03-17 PROCEDURE — A9270 NON-COVERED ITEM OR SERVICE: HCPCS | Mod: GY | Performed by: PHYSICIAN ASSISTANT

## 2017-03-17 PROCEDURE — 92526 ORAL FUNCTION THERAPY: CPT | Mod: GN

## 2017-03-17 PROCEDURE — A9270 NON-COVERED ITEM OR SERVICE: HCPCS | Mod: GY | Performed by: PHYSICAL MEDICINE & REHABILITATION

## 2017-03-17 PROCEDURE — 40000133 ZZH STATISTIC OT WARD VISIT

## 2017-03-17 PROCEDURE — 97535 SELF CARE MNGMENT TRAINING: CPT | Mod: GO

## 2017-03-17 PROCEDURE — 00000146 ZZHCL STATISTIC GLUCOSE BY METER IP

## 2017-03-17 PROCEDURE — 97110 THERAPEUTIC EXERCISES: CPT | Mod: GP | Performed by: STUDENT IN AN ORGANIZED HEALTH CARE EDUCATION/TRAINING PROGRAM

## 2017-03-17 PROCEDURE — 25000132 ZZH RX MED GY IP 250 OP 250 PS 637: Mod: GY | Performed by: PHYSICAL MEDICINE & REHABILITATION

## 2017-03-17 PROCEDURE — 97530 THERAPEUTIC ACTIVITIES: CPT | Mod: GP | Performed by: STUDENT IN AN ORGANIZED HEALTH CARE EDUCATION/TRAINING PROGRAM

## 2017-03-17 PROCEDURE — 25000132 ZZH RX MED GY IP 250 OP 250 PS 637: Mod: GY | Performed by: PHYSICIAN ASSISTANT

## 2017-03-17 PROCEDURE — 97532 ZZHC SP COGNITIVE SKILLS EA 15 MIN: CPT | Mod: GN

## 2017-03-17 RX ADMIN — DICLOFENAC SODIUM: 10 GEL TOPICAL at 21:44

## 2017-03-17 RX ADMIN — LIDOCAINE 2 PATCH: 50 PATCH CUTANEOUS at 07:54

## 2017-03-17 RX ADMIN — METOPROLOL TARTRATE 50 MG: 50 TABLET, FILM COATED ORAL at 21:44

## 2017-03-17 RX ADMIN — FLUTICASONE PROPIONATE 1 SPRAY: 50 SPRAY, METERED NASAL at 08:14

## 2017-03-17 RX ADMIN — RANITIDINE HYDROCHLORIDE 150 MG: 150 TABLET, FILM COATED ORAL at 21:44

## 2017-03-17 RX ADMIN — TAMSULOSIN HYDROCHLORIDE 0.8 MG: 0.4 CAPSULE ORAL at 07:53

## 2017-03-17 RX ADMIN — VITAMIN D, TAB 1000IU (100/BT) 2000 UNITS: 25 TAB at 07:54

## 2017-03-17 RX ADMIN — FERROUS GLUCONATE 324 MG: 324 TABLET ORAL at 07:54

## 2017-03-17 RX ADMIN — Medication 7.5 MG: at 07:53

## 2017-03-17 RX ADMIN — LISINOPRIL 20 MG: 20 TABLET ORAL at 21:44

## 2017-03-17 RX ADMIN — MICONAZOLE NITRATE: 2 POWDER TOPICAL at 21:44

## 2017-03-17 RX ADMIN — MICONAZOLE NITRATE: 2 POWDER TOPICAL at 08:04

## 2017-03-17 RX ADMIN — LISINOPRIL 20 MG: 20 TABLET ORAL at 07:54

## 2017-03-17 RX ADMIN — RIVAROXABAN 20 MG: 10 TABLET, FILM COATED ORAL at 16:16

## 2017-03-17 RX ADMIN — DICLOFENAC SODIUM: 10 GEL TOPICAL at 12:41

## 2017-03-17 RX ADMIN — DICLOFENAC SODIUM: 10 GEL TOPICAL at 16:16

## 2017-03-17 RX ADMIN — ATORVASTATIN CALCIUM 40 MG: 40 TABLET, FILM COATED ORAL at 07:54

## 2017-03-17 RX ADMIN — RANITIDINE HYDROCHLORIDE 150 MG: 150 TABLET, FILM COATED ORAL at 07:53

## 2017-03-17 RX ADMIN — METOPROLOL TARTRATE 50 MG: 50 TABLET, FILM COATED ORAL at 07:54

## 2017-03-17 RX ADMIN — Medication 1 MG: at 21:44

## 2017-03-17 RX ADMIN — DICLOFENAC SODIUM: 10 GEL TOPICAL at 07:55

## 2017-03-17 NOTE — PLAN OF CARE
Problem: Goal/Outcome  Goal: Goal Outcome Summary  Outcome: Therapy, progress toward functional goals as expected     Pt seen for dysphagia tx during meal. After thorough oral cares, trialed thin liquids. Verbally cued pt to drink in small sips, pt took 5x small sips with no overt s/sx of aspiration, but ~2 minutes later pt had delayed cough and complained of pain in lower left side. Suspect aspiration with thin liquids. Pt required assistance to open syrup and juice packets, and to cut up Icelandic toast, but pt self-fed independently with setup. No overt s/sx of aspiration with nectar-thick liquids, 1x throat clear with chopped peaches. Recommend continuing DD3/NTL diet.

## 2017-03-17 NOTE — PLAN OF CARE
Problem: Goal/Outcome  Goal: Goal Outcome Summary  Talked with pts sister regarding TCU options. Haven't heard back yet from [facility]. [facility], [facility] is now families first choice. Spoke with Jaya at [facility], he shared that they may have a bed available early next week. Awaiting a return call from Jaya after he has reviewed pts chart and made a decision. Sw will continue to assist as needed.

## 2017-03-17 NOTE — PROGRESS NOTES
"[facility] Acute Rehabilitation Unit  Progress Note    Interval Hx  In room  About to eat lunch  Feels well overall  Pending TCU      Assessment and Plan of Care: This is an 85-year-old male with a past medical history of stroke 9 years ago with some residual left leg weakness with history of chronic ischemic heart disease, prostate cancer, hypertension, hyperlipidemia, type 2 diabetes, chronic kidney disease, atrial fibrillation. Now presents with left hemiparesis, upper extremity, on top of residual left lower extremity weakness with dysarthria, dysphagia, concern for cognitive impairment on top of hard of hearing with impaired mobility, ADLs.    Functionally:  \"Pt seen for dysphagia tx during meal. After thorough oral cares, trialed thin liquids. Verbally cued pt to drink in small sips, pt took 5x small sips with no overt s/sx of aspiration, but ~2 minutes later pt had delayed cough and complained of pain in lower left side. Suspect aspiration with thin liquids. Pt required assistance to open syrup and juice packets, and to cut up french toast, but pt self-fed independently with setup. No overt s/sx of aspiration with nectar-thick liquids, 1x throat clear with chopped peaches. Recommend continuing DD3/NTL diet. \"  \"OT: pt had BM during session completing functional transfers with min A and quad cane including ambulating into bathroom though significantly unsteady, pt was Mod A for toilet cares. Pt was motivated to participate in session.\"  Continue therapies and plan of care.      Overall Management:   - optimize secondary stroke management, on xarelto for anticoagulation, BP management, DM / BS control, lipid management  - HTN, on HCTZ, metoprolol and lisinopril increase dose 3/1 and 3/3, has prn hydralazine, added HCTZ 3/6, adjust as needed  - DM / BS, endocrinology managing BS control and adjusting management as needed   - HLD, on lipitor  - on vit D, ferrous gluconate, " flonase  - melatonin for sleep  - L ankle swelling, some pain, reports twisting ankle, able to move aROM on own w/o sig discomfort, fx suspicion low, will do ice pack and voltaren gel for now, monitor   - dietitian following with terrence counts now off TF since 3/7 overnight, follow po intake, d/fortino nasal feeding tube 3/8  - at risk for dehydration d/t restricted po on thickened liquids, i/o monitoring, encourage fluids, IVF supplementation done on 3/13 with improvement     Bladder and Bowel - monitor spont voids and BM, on flomax, miralax prn  GI ppx - on zantac  DVT ppx - optimize mech ppx with PCDs and antiembolism stockings, on xarelto, progressive mobilization         Active Diet Order      Diet      Dysphagia Diet Level 3 Advanced Nectar Thickened Liquids (pre-thickened or use instant food thickener)    Labs    Recent Labs  Lab 03/17/17  1209 03/17/17  0801 03/17/17  0155 03/16/17  2107 03/16/17  1700 03/16/17  1227  03/14/17  0730  03/13/17  1111  03/12/17  1121   GLC  --   --   --   --   --   --   --  117*  --  228*  --  196*   * 150* 180* 273* 180* 175*  < >  --   < >  --   < >  --    < > = values in this interval not displayed.          Medications:  Current Facility-Administered Medications   Medication     lidocaine (LIDODERM) 5 % Patch 1-2 patch     lidocaine (LIDODERM) patch REMOVAL     lidocaine (LIDODERM) Patch in Place     glipiZIDE (GLUCOTROL) half-tab 7.5 mg     tamsulosin (FLOMAX) capsule 0.8 mg     insulin glargine (LANTUS) injection 10 Units     insulin aspart (NovoLOG) inj (RAPID ACTING)     hydrochlorothiazide (MICROZIDE) capsule 25 mg     hydrALAZINE (APRESOLINE) tablet 10 mg     insulin aspart (NovoLOG) inj (RAPID ACTING)     lisinopril (PRINIVIL/ZESTRIL) tablet 20 mg     miconazole (MICATIN; MICRO GUARD) 2 % powder     diclofenac (VOLTAREN) 1 % topical gel     acetaminophen (TYLENOL) tablet 650 mg     atorvastatin (LIPITOR) tablet 40 mg     cholecalciferol (vitamin D) tablet 2,000 Units  "    ferrous gluconate (FERGON) tablet 324 mg     fluticasone (FLONASE) 50 MCG/ACT spray 1-2 spray     melatonin tablet 1 mg     metoprolol (LOPRESSOR) tablet 50 mg     polyethylene glycol (MIRALAX/GLYCOLAX) powder 17 g     rivaroxaban ANTICOAGULANT (XARELTO) tablet 20 mg     glucose 40 % gel 15-30 g    Or     dextrose 50 % injection 25-50 mL    Or     glucagon injection 1 mg     ranitidine (ZANTAC) tablet 150 mg         Physical Examination:   /50  Pulse 71  Temp 97.2  F (36.2  C) (Oral)  Resp 18  Ht 1.778 m (5' 10\")  Wt 83.4 kg (183 lb 14.4 oz)  SpO2 96%  BMI 26.39 kg/y9Uilet, in room, about to eat lunch  Feels well overall  Conversant and cooperative   R pupil deformed - previously documented   L hand and toe nails with fungal infection changes appears chronic   L UE with tone on L elbow flexors  L LE with antigravity hip flex and knee ext but limited range 3-/5  Limited antigravity ankle DF / PF with limited range   Left neglect       More than 15 minutes spent with at least half on care coordination      "

## 2017-03-17 NOTE — PLAN OF CARE
Problem: Goal/Outcome  Goal: Goal Outcome Summary  Outcome: Therapy, progress toward functional goals as expected     Pt continues to make progress regarding neglect with functional reading tasks - pt independently located first word on page. Deficits remain in allocentric neglect, e.g. The first half of a word or the left half of an item, but pt with 33/40 accuracy overall in reading single words. Pt required mod-max cues to complete verbal memory task.

## 2017-03-17 NOTE — PLAN OF CARE
Problem: Goal/Outcome  Goal: Goal Outcome Summary  Outcome: Improving  Patient has been alert and oriented, but forgetful. He has been utilizing call light appropriately this shift and has been able to make his needs known. He transfers with assist of one staff, jaclyn walker, and LUE sling via SPT to wheelchair from bed. He needs minimal cueing to perform this. He has been continent of bowel and bladder and did have a bowel movement this shift. Patient's blood glucose levels were 150 and 231, both requiring sliding scale insulin. Patient stated he had some pain in his left ankle this morning, but after Lidocaine patch was applied, denied pain the rest of the afternoon. Patient also receives scheduled Voltaren gel to this area. Patient denied further pain anywhere else. Lidocaine patch placement to lower back and left ankle at this time. Patient states he has numbness present to LUE, which is not new for him. Patient takes all medications whole in applesauce without difficulty.

## 2017-03-17 NOTE — PLAN OF CARE
Problem: Goal/Outcome  Goal: Goal Outcome Summary  [facility]. Left a message for pts daughter, Jazmin to inform her of plan. Also Sw will arrange a w/c transport for pt. Sw will continue to assist as needed.

## 2017-03-17 NOTE — PLAN OF CARE
Problem: Goal/Outcome  Goal: Goal Outcome Summary  Outcome: Improving  Patient is A&iO x 3, able to make needs known.VS'S. Bed alarm on. Had a BM this shift. Transfers with assist of oneto two. Continent this shift. Nursing will continue to monitor.

## 2017-03-17 NOTE — PLAN OF CARE
Problem: Goal/Outcome  Goal: Goal Outcome Summary  Outcome: No Change  FOCUS/GOAL  Medical management     ASSESSMENT, INTERVENTIONS AND CONTINUING PLAN FOR GOAL:  Patient slept all night, denies pain or discomfort. Continent of bladder, used urinal x1. Turned and reposition every two hours. Rash to middle of back and feet remains unchanged.

## 2017-03-18 LAB
GLUCOSE BLDC GLUCOMTR-MCNC: 152 MG/DL (ref 70–99)
GLUCOSE BLDC GLUCOMTR-MCNC: 162 MG/DL (ref 70–99)
GLUCOSE BLDC GLUCOMTR-MCNC: 163 MG/DL (ref 70–99)
GLUCOSE BLDC GLUCOMTR-MCNC: 164 MG/DL (ref 70–99)
GLUCOSE BLDC GLUCOMTR-MCNC: 195 MG/DL (ref 70–99)

## 2017-03-18 PROCEDURE — A9270 NON-COVERED ITEM OR SERVICE: HCPCS | Mod: GY | Performed by: PHYSICAL MEDICINE & REHABILITATION

## 2017-03-18 PROCEDURE — A9270 NON-COVERED ITEM OR SERVICE: HCPCS | Mod: GY | Performed by: PHYSICIAN ASSISTANT

## 2017-03-18 PROCEDURE — 00000146 ZZHCL STATISTIC GLUCOSE BY METER IP

## 2017-03-18 PROCEDURE — 25000128 H RX IP 250 OP 636: Performed by: PHYSICAL MEDICINE & REHABILITATION

## 2017-03-18 PROCEDURE — 25000132 ZZH RX MED GY IP 250 OP 250 PS 637: Mod: GY | Performed by: PHYSICIAN ASSISTANT

## 2017-03-18 PROCEDURE — 40000193 ZZH STATISTIC PT WARD VISIT: Performed by: PHYSICAL THERAPIST

## 2017-03-18 PROCEDURE — 40000225 ZZH STATISTIC SLP WARD VISIT: Performed by: SPEECH-LANGUAGE PATHOLOGIST

## 2017-03-18 PROCEDURE — 97530 THERAPEUTIC ACTIVITIES: CPT | Mod: GP | Performed by: PHYSICAL THERAPIST

## 2017-03-18 PROCEDURE — 97530 THERAPEUTIC ACTIVITIES: CPT | Mod: GO | Performed by: OCCUPATIONAL THERAPIST

## 2017-03-18 PROCEDURE — 25000132 ZZH RX MED GY IP 250 OP 250 PS 637: Mod: GY | Performed by: PHYSICAL MEDICINE & REHABILITATION

## 2017-03-18 PROCEDURE — 92526 ORAL FUNCTION THERAPY: CPT | Mod: GN | Performed by: SPEECH-LANGUAGE PATHOLOGIST

## 2017-03-18 PROCEDURE — 92507 TX SP LANG VOICE COMM INDIV: CPT | Mod: GN | Performed by: SPEECH-LANGUAGE PATHOLOGIST

## 2017-03-18 PROCEDURE — 12800006 ZZH R&B REHAB

## 2017-03-18 PROCEDURE — 97110 THERAPEUTIC EXERCISES: CPT | Mod: GP | Performed by: PHYSICAL THERAPIST

## 2017-03-18 PROCEDURE — 40000133 ZZH STATISTIC OT WARD VISIT: Performed by: OCCUPATIONAL THERAPIST

## 2017-03-18 RX ORDER — SODIUM CHLORIDE 9 MG/ML
INJECTION, SOLUTION INTRAVENOUS CONTINUOUS
Status: DISPENSED | OUTPATIENT
Start: 2017-03-18 | End: 2017-03-18

## 2017-03-18 RX ORDER — HYDROCHLOROTHIAZIDE 12.5 MG/1
12.5 CAPSULE ORAL DAILY
Status: DISCONTINUED | OUTPATIENT
Start: 2017-03-19 | End: 2017-03-21 | Stop reason: HOSPADM

## 2017-03-18 RX ADMIN — Medication 7.5 MG: at 07:49

## 2017-03-18 RX ADMIN — DICLOFENAC SODIUM: 10 GEL TOPICAL at 19:58

## 2017-03-18 RX ADMIN — LISINOPRIL 20 MG: 20 TABLET ORAL at 07:49

## 2017-03-18 RX ADMIN — ATORVASTATIN CALCIUM 40 MG: 40 TABLET, FILM COATED ORAL at 10:45

## 2017-03-18 RX ADMIN — MICONAZOLE NITRATE: 2 POWDER TOPICAL at 08:00

## 2017-03-18 RX ADMIN — METOPROLOL TARTRATE 50 MG: 50 TABLET, FILM COATED ORAL at 19:59

## 2017-03-18 RX ADMIN — FERROUS GLUCONATE 324 MG: 324 TABLET ORAL at 07:49

## 2017-03-18 RX ADMIN — RIVAROXABAN 20 MG: 10 TABLET, FILM COATED ORAL at 17:35

## 2017-03-18 RX ADMIN — VITAMIN D, TAB 1000IU (100/BT) 2000 UNITS: 25 TAB at 07:48

## 2017-03-18 RX ADMIN — FLUTICASONE PROPIONATE 1 SPRAY: 50 SPRAY, METERED NASAL at 07:50

## 2017-03-18 RX ADMIN — RANITIDINE HYDROCHLORIDE 150 MG: 150 TABLET, FILM COATED ORAL at 07:49

## 2017-03-18 RX ADMIN — MICONAZOLE NITRATE: 2 POWDER TOPICAL at 19:59

## 2017-03-18 RX ADMIN — LISINOPRIL 20 MG: 20 TABLET ORAL at 19:59

## 2017-03-18 RX ADMIN — DICLOFENAC SODIUM: 10 GEL TOPICAL at 12:30

## 2017-03-18 RX ADMIN — LIDOCAINE 2 PATCH: 50 PATCH CUTANEOUS at 07:50

## 2017-03-18 RX ADMIN — DICLOFENAC SODIUM: 10 GEL TOPICAL at 15:52

## 2017-03-18 RX ADMIN — HYDROCHLOROTHIAZIDE 25 MG: 12.5 CAPSULE ORAL at 07:49

## 2017-03-18 RX ADMIN — SODIUM CHLORIDE: 9 INJECTION, SOLUTION INTRAVENOUS at 12:31

## 2017-03-18 RX ADMIN — Medication 1 MG: at 20:00

## 2017-03-18 RX ADMIN — METOPROLOL TARTRATE 50 MG: 50 TABLET, FILM COATED ORAL at 07:49

## 2017-03-18 RX ADMIN — DICLOFENAC SODIUM: 10 GEL TOPICAL at 07:53

## 2017-03-18 RX ADMIN — RANITIDINE HYDROCHLORIDE 150 MG: 150 TABLET, FILM COATED ORAL at 19:59

## 2017-03-18 RX ADMIN — TAMSULOSIN HYDROCHLORIDE 0.8 MG: 0.4 CAPSULE ORAL at 07:49

## 2017-03-18 NOTE — PLAN OF CARE
Problem: Goal/Outcome  Goal: Goal Outcome Summary  OT: Session limited by dizziness and decreased BP with sitting EOB 67/38 and HOB elevated 81/29. MD made aware and following up with patient.

## 2017-03-18 NOTE — PLAN OF CARE
"Problem: Goal/Outcome  Goal: Goal Outcome Summary  Outcome: No Change  FOCUS/GOAL  Bowel management, Bladder management and Medical management     ASSESSMENT, INTERVENTIONS AND CONTINUING PLAN FOR GOAL:     Patient has been sexual improper x 1 to staff (GORDO). Requested staff to rub his penis \"so he could void.\" Staff refused. Patient also stated staff was \"crushing his balls\" while doing castro cares. Patient incontinent of large amount of loose stool. Patient sleeping between cares. Continue plan of care.           "

## 2017-03-18 NOTE — PLAN OF CARE
Problem: Goal/Outcome  Goal: Goal Outcome Summary  FOCUS/GOAL  Bowel management     ASSESSMENT, INTERVENTIONS AND CONTINUING PLAN FOR GOAL:  Pt pleasant, alert, recognized nurse from weeks ago. Daughter present, voiced concern of his ability to chew ham sandwich but he ate 100% no swallowing difficulty noted. Encouraging nectar thick fluids. Continent of urine, clear, light yellow, was up to toilet and urinal with assistance. Extra large incontinent soft BM, linens changed. Denies pain, yelled out when pericares done. Wears sling to LUE and jaclyn walker assist of 1. Alarms on.

## 2017-03-18 NOTE — PROGRESS NOTES
Postural Assessment Scale for Stroke    Maintaining a posture  1. Sitting without support:3  2. Standing with support:3  3. Standing without support:1  4. Standing on nonparetic le  5. Standing on paretic le    Changing posture  6. Supine to affected side lateral:3  7. Supine to nonaffected side lateral:1  8. Supine to sitting up on the edge of the table:1  9. Sitting on the edge of the table to supine:1  10. Sitting to standing up:2  11. Standing up to sitting down:2  12. Standing, picking up a pencil from the floor:2    Total 19: /36

## 2017-03-18 NOTE — PLAN OF CARE
Problem: Goal/Outcome  Goal: Goal Outcome Summary  Outcome: No Change  Patient is alert and oriented and able to make needs known. He utilizes call light appropriately and has been continent of bladder this shift. This morning, patient was physically inappropriate with writer. Patient was attempting to touch, hug, and kiss writer. Writer was direct with patient and asked him to remove his hands. This did not deter patient. Patient transfers with jaclyn walker, gait belt, CGA, and LUE sling. He sits up in wheelchair for meals and ate well this shift. Blood glucose levels were 163 and 164 with sliding scale Novolog coverage. Patient needs encouragement to drink fluids. Patient had episode of orthostatic hypotension with therapy today, as well as yesterday, so Resident ordered patient to be on continuous fluids of 125cc/hr for 8 hours. Fellow RN inserted PIV in patient's RUE and patient tolerated well. IV site WNL and tegaderm dressing in place. Patient denied any difficulty with pain this shift. Lidocaine patches to left ankle and lower, mid back. Patient remains on NDD3 with nectar thickened liquids and took all medications orally with applesauce without difficulty. No other concerns noted at this time. Patient is scheduled to discharge on 3/21.

## 2017-03-18 NOTE — PLAN OF CARE
Problem: Goal/Outcome  Goal: Goal Outcome Summary  PT: Pt participated in transfers, stance with weight shifting to music, and completed PASS again.  Pt scored 19/36 today, up from 14/36 on 3/8. Pt also worked on neck ROM, tolerated stretching, STM to R upper trap, continued education regarding posture, ROM to avoid tightness, discomfort.  Cont toward goals.

## 2017-03-18 NOTE — PLAN OF CARE
Problem: Goal/Outcome  Goal: Goal Outcome Summary  SLP--Pt seen for dysphagia test tray of current diet DD3 with nectar thick liquids.  Oral cares and denture placement completed prior to trials.  Thin liquid trials x3 by spoon and x3 by side of cup.  Pt required reminders for chin tuck however no overt s/sx of aspiration.  Pt required assistance with food set-up for meal as well as cueing to use tongue sweep to removed pocketed bolus in left cheek.  Began paying more attention to left side pocketing after first reminder.  DD3 diet level appears appropriate at this time.  Continue to monitor and apply safe swallow strategies.      Pt followed one-step direct and explicit directives (e.g., Open your mouth,  your spoon, wipe your mouth).  Pt had more difficulty following casual/conversational directions or implied questions (e.g., Would you like to open the blinds?).       Pt had difficulty visually interpreting real picture cards as well as answering basic WH questions about the picture scenes or function real life scenarios.

## 2017-03-18 NOTE — PLAN OF CARE
Problem: Goal/Outcome  Goal: Goal Outcome Summary  SLP--Pt expressive/receptive language task performance improved this PM session with real objects rather than pictures used.  At the end of the session pt required clues to recall each of the 5 real object items.  Pt's family was educated as to home exercises to increase communication outside of session.  Family also observed cueing strategies.

## 2017-03-18 NOTE — PROGRESS NOTES
[facility], [place]   Physical Medicine and Rehabilitation Daily Note         Assessment and Plan of Care:   Mr. 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 Fib who was admitted to the ARU on 2/27 after having a right MCA stroke. His new deficits include dysphagia, dysarthria, left sided weakness and coordination difficulties.    -Continue therapies and plan of care  -Ordered 1 L NaCl 0.9% to run at 125ml/hr  -Patient was seen later in the day and was feeling much better, follow up BP much improved  -Decreased HCTZ to 12.5mg as he has been having hypotensive blood pressure readings over the last few days. Will continue to monitor bp closely and consider DC'ing the HCTZ.         Interval History:   Elias Mckee was seen and examined at bedside. He states that he was feeling dizzy and fatigued after therapy. His blood presure was checked and found to be hypotensive. Discussed if he was drinking enough fluids, he states that he has been trying but it was difficult with the thickened liquids. He was agreeable to getting some IV fluids today.     Denies new weakness or numbness. Denies headaches, fevers, chills, vision changes, chest pain, dyspnea or nausea.         Physical Exam:     Vitals:    03/18/17 1130 03/18/17 1132 03/18/17 1136 03/18/17 1534   BP: (!) 67/38 118/47 (!) 81/29 104/50   BP Location: Right arm Right arm Right arm Right arm   Pulse:    68   Resp:    16   Temp:    97.9  F (36.6  C)   TempSrc:    Oral   SpO2:    96%   Weight:       Height:         General: awake, alert, cooperative, no apparent distress, and appears stated age  Pulmonary: No increased work of breathing, good air exchange, clear to auscultation bilaterally.  Cardiovascular: Regular rate and rhythm.  Abdominal: Normal bowel sounds, soft, non-distended, non-tender.  Neurologic: Awake, alert. Left facial droop  noted.          Data:   Scheduled meds    [START ON 3/19/2017] hydrochlorothiazide  12.5 mg Oral Daily     lidocaine  1-2 patch Transdermal Q24H     lidocaine   Transdermal Q24h     lidocaine   Transdermal Q8H     glipiZIDE  7.5 mg Oral Daily with breakfast     tamsulosin  0.8 mg Oral Daily     insulin glargine  10 Units Subcutaneous Q24H     insulin aspart  1-5 Units Subcutaneous At Bedtime     insulin aspart  1-7 Units Subcutaneous TID AC     lisinopril  20 mg Oral BID     miconazole   Topical BID     diclofenac   Topical 4x Daily     atorvastatin  40 mg Oral or Feeding Tube Daily     cholecalciferol  2,000 Units Oral Daily     ferrous gluconate  324 mg Oral Daily with breakfast     fluticasone  1-2 spray Both Nostrils Daily     melatonin  1 mg Oral At Bedtime     metoprolol  50 mg Oral BID     rivaroxaban ANTICOAGULANT  20 mg Oral Daily with supper     ranitidine  150 mg Oral BID       PRN meds:  hydrALAZINE, acetaminophen, polyethylene glycol, glucose **OR** dextrose **OR** glucagon    Zak Fontanez DO  Resident Physician, PGY4  Physical Medicine and Rehabilitation Service      Staffed with Dr. Rodriguez

## 2017-03-19 LAB
GLUCOSE BLDC GLUCOMTR-MCNC: 121 MG/DL (ref 70–99)
GLUCOSE BLDC GLUCOMTR-MCNC: 142 MG/DL (ref 70–99)
GLUCOSE BLDC GLUCOMTR-MCNC: 145 MG/DL (ref 70–99)
GLUCOSE BLDC GLUCOMTR-MCNC: 147 MG/DL (ref 70–99)
GLUCOSE BLDC GLUCOMTR-MCNC: 150 MG/DL (ref 70–99)

## 2017-03-19 PROCEDURE — 00000146 ZZHCL STATISTIC GLUCOSE BY METER IP

## 2017-03-19 PROCEDURE — 97530 THERAPEUTIC ACTIVITIES: CPT | Mod: GO | Performed by: OCCUPATIONAL THERAPIST

## 2017-03-19 PROCEDURE — 25000132 ZZH RX MED GY IP 250 OP 250 PS 637: Mod: GY | Performed by: PHYSICIAN ASSISTANT

## 2017-03-19 PROCEDURE — 25000132 ZZH RX MED GY IP 250 OP 250 PS 637: Mod: GY | Performed by: PHYSICAL MEDICINE & REHABILITATION

## 2017-03-19 PROCEDURE — 12800006 ZZH R&B REHAB

## 2017-03-19 PROCEDURE — 97032 APPL MODALITY 1+ESTIM EA 15: CPT | Mod: GP | Performed by: PHYSICAL THERAPIST

## 2017-03-19 PROCEDURE — 97110 THERAPEUTIC EXERCISES: CPT | Mod: GP | Performed by: PHYSICAL THERAPIST

## 2017-03-19 PROCEDURE — A9270 NON-COVERED ITEM OR SERVICE: HCPCS | Mod: GY | Performed by: PHYSICAL MEDICINE & REHABILITATION

## 2017-03-19 PROCEDURE — 40000225 ZZH STATISTIC SLP WARD VISIT: Performed by: SPEECH-LANGUAGE PATHOLOGIST

## 2017-03-19 PROCEDURE — A9270 NON-COVERED ITEM OR SERVICE: HCPCS | Mod: GY | Performed by: PHYSICIAN ASSISTANT

## 2017-03-19 PROCEDURE — 97530 THERAPEUTIC ACTIVITIES: CPT | Mod: GP | Performed by: PHYSICAL THERAPIST

## 2017-03-19 PROCEDURE — 40000193 ZZH STATISTIC PT WARD VISIT: Performed by: PHYSICAL THERAPIST

## 2017-03-19 PROCEDURE — 40000133 ZZH STATISTIC OT WARD VISIT: Performed by: OCCUPATIONAL THERAPIST

## 2017-03-19 PROCEDURE — 92526 ORAL FUNCTION THERAPY: CPT | Mod: GN | Performed by: SPEECH-LANGUAGE PATHOLOGIST

## 2017-03-19 PROCEDURE — 97112 NEUROMUSCULAR REEDUCATION: CPT | Mod: GP | Performed by: PHYSICAL THERAPIST

## 2017-03-19 RX ORDER — BACLOFEN 10 MG/1
5 TABLET ORAL 3 TIMES DAILY
Status: DISCONTINUED | OUTPATIENT
Start: 2017-03-19 | End: 2017-03-21 | Stop reason: HOSPADM

## 2017-03-19 RX ORDER — TAMSULOSIN HYDROCHLORIDE 0.4 MG/1
0.4 CAPSULE ORAL DAILY
Status: DISCONTINUED | OUTPATIENT
Start: 2017-03-20 | End: 2017-03-21 | Stop reason: HOSPADM

## 2017-03-19 RX ORDER — LANOLIN ALCOHOL/MO/W.PET/CERES
3 CREAM (GRAM) TOPICAL AT BEDTIME
Status: DISCONTINUED | OUTPATIENT
Start: 2017-03-19 | End: 2017-03-21 | Stop reason: HOSPADM

## 2017-03-19 RX ADMIN — FERROUS GLUCONATE 324 MG: 324 TABLET ORAL at 09:13

## 2017-03-19 RX ADMIN — DICLOFENAC SODIUM: 10 GEL TOPICAL at 16:09

## 2017-03-19 RX ADMIN — DICLOFENAC SODIUM: 10 GEL TOPICAL at 20:53

## 2017-03-19 RX ADMIN — ATORVASTATIN CALCIUM 40 MG: 40 TABLET, FILM COATED ORAL at 09:13

## 2017-03-19 RX ADMIN — RIVAROXABAN 20 MG: 10 TABLET, FILM COATED ORAL at 16:09

## 2017-03-19 RX ADMIN — MICONAZOLE NITRATE: 2 POWDER TOPICAL at 20:52

## 2017-03-19 RX ADMIN — METOPROLOL TARTRATE 50 MG: 50 TABLET, FILM COATED ORAL at 20:53

## 2017-03-19 RX ADMIN — LISINOPRIL 20 MG: 20 TABLET ORAL at 20:53

## 2017-03-19 RX ADMIN — HYDROCHLOROTHIAZIDE 12.5 MG: 12.5 CAPSULE ORAL at 09:13

## 2017-03-19 RX ADMIN — DICLOFENAC SODIUM: 10 GEL TOPICAL at 10:31

## 2017-03-19 RX ADMIN — ACETAMINOPHEN 650 MG: 325 TABLET, FILM COATED ORAL at 03:49

## 2017-03-19 RX ADMIN — RANITIDINE HYDROCHLORIDE 150 MG: 150 TABLET, FILM COATED ORAL at 09:14

## 2017-03-19 RX ADMIN — RANITIDINE HYDROCHLORIDE 150 MG: 150 TABLET, FILM COATED ORAL at 20:53

## 2017-03-19 RX ADMIN — Medication 5 MG: at 20:53

## 2017-03-19 RX ADMIN — FLUTICASONE PROPIONATE 2 SPRAY: 50 SPRAY, METERED NASAL at 10:31

## 2017-03-19 RX ADMIN — TAMSULOSIN HYDROCHLORIDE 0.8 MG: 0.4 CAPSULE ORAL at 09:13

## 2017-03-19 RX ADMIN — Medication 5 MG: at 13:41

## 2017-03-19 RX ADMIN — VITAMIN D, TAB 1000IU (100/BT) 2000 UNITS: 25 TAB at 09:13

## 2017-03-19 RX ADMIN — MELATONIN TAB 3 MG 3 MG: 3 TAB at 20:53

## 2017-03-19 RX ADMIN — Medication 7.5 MG: at 09:13

## 2017-03-19 RX ADMIN — MICONAZOLE NITRATE: 2 POWDER TOPICAL at 10:32

## 2017-03-19 NOTE — PLAN OF CARE
Problem: Goal/Outcome  Goal: Goal Outcome Summary  SLP--VFSS scheduled for 3/20/17 to rule out silent aspiration and assess upgrade to thin liquids prior to discharge on Tuesday.  Family was present and educated regarding VFSS process and purpose.

## 2017-03-19 NOTE — PLAN OF CARE
"Problem: Goal/Outcome  Goal: Goal Outcome Summary  Outcome: No Change  FOCUS/GOAL  Bowel management, Bladder management and Pain management     ASSESSMENT, INTERVENTIONS AND CONTINUING PLAN FOR GOAL:     Patient pounds on bedside table when he can not find his call light. Call light was clipped to gown and on his chest. Patient incontinent of urine x 2 and stool x 1. Patient yells when staff does castro cares, stated staff was\" squashing his balls.\" Patient was aware he had been incontinent of stool prior to using call light to call staff. Given Tylenol x 1 per request for groin, penis pain. Continue plan of care.       "

## 2017-03-19 NOTE — PLAN OF CARE
Problem: Goal/Outcome  Goal: Goal Outcome Summary  OT: Continued to focus on strengthening with standing activity and core activity Edge of Mat. Pt. BP's remained stable throughout OT session and no C/o dizziness during session.

## 2017-03-19 NOTE — PLAN OF CARE
"Problem: Goal/Outcome  Goal: Goal Outcome Summary  FOCUS/GOAL  Bowel management, Bladder management and Pain management     ASSESSMENT, INTERVENTIONS AND CONTINUING PLAN FOR GOAL:  Pt alert, disoriented to time and situation, asks what time it is, is it AM or PM. No difficulty swallowing noted. Pt expressed his thankfulness, complimenting nurse, and asking for a hug. Continent BM in toilet. At bedtime pt moaning loudly, staff ran into room, pt states his face hurts from shaving, also his groin. Lotion applied to face, attends dry no redness noted. He states he has no more needs at this time. Dim light left on. Several minutes later pt moaning again, eyes closed stating, \"come on, come on, you're hurting me.\" Nurse asked pt if he is ok, he states, \"No i'm miserable I'm wet.\" Gown, briefs, and pad wet. Assist of 2 with cares. Moaning during cares stating groin hurts, refused any prn pain meds. Pt reminded to use call light and clipped on gown. Able to recall nurses assisting, neuros appear intact at baseline. Alarms on. Continue to monitor.      "

## 2017-03-19 NOTE — PROGRESS NOTES
Skilled set up on :  Pt dependent for proper placement of electrodes on L gastroc, anterior tibialis, quad, glutes, hams for optimum muscle contraction, safe positioning on w/c within frame and cushion for prevention of skin breakdown, feet secured to foot pedals, w/c secured to  w/ Q-straints.  Passive motion assessed to ensure proper positioning.  Determined appropriate FES parameters for each muscle group based off of strong tetanic response of muscle test. Adjusted L quad and gastroc for PW and intensity due to decreased comfort at one point.     Pt performed 14 minutes of active FES ergometry with max = min stimulation applied to above muscles at  35 pm with 0.50nm resistance.  This PT adjusted e-stim and cycling parameters in real-time to ensure palpable muscle contractions throughout session.  Please see [email or web address] for further details on patient's stimulation parameters and ergometry outcomes.  Patient ID:[id number], PIN: [id number].

## 2017-03-19 NOTE — PLAN OF CARE
"Problem: Goal/Outcome  Goal: Goal Outcome Summary  FOCUS/GOAL  Bladder management, Medication management, Medical management and Safety management     ASSESSMENT, INTERVENTIONS AND CONTINUING PLAN FOR GOAL:  Pt denied pain during shift. Baclofen started per Resident, muscle stiffness noted. Pt reported \"not getting a lot of sleep\", per Resident melatonin increased to 3 mg tablet before bedtime. BP 92/46 at start of shift, encouraged/pushed oral fluids, 520 mL shift total intake; /70 after recheck. No reports of dizziness during shift.  and 145 during shift. Encouraged pt to use call light, used call light appropriately to express needs. Continent of urine during shift. Alarms on. Continue with POC.       "

## 2017-03-19 NOTE — PLAN OF CARE
Problem: Goal/Outcome  Goal: Goal Outcome Summary  SLP--Pt seen for breakfast DD3 and nectar thick.  Prior to meal, oral cares (denture cleaning) was completed.  Dentures were left in over night.  Thin water trials toleterated well.  Pt drank ~6 oz of water.  4 tsp and the remaining from the side of the cup.  Consistent reminders to use a chin tuck across trials.  One mild throat clear ~30 sec after water trials were complete.  Recommendation to have VFSS this coming week to rule out silent aspiration.  Pt is not an accurate  at this time and hydration is a significant medical concern.   Last VFSS complete 2/25/17.

## 2017-03-19 NOTE — PROGRESS NOTES
[facility], [place]   Physical Medicine and Rehabilitation Daily Note         Assessment and Plan of Care:   Mr. 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 Fib who was admitted to the ARU on 2/27 after having a right MCA stroke. His new deficits include dysphagia, dysarthria, left sided weakness and coordination difficulties.    -Continue therapies and plan of care  -Encouraing PO fluids  -BP improved but still somewhat variable, orthostatics ordered  -Will start baclofen at 5mg TID as he has discomfort from spasticity in the left calf and left arm. Team to monitor for fatigue.          Interval History:   Elias Mckee was seen and examined at bedside. He is feeling well today, participated in therapies this morning without dizziness or fatigue.     Discussed muscle stiffness and cramping and he stats that this has been painful in his left arm and left leg. Discussed a trail of baclofen and he was agreeable to this.    Denies new weakness or numbness. Denies headaches, fevers, chills, vision changes, chest pain, dyspnea or nausea.         Physical Exam:     Vitals:    03/19/17 0700 03/19/17 0900 03/19/17 1019 03/19/17 1216   BP: 119/74 92/46 90/46 138/70   BP Location: Right arm Right arm Right arm Right arm   Pulse: 63      Resp: 16 16     Temp: 97.4  F (36.3  C) 96.8  F (36  C)     TempSrc: Oral Oral     SpO2:  94%     Weight:       Height:         General: awake, alert, cooperative, no apparent distress.  Pulmonary: Clear to auscultation bilaterally.  Cardiovascular: Regular rate and rhythm.  Abdominal: Normal bowel sounds, soft, non-distended, non-tender.  Neurologic: Awake, alert. Left sided hemiparesis. Spasticity noted at the left wrist, left elbow (flexion) and left ankle (dorsiflexion) 1-2/4 on MAS scale.         Data:   Scheduled meds    melatonin  3 mg Oral At Bedtime      hydrochlorothiazide  12.5 mg Oral Daily     sodium chloride (PF)  3 mL Intracatheter Q8H     lidocaine  1-2 patch Transdermal Q24H     lidocaine   Transdermal Q24h     lidocaine   Transdermal Q8H     glipiZIDE  7.5 mg Oral Daily with breakfast     tamsulosin  0.8 mg Oral Daily     insulin glargine  10 Units Subcutaneous Q24H     insulin aspart  1-5 Units Subcutaneous At Bedtime     insulin aspart  1-7 Units Subcutaneous TID AC     lisinopril  20 mg Oral BID     miconazole   Topical BID     diclofenac   Topical 4x Daily     atorvastatin  40 mg Oral or Feeding Tube Daily     cholecalciferol  2,000 Units Oral Daily     ferrous gluconate  324 mg Oral Daily with breakfast     fluticasone  1-2 spray Both Nostrils Daily     metoprolol  50 mg Oral BID     rivaroxaban ANTICOAGULANT  20 mg Oral Daily with supper     ranitidine  150 mg Oral BID       PRN meds:  sodium chloride (PF), hydrALAZINE, acetaminophen, polyethylene glycol, glucose **OR** dextrose **OR** glucagon    Zak Fontanez DO  Resident Physician, PGY4  Physical Medicine and Rehabilitation Service      Staffed with Dr. Rodriguez

## 2017-03-20 ENCOUNTER — APPOINTMENT (OUTPATIENT)
Dept: GENERAL RADIOLOGY | Facility: CLINIC | Age: 82
DRG: 057 | End: 2017-03-20
Attending: PHYSICAL MEDICINE & REHABILITATION
Payer: MEDICARE

## 2017-03-20 ENCOUNTER — TELEPHONE (OUTPATIENT)
Dept: FAMILY MEDICINE | Facility: CLINIC | Age: 82
End: 2017-03-20

## 2017-03-20 LAB
ANION GAP SERPL CALCULATED.3IONS-SCNC: 9 MMOL/L (ref 3–14)
BUN SERPL-MCNC: 23 MG/DL (ref 7–30)
CALCIUM SERPL-MCNC: 8.9 MG/DL (ref 8.5–10.1)
CHLORIDE SERPL-SCNC: 104 MMOL/L (ref 94–109)
CO2 SERPL-SCNC: 25 MMOL/L (ref 20–32)
CREAT SERPL-MCNC: 1.08 MG/DL (ref 0.66–1.25)
ERYTHROCYTE [DISTWIDTH] IN BLOOD BY AUTOMATED COUNT: 14.2 % (ref 10–15)
GFR SERPL CREATININE-BSD FRML MDRD: 65 ML/MIN/1.7M2
GLUCOSE BLDC GLUCOMTR-MCNC: 137 MG/DL (ref 70–99)
GLUCOSE BLDC GLUCOMTR-MCNC: 138 MG/DL (ref 70–99)
GLUCOSE BLDC GLUCOMTR-MCNC: 228 MG/DL (ref 70–99)
GLUCOSE BLDC GLUCOMTR-MCNC: 91 MG/DL (ref 70–99)
GLUCOSE SERPL-MCNC: 241 MG/DL (ref 70–99)
HCT VFR BLD AUTO: 33.7 % (ref 40–53)
HGB BLD-MCNC: 11.3 G/DL (ref 13.3–17.7)
MCH RBC QN AUTO: 30.5 PG (ref 26.5–33)
MCHC RBC AUTO-ENTMCNC: 33.5 G/DL (ref 31.5–36.5)
MCV RBC AUTO: 91 FL (ref 78–100)
PLATELET # BLD AUTO: 139 10E9/L (ref 150–450)
POTASSIUM SERPL-SCNC: 4.3 MMOL/L (ref 3.4–5.3)
RBC # BLD AUTO: 3.7 10E12/L (ref 4.4–5.9)
SODIUM SERPL-SCNC: 138 MMOL/L (ref 133–144)
WBC # BLD AUTO: 5.8 10E9/L (ref 4–11)

## 2017-03-20 PROCEDURE — 74230 X-RAY XM SWLNG FUNCJ C+: CPT

## 2017-03-20 PROCEDURE — 25000132 ZZH RX MED GY IP 250 OP 250 PS 637: Mod: GY | Performed by: PHYSICAL MEDICINE & REHABILITATION

## 2017-03-20 PROCEDURE — 40000133 ZZH STATISTIC OT WARD VISIT: Performed by: OCCUPATIONAL THERAPIST

## 2017-03-20 PROCEDURE — 36415 COLL VENOUS BLD VENIPUNCTURE: CPT | Performed by: PHYSICAL MEDICINE & REHABILITATION

## 2017-03-20 PROCEDURE — 00000146 ZZHCL STATISTIC GLUCOSE BY METER IP

## 2017-03-20 PROCEDURE — A9270 NON-COVERED ITEM OR SERVICE: HCPCS | Mod: GY | Performed by: PHYSICAL MEDICINE & REHABILITATION

## 2017-03-20 PROCEDURE — 25000132 ZZH RX MED GY IP 250 OP 250 PS 637: Mod: GY | Performed by: PHYSICIAN ASSISTANT

## 2017-03-20 PROCEDURE — 25000128 H RX IP 250 OP 636: Performed by: PHYSICAL MEDICINE & REHABILITATION

## 2017-03-20 PROCEDURE — 92526 ORAL FUNCTION THERAPY: CPT | Mod: GN

## 2017-03-20 PROCEDURE — 80048 BASIC METABOLIC PNL TOTAL CA: CPT | Performed by: PHYSICAL MEDICINE & REHABILITATION

## 2017-03-20 PROCEDURE — 92611 MOTION FLUOROSCOPY/SWALLOW: CPT | Mod: GN

## 2017-03-20 PROCEDURE — 85027 COMPLETE CBC AUTOMATED: CPT | Performed by: PHYSICAL MEDICINE & REHABILITATION

## 2017-03-20 PROCEDURE — 40000225 ZZH STATISTIC SLP WARD VISIT

## 2017-03-20 PROCEDURE — 97110 THERAPEUTIC EXERCISES: CPT | Mod: GP | Performed by: PHYSICAL THERAPIST

## 2017-03-20 PROCEDURE — 97535 SELF CARE MNGMENT TRAINING: CPT | Mod: GO | Performed by: OCCUPATIONAL THERAPIST

## 2017-03-20 PROCEDURE — 40000193 ZZH STATISTIC PT WARD VISIT: Performed by: PHYSICAL THERAPIST

## 2017-03-20 PROCEDURE — 12800006 ZZH R&B REHAB

## 2017-03-20 PROCEDURE — A9270 NON-COVERED ITEM OR SERVICE: HCPCS | Mod: GY | Performed by: PHYSICIAN ASSISTANT

## 2017-03-20 PROCEDURE — 97530 THERAPEUTIC ACTIVITIES: CPT | Mod: GP | Performed by: PHYSICAL THERAPIST

## 2017-03-20 RX ADMIN — Medication 5 MG: at 14:59

## 2017-03-20 RX ADMIN — FERROUS GLUCONATE 324 MG: 324 TABLET ORAL at 09:00

## 2017-03-20 RX ADMIN — VITAMIN D, TAB 1000IU (100/BT) 2000 UNITS: 25 TAB at 09:00

## 2017-03-20 RX ADMIN — LISINOPRIL 20 MG: 20 TABLET ORAL at 09:02

## 2017-03-20 RX ADMIN — ACETAMINOPHEN 650 MG: 325 TABLET, FILM COATED ORAL at 02:12

## 2017-03-20 RX ADMIN — Medication 5 MG: at 09:02

## 2017-03-20 RX ADMIN — Medication 5 MG: at 20:39

## 2017-03-20 RX ADMIN — RANITIDINE HYDROCHLORIDE 150 MG: 150 TABLET, FILM COATED ORAL at 20:39

## 2017-03-20 RX ADMIN — METOPROLOL TARTRATE 50 MG: 50 TABLET, FILM COATED ORAL at 20:39

## 2017-03-20 RX ADMIN — SODIUM CHLORIDE 1000 ML: 9 INJECTION, SOLUTION INTRAVENOUS at 11:27

## 2017-03-20 RX ADMIN — ATORVASTATIN CALCIUM 40 MG: 40 TABLET, FILM COATED ORAL at 08:59

## 2017-03-20 RX ADMIN — HYDROCHLOROTHIAZIDE 12.5 MG: 12.5 CAPSULE ORAL at 08:59

## 2017-03-20 RX ADMIN — METOPROLOL TARTRATE 50 MG: 50 TABLET, FILM COATED ORAL at 09:02

## 2017-03-20 RX ADMIN — Medication 7.5 MG: at 09:00

## 2017-03-20 RX ADMIN — RANITIDINE HYDROCHLORIDE 150 MG: 150 TABLET, FILM COATED ORAL at 08:59

## 2017-03-20 RX ADMIN — RIVAROXABAN 20 MG: 10 TABLET, FILM COATED ORAL at 16:55

## 2017-03-20 RX ADMIN — DICLOFENAC SODIUM: 10 GEL TOPICAL at 09:00

## 2017-03-20 RX ADMIN — MICONAZOLE NITRATE: 2 POWDER TOPICAL at 09:02

## 2017-03-20 RX ADMIN — FLUTICASONE PROPIONATE 2 SPRAY: 50 SPRAY, METERED NASAL at 09:00

## 2017-03-20 RX ADMIN — LIDOCAINE 2 PATCH: 50 PATCH CUTANEOUS at 09:02

## 2017-03-20 RX ADMIN — MELATONIN TAB 3 MG 3 MG: 3 TAB at 20:39

## 2017-03-20 RX ADMIN — TAMSULOSIN HYDROCHLORIDE 0.4 MG: 0.4 CAPSULE ORAL at 09:00

## 2017-03-20 RX ADMIN — MICONAZOLE NITRATE: 2 POWDER TOPICAL at 20:44

## 2017-03-20 RX ADMIN — LISINOPRIL 20 MG: 20 TABLET ORAL at 20:39

## 2017-03-20 NOTE — PLAN OF CARE
"Problem: Goal/Outcome  Goal: Goal Outcome Summary  Outcome: Therapy, progress toward functional goals as expected     Patient presenting with silent aspiration during study that was not immediately idenfied during the live study but noted upon review. Patient presenting with consistent premature spillage to the level of pyriforms and requiring a second swallow to clear remaining residue. When controlled for bolus size and with use of chin tuck patient having flash penetration. Flash penetration also noted with nectar thick liquids though signficance of swallow delay and residue not as much as with thin liquids. High level of cueing required to correctly utilize chin tuck maneuver. No difficulties noted with single solid food texture observed. Recommend continue with current NDD-3 food textures and nectar thick liquids. Increased emphasis on use of chin tuck maneuver and use of a double (\"dry\") swallow to improve safety. Ongoing therapy upon discharge to TCU to advance diet as appropriate and for additional referral for repeat VFSS if appropriate.       "

## 2017-03-20 NOTE — PLAN OF CARE
Problem: Goal/Outcome  Goal: Goal Outcome Summary  Outcome: No Change  FOCUS/GOAL  Bladder management, Nutrition/Feeding/Swallowing precautions, Medical management and Mobility     ASSESSMENT, INTERVENTIONS AND CONTINUING PLAN FOR GOAL:  Pt denied pain at start of shift but then c/o neck pain/headache after sitting up for lunch. Hill better after he transferred back to bed. Able to transfer slowly with CGA using hemiwalker. He needs assist to get LEs up to bed. Orthostasis noted with PT this morning. Started on IV fluid bolus. Encouraged and given nectar thick liquids. No bm this shift. Able to void using the urinal with positioning/placement assistance.

## 2017-03-20 NOTE — PROGRESS NOTES
Discharge planning  D/I:  Patient has been confirmed by Matthew at [facility] [phone], fax [phone] to be admitted there on Tuesday 3/21.  Writer spoke with patient about having transportation arranged which he agrees.  Writer set up [facility] (borrowing wheelchair) for  Tuesday at 1:15.  Updated patient and daughter, Jazmin, by phone about transport.  Completed/faxed PAS to [facility], provided fax cover sheet to [facility] for faxing orders on 3/21.  Reviewed Important Message from Medicare which patient understood and signed.  A/P:  SW following.

## 2017-03-20 NOTE — PLAN OF CARE
Problem: Goal/Outcome  Goal: Goal Outcome Summary  Outcome: No Change  FOCUS/GOAL  Pain management, Medical management and Mobility     ASSESSMENT, INTERVENTIONS AND CONTINUING PLAN FOR GOAL:  Patient slept throughout the night. Denied any chest pain, SOB, numbness, tingling or lightheadedness. Patient reported pain in L arm and was given PRN tylenol. Patient was AxO, but has episode of confusion when woken for sleep. Reorientated to environment. Patient voided without difficulty in the urinal and was incontinent x1. NO BM this shift. Patient was appropriate throughout the shift. Patient was able to use call light to make needs known and call light remained within reach for the duration of the shift. NO signs of pain or discomfort upon frequent rounding. Continue POC.

## 2017-03-20 NOTE — PLAN OF CARE
"Problem: Goal/Outcome  Goal: Goal Outcome Summary  FOCUS/GOAL  Nutrition/Feeding/Swallowing precautions, Pain management and Mobility     ASSESSMENT, INTERVENTIONS AND CONTINUING PLAN FOR GOAL:  Pt alert, hard of hearing, left sided weakness, able to ambulate to bathroom assist of 1 with sling and hemiwalker. C/o pain to back, was repositioned to side in bed which helped. Denied pain to neck or left ankle. Daughter present during dinner, ate 100% no difficulty swallowing noted. Takes pills better if nurse assists to put pills in mouth, or with applesauce. At bedtime, patient asking for a hug. Nurse stated it's not appropriate and not allowed. Pt stated, \"Come on, nobody has to know.\" Nurse apologized, informed him not comfortable to do so. Pt then asked for a pat on the hand, when nurse came to do so, he squeezed nurse hand for several seconds.Video swallow tomorrow at 9am. Pt denied feeling dizzy/lightheaded, continue to check ortho BP Q shift.      "

## 2017-03-20 NOTE — TELEPHONE ENCOUNTER
[facility] calls want to know if Dr. Huff is okay to f/u patient via phone/fax while he is in rehab  Had a stroke and will admit to [facility] tomorrow for short term   Please review and advise.   Triage to call Matthew [phone] with response.  OK to leave detail message    Reina Barrow RN

## 2017-03-20 NOTE — PLAN OF CARE
Problem: Goal/Outcome  Goal: Goal Outcome Summary  PT: Pt with difficulty tolerating standing position today while checking orthostatic vitals.  Pt with lightheadedness, fatigue in standing, BP 84/64 (was 136/84 in supine).  See vitals section for details.  RN and MD notified. PT continues to need cuing and A for safe positioning and efficient use with transfers and scooting in bed. Pt needing Karey with wc mobility due to decreased awareness L side, decreased scanning.   Cues to locate room on way back from loop with wc propulsion.

## 2017-03-20 NOTE — PROGRESS NOTES
CLINICAL NUTRITION SERVICES - REASSESSMENT NOTE     Nutrition Prescription    RECOMMENDATIONS FOR MDs/PROVIDERS TO ORDER:  None at this time.    Malnutrition Status:    Patient does not meet two of the above criteria necessary for diagnosing malnutrition    Recommendations already ordered by Registered Dietitian (RD):  -Continue with DD3 + NTL per SLP recommendation.    Future/Additional Recommendations:  -Continue to encourage PO food and fluid intake. If intake falls below 75%, would consider additional supplements to meet nutrition needs.  -Continued diet advancement as medically appropriate per SLP evaluations.     EVALUATION OF THE PROGRESS TOWARD GOALS   Diet: DD3 + NTL, NT Cranberry juice TID  Nutrition Support: Magic Cup with lunch and dinner  Intake: Per RN and Aide, patient tolerating DD3 + NTL without N/V/abdominal pain.     -Per [facility]: 7 day average intake of 2463 kcal (35 kcal/kg) and 72 gm protein (1 gm/kg) per dosing wt of 71 kg, which meets 100% of estimated energy and protein needs.   -Pt consuming % of most recorded meals over past week per nursing documentation-occasionally 50% (x 2 meals).       NEW FINDINGS   -Per chart review pt with hypotension during therapy x2 days and Resident ordered continuous  mL/hr for 8 hrs. Last IVF received 3/18/17  - Per SLP note: pt noted to have silent aspiration on VFSS-recommend continue with NDD3/[place] Thick Liquids.    MALNUTRITION  % Intake: No decreased intake noted  % Weight Loss: Unable to assess - last weight from 3/12, however, wt stable for 7 days prior   Subcutaneous Fat Loss: None observed  Muscle Loss: Thoracic region (clavicle, acromium bone, deltoid, trapezius, pectoral):  mild  Fluid Accumulation/Edema: None noted  Malnutrition Diagnosis: Patient does not meet two of the above criteria necessary for diagnosing malnutrition    Previous Goals   Patient to consume % of nutritionally adequate meal trays TID, or the equivalent  with supplements/snacks.  Evaluation: Met    Pt to remain hydration via POs alone  Evaluation: Not met    Previous Nutrition Diagnosis  Inadequate oral intake related to swallowing difficulties, ?reduced appetite as evidenced by terrence cts meeting <70% est needs however anticipate improvements in PO since cts taken and requiring IVF to meet hydration over past week   Evaluation: Improving    CURRENT NUTRITION DIAGNOSIS  Predicted inadequate nutrient intake (energy/protein/fluid) related to swallowing difficulties and need for IVF to maintain hydration.    INTERVENTIONS  Implementation  Collaboration with other providers - discussed pt with RN and Aide    Ordered weight recheck.      Goals  1. Patient to consume % of nutritionally adequate meal trays TID, or the equivalent with supplements/snacks.  2. Pt to maintain adequate hydration by PO alone.    Monitoring/Evaluation  Progress toward goals will be monitored and evaluated per protocol.      Evan Kenny, Dietetic Intern  Pager: [phone]    I have reviewed and agree with above nutrition assessment and plan of care.    Yvette Burks RD, LD

## 2017-03-20 NOTE — PROGRESS NOTES
03/20/17 1500   General Information   Onset Date 02/20/17   Start of Care Date 03/20/17   Referring Physician Dr. Bond   Patient Profile Review/OT: Additional Occupational Profile Info See Profile for full history and prior level of function   Patient/Family Goals Statement get back to thin liquids   Swallowing Evaluation Videofluoroscopic evaluation   Behaviorial Observations WFL (within functional limits)   Mode of current nutrition Oral diet   Type of oral diet Dysphagia diet level 3;Nectar - thick liquid   Respiratory Status Room air   Comments has been seen by SLP during course of ARU stay. Overall positive improvement noted in level of awareness and engagement. Swallowing function has improved and diet upgraded to NDD-3 and NTL. Found helpful strategies to include more frequent drinks ensure dentures are placed and making sure patient upright and alert for all PO intake.    VFSS Eval: Thin Liquid Texture Trial   Mode of Presentation, Thin Liquid cup;spoon;self-fed   Order of Presentation 1, 2, 3, 4, 9, 10, 11, 12   Preparatory Phase Poor bolus control   Oral Phase, Thin Liquid Premature pharyngeal entry   Pharyngeal Phase, Thin Liquid Delayed swallow reflex;Residue in valleculae;Residue in pyriform sinus   Rosenbek's Penetration Aspiration Scale: Thin Liquid Trial Results 8 - contrast passes glottis, visible subglottic residue remains, absent patient response (aspiration)   Response to Aspiration absent response, silent aspiration   Successful Strategies Trialed During Procedure, Thin Liquid chin tuck;other (see comments)  (double swallow)   Diagnostic Statement Patient with normal sized single swallow having silent aspiration not immediately identified during the study but observed on frame #230 of series #2. Aspiration was fully silent. Subsequent swallows controlled quantities to approximate teaspoon sized boluses. Flash penetration noted with use of chin tuck maneuver. Persistant delay in swallow  "trigger to the level of the pyriforms with base of tongue residue also evident with patient benefitting from a second \"dry swallow\" to clear the residue.    VFSS Eval: Nectar Thick Liquid Texture Trial   Mode of Presentation, Nectar cup;self-fed   Order of Presentation 5, 6, 7   Preparatory Phase WFL   Oral Phase, Nectar Premature pharyngeal entry   Pharyngeal Phase, Nectar Delayed swallow reflex;Residue in valleculae;Residue in pyriform sinus   Rosenbek's Penetration Aspiration Scale: Nectar-Thick Liquid Trial Results 2 - contrast enters airway, remains above the vocal cords, no residue remains (penetration)   Diagnostic Statement Flash penetration noted with swallows of NTL without any use off compensatory swallowing strategies. Very mild amount of vallecular and pyriform residue. Severity of swallow trigger delay not as severe.    VFSS Eval: Solid Food Texture Trial   Mode of Presentation, Solid spoon;self-fed   Order of Presentation 8   Preparatory Phase WFL   Oral Phase, Solid Premature pharyngeal entry   Pharyngeal Phase, Solid WFL   Rosenbek's Penetration Aspiration Scale: Solid Food Trial Results 1 - no aspiration, contrast does not enter airway   Diagnostic Statement Tolerated solid food texture without any significant difficulties noted.    Esophageal Phase of Swallow   Patient reports or presents with symptoms of esophageal dysphagia No   Esophageal comments No deficits noted on upper esphagus and no complaints of discomfort.   General Therapy Interventions   Planned Therapy Interventions Dysphagia Treatment   Dysphagia treatment Compensatory strategies for swallowing   Swallow Eval: Clinical Impressions   Skilled Criteria for Therapy Intervention Skilled criteria met.  Treatment indicated.   Functional Assessment Scale (FAS) 4   Treatment Diagnosis mild-moderate oropharyngeal dysphagia   Diet texture recommendations Dysphagia diet level 3;Nectar thick liquids   Recommended Feeding/Eating Techniques " "alternate between small bites and sips of food/liquid;maintain upright posture during/after eating for 30 mins;small sips/bites;tuck chin during every swallow   Therapy Frequency daily   Predicted Duration of Therapy Intervention (days/wks) Ongoing upon facility discharge on 3/21   Anticipated Discharge Disposition inpatient rehabilitation facility   Risks and Benefits of Treatment have been explained. Yes   Patient, family and/or staff in agreement with Plan of Care Yes   Clinical Impression Comments Patient presenting with silent aspiration during study that was not immediately idenfied during the live study but noted upon review. Patient presenting with consistent premature spillage to the level of pyriforms and requiring a second swallow to clear remaining residue. When controlled for bolus size and with use of chin tuck patient having flash penetration. Flash penetration also noted with nectar thick liquids though signficance of swallow delay and residue not as much as with thin liquids. High level of cueing required to correctly utilize chin tuck maneuver. No difficulties noted with single solid food texture observed. Recommend continue with current NDD-3 food textures and nectar thick liquids. Increased emphasis on use of chin tuck maneuver and use of a double (\"dry\") swallow to improve safety. Ongoing therapy upon discharge to TCU to advance diet as appropriate and for additional referral for repeat VFSS if appropriate.    Total Evaluation Time   Total Evaluation Time (Minutes) 30     "

## 2017-03-21 VITALS
DIASTOLIC BLOOD PRESSURE: 57 MMHG | WEIGHT: 183.9 LBS | SYSTOLIC BLOOD PRESSURE: 99 MMHG | OXYGEN SATURATION: 96 % | TEMPERATURE: 96.9 F | BODY MASS INDEX: 26.33 KG/M2 | HEART RATE: 73 BPM | RESPIRATION RATE: 16 BRPM | HEIGHT: 70 IN

## 2017-03-21 LAB
GLUCOSE BLDC GLUCOMTR-MCNC: 133 MG/DL (ref 70–99)
GLUCOSE BLDC GLUCOMTR-MCNC: 205 MG/DL (ref 70–99)
GLUCOSE BLDC GLUCOMTR-MCNC: 224 MG/DL (ref 70–99)
GLUCOSE BLDC GLUCOMTR-MCNC: 229 MG/DL (ref 70–99)

## 2017-03-21 PROCEDURE — 00000146 ZZHCL STATISTIC GLUCOSE BY METER IP

## 2017-03-21 PROCEDURE — 25000132 ZZH RX MED GY IP 250 OP 250 PS 637: Mod: GY | Performed by: PHYSICAL MEDICINE & REHABILITATION

## 2017-03-21 PROCEDURE — A9270 NON-COVERED ITEM OR SERVICE: HCPCS | Mod: GY | Performed by: PHYSICAL MEDICINE & REHABILITATION

## 2017-03-21 PROCEDURE — 40000133 ZZH STATISTIC OT WARD VISIT: Performed by: OCCUPATIONAL THERAPIST

## 2017-03-21 PROCEDURE — A9270 NON-COVERED ITEM OR SERVICE: HCPCS | Mod: GY | Performed by: PHYSICIAN ASSISTANT

## 2017-03-21 PROCEDURE — 40000193 ZZH STATISTIC PT WARD VISIT: Performed by: PHYSICAL THERAPIST

## 2017-03-21 PROCEDURE — 92526 ORAL FUNCTION THERAPY: CPT | Mod: GN

## 2017-03-21 PROCEDURE — 97530 THERAPEUTIC ACTIVITIES: CPT | Mod: GP | Performed by: PHYSICAL THERAPIST

## 2017-03-21 PROCEDURE — 97116 GAIT TRAINING THERAPY: CPT | Mod: GP | Performed by: PHYSICAL THERAPIST

## 2017-03-21 PROCEDURE — 40000225 ZZH STATISTIC SLP WARD VISIT

## 2017-03-21 PROCEDURE — 97535 SELF CARE MNGMENT TRAINING: CPT | Mod: GO | Performed by: OCCUPATIONAL THERAPIST

## 2017-03-21 PROCEDURE — 25000132 ZZH RX MED GY IP 250 OP 250 PS 637: Mod: GY | Performed by: PHYSICIAN ASSISTANT

## 2017-03-21 RX ORDER — LIDOCAINE 50 MG/G
1-2 PATCH TOPICAL EVERY 24 HOURS
Qty: 60 PATCH | Refills: 0 | Status: SHIPPED | DISCHARGE
Start: 2017-03-21 | End: 2017-06-05

## 2017-03-21 RX ORDER — METOPROLOL TARTRATE 50 MG
50 TABLET ORAL 2 TIMES DAILY
Qty: 60 TABLET | Refills: 0 | DISCHARGE
Start: 2017-03-21 | End: 2017-06-05 | Stop reason: DRUGHIGH

## 2017-03-21 RX ORDER — BACLOFEN 10 MG/1
5 TABLET ORAL 3 TIMES DAILY
Qty: 90 TABLET | Refills: 0 | DISCHARGE
Start: 2017-03-21 | End: 2017-06-05

## 2017-03-21 RX ORDER — LANOLIN ALCOHOL/MO/W.PET/CERES
3 CREAM (GRAM) TOPICAL AT BEDTIME
Qty: 60 TABLET | DISCHARGE
Start: 2017-03-21 | End: 2017-06-05

## 2017-03-21 RX ORDER — LISINOPRIL 20 MG/1
20 TABLET ORAL 2 TIMES DAILY
Qty: 30 TABLET | Refills: 0 | DISCHARGE
Start: 2017-03-21 | End: 2018-07-01

## 2017-03-21 RX ORDER — GLIPIZIDE 5 MG/1
7.5 TABLET ORAL
Qty: 60 TABLET | DISCHARGE
Start: 2017-03-21 | End: 2017-08-22

## 2017-03-21 RX ORDER — TAMSULOSIN HYDROCHLORIDE 0.4 MG/1
0.4 CAPSULE ORAL DAILY
Qty: 60 CAPSULE | Refills: 0 | DISCHARGE
Start: 2017-03-21

## 2017-03-21 RX ORDER — HYDROCHLOROTHIAZIDE 12.5 MG/1
12.5 CAPSULE ORAL DAILY
Qty: 30 CAPSULE | Refills: 0 | DISCHARGE
Start: 2017-03-21 | End: 2017-08-22

## 2017-03-21 RX ADMIN — RANITIDINE HYDROCHLORIDE 150 MG: 150 TABLET, FILM COATED ORAL at 09:17

## 2017-03-21 RX ADMIN — Medication 5 MG: at 09:17

## 2017-03-21 RX ADMIN — LISINOPRIL 20 MG: 20 TABLET ORAL at 09:18

## 2017-03-21 RX ADMIN — TAMSULOSIN HYDROCHLORIDE 0.4 MG: 0.4 CAPSULE ORAL at 09:17

## 2017-03-21 RX ADMIN — VITAMIN D, TAB 1000IU (100/BT) 2000 UNITS: 25 TAB at 09:18

## 2017-03-21 RX ADMIN — FERROUS GLUCONATE 324 MG: 324 TABLET ORAL at 09:18

## 2017-03-21 RX ADMIN — ATORVASTATIN CALCIUM 40 MG: 40 TABLET, FILM COATED ORAL at 09:17

## 2017-03-21 RX ADMIN — HYDROCHLOROTHIAZIDE 12.5 MG: 12.5 CAPSULE ORAL at 09:17

## 2017-03-21 RX ADMIN — FLUTICASONE PROPIONATE 2 SPRAY: 50 SPRAY, METERED NASAL at 09:16

## 2017-03-21 RX ADMIN — MICONAZOLE NITRATE: 2 POWDER TOPICAL at 09:17

## 2017-03-21 RX ADMIN — Medication 7.5 MG: at 09:17

## 2017-03-21 RX ADMIN — LIDOCAINE 2 PATCH: 50 PATCH CUTANEOUS at 09:14

## 2017-03-21 RX ADMIN — METOPROLOL TARTRATE 50 MG: 50 TABLET, FILM COATED ORAL at 09:17

## 2017-03-21 NOTE — PROGRESS NOTES
"[facility] Acute Rehabilitation Unit  Progress Note    Interval Hx  TCU transfers tomorrow  Some orthostatic hypotension today  Likely not drinking enough on top of nectar thick liquid d/t dysphagia  Will give IV bolus today of 1 L and cont to encourage po fluid intake repeat labs ok  Videoswallow today         Assessment and Plan of Care: This is an 85-year-old male with a past medical history of stroke 9 years ago with some residual left leg weakness with history of chronic ischemic heart disease, prostate cancer, hypertension, hyperlipidemia, type 2 diabetes, chronic kidney disease, atrial fibrillation. Now presents with left hemiparesis, upper extremity, on top of residual left lower extremity weakness with dysarthria, dysphagia, concern for cognitive impairment on top of hard of hearing with impaired mobility, ADLs.    Functionally:  \"Patient presenting with silent aspiration during study that was not immediately idenfied during the live study but noted upon review. Patient presenting with consistent premature spillage to the level of pyriforms and requiring a second swallow to clear remaining residue. When controlled for bolus size and with use of chin tuck patient having flash penetration. Flash penetration also noted with nectar thick liquids though signficance of swallow delay and residue not as much as with thin liquids. High level of cueing required to correctly utilize chin tuck maneuver. No difficulties noted with single solid food texture observed. Recommend continue with current NDD-3 food textures and nectar thick liquids. Increased emphasis on use of chin tuck maneuver and use of a double (\"dry\") swallow to improve safety. Ongoing therapy upon discharge to TCU to advance diet as appropriate and for additional referral for repeat VFSS if appropriate. \"  \"Patient presenting with silent aspiration during study that was not immediately idenfied during the live study " "but noted upon review. Patient presenting with consistent premature spillage to the level of pyriforms and requiring a second swallow to clear remaining residue. When controlled for bolus size and with use of chin tuck patient having flash penetration. Flash penetration also noted with nectar thick liquids though signficance of swallow delay and residue not as much as with thin liquids. High level of cueing required to correctly utilize chin tuck maneuver. No difficulties noted with single solid food texture observed. Recommend continue with current NDD-3 food textures and nectar thick liquids. Increased emphasis on use of chin tuck maneuver and use of a double (\"dry\") swallow to improve safety. Ongoing therapy upon discharge to TCU to advance diet as appropriate and for additional referral for repeat VFSS if appropriate\"  \"PT: Pt with difficulty tolerating standing position today while checking orthostatic vitals. Pt with lightheadedness, fatigue in standing, BP 84/64 (was 136/84 in supine). See vitals section for details. RN and MD notified. PT continues to need cuing and A for safe positioning and efficient use with transfers and scooting in bed. Pt needing Karey with wc mobility due to decreased awareness L side, decreased scanning. Cues to locate room on way back from loop with wc propulsion. \"  \"SLP--VFSS scheduled for 3/20/17 to rule out silent aspiration and assess upgrade to thin liquids prior to discharge on Tuesday. Family was present and educated regarding VFSS process and purpose. \"  Continue therapies and plan of care.      Overall Management:   - optimize secondary stroke management, on xarelto for anticoagulation, BP management, DM / BS control, lipid management  - HTN, on HCTZ, metoprolol and lisinopril increase dose 3/1 and 3/3, has prn hydralazine, added HCTZ 3/6, adjust as needed  - DM / BS, endocrinology managing BS control and adjusting management as needed   - HLD, on lipitor  - on vit D, " ferrous gluconate, flonase  - melatonin for sleep  - L ankle swelling, some pain, reports twisting ankle, able to move aROM on own w/o sig discomfort, fx suspicion low, will do ice pack and voltaren gel for now improved, monitor   - spasticity on L UE, cont ROM and stretching, started on baclofen 5 mg tid, may need to further titrate   - dietitian following with terrence counts now off TF since 3/7 overnight, follow po intake, d/fortino nasal feeding tube 3/8  - at risk for dehydration d/t restricted po on thickened liquids, i/o monitoring, encourage fluids, IVF supplementation done on 3/13 and on 3/20 with improvement    Bladder and Bowel - monitor spont voids and BM, on flomax decreased to 0.4 mg 3/20 as may contribute to orthostatis, miralax prn  GI ppx - on zantac  DVT ppx - optimize mech ppx with PCDs and antiembolism stockings, on xarelto, progressive mobilization         Active Diet Order      Diet      Dysphagia Diet Level 3 Advanced Nectar Thickened Liquids (pre-thickened or use instant food thickener)    Labs  Lab Results   Component Value Date    WBC 5.8 03/20/2017     Lab Results   Component Value Date    RBC 3.70 03/20/2017     Lab Results   Component Value Date    HGB 11.3 03/20/2017     Lab Results   Component Value Date    HCT 33.7 03/20/2017     No components found for: MCT  Lab Results   Component Value Date    MCV 91 03/20/2017     Lab Results   Component Value Date    MCH 30.5 03/20/2017     Lab Results   Component Value Date    MCHC 33.5 03/20/2017     Lab Results   Component Value Date    RDW 14.2 03/20/2017     Lab Results   Component Value Date     03/20/2017       Last Basic Metabolic Panel:  Lab Results   Component Value Date     03/20/2017      Lab Results   Component Value Date    POTASSIUM 4.3 03/20/2017     Lab Results   Component Value Date    CHLORIDE 104 03/20/2017     Lab Results   Component Value Date    TERRENCE 8.9 03/20/2017     Lab Results   Component Value Date    CO2 25  "03/20/2017     Lab Results   Component Value Date    BUN 23 03/20/2017     Lab Results   Component Value Date    CR 1.08 03/20/2017     Lab Results   Component Value Date     03/20/2017       Videoswallow 3/20  Impression: Deep laryngeal penetration with thin consistency barium  improved by chin tuck maneuver. Transient penetration with nectar  consistency barium. Please see the speech pathologist report for  further details    Medications:  Current Facility-Administered Medications   Medication     diclofenac (VOLTAREN) 1 % topical gel 2 g     melatonin tablet 3 mg     baclofen (LIORESAL) half-tab 5 mg     tamsulosin (FLOMAX) capsule 0.4 mg     hydrochlorothiazide (MICROZIDE) capsule 12.5 mg     sodium chloride (PF) 0.9% PF flush 3 mL     sodium chloride (PF) 0.9% PF flush 3 mL     lidocaine (LIDODERM) 5 % Patch 1-2 patch     lidocaine (LIDODERM) patch REMOVAL     lidocaine (LIDODERM) Patch in Place     glipiZIDE (GLUCOTROL) half-tab 7.5 mg     insulin glargine (LANTUS) injection 10 Units     insulin aspart (NovoLOG) inj (RAPID ACTING)     hydrALAZINE (APRESOLINE) tablet 10 mg     insulin aspart (NovoLOG) inj (RAPID ACTING)     lisinopril (PRINIVIL/ZESTRIL) tablet 20 mg     miconazole (MICATIN; MICRO GUARD) 2 % powder     acetaminophen (TYLENOL) tablet 650 mg     atorvastatin (LIPITOR) tablet 40 mg     cholecalciferol (vitamin D) tablet 2,000 Units     ferrous gluconate (FERGON) tablet 324 mg     fluticasone (FLONASE) 50 MCG/ACT spray 1-2 spray     metoprolol (LOPRESSOR) tablet 50 mg     polyethylene glycol (MIRALAX/GLYCOLAX) powder 17 g     rivaroxaban ANTICOAGULANT (XARELTO) tablet 20 mg     glucose 40 % gel 15-30 g    Or     dextrose 50 % injection 25-50 mL    Or     glucagon injection 1 mg     ranitidine (ZANTAC) tablet 150 mg         Physical Examination:   /55 (BP Location: Left arm)  Pulse 55  Temp 96.7  F (35.9  C) (Oral)  Resp 18  Ht 1.778 m (5' 10\")  Wt 83.4 kg (183 lb 14.4 oz)  SpO2 99%  BMI " 26.39 kg/p1S[place]x, in room, about to eat lunch  In bed, awake, and pleasant   Conversant and cooperative   R pupil deformed - previously documented   L hand and toe nails with fungal infection changes appears chronic   L UE MAS 2/4 on L elbow flexors and pronators  L LE with antigravity hip flex and knee ext but limited range 3-/5  Limited antigravity ankle DF / PF with limited range   Left neglect improving       More than 25 minutes spent with at least half on care coordination

## 2017-03-21 NOTE — DISCHARGE SUMMARY
"[facility] Acute Rehabilitation Unit   Discharge Summary     Date Admitted: 2/27/2017  Date Discharge: 3/21/17    Discharge Diagnosis: This is an 85-year-old male with a past medical history of stroke 9 years ago with some residual left leg weakness with history of chronic ischemic heart disease, prostate cancer, hypertension, hyperlipidemia, type 2 diabetes, chronic kidney disease, atrial fibrillation. Now presents with left hemiparesis, upper extremity, on top of residual left lower extremity weakness with dysarthria, dysphagia, concern for cognitive impairment on top of hard of hearing with impaired mobility, ADLs.    Brief History of Presenting Illness and Hospital Course:  This is a 85 year old male admitted to the acute rehabilitation unit on 2/27/2017 per HPI \"Elias Mckee is an 85-year-old male admitted to acute rehab unit on 02/27/2017 with a past medical history notable for a stroke 9 years ago with some residual left leg weakness. He has a history of chronic ischemic heart disease, prostate cancer, hypertension, hyperlipidemia, type 2 diabetes, chronic kidney disease stage 3, atrial fibrillation. He presented to have significant left lower facial droop, severe weakness of L UE extremity, some left lower extremity weakness. CT angiography revealed occlusion of the right M2 branch of the middle cerebral artery and perfusion deficit corresponding in the right MCA territory, as well as consistent with ischemia. MRI shows right MCA diffusion restriction area and left cerebellar small area of diffusion restriction. The patient did receive IV tPA, and mechanical thrombectomy was attempted but unsuccessful due to angle of branch of occlusion and calcified nature of clot.       Functionally, the patient is taking steps, 3-6 feet, with moderate assist x2. Buckling in lower extremities present. Supine to sit with minimal-to-moderate assist x1, scooting to edge of bed and " "sitting with standby assist, increased time. Sit to and from, stand with minimal assist x1 and contact guard assist of another. Requires minimal assist for sitting balance during ADL tasks at the edge of bed and stood at the edge of bed x2 with minimal/moderate assist x2 on platform front-wheeled walker. Able to follow 1-step directions, moderate-to-severe dysarthria. Has oral and pharyngeal dysphagia. Needs further cognitive linguistic evaluation to address dysarthria and left facial droop and further cognitive evaluation and treatment.       Currently the patient presents with dysarthria, dysphagia, impaired left-sided weakness, greater weakness in left upper extremity and impaired cognition. The patient is medically appropriate and assessed to have needs and will benefit from inpatient acute rehabilitation comprehensive program, working with Physical Therapy, Occupational Therapy and Speech \"        During the acute rehab course, he has made improvements and progress but still has a lot more needs for overall recovery from functional deficits.    - optimize secondary stroke management, on xarelto for anticoagulation, BP management, DM / BS control, lipid management  - HTN, on HCTZ, metoprolol and lisinopril increase dose 3/1 and 3/3, has prn hydralazine, added HCTZ 3/6 for better BP control, adjust as needed, f/u with PCP on discharge, eventual goal post stroke and those with DM < / = 130 / 80  - DM / BS, endocrinology helped with BS control and adjusting management while in rehab, now on lantus 10 units daily and glipizide 7.5 mg daily, f/u with PCP on discharge   - HLD, on lipitor  - on vit D, ferrous gluconate, flonase  - melatonin for sleep  - L ankle swelling initially, some pain, reports twisting ankle, able to move aROM on own w/o sig discomfort, fx suspicion low, did ice pack and voltaren gel now improved and better  - spasticity on L UE, cont ROM and stretching, started on baclofen 5 mg tid recently, may " "need to further titrate, f/u with PM&&R on discharge  - dietitian following with terrence counts now off TF since 3/7 overnight, follow po intake, d/fortino nasal feeding tube 3/8, doing well with po food intake  - at risk for dehydration d/t restricted po on thickened liquids, i/o monitoring, encourage fluids, IVF supplementation done on 3/13 and on 3/20 with improvement, consider i/o monitoring and encourage po fluid intake to maintain hydration      Bladder and Bowel - monitor spont voids and BM, on flomax decreased to 0.4 mg 3/20 as may contribute to orthostatis, miralax prn  GI ppx - on zantac  DVT ppx - optimize mech ppx with PCDs and antiembolism stockings, on xarelto, progressive mobilization          Functionally at discharge:  \"Patient presenting with silent aspiration during study that was not immediately idenfied during the live study but noted upon review. Patient presenting with consistent premature spillage to the level of pyriforms and requiring a second swallow to clear remaining residue. When controlled for bolus size and with use of chin tuck patient having flash penetration. Flash penetration also noted with nectar thick liquids though signficance of swallow delay and residue not as much as with thin liquids. High level of cueing required to correctly utilize chin tuck maneuver. No difficulties noted with single solid food texture observed. Recommend continue with current NDD-3 food textures and nectar thick liquids. Increased emphasis on use of chin tuck maneuver and use of a double (\"dry\") swallow to improve safety. Ongoing therapy upon discharge to TCU to advance diet as appropriate and for additional referral for repeat VFSS if appropriate. \"  \"Patient presenting with silent aspiration during study that was not immediately idenfied during the live study but noted upon review. Patient presenting with consistent premature spillage to the level of pyriforms and requiring a second swallow to clear " "remaining residue. When controlled for bolus size and with use of chin tuck patient having flash penetration. Flash penetration also noted with nectar thick liquids though signficance of swallow delay and residue not as much as with thin liquids. High level of cueing required to correctly utilize chin tuck maneuver. No difficulties noted with single solid food texture observed. Recommend continue with current NDD-3 food textures and nectar thick liquids. Increased emphasis on use of chin tuck maneuver and use of a double (\"dry\") swallow to improve safety. Ongoing therapy upon discharge to TCU to advance diet as appropriate and for additional referral for repeat VFSS if appropriate\"  \"PT: Pt with difficulty tolerating standing position today while checking orthostatic vitals. Pt with lightheadedness, fatigue in standing, BP 84/64 (was 136/84 in supine). See vitals section for details. RN and MD notified. PT continues to need cuing and A for safe positioning and efficient use with transfers and scooting in bed. Pt needing Karey with wc mobility due to decreased awareness L side, decreased scanning. Cues to locate room on way back from loop with wc propulsion. \"  \"SLP--VFSS scheduled for 3/20/17 to rule out silent aspiration and assess upgrade to thin liquids prior to discharge on Tuesday. Family was present and educated regarding VFSS process and purpose. \"  Upon discharge it is recommended to continue with PT / OT / SLP at TCU            Pertinent Latest Lab Results:   Last Basic Metabolic Panel:  Lab Results   Component Value Date     03/20/2017      Lab Results   Component Value Date    POTASSIUM 4.3 03/20/2017     Lab Results   Component Value Date    CHLORIDE 104 03/20/2017     Lab Results   Component Value Date    TRENT 8.9 03/20/2017     Lab Results   Component Value Date    CO2 25 03/20/2017     Lab Results   Component Value Date    BUN 23 03/20/2017     Lab Results   Component Value Date    CR 1.08 " 03/20/2017     Lab Results   Component Value Date     03/20/2017       Lab Results   Component Value Date    WBC 5.8 03/20/2017     Lab Results   Component Value Date    RBC 3.70 03/20/2017     Lab Results   Component Value Date    HGB 11.3 03/20/2017     Lab Results   Component Value Date    HCT 33.7 03/20/2017     No components found for: MCT  Lab Results   Component Value Date    MCV 91 03/20/2017     Lab Results   Component Value Date    MCH 30.5 03/20/2017     Lab Results   Component Value Date    MCHC 33.5 03/20/2017     Lab Results   Component Value Date    RDW 14.2 03/20/2017     Lab Results   Component Value Date     03/20/2017           Discharge Medications:   Elias Mckee   Home Medication Instructions SCOTTIE:[id number]    Printed on:03/21/17 0900   Medication Information                      acetaminophen (TYLENOL) 325 MG tablet  Take 650 mg by mouth every 4 hours as needed for mild pain              atorvastatin (LIPITOR) 40 MG tablet  1 tablet (40 mg) by Oral or Feeding Tube route daily             baclofen (LIORESAL) 10 MG tablet  Take 0.5 tablets (5 mg) by mouth 3 times daily             Blood Glucose Monitoring Suppl (BLOOD GLUCOSE TEST STRIPS STRP)  66 strips continuous.             Cholecalciferol (VITAMIN D) 2000 UNITS tablet  Take 2,000 Units by mouth daily             ferrous gluconate (FERGON) 324 (38 FE) MG tablet  Take 1 tablet (324 mg) by mouth daily (with breakfast)             fluticasone (FLONASE) 50 MCG/ACT nasal spray  Spray 1-2 sprays into both nostrils daily             glipiZIDE (GLUCOTROL) 5 MG tablet  Take 1.5 tablets (7.5 mg) by mouth daily (with breakfast)             hydrochlorothiazide (MICROZIDE) 12.5 MG capsule  Take 1 capsule (12.5 mg) by mouth daily Hold for SBP < 110             insulin glargine (LANTUS) 100 UNIT/ML injection  Inject 10 Units Subcutaneous every 24 hours             LANCETS MISC. KIT  appropriate to strips and machine            "  lidocaine (LIDODERM) 5 % Patch  Place 1-2 patches onto the skin every 24 hours Apply patch(s) to neck. To prevent lidocaine toxicity, patient should be patch free for 12 hrs daily. Patches may be cut to smaller size prior to removing release liner.             lisinopril (PRINIVIL/ZESTRIL) 20 MG tablet  Take 1 tablet (20 mg) by mouth 2 times daily Hold for SBP < 100             melatonin 3 MG tablet  Take 1 tablet (3 mg) by mouth At Bedtime             metoprolol (LOPRESSOR) 50 MG tablet  Take 1 tablet (50 mg) by mouth 2 times daily Hold for SBP < 100 or HR < 55 / min             miconazole (MICATIN; MICRO GUARD) 2 % powder  Apply topically 2 times daily Apply to groin             ORDER FOR DME  Equipment being ordered: Wheelchair             ORDER FOR DME  Equipment being ordered:  transport wheelchair             order for DME  Equipment being ordered: transport wheelchair.   It has been ruled out that a walker and cane are not sufficient.             order for DME  Equipment being ordered: Walker - basic black non collapseable walker             order for DME  Equipment being ordered: basic black non - collapesable cane             order for DME  Equipment being ordered: Cane             polyethylene glycol (MIRALAX/GLYCOLAX) powder  Take 17 g by mouth every 48 hours as needed for constipation             ranitidine (ZANTAC) 150 MG tablet  Take 1 tablet (150 mg) by mouth 2 times daily             rivaroxaban ANTICOAGULANT (XARELTO) 20 MG TABS tablet  Take 1 tablet (20 mg) by mouth daily (with dinner)             tamsulosin (FLOMAX) 0.4 MG capsule  Take 1 capsule (0.4 mg) by mouth daily                     Physical Examination:   /55 (BP Location: Right arm)  Pulse 58  Temp 96.9  F (36.1  C) (Oral)  Resp 16  Ht 1.778 m (5' 10\")  Wt 83.4 kg (183 lb 14.4 oz)  SpO2 96%  BMI 26.39 kg/m2  Sitting at chair this am   Conversant and cooperative   R pupil deformed - previously documented   L hand and toe nails with " fungal infection changes appears chronic   L UE MAS 1+ to 2/4 on L elbow flexors and pronators - variable during day and before / after ROM / stretching  L LE with antigravity hip flex and knee ext but limited range 3+/5  Limited antigravity ankle DF / PF with limited range   Left sided neglect improving       Active Diet Order      Diet      Dysphagia Diet Level 3 Advanced Nectar Thickened Liquids (pre-thickened or use instant food thickener)       Follow up Appointments:  Follow up   - PCP in 1-2 weeks s/p stroke with Dr Shen Huff     - Neurology with Dr Manuel Summers in 4 weeks s/p stroke     - Physical Medicine & Rehabilitation Clinic in 8 weeks with Dr Kevin Bond or Dr Verenice Garcia at [phone] [facility]     Discharge Disposition: TCU    Total Discharge time spent is > 30 minutes.

## 2017-03-21 NOTE — PLAN OF CARE
Problem: Goal/Outcome  Goal: Goal Outcome Summary  Outcome: No Change  FOCUS/GOAL  Bowel management, Bladder management, Nutrition/Feeding/Swallowing precautions, Pain management, Medical management and Mobility     ASSESSMENT, INTERVENTIONS AND CONTINUING PLAN FOR GOAL:  Pt's vitals stable. He does still get orthostatic when up. Nectar thick fluids provided at bedside and reminders given to keep drinking fluids. BGs 133 and 229. Correction novolog given at lunchtime. Denied any pain. Eats with set-up. Ate 50% of breakfast and 75% of lunch. He had a bm in the [facility] after lunch. Continent of urine using the urinal with staff assistance with placement/positioning. Discharged from unit at 1320 when [facility]

## 2017-03-21 NOTE — PLAN OF CARE
Problem: Goal/Outcome  Goal: Goal Outcome Summary  Outcome: No Change  FOCUS/GOAL  Medical management     ASSESSMENT, INTERVENTIONS AND CONTINUING PLAN FOR GOAL:  Patient did not call for assistance, call light was within his reach, he yelled out for help when he needed to use a urinal, was also incontinent once. He slept off and on. He needs assist of one with turning and repositioning, he can boost himself up with cues. No c/opain.   [facility].

## 2017-03-21 NOTE — PLAN OF CARE
Problem: Goal/Outcome  Goal: Goal Outcome Summary  Physical Therapy Discharge Summary     Reason for therapy discharge:    Discharged to transitional care facility.     Progress towards therapy goal(s). See goals on Care Plan in [facility] electronic health record for goal details.  Goals not met.  Barriers to achieving goals:   Pt has not met goals due to continued L hemiparesis LUE and LLE, L side inattention and decreased gaze and head turn to L side, decreased activity tolerance, decreased memory, problem-solving and initiation. .     Therapy recommendation(s):    Continued therapy is recommended.  Rationale/Recommendations:  Pt will benefit form continued neuro caro for improved functional use LUE, LLE.  Pt also needs continued training for L side attention, head turns and ROM to L side for improved awareness L side of environment.  Pt needs bed mobility, transfers, sitting and standing balance, short dist gait training, wc mobility training. Pt is needing Kraey with wc mobility for awareness L side using RUE and RLE.  Pt needing mod to max A for transfers (max to L side, modA to R ) , stand pivot with hemicane or LBQC.  Pt amb short distance with rail on R, modA, wc follow, cuing and A for LLE advancement. .

## 2017-03-21 NOTE — PLAN OF CARE
Problem: Goal/Outcome  Goal: Goal Outcome Summary  Occupational Therapy Discharge Summary     Reason for therapy discharge:    Discharged to transitional care facility.     Progress towards therapy goal(s). See goals on Care Plan in [facility] electronic health record for goal details.  Goals partially met.  Barriers to achieving goals:   gradual progress with goals in POC, will benefit from extended rehab course to reach goals.     Therapy recommendation(s):    Continued therapy is recommended.  Rationale/Recommendations:  Progress IND ADLs, LUE neuro re-ed, functional cognition, endurance, L sided awareness, and functional mobility.      Summary: Pt is a 85 yr old M admitted to  ARU 2/27/17 following hospital stay for R MCA CVA. Presenting with left hemiparesis, dysphagia, dysarthria and impaired mobility/ADLs.     ADLs/IADLs: Pt limited by hemiparesis, impaired balance, and cognitive impairments. Pt is Min A STS and Mod-Max A SPT, improved performance when transferring towards R side. Pt amb short distances with Mod-Max A and jaclyn cane. Pt requiring Mod A g/h, Mod A UB dressing, Max A LB dressing, and Mod A bathing. Pt at Mod A level for toilet transfer with grab bar, Max A toilet hygiene/clothing management. Interventions have been focused on jaclyn techniques, progressing safety with transfers, and WB through LUE during standing tasks. Pt requiring assist for all IADLs due to current impairments.      UE: LUE hemiparesis, 1 finger subluxation, no movement hand/wrist/elbow, limited movement sh/scapula. Has w/c lap tray and wearing shoulder sling during mobility/transfers. Has started to develop increased muscle tone in elbow, wrist, and hand. Interventions focus on PROM/AAROM, E-stim (FES bike), WB activities, and joint protection.   DME/AE: During acute rehab, pt has been using weight jaclyn cane for transfers and mobility, tub bench/grab bars for bathing, w/c for longer transport, commode overlay and grab bar for  toileting. Equipment for home pending progress in rehab.   Family support: Daughter very supportive, present for several therapy sessions. Daughter working full time and unable to provide extensive physical assist. Increased services at [facility].           Problem: OT General Care Plan  Goal: Hygiene/Grooming (OT)  Hygiene/Grooming (OT)   Outcome: Improving  Mod A seated g/h. Able to brush hair and wash face, jaclyn technique. Requiring assist for applying deodorant, washing hands, and oral cares.  Goal: Upper Body Dressing (OT)  Upper Body Dressing (OT)   Outcome: Improving  Mod A doff/don shirt using jaclyn technique. Needing cues to recall techniques.  Goal: Lower Body Dressing (OT)  Lower Body Dressing (OT)   Outcome: Improving  Assist to thread BLE. STS Min A from EOB with jaclyn cane. Can assist with pulling up pants on R side, needing assist for back and L side.  Goal: Upper Body Bathing (OT)  Upper Body Bathing (OT)   Outcome: Improving  Washing chest, abdomen, face, hair, and LUE with supervision. Assist to wash RUE, underarms, and back.  Goal: Lower Body Bathing (OT)  Lower Body Bathing (OT)   Outcome: Improving  Assist to wash BLE, castro area, gluteal region due to hemiparesis and postural concerns.   Goal: Toilet Transfer/Toileting (OT)  Toilet Transfer/Toileting (OT)   Outcome: Improving  Mod A SPT w/c <-> toilet with grab bar. Needing Max A toilet hygiene and clothing management while stabilizing in standing with grab bar.  Goal: Home Management (OT)  Home Management (OT)   Outcome: No Change  Requiring total A household management. Will need to determine increased services at [facility].  Goal: Cognitive (OT)  Cognitive (OT)   Outcome: Improving  Needing verbal cues to initiate/sequence ADL routine, needing set up and cues for transfers, deficits with problem solving/comprehension/recall.   Goal: Edema Management (OT)  OT Goal: Edema Management (OT)   Outcome: Improving  Needing cues to elevate/support LUE while  supine and while seated upright in chair.   Goal: OT Goal 1  OT Goal 1   Outcome: Improving  Mod A SPT w/c <-> extended tub bench using grab bar-walk in shower.

## 2017-03-21 NOTE — PLAN OF CARE
Problem: Goal/Outcome  Goal: Goal Outcome Summary  Outcome: No Change  FOCUS/GOAL  Nutrition/Feeding/Swallowing precautions, Medication management and Wound care management     ASSESSMENT, INTERVENTIONS AND CONTINUING PLAN FOR GOAL:  Pt confused at times. A&Ox2. Denied pain. Ao1 from wc<>bed. Needs assistance using urinal in bed. Pt diet remaining the same DD3-nectar liquids-see video swallow study for more details. Encouraged fluids, no low BP noted this shift. Pt DC to TCU tomorrow. No redness/pinkness noted on coccyx area. Need a pt weight.

## 2017-03-23 NOTE — PROGRESS NOTES
IRF-RAFF[facility]LE CLARIFICATION NOTE FOR DISCHARGE  Lowest score for each FIM item supported by available charting *FIM scores taken from charting on 3/21/17    Eating: FIM 5: Charting indicates pt requires set up for eating  Grooming: FIM 4: Charting indicates pt required assist for 25% of time with grooming task  Bathing: FIM 3: Charting indicates pt required assist for 50% of bathing task  Upper Body Dressing: FIM 2: Charting indicates pt required assist for over half of upper body dressing task  Lower Body Dressing: FIM 1: Charting describes pt requiring assist for over 75% of lower body dressing task  Toileting: FIM 1: Charting indicates pt required assist of 2 for toileting /perihygiene task  Bladder:  FIM 4: Charting indicates pt required assist with use of urinal positioning/holding urinal  Bladder Number of Accidents: 0  Bowel: FIM 6: pt wears incontinent pad for bowel mgmt  Bowel Number of Accidents: 0  Transfer: FIM 2: Charting indicates pt requires lifting and lowering assist   Toilet transfer: FIM 2: Max assist lifting and lowering assist per chart  Tub/shower transfer: FIM 3: Mod assist charted  Locomotion distance: 150 feet  Locomotion: FIM 4: Min assist w/c  Comprehension: FIM 5: Repetition for comprehension  Expression: FIM 5: Repetition and increased time for expression  Social Interaction: FIM 6: Charting describes pt with mild difficulty with appropriate interaction  Problem solving: FIM 4: Charting indicates pt required assist approx 25% of time (or less) for basic problem solving   Memory: FIM 5: Poor recall charted; cues charted approx 10% of time or less.

## 2017-05-02 ENCOUNTER — THERAPY VISIT (OUTPATIENT)
Dept: SPEECH THERAPY | Facility: CLINIC | Age: 82
End: 2017-05-02

## 2017-05-02 DIAGNOSIS — R13.12 OROPHARYNGEAL DYSPHAGIA: Primary | ICD-10-CM

## 2017-05-02 NOTE — MR AVS SNAPSHOT
After Visit Summary   2017    Elias Mckee    MRN: [id number]           Patient Information     Date Of Birth          8/10/1931        Visit Information        Provider Department      2017 2:00 PM Heather Lara SLP [facility]        Today's Diagnoses     Oropharyngeal dysphagia    -  1       Follow-ups after your visit        Who to contact     Please call your clinic at [phone] to:    Ask questions about your health    Make or cancel appointments    Discuss your medicines    Learn about your test results    Speak to your doctor   If you have compliments or concerns about an experience at your clinic, or if you wish to file a complaint, please contact [facility] Patient Relations at [phone] or email us at [email or web address]         Additional Information About Your Visit        MyChart Information     Captora is an electronic gateway that provides easy, online access to your medical records. With Captora, you can request a clinic appointment, read your test results, renew a prescription or communicate with your care team.     To sign up for Captora visit the website at [email or web address] ([email or web address])   You will be asked to enter the access code listed below, as well as some personal information. Please follow the directions to create your username and password.     Your access code is: [email or web address]  Expires: 2017 10:41 AM     Your access code will  in 90 days. If you need help or a new code, please contact your [facility] or call [phone] for assistance.        Care EveryWhere ID     This is your Care EveryWhere ID. This could be used by other organizations to access your [place] medical records  [id number]         Blood Pressure from Last 3 Encounters:   17 99/57   17 156/73   17 124/70    Weight from Last 3 Encounters:   17 83.4 kg (183 lb 14.4 oz)   17  81.2 kg (179 lb 0.2 oz)   02/17/17 87.5 kg (193 lb)              Today, you had the following     No orders found for display       Primary Care Provider Office Phone # Fax #    Shen Huff [phone]       [facility] [address] [address]        Thank you!     Thank you for choosing [facility]  for your care. Our goal is always to provide you with excellent care. Hearing back from our patients is one way we can continue to improve our services. Please take a few minutes to complete the written survey that you may receive in the mail after your visit with us. Thank you!             Your Updated Medication List - Protect others around you: Learn how to safely use, store and throw away your medicines at www.disposemymeds.org.          This list is accurate as of: 5/2/17 11:59 PM.  Always use your most recent med list.                   Brand Name Dispense Instructions for use    atorvastatin 40 MG tablet    LIPITOR    30 tablet    1 tablet (40 mg) by Oral or Feeding Tube route daily       baclofen 10 MG tablet    LIORESAL    90 tablet    Take 0.5 tablets (5 mg) by mouth 3 times daily       blood glucose lancets standard    no brand specified    1 box    appropriate to strips and machine       BLOOD GLUCOSE TEST STRIPS STRP     99 strip    66 strips continuous.       ferrous gluconate 324 (38 FE) MG tablet    FERGON    90 tablet    Take 1 tablet (324 mg) by mouth daily (with breakfast)       fluticasone 50 MCG/ACT spray    FLONASE    1 Package    Spray 1-2 sprays into both nostrils daily       glipiZIDE 5 MG tablet    GLUCOTROL    60 tablet    Take 1.5 tablets (7.5 mg) by mouth daily (with breakfast)       hydrochlorothiazide 12.5 MG capsule    MICROZIDE    30 capsule    Take 1 capsule (12.5 mg) by mouth daily Hold for SBP < 110       insulin glargine 100 UNIT/ML injection    LANTUS    50 mL    Inject 10 Units Subcutaneous every 24 hours       lidocaine 5 %  Patch    LIDODERM    60 patch    Place 1-2 patches onto the skin every 24 hours Apply patch(s) to neck. To prevent lidocaine toxicity, patient should be patch free for 12 hrs daily. Patches may be cut to smaller size prior to removing release liner.       lisinopril 20 MG tablet    PRINIVIL/ZESTRIL    30 tablet    Take 1 tablet (20 mg) by mouth 2 times daily Hold for SBP < 100       melatonin 3 MG tablet     60 tablet    Take 1 tablet (3 mg) by mouth At Bedtime       metoprolol 50 MG tablet    LOPRESSOR    60 tablet    Take 1 tablet (50 mg) by mouth 2 times daily Hold for SBP < 100 or HR < 55 / min       miconazole 2 % powder    MICATIN; MICRO GUARD    100 g    Apply topically 2 times daily Apply to groin       * order for DME     1 Device    Equipment being ordered: Wheelchair       * order for DME     1 Device    Equipment being ordered:  transport wheelchair       * order for DME     1 Device    Equipment being ordered: transport wheelchair.  It has been ruled out that a walker and cane are not sufficient.       * order for DME     1 Device    Equipment being ordered: Walker - basic black non collapseable walker       * order for DME     1 each    Equipment being ordered: basic black non - collapesable cane       * order for DME     1 Device    Equipment being ordered: Cane       polyethylene glycol powder    MIRALAX/GLYCOLAX     Take 17 g by mouth every 48 hours as needed for constipation       ranitidine 150 MG tablet    ZANTAC    60 tablet    Take 1 tablet (150 mg) by mouth 2 times daily       rivaroxaban ANTICOAGULANT 20 MG Tabs tablet    XARELTO    30 tablet    Take 1 tablet (20 mg) by mouth daily (with dinner)       tamsulosin 0.4 MG capsule    FLOMAX    60 capsule    Take 1 capsule (0.4 mg) by mouth daily       TYLENOL 325 MG tablet   Generic drug:  acetaminophen     100 tablet    Take 650 mg by mouth every 4 hours as needed for mild pain       vitamin D 2000 UNITS tablet     100 tablet    Take 2,000 Units  by mouth daily       * Notice:  This list has 6 medication(s) that are the same as other medications prescribed for you. Read the directions carefully, and ask your doctor or other care provider to review them with you.

## 2017-05-16 NOTE — PROGRESS NOTES
" 05/02/17 1300   General Information   Type Of Visit Initial   Start Of Care Date 05/02/17   Referring Physician Karol Shaffer MD   Orders Evaluate And Treat   Orders Comment Video Swallow Study   Medical Diagnosis Dysphagia   Pertinent History of Current Problem/OT: Additional Occupational Profile Info Mr Mckee presents for VFSS to further evaluate swallowing function. Pt underwent VFSS on 3/20/17 with noted aspiration/penetration at that time. 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 He is continuing to recover from CVA.    Respiratory Status Room air   Prior Level Of Function Swallowing   Prior Level Of Function Comment Dysphagia diet level 2 with nectar thickened liquids.   Patient Role/employment History Retired   Living Environment [facility]   General Observations Pt very pleasant and cooperative throughout evaluation.    Patient/family Goals Pt wants \"to drink regular liquids\".    Pain Assessment   Pain Reported Yes   Pain Location Neck and back   Pain Comments Pt moving back and forth during exam. Pain was reported by patient and care provider reports pt has been complaining of pain for most of the day.    FALL RISK SCREEN   Comments Pt had a stroke recently and thus is at increased risk for falling due to weakness.    Clinical Swallow Evaluation   Oral Musculature generally intact   Mucosal Quality good   Mandibular Strength and Mobility intact   Oral Labial Strength and Mobility impaired seal;impaired coordination;impaired retraction  (left facial weakness)   Laryngeal Function Swallow;Voicing initiated;Cough;Throat clear   VFSS Eval: Radiology   Radiologist Resident   Views Taken left lateral   Physical Location of [facility]   VFSS Eval: Thin Liquid Texture Trial   Mode of Presentation, Thin Liquid spoon;self-fed   Order of Presentation 2,3,6,7   Preparatory Phase WFL   Oral Phase, Thin Liquid Premature pharyngeal entry   Pharyngeal Phase, Thin " Liquid Delayed swallow reflex   Rosenbek's Penetration Aspiration Scale: Thin Liquid Trial Results 1 - no aspiration, contrast does not enter airway   Diagnostic Statement No aspiration/penetration noted on thin liquid trials   VFSS Eval: Nectar Thick Liquid Texture Trial   Mode of Presentation, Nectar cup;self-fed   Order of Presentation 1   Preparatory Phase WFL   Oral Phase, Nectar WFL   Pharyngeal Phase, Nectar Delayed swallow reflex   Rosenbek's Penetration Aspiration Scale: Nectar-Thick Liquid Trial Results 1 - no aspiration, contrast does not enter airway   Diagnostic Statement No aspiration/penetration noted on nectar thick liquid trial.   VFSS Eval: Puree Solid Texture Trial   Mode of Presentation, Puree spoon;fed by clinician   Order of Presentation 4   Preparatory Phase WFL   Oral Phase, Puree WFL   Pharyngeal Phase, Puree WFL   Rosenbek's Penetration Aspiration Scale: Puree Food Trial Results 1 - no aspiration, contrast does not enter airway   Diagnostic Statement No asipration/penetration on puree noted.    VFSS Eval: Solid Food Texture Trial   Mode of Presentation, Solid fed by clinician   Order of Presentation 5   Preparatory Phase WFL   Oral Phase, Solid WFL   Pharyngeal Phase, Solid (Stasis along tongue base)   Rosenbek's Penetration Aspiration Scale: Solid Food Trial Results 1 - no aspiration, contrast does not enter airway   Diagnostic Statement No aspiration/penetration noted on solid consistency. Stasis along tongue base which pt requested sip of liquid and was able to clear without difficulty.    Swallow Compensations   Swallow Compensations Alternate viscosity of consistencies   Educational Assessment   Barriers to Learning No barriers   Esophageal Phase of Swallow   Patient reports or presents with symptoms of esophageal dysphagia No   Swallow Eval: Clinical Impressions   Skilled Criteria for Therapy Intervention Skilled criteria met.  Treatment indicated.   Dysphagia Outcome Severity Scale  (DEREK) Level 5 - DEREK   Treatment Diagnosis Mild oropharyngeal dysphagia   Diet texture recommendations Regular diet;Thin liquids   Recommended Feeding/Eating Techniques alternate between small bites and sips of food/liquid;maintain upright posture during/after eating for 30 mins;small sips/bites;tuck chin during every swallow   Rehab Potential good, to achieve stated therapy goals   Predicted Duration of Therapy Intervention (days/wks) Evaluation only at this location. Pt should continue to follow up with SLP at SNF.    Anticipated Discharge Disposition inpatient rehabilitation facility   Risks and Benefits of Treatment have been explained. Yes   Patient, family and/or staff in agreement with Plan of Care Yes   Clinical Impression Comments Mr Mckee demonstrates mild oropharyngeal dysphagia as characterized by delayed initiation of the swallow to the valleculae, mild stasis of cookie along the base of the tongue for which he requested a sip of liquid to clear. No aspiration/penetration noted on any consistencies trialed. No residual stasis in the pharynx after completed swallow except for the aforementioned episode on cookie consistency. Recommend regular consistency diet with thin liquids. Sit pt upright for po intake. Encourage small bites/sips and slow rate. Alternate bites/sips. No further SLP Services indicated at this time. Thank you kindly for this referral.    Total Session Time   Total Session Time 45   Total Evaluation Time 45   SLP Medicare Only G-code   G-code Swallowing   Swallowing   Swallowing:  Current Status , Goal , Discharge -Dyhp Only-Modifier the same for all G-codes CI: 1-19% impairment   Swallowing: Current  & Discharge Modifier Rationale-Eval Only Based on the results of this evaluation along with reference to BALWINDER guidelines.

## 2017-06-05 ENCOUNTER — NURSING HOME VISIT (OUTPATIENT)
Dept: GERIATRICS | Facility: CLINIC | Age: 82
End: 2017-06-05
Payer: MEDICARE

## 2017-06-05 VITALS
DIASTOLIC BLOOD PRESSURE: 59 MMHG | HEIGHT: 70 IN | WEIGHT: 186 LBS | TEMPERATURE: 98 F | BODY MASS INDEX: 26.63 KG/M2 | SYSTOLIC BLOOD PRESSURE: 105 MMHG | HEART RATE: 69 BPM | RESPIRATION RATE: 20 BRPM

## 2017-06-05 DIAGNOSIS — Z79.4 TYPE 2 DIABETES MELLITUS WITH DIABETIC NEPHROPATHY, WITH LONG-TERM CURRENT USE OF INSULIN (H): ICD-10-CM

## 2017-06-05 DIAGNOSIS — I12.9 BENIGN HYPERTENSION WITH CKD (CHRONIC KIDNEY DISEASE) STAGE III (H): ICD-10-CM

## 2017-06-05 DIAGNOSIS — I69.391 DYSPHAGIA DUE TO RECENT CEREBROVASCULAR ACCIDENT (CVA): ICD-10-CM

## 2017-06-05 DIAGNOSIS — N18.30 CKD (CHRONIC KIDNEY DISEASE) STAGE 3, GFR 30-59 ML/MIN (H): ICD-10-CM

## 2017-06-05 DIAGNOSIS — E03.4 HYPOTHYROIDISM DUE TO ACQUIRED ATROPHY OF THYROID: ICD-10-CM

## 2017-06-05 DIAGNOSIS — I48.20 CHRONIC ATRIAL FIBRILLATION (H): ICD-10-CM

## 2017-06-05 DIAGNOSIS — E11.21 TYPE 2 DIABETES MELLITUS WITH DIABETIC NEPHROPATHY, WITH LONG-TERM CURRENT USE OF INSULIN (H): ICD-10-CM

## 2017-06-05 DIAGNOSIS — I69.919 COGNITIVE DEFICITS AS LATE EFFECT OF CEREBROVASCULAR DISEASE: ICD-10-CM

## 2017-06-05 DIAGNOSIS — N18.30 TYPE 2 DIABETES MELLITUS WITH STAGE 3 CHRONIC KIDNEY DISEASE, WITH LONG-TERM CURRENT USE OF INSULIN (H): ICD-10-CM

## 2017-06-05 DIAGNOSIS — N40.0 BENIGN PROSTATIC HYPERPLASIA WITHOUT LOWER URINARY TRACT SYMPTOMS, UNSPECIFIED MORPHOLOGY: ICD-10-CM

## 2017-06-05 DIAGNOSIS — I69.354 HEMIPARESIS AFFECTING LEFT SIDE AS LATE EFFECT OF CEREBROVASCULAR ACCIDENT (H): Primary | ICD-10-CM

## 2017-06-05 DIAGNOSIS — N18.30 BENIGN HYPERTENSION WITH CKD (CHRONIC KIDNEY DISEASE) STAGE III (H): ICD-10-CM

## 2017-06-05 DIAGNOSIS — R27.0 ATAXIA: ICD-10-CM

## 2017-06-05 DIAGNOSIS — E11.22 TYPE 2 DIABETES MELLITUS WITH STAGE 3 CHRONIC KIDNEY DISEASE, WITH LONG-TERM CURRENT USE OF INSULIN (H): ICD-10-CM

## 2017-06-05 DIAGNOSIS — Z79.4 TYPE 2 DIABETES MELLITUS WITH STAGE 3 CHRONIC KIDNEY DISEASE, WITH LONG-TERM CURRENT USE OF INSULIN (H): ICD-10-CM

## 2017-06-05 PROBLEM — K59.01 SLOW TRANSIT CONSTIPATION: Status: ACTIVE | Noted: 2017-06-05

## 2017-06-05 PROBLEM — I63.9 STROKE (H): Status: RESOLVED | Noted: 2017-02-27 | Resolved: 2017-06-05

## 2017-06-05 PROBLEM — B35.1 ONYCHOMYCOSIS: Status: ACTIVE | Noted: 2017-06-05

## 2017-06-05 PROBLEM — Z86.73 H/O: CVA (CEREBROVASCULAR ACCIDENT): Status: ACTIVE | Noted: 2017-06-05

## 2017-06-05 PROBLEM — I63.9 CVA (CEREBRAL VASCULAR ACCIDENT) (H): Status: RESOLVED | Noted: 2017-02-20 | Resolved: 2017-06-05

## 2017-06-05 PROCEDURE — 99310 SBSQ NF CARE HIGH MDM 45: CPT | Mod: GW | Performed by: NURSE PRACTITIONER

## 2017-06-05 PROCEDURE — 99207 ZZC CDG-CORRECTLY CODED, REVIEWED AND AGREE: CPT | Performed by: NURSE PRACTITIONER

## 2017-06-05 RX ORDER — LIDOCAINE 50 MG/G
1 PATCH TOPICAL EVERY 24 HOURS
COMMUNITY
End: 2017-11-15

## 2017-06-05 NOTE — PROGRESS NOTES
[facility]  PRIMARY CARE PROVIDER AND CLINIC:  Shen Huff [facility] [address] / [place] WRIGHT*  Chief Complaint   Patient presents with     [facility]     HPI:    Elias Mckee is a 85 year old  (8/10/1931),admitted [facility] the [facility]  TCU stay 2/27/17 through 6/2/17.  Admitted to this facility for  rehab, medical management and nursing care.  Current issues are:      Hemiparesis affecting left side as late effect of cerebrovascular accident (H)  Pt had CVA in 2008 with some left sided weakness, but had another larger CVA on 2/20/2017, resulting in left sided hemiparesis, dysphagia and some cognitive loss. Had an extended stay in TCU at [facility] and now transferred to [facility] on 6/2.  Continues with Physical Therapy.  Patient and daughter report that he continues to show improvement and he does ambulate with walker and therapy support, otherwise uses w/c for distance movement. Pt reports that he is very happy to be at [facility]    Dysphagia due to recent cerebrovascular accident (CVA)  Has advanced to regular diet and thin liquids. No choking or coughing reported this weekend.     Type 2 diabetes mellitus with stage 3 chronic kidney disease, with long-term current use of insulin (H)  Remains on glipizide and insulin.  Accuchecks past three days: 0730:  136, 1700: 105 - 111.    Benign hypertension with CKD (chronic kidney disease) stage III  BP ranges in past three days: 98//65.  No reports of chest pain.    Type 2 diabetes mellitus with diabetic nephropathy, with long-term current use of insulin (H)  Accuchecks as noted above.  Pt reports some numbness in both feet, which is not changed.  He has onychomycosis of left hand fingernails and bilateral pedal nails. Had seen podiatry when at the [facility] before his stroke.    CKD (chronic kidney disease) stage 3, GFR 30-59  "ml/min  Chronic, per medical record.  No labs available since March.    Chronic atrial fibrillation (H)  Heart rate ranges in past 4 days: 67-75. On Xarelto for anticoagulation.  CVA was determined to be cardioembolic.    Cognitive deficits as late effect of cerebrovascular disease  Daughter reports ongoing improvement.  MARIXA was a 23 in March and improved to 27 on discharge.  She noted that he developed a UTI and had a sudden onset of sexually inappropriate behaviors and these resolved with tx of the UTI and his cognition improved.     Benign prostatic hyperplasia without lower urinary tract symptoms, unspecified morphology  H/o prostate cancer.  Remains on flomax daily.  Pt denies any changes in urination.    Ataxia  Gait disturbance following CVA.    Hypothyroidism due to acquired atrophy of thyroid  Remains on levothyroxine.    CODE STATUS/ADVANCE DIRECTIVES DISCUSSION:   CPR/Full code , Polst completed with pt today.  He desires CPR, but is unsure of tube feeding.  He notes that he would not want to live and be \"completely helpless\".  Patient's living condition: lives in a skilled nursing facility    ALLERGIES:No known allergies  PAST MEDICAL HISTORY:  has a past medical history of Acute, but ill-defined, cerebrovascular disease (1-2008); Atrial fibrillation (H); Benign hypertension with CKD (chronic kidney disease) stage III (6/5/2017); Chronic atrial fibrillation (H) (6/5/2017); Chronic ischemic heart disease, unspecified; Cognitive deficits as late effect of cerebrovascular disease (6/5/2017); Coronary artery disease; CVA (cerebral infarction); Dysphagia due to recent cerebrovascular accident (CVA) (6/5/2017); Elevated cholesterol; Essential hypertension, benign; H/O: CVA (cerebrovascular accident) (6/5/2017); Hemiparesis affecting left side as late effect of cerebrovascular accident (H) (6/5/2017); Hyperlipidaemia; MEDICAL HISTORY OF - (1- 2008); Myocardial infarction (H); Other and unspecified " hyperlipidemia; Type 2 diabetes mellitus with stage 3 chronic kidney disease (H) (2017); and Type II or unspecified type diabetes mellitus without mention of complication, not stated as uncontrolled (-).  PAST SURGICAL HISTORY:  has a past surgical history that includes seed implantation (); Phacoemulsification clear cornea with standard intraocular lens implant (Right, 10/7/2014); and Vitrectomy anterior (Right, 10/7/2014).  FAMILY HISTORY: family history is not on file.  SOCIAL HISTORY:  reports that he quit smoking about 44 years ago. His smoking use included Cigarettes. He started smoking about 64 years ago. He has a 20.00 pack-year smoking history. He has never used smokeless tobacco. He reports that he drinks alcohol. He reports that he does not use illicit drugs.    Social History     Social History Narrative    Born and raised in [facility].  His parents  when he was young and his father moved to [place].  His mother  when he was 11 from a brain tumor.  He was [place] by his [place] and graduated from .  Met is wife in [facility] and  in 1949.  She  in  from COPD.  They had four children (3 sons and 1 daughter).  Sons live in [place] and daughter lives in [place] and is his POA.  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  Lived at the [facility] in [place] until his stroke in 2017.       Post Discharge Medication Reconciliation Status: patient was not discharged from an inpatient facility.  Current Outpatient Prescriptions   Medication Sig Dispense Refill     Fluticasone Propionate (FLONASE NA) Spray 2 sprays into both nostrils daily       TRAZODONE HCL PO Take 25 mg by mouth At Bedtime       Atorvastatin Calcium (LIPITOR PO) Take 40 mg by mouth At Bedtime       ACETAMINOPHEN PO Take 1,000 mg by mouth 3 times daily       lidocaine (LIDODERM) 5 % Patch Place 1 patch  onto the skin every 24 hours On for 12hrs and off for 12hrs       LEVOTHYROXINE SODIUM PO Take 25 mcg by mouth daily       METOPROLOL TARTRATE PO Take 25 mg by mouth 2 times daily       glipiZIDE (GLUCOTROL) 5 MG tablet Take 1.5 tablets (7.5 mg) by mouth daily (with breakfast) 60 tablet      insulin glargine (LANTUS) 100 UNIT/ML injection Inject 10 Units Subcutaneous every 24 hours 50 mL 0     lisinopril (PRINIVIL/ZESTRIL) 20 MG tablet Take 1 tablet (20 mg) by mouth 2 times daily Hold for SBP < 100 30 tablet 0     miconazole (MICATIN; MICRO GUARD) 2 % powder Apply topically 2 times daily Apply to groin 100 g 0     hydrochlorothiazide (MICROZIDE) 12.5 MG capsule Take 1 capsule (12.5 mg) by mouth daily Hold for SBP < 110 30 capsule 0     tamsulosin (FLOMAX) 0.4 MG capsule Take 1 capsule (0.4 mg) by mouth daily 60 capsule 0     rivaroxaban ANTICOAGULANT (XARELTO) 20 MG TABS tablet Take 1 tablet (20 mg) by mouth daily (with dinner) 30 tablet 1     polyethylene glycol (MIRALAX/GLYCOLAX) powder Take 17 g by mouth daily        acetaminophen (TYLENOL) 325 MG tablet Take 650 mg by mouth every 4 hours as needed for mild pain  100 tablet      ferrous gluconate (FERGON) 324 (38 FE) MG tablet Take 1 tablet (324 mg) by mouth daily (with breakfast) 90 tablet 1     Cholecalciferol (VITAMIN D) 2000 UNITS tablet Take 2,000 Units by mouth daily 100 tablet 3     Patient Active Problem List   Diagnosis     Chronic ischemic heart disease     Malignant neoplasm of prostate (H)     Personal history of other diseases of circulatory system     HYPERLIPIDEMIA LDL GOAL <100     Advanced directives, counseling/discussion     CKD (chronic kidney disease) stage 3, GFR 30-59 ml/min     Leg weakness     Ataxia     BPH (benign prostatic hyperplasia)     Type 2 diabetes mellitus with diabetic nephropathy (H)     History of actinic keratoses     History of squamous cell carcinoma     S/P Mohs surgery for basal cell carcinoma     H/O: CVA  "(cerebrovascular accident), Right MCA cardioembolic stroke 2/2017     Cognitive deficits as late effect of cerebrovascular disease     Hemiparesis affecting left side as late effect of cerebrovascular accident (H)     Dysphagia due to recent cerebrovascular accident (CVA)     Type 2 diabetes mellitus with stage 3 chronic kidney disease (H)     Benign hypertension with CKD (chronic kidney disease) stage III     Hypothyroidism due to acquired atrophy of thyroid     Chronic atrial fibrillation (H)     Slow transit constipation     Most Recent Immunizations   Administered Date(s) Administered     Influenza (IIV3) 01/22/2013     Pneumococcal 23 valent 09/10/2008     TD (ADULT, 7+) 09/10/2008     ROS:  Negative selected, CONSTITUTIONAL:  weight loss, weight gain, poor appetite, fevers and fatigue, EYES:  Positive for right eye surgery and ongiong issues with vision and redness., ENT:  Positive for hearing loss.  Had bilateral hearing aides and he reports that left one is lost and right one is still available, but he is not wearing it at this time.  Has upper and lower dentures., CV:  chest pain, dyspnea on exertion and Positive for atrial fibrillation, RESPIRATORY: shortness of breath, cough, asthma and wheezing, :  dysuria and Positive for h/o prostate cancer, GI:  abdominal pain, constipation, diarrhea, nausea, vomiting and Positive for some dysphagia following CVA., NEURO:  headaches and Positive for CVA in 2017 with left sided paralysis and some dysphagia. with mild cognitive impairent., PSYCH: anxiety and depression, MUSCULOSKELETAL: back pain, joint pain and fibromyalgia and SKIN: Positive for fungal fingernails on left hand and bilateral pedal fungal nails.    Exam:  /59  Pulse 69  Temp 98  F (36.7  C)  Resp 20  Ht 5' 10\" (1.778 m)  Wt 186 lb (84.4 kg)  BMI 26.69 kg/m2  GENERAL APPEARANCE:  Alert, cooperative, male seen in his room.  Pleasant and able to express self verbally.  Denies any " discomfort.  ENT:  Mouth and posterior oropharynx normal, moist mucous membranes, edentulous  with denture plateupper and lower. , No hearing aides present.  EYES:  right sclera very reddened and pt is holding eye partially closed due to irriation following eye exam this morning.  Left sclera clear and conjunctiva pink without discharge.  NECK:  No adenopathy,masses or thyromegaly  RESP:  Quiet, effortless respirations.  No cough.  CTA bilaterally.  CV:  Irregularly irregular rhythmn.  No murmur.  DP pulses +1 bilaterally.  Pedal skin warm and dry.  ABDOMEN:  Soft rounded abdomen.  Bowel sounds positive all four quadrants.  No tenderness with palpation.  :    No discomfort with palpation of suprapubic area or CVA tendenerss.  M/S:   No discomfort with palpation of spinal process or with gentle ROM of extremities.  Left arm in sling due to hemiparesis.  SKIN:  Thickened yellow left hand fingernails and bilateral toenails.  No pedal wounds.  NEURO:   Strong hand grasp right hand and only minimal ability to move left hand fingers.  Left sided facial droop.  Strong ability to raise right leg against resistance and moderate left leg strength. Oriented X  PSYCH:  oriented X 3, Pleasant and positive.  No signs of sadness.    Lab/Diagnostic data:    CBC RESULTS:   Recent Labs   Lab Test  03/20/17   1225  02/27/17   0746   WBC  5.8  8.4   RBC  3.70*  3.11*   HGB  11.3*  9.6*   HCT  33.7*  28.1*   MCV  91  90   MCH  30.5  30.9   MCHC  33.5  34.2   RDW  14.2  14.9   PLT  139*  148*       Last Basic Metabolic Panel:  Recent Labs   Lab Test  03/20/17   1225  03/14/17   0730   NA  138  139   POTASSIUM  4.3  4.3   CHLORIDE  104  105   TRENT  8.9  8.0*   CO2  25  27   BUN  23  26   CR  1.08  1.22   GLC  241*  117*     GFR Estimate   Date Value Ref Range Status   03/20/2017 65 >60 mL/min/1.7m2 Final     Comment:     Non  GFR Calc   03/14/2017 56 (L) >60 mL/min/1.7m2 Final     Comment:     Non  GFR  "Calc   03/13/2017 46 (L) >60 mL/min/1.7m2 Final     Comment:     Non  GFR Calc   03/12/2017 59 (L) >60 mL/min/1.7m2 Final     Comment:     Non  GFR Calc   02/27/2017 74 >60 mL/min/1.7m2 Final     Comment:     Non  GFR Calc       Liver Function Studies -   Recent Labs   Lab Test  02/17/17   0940  08/11/16   0930  01/12/16   1022   PROTTOTAL   --   7.1  7.2   ALBUMIN   --   3.7  3.7   BILITOTAL   --   0.3  0.3   ALKPHOS   --   80  87   AST   --   13  13   ALT  29  24  25     Lab Results   Component Value Date    CHOL 105 02/21/2017     Lab Results   Component Value Date    HDL 28 02/21/2017     Lab Results   Component Value Date    LDL 49 02/21/2017     Lab Results   Component Value Date    TRIG 140 02/21/2017     Lab Results   Component Value Date    CHOLHDLRATIO 5.2 05/29/2014         TSH   Date Value Ref Range Status   02/21/2017 5.96 (H) 0.40 - 4.00 mU/L Final   11/28/2014 3.06 0.40 - 4.00 mU/L Final     Comment:     Effective 7/30/2014, the reference range for this assay has changed to reflect   new instrumentation/methodology.         Lab Results   Component Value Date    A1C 6.8 02/21/2017    A1C 6.7 02/17/2017     Lab Results   Component Value Date    INR 0.99 02/20/2017    INR 0.97 06/13/2008     Family Communication:  Contacted daughter Jazmin.  Introduced myself and discussed father's current condition.  Reviewed his wishes for advanced directives.  She expressed gratitude for the discussion as she is concerned about full code status if he should have another stroke.  Given his comments about not wanting to \"be completely helpless\", I will reapproach in the future and discuss this topic in greater detail.    ASSESSMENT/PLAN:  (I69.354) Hemiparesis affecting left side as late effect of cerebrovascular accident (H)  (primary encounter diagnosis)  Comment: Chronic  Plan: Continue Physical Therapy and Occupational Therapy as well an anticoagulation therapy   " Monitor.  Dr. Boyd to see on MOnday.    (I69.391) Dysphagia due to recent cerebrovascular accident (CVA)  Comment: Appears improved  Plan: Monitor.    (E11.22,  N18.3,  Z79.4) Type 2 diabetes mellitus with stage 3 chronic kidney disease, with long-term current use of insulin (H)  Comment: Chronic, [id number] goal; <8%  Plan: Given low blood sugars, he may not need the glipizide.  Will obtain HgbA1C Thursday and evaluate at that time.      (I12.9,  N18.3) Benign hypertension with CKD (chronic kidney disease) stage III  Comment: BP goal; <150/90, at goal  Plan: Continue to monitor.  May benefit from slight reduction in lisinopril, due to episodes of low BP, but will reevaluate at MD visit in one week.    (E11.21,  Z79.4) Type 2 diabetes mellitus with diabetic nephropathy, with long-term current use of insulin (H)  Comment: Chronic  Plan: MOnitor for pedal wounds.  Continue with current diabetic plan as noted above.    (N18.3) CKD (chronic kidney disease) stage 3, GFR 30-59 ml/min  Comment: Chronic  Plan: BMP on Thursday.   Renally adjust dose of medications.    (I48.2) Chronic atrial fibrillation (H)  Comment: Chronic  Plan: Continue metoprolol and Xarelto.  Monitor.    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  If behaviors become suddenly bizarre, consider UTI as cause, due to recent infection and symptoms.    (N40.0) Benign prostatic hyperplasia without lower urinary tract symptoms, unspecified morphology  Comment: Chronic  Plan: Continue Flomax    (R27.0) Ataxia  Comment:  Secondary to CVA  Plan:  Continue Physical Therapy.     (E03.4) Hypothyroidism due to acquired atrophy of thyroid  Comment: Chronic  Plan: Continue levothyroxine dose and check TSH on Thursday.    Information reviewed:  Medications, vital signs, orders, nursing notes, problem list, hospital information. Total time spent with patient visit was 35 min including patient visit, review of past  records and phone call to patient contact. Greater than 50% of total time spent with counseling and coordinating care.    Electronically signed by:  TOBY Momin CNP

## 2017-06-08 ENCOUNTER — TRANSFERRED RECORDS (OUTPATIENT)
Dept: HEALTH INFORMATION MANAGEMENT | Facility: CLINIC | Age: 82
End: 2017-06-08

## 2017-06-08 LAB
BUN SERPL-MCNC: 20 MG/DL (ref 9–26)
CALCIUM SERPL-MCNC: 9.4 MG/DL (ref 8.4–10.2)
CHLORIDE SERPLBLD-SCNC: 105 MMOL/L (ref 98–109)
CO2 SERPL-SCNC: 29 MMOL/L (ref 22–31)
CREAT SERPL-MCNC: 1.1 MG/DL (ref 0.73–1.18)
DIFFERENTIAL: ABNORMAL
ERYTHROCYTE [DISTWIDTH] IN BLOOD BY AUTOMATED COUNT: 15.6 % (ref 11–15)
GFR SERPL CREATININE-BSD FRML MDRD: >60 ML/MIN/1.73M2
GLUCOSE SERPL-MCNC: 186 MG/DL (ref 70–100)
HBA1C MFR BLD: 6.2 % (ref 4–5.6)
HCT VFR BLD AUTO: 35.1 % (ref 39–51)
HEMOGLOBIN: 11.8 G/DL (ref 13.4–17.5)
MCV RBC AUTO: 92.6 FL (ref 80–100)
PLATELET # BLD AUTO: 163 K/CMM (ref 140–450)
POTASSIUM SERPL-SCNC: 4.5 MMOL/L (ref 3.5–5.2)
RBC # BLD AUTO: 3.79 M/CMM (ref 4.2–5.9)
SODIUM SERPL-SCNC: 143 MMOL/L (ref 136–145)
TSH SERPL-ACNC: 3.66 UIU/ML (ref 0.3–4.5)
WBC # BLD AUTO: 5 K/CMM (ref 3.8–11)

## 2017-06-13 ENCOUNTER — NURSING HOME VISIT (OUTPATIENT)
Dept: GERIATRICS | Facility: CLINIC | Age: 82
End: 2017-06-13

## 2017-06-13 DIAGNOSIS — E11.22 TYPE 2 DIABETES MELLITUS WITH STAGE 3 CHRONIC KIDNEY DISEASE, WITH LONG-TERM CURRENT USE OF INSULIN (H): ICD-10-CM

## 2017-06-13 DIAGNOSIS — Z79.4 TYPE 2 DIABETES MELLITUS WITH STAGE 3 CHRONIC KIDNEY DISEASE, WITH LONG-TERM CURRENT USE OF INSULIN (H): ICD-10-CM

## 2017-06-13 DIAGNOSIS — I69.354 HEMIPARESIS AFFECTING LEFT SIDE AS LATE EFFECT OF CEREBROVASCULAR ACCIDENT (H): Primary | ICD-10-CM

## 2017-06-13 DIAGNOSIS — I48.20 CHRONIC ATRIAL FIBRILLATION (H): ICD-10-CM

## 2017-06-13 DIAGNOSIS — N18.30 TYPE 2 DIABETES MELLITUS WITH STAGE 3 CHRONIC KIDNEY DISEASE, WITH LONG-TERM CURRENT USE OF INSULIN (H): ICD-10-CM

## 2017-06-13 DIAGNOSIS — E03.4 HYPOTHYROIDISM DUE TO ACQUIRED ATROPHY OF THYROID: ICD-10-CM

## 2017-06-13 DIAGNOSIS — I69.391 DYSPHAGIA DUE TO RECENT CEREBROVASCULAR ACCIDENT (CVA): ICD-10-CM

## 2017-06-14 NOTE — PROGRESS NOTES
[facility]  PRIMARY CARE PROVIDER AND CLINIC:  [facility] [address] / [place]*      Pt was seen by Dr Boyd on 6/13/17 for an initial TCU visit    Course reviewed with NP    HPI:    Elias Mckee is a 85 year old  (8/10/1931),admitted to the [facility]] from [facility]..  TCU stay 2/27/17 through 6/2/17.      Admitted to this facility for  rehab, medical management and nursing care.        Current issues reviewed with NP:     Hemiparesis affecting left side as late effect of cerebrovascular accident (H)  Pt had CVA in 2008 with some left sided weakness, but had another larger CVA on 2/20/2017, resulting in left sided hemiparesis, dysphagia and some cognitive loss. Had an extended stay in TCU at [facility] and now transferred to [facility] on 6/2.  Continues with Physical Therapy. Pt has ataxia, walks with walker    Dysphagia due to recent cerebrovascular accident (CVA)  On regular diet and thin liquids.      Type 2 diabetes mellitus with stage 3 chronic kidney disease, with long-term current use of insulin (H)  Remains on glipizide and insulin.  Accuchecks stable      Benign hypertension with CKD (chronic kidney disease) stage III  BP stable since admission to the TCU    CKD (chronic kidney disease) stage 3, GFR 30-59 ml/min    Chronic atrial fibrillation (H)   On Xarelto for anticoagulation.      Cognitive deficits as late effect of cerebrovascular disease    Benign prostatic hyperplasia without lower urinary tract symptoms, unspecified morphology  H/o prostate cancer.      CODE STATUS/ADVANCE DIRECTIVES DISCUSSION:   CPR/Full code     Patient's living condition: lives in a skilled nursing facility    ALLERGIES:No known allergies  PAST MEDICAL HISTORY:  has a past medical history of Acute, but ill-defined, cerebrovascular disease (1-2008); Atrial fibrillation (H); Benign hypertension with CKD (chronic  kidney disease) stage III (2017); Chronic atrial fibrillation (H) (2017); Chronic ischemic heart disease, unspecified; Cognitive deficits as late effect of cerebrovascular disease (2017); Coronary artery disease; CVA (cerebral infarction); Dysphagia due to recent cerebrovascular accident (CVA) (2017); Elevated cholesterol; Essential hypertension, benign; H/O: CVA (cerebrovascular accident) (2017); Hemiparesis affecting left side as late effect of cerebrovascular accident (H) (2017); Hyperlipidaemia; MEDICAL HISTORY OF - (2008); Myocardial infarction (H); Onychomycosis (2017); Other and unspecified hyperlipidemia; Type 2 diabetes mellitus with stage 3 chronic kidney disease (H) (2017); and Type II or unspecified type diabetes mellitus without mention of complication, not stated as uncontrolled ().  PAST SURGICAL HISTORY:  has a past surgical history that includes seed implantation (); Phacoemulsification clear cornea with standard intraocular lens implant (Right, 10/7/2014); and Vitrectomy anterior (Right, 10/7/2014).  FAMILY HISTORY: family history is not on file.  SOCIAL HISTORY:  reports that he quit smoking about 44 years ago. His smoking use included Cigarettes. He started smoking about 64 years ago. He has a 20.00 pack-year smoking history. He has never used smokeless tobacco. He reports that he drinks alcohol. He reports that he does not use illicit drugs.    Social History     Social History Narrative    Born and raised in [place] [place] [facility].  His parents  when he was young and his father moved to [place].  His mother  when he was 11 from a brain tumor.  He was [place] and graduated from .  Met is wife in [facility] and  in 1949.  She  in  from COPD.  They had four children (3 sons and 1 daughter).  Sons live in [place] and daughter lives in [place] and is his POA.  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  Lived at the [facility] in [place] until his stroke in Feb 2017.           Post Discharge Medication Reconciliation Status:   .  Current Outpatient Prescriptions   Medication Sig Dispense Refill     Fluticasone Propionate (FLONASE NA) Spray 2 sprays into both nostrils daily       TRAZODONE HCL PO Take 25 mg by mouth At Bedtime       Atorvastatin Calcium (LIPITOR PO) Take 40 mg by mouth At Bedtime       ACETAMINOPHEN PO Take 1,000 mg by mouth 3 times daily       lidocaine (LIDODERM) 5 % Patch Place 1 patch onto the skin every 24 hours On for 12hrs and off for 12hrs       LEVOTHYROXINE SODIUM PO Take 25 mcg by mouth daily       METOPROLOL TARTRATE PO Take 25 mg by mouth 2 times daily       glipiZIDE (GLUCOTROL) 5 MG tablet Take 1.5 tablets (7.5 mg) by mouth daily (with breakfast) 60 tablet      insulin glargine (LANTUS) 100 UNIT/ML injection Inject 10 Units Subcutaneous every 24 hours 50 mL 0     lisinopril (PRINIVIL/ZESTRIL) 20 MG tablet Take 1 tablet (20 mg) by mouth 2 times daily Hold for SBP < 100 30 tablet 0     miconazole (MICATIN; MICRO GUARD) 2 % powder Apply topically 2 times daily Apply to groin 100 g 0     hydrochlorothiazide (MICROZIDE) 12.5 MG capsule Take 1 capsule (12.5 mg) by mouth daily Hold for SBP < 110 30 capsule 0     tamsulosin (FLOMAX) 0.4 MG capsule Take 1 capsule (0.4 mg) by mouth daily 60 capsule 0     rivaroxaban ANTICOAGULANT (XARELTO) 20 MG TABS tablet Take 1 tablet (20 mg) by mouth daily (with dinner) 30 tablet 1     polyethylene glycol (MIRALAX/GLYCOLAX) powder Take 17 g by mouth daily        acetaminophen (TYLENOL) 325 MG tablet Take 650 mg by mouth every 4 hours as needed for mild pain  100 tablet      ferrous gluconate (FERGON) 324 (38 FE) MG tablet Take 1 tablet (324 mg) by mouth daily (with breakfast) 90 tablet 1     Cholecalciferol (VITAMIN D) 2000 UNITS tablet Take 2,000 Units by mouth daily 100 tablet 3      Patient Active Problem List   Diagnosis     Chronic ischemic heart disease     Malignant neoplasm of prostate (H)     Personal history of other diseases of circulatory system     HYPERLIPIDEMIA LDL GOAL <100     Advanced directives, counseling/discussion     CKD (chronic kidney disease) stage 3, GFR 30-59 ml/min     Leg weakness     Ataxia     BPH (benign prostatic hyperplasia)     Type 2 diabetes mellitus with diabetic nephropathy (H)     History of actinic keratoses     History of squamous cell carcinoma     S/P Mohs surgery for basal cell carcinoma     H/O: CVA (cerebrovascular accident), Right MCA cardioembolic stroke 2/2017     Cognitive deficits as late effect of cerebrovascular disease     Hemiparesis affecting left side as late effect of cerebrovascular accident (H)     Dysphagia due to recent cerebrovascular accident (CVA)     Type 2 diabetes mellitus with stage 3 chronic kidney disease (H)     Benign hypertension with CKD (chronic kidney disease) stage III     Hypothyroidism due to acquired atrophy of thyroid     Chronic atrial fibrillation (H)     Slow transit constipation     Onychomycosis     Most Recent Immunizations   Administered Date(s) Administered     Influenza (IIV3) 01/22/2013     Pneumococcal 23 valent 09/10/2008     TD (ADULT, 7+) 09/10/2008     ROS:    + L sided weakness, mild dysphagia  Pt denies chest pain, SOB, dizziness, Abd pain, dysuria  Spirits are good      Exam:    GENERAL APPEARANCE:  Alert, very pleasant, sitting in wheelchair  ENT:  Mouth and posterior oropharynx normal, edentulous  EYES:  No erythema or drainage  NECK:  No adenopathy,masses or thyromegaly  RESP:  Quiet, effortless respirations.  No cough.  CTA bilaterally.  CV:  Irregularly irregular rhythmn.  No murmur.   ABDOMEN:  Soft non-distended  M/S:   No LE edema, no evidence of acute arthritis  NEURO:   Left sided facial droop. L sided weakness, with LUE weaker than L LE. Speech clear.  PSYCH:  oriented X 3, Pleasant  and positive.  No signs of sadness.    Lab/Diagnostic data:    CBC RESULTS:   Recent Labs   Lab Test  03/20/17   1225  02/27/17   0746   WBC  5.8  8.4   RBC  3.70*  3.11*   HGB  11.3*  9.6*   HCT  33.7*  28.1*   MCV  91  90   MCH  30.5  30.9   MCHC  33.5  34.2   RDW  14.2  14.9   PLT  139*  148*       Last Basic Metabolic Panel:  Recent Labs   Lab Test  03/20/17   1225  03/14/17   0730   NA  138  139   POTASSIUM  4.3  4.3   CHLORIDE  104  105   TRENT  8.9  8.0*   CO2  25  27   BUN  23  26   CR  1.08  1.22   GLC  241*  117*     GFR Estimate   Date Value Ref Range Status   06/08/2017 >60 >60 ml/min/1.73m2 Final   03/20/2017 65 >60 mL/min/1.7m2 Final     Comment:     Non  GFR Calc   03/14/2017 56 (L) >60 mL/min/1.7m2 Final     Comment:     Non  GFR Calc   03/13/2017 46 (L) >60 mL/min/1.7m2 Final     Comment:     Non  GFR Calc   03/12/2017 59 (L) >60 mL/min/1.7m2 Final     Comment:     Non  GFR Calc       Liver Function Studies -   Recent Labs   Lab Test  02/17/17   0940  08/11/16   0930  01/12/16   1022   PROTTOTAL   --   7.1  7.2   ALBUMIN   --   3.7  3.7   BILITOTAL   --   0.3  0.3   ALKPHOS   --   80  87   AST   --   13  13   ALT  29  24  25     Lab Results   Component Value Date    CHOL 105 02/21/2017     Lab Results   Component Value Date    HDL 28 02/21/2017     Lab Results   Component Value Date    LDL 49 02/21/2017     Lab Results   Component Value Date    TRIG 140 02/21/2017     Lab Results   Component Value Date    CHOLHDLRATIO 5.2 05/29/2014         TSH   Date Value Ref Range Status   02/21/2017 5.96 (H) 0.40 - 4.00 mU/L Final   11/28/2014 3.06 0.40 - 4.00 mU/L Final     Comment:     Effective 7/30/2014, the reference range for this assay has changed to reflect   new instrumentation/methodology.         Lab Results   Component Value Date    A1C 6.8 02/21/2017    A1C 6.7 02/17/2017     Lab Results   Component Value Date    INR 0.99 02/20/2017     INR 0.97 06/13/2008       ASSESSMENT/PLAN:    (I69.354) Hemiparesis affecting left side as late effect of cerebrovascular accident (H)  (primary encounter diagnosis)  Comment: Chronic  Plan: Continue Physical Therapy and Occupational Therapy. Chronic warfarin therapy.    (I69.391) Dysphagia due to recent cerebrovascular accident (CVA)  Comment: Stable  Plan: Monitor.    (E11.22,  N18.3,  Z79.4) Type 2 diabetes mellitus with stage 3 chronic kidney disease, with long-term current use of insulin (H)  Comment: Chronic, [id number] 6.2%  Plan: Monitor BG, consider decrease or d/c or glipizide.  Monitor skin    (I12.9,  N18.3) Benign hypertension with CKD (chronic kidney disease) stage III  Comment: BP stable  Plan: Continue to monitor.  Avoid hypotension    (N18.3) CKD (chronic kidney disease) stage 3, GFR 30-59 ml/min  Comment: Chronic, stable  Plan: Monitor BMP    (I48.2) Chronic atrial fibrillation (H)  Comment: Chronic, HR controlled  Plan: Continue metoprolol and Xarelto.     (N40.0) Benign prostatic hyperplasia without lower urinary tract symptoms, unspecified morphology  Comment: Chronic  Plan: Continue Flomax      Elijah Boyd MD

## 2017-06-28 ENCOUNTER — TELEPHONE (OUTPATIENT)
Dept: GERIATRICS | Facility: CLINIC | Age: 82
End: 2017-06-28

## 2017-06-28 NOTE — TELEPHONE ENCOUNTER
TELEPHONE ENCOUNTER:      Elias Mckee is a 85 year old  (8/10/1931),Nurse called today to report: patient fell out of wheelchair last evening trying to pick something up from the floor.  Per nursing note, he was able to bear weight as normal, reported no pain, and no visible evidence of soft tissue trauma.  This morning he was assisted per usual to get dressed for breakfast but noting he had left knee and ankle pain.  While in the dining room stated the pain increased from his toes to his hip, unable to eat breakfast, requested to lay down.  He was unable to stand and required the mechanical stand lift to get him in bed.  Nurse reported the the left hip does appear to be slightly externally rotated with some pain during PROM assessment of the left hip, knee, and ankle.  He has history of CVA with left sided weakness.    ASSESSMENT/PLAN  Fall out of wheelchair with left hip, knee, and ankle pain    Xrays of left hip/pelvis, knee, and ankle.    Awaiting results    TOBY Casey CNP

## 2017-06-28 NOTE — TELEPHONE ENCOUNTER
TELEPHONE ENCOUNTER:      Elias Mckee is a 85 year old  (8/10/1931),Nurse called today to report: Left hip, pelvis, knee, and ankle xray report reveals some soft tissue edema, no fracture noted.  DJD present.    ASSESSMENT/PLAN  Left leg pain     Tramadol 50 mg every 6 hours PRN pain x 1 week    Follow up in 1 week regarding pain and movement.    Chani Carbone, TOBY CNP

## 2017-07-05 NOTE — PROGRESS NOTES
[facility]    Chief Complaint   Patient presents with     RECHECK     HPI:    Elias Mckee is a 85 year old  (8/10/1931), who is being seen today for an episodic care visit at [facility].  HPI information obtained from: facility chart records, facility staff, patient report and [facility] chart review.Today's concern is:  Acute left ankle pain  Pt had two recent falls (6/27 and 7/1).  Both were in his room and he was attempting to self transfer.  He is becoming stronger and more active, but still lacks insight into his functional losses following his stroke.  Had left ankle swelling and pain following fall.  XRays completed X2, 7/4 and 6/28.No signs of obvious fx.  Today, he reports that pain is improving.    Hemiparesis affecting left side as late effect of cerebrovascular accident (H)  Remains in therapy for walking and strengthening.  Continuing to make progress    Cognitive deficits as late effect of cerebrovascular disease  BIMS score of 14, but continues with impulsivity and lack of insight into functional losses.     Type 2 diabetes mellitus with stage 3 chronic kidney disease, without long-term current use of insulin (H)  Accuchecks this month: 0730:  168-190, 5pm: 125-249.  HgbA1C on 6/8 was 6.2%.    Benign hypertension with CKD (chronic kidney disease) stage III  BP ranges this month: 116-148/63-80.  No reports of chest pain.    CKD (chronic kidney disease) stage 3, GFR 30-59 ml/min  Labs on 6/8 showed a creatinine of 1.10 and an est GFR of >60.    Chronic atrial fibrillation (H)  Remains on metoprolol for rate control and Xarelto for anticoagulation.  Pulse ranges this month:  62-79    ALLERGIES: No known allergies  Past Medical, Surgical, Family and Social History reviewed and updated in [facility].    Current Outpatient Prescriptions   Medication Sig Dispense Refill     Fluticasone Propionate (FLONASE NA) Spray 2 sprays into both nostrils daily       TRAZODONE  HCL PO Take 25 mg by mouth At Bedtime       Atorvastatin Calcium (LIPITOR PO) Take 40 mg by mouth At Bedtime       ACETAMINOPHEN PO Take 1,000 mg by mouth 3 times daily       lidocaine (LIDODERM) 5 % Patch Place 1 patch onto the skin every 24 hours On for 12hrs and off for 12hrs       LEVOTHYROXINE SODIUM PO Take 25 mcg by mouth daily       METOPROLOL TARTRATE PO Take 25 mg by mouth 2 times daily       glipiZIDE (GLUCOTROL) 5 MG tablet Take 1.5 tablets (7.5 mg) by mouth daily (with breakfast) 60 tablet      insulin glargine (LANTUS) 100 UNIT/ML injection Inject 10 Units Subcutaneous every 24 hours 50 mL 0     lisinopril (PRINIVIL/ZESTRIL) 20 MG tablet Take 1 tablet (20 mg) by mouth 2 times daily Hold for SBP < 100 30 tablet 0     miconazole (MICATIN; MICRO GUARD) 2 % powder Apply topically 2 times daily Apply to groin 100 g 0     hydrochlorothiazide (MICROZIDE) 12.5 MG capsule Take 1 capsule (12.5 mg) by mouth daily Hold for SBP < 110 30 capsule 0     tamsulosin (FLOMAX) 0.4 MG capsule Take 1 capsule (0.4 mg) by mouth daily 60 capsule 0     rivaroxaban ANTICOAGULANT (XARELTO) 20 MG TABS tablet Take 1 tablet (20 mg) by mouth daily (with dinner) 30 tablet 1     polyethylene glycol (MIRALAX/GLYCOLAX) powder Take 17 g by mouth daily        acetaminophen (TYLENOL) 325 MG tablet Take 650 mg by mouth every 4 hours as needed for mild pain  100 tablet      ferrous gluconate (FERGON) 324 (38 FE) MG tablet Take 1 tablet (324 mg) by mouth daily (with breakfast) 90 tablet 1     Cholecalciferol (VITAMIN D) 2000 UNITS tablet Take 2,000 Units by mouth daily 100 tablet 3     Medications reviewed:  Medications reconciled to facility chart and changes were made to reflect current medications as identified as above med list. Below are the changes that were made:   Medications stopped since last EPIC medication reconciliation:   There are no discontinued medications.    Medications started since last [facility] medication reconciliation:  No  orders of the defined types were placed in this encounter.    Patient Active Problem List   Diagnosis     Chronic ischemic heart disease     Personal history of other diseases of circulatory system     HYPERLIPIDEMIA LDL GOAL <100     Advanced directives, counseling/discussion     CKD (chronic kidney disease) stage 3, GFR 30-59 ml/min     Leg weakness     Ataxia     BPH (benign prostatic hyperplasia)     Type 2 diabetes mellitus with diabetic nephropathy (H)     History of actinic keratoses     History of squamous cell carcinoma     S/P Mohs surgery for basal cell carcinoma     H/O: CVA (cerebrovascular accident), Right MCA cardioembolic stroke 2/2017     Cognitive deficits as late effect of cerebrovascular disease     Hemiparesis affecting left side as late effect of cerebrovascular accident (H)     Dysphagia due to recent cerebrovascular accident (CVA)     Type 2 diabetes mellitus with stage 3 chronic kidney disease (H)     Benign hypertension with CKD (chronic kidney disease) stage III     Hypothyroidism due to acquired atrophy of thyroid     Chronic atrial fibrillation (H)     Slow transit constipation     Onychomycosis     H/O prostate cancer       REVIEW OF SYSTEMS:  Negative selected, CONSTITUTIONAL:  weight loss, weight gain, poor appetite, fevers and fatigue, EYES:  Positive for right eye surgery and ongiong issues with vision and redness., ENT:  Positive for hearing loss.  Had bilateral hearing aides and he reports that left one is lost and right one is still available, but he is not wearing it at this time.  Has upper and lower dentures., CV:  chest pain, dyspnea on exertion and Positive for atrial fibrillation, RESPIRATORY: shortness of breath, cough, asthma and wheezing, :  dysuria and Positive for h/o prostate cancer, GI:  abdominal pain, constipation, diarrhea, nausea, vomiting and Positive for some dysphagia following CVA., NEURO:  headaches and Positive for CVA in 2017 with left sided paralysis  "and some dysphagia. with mild cognitive impairent., PSYCH: anxiety and depression, MUSCULOSKELETAL: back pain, joint pain and fibromyalgia and SKIN: Positive for fungal fingernails on left hand and bilateral pedal fungal nails.    Physical Exam:  /63  Pulse 69  Temp 98.4  F (36.9  C)  Resp 18  Ht 5' 10\" (1.778 m)  Wt 190 lb (86.2 kg)  BMI 27.26 kg/m2  GENERAL APPEARANCE:  Alert, cooperative, male seen in his room.  Pleasant and able to express self verbally. Reports mild discomfort in left ankle..  ENT:  Mouth and posterior oropharynx normal, moist mucous membranes, edentulous  with denture plateupper and lower. , No hearing aides present.  EYES:  Sclera clear and conjunctiva pink without discharge.  RESP:  Quiet, effortless respirations.  No cough.  CTA bilaterally.  CV:  Irregularly irregular rhythmn.  No murmur.  DP pulses +1 bilaterally.  Pedal skin warm and dry.  ABDOMEN:  Soft rounded abdomen.  Bowel sounds positive all four quadrants.  No tenderness with palpation.  :    No discomfort with palpation of suprapubic area or CVA tendenerss.  M/S:   No discomfort with palpation of spinal process or with gentle ROM of extremities, except for left ankle.  Tender with palpation of lateral malleolus. Limited ROM..  Left hand in splintdue to hemiparesis.  NEURO:   Strong hand grasp right hand and only minimal ability to move left hand fingers.  Left sided facial droop.  Strong ability to raise right leg against resistance and moderate left leg strength. Oriented X  PSYCH:  oriented X 3, Pleasant and positive.  No signs of sadness.  Just returned from [place] which he seemed to enjoy.    Recent Labs:    6/8/17:  WBC: 5.0, Hgb: 11.8, Platelet: 163  Last Basic Metabolic Panel:  Recent Labs   Lab Test 06/08/17 03/20/17   1225  03/14/17   0730   NA   --   138  139   POTASSIUM   --   4.3  4.3   CHLORIDE   --   104  105   TRENT  9.4  8.9  8.0*   CO2   --   25  27   BUN  20  23  26   CR  1.10  1.08  1.22   GLC  " 186*  241*  117*     GFR Estimate   Date Value Ref Range Status   06/08/2017 >60 >60 ml/min/1.73m2 Final   03/20/2017 65 >60 mL/min/1.7m2 Final     Comment:     Non  GFR Calc   03/14/2017 56 (L) >60 mL/min/1.7m2 Final     Comment:     Non  GFR Calc   03/13/2017 46 (L) >60 mL/min/1.7m2 Final     Comment:     Non  GFR Calc   03/12/2017 59 (L) >60 mL/min/1.7m2 Final     Comment:     Non  GFR Calc       Lab Results   Component Value Date    A1C 6.2 06/08/2017    A1C 6.8 02/21/2017     TSH   Date Value Ref Range Status   02/21/2017 5.96 (H) 0.40 - 4.00 mU/L Final   ]  TSH  6//8/17:  3.56    Left ankle XRay: 7/4/17;  IMPRESSION:  Soft tissue swelling without acute bony changes.    XRay left ankle, knee and hip with pelvis:  IMPRESSION:  No active pathological process of bone appreciated.  Age-appropriate joint degeneration with some peripheral vascular disease.  Mildly edematous appearance of soft tissues.    Assessment/Plan:  (M25.572) Acute left ankle pain  (primary encounter diagnosis)  Comment: Improving  Plan: COntinue to monitor and cool packs prn for comfort.     (I69.354) Hemiparesis affecting left side as late effect of cerebrovascular accident (H)  Comment: CHronic  Plan: Continue current POC.  Dr. Boyd to see this month for routine visit.    (I69.429) Cognitive deficits as late effect of cerebrovascular disease  Comment: Chronic  Plan: Continue current POC.    (E11.22,  N18.3) Type 2 diabetes mellitus with stage 3 chronic kidney disease, without long-term current use of insulin (H)  Comment: Chronic, HgbA1C goal <8%, at goal  Plan: Continue current POC.    (I12.9,  N18.3) Benign hypertension with CKD (chronic kidney disease) stage III  Comment: Chronic,  BP goal; <150/90, at goal  Plan: Continue current POC.    (N18.3) CKD (chronic kidney disease) stage 3, GFR 30-59 ml/min  Comment: Chronic, but stable  Plan: Renally adjust dose of medications.   BMP q3m or with acute illness.    (I48.2) Chronic atrial fibrillation (H)  Comment: Chronic, rate controlled  Plan: Continue metoprolol and xarelto    Electronically signed by  TOBY Momin CNP

## 2017-07-06 ENCOUNTER — NURSING HOME VISIT (OUTPATIENT)
Dept: GERIATRICS | Facility: CLINIC | Age: 82
End: 2017-07-06
Payer: MEDICARE

## 2017-07-06 VITALS
SYSTOLIC BLOOD PRESSURE: 116 MMHG | WEIGHT: 190 LBS | TEMPERATURE: 98.4 F | DIASTOLIC BLOOD PRESSURE: 63 MMHG | HEART RATE: 69 BPM | HEIGHT: 70 IN | RESPIRATION RATE: 18 BRPM | BODY MASS INDEX: 27.2 KG/M2

## 2017-07-06 DIAGNOSIS — M25.572 ACUTE LEFT ANKLE PAIN: Primary | ICD-10-CM

## 2017-07-06 DIAGNOSIS — N18.30 CKD (CHRONIC KIDNEY DISEASE) STAGE 3, GFR 30-59 ML/MIN (H): ICD-10-CM

## 2017-07-06 DIAGNOSIS — I69.354 HEMIPARESIS AFFECTING LEFT SIDE AS LATE EFFECT OF CEREBROVASCULAR ACCIDENT (H): ICD-10-CM

## 2017-07-06 DIAGNOSIS — E11.22 TYPE 2 DIABETES MELLITUS WITH STAGE 3 CHRONIC KIDNEY DISEASE, WITHOUT LONG-TERM CURRENT USE OF INSULIN (H): ICD-10-CM

## 2017-07-06 DIAGNOSIS — I12.9 BENIGN HYPERTENSION WITH CKD (CHRONIC KIDNEY DISEASE) STAGE III (H): ICD-10-CM

## 2017-07-06 DIAGNOSIS — I69.919 COGNITIVE DEFICITS AS LATE EFFECT OF CEREBROVASCULAR DISEASE: ICD-10-CM

## 2017-07-06 DIAGNOSIS — N18.30 TYPE 2 DIABETES MELLITUS WITH STAGE 3 CHRONIC KIDNEY DISEASE, WITHOUT LONG-TERM CURRENT USE OF INSULIN (H): ICD-10-CM

## 2017-07-06 DIAGNOSIS — I48.20 CHRONIC ATRIAL FIBRILLATION (H): ICD-10-CM

## 2017-07-06 DIAGNOSIS — N18.30 BENIGN HYPERTENSION WITH CKD (CHRONIC KIDNEY DISEASE) STAGE III (H): ICD-10-CM

## 2017-07-06 PROBLEM — Z85.46 H/O PROSTATE CANCER: Status: ACTIVE | Noted: 2017-07-06

## 2017-07-06 PROCEDURE — 99309 SBSQ NF CARE MODERATE MDM 30: CPT | Mod: GW | Performed by: NURSE PRACTITIONER

## 2017-07-06 PROCEDURE — 99207 ZZC CDG-CORRECTLY CODED, REVIEWED AND AGREE: CPT | Performed by: NURSE PRACTITIONER

## 2017-07-10 ENCOUNTER — NURSING HOME VISIT (OUTPATIENT)
Dept: GERIATRICS | Facility: CLINIC | Age: 82
End: 2017-07-10

## 2017-07-10 DIAGNOSIS — I69.354 HEMIPARESIS AFFECTING LEFT SIDE AS LATE EFFECT OF CEREBROVASCULAR ACCIDENT (H): ICD-10-CM

## 2017-07-10 DIAGNOSIS — I48.20 CHRONIC ATRIAL FIBRILLATION (H): ICD-10-CM

## 2017-07-10 DIAGNOSIS — E11.22 TYPE 2 DIABETES MELLITUS WITH STAGE 3 CHRONIC KIDNEY DISEASE, WITHOUT LONG-TERM CURRENT USE OF INSULIN (H): ICD-10-CM

## 2017-07-10 DIAGNOSIS — N18.30 TYPE 2 DIABETES MELLITUS WITH STAGE 3 CHRONIC KIDNEY DISEASE, WITHOUT LONG-TERM CURRENT USE OF INSULIN (H): ICD-10-CM

## 2017-07-10 DIAGNOSIS — M25.572 ACUTE LEFT ANKLE PAIN: Primary | ICD-10-CM

## 2017-07-11 ENCOUNTER — OFFICE VISIT (OUTPATIENT)
Dept: PHYSICAL MEDICINE AND REHAB | Facility: CLINIC | Age: 82
End: 2017-07-11

## 2017-07-11 VITALS
HEART RATE: 64 BPM | WEIGHT: 192.9 LBS | SYSTOLIC BLOOD PRESSURE: 163 MMHG | BODY MASS INDEX: 27.62 KG/M2 | OXYGEN SATURATION: 96 % | HEIGHT: 70 IN | DIASTOLIC BLOOD PRESSURE: 74 MMHG | TEMPERATURE: 97 F | RESPIRATION RATE: 20 BRPM

## 2017-07-11 DIAGNOSIS — I69.359 HEMIPARESIS AFFECTING DOMINANT SIDE AS LATE EFFECT OF CEREBROVASCULAR ACCIDENT (H): Primary | ICD-10-CM

## 2017-07-11 ASSESSMENT — PAIN SCALES - GENERAL: PAINLEVEL: NO PAIN (0)

## 2017-07-11 NOTE — NURSING NOTE
Chief Complaint   Patient presents with     Consult     [facility]     Anthony Elizondo, KINSEY

## 2017-07-11 NOTE — PROGRESS NOTES
Physical Medicine & Rehabilitation Clinic      HPI  This is a 85 year old male with risk factors of HTN, DM, HL, and Atrial Fibrillation, who presents to the clinic for left hemiparesis for ongoing rehabilitation recommendations. He also had a previous stroke in 2008 effecting the same side but with good recovery.   He sustained a stroke in Feb 2017. He was discharged to a ARU facility where he underwent intense rehabilitation for a period of 3 weeks. He was then transferred to a TCU, and then on to a NH.   He presents today with his daughter who is very involved in his cares     Main concerns today are function. He has been wheel chair bound. He was being walked with assistance of one, but fell twice when all mobility was placed on hold. He reports that he needs assistance of 1 or 2 for transfers. He is on a regular diet.   He wears depends. He endorses incontinence of bowel once or twice.      He denies any concernswith mood, however his daughter was tearful about some of the conversations they had together.   He does not have any skin breakdown.     He had a PCP in [place] prior to stroke, but [facility]  He does have a splint in his left hand.     ROS   Constitution: no fever  Eyes: no BOV  Ear, nose, throat & mouth: no sore throat  Cardiovascular: no cp, no swelling on LE  Respiratory: no sob  GI: no n/v  : no bladder and bowel changes  MSK: left weakness  Skin: no skin breakdown  Psychiatric: no depression or anxiety         PMHx - at least 1 (meds, prior illness / injury, surgery / hospitalization)  Current Outpatient Prescriptions   Medication     cholecalciferol (VITAMIN D) 1000 UNIT tablet     Fluticasone Propionate (FLONASE NA)     TRAZODONE HCL PO     Atorvastatin Calcium (LIPITOR PO)     ACETAMINOPHEN PO     lidocaine (LIDODERM) 5 % Patch     LEVOTHYROXINE SODIUM PO     METOPROLOL TARTRATE PO     glipiZIDE (GLUCOTROL) 5 MG tablet     insulin glargine  (LANTUS) 100 UNIT/ML injection     lisinopril (PRINIVIL/ZESTRIL) 20 MG tablet     miconazole (MICATIN; MICRO GUARD) 2 % powder     hydrochlorothiazide (MICROZIDE) 12.5 MG capsule     tamsulosin (FLOMAX) 0.4 MG capsule     rivaroxaban ANTICOAGULANT (XARELTO) 20 MG TABS tablet     polyethylene glycol (MIRALAX/GLYCOLAX) powder     acetaminophen (TYLENOL) 325 MG tablet     ferrous gluconate (FERGON) 324 (38 FE) MG tablet     No current facility-administered medications for this visit.        SHx  -  Social History     Social History     Marital status:      Spouse name: N/A     Number of children: N/A     Years of education: N/A     Occupational History     Not on file.     Social History Main Topics     Smoking status: Former Smoker     Packs/day: 1.00     Years: 20.00     Types: Cigarettes     Start date: 1953     Quit date: 1973     Smokeless tobacco: Never Used     Alcohol use 0.6 oz/week     1 Cans of beer per week      Comment: Occasional     Drug use: No     Sexual activity: Not Currently     Other Topics Concern     Sleep Concern Yes     pt using sleep aid     Stress Concern No     Weight Concern No     Special Diet No     Exercise Yes     everyday     Seat Belt Yes     Social History Narrative    Born and raised in [facility].  His parents  when he was young and his father moved to [place].  His mother  when he was 11 from a brain tumor.  [place] from .  Met is wife in [facility] and  in 1949.  She  in  from COPD.  They had four children (3 sons and 1 daughter).  Sons live in [place] and daughter lives in [place] and is his POA.  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  Lived at the [facility] in [place] until his stroke in 2017.               Physical Exam (at least 8)  /74 (BP Location: Right arm, Patient Position: Sitting,  "Cuff Size: Adult Large)  Pulse 64  Temp 97  F (36.1  C) (Oral)  Resp 20  Ht 5' 10\" (1.778 m)  Wt 192 lb 14.4 oz (87.5 kg)  SpO2 96%  BMI 27.68 kg/m2  General Appearance  Eyes - gross vision intact  ENT / mouth - gross hearing intact  Respiratory - unlabored breathing   Cardiac - regular pulse  Neurologic -   Awake and alert   Able to follow directions   Speech intact   RUE/LE 4/5   Left hemiparesis   Shoulder abduction antigravity strength   Biceps and triceps 2/5, increased tone in the biceps at 2/4   Wrists flexion and extension  Hand in a resting splint    FF 1/5  FE 1/5  1+/4 tone in the UE   Mild impaired sensation in the UE.   LLE   HF 3/5  Quads 3/5  DF 3/5   Decreased sensation on the LLE   No clonus       Skin - no skin breakdown  Psychiatric - appropriate affect  Lymphatic - no swelling       Assessment and Plan  This is a 85 year old male with   1. Stroke: prognosis and timeline counseld on, at this point we dont think there will ne further recoevry but not enough to change the fucntional status. Keeping the momentum, and preventing the deconditioning is improtant.   Secondary prevention of stroke with the many risk factors that he has. He is being seen by Dr Carvajal from [place]. He is on xeralto for atrial fibrillation. He is on beta blockers for rate control. Blood   2. Importance of standing:  Counseled on impact of standing on osteoporosis prevention. He would benefit from the standing frame, which may be available in the NH   Prescription to that effect given    3. Mood is good, we will continue to monitor it. He is not fluoxetine for now.   4. Recommend wellness center participation'  5. Spasticity; he does have some tone in the biceps, but not enough to warrant starting medications or Botox. We will continue to monitor closely   6. Osteoporosis: counseled patient on the risk of osteoporosis when wheel chair bound.   7. He was counseled importance of daily stretching to prevent contractures. "     Follow up in 4 months    60 minutes spent in direct patient interaction greater than 50% in counseling and education       CC:  Referring Provider   PCP Elijah Boyd

## 2017-07-11 NOTE — MR AVS SNAPSHOT
After Visit Summary   2017    Elias Mckee    MRN: [id number]           Patient Information     Date Of Birth          8/10/1931        Visit Information        Provider Department      2017 3:20 PM Verenice Garcia MD [facility] Physical Medicine and Rehabilitation        Today's Diagnoses     Hemiparesis affecting dominant side as late effect of cerebrovascular accident (H)    -  1       Follow-ups after your visit        Additional Services     Miscellaneous Equipment       Order for: E Stim for the LUE                  Follow-up notes from your care team     Return in about 4 months (around 2017).      Who to contact     Please call your clinic at [phone] to:    Ask questions about your health    Make or cancel appointments    Discuss your medicines    Learn about your test results    Speak to your doctor   If you have compliments or concerns about an experience at your clinic, or if you wish to file a complaint, please contact [facility] Patient Relations at [phone] or email us at [email or web address]         Additional Information About Your Visit        MyChart Information     [facility] is an electronic gateway that provides easy, online access to your medical records. With [facility], you can request a clinic appointment, read your test results, renew a prescription or communicate with your care team.     To sign up for [facility] visit the website at [email or web address]   You will be asked to enter the access code listed below, as well as some personal information. Please follow the directions to create your username and password.     Your access code is: [email or web address]  Expires: 2017 11:40 AM     Your access code will  in 90 days. If you need help or a new code, please contact your [facility] or call [phone] for assistance.        Care EveryWhere ID     This is your Care  "EveryWhere ID. This could be used by other organizations to access your [facility] medical records  [id number]        Your Vitals Were     Pulse Temperature Respirations Height Pulse Oximetry BMI (Body Mass Index)    64 97  F (36.1  C) (Oral) 20 5' 10\" (1.778 m) 96% 27.68 kg/m2       Blood Pressure from Last 3 Encounters:   07/11/17 163/74   07/06/17 116/63   06/05/17 105/59    Weight from Last 3 Encounters:   07/11/17 192 lb 14.4 oz (87.5 kg)   07/06/17 190 lb (86.2 kg)   06/05/17 186 lb (84.4 kg)              We Performed the Following     Miscellaneous Equipment        Primary Care Provider Office Phone # Fax #    Elijah Boyd [phone]        [facility] [address] [place] [id number]        Equal Access to Services     [facility] Center[facility] : Hadii aad ku hadasho Sogloriaali, waaxda luqadaha, qaybta kaalmada adeegyada, waxay jenyin haylouisn sharla ayala . So [facility] [phone].    ATENCIÓN: Si habla español, tiene a whitehead disposición servicios gratuitos de asistencia lingüística. Chely al [phone].    We comply with applicable federal civil rights laws and [place] laws. We do not discriminate on the basis of race, color, national origin, age, disability sex, sexual orientation or gender identity.            Thank you!     Thank you for choosing [facility] PHYSICAL MEDICINE AND REHABILITATION  for your care. Our goal is always to provide you with excellent care. Hearing back from our patients is one way we can continue to improve our services. Please take a few minutes to complete the written survey that you may receive in the mail after your visit with us. Thank you!             Your Updated Medication List - Protect others around you: Learn how to safely use, store and throw away your medicines at www.disposemymeds.org.          This list is accurate as of: 7/11/17  4:44 PM.  Always use your most recent med list.                   Brand Name Dispense Instructions for " use Diagnosis    cholecalciferol 1000 UNIT tablet    vitamin D     Take 2,000 Units by mouth daily        ferrous gluconate 324 (38 FE) MG tablet    FERGON    90 tablet    Take 1 tablet (324 mg) by mouth daily (with breakfast)    Anemia       FLONASE NA      Spray 2 sprays into both nostrils daily        glipiZIDE 5 MG tablet    GLUCOTROL    60 tablet    Take 1.5 tablets (7.5 mg) by mouth daily (with breakfast)    Type 2 diabetes mellitus with diabetic nephropathy, unspecified long term insulin use status (H)       hydrochlorothiazide 12.5 MG capsule    MICROZIDE    30 capsule    Take 1 capsule (12.5 mg) by mouth daily Hold for SBP < 110    Essential hypertension, Cerebrovascular accident (CVA), unspecified mechanism (H)       insulin glargine 100 UNIT/ML injection    LANTUS    50 mL    Inject 10 Units Subcutaneous every 24 hours    Type 2 diabetes mellitus with diabetic nephropathy, unspecified long term insulin use status (H)       LEVOTHYROXINE SODIUM PO      Take 25 mcg by mouth daily        lidocaine 5 % Patch    LIDODERM     Place 1 patch onto the skin every 24 hours On for 12hrs and off for 12hrs        LIPITOR PO      Take 40 mg by mouth At Bedtime        lisinopril 20 MG tablet    PRINIVIL/ZESTRIL    30 tablet    Take 1 tablet (20 mg) by mouth 2 times daily Hold for SBP < 100    Essential hypertension       METOPROLOL TARTRATE PO      Take 25 mg by mouth 2 times daily        miconazole 2 % powder    MICATIN; MICRO GUARD    100 g    Apply topically 2 times daily Apply to groin    Groin rash       polyethylene glycol powder    MIRALAX/GLYCOLAX     Take 17 g by mouth daily        rivaroxaban ANTICOAGULANT 20 MG Tabs tablet    XARELTO    30 tablet    Take 1 tablet (20 mg) by mouth daily (with dinner)    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)       tamsulosin 0.4 MG capsule    FLOMAX    60 capsule    Take 1 capsule (0.4 mg) by mouth daily    Benign prostatic hyperplasia, presence  of lower urinary tract symptoms unspecified, unspecified morphology       TRAZODONE HCL PO      Take 25 mg by mouth At Bedtime        * ACETAMINOPHEN PO      Take 1,000 mg by mouth 3 times daily        * TYLENOL 325 MG tablet   Generic drug:  acetaminophen     100 tablet    Take 650 mg by mouth every 4 hours as needed for mild pain    Pain in joint involving ankle and foot, left       * Notice:  This list has 2 medication(s) that are the same as other medications prescribed for you. Read the directions carefully, and ask your doctor or other care provider to review them with you.

## 2017-07-11 NOTE — LETTER
7/11/2017       RE: Elias Mckee  [address] [place] [address] [address]  [facility] [id number]     Dear Colleague,    Thank you for referring your patient, Elias Mckee, to the [facility] PHYSICAL MEDICINE AND REHABILITATION at [facility]. Please see a copy of my visit note below.      Physical Medicine & Rehabilitation Clinic      HPI  This is a 85 year old male with risk factors of HTN, DM, HL, and Atrial Fibrillation, who presents to the clinic for left hemiparesis for ongoing rehabilitation recommendations. He also had a previous stroke in 2008 effecting the same side but with good recovery.   He sustained a stroke in Feb 2017. He was discharged to a ARU facility where he underwent intense rehabilitation for a period of 3 weeks. He was then transferred to a TCU, and then on to a NH.   He presents today with his daughter who is very involved in his cares     Main concerns today are function. He has been wheel chair bound. He was being walked with assistance of one, but fell twice when all mobility was placed on hold. He reports that he needs assistance of 1 or 2 for transfers. He is on a regular diet.   He wears depends. He endorses incontinence of bowel once or twice.      He denies any concernswith mood, however his daughter was tearful about some of the conversations they had together.   He does not have any skin breakdown.     He had a PCP in [place] prior to stroke, but now is followed by a doctor from [facility].  He does have a splint in his left hand.     ROS   Constitution: no fever  Eyes: no BOV  Ear, nose, throat & mouth: no sore throat  Cardiovascular: no cp, no swelling on LE  Respiratory: no sob  GI: no n/v  : no bladder and bowel changes  MSK: left weakness  Skin: no skin breakdown  Psychiatric: no depression or anxiety         PMHx - at least 1 (meds, prior illness / injury, surgery / hospitalization)  Current Outpatient Prescriptions    Medication     cholecalciferol (VITAMIN D) 1000 UNIT tablet     Fluticasone Propionate (FLONASE NA)     TRAZODONE HCL PO     Atorvastatin Calcium (LIPITOR PO)     ACETAMINOPHEN PO     lidocaine (LIDODERM) 5 % Patch     LEVOTHYROXINE SODIUM PO     METOPROLOL TARTRATE PO     glipiZIDE (GLUCOTROL) 5 MG tablet     insulin glargine (LANTUS) 100 UNIT/ML injection     lisinopril (PRINIVIL/ZESTRIL) 20 MG tablet     miconazole (MICATIN; MICRO GUARD) 2 % powder     hydrochlorothiazide (MICROZIDE) 12.5 MG capsule     tamsulosin (FLOMAX) 0.4 MG capsule     rivaroxaban ANTICOAGULANT (XARELTO) 20 MG TABS tablet     polyethylene glycol (MIRALAX/GLYCOLAX) powder     acetaminophen (TYLENOL) 325 MG tablet     ferrous gluconate (FERGON) 324 (38 FE) MG tablet     No current facility-administered medications for this visit.        SHx  -  Social History     Social History     Marital status:      Spouse name: N/A     Number of children: N/A     Years of education: N/A     Occupational History     Not on file.     Social History Main Topics     Smoking status: Former Smoker     Packs/day: 1.00     Years: 20.00     Types: Cigarettes     Start date: 1953     Quit date: 1973     Smokeless tobacco: Never Used     Alcohol use 0.6 oz/week     1 Cans of beer per week      Comment: Occasional     Drug use: No     Sexual activity: Not Currently     Other Topics Concern     Sleep Concern Yes     pt using sleep aid     Stress Concern No     Weight Concern No     Special Diet No     Exercise Yes     everyday     Seat Belt Yes     Social History Narrative    Born and raised in [facility] [place] [facility].  His parents  when he was young and his father moved to [place].  His mother  when he was 11 from a brain tumor.  [place] graduated from .  Met is wife in [facility] and  in 1949.  She  in  from COPD.  They had four children (3 sons and 1 daughter).  Sons live in  "[place] and daughter lives in [place] and is his POA.  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  Lived at the [employer] in [employer] until his stroke in Feb 2017.       Physical Exam (at least 8)  /74 (BP Location: Right arm, Patient Position: Sitting, Cuff Size: Adult Large)  Pulse 64  Temp 97  F (36.1  C) (Oral)  Resp 20  Ht 5' 10\" (1.778 m)  Wt 192 lb 14.4 oz (87.5 kg)  SpO2 96%  BMI 27.68 kg/m2  General Appearance  Eyes - gross vision intact  ENT / mouth - gross hearing intact  Respiratory - unlabored breathing   Cardiac - regular pulse  Neurologic -   Awake and alert   Able to follow directions   Speech intact   RUE/LE 4/5   Left hemiparesis   Shoulder abduction antigravity strength   Biceps and triceps 2/5, increased tone in the biceps at 2/4   Wrists flexion and extension  Hand in a resting splint    FF 1/5  FE 1/5  1+/4 tone in the UE   Mild impaired sensation in the UE.   LLE   HF 3/5  Quads 3/5  DF 3/5   Decreased sensation on the LLE   No clonus     Skin - no skin breakdown  Psychiatric - appropriate affect  Lymphatic - no swelling       Assessment and Plan  This is a 85 year old male with   1. Stroke: prognosis and timeline counseld on, at this point we dont think there will ne further recoevry but not enough to change the fucntional status. Keeping the momentum, and preventing the deconditioning is improtant.   Secondary prevention of stroke with the many risk factors that he has. He is being seen by Dr Carvajal from [place]. He is on xeralto for atrial fibrillation. He is on beta blockers for rate control. Blood   2. Importance of standing:  Counseled on impact of standing on osteoporosis prevention. He would benefit from the standing frame, which may be available in the NH   Prescription to that effect given    3. Mood is good, we will continue to monitor it. He is not fluoxetine for now.   4. Recommend wellness center " participation'  5. Spasticity; he does have some tone in the biceps, but not enough to warrant starting medications or Botox. We will continue to monitor closely   6. Osteoporosis: counseled patient on the risk of osteoporosis when wheel chair bound.   7. He was counseled importance of daily stretching to prevent contractures.     Follow up in 4 months    60 minutes spent in direct patient interaction greater than 50% in counseling and education       CC:  Referring Provider   PCP Elijah Boyd      Again, thank you for allowing me to participate in the care of your patient.      Sincerely,    Verenice Garcia MD

## 2017-07-13 NOTE — PROGRESS NOTES
Pt was seen for a regulatory LTC visit  Case reviewed with NP    Pt continues to participate in therapy  He has suffered 2 falls recently, suffering minor injuries to his L ankle    He notes improving L ankle pain  He denies chest pain, SOB    VSS  BG generally 100s  Alert, fully oriented, pleasant  Lungs clear  CV irreg  Abd soft  L sided hemiparesis  L ankle without signs of trauma.  No pain with ROM L ankle      Assessment    S/p CVA with L hemiparesis  L ankle pain following fall, improved  DM, stable  HTN stable  Chronic afib, HR controlled, on Xarelto    Plan  Continue current tx  Routine lab monitoring

## 2017-07-31 ENCOUNTER — TELEPHONE (OUTPATIENT)
Dept: PHYSICAL MEDICINE AND REHAB | Facility: CLINIC | Age: 82
End: 2017-07-31

## 2017-07-31 NOTE — TELEPHONE ENCOUNTER
Sam, physical therapist from [facility] called requesting verbal orders from Dr Garcia for physical therapy to evaluate and treat for E Stim to left upper extremity. Verbal authorization was given.

## 2017-08-20 NOTE — PLAN OF CARE
Problem: Goal Outcome Summary  Goal: Goal Outcome Summary  Outcome: Improving  A and O x4 at start of shift, resting after therapies and intermittently confused upon awakening. L droop, slurred speech, L field cut and neglect, LUE hemiplegia and LLE hemiparesis. VSS. No pain. Up to EOB with therapy, otherwise lift dependent. Voiding in urinal. Tolerating TF and flushes, TF increased to goal rate at 1900. Plan is for video swallow tomorrow.        Coumadin

## 2017-08-21 VITALS
SYSTOLIC BLOOD PRESSURE: 99 MMHG | BODY MASS INDEX: 28.44 KG/M2 | DIASTOLIC BLOOD PRESSURE: 65 MMHG | HEIGHT: 69 IN | WEIGHT: 192 LBS | HEART RATE: 67 BPM

## 2017-08-21 NOTE — PROGRESS NOTES
[facility]    Chief Complaint   Patient presents with     USP Regulatory     HPI:    Elias Mckee is a 86 year old  (8/10/1931), who is being seen today for a federally mandated E/M visit at [facility].  HPI information obtained from: facility chart records, facility staff, patient report and [facility] chart review. Today's concerns are:  CKD (chronic kidney disease) stage 3, GFR 30-59 ml/min  Labs in June showed a creatinine of 1.10 and an est GFR >60.    Type 2 diabetes mellitus with stage 3 chronic kidney disease, with long-term current use of insulin (H)  No recent changes to insulin dose and remains on glipizide.  Accuchecks this month: 0730: 157-190, 5pm: .  HgbA1C in June was 6.2%.    Benign hypertension with CKD (chronic kidney disease) stage III  BP ranges in past month: 94//68.  No reports of chest pain.    Hypothyroidism due to acquired atrophy of thyroid  No recen tchanges to levothyroxine dose.  TSH on 6/8 was 3.66.    Chronic atrial fibrillation (H)  No reports of heart rates >100/min.  Remains on metoprolol.  On xarelto for anticoagulation.    Hemiparesis affecting left side as late effect of cerebrovascular accident (H)  No use of left arm.  Increased strength in left leg and able to ambulate with stand by assist.  Remains on ambulation program.     Cognitive deficits as late effect of cerebrovascular disease  BIMS score of 14 in June.       Depression Screening  Pt denies feelings of sadness today.    PHQ-9 SCORE 11/28/2014 8/22/2017   Total Score 3 -   Total Score - 1     ALLERGIES: No known allergies  PAST MEDICAL HISTORY:  has a past medical history of Acute, but ill-defined, cerebrovascular disease (1-2008); Atrial fibrillation (H); Benign hypertension with CKD (chronic kidney disease) stage III (6/5/2017); Chronic atrial fibrillation (H) (6/5/2017); Chronic ischemic heart disease, unspecified; Cognitive deficits as late  effect of cerebrovascular disease (6/5/2017); Coronary artery disease; CVA (cerebral infarction); Dysphagia due to recent cerebrovascular accident (CVA) (6/5/2017); Elevated cholesterol; Essential hypertension, benign; H/O prostate cancer (7/6/2017); H/O: CVA (cerebrovascular accident) (6/5/2017); Hemiparesis affecting left side as late effect of cerebrovascular accident (H) (6/5/2017); Hyperlipidaemia; MEDICAL HISTORY OF - (1- 2008); Myocardial infarction (H); Onychomycosis (6/5/2017); Other and unspecified hyperlipidemia; Type 2 diabetes mellitus with stage 3 chronic kidney disease (H) (6/5/2017); and Type II or unspecified type diabetes mellitus without mention of complication, not stated as uncontrolled (1-2008).  PAST SURGICAL HISTORY:  has a past surgical history that includes seed implantation (2002); Phacoemulsification clear cornea with standard intraocular lens implant (Right, 10/7/2014); and Vitrectomy anterior (Right, 10/7/2014).  FAMILY HISTORY: family history is not on file.  SOCIAL HISTORY:  reports that he quit smoking about 44 years ago. His smoking use included Cigarettes. He started smoking about 64 years ago. He has a 20.00 pack-year smoking history. He has never used smokeless tobacco. He reports that he drinks about 0.6 oz of alcohol per week  He reports that he does not use illicit drugs.    MEDICATIONS:  Current Outpatient Prescriptions   Medication Sig Dispense Refill     TRAMADOL HCL PO Take 50 mg by mouth every 6 hours as needed for moderate to severe pain       cholecalciferol (VITAMIN D) 1000 UNIT tablet Take 2,000 Units by mouth daily       Fluticasone Propionate (FLONASE NA) Spray 2 sprays into both nostrils daily       TRAZODONE HCL PO Take 25 mg by mouth At Bedtime       Atorvastatin Calcium (LIPITOR PO) Take 40 mg by mouth At Bedtime       ACETAMINOPHEN PO Take 1,000 mg by mouth 3 times daily       lidocaine (LIDODERM) 5 % Patch Place 1 patch onto the skin every 24 hours On for  12hrs and off for 12hrs       LEVOTHYROXINE SODIUM PO Take 25 mcg by mouth daily       METOPROLOL TARTRATE PO Take 25 mg by mouth 2 times daily       glipiZIDE (GLUCOTROL) 5 MG tablet Take 1.5 tablets (7.5 mg) by mouth daily (with breakfast) 60 tablet      insulin glargine (LANTUS) 100 UNIT/ML injection Inject 10 Units Subcutaneous every 24 hours 50 mL 0     lisinopril (PRINIVIL/ZESTRIL) 20 MG tablet Take 1 tablet (20 mg) by mouth 2 times daily Hold for SBP < 100 30 tablet 0     miconazole (MICATIN; MICRO GUARD) 2 % powder Apply topically 2 times daily Apply to groin 100 g 0     hydrochlorothiazide (MICROZIDE) 12.5 MG capsule Take 1 capsule (12.5 mg) by mouth daily Hold for SBP < 110 30 capsule 0     tamsulosin (FLOMAX) 0.4 MG capsule Take 1 capsule (0.4 mg) by mouth daily 60 capsule 0     rivaroxaban ANTICOAGULANT (XARELTO) 20 MG TABS tablet Take 1 tablet (20 mg) by mouth daily (with dinner) 30 tablet 1     polyethylene glycol (MIRALAX/GLYCOLAX) powder Take 17 g by mouth daily        acetaminophen (TYLENOL) 325 MG tablet Take 650 mg by mouth every 4 hours as needed for mild pain  100 tablet      ferrous gluconate (FERGON) 324 (38 FE) MG tablet Take 1 tablet (324 mg) by mouth daily (with breakfast) 90 tablet 1     Medications reviewed:  Medications reconciled to facility chart and changes were made to reflect current medications as identified as above med list. Below are the changes that were made:   Medications stopped since last EPIC medication reconciliation:   There are no discontinued medications.    Medications started since last [facility] medication reconciliation:  Orders Placed This Encounter   Medications     TRAMADOL HCL PO     Sig: Take 50 mg by mouth every 6 hours as needed for moderate to severe pain     Patient Active Problem List   Diagnosis     Chronic ischemic heart disease     Personal history of other diseases of circulatory system     HYPERLIPIDEMIA LDL GOAL <100     Advanced directives,  counseling/discussion     CKD (chronic kidney disease) stage 3, GFR 30-59 ml/min     Leg weakness     Ataxia     BPH (benign prostatic hyperplasia)     Type 2 diabetes mellitus with diabetic nephropathy (H)     History of actinic keratoses     History of squamous cell carcinoma     S/P Mohs surgery for basal cell carcinoma     H/O: CVA (cerebrovascular accident), 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      Case Management:  I have reviewed the care plan and MDS and do agree with the plan. Patient's desire to return to the community is present, but is not able due to care needs .  Information reviewed:  Medications, vital signs, orders, and nursing notes.    ROS:  Negative selected, CONSTITUTIONAL:  weight loss, weight gain, poor appetite, fevers and fatigue, EYES:  Positive for right eye surgery and ongiong issues with vision and redness., ENT:  Positive for hearing loss.  Had bilateral hearing aides and he reports that left one is lost and right one is still available, but he is not wearing it at this time.  Has upper and lower dentures., CV:  chest pain, dyspnea on exertion and Positive for atrial fibrillation, RESPIRATORY: shortness of breath, cough, asthma and wheezing, :  dysuria and Positive for h/o prostate cancer, GI:  abdominal pain, constipation, diarrhea, nausea, vomiting and Positive for some dysphagia following CVA., NEURO:  headaches and Positive for CVA in 2017 with left sided  "paralysis and some dysphagia. with mild cognitive impairent., PSYCH: anxiety and depression, MUSCULOSKELETAL: back pain, joint pain and fibromyalgia and SKIN: Positive for fungal fingernails on left hand and bilateral pedal fungal nails.    Exam:  Vitals: BP 99/65  Pulse 67  Ht 5' 9\" (1.753 m)  Wt 192 lb (87.1 kg)  BMI 28.35 kg/m2  BMI= Body mass index is 28.35 kg/(m^2).  GENERAL APPEARANCE:  Alert, cooperative, male seen in his room.  Pleasant and able to express self verbally. Denies any acute distress and states he is a healthy person.  HEAD:  Normocephalic.  Left sided mouth droop..  ENT:  Mouth and posterior oropharynx normal, moist mucous membranes, edentulous  with denture plateupper and lower. , No hearing aides present.  EYES:  Sclera clear and conjunctiva pink without discharge.  RESP:  Quiet, effortless respirations.  No cough.  CTA bilaterally.  CV:  Irregularly irregular rhythmn.  No murmur.  DP pulses +1 bilaterally.  Pedal skin warm and dry.Trace LE edema  ABDOMEN:  Soft rounded abdomen.  Bowel sounds positive all four quadrants.  No tenderness with palpation.  :    No discomfort with palpation of suprapubic area or CVA tendenerss.  M/S:   No discomfort with palpation of spinal process or with gentle ROM of extremities,  Left hand in splintdue to hemiparesis.  NEURO:   Strong hand grasp right hand and only minimal ability to move left hand fingers.  Left sided facial droop.  Strong ability to raise right leg against resistance and moderate left leg strength. Oriented X 3  PSYCH:   Pleasant and positive.  No signs of sadness.      Lab/Diagnostic data:    CBC RESULTS:   Recent Labs   Lab Test 06/08/17 03/20/17   1225  02/27/17   0746   WBC  5.0  5.8  8.4   RBC  3.79*  3.70*  3.11*   HGB  11.8*  11.3*  9.6*   HCT  35.1*  33.7*  28.1*   MCV  92.6  91  90   MCH   --   30.5  30.9   MCHC   --   33.5  34.2   RDW  15.6*  14.2  14.9   PLT  163  139*  148*       Last Basic Metabolic Panel:  Recent Labs "   Lab Test 06/08/17 03/20/17   1225   NA  143  138   POTASSIUM  4.5  4.3   CHLORIDE  105  104   TRENT  9.4  8.9   CO2  29  25   BUN  20  23   CR  1.10  1.08   GLC  186*  241*     GFR Estimate   Date Value Ref Range Status   06/08/2017 >60 >60 ml/min/1.73m2 Final   03/20/2017 65 >60 mL/min/1.7m2 Final     Comment:     Non  GFR Calc   03/14/2017 56 (L) >60 mL/min/1.7m2 Final     Comment:     Non  GFR Calc   03/13/2017 46 (L) >60 mL/min/1.7m2 Final     Comment:     Non  GFR Calc   03/12/2017 59 (L) >60 mL/min/1.7m2 Final     Comment:     Non  GFR Calc       TSH   Date Value Ref Range Status   06/08/2017 3.66 0.30 - 4.50 uIU/mL Final   02/21/2017 5.96 (H) 0.40 - 4.00 mU/L Final     Lab Results   Component Value Date    A1C 6.2 06/08/2017    A1C 6.8 02/21/2017     ASSESSMENT/PLAN  (N18.3) CKD (chronic kidney disease) stage 3, GFR 30-59 ml/min  (primary encounter diagnosis)  Comment: Improved  Plan:  Renally adjust dose of medications.  BMP on Monday.    (E11.22,  N18.3,  Z79.4) Type 2 diabetes mellitus with stage 3 chronic kidney disease, with long-term current use of insulin (H)  Comment: Chronic, remains on dual therapy.  Plan: DC glipizide due to potential for hypoglycemia.  Increase lantus to 16U SQ and nsg to update NP on blood sugars on Monday.  HgbA1C on Monday.    (I12.9,  N18.3) Benign hypertension with CKD (chronic kidney disease) stage III  Comment: Based on JNC-8 goals,  patients age of 86 year old, presence of diabetes or CKD, and goals of care goal BP is <150/90 mm Hg. patients BP is lower than goal   PLAN:  DC Microzide.  Continue lisinopril and metoprolol  Nsg to update NP on Monday with BP's.  BMP Monday..    (E03.4) Hypothyroidism due to acquired atrophy of thyroid  Comment: Chronic, controlled with levothyroxine  Plan: Continue current dose of levothyroxine.  Annual TSH.    (I48.2) Chronic atrial fibrillation (H)  Comment: Chronic, rate  controlled  Plan: Continue metoprolol and xarelto per cardiology recommendation.    (I69.354) Hemiparesis affecting left side as late effect of cerebrovascular accident (H)  Comment: Chronic  Plan: Continue BP control and anticoagulation.    (I69.919) Cognitive deficits as late effect of cerebrovascular disease  Comment: Mild, but continues with impulsivity issues  Plan: Continue LTC.    (Z13.89) Screening for depression  Comment: Depression screen done: PHQ-9 Given screen score and clinical assessment patient is stable without any signs of depression and no futher interventions warrented at this time.    Electronically signed by:  TOBY Momin CNP

## 2017-08-22 ENCOUNTER — NURSING HOME VISIT (OUTPATIENT)
Dept: GERIATRICS | Facility: CLINIC | Age: 82
End: 2017-08-22
Payer: MEDICARE

## 2017-08-22 DIAGNOSIS — N18.30 CKD (CHRONIC KIDNEY DISEASE) STAGE 3, GFR 30-59 ML/MIN (H): Primary | ICD-10-CM

## 2017-08-22 DIAGNOSIS — E11.22 TYPE 2 DIABETES MELLITUS WITH STAGE 3 CHRONIC KIDNEY DISEASE, WITH LONG-TERM CURRENT USE OF INSULIN (H): ICD-10-CM

## 2017-08-22 DIAGNOSIS — I69.354 HEMIPARESIS AFFECTING LEFT SIDE AS LATE EFFECT OF CEREBROVASCULAR ACCIDENT (H): ICD-10-CM

## 2017-08-22 DIAGNOSIS — E03.4 HYPOTHYROIDISM DUE TO ACQUIRED ATROPHY OF THYROID: ICD-10-CM

## 2017-08-22 DIAGNOSIS — Z79.4 TYPE 2 DIABETES MELLITUS WITH STAGE 3 CHRONIC KIDNEY DISEASE, WITH LONG-TERM CURRENT USE OF INSULIN (H): ICD-10-CM

## 2017-08-22 DIAGNOSIS — N18.30 BENIGN HYPERTENSION WITH CKD (CHRONIC KIDNEY DISEASE) STAGE III (H): ICD-10-CM

## 2017-08-22 DIAGNOSIS — N18.30 TYPE 2 DIABETES MELLITUS WITH STAGE 3 CHRONIC KIDNEY DISEASE, WITH LONG-TERM CURRENT USE OF INSULIN (H): ICD-10-CM

## 2017-08-22 DIAGNOSIS — I69.919 COGNITIVE DEFICITS AS LATE EFFECT OF CEREBROVASCULAR DISEASE: ICD-10-CM

## 2017-08-22 DIAGNOSIS — I48.20 CHRONIC ATRIAL FIBRILLATION (H): ICD-10-CM

## 2017-08-22 DIAGNOSIS — I12.9 BENIGN HYPERTENSION WITH CKD (CHRONIC KIDNEY DISEASE) STAGE III (H): ICD-10-CM

## 2017-08-22 DIAGNOSIS — Z13.31 SCREENING FOR DEPRESSION: ICD-10-CM

## 2017-08-22 PROCEDURE — 99309 SBSQ NF CARE MODERATE MDM 30: CPT | Mod: GW | Performed by: NURSE PRACTITIONER

## 2017-08-22 ASSESSMENT — PATIENT HEALTH QUESTIONNAIRE - PHQ9: SUM OF ALL RESPONSES TO PHQ QUESTIONS 1-9: 1

## 2017-08-28 ENCOUNTER — TRANSFERRED RECORDS (OUTPATIENT)
Dept: HEALTH INFORMATION MANAGEMENT | Facility: CLINIC | Age: 82
End: 2017-08-28

## 2017-08-28 LAB
BUN SERPL-MCNC: 20 MG/DL (ref 9–26)
CALCIUM SERPL-MCNC: 9.5 MG/DL (ref 8.4–10.2)
CHLORIDE SERPLBLD-SCNC: 104 MMOL/L (ref 98–109)
CO2 SERPL-SCNC: 26 MMOL/L (ref 22–31)
CREAT SERPL-MCNC: 1.11 MG/DL (ref 0.73–1.18)
GFR SERPL CREATININE-BSD FRML MDRD: >60 ML/MIN/1.73M2
GLUCOSE SERPL-MCNC: 153 MG/DL (ref 70–100)
HEMOGLOBIN: 12.8 G/DL (ref 13.4–17.5)
POTASSIUM SERPL-SCNC: 4.1 MMOL/L (ref 3.5–5.2)
SODIUM SERPL-SCNC: 139 MMOL/L (ref 136–145)

## 2017-10-05 ENCOUNTER — NURSING HOME VISIT (OUTPATIENT)
Dept: GERIATRICS | Facility: CLINIC | Age: 82
End: 2017-10-05
Payer: MEDICARE

## 2017-10-05 VITALS
HEIGHT: 69 IN | WEIGHT: 194 LBS | HEART RATE: 68 BPM | SYSTOLIC BLOOD PRESSURE: 123 MMHG | TEMPERATURE: 97.5 F | BODY MASS INDEX: 28.73 KG/M2 | RESPIRATION RATE: 17 BRPM | DIASTOLIC BLOOD PRESSURE: 74 MMHG

## 2017-10-05 DIAGNOSIS — E11.21 TYPE 2 DIABETES MELLITUS WITH DIABETIC NEPHROPATHY, WITH LONG-TERM CURRENT USE OF INSULIN (H): ICD-10-CM

## 2017-10-05 DIAGNOSIS — Z79.4 TYPE 2 DIABETES MELLITUS WITH DIABETIC NEPHROPATHY, WITH LONG-TERM CURRENT USE OF INSULIN (H): ICD-10-CM

## 2017-10-05 DIAGNOSIS — I69.919 COGNITIVE DEFICITS AS LATE EFFECT OF CEREBROVASCULAR DISEASE: ICD-10-CM

## 2017-10-05 DIAGNOSIS — I12.9 BENIGN HYPERTENSION WITH CKD (CHRONIC KIDNEY DISEASE) STAGE III (H): ICD-10-CM

## 2017-10-05 DIAGNOSIS — R27.0 ATAXIA: ICD-10-CM

## 2017-10-05 DIAGNOSIS — I69.354 HEMIPARESIS AFFECTING LEFT SIDE AS LATE EFFECT OF CEREBROVASCULAR ACCIDENT (H): ICD-10-CM

## 2017-10-05 DIAGNOSIS — E03.4 HYPOTHYROIDISM DUE TO ACQUIRED ATROPHY OF THYROID: ICD-10-CM

## 2017-10-05 DIAGNOSIS — I25.9 CHRONIC ISCHEMIC HEART DISEASE: Primary | ICD-10-CM

## 2017-10-05 DIAGNOSIS — Z00.00 PREVENTATIVE HEALTH CARE: ICD-10-CM

## 2017-10-05 DIAGNOSIS — N18.30 CKD (CHRONIC KIDNEY DISEASE) STAGE 3, GFR 30-59 ML/MIN (H): ICD-10-CM

## 2017-10-05 DIAGNOSIS — N18.30 BENIGN HYPERTENSION WITH CKD (CHRONIC KIDNEY DISEASE) STAGE III (H): ICD-10-CM

## 2017-10-05 PROCEDURE — 99309 SBSQ NF CARE MODERATE MDM 30: CPT | Performed by: NURSE PRACTITIONER

## 2017-10-05 RX ORDER — INSULIN GLARGINE 100 [IU]/ML
22 INJECTION, SOLUTION SUBCUTANEOUS AT BEDTIME
COMMUNITY
End: 2017-10-05

## 2017-10-05 NOTE — PROGRESS NOTES
[facility]    Chief Complaint   Patient presents with     RECHECK       HPI:    Elias Mckee is a 86 year old  (8/10/1931), who is being seen today for an episodic care visit at [facility].  HPI information obtained from: facility chart records, facility staff, patient report and [facility] chart review.Today's concern is:  Chronic ischemic heart disease  No reports of changes in cardiac meds or chest pain. On atrovastatin.    CKD (chronic kidney disease) stage 3, GFR 30-59 ml/min  Labs on 8/28 showed a creatinine of 1.11 with an est GFR >60    Ataxia  S/P CVA with right hemiplegia.  Right leg has become stronger and he is on an ambulation program with 4 pronged cane.    Type 2 diabetes mellitus with diabetic nephropathy, with long-term current use of insulin (H)  Lantus insulin dose increased on 9/5 and novolog started at 4U with breakfast on 9/22.. Accuchecks this month: 0730: 140-202, 11am: 178-249, 5pm: 196 - High.    Cognitive deficits as late effect of cerebrovascular disease  BIMS score of 14 in September.  Primary issue is his impulsiveness and lack of insight into safety recommendations, creating an increased fall risk. Last fall was 9/5.    Hemiparesis affecting left side as late effect of cerebrovascular accident (H)  Left arm has very limited movement.  Left leg has gained enough strength to ambulate with assistance.    Benign hypertension with CKD (chronic kidney disease) stage III  BP ranges in past month: 123/74 - 172/74.  No reports of chest pain.    Hypothyroidism due to acquired atrophy of thyroid  TSH in June was 3.66.  No recent changes in levothyroxine dose    Preventative health care  Immunization status reviewed.  Pt awaiting influenza vaccine.  Discussed the prevnar 13 and reviewed the Vaccine Information Sheet.  He agrees that this is an immunization that he wishes to receive.    ALLERGIES: No known allergies  Past Medical, Surgical, Family  and Social History reviewed and updated in [facility].    Current Outpatient Prescriptions   Medication Sig Dispense Refill     insulin glargine (LANTUS) 100 UNIT/ML injection Inject 22 Units Subcutaneous At Bedtime       Insulin Aspart (NOVOLOG SC) Inject 4 Units Subcutaneous daily (with breakfast)       TRAMADOL HCL PO Take 50 mg by mouth every 6 hours as needed for moderate to severe pain       cholecalciferol (VITAMIN D) 1000 UNIT tablet Take 2,000 Units by mouth daily       Fluticasone Propionate (FLONASE NA) Spray 2 sprays into both nostrils daily       TRAZODONE HCL PO Take 25 mg by mouth At Bedtime       Atorvastatin Calcium (LIPITOR PO) Take 40 mg by mouth At Bedtime       ACETAMINOPHEN PO Take 1,000 mg by mouth 3 times daily       lidocaine (LIDODERM) 5 % Patch Place 1 patch onto the skin every 24 hours On for 12hrs and off for 12hrs       LEVOTHYROXINE SODIUM PO Take 25 mcg by mouth daily       METOPROLOL TARTRATE PO Take 25 mg by mouth 2 times daily       lisinopril (PRINIVIL/ZESTRIL) 20 MG tablet Take 1 tablet (20 mg) by mouth 2 times daily Hold for SBP < 100 30 tablet 0     tamsulosin (FLOMAX) 0.4 MG capsule Take 1 capsule (0.4 mg) by mouth daily 60 capsule 0     rivaroxaban ANTICOAGULANT (XARELTO) 20 MG TABS tablet Take 1 tablet (20 mg) by mouth daily (with dinner) 30 tablet 1     polyethylene glycol (MIRALAX/GLYCOLAX) powder Take 17 g by mouth daily        acetaminophen (TYLENOL) 325 MG tablet Take 650 mg by mouth every 4 hours as needed for mild pain  100 tablet      ferrous gluconate (FERGON) 324 (38 FE) MG tablet Take 1 tablet (324 mg) by mouth daily (with breakfast) 90 tablet 1     Medications reviewed:  Medications reconciled to facility chart and changes were made to reflect current medications as identified as above med list. Below are the changes that were made:   Medications stopped since last EPIC medication reconciliation:   Medications Discontinued During This Encounter   Medication Reason      insulin glargine (LANTUS) 100 UNIT/ML injection      miconazole (MICATIN; MICRO GUARD) 2 % powder        Medications started since last [facility] medication reconciliation:  Orders Placed This Encounter   Medications     insulin glargine (LANTUS) 100 UNIT/ML injection     Sig: Inject 22 Units Subcutaneous At Bedtime     Insulin Aspart (NOVOLOG SC)     Sig: Inject 4 Units Subcutaneous daily (with breakfast)     Patient Active Problem List   Diagnosis     Chronic ischemic heart disease     Personal history of other diseases of circulatory system     HYPERLIPIDEMIA LDL GOAL <100     Advanced directives, counseling/discussion     CKD (chronic kidney disease) stage 3, GFR 30-59 ml/min     Leg weakness     Ataxia     BPH (benign prostatic hyperplasia)     Type 2 diabetes mellitus with diabetic nephropathy (H)     History of actinic keratoses     History of squamous cell carcinoma     S/P Mohs surgery for basal cell carcinoma     H/O: CVA (cerebrovascular accident), 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          REVIEW OF SYSTEMS:  Negative selected, CONSTITUTIONAL:  weight loss, weight gain, poor appetite, fevers and fatigue, EYES:  Positive for right eye surgery and ongiong issues with vision and redness., ENT:  Positive for hearing loss.  Had bilateral hearing aides and he reports that left one is lost and right one is still available, but he is not wearing it at this time.  Has upper and lower dentures.,  "CV:  chest pain, dyspnea on exertion and Positive for atrial fibrillation, RESPIRATORY: shortness of breath, cough, asthma and wheezing, :  dysuria and Positive for h/o prostate cancer, GI:  abdominal pain, constipation, diarrhea, nausea, vomiting and Positive for some dysphagia following CVA., NEURO:  headaches and Positive for CVA in 2017 with left sided paralysis and some dysphagia. with mild cognitive impairent., PSYCH: anxiety and depression, MUSCULOSKELETAL: back pain, joint pain and fibromyalgia and SKIN: Positive for fungal fingernails on left hand and bilateral pedal fungal nails.    Physical Exam:  /74  Pulse 68  Temp 97.5  F (36.4  C)  Resp 17  Ht 5' 9\" (1.753 m)  Wt 194 lb (88 kg)  BMI 28.65 kg/m2  GENERAL APPEARANCE:  Alert, cooperative, male seen in his room.  Pleasant and able to express self verbally. Denies any acute distress..  HEAD:  Normocephalic.  Left sided mouth droop..  ENT:  Mouth and posterior oropharynx normal, moist mucous membranes, edentulous  with denture plateupper and lower. , No hearing aides present.  EYES:  Sclera clear and conjunctiva pink without discharge.  RESP:  Quiet, effortless respirations.  No cough.  CTA bilaterally.  CV:  Irregularly irregular rhythmn.  No murmur.  DP pulses +1 bilaterally.  Pedal skin warm and dry.Trace LE edema  ABDOMEN:  Soft rounded abdomen.  Bowel sounds positive all four quadrants.  No tenderness with palpation.  M/S:   No discomfort with palpation of spinal process or with gentle ROM of extremities,  Left hand in splintdue to hemiparesis.  NEURO:   Strong hand grasp right hand and only minimal ability to move left hand fingers.  Left sided facial droop.  Strong ability to raise right leg against resistance and moderate left leg strength. Oriented X 3  PSYCH:   Pleasant and positive.  No signs of sadness.      Recent Labs:    CBC RESULTS:   Recent Labs   Lab Test 08/28/17 06/08/17 03/20/17   1225  02/27/17   0746   WBC   --   5.0  " 5.8  8.4   RBC   --   3.79*  3.70*  3.11*   HGB  12.8*  11.8*  11.3*  9.6*   HCT   --   35.1*  33.7*  28.1*   MCV   --   92.6  91  90   MCH   --    --   30.5  30.9   MCHC   --    --   33.5  34.2   RDW   --   15.6*  14.2  14.9   PLT   --   163  139*  148*       Last Basic Metabolic Panel:  Recent Labs   Lab Test 08/28/17 06/08/17   NA  139  143   POTASSIUM  4.1  4.5   CHLORIDE  104  105   TRENT  9.5  9.4   CO2  26  29   BUN  20  20   CR  1.11  1.10   GLC  153*  186*     GFR Estimate   Date Value Ref Range Status   08/28/2017 >60 >60 ml/min/1.73m2 Final   06/08/2017 >60 >60 ml/min/1.73m2 Final   03/20/2017 65 >60 mL/min/1.7m2 Final     Comment:     Non  GFR Calc   03/14/2017 56 (L) >60 mL/min/1.7m2 Final     Comment:     Non  GFR Calc   03/13/2017 46 (L) >60 mL/min/1.7m2 Final     Comment:     Non  GFR Calc     Lab Results   Component Value Date    A1C 6.2 06/08/2017    A1C 6.8 02/21/2017    A1C 6.7 02/17/2017    A1C 6.0 08/11/2016    A1C 6.4 01/12/2016     TSH   Date Value Ref Range Status   06/08/2017 3.66 0.30 - 4.50 uIU/mL Final   ]  Lab Results   Component Value Date    CHOL 105 02/21/2017     Lab Results   Component Value Date    HDL 28 02/21/2017     Lab Results   Component Value Date    LDL 49 02/21/2017     Lab Results   Component Value Date    TRIG 140 02/21/2017     Lab Results   Component Value Date    CHOLHDLRATIO 5.2 05/29/2014       Assessment/Plan:  (I25.9) Chronic ischemic heart disease  (primary encounter diagnosis)  Comment: Asymptomatic  Plan: Continue current BP control and lipid control.  On Xarelto for anticoagulation.  Dr. Boyd to see in 10 days.    (N18.3) CKD (chronic kidney disease) stage 3, GFR 30-59 ml/min  Comment: Improved  Plan: BMP on Monday, then q3m or with acute illness.    (R27.0) Ataxia  Comment: S/P CVA  Plan: Continue monitored ambulation program.    (E11.21,  Z79.4) Type 2 diabetes mellitus with diabetic nephropathy, with long-term  current use of insulin (H)  Comment: Chronic, HgbA1C goal of <8%.  Plan: IncreaseLantus insulin to 24 U. And start novolog 4U at lunch. Nsg to update on blood sugars on 10/10.    (I69.919) Cognitive deficits as late effect of cerebrovascular disease  Comment: Chronic  Plan: Continue current POC    (I69.354) Hemiparesis affecting left side as late effect of cerebrovascular accident (H)  Comment: Chronic  Plan: Continue current POC.    (I12.9,  N18.3) Benign hypertension with CKD (chronic kidney disease) stage III  Comment: BP goal: <150/90,   Plan: Continue POC and Dr. Boyd to see on 10/16.    (E03.4) Hypothyroidism due to acquired atrophy of thyroid  Comment: Chronic, controlled with levothyroxine  Plan: Continue current dose of levothyroxine and check TSH annually.    (Z00.00) Preventative health care  Comment: CHronic  Plan: Administer Prevnar 13 (2 weeks after influenza vaccine).    Electronically signed by  TOBY Momin CNP

## 2017-10-06 ENCOUNTER — DOCUMENTATION ONLY (OUTPATIENT)
Dept: OTHER | Facility: CLINIC | Age: 82
End: 2017-10-06

## 2017-10-06 DIAGNOSIS — Z71.89 ADVANCED DIRECTIVES, COUNSELING/DISCUSSION: Chronic | ICD-10-CM

## 2017-10-09 ENCOUNTER — TRANSFERRED RECORDS (OUTPATIENT)
Dept: HEALTH INFORMATION MANAGEMENT | Facility: CLINIC | Age: 82
End: 2017-10-09

## 2017-10-09 LAB
BUN SERPL-MCNC: 22 MG/DL (ref 9–26)
CALCIUM SERPL-MCNC: 9.3 MG/DL (ref 8.4–10.2)
CHLORIDE SERPLBLD-SCNC: 103 MMOL/L (ref 98–109)
CO2 SERPL-SCNC: 28 MMOL/L (ref 22–31)
CREAT SERPL-MCNC: 1.16 MG/DL (ref 0.73–1.18)
GFR SERPL CREATININE-BSD FRML MDRD: 57 ML/MIN/1.73M2
GLUCOSE SERPL-MCNC: 157 MG/DL (ref 70–100)
HBA1C MFR BLD: 7.8 % (ref 4–5.6)
HEMOGLOBIN: 13.1 G/DL (ref 13.4–17.5)
POTASSIUM SERPL-SCNC: 4.3 MMOL/L (ref 3.5–5.2)
SODIUM SERPL-SCNC: 140 MMOL/L (ref 136–145)

## 2017-10-16 ENCOUNTER — NURSING HOME VISIT (OUTPATIENT)
Dept: GERIATRICS | Facility: CLINIC | Age: 82
End: 2017-10-16

## 2017-10-16 DIAGNOSIS — E11.21 TYPE 2 DIABETES MELLITUS WITH DIABETIC NEPHROPATHY, WITH LONG-TERM CURRENT USE OF INSULIN (H): Primary | ICD-10-CM

## 2017-10-16 DIAGNOSIS — I48.20 CHRONIC ATRIAL FIBRILLATION (H): ICD-10-CM

## 2017-10-16 DIAGNOSIS — Z79.4 TYPE 2 DIABETES MELLITUS WITH DIABETIC NEPHROPATHY, WITH LONG-TERM CURRENT USE OF INSULIN (H): Primary | ICD-10-CM

## 2017-10-19 NOTE — PROGRESS NOTES
Pt was seen for a regulatory LTC visit  Case reviewed with NP    Pt has done well over the last few months  He is ambulating with 4 pronged cane    Pt feels well, denies pain, chest pain, SOB  BG have been elevated, likely secondary to diet indiscretion    VSS  Alert, pleasant, fully oriented, in good spirits  Lungs clear  CV irreg, HR 70s  Abd soft  L hemiplegia  No LE edema      Assessment    S/p CVA, with L hemiplegia, mild cognitive deficits  DM type 2, with recent suboptimal control. Insulin regimen has been adjusted  CKD, stable  Chronic afib, HR controlled, on warfarin    Plan   Continue current tx  Titrate up insulin  Consider metformin if BG remain difficult to manage

## 2017-10-23 ENCOUNTER — NURSING HOME VISIT (OUTPATIENT)
Dept: GERIATRICS | Facility: CLINIC | Age: 82
End: 2017-10-23
Payer: MEDICARE

## 2017-10-23 ENCOUNTER — TRANSFERRED RECORDS (OUTPATIENT)
Dept: HEALTH INFORMATION MANAGEMENT | Facility: CLINIC | Age: 82
End: 2017-10-23

## 2017-10-23 VITALS
HEART RATE: 75 BPM | WEIGHT: 193 LBS | BODY MASS INDEX: 28.5 KG/M2 | SYSTOLIC BLOOD PRESSURE: 122 MMHG | DIASTOLIC BLOOD PRESSURE: 76 MMHG | RESPIRATION RATE: 18 BRPM

## 2017-10-23 DIAGNOSIS — H57.11 ACUTE RIGHT EYE PAIN: Primary | ICD-10-CM

## 2017-10-23 PROCEDURE — 99307 SBSQ NF CARE SF MDM 10: CPT | Performed by: NURSE PRACTITIONER

## 2017-10-23 NOTE — PROGRESS NOTES
[facility]    Chief Complaint   Patient presents with     Nursing Home Acute     HPI:    Elias Mckee is a 86 year old  (8/10/1931), who is being seen today for an episodic care visit at [facility].    HPI information obtained from: facility chart records, facility staff and patient report. Today's concern is:  Acute right eye pain  Pt is a diabetic with onset of right eye pain and redness yesterday. Today is unable to open right eye due to pain.  He sees a retina specialist, Dr. Tellez and has an appt pending this Thursday per his daughter.  She reports that during her visit yesterday, he had pain and redness, but he told her it was from rubbing it.  H/o surgery in the right eye in the pas.    REVIEW OF SYSTEMS:  Negative selected, CONSTITUTIONAL:  weight loss, weight gain, poor appetite, fevers and fatigue, EYES:  Positive for right eye surgery and ongiong issues with vision and redness., ENT:  Positive for hearing loss.  Had bilateral hearing aides and he reports that left one is lost and right one is still available, but he is not wearing it at this time.  Has upper and lower dentures., CV:  chest pain, dyspnea on exertion and Positive for atrial fibrillation, RESPIRATORY: shortness of breath, cough, asthma and wheezing, :  dysuria and Positive for h/o prostate cancer, GI:  abdominal pain, constipation, diarrhea, nausea, vomiting and Positive for some dysphagia following CVA., NEURO:  headaches and Positive for CVA in 2017 with left sided paralysis and some dysphagia. with mild cognitive impairent., PSYCH: anxiety and depression, MUSCULOSKELETAL: back pain, joint pain and fibromyalgia and SKIN: Positive for fungal fingernails on left hand and bilateral pedal fungal nails.       /76  Pulse 75  Resp 18  Wt 193 lb (87.5 kg)  BMI 28.5 kg/m2  GENERAL APPEARANCE:  Alert, in no distress  Head: Normocephalic with slight left sided mouth droop.  Eye:  Holding right eye  closed tightly.  Unable to open it due to pain.  Attempts for this examiner to open eye, caused pain.  Mouth: Moist oral cavity.    CV:  Irregularly irregular rhythmn.  No murmur.  DP pulses +1 bilaterally.  Pedal skin warm and dry.Trace LE edema    ASSESSMENT/PLAN:  (H57.11) Acute right eye pain  (primary encounter diagnosis)  Comment: Acute with h/o right eye surgery in the past  Plan: To see Dr. Tellez urgently.  Daughter aware.    TOBY Momin CNP

## 2017-10-30 ENCOUNTER — NURSING HOME VISIT (OUTPATIENT)
Dept: GERIATRICS | Facility: CLINIC | Age: 82
End: 2017-10-30
Payer: COMMERCIAL

## 2017-10-30 ENCOUNTER — TRANSFERRED RECORDS (OUTPATIENT)
Dept: HEALTH INFORMATION MANAGEMENT | Facility: CLINIC | Age: 82
End: 2017-10-30

## 2017-10-30 VITALS
WEIGHT: 195 LBS | DIASTOLIC BLOOD PRESSURE: 71 MMHG | TEMPERATURE: 97.2 F | HEIGHT: 69 IN | SYSTOLIC BLOOD PRESSURE: 129 MMHG | BODY MASS INDEX: 28.88 KG/M2 | HEART RATE: 70 BPM

## 2017-10-30 DIAGNOSIS — I69.354 HEMIPARESIS AFFECTING LEFT SIDE AS LATE EFFECT OF CEREBROVASCULAR ACCIDENT (H): ICD-10-CM

## 2017-10-30 DIAGNOSIS — N18.30 CKD (CHRONIC KIDNEY DISEASE) STAGE 3, GFR 30-59 ML/MIN (H): ICD-10-CM

## 2017-10-30 DIAGNOSIS — I12.9 BENIGN HYPERTENSION WITH CKD (CHRONIC KIDNEY DISEASE) STAGE III (H): ICD-10-CM

## 2017-10-30 DIAGNOSIS — E11.22 TYPE 2 DIABETES MELLITUS WITH STAGE 3 CHRONIC KIDNEY DISEASE, WITH LONG-TERM CURRENT USE OF INSULIN (H): ICD-10-CM

## 2017-10-30 DIAGNOSIS — H21.01 HYPHEMA OF RIGHT EYE: ICD-10-CM

## 2017-10-30 DIAGNOSIS — R53.83 LETHARGY: Primary | ICD-10-CM

## 2017-10-30 DIAGNOSIS — N18.30 BENIGN HYPERTENSION WITH CKD (CHRONIC KIDNEY DISEASE) STAGE III (H): ICD-10-CM

## 2017-10-30 DIAGNOSIS — N18.30 TYPE 2 DIABETES MELLITUS WITH STAGE 3 CHRONIC KIDNEY DISEASE, WITH LONG-TERM CURRENT USE OF INSULIN (H): ICD-10-CM

## 2017-10-30 DIAGNOSIS — Z79.4 TYPE 2 DIABETES MELLITUS WITH STAGE 3 CHRONIC KIDNEY DISEASE, WITH LONG-TERM CURRENT USE OF INSULIN (H): ICD-10-CM

## 2017-10-30 LAB
BUN SERPL-MCNC: 24 MG/DL (ref 9–26)
CALCIUM SERPL-MCNC: 9.3 MG/DL (ref 8.4–10.2)
CHLORIDE SERPLBLD-SCNC: 99 MMOL/L (ref 98–109)
CO2 SERPL-SCNC: 27 MMOL/L (ref 22–31)
CREAT SERPL-MCNC: 1.39 MG/DL (ref 0.73–1.18)
DIFFERENTIAL: ABNORMAL
ERYTHROCYTE [DISTWIDTH] IN BLOOD BY AUTOMATED COUNT: 15.4 % (ref 11–15)
GFR SERPL CREATININE-BSD FRML MDRD: 46 ML/MIN/1.73M2
GLUCOSE SERPL-MCNC: 194 MG/DL (ref 70–100)
HCT VFR BLD AUTO: 38.6 % (ref 39–51)
HEMOGLOBIN: 12.9 G/DL (ref 13.4–17.5)
MCV RBC AUTO: 92.1 FL (ref 80–100)
PLATELET # BLD AUTO: 173 K/CMM (ref 140–450)
POTASSIUM SERPL-SCNC: 4.8 MMOL/L (ref 3.5–5.2)
RBC # BLD AUTO: 4.19 M/CMM (ref 4.2–5.9)
SODIUM SERPL-SCNC: 135 MMOL/L (ref 136–145)
WBC # BLD AUTO: 7.5 K/CMM (ref 3.8–11)

## 2017-10-30 PROCEDURE — 99310 SBSQ NF CARE HIGH MDM 45: CPT | Performed by: NURSE PRACTITIONER

## 2017-10-30 NOTE — PROGRESS NOTES
[facility]    Chief Complaint   Patient presents with     Nursing Home Acute     HPI:    Elias Mckee is a 86 year old  (8/10/1931), who is being seen today for an episodic care visit at [facility].  HPI information obtained from: facility chart records, facility staff, patient report and [facility] chart review.Today's concern is:  Lethargy  Daughter reported to [facility] staff that pt seemed much sleepier in the prior two days.  Wellness center staff also confirmed that he had been very sleepy in the past week during his sessions.  This morning, though, pt is more alert and did feed himself his entire breakfast.  Pt denies any pain and reports that he is sleeping well at night. CBC abd BMP pending today.    Type 2 diabetes mellitus with stage 3 chronic kidney disease, with long-term current use of insulin (H)  Last lantus increase was 10/16/17.  Accuchecks in past week: 0730: 134-179, 11am: 193-298, 5pm:  130-250.  HgbA1C on 10/9 was 7.8%.    CKD (chronic kidney disease) stage 3, GFR 30-59 ml/min  Labs on 10/9 showed a creatinine of 1.16 and an est GFR of 57.  BMP pending today.    Benign hypertension with CKD (chronic kidney disease) stage III  Metoprolol dose increased to 50 mg bid on 10/26. BP ranges since the increase: Pre medication in the mornin/71 - 145/70.  BP this morning, 2 hours after med was 118/62 with a pulse of 62, 9pm: 138/81 - 166/82. No reports of chest pain.    Hyphema of right eye  Sudden development on 10/23.  Saw his retinal MD urgently and started on Timolol drops.  Continues with some redness and irritation, but he believes that the drops help    Hemiparesis affecting left side as late effect of cerebrovascular accident (H)  Ongoing left arm and left leg weakness (Left arm weaker than left leg).  Has wellness center appts to maintain strength.  Still has ring on left 4th finger.    ALLERGIES: No known allergies  Past Medical, Surgical,  Family and Social History reviewed and updated in [facility].    Current Outpatient Prescriptions   Medication Sig Dispense Refill     INSULIN GLARGINE SC Inject 30 Units Subcutaneous At Bedtime       insulin aspart (NOVOLOG VIAL) 100 UNITS/ML injection 4U SQ with breakfast and lunch.       TRAMADOL HCL PO Take 50 mg by mouth every 6 hours as needed for moderate to severe pain       cholecalciferol (VITAMIN D) 1000 UNIT tablet Take 2,000 Units by mouth daily       Fluticasone Propionate (FLONASE NA) Spray 2 sprays into both nostrils daily       TRAZODONE HCL PO Take 25 mg by mouth At Bedtime       Atorvastatin Calcium (LIPITOR PO) Take 40 mg by mouth At Bedtime       ACETAMINOPHEN PO Take 1,000 mg by mouth 3 times daily       lidocaine (LIDODERM) 5 % Patch Place 1 patch onto the skin every 24 hours On for 12hrs and off for 12hrs       LEVOTHYROXINE SODIUM PO Take 25 mcg by mouth daily       METOPROLOL TARTRATE PO Take 50 mg by mouth 2 times daily        lisinopril (PRINIVIL/ZESTRIL) 20 MG tablet Take 1 tablet (20 mg) by mouth 2 times daily Hold for SBP < 100 30 tablet 0     tamsulosin (FLOMAX) 0.4 MG capsule Take 1 capsule (0.4 mg) by mouth daily 60 capsule 0     rivaroxaban ANTICOAGULANT (XARELTO) 20 MG TABS tablet Take 1 tablet (20 mg) by mouth daily (with dinner) 30 tablet 1     polyethylene glycol (MIRALAX/GLYCOLAX) powder Take 17 g by mouth daily        acetaminophen (TYLENOL) 325 MG tablet Take 650 mg by mouth every 4 hours as needed for mild pain  100 tablet      ferrous gluconate (FERGON) 324 (38 FE) MG tablet Take 1 tablet (324 mg) by mouth daily (with breakfast) 90 tablet 1     Medications reviewed:  Medications reconciled to facility chart and changes were made to reflect current medications as identified as above med list. Below are the changes that were made:   Medications stopped since last EPIC medication reconciliation:   Medications Discontinued During This Encounter   Medication Reason     insulin  glargine (LANTUS) 100 UNIT/ML injection        Medications started since last [facility] medication reconciliation:  Orders Placed This Encounter   Medications     INSULIN GLARGINE SC     Sig: Inject 30 Units Subcutaneous At Bedtime     Patient Active Problem List   Diagnosis     Chronic ischemic heart disease     Personal history of other diseases of circulatory system     HYPERLIPIDEMIA LDL GOAL <100     Advance Care Planning     CKD (chronic kidney disease) stage 3, GFR 30-59 ml/min     Leg weakness     Ataxia     BPH (benign prostatic hyperplasia)     Type 2 diabetes mellitus with diabetic nephropathy (H)     History of actinic keratoses     History of squamous cell carcinoma     S/P Mohs surgery for basal cell carcinoma     H/O: CVA (cerebrovascular accident), 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        REVIEW OF SYSTEMS:  Negative selected, CONSTITUTIONAL:  weight loss, weight gain, poor appetite, fevers and fatigue, EYES:  Positive for right eye surgery and ongiong issues with vision and redness., ENT:  Positive for hearing loss.  Had bilateral hearing aides and he reports that left one is lost and right one is still available, but he is not wearing it at this time.  Has upper and lower dentures., CV:  chest pain, dyspnea on exertion and Positive for atrial fibrillation, RESPIRATORY: shortness of breath, cough, asthma and wheezing, :  dysuria and Positive for h/o prostate cancer, GI:  abdominal  "pain, constipation, diarrhea, nausea, vomiting and Positive for some dysphagia following CVA., NEURO:  headaches and Positive for CVA in 2017 with left sided paralysis and some dysphagia. with mild cognitive impairent., PSYCH: anxiety and depression, MUSCULOSKELETAL: back pain, joint pain and fibromyalgia and SKIN: Positive for fungal fingernails on left hand and bilateral pedal fungal nails.       Physical Exam:  /71  Pulse 70  Temp 97.2  F (36.2  C)  Ht 5' 9\" (1.753 m)  Wt 195 lb (88.5 kg)  BMI 28.8 kg/m2  GENERAL APPEARANCE:  Alert and able to express self verbally,  Participating in wellness clinic exercises.  Head: Normocephalic with slight left sided mouth droop.  Eye:  Holding right eye closed slightly.  Right sclera reddened. No discharge.   Mouth: Moist oral cavity.  No exudate  CV:  Irregularly irregular rhythmn.  No murmur.  DP pulses +1 bilaterally. Trace LE edema  RESP:  No cough.  Quiet, effortless respirations.  Clear to auscultation bilaterally.   ABDOMEN:  Soft rounded abdomen.  Bowel sounds positive all four quadrants.  No tenderness with palpation.  M/S:   No discomfort with palpation of spinal process or with gentle ROM of extremities,   NEURO:   Strong hand grasp right hand and only minimal ability to move left hand fingers.  Left sided facial droop.  Strong ability to raise right leg against resistance and moderate left leg strength. Oriented X 3.  Left fingers slightly swollen, and is wearing left fourth finger ring.  PSYCH:   Pleasant and positive.  No signs of sadness.    Recent Labs:    CBC RESULTS:   Recent Labs   Lab Test 10/09/17 08/28/17 06/08/17  03/20/17   1225  02/27/17   0746   WBC   --    --   5.0  5.8  8.4   RBC   --    --   3.79*  3.70*  3.11*   HGB  13.1*  12.8*  11.8*  11.3*  9.6*   HCT   --    --   35.1*  33.7*  28.1*   MCV   --    --   92.6  91  90   MCH   --    --    --   30.5  30.9   MCHC   --    --    --   33.5  34.2   RDW   --    --   15.6*  14.2  14.9   PLT   " --    --   163  139*  148*       Last Basic Metabolic Panel:  Recent Labs   Lab Test 10/09/17 08/28/17   NA  140  139   POTASSIUM  4.3  4.1   CHLORIDE  103  104   TRENT  9.3  9.5   CO2  28  26   BUN  22  20   CR  1.16  1.11   GLC  157*  153*     GFR Estimate   Date Value Ref Range Status   10/09/2017 57 (L) >60 mL/min/1.73m2 Final   08/28/2017 >60 >60 ml/min/1.73m2 Final   06/08/2017 >60 >60 ml/min/1.73m2 Final   03/20/2017 65 >60 mL/min/1.7m2 Final     Comment:     Non  GFR Calc   03/14/2017 56 (L) >60 mL/min/1.7m2 Final     Comment:     Non  GFR Calc       Lab Results   Component Value Date    A1C 7.8 10/09/2017    A1C 6.2 06/08/2017     Family Communication:  Message left for daughter with update.    Assessment/Plan:  (R53.83) Lethargy  (primary encounter diagnosis)  Comment: Unclear cause, but appears improved today  Plan: CBC and BMP pending.  Cannot r/o that this was a transient effect from increased beta blocker.  Monitor.    (E11.22,  N18.3,  Z79.4) Type 2 diabetes mellitus with stage 3 chronic kidney disease, with long-term current use of insulin (H)  Comment: Improved. Accuchecks.  HgbA1C gol <8%, at goal  Plan: Continue current POC.  Need to avoid low blood sugars due to fall risk.    (N18.3) CKD (chronic kidney disease) stage 3, GFR 30-59 ml/min  Comment: Chronic  Plan: BMP pending today.  Renally adjust dose of medications.    (I12.9,  N18.3) Benign hypertension with CKD (chronic kidney disease) stage III  Comment: BP goal: <150/90, much improved since beta blocker dose increase.  Plan: Continue current dose of meds and continue monitoring. Cannot r/o that new dose of beta blocker has contributed to his lethargy that is improved.    (H21.01) Hyphema of right eye  Comment: Acute, but improved  Plan: Continue POC as developed by retinal MD.    (I38.354) Hemiparesis affecting left side as late effect of cerebrovascular accident (H)  Comment: Chronic  Plan: Nsg to remove ring  from left fourth finger.     Electronically signed by  TOBY Momin CNP

## 2017-11-14 NOTE — PROGRESS NOTES
[facility]    Chief Complaint   Patient presents with     FVP Care Coordination - Health Plan or Product Change       HPI:    Elias Mckee is a 86 year old  (8/10/1931), who is being seen today for an episodic care visit at [facility].  HPI information obtained from: facility chart records, facility staff, patient report and [place] Epic chart review.Today's concern is:  Central retinal vein occlusion, right eye  Continues with right eye pain.  Appt for injection pending at retina MD.  He did start Timolol on 10/23 due to acute onset of right eye pain and redness, but no real improvement.  Today reporting headache from right eye pain.    Chronic atrial fibrillation (H)  Remains on metoprolol for rate control and xarelto for anticoagulation.  Pulse ranges in past month: 61-67.    Hypothyroidism due to acquired atrophy of thyroid  No recent changes to levothyroxine dose.  TSH was 3.66 in June.    Benign hypertension with CKD (chronic kidney disease) stage III  BP ranges in past week: 115-161/70's.  Majority of systolic in the 120-130 range.  No reports of chest pain.     Type 2 diabetes mellitus with stage 3 chronic kidney disease, with long-term current use of insulin (H)  Acuchecks in past week: 0730: , 11am: 113-251, 5pm: 117-195.     Hemiparesis affecting left side as late effect of cerebrovascular accident (H)  On ambulation program with four pronged cane.  Minimal use of left arm, but has weak function of left leg.       The health plan new enrollment has happened. I have reviewed the  MDS, the preventative needs,  and facility care plan. The level of care is appropriate. I have reviewed the code status/advanced directives.       ALLERGIES: No known allergies  Past Medical, Surgical, Family and Social History reviewed and updated in [facility].    Current Outpatient Prescriptions   Medication Sig Dispense Refill     timolol, PF, (TIMOPTIC OCUDOSE) 0.5 % ophthalmic  solution Place 1 drop into the right eye 2 times daily       INSULIN GLARGINE SC Inject 30 Units Subcutaneous At Bedtime       insulin aspart (NOVOLOG VIAL) 100 UNITS/ML injection 4U SQ with breakfast and lunch.       TRAMADOL HCL PO Take 50 mg by mouth every 6 hours as needed for moderate to severe pain       cholecalciferol (VITAMIN D) 1000 UNIT tablet Take 2,000 Units by mouth daily       Fluticasone Propionate (FLONASE NA) Spray 2 sprays into both nostrils daily       TRAZODONE HCL PO Take 25 mg by mouth At Bedtime       Atorvastatin Calcium (LIPITOR PO) Take 40 mg by mouth At Bedtime       ACETAMINOPHEN PO Take 1,000 mg by mouth 3 times daily       LEVOTHYROXINE SODIUM PO Take 25 mcg by mouth daily       METOPROLOL TARTRATE PO Take 50 mg by mouth 2 times daily        lisinopril (PRINIVIL/ZESTRIL) 20 MG tablet Take 1 tablet (20 mg) by mouth 2 times daily Hold for SBP < 100 30 tablet 0     tamsulosin (FLOMAX) 0.4 MG capsule Take 1 capsule (0.4 mg) by mouth daily 60 capsule 0     rivaroxaban ANTICOAGULANT (XARELTO) 20 MG TABS tablet Take 1 tablet (20 mg) by mouth daily (with dinner) 30 tablet 1     polyethylene glycol (MIRALAX/GLYCOLAX) powder Take 17 g by mouth daily        acetaminophen (TYLENOL) 325 MG tablet Take 650 mg by mouth every 4 hours as needed for mild pain  100 tablet      ferrous gluconate (FERGON) 324 (38 FE) MG tablet Take 1 tablet (324 mg) by mouth daily (with breakfast) 90 tablet 1     Medications reviewed:  Medications reconciled to facility chart and changes were made to reflect current medications as identified as above med list. Below are the changes that were made:   Medications stopped since last EPIC medication reconciliation:   There are no discontinued medications.    Medications started since last [facility] medication reconciliation:  No orders of the defined types were placed in this encounter.    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      Central retinal vein occlusion, right eye       REVIEW OF SYSTEMS:  Negative selected, CONSTITUTIONAL:  weight loss, weight gain, poor appetite, fevers and fatigue, EYES:  Positive for right eye surgery and ongiong issues with vision, pain and redness., ENT:  Positive for hearing loss.  Had bilateral hearing aides and he reports that left one is lost and right one is still available, but he is not wearing it at this time.  Has upper and lower dentures., CV:  chest pain, dyspnea on exertion and Positive for atrial fibrillation, RESPIRATORY: shortness of breath, cough, asthma and wheezing, :  dysuria and Positive for h/o prostate cancer, GI:  abdominal pain, constipation, diarrhea, nausea, vomiting and Positive for some dysphagia following CVA., NEURO:  headaches and Positive for CVA in 2017 with left sided paralysis and some dysphagia. with mild cognitive impairent., PSYCH: anxiety and depression,  "MUSCULOSKELETAL: back pain, joint pain and fibromyalgia and SKIN: Positive for fungal fingernails on left hand and bilateral pedal fungal nails.    Physical Exam:  /77  Pulse 67  Temp 97.8  F (36.6  C)  Resp 18  Ht 5' 9\" (1.753 m)  Wt 182 lb (82.6 kg)  BMI 26.88 kg/m2  GENERAL APPEARANCE:  Alert and able to express self verbally,  Participating in wellness clinic exercises.  Head: Normocephalic with slight left sided mouth droop.  Eye:  Holding right eye closed slightly.  Right sclera reddened. No discharge.   Mouth: Moist oral cavity.  No exudate  CV:  Irregularly irregular rhythmn.  No murmur.  DP pulses +1 bilaterally. Trace LE edema  RESP:  No cough.  Quiet, effortless respirations.  Clear to auscultation bilaterally.   ABDOMEN:  Soft rounded abdomen.  Bowel sounds positive all four quadrants.  No tenderness with palpation.  M/S:   No discomfort with palpation of spinal process or with gentle ROM of extremities,   NEURO:   Strong hand grasp right hand and only minimal ability to move left hand fingers.  Left sided facial droop.  Strong ability to raise right leg against resistance and moderate left leg strength. Oriented X 3.    PSYCH:   Tired and in pain.  Requesting pain pill for eye and head pain.     Recent Labs:    CBC RESULTS:   Recent Labs   Lab Test 10/30/17 10/09/17  06/08/17  03/20/17   1225  02/27/17   0746   WBC  7.5   --    --   5.0  5.8  8.4   RBC  4.19*   --    --   3.79*  3.70*  3.11*   HGB  12.9*  13.1*   < >  11.8*  11.3*  9.6*   HCT  38.6*   --    --   35.1*  33.7*  28.1*   MCV  92.1   --    --   92.6  91  90   MCH   --    --    --    --   30.5  30.9   MCHC   --    --    --    --   33.5  34.2   RDW  15.4*   --    --   15.6*  14.2  14.9   PLT  173   --    --   163  139*  148*    < > = values in this interval not displayed.       Last Basic Metabolic Panel:  Recent Labs   Lab Test 10/30/17 10/09/17   NA  135*  140   POTASSIUM  4.8  4.3   CHLORIDE  99  103   TRENT  9.3  9.3   CO2  27  " 28   BUN  24  22   CR  1.39*  1.16   GLC  194*  157*     GFR Estimate   Date Value Ref Range Status   10/30/2017 46 (L) >60 ml/min/1.73m2 Final   10/09/2017 57 (L) >60 mL/min/1.73m2 Final   08/28/2017 >60 >60 ml/min/1.73m2 Final   06/08/2017 >60 >60 ml/min/1.73m2 Final   03/20/2017 65 >60 mL/min/1.7m2 Final     Comment:     Non  GFR Calc     Lab Results   Component Value Date    A1C 7.8 10/09/2017    A1C 6.2 06/08/2017     TSH   Date Value Ref Range Status   06/08/2017 3.66 0.30 - 4.50 uIU/mL Final   ]  Assessment/Plan:  (H34.8112) Central retinal vein occlusion, right eye  (primary encounter diagnosis)  Comment: Ongoing pain and redness  Plan: Ativan prescribed for retina visit next week - hoping that they will be able to administer injection. Pt remains anxious about this.  PRN tramadol for pain.  Monitor.    (I48.2) Chronic atrial fibrillation (H)  Comment: Chronic, rate controlled and anticoagulated  Plan: Continue current POC.    (E03.4) Hypothyroidism due to acquired atrophy of thyroid  Comment: Chronic, controlled with levothyroxine  Plan: Continue current dose of levothyroxine.  Check TSH annually (due in June 2018).    (I12.9,  N18.3) Benign hypertension with CKD (chronic kidney disease) stage III  Comment: BP goal: <150/90,  At goal majority of time  Plan: Continue current pOC.    (E11.22,  N18.3,  Z79.4) Type 2 diabetes mellitus with stage 3 chronic kidney disease, with long-term current use of insulin (H)  Comment: Chronic, HgbA1c goal <8%, at goal  Plan: Continue current dose of insulin.  HgbA1C in one month.    (I69.354) Hemiparesis affecting left side as late effect of cerebrovascular accident (H)  Comment: Chronic  Plan: Continue current POC.     Electronically signed by  TOBY Momin CNP

## 2017-11-15 ENCOUNTER — NURSING HOME VISIT (OUTPATIENT)
Dept: GERIATRICS | Facility: CLINIC | Age: 82
End: 2017-11-15
Payer: COMMERCIAL

## 2017-11-15 VITALS
RESPIRATION RATE: 18 BRPM | HEART RATE: 67 BPM | HEIGHT: 69 IN | TEMPERATURE: 97.8 F | WEIGHT: 182 LBS | DIASTOLIC BLOOD PRESSURE: 77 MMHG | BODY MASS INDEX: 26.96 KG/M2 | SYSTOLIC BLOOD PRESSURE: 161 MMHG

## 2017-11-15 DIAGNOSIS — N18.30 TYPE 2 DIABETES MELLITUS WITH STAGE 3 CHRONIC KIDNEY DISEASE, WITH LONG-TERM CURRENT USE OF INSULIN (H): ICD-10-CM

## 2017-11-15 DIAGNOSIS — I48.20 CHRONIC ATRIAL FIBRILLATION (H): ICD-10-CM

## 2017-11-15 DIAGNOSIS — E11.22 TYPE 2 DIABETES MELLITUS WITH STAGE 3 CHRONIC KIDNEY DISEASE, WITH LONG-TERM CURRENT USE OF INSULIN (H): ICD-10-CM

## 2017-11-15 DIAGNOSIS — I12.9 BENIGN HYPERTENSION WITH CKD (CHRONIC KIDNEY DISEASE) STAGE III (H): ICD-10-CM

## 2017-11-15 DIAGNOSIS — I69.354 HEMIPARESIS AFFECTING LEFT SIDE AS LATE EFFECT OF CEREBROVASCULAR ACCIDENT (H): ICD-10-CM

## 2017-11-15 DIAGNOSIS — H34.8112: Primary | ICD-10-CM

## 2017-11-15 DIAGNOSIS — E03.4 HYPOTHYROIDISM DUE TO ACQUIRED ATROPHY OF THYROID: ICD-10-CM

## 2017-11-15 DIAGNOSIS — N18.30 BENIGN HYPERTENSION WITH CKD (CHRONIC KIDNEY DISEASE) STAGE III (H): ICD-10-CM

## 2017-11-15 DIAGNOSIS — Z79.4 TYPE 2 DIABETES MELLITUS WITH STAGE 3 CHRONIC KIDNEY DISEASE, WITH LONG-TERM CURRENT USE OF INSULIN (H): ICD-10-CM

## 2017-11-15 PROCEDURE — 99309 SBSQ NF CARE MODERATE MDM 30: CPT | Mod: GW | Performed by: NURSE PRACTITIONER

## 2017-11-15 RX ORDER — TIMOLOL 5 MG/ML
1 SOLUTION/ DROPS OPHTHALMIC 2 TIMES DAILY
Start: 2017-11-15 | End: 2022-01-01

## 2017-11-20 ENCOUNTER — CLINICAL UPDATE (OUTPATIENT)
Dept: PHARMACY | Facility: CLINIC | Age: 82
End: 2017-11-20

## 2017-11-21 NOTE — PROGRESS NOTES
This patient's medication list and chart were reviewed as part of the service provided by [facility].    Assessment/Recommendations:  1. (Afib):  Due to pt's CrCl of 44ml/min ([place], using actual body weight), provider may consider reduction in Xarelto dose to 15mg daily from 20mg daily.  For nonvalvular afib, recommendation of Xarelto dose for CrCl 15-50ml/min, is 15mg daily with evening meal.  2. (Anemia):  Consider d'c iron supplement and recheck Hgb in one month.  3. (Eye pain):  Per previous NP note, Timolol eye gtts were started by retina MD due to acute onset of right eye pain and redness, but no real improvement.  May be of benefit to discuss with eye MD potential d'c of Timolol since it has not been effective.      Andria Vo, Pharm.D.,[facility]  Board Certified Geriatric Pharmacist  Medication Therapy Management Pharmacist  [phone]

## 2017-12-07 NOTE — PROGRESS NOTES
"[facility]  Chief Complaint   Patient presents with     Annual Comprehensive Nursing Home       HPI:    Elias Mckee is a 86 year old  (8/10/1931), who is being seen today for an annual comprehensive visit at [facility].  HPI information obtained from: facility chart records, facility staff, patient report and [facility] chart review. Pt is unreliable historian due to cognitive losses.  Today's concerns are:  Type 2 diabetes mellitus with diabetic nephropathy, with long-term current use of insulin (H)  Pt reports occasional \"nerve\" pain in upper extremities, but this has decreased significantly.  Remains on scheduled tylenol.  No use of prn tylenol or tramadol in past month.    Type 2 diabetes mellitus with stage 3 chronic kidney disease, with long-term current use of insulin (H)  Last increase of lantus was 10/16.  Accuchecks in past week: 0730: 111-148, 11am: 143-238, 5pm: .  No reports of symptomatic hypoglycemia. HgbA1C on 10/9 was 7.8%    Benign hypertension with CKD (chronic kidney disease) stage III  Metoprolol dose increased on 10/26 due to increased BP.  BP ranges this month: 104-140/67-88.  Pulse ranges: 63-79.  No reports of chest pain.    CKD (chronic kidney disease) stage 3, GFR 30-59 ml/min  Labs on 10/30 showed a creatinine of 1.39 and an est GFR of 46    Cognitive deficits as late effect of cerebrovascular disease  BIMS score of 14.  Short term memory impacted, but does participate in own decision making.    Hemiparesis affecting left side as late effect of cerebrovascular accident (H)  Continues with left sided paralysis, although does ambulate with walker and assistance.  Does feed himself.    Chronic atrial fibrillation (H)  Pulse ranges of 63-79 this month.  On Xarelto for anticoagulation per cardiology.    Hypothyroidism due to acquired atrophy of thyroid  No recent changes to levothyroxine dose.  TSH on 6/8/17 was 3.66    Central retinal " vein occlusion, right eye  Pt followed by Dr. Tellez.  Had severe pain in November and was scheduled for introocular injection, but even with ativan, pt could not cooperate and patch ordered for comfort.  Noted vitreous hemorrhage of right eye.  Pain is currently improved. He continues to hold right eye slightly closed.    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      Central retinal vein occlusion, right eye     PAST MEDICAL HISTORY:  has a past medical history of Acute, but ill-defined, cerebrovascular disease (1-2008); Atrial fibrillation (H); Benign hypertension with CKD (chronic kidney disease) stage III (6/5/2017); Central retinal vein occlusion, right eye (11/15/2017); Chronic atrial fibrillation (H) (6/5/2017); Chronic ischemic heart disease, unspecified; Cognitive deficits as late effect of cerebrovascular disease  (6/5/2017); Coronary artery disease; CVA (cerebral infarction); Dysphagia due to recent cerebrovascular accident (CVA) (6/5/2017); Elevated cholesterol; Essential hypertension, benign; H/O prostate cancer (7/6/2017); H/O: CVA (cerebrovascular accident) (6/5/2017); Hemiparesis affecting left side as late effect of cerebrovascular accident (H) (6/5/2017); Hyperlipidaemia; MEDICAL HISTORY OF - (1- 2008); Myocardial infarction; Onychomycosis (6/5/2017); Other and unspecified hyperlipidemia; Type 2 diabetes mellitus with stage 3 chronic kidney disease (H) (6/5/2017); and Type II or unspecified type diabetes mellitus without mention of complication, not stated as uncontrolled (1-2008).  PAST SURGICAL HISTORY:  has a past surgical history that includes seed implantation (2002); Phacoemulsification clear cornea with standard intraocular lens implant (Right, 10/7/2014); and Vitrectomy anterior (Right, 10/7/2014).  FAMILY HISTORY: family history is not on file.  SOCIAL HISTORY:  reports that he quit smoking about 44 years ago. His smoking use included Cigarettes. He started smoking about 64 years ago. He has a 20.00 pack-year smoking history. He has never used smokeless tobacco. He reports that he drinks about 0.6 oz of alcohol per week  He reports that he does not use illicit drugs.  IMMUNIZATIONS:  Most Recent Immunizations   Administered Date(s) Administered     Influenza (High Dose) 3 valent vaccine 10/05/2017     Influenza (IIV3) PF 01/22/2013     Pneumococcal (PCV 13) 10/19/2017     Pneumococcal 23 valent 09/10/2008     TD (ADULT, 7+) 09/10/2008     Above immunizations pulled from [facility]. [facility] and facility records also reconciled.  Future immunizations needed:  yearly influenza per facility protocol  MEDICATIONS:  Current Outpatient Prescriptions   Medication Sig Dispense Refill     timolol, PF, (TIMOPTIC OCUDOSE) 0.5 % ophthalmic solution Place 1 drop into the right eye 2 times daily       INSULIN GLARGINE SC  Inject 30 Units Subcutaneous At Bedtime       insulin aspart (NOVOLOG VIAL) 100 UNITS/ML injection 4U SQ with breakfast and lunch.       TRAMADOL HCL PO Take 50 mg by mouth every 6 hours as needed for moderate to severe pain       cholecalciferol (VITAMIN D) 1000 UNIT tablet Take 2,000 Units by mouth daily       Fluticasone Propionate (FLONASE NA) Spray 2 sprays into both nostrils daily       TRAZODONE HCL PO Take 25 mg by mouth At Bedtime       Atorvastatin Calcium (LIPITOR PO) Take 40 mg by mouth At Bedtime       ACETAMINOPHEN PO Take 1,000 mg by mouth 3 times daily       LEVOTHYROXINE SODIUM PO Take 25 mcg by mouth daily       METOPROLOL TARTRATE PO Take 50 mg by mouth 2 times daily        lisinopril (PRINIVIL/ZESTRIL) 20 MG tablet Take 1 tablet (20 mg) by mouth 2 times daily Hold for SBP < 100 30 tablet 0     tamsulosin (FLOMAX) 0.4 MG capsule Take 1 capsule (0.4 mg) by mouth daily 60 capsule 0     rivaroxaban ANTICOAGULANT (XARELTO) 20 MG TABS tablet Take 1 tablet (20 mg) by mouth daily (with dinner) 30 tablet 1     polyethylene glycol (MIRALAX/GLYCOLAX) powder Take 17 g by mouth daily        acetaminophen (TYLENOL) 325 MG tablet Take 650 mg by mouth every 4 hours as needed for mild pain  100 tablet      Medications reviewed:  Medications reconciled to facility chart and changes were made to reflect current medications as identified as above med list. Below are the changes that were made:   Medications stopped since last EPIC medication reconciliation:   There are no discontinued medications.    Medications started since last [facility] medication reconciliation:  No orders of the defined types were placed in this encounter.    Case Management:  I have reviewed the facility/SNF care plan/MDS which was done 9/5/17, including the falls risk, nutrition and pain screening. I also reviewed the current immunizations, and preventive care..Future cancer screening is not clinically indicated secondary to age/goals of care  "Patient's desire to return to the community is present, but is not able due to care needs . Current Level of Care is appropriate.    Advance Directive Discussion:    I reviewed the current advanced directives as reflected in EPIC, the POLST and the facility chart, and verified the congruency of orders . I spoke to Elias about his advanced directives as he is his decision maker and made no changes to plan of Care. He continues to desire CPR.     Team Discussion:  I communicated with the appropriate disciplines involved with the Plan of Care:   Nursing      Patient Goal:  Patient's goal is aggressive interventions and treatment for medical issues..    Information reviewed:  Medications, vital signs, orders, and nursing notes.    ROS:  REVIEW OF SYSTEMS:  Negative selected, CONSTITUTIONAL:  weight loss, weight gain, poor appetite, fevers and fatigue, EYES:  Positive for right eye surgery and ongiong issues with vision, pain and redness., ENT:  Positive for hearing loss.  Had bilateral hearing aides and he reports that left one is lost and right one is still available, but he is not wearing it at this time.  Has upper and lower dentures., CV:  chest pain, dyspnea on exertion and Positive for atrial fibrillation, RESPIRATORY: shortness of breath, cough, asthma and wheezing, :  dysuria and Positive for h/o prostate cancer, GI:  abdominal pain, constipation, diarrhea, nausea, vomiting and Positive for some dysphagia following CVA., NEURO:  headaches and Positive for CVA in 2017 with left sided paralysis and some dysphagia. with mild cognitive impairent., PSYCH: anxiety and depression, MUSCULOSKELETAL: back pain, joint pain and fibromyalgia and SKIN: Positive for fungal fingernails on left hand and bilateral pedal fungal nails.    Exam:  /84  Pulse 74  Ht 5' 9\" (1.753 m)  Wt 194 lb (88 kg)  BMI 28.65 kg/m2  GENERAL APPEARANCE:  Alert and able to express self verbally, Denies any acute distress. .  Head: " Normocephalic with slight left sided mouth droop.  Eye:  Holding right eye closed slightly.  Right sclera very mildlyreddened. No discharge.  ENIT:  No rhinitis.  Hearing well. Moist oral cavity. No exudate.  NECK:   Trachea midline.  No palpable lymphadenopathy.  CV:  Irregularly irregular rhythmn.  No murmur.  DP pulses +1 bilaterally. Trace LE edema.  Distal toes, rubrous discoloration to skin per baseline.  Skin cool and dry.  Very tender to any touch.  RESP:  No cough.  Quiet, effortless respirations.  Clear to auscultation bilaterally.   ABDOMEN:  Soft rounded abdomen.  Bowel sounds positive all four quadrants.  No tenderness with palpation.  M/S:   No discomfort with palpation of spinal process or with gentle ROM of extremities,Left fingers curled slightly.  Demonstrated to patient how to extend those fingers using his right hand, which he was able to do.    NEURO:   Strong hand grasp right hand and only minimal ability to move left hand fingers.  Left sided facial droop.  Strong ability to raise right leg against resistance and moderate left leg strength. Oriented X 3.   SKIN  Fungal nails on left and and both feet. No pedal wounds, but skin is very sensitive and distal rubrous discoloration and cool to touch.  Wearing STEVE stockings.   PSYCH:   Watching TV.  Bright and alert.  Many positive comments. Smiling often.      Lab/Diagnostic data:    CBC RESULTS:   Recent Labs   Lab Test 10/30/17 10/09/17  06/08/17  03/20/17   1225  02/27/17   0746   WBC  7.5   --    --   5.0  5.8  8.4   RBC  4.19*   --    --   3.79*  3.70*  3.11*   HGB  12.9*  13.1*   < >  11.8*  11.3*  9.6*   HCT  38.6*   --    --   35.1*  33.7*  28.1*   MCV  92.1   --    --   92.6  91  90   MCH   --    --    --    --   30.5  30.9   MCHC   --    --    --    --   33.5  34.2   RDW  15.4*   --    --   15.6*  14.2  14.9   PLT  173   --    --   163  139*  148*    < > = values in this interval not displayed.       Last Basic Metabolic Panel:  Recent  Labs   Lab Test 10/30/17 10/09/17   NA  135*  140   POTASSIUM  4.8  4.3   CHLORIDE  99  103   TRENT  9.3  9.3   CO2  27  28   BUN  24  22   CR  1.39*  1.16   GLC  194*  157*     GFR Estimate   Date Value Ref Range Status   10/30/2017 46 (L) >60 ml/min/1.73m2 Final   10/09/2017 57 (L) >60 mL/min/1.73m2 Final   08/28/2017 >60 >60 ml/min/1.73m2 Final   06/08/2017 >60 >60 ml/min/1.73m2 Final   03/20/2017 65 >60 mL/min/1.7m2 Final     Comment:     Non  GFR Calc       Lab Results   Component Value Date    A1C 7.8 10/09/2017    A1C 6.2 06/08/2017     TSH   Date Value Ref Range Status   06/08/2017 3.66 0.30 - 4.50 uIU/mL Final   ]    ASSESSMENT/PLAN  (E11.21,  Z79.4) Type 2 diabetes mellitus with diabetic nephropathy, with long-term current use of insulin (H)  (primary encounter diagnosis)  Comment: Chronic, with distal pedal pain with touch.  Plan: Monitor carefully for pain as well as wounds. Continue with routine podiatry care. Last visit 11/16/17.  Given skin appearance and coolness will add PVD to problem list.    (E11.22,  N18.3,  Z79.4) Type 2 diabetes mellitus with stage 3 chronic kidney disease, with long-term current use of insulin (H)  Comment: Chronic, HgbA1C goal <8%, at goal  Plan:Continue current doses of insulin and frequency of accuchecks.  HgbA1C and BMP next lab day.    (I12.9,  N18.3) Benign hypertension with CKD (chronic kidney disease) stage III  Comment: BP goal; <150/90, at goal  Plan: Continue current POC.  Hgb and BMP next lab day.    (N18.3) CKD (chronic kidney disease) stage 3, GFR 30-59 ml/min  Comment: Chronic  Plan: Renally adjust dose of medications.  BMP next lab day.    (I69.919) Cognitive deficits as late effect of cerebrovascular disease  Comment: Mild  Plan: Continue to monitor.    (I69.354) Hemiparesis affecting left side as late effect of cerebrovascular accident (H)  Comment:Chronic  Plan: Continue BP control and monitor for neuro changes.      (I48.2) Chronic atrial  fibrillation (H)  Comment: Chronic, rate controlled  Plan: Continue POC as directed by cardiology (metoprolol for rate control and xarelto for anticoagulation.    (E03.4) Hypothyroidism due to acquired atrophy of thyroid  Comment: Chronic, controlled with levothyroxine  Plan:Continue current dose of levothyroxine and check TSH annually (6/2018)    (H34.8112) Central retinal vein occlusion, right eye  Comment:  Chronic  Plan: Continue with POC as developed by vitreous retinal surgeon.     Electronically signed by:  TOBY Momin CNP

## 2017-12-11 VITALS
HEART RATE: 74 BPM | BODY MASS INDEX: 28.73 KG/M2 | SYSTOLIC BLOOD PRESSURE: 130 MMHG | WEIGHT: 194 LBS | DIASTOLIC BLOOD PRESSURE: 84 MMHG | HEIGHT: 69 IN

## 2017-12-12 ENCOUNTER — NURSING HOME VISIT (OUTPATIENT)
Dept: GERIATRICS | Facility: CLINIC | Age: 82
End: 2017-12-12
Payer: COMMERCIAL

## 2017-12-12 DIAGNOSIS — I48.20 CHRONIC ATRIAL FIBRILLATION (H): ICD-10-CM

## 2017-12-12 DIAGNOSIS — Z79.4 TYPE 2 DIABETES MELLITUS WITH STAGE 3 CHRONIC KIDNEY DISEASE, WITH LONG-TERM CURRENT USE OF INSULIN (H): ICD-10-CM

## 2017-12-12 DIAGNOSIS — E11.21 TYPE 2 DIABETES MELLITUS WITH DIABETIC NEPHROPATHY, WITH LONG-TERM CURRENT USE OF INSULIN (H): Primary | ICD-10-CM

## 2017-12-12 DIAGNOSIS — I12.9 BENIGN HYPERTENSION WITH CKD (CHRONIC KIDNEY DISEASE) STAGE III (H): ICD-10-CM

## 2017-12-12 DIAGNOSIS — E11.22 TYPE 2 DIABETES MELLITUS WITH STAGE 3 CHRONIC KIDNEY DISEASE, WITH LONG-TERM CURRENT USE OF INSULIN (H): ICD-10-CM

## 2017-12-12 DIAGNOSIS — N18.30 CKD (CHRONIC KIDNEY DISEASE) STAGE 3, GFR 30-59 ML/MIN (H): ICD-10-CM

## 2017-12-12 DIAGNOSIS — N18.30 TYPE 2 DIABETES MELLITUS WITH STAGE 3 CHRONIC KIDNEY DISEASE, WITH LONG-TERM CURRENT USE OF INSULIN (H): ICD-10-CM

## 2017-12-12 DIAGNOSIS — N18.30 BENIGN HYPERTENSION WITH CKD (CHRONIC KIDNEY DISEASE) STAGE III (H): ICD-10-CM

## 2017-12-12 DIAGNOSIS — E03.4 HYPOTHYROIDISM DUE TO ACQUIRED ATROPHY OF THYROID: ICD-10-CM

## 2017-12-12 DIAGNOSIS — H34.8112: ICD-10-CM

## 2017-12-12 DIAGNOSIS — I69.354 HEMIPARESIS AFFECTING LEFT SIDE AS LATE EFFECT OF CEREBROVASCULAR ACCIDENT (H): ICD-10-CM

## 2017-12-12 DIAGNOSIS — Z79.4 TYPE 2 DIABETES MELLITUS WITH DIABETIC NEPHROPATHY, WITH LONG-TERM CURRENT USE OF INSULIN (H): Primary | ICD-10-CM

## 2017-12-12 DIAGNOSIS — I69.919 COGNITIVE DEFICITS AS LATE EFFECT OF CEREBROVASCULAR DISEASE: ICD-10-CM

## 2017-12-12 PROBLEM — E11.52 TYPE 2 DIABETES MELLITUS WITH DIABETIC PERIPHERAL ANGIOPATHY WITH GANGRENE (H): Status: RESOLVED | Noted: 2017-12-12 | Resolved: 2017-12-12

## 2017-12-12 PROBLEM — E11.51 TYPE II DIABETES MELLITUS WITH PERIPHERAL CIRCULATORY DISORDER (H): Status: ACTIVE | Noted: 2017-12-12

## 2017-12-12 PROBLEM — E11.52 TYPE 2 DIABETES MELLITUS WITH DIABETIC PERIPHERAL ANGIOPATHY WITH GANGRENE (H): Status: ACTIVE | Noted: 2017-12-12

## 2017-12-12 PROBLEM — I73.9 PVD (PERIPHERAL VASCULAR DISEASE) (H): Status: ACTIVE | Noted: 2017-12-12

## 2017-12-12 PROCEDURE — 99318 ZZC ANNUAL NURSING FAC ASSESSMNT, STABLE: CPT | Mod: GW | Performed by: NURSE PRACTITIONER

## 2017-12-14 ENCOUNTER — TRANSFERRED RECORDS (OUTPATIENT)
Dept: HEALTH INFORMATION MANAGEMENT | Facility: CLINIC | Age: 82
End: 2017-12-14

## 2017-12-14 LAB
BUN SERPL-MCNC: 18 MG/DL (ref 9–26)
CALCIUM SERPL-MCNC: 9.4 MG/DL (ref 8.4–10.2)
CHLORIDE SERPLBLD-SCNC: 102 MMOL/L (ref 98–109)
CO2 SERPL-SCNC: 28 MMOL/L (ref 22–31)
CREAT SERPL-MCNC: 1.13 MG/DL (ref 0.73–1.18)
GFR SERPL CREATININE-BSD FRML MDRD: 59 ML/MIN/1.73M2
GLUCOSE SERPL-MCNC: 127 MG/DL (ref 70–100)
HBA1C MFR BLD: 7.2 % (ref 4–5.6)
HEMOGLOBIN: 12.7 G/DL (ref 13.4–17.5)
POTASSIUM SERPL-SCNC: 4.5 MMOL/L (ref 3.5–5.2)
SODIUM SERPL-SCNC: 134 MMOL/L (ref 136–145)

## 2017-12-21 ENCOUNTER — TRANSFERRED RECORDS (OUTPATIENT)
Dept: HEALTH INFORMATION MANAGEMENT | Facility: CLINIC | Age: 82
End: 2017-12-21

## 2017-12-21 LAB — HEMOGLOBIN: 11.9 G/DL (ref 13.4–17.5)

## 2018-01-18 ENCOUNTER — NURSING HOME VISIT (OUTPATIENT)
Dept: GERIATRICS | Facility: CLINIC | Age: 83
End: 2018-01-18
Payer: COMMERCIAL

## 2018-01-18 VITALS
HEIGHT: 69 IN | DIASTOLIC BLOOD PRESSURE: 88 MMHG | TEMPERATURE: 98 F | HEART RATE: 71 BPM | SYSTOLIC BLOOD PRESSURE: 160 MMHG | RESPIRATION RATE: 18 BRPM | WEIGHT: 187 LBS | BODY MASS INDEX: 27.7 KG/M2

## 2018-01-18 DIAGNOSIS — I73.9 PVD (PERIPHERAL VASCULAR DISEASE) (H): ICD-10-CM

## 2018-01-18 DIAGNOSIS — I69.354 HEMIPARESIS AFFECTING LEFT SIDE AS LATE EFFECT OF CEREBROVASCULAR ACCIDENT (H): ICD-10-CM

## 2018-01-18 DIAGNOSIS — N18.30 TYPE 2 DIABETES MELLITUS WITH STAGE 3 CHRONIC KIDNEY DISEASE, WITH LONG-TERM CURRENT USE OF INSULIN (H): ICD-10-CM

## 2018-01-18 DIAGNOSIS — I69.919 COGNITIVE DEFICITS AS LATE EFFECT OF CEREBROVASCULAR DISEASE: ICD-10-CM

## 2018-01-18 DIAGNOSIS — Z79.4 TYPE 2 DIABETES MELLITUS WITH STAGE 3 CHRONIC KIDNEY DISEASE, WITH LONG-TERM CURRENT USE OF INSULIN (H): ICD-10-CM

## 2018-01-18 DIAGNOSIS — I12.9 BENIGN HYPERTENSION WITH CKD (CHRONIC KIDNEY DISEASE) STAGE III (H): ICD-10-CM

## 2018-01-18 DIAGNOSIS — N18.30 BENIGN HYPERTENSION WITH CKD (CHRONIC KIDNEY DISEASE) STAGE III (H): ICD-10-CM

## 2018-01-18 DIAGNOSIS — I48.20 CHRONIC ATRIAL FIBRILLATION (H): ICD-10-CM

## 2018-01-18 DIAGNOSIS — E11.22 TYPE 2 DIABETES MELLITUS WITH STAGE 3 CHRONIC KIDNEY DISEASE, WITH LONG-TERM CURRENT USE OF INSULIN (H): ICD-10-CM

## 2018-01-18 DIAGNOSIS — R27.0 ATAXIA: Primary | ICD-10-CM

## 2018-01-18 PROCEDURE — 99309 SBSQ NF CARE MODERATE MDM 30: CPT | Mod: GW | Performed by: NURSE PRACTITIONER

## 2018-01-18 NOTE — PROGRESS NOTES
[place] [facility]    Chief Complaint   Patient presents with     RECHECK     HPI:    Elias Mckee is a 86 year old  (8/10/1931), who is being seen today for an episodic care visit at [facility].  HPI information obtained from: facility chart records, facility staff, patient report and [facility] chart review Pt is unreliable historian due his cogniitive loss.Today's concern is:  Ataxia  Last fall on 1/11 during attempts to self transfer to .  He reports that pain was noted in his coccyx area at time of fall.  Denies pain at this time.    Cognitive deficits as late effect of cerebrovascular disease  Impulsivity continues and is the contributor to his falls, as he forgets to call for assistance. BIMS score of 13.    Hemiparesis affecting left side as late effect of cerebrovascular accident (H)  Uses hand and forearm splint due to paralysis following CVA.  Has strength in his left leg and can ambulate with walker and assist with transfers.      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  Accuchecks this month: 0730: , 11am: 109-256, 5pm: .  HgbA1C on 12/14/17 was 7.2%.    Benign hypertension with CKD (chronic kidney disease) stage III  Metoprolol dose adjusted in November.  BP ranges this month: 106/65 - 160/76 with majority of systolic BP's in the 120-130 ranges.  No reports of chest pain.    Chronic atrial fibrillation (H)  Pulse ranges this month: 61-84.    PVD (peripheral vascular disease) (H)  Seen by podiatry yesterday.  No reports of pedal wounds.     ALLERGIES: No known allergies  Past Medical, Surgical, Family and Social History reviewed and updated in [facility].    Current Outpatient Prescriptions   Medication Sig Dispense Refill     timolol, PF, (TIMOPTIC OCUDOSE) 0.5 % ophthalmic solution Place 1 drop into the right eye 2 times daily       INSULIN  GLARGINE SC Inject 30 Units Subcutaneous At Bedtime       insulin aspart (NOVOLOG VIAL) 100 UNITS/ML injection 4U SQ with breakfast and lunch.       TRAMADOL HCL PO Take 50 mg by mouth every 6 hours as needed for moderate to severe pain       cholecalciferol (VITAMIN D) 1000 UNIT tablet Take 2,000 Units by mouth daily       Fluticasone Propionate (FLONASE NA) Spray 2 sprays into both nostrils daily       TRAZODONE HCL PO Take 25 mg by mouth At Bedtime       Atorvastatin Calcium (LIPITOR PO) Take 40 mg by mouth At Bedtime       ACETAMINOPHEN PO Take 1,000 mg by mouth 3 times daily       LEVOTHYROXINE SODIUM PO Take 25 mcg by mouth daily       METOPROLOL TARTRATE PO Take 50 mg by mouth 2 times daily        lisinopril (PRINIVIL/ZESTRIL) 20 MG tablet Take 1 tablet (20 mg) by mouth 2 times daily Hold for SBP < 100 30 tablet 0     tamsulosin (FLOMAX) 0.4 MG capsule Take 1 capsule (0.4 mg) by mouth daily 60 capsule 0     rivaroxaban ANTICOAGULANT (XARELTO) 20 MG TABS tablet Take 1 tablet (20 mg) by mouth daily (with dinner) 30 tablet 1     polyethylene glycol (MIRALAX/GLYCOLAX) powder Take 17 g by mouth daily        acetaminophen (TYLENOL) 325 MG tablet Take 650 mg by mouth every 4 hours as needed for mild pain  100 tablet      Medications reviewed:  Medications reconciled to facility chart and changes were made to reflect current medications as identified as above med list. Below are the changes that were made:   Medications stopped since last EPIC medication reconciliation:   There are no discontinued medications.    Medications started since last [facility] medication reconciliation:  No orders of the defined types were placed in this encounter.    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      Central retinal vein occlusion, right eye     PVD (peripheral vascular disease) (H)     Type II diabetes mellitus with peripheral circulatory disorder (H)       REVIEW OF SYSTEMS:  Negative selected, CONSTITUTIONAL:  weight loss, weight gain, poor appetite, fevers and fatigue, EYES:  Positive for right eye surgery and ongiong issues with vision, pain and redness., ENT:  Positive for hearing loss.  Had bilateral hearing aides  Has upper and lower dentures., CV:  chest pain, dyspnea on exertion and Positive for atrial fibrillation, RESPIRATORY: shortness of breath, cough, asthma and wheezing, :  dysuria and Positive for h/o prostate cancer, GI:  abdominal pain, constipation, diarrhea, nausea, vomiting and Positive for some dysphagia following CVA., NEURO:  headaches and Positive for CVA in 2017 with left sided paralysis and some dysphagia. with mild cognitive impairent., PSYCH: anxiety and depression, MUSCULOSKELETAL: back pain, joint pain and fibromyalgia and SKIN: Positive for fungal fingernails on left hand and bilateral pedal fungal nails.    Physical Exam:  /88  Pulse 71  Temp 98  F (36.7  " C)  Resp 18  Ht 5' 9\" (1.753 m)  Wt 187 lb (84.8 kg)  BMI 27.62 kg/m2  GENERAL APPEARANCE:  Alert and able to express self verbally, Denies any acute distress. .  Head: Normocephalic with slight left sided mouth droop.  Eye:  Holding right eye closed slightly.  Right sclera very mildly reddened. No discharge.  ENIT:  No rhinitis.  Hearing well. Moist oral cavity. No exudate.  CV:  Irregularly irregular rhythmn.  No murmur.  DP pulses +1 bilaterally. Trace LE edema.  Distal toes, rubrous discoloration to skin per baseline.  Skin cool and dry..  RESP:  No cough.  Quiet, effortless respirations.  Clear to auscultation bilaterally.   ABDOMEN:  Soft rounded abdomen.  Bowel sounds positive all four quadrants.  No tenderness with palpation.  M/S:   No discomfort with palpation of spinal process except for mild discomfort with deep palpation of coccyx.  No bruising or redness. No pain with gentle ROM of extremities,Left fingers curled slightly and resting in hand splint.  Demonstrated to patient how to extend those fingers using his right hand, which he was able to do.    NEURO:   Strong hand grasp right hand and only minimal ability to move left hand and arm.  Left sided facial droop.  Strong ability to raise right leg against resistance and moderate left leg strength.    SKIN  Fungal nails on left and and both feet. No pedal wounds, but skin is very dry.  Wearing STEVE stockings.   PSYCH:  Bright and alert.  Many positive comments. Smiling often.      Recent Labs:   CBC RESULTS:   Recent Labs   Lab Test 12/21/17 12/14/17 10/30/17  06/08/17  03/20/17   1225  02/27/17   0746   WBC   --    --   7.5   --   5.0  5.8  8.4   RBC   --    --   4.19*   --   3.79*  3.70*  3.11*   HGB  11.9*  12.7*  12.9*   < >  11.8*  11.3*  9.6*   HCT   --    --   38.6*   --   35.1*  33.7*  28.1*   MCV   --    --   92.1   --   92.6  91  90   MCH   --    --    --    --    --   30.5  30.9   MCHC   --    --    --    --    --   33.5  34.2   RDW   -- "    --   15.4*   --   15.6*  14.2  14.9   PLT   --    --   173   --   163  139*  148*    < > = values in this interval not displayed.       Last Basic Metabolic Panel:  Recent Labs   Lab Test 12/14/17 10/30/17   NA  134*  135*   POTASSIUM  4.5  4.8   CHLORIDE  102  99   TRENT  9.4  9.3   CO2  28  27   BUN  18  24   CR  1.13  1.39*   GLC  127*  194*     GFR Estimate   Date Value Ref Range Status   12/14/2017 59 (L) >60 ml/min/1.73m2 Final   10/30/2017 46 (L) >60 ml/min/1.73m2 Final   10/09/2017 57 (L) >60 mL/min/1.73m2 Final   08/28/2017 >60 >60 ml/min/1.73m2 Final   06/08/2017 >60 >60 ml/min/1.73m2 Final       Lab Results   Component Value Date    A1C 7.2 12/14/2017    A1C 7.8 10/09/2017     Assessment/Plan:  (R27.0) Ataxia  (primary encounter diagnosis)  Comment: CHronic secondary to CVA  Plan:Continue with current POC and frequent reminders for him to ask for assistance with transfers due to fall risk.    (I69.919) Cognitive deficits as late effect of cerebrovascular disease  Comment: Ongoing  Plan: Continue with POC in skilled 24 hr [facility] facility.  Monitor for progression of memory loss.    (I69.354) Hemiparesis affecting left side as late effect of cerebrovascular accident (H)  Comment: Chronic  Plan: COntinue with assist with cares as well as reminders not to transfer without assistance.  Continue with ambulation program.     (E11.22,  N18.3,  Z79.4) Type 2 diabetes mellitus with stage 3 chronic kidney disease, with long-term current use of insulin (H)  Comment:Chronic, HgbA1C goal <8%, at goal  Plan: Continue current dose of insulin and continue with current schedule of accuchecks. HgbA1C q3m.    (I12.9,  N18.3) Benign hypertension with CKD (chronic kidney disease) stage III  Comment:BP goal; <150/90, at goal majority of time.  Plan:Continue current dose of cardiac medications.  BMP q3m or with acute illness.    (I48.2) Chronic atrial fibrillation (H)  Comment: Rate controlled.  Anticoagulated with Xarelto  Plan:  Message out to daughter Jazmin to discuss f/u appt with cardiology for evaluation of dose of xarelto, given his mildly altered renal function and fall risk.    (I73.9) PVD (peripheral vascular disease) (H)  Comment:CHronic  Plan: Eucerin cream to toes bid.     Electronically signed by  TOBY Mmoin CNP

## 2018-01-25 VITALS
SYSTOLIC BLOOD PRESSURE: 132 MMHG | HEART RATE: 67 BPM | TEMPERATURE: 97.7 F | BODY MASS INDEX: 26.96 KG/M2 | DIASTOLIC BLOOD PRESSURE: 74 MMHG | RESPIRATION RATE: 18 BRPM | WEIGHT: 182 LBS | HEIGHT: 69 IN

## 2018-01-25 NOTE — PROGRESS NOTES
[place] [facility]    Chief Complaint   Patient presents with     RECHECK     HPI:    Elias Mckee is a 86 year old  (8/10/1931), who is being seen today for an episodic care visit at [facility].  HPI information obtained from: facility chart records, facility staff, patient report and [facility] chart review.Pt is unreliable historian due to cognitive loss. Today's concern is:  Type 2 diabetes mellitus with stage 3 chronic kidney disease, with long-term current use of insulin (H)  Last dose adjustment of insulin was 10/16/17.  Accuchecks in last week: 0730: 107-157, 11am: 141-170, 1700: 121-195.  HgbA1C on 12/14/17 was 7.2%    Benign hypertension with CKD (chronic kidney disease) stage III  BP ranges in past year: 118/74 -153/82.  No reports of chest pain. Pulse ranges: 60-77.    CKD (chronic kidney disease) stage 3, GFR 30-59 ml/min  Kidney function has improved with labs on 12/14/17 showing a creatinine of 1.13 and and est GFR of 59.  Creatinine clearance today is estimated at 55.     Cognitive deficits as late effect of cerebrovascular disease  Continues with impulsivity issues and lack of insight into functional & cognitive losses following CVA.  Falls occur during his attempts to self transfer when he forgets to call for help.  Last fall was on 1/20 and had no obvious injury.    Hemiparesis affecting left side as late effect of cerebrovascular accident (H)  Wears left arm splint.  Ambulates with walker with arm support.  No changes in neuro status.    Hypothyroidism due to acquired atrophy of thyroid  No recent changes to levothyroxine dose.  TSH last June was 3.66.    Chronic atrial fibrillation (H)  No reports of heart rates >100/min.  Remains on xarelto 20 mg daily.for anticoagulation.    ALLERGIES: No known allergies  Past Medical, Surgical, Family and Social History reviewed and updated in [facility].    Current Outpatient Prescriptions   Medication Sig Dispense Refill      timolol, PF, (TIMOPTIC OCUDOSE) 0.5 % ophthalmic solution Place 1 drop into the right eye 2 times daily       INSULIN GLARGINE SC Inject 30 Units Subcutaneous At Bedtime       insulin aspart (NOVOLOG VIAL) 100 UNITS/ML injection 4U SQ with breakfast and lunch.       TRAMADOL HCL PO Take 50 mg by mouth every 6 hours as needed for moderate to severe pain       cholecalciferol (VITAMIN D) 1000 UNIT tablet Take 2,000 Units by mouth daily       Fluticasone Propionate (FLONASE NA) Spray 2 sprays into both nostrils daily       TRAZODONE HCL PO Take 25 mg by mouth At Bedtime       Atorvastatin Calcium (LIPITOR PO) Take 40 mg by mouth At Bedtime       ACETAMINOPHEN PO Take 1,000 mg by mouth 3 times daily       LEVOTHYROXINE SODIUM PO Take 25 mcg by mouth daily       METOPROLOL TARTRATE PO Take 50 mg by mouth 2 times daily        lisinopril (PRINIVIL/ZESTRIL) 20 MG tablet Take 1 tablet (20 mg) by mouth 2 times daily Hold for SBP < 100 30 tablet 0     tamsulosin (FLOMAX) 0.4 MG capsule Take 1 capsule (0.4 mg) by mouth daily 60 capsule 0     rivaroxaban ANTICOAGULANT (XARELTO) 20 MG TABS tablet Take 1 tablet (20 mg) by mouth daily (with dinner) 30 tablet 1     polyethylene glycol (MIRALAX/GLYCOLAX) powder Take 17 g by mouth daily        acetaminophen (TYLENOL) 325 MG tablet Take 650 mg by mouth every 4 hours as needed for mild pain  100 tablet      Medications reviewed:  Medications reconciled to facility chart and changes were made to reflect current medications as identified as above med list. Below are the changes that were made:   Medications stopped since last EPIC medication reconciliation:   There are no discontinued medications.    Medications started since last [facility] medication reconciliation:  No orders of the defined types were placed in this encounter.    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      Central retinal vein occlusion, right eye     PVD (peripheral vascular disease) (H)     Type II diabetes mellitus with peripheral circulatory disorder (H)       REVIEW OF SYSTEMS:  Negative selected, CONSTITUTIONAL:  weight loss, weight gain, poor appetite, fevers and fatigue, EYES:  Positive for right eye surgery and ongiong issues with vision, pain and redness., ENT:  Positive for hearing loss.  Had bilateral hearing aides  Has upper and lower dentures., CV:  chest pain, dyspnea on exertion and Positive for atrial fibrillation, RESPIRATORY: shortness of breath, cough, asthma and wheezing, :  dysuria and Positive for h/o prostate cancer, GI:  abdominal pain, constipation, diarrhea, nausea, vomiting and Positive for some dysphagia following CVA., NEURO:  headaches and Positive for CVA in 2017 with left sided paralysis and some dysphagia. with mild cognitive impairent., PSYCH: anxiety and depression, MUSCULOSKELETAL: back pain, joint pain and fibromyalgia and SKIN: Positive for  "fungal fingernails on left hand and bilateral pedal fungal nails.    Physical Exam:  /74  Pulse 67  Temp 97.7  F (36.5  C)  Resp 18  Ht 5' 9\" (1.753 m)  Wt 182 lb (82.6 kg)  BMI 26.88 kg/m2  GENERAL APPEARANCE:  Alert and able to express self verbally, Denies any acute distress. .  Head: Normocephalic with slight left sided mouth droop.  Eye:  Holding right eye closed slightly.  Right sclera very mildly reddened. No discharge.  ENIT:  No rhinitis.  Hearing well. Moist oral cavity. No exudate.  CV:  Irregularly irregular rhythmn.  No murmur.  DP pulses +1 bilaterally. Trace LE edema.  Distal toes, rubrous discoloration to skin per baseline.  Skin cool and dry..  RESP:  No cough.  Quiet, effortless respirations.  Clear to auscultation bilaterally.   ABDOMEN:  Soft rounded abdomen.  Bowel sounds positive all four quadrants.  No tenderness with palpation.  M/S:   No pain with gentle ROM of extremities,Left fingers curled slightly and resting in hand splint.     NEURO:   Strong hand grasp right hand and only minimal ability to move left hand and arm.  Left sided facial droop.  Strong ability to raise right leg against resistance and moderate left leg strength.    SKIN  Fungal nails on left and and both feet. No pedal wounds.   PSYCH:  Bright and alert.  Many positive comments. Smiling often.      Recent Labs:   CBC RESULTS:   Recent Labs   Lab Test 12/21/17 12/14/17 10/30/17  06/08/17  03/20/17   1225  02/27/17   0746   WBC   --    --   7.5   --   5.0  5.8  8.4   RBC   --    --   4.19*   --   3.79*  3.70*  3.11*   HGB  11.9*  12.7*  12.9*   < >  11.8*  11.3*  9.6*   HCT   --    --   38.6*   --   35.1*  33.7*  28.1*   MCV   --    --   92.1   --   92.6  91  90   MCH   --    --    --    --    --   30.5  30.9   MCHC   --    --    --    --    --   33.5  34.2   RDW   --    --   15.4*   --   15.6*  14.2  14.9   PLT   --    --   173   --   163  139*  148*    < > = values in this interval not displayed.       Last " Basic Metabolic Panel:  Recent Labs   Lab Test 12/14/17 10/30/17   NA  134*  135*   POTASSIUM  4.5  4.8   CHLORIDE  102  99   TRENT  9.4  9.3   CO2  28  27   BUN  18  24   CR  1.13  1.39*   GLC  127*  194*     GFR Estimate   Date Value Ref Range Status   12/14/2017 59 (L) >60 ml/min/1.73m2 Final   10/30/2017 46 (L) >60 ml/min/1.73m2 Final   10/09/2017 57 (L) >60 mL/min/1.73m2 Final   08/28/2017 >60 >60 ml/min/1.73m2 Final   06/08/2017 >60 >60 ml/min/1.73m2 Final       Lab Results   Component Value Date    A1C 7.2 12/14/2017    A1C 7.8 10/09/2017     TSH   Date Value Ref Range Status   06/08/2017 3.66 0.30 - 4.50 uIU/mL Final   ]    Assessment/Plan:  (E11.22,  N18.3,  Z79.4) Type 2 diabetes mellitus with stage 3 chronic kidney disease, with long-term current use of insulin (H)  (primary encounter diagnosis)  Comment: Chronic, HgbA1C goal <8%, at goal  Plan: Continue current POC.  Dr. Boyd to see in February.     (I12.9,  N18.3) Benign hypertension with CKD (chronic kidney disease) stage III  Comment: BP goal; <150/90, at goal  Plan: Continue current POC.  Hgb and BMP q3m or with acute illness.    (N18.3) CKD (chronic kidney disease) stage 3, GFR 30-59 ml/min  Comment: Chronic, but improved  Plan: Monitor BMP and monitor medication dose based on current renal function.    (I69.919) Cognitive deficits as late effect of cerebrovascular disease  Comment: Advancing  Plan: Continue current POC    (I69.354) Hemiparesis affecting left side as late effect of cerebrovascular accident (H)  Comment: Chronic  Plan: Continue current pOC.    (E03.4) Hypothyroidism due to acquired atrophy of thyroid  Comment: Chronic  Plan: Continue current dose of levothyroxine and check TSH annually,(due in June).    (I48.2) Chronic atrial fibrillation (H)  Comment: Chronic, but rate controlled  Plan: Continue current dose of xarelto.  If kidney function declines, contact Cardiology regarding reduction of dose to 15 mg.    Electronically signed  by  TOBY Momin CNP

## 2018-01-26 ENCOUNTER — NURSING HOME VISIT (OUTPATIENT)
Dept: GERIATRICS | Facility: CLINIC | Age: 83
End: 2018-01-26
Payer: COMMERCIAL

## 2018-01-26 DIAGNOSIS — I69.354 HEMIPARESIS AFFECTING LEFT SIDE AS LATE EFFECT OF CEREBROVASCULAR ACCIDENT (H): ICD-10-CM

## 2018-01-26 DIAGNOSIS — I48.20 CHRONIC ATRIAL FIBRILLATION (H): ICD-10-CM

## 2018-01-26 DIAGNOSIS — N18.30 CKD (CHRONIC KIDNEY DISEASE) STAGE 3, GFR 30-59 ML/MIN (H): ICD-10-CM

## 2018-01-26 DIAGNOSIS — E11.22 TYPE 2 DIABETES MELLITUS WITH STAGE 3 CHRONIC KIDNEY DISEASE, WITH LONG-TERM CURRENT USE OF INSULIN (H): Primary | ICD-10-CM

## 2018-01-26 DIAGNOSIS — Z79.4 TYPE 2 DIABETES MELLITUS WITH STAGE 3 CHRONIC KIDNEY DISEASE, WITH LONG-TERM CURRENT USE OF INSULIN (H): Primary | ICD-10-CM

## 2018-01-26 DIAGNOSIS — E03.4 HYPOTHYROIDISM DUE TO ACQUIRED ATROPHY OF THYROID: ICD-10-CM

## 2018-01-26 DIAGNOSIS — N18.30 BENIGN HYPERTENSION WITH CKD (CHRONIC KIDNEY DISEASE) STAGE III (H): ICD-10-CM

## 2018-01-26 DIAGNOSIS — N18.30 TYPE 2 DIABETES MELLITUS WITH STAGE 3 CHRONIC KIDNEY DISEASE, WITH LONG-TERM CURRENT USE OF INSULIN (H): Primary | ICD-10-CM

## 2018-01-26 DIAGNOSIS — I69.919 COGNITIVE DEFICITS AS LATE EFFECT OF CEREBROVASCULAR DISEASE: ICD-10-CM

## 2018-01-26 DIAGNOSIS — I12.9 BENIGN HYPERTENSION WITH CKD (CHRONIC KIDNEY DISEASE) STAGE III (H): ICD-10-CM

## 2018-01-26 PROCEDURE — 99309 SBSQ NF CARE MODERATE MDM 30: CPT | Mod: GW | Performed by: NURSE PRACTITIONER

## 2018-02-19 ENCOUNTER — NURSING HOME VISIT (OUTPATIENT)
Dept: GERIATRICS | Facility: CLINIC | Age: 83
End: 2018-02-19
Payer: COMMERCIAL

## 2018-02-19 DIAGNOSIS — Z79.4 TYPE 2 DIABETES MELLITUS WITH STAGE 3 CHRONIC KIDNEY DISEASE, WITH LONG-TERM CURRENT USE OF INSULIN (H): ICD-10-CM

## 2018-02-19 DIAGNOSIS — N18.30 BENIGN HYPERTENSION WITH CKD (CHRONIC KIDNEY DISEASE) STAGE III (H): ICD-10-CM

## 2018-02-19 DIAGNOSIS — I48.20 CHRONIC ATRIAL FIBRILLATION (H): ICD-10-CM

## 2018-02-19 DIAGNOSIS — E11.22 TYPE 2 DIABETES MELLITUS WITH STAGE 3 CHRONIC KIDNEY DISEASE, WITH LONG-TERM CURRENT USE OF INSULIN (H): ICD-10-CM

## 2018-02-19 DIAGNOSIS — I69.919 COGNITIVE DEFICITS AS LATE EFFECT OF CEREBROVASCULAR DISEASE: Primary | ICD-10-CM

## 2018-02-19 DIAGNOSIS — N18.30 TYPE 2 DIABETES MELLITUS WITH STAGE 3 CHRONIC KIDNEY DISEASE, WITH LONG-TERM CURRENT USE OF INSULIN (H): ICD-10-CM

## 2018-02-19 DIAGNOSIS — I12.9 BENIGN HYPERTENSION WITH CKD (CHRONIC KIDNEY DISEASE) STAGE III (H): ICD-10-CM

## 2018-02-19 DIAGNOSIS — I69.354 HEMIPARESIS AFFECTING LEFT SIDE AS LATE EFFECT OF CEREBROVASCULAR ACCIDENT (H): ICD-10-CM

## 2018-02-21 NOTE — PROGRESS NOTES
Pt was seen for a regulatory LTC visit  Case reviewed with NP    Overall mental and functional status has been stable    Pt continues to have occasional falls secondary to impulsiveness    He denies any specific physical concerns    VSS  BG generally 100s    Alert, pleasant, oriented to person, place  Lungs clear  CV irreg  Abd soft  Trace LE edema  L sided weakness, particularly L UE      Assessment    S/p CVA with L sided weakness, cognitive deficits, stable  Chronic afib, HR controlled. On xarelto  DM type 2, stable on insulin    Plan  Continue current tx  Routine lab monitoring  LTC

## 2018-04-16 NOTE — PROGRESS NOTES
[place] [facility]    Chief Complaint   Patient presents with     assisted Regulatory     HPI:    Elias Mckee is a 86 year old  (8/10/1931), who is being seen today for a federally mandated E/M visit at [facility].  HPI information obtained from: facility chart records, facility staff, patient report and [facility] chart review. Today's concerns are:  Type 2 diabetes mellitus with diabetic nephropathy, with long-term current use of insulin (H)  Switched from lantus insulin to basiglar due to [employer] no longer covering Lantus insulin.  Accuchecks in past week: 0730:  , 11am: 137-208, 5pm: .  No recent insulin dose changes. HgbA1c on 12/14 was 7.2%.    CKD (chronic kidney disease) stage 3, GFR 30-59 ml/min  Labs in December: Creatinine was 1.13 with an est GFR of 59.      Hyperlipidemia LDL goal <100  Remains on atorvastatin daily.  Lipids checked one year ago.    Hemiparesis affecting left side as late effect of cerebrovascular accident (H)  Left arm paralysis and has arm and hand support to reduce contractures.  Can ambulate with walker. No recent changes in neurological changes.    Chronic atrial fibrillation (H)  Remains on metoprolol for rate control and xarelto for anticoagulation.    Benign hypertension with CKD (chronic kidney disease) stage III  BP ranges in past week:  116-152/60-85.  No reports of chest pain.    Hypothyroidism due to acquired atrophy of thyroid  NO recent changes in levothyroxine dose.  Last TSH last June.    Cognitive deficits as late effect of cerebrovascular disease  Impulsivity continues due to brain injury post stroke, but BIMS score is 14.  Short term memory forgetfulness. Last fall on 3/23 resulted from efforts to self transfer and no obvious injury.    Insomnia, unspecified type  Trazodone changed to prn two months ago.  No use of prn trazodone this month.    ALLERGIES: No known allergies  PAST MEDICAL HISTORY:  has a past  medical history of Acute, but ill-defined, cerebrovascular disease (1-2008); Atrial fibrillation (H); Benign hypertension with CKD (chronic kidney disease) stage III (6/5/2017); Central retinal vein occlusion, right eye (11/15/2017); Chronic atrial fibrillation (H) (6/5/2017); Chronic ischemic heart disease, unspecified; Cognitive deficits as late effect of cerebrovascular disease (6/5/2017); Coronary artery disease; CVA (cerebral infarction); Dysphagia due to recent cerebrovascular accident (CVA) (6/5/2017); Elevated cholesterol; Essential hypertension, benign; H/O prostate cancer (7/6/2017); H/O: CVA (cerebrovascular accident) (6/5/2017); Hemiparesis affecting left side as late effect of cerebrovascular accident (H) (6/5/2017); Hyperlipidaemia; MEDICAL HISTORY OF - (1- 2008); Myocardial infarction; Onychomycosis (6/5/2017); Other and unspecified hyperlipidemia; PVD (peripheral vascular disease) (H) (12/12/2017); Type 2 diabetes mellitus with diabetic peripheral angiopathy with gangrene (H) (12/12/2017); Type 2 diabetes mellitus with stage 3 chronic kidney disease (H) (6/5/2017); Type II diabetes mellitus with peripheral circulatory disorder (H) (12/12/2017); and Type II or unspecified type diabetes mellitus without mention of complication, not stated as uncontrolled (1-2008).  PAST SURGICAL HISTORY:  has a past surgical history that includes seed implantation (2002); Phacoemulsification clear cornea with standard intraocular lens implant (Right, 10/7/2014); and Vitrectomy anterior (Right, 10/7/2014).  FAMILY HISTORY: family history is not on file.  SOCIAL HISTORY:  reports that he quit smoking about 44 years ago. His smoking use included Cigarettes. He started smoking about 64 years ago. He has a 20.00 pack-year smoking history. He has never used smokeless tobacco. He reports that he drinks about 0.6 oz of alcohol per week  He reports that he does not use illicit drugs.    MEDICATIONS:  Current Outpatient  Prescriptions   Medication Sig Dispense Refill     cholecalciferol (VITAMIN D) 1000 UNIT tablet Take 2,000 Units by mouth daily       Fluticasone Propionate (FLONASE NA) Spray 2 sprays into both nostrils daily       Atorvastatin Calcium (LIPITOR PO) Take 40 mg by mouth At Bedtime       tamsulosin (FLOMAX) 0.4 MG capsule Take 1 capsule (0.4 mg) by mouth daily 60 capsule 0     polyethylene glycol (MIRALAX/GLYCOLAX) powder Take 17 g by mouth daily        timolol, PF, (TIMOPTIC OCUDOSE) 0.5 % ophthalmic solution Place 1 drop into the right eye 2 times daily       insulin aspart (NOVOLOG VIAL) 100 UNITS/ML injection 4U SQ with breakfast and lunch.       TRAMADOL HCL PO Take 50 mg by mouth every 6 hours as needed for moderate to severe pain       TRAZODONE HCL PO Take 25 mg by mouth At Bedtime       ACETAMINOPHEN PO Take 1,000 mg by mouth 3 times daily       LEVOTHYROXINE SODIUM PO Take 25 mcg by mouth daily       METOPROLOL TARTRATE PO Take 50 mg by mouth 2 times daily        lisinopril (PRINIVIL/ZESTRIL) 20 MG tablet Take 1 tablet (20 mg) by mouth 2 times daily Hold for SBP < 100 30 tablet 0     rivaroxaban ANTICOAGULANT (XARELTO) 20 MG TABS tablet Take 1 tablet (20 mg) by mouth daily (with dinner) 30 tablet 1     acetaminophen (TYLENOL) 325 MG tablet Take 650 mg by mouth every 4 hours as needed for mild pain  100 tablet      Medications reviewed:  Medications reconciled to facility chart and changes were made to reflect current medications as identified as above med list. Below are the changes that were made:   Medications stopped since last EPIC medication reconciliation:   There are no discontinued medications.    Medications started since last [facility] medication reconciliation:  No orders of the defined types were placed in this encounter.    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      Central retinal vein occlusion, right eye     PVD (peripheral vascular disease) (H)     Type II diabetes mellitus with peripheral circulatory disorder (H)     Case Management:  I have reviewed the care plan and MDS and do agree with the plan. Patient's desire to return to the community is present, but is not able due to care needs .  Information reviewed:  Medications, vital signs, orders, and nursing notes.    ROS:  Negative selected, CONSTITUTIONAL:  weight loss, weight gain, poor appetite, fevers and fatigue, EYES:  Positive for right eye surgery and ongiong issues with vision, pain and redness., ENT:  Positive for hearing loss.  Had bilateral hearing aides  Has upper and lower dentures., CV:  chest pain, dyspnea on exertion and Positive for atrial fibrillation, RESPIRATORY: shortness of breath, cough, asthma and wheezing, :  dysuria and Positive for h/o prostate cancer, GI:  abdominal pain, constipation, diarrhea, nausea, vomiting and Positive for some dysphagia following CVA., NEURO:  " headaches and Positive for CVA in 2017 with left sided paralysis and some dysphagia. with mild cognitive impairent., PSYCH: anxiety and depression, MUSCULOSKELETAL: back pain, joint pain and fibromyalgia and SKIN: Positive for fungal fingernails on left hand and bilateral pedal fungal nails.    Exam:  Vitals: /71  Pulse 63  Temp 97.1  F (36.2  C)  Resp 18  Ht 5' 9\" (1.753 m)  Wt 183 lb (83 kg)  BMI 27.02 kg/m2  BMI= Body mass index is 27.02 kg/(m^2).  GENERAL APPEARANCE:  Alert and able to express self verbally, Denies any acute distress. .  Head: Normocephalic with slight left sided mouth droop.  Eye:  Holding right eye closed slightly.  Right sclera very mildly reddened with chronic issue. No discharge.  ENIT:  No rhinitis.  Hearing well. Moist oral cavity. No exudate.  CV:  Irregularly irregular rhythmn.  No murmur.  DP pulses +1 bilaterally. Trace LE edema.   Skin cool and dry No pedal wounds..  RESP:  No cough.  Quiet, effortless respirations.  Clear to auscultation bilaterally.   ABDOMEN:  Soft rounded abdomen.  Bowel sounds positive all four quadrants.  No tenderness with palpation.  M/S:   No pain with gentle ROM of extremities,Left fingers curled slightly and resting in hand splint.     NEURO:   Strong hand grasp right hand and only minimal ability to move left hand and arm.  Left sided facial droop.  Strong ability to raise right leg against resistance and moderate left leg strength.    SKIN  Fungal nails on left hand and and both feet. No pedal wounds.   PSYCH:  Bright and alert.  Many positive comments. Smiling often.         Lab/Diagnostic data:   CBC RESULTS:   Recent Labs   Lab Test 12/21/17 12/14/17 10/30/17  06/08/17  03/20/17   1225  02/27/17   0746   WBC   --    --   7.5   --   5.0  5.8  8.4   RBC   --    --   4.19*   --   3.79*  3.70*  3.11*   HGB  11.9*  12.7*  12.9*   < >  11.8*  11.3*  9.6*   HCT   --    --   38.6*   --   35.1*  33.7*  28.1*   MCV   --    --   92.1   --   92.6  91 "  90   MCH   --    --    --    --    --   30.5  30.9   MCHC   --    --    --    --    --   33.5  34.2   RDW   --    --   15.4*   --   15.6*  14.2  14.9   PLT   --    --   173   --   163  139*  148*    < > = values in this interval not displayed.       Last Basic Metabolic Panel:  Recent Labs   Lab Test 12/14/17 10/30/17   NA  134*  135*   POTASSIUM  4.5  4.8   CHLORIDE  102  99   TRENT  9.4  9.3   CO2  28  27   BUN  18  24   CR  1.13  1.39*   GLC  127*  194*       Liver Function Studies -   Recent Labs   Lab Test  02/17/17   0940  08/11/16   0930  01/12/16   1022   PROTTOTAL   --   7.1  7.2   ALBUMIN   --   3.7  3.7   BILITOTAL   --   0.3  0.3   ALKPHOS   --   80  87   AST   --   13  13   ALT  29  24  25     Lab Results   Component Value Date    CHOL 105 02/21/2017     Lab Results   Component Value Date    HDL 28 02/21/2017     Lab Results   Component Value Date    LDL 49 02/21/2017     Lab Results   Component Value Date    TRIG 140 02/21/2017     Lab Results   Component Value Date    CHOLHDLRATIO 5.2 05/29/2014       TSH   Date Value Ref Range Status   06/08/2017 3.66 0.30 - 4.50 uIU/mL Final   02/21/2017 5.96 (H) 0.40 - 4.00 mU/L Final   ]    Lab Results   Component Value Date    A1C 7.2 12/14/2017    A1C 7.8 10/09/2017     ASSESSMENT/PLAN  (E11.21,  Z79.4) Type 2 diabetes mellitus with diabetic nephropathy, with long-term current use of insulin (H)  (primary encounter diagnosis)  Comment: Chronic, Hgb A1C goal <8%, at goal  Plan: Continue current dose of insulin and current schedule of monitoring.  Check HgbA1C next lab day.    (N18.3) CKD (chronic kidney disease) stage 3, GFR 30-59 ml/min  Comment: Chronic, but stable  Plan: Renally adjust dose of medications.  BMP next lab day and then q4m.    (E78.5) Hyperlipidemia LDL goal <100  Comment: Chronic, with h/o CVA  Plan: Continue atorvastatin and check fasting lipids and LFT's next lab day.    (I69.354) Hemiparesis affecting left side as late effect of  cerebrovascular accident (H)  Comment: CHronic, but stable  Plan: Continue current POC.  Nsg manager will work on walking program for patient.    (I48.2) Chronic atrial fibrillation (H)  Comment: Chronic  Plan: Continue metoprolol and xarelto.  MOnitor.    (I12.9,  N18.3) Benign hypertension with CKD (chronic kidney disease) stage III  Comment: BP goal <150/90, at goal  Plan: Continue current POC.    (E03.4) Hypothyroidism due to acquired atrophy of thyroid  Comment: Chronic, controlled with levothyroxine  Plan: Continue current levothyroxine dose and check TSH next lab day.  Adjust dose as indicated by lab value.    (I69.919) Cognitive deficits as late effect of cerebrovascular disease  Comment: Chronic, but stable  Plan: Continue assistance on skilled nsg unit.  Impulsivity continues and is an ongoing fall risk.    (G47.00) Insomnia, unspecified type  Comment: Asymptomatic  Plan: DC prn trazodone.    Electronically signed by:  TOBY Momin CNP

## 2018-04-17 ENCOUNTER — NURSING HOME VISIT (OUTPATIENT)
Dept: GERIATRICS | Facility: CLINIC | Age: 83
End: 2018-04-17
Payer: COMMERCIAL

## 2018-04-17 ENCOUNTER — TELEPHONE (OUTPATIENT)
Dept: GERIATRICS | Facility: CLINIC | Age: 83
End: 2018-04-17

## 2018-04-17 VITALS
BODY MASS INDEX: 27.11 KG/M2 | RESPIRATION RATE: 18 BRPM | WEIGHT: 183 LBS | SYSTOLIC BLOOD PRESSURE: 140 MMHG | HEART RATE: 63 BPM | DIASTOLIC BLOOD PRESSURE: 71 MMHG | TEMPERATURE: 97.1 F | HEIGHT: 69 IN

## 2018-04-17 DIAGNOSIS — E03.4 HYPOTHYROIDISM DUE TO ACQUIRED ATROPHY OF THYROID: ICD-10-CM

## 2018-04-17 DIAGNOSIS — I69.354 HEMIPARESIS AFFECTING LEFT SIDE AS LATE EFFECT OF CEREBROVASCULAR ACCIDENT (H): ICD-10-CM

## 2018-04-17 DIAGNOSIS — N18.30 CKD (CHRONIC KIDNEY DISEASE) STAGE 3, GFR 30-59 ML/MIN (H): ICD-10-CM

## 2018-04-17 DIAGNOSIS — G47.00 INSOMNIA, UNSPECIFIED TYPE: ICD-10-CM

## 2018-04-17 DIAGNOSIS — Z79.4 TYPE 2 DIABETES MELLITUS WITH DIABETIC NEPHROPATHY, WITH LONG-TERM CURRENT USE OF INSULIN (H): Primary | ICD-10-CM

## 2018-04-17 DIAGNOSIS — I48.20 CHRONIC ATRIAL FIBRILLATION (H): ICD-10-CM

## 2018-04-17 DIAGNOSIS — I12.9 BENIGN HYPERTENSION WITH CKD (CHRONIC KIDNEY DISEASE) STAGE III (H): ICD-10-CM

## 2018-04-17 DIAGNOSIS — I69.919 COGNITIVE DEFICITS AS LATE EFFECT OF CEREBROVASCULAR DISEASE: ICD-10-CM

## 2018-04-17 DIAGNOSIS — E11.21 TYPE 2 DIABETES MELLITUS WITH DIABETIC NEPHROPATHY, WITH LONG-TERM CURRENT USE OF INSULIN (H): Primary | ICD-10-CM

## 2018-04-17 DIAGNOSIS — E78.5 HYPERLIPIDEMIA LDL GOAL <100: ICD-10-CM

## 2018-04-17 DIAGNOSIS — N18.30 BENIGN HYPERTENSION WITH CKD (CHRONIC KIDNEY DISEASE) STAGE III (H): ICD-10-CM

## 2018-04-17 PROCEDURE — 99309 SBSQ NF CARE MODERATE MDM 30: CPT | Mod: GW | Performed by: NURSE PRACTITIONER

## 2018-04-17 RX ORDER — INSULIN GLARGINE 100 [IU]/ML
INJECTION, SOLUTION SUBCUTANEOUS
Start: 2018-04-17 | End: 2018-07-01

## 2018-04-17 RX ORDER — TRAZODONE HYDROCHLORIDE 50 MG/1
25 TABLET, FILM COATED ORAL
Qty: 60 TABLET
Start: 2018-04-17 | End: 2018-04-17

## 2018-04-19 ENCOUNTER — TRANSFERRED RECORDS (OUTPATIENT)
Dept: HEALTH INFORMATION MANAGEMENT | Facility: CLINIC | Age: 83
End: 2018-04-19

## 2018-04-19 LAB
ALBUMIN SERPL-MCNC: 3.6 G/DL (ref 3.4–5)
ALP SERPL-CCNC: 67 U/L (ref 40–150)
ALT SERPL-CCNC: 10 U/L (ref 9–55)
AST SERPL-CCNC: 14 U/L (ref 10–40)
BILIRUB SERPL-MCNC: 0.5 MG/DL (ref 0.2–1.2)
BUN SERPL-MCNC: 19 MG/DL (ref 9–26)
CALCIUM SERPL-MCNC: 9.4 MG/DL (ref 8.4–10.4)
CHLORIDE SERPLBLD-SCNC: 104 MMOL/L (ref 98–109)
CHOLEST SERPL-MCNC: 116 MG/DL (ref 0–199)
CO2 SERPL-SCNC: 26 MMOL/L (ref 22–31)
CREAT SERPL-MCNC: 1.19 MG/DL (ref 0.73–1.18)
GFR SERPL CREATININE-BSD FRML MDRD: 55 ML/MIN/1.73M2
GLUCOSE SERPL-MCNC: 91 MG/DL (ref 70–100)
HBA1C MFR BLD: 6.5 % (ref 4–5.6)
HDLC SERPL-MCNC: 28 MG/DL
HEMOGLOBIN: 13.2 G/DL (ref 13.4–17.5)
LDLC SERPL CALC-MCNC: 56 MG/DL (ref 19–130)
NONHDLC SERPL-MCNC: 88 MG/DL (ref 0–159)
POTASSIUM SERPL-SCNC: 4.4 MMOL/L (ref 3.5–5.2)
PROT SERPL-MCNC: 6.9 G/DL (ref 6.4–8.3)
SODIUM SERPL-SCNC: 137 MMOL/L (ref 136–145)
TRIGL SERPL-MCNC: 162 MG/DL (ref 4–149)

## 2018-05-25 ENCOUNTER — TRANSFERRED RECORDS (OUTPATIENT)
Dept: HEALTH INFORMATION MANAGEMENT | Facility: CLINIC | Age: 83
End: 2018-05-25

## 2018-06-11 VITALS
SYSTOLIC BLOOD PRESSURE: 90 MMHG | WEIGHT: 184 LBS | BODY MASS INDEX: 27.25 KG/M2 | TEMPERATURE: 97.3 F | RESPIRATION RATE: 18 BRPM | HEART RATE: 77 BPM | DIASTOLIC BLOOD PRESSURE: 59 MMHG | HEIGHT: 69 IN

## 2018-06-11 NOTE — PROGRESS NOTES
[place] [facility]    Chief Complaint   Patient presents with     BOO     [place] Medical Record Number:  [id number]    HPI:    Elias Mckee is a 86 year old  (8/10/1931), who is being seen today for an episodic care visit at [facility].  HPI information obtained from: facility chart records, facility staff, patient report and [place] Epic chart review. Pt is unreliable historian secondary to cognitive loss.  Met with patient, family and nsg staff for care conference. Today's concern is:  Type 2 diabetes mellitus with diabetic nephropathy, with long-term current use of insulin (H)  No recent changes to insulin dose.  Accuchecks in past week: 0730: 130-197, 11am: 189-242, 5pm: 119-171. HgbA1C in December was 7.2%    CKD (chronic kidney disease) stage 3, GFR 30-59 ml/mi  Labs on 4/19 showed a creatinine of1.19 and an est GFr of 55.    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 Lacks insight into these losses and will fall occasionally during attempts to self transfer. Remains on Xarelto.    Cognitive deficits as late effect of cerebrovascular disease  Ongoing loss of insight into judgement.  Relies on staff for assistance with cares.  Seems very happy at [place].  Family very supportive.    Hemiparesis affecting left side as late effect of cerebrovascular accident (H)  Uses left sided hand and arm splint.  On ambulation program with 4 pronged cane.    Benign hypertension with CKD (chronic kidney disease) stage III  BP ranges in the morning this month:: 90/59 - 138/78 and pulse of 66-82 and his metoprolol has been held 5/11 times.  Evening BP's 107-/82, Pulse ranges 65-75. Nsg and family notice increased fatigue in the morning.    Hypothyroidism due to acquired atrophy of thyroid  Remains on levothyroxine. TSH 3.66 one year ago      ALLERGIES: No known allergies  Past Medical, Surgical, Family and Social  History reviewed and updated in [facility].    Current Outpatient Prescriptions   Medication Sig Dispense Refill     acetaminophen (TYLENOL) 325 MG tablet Take 650 mg by mouth every 4 hours as needed for mild pain  100 tablet      ACETAMINOPHEN PO Take 1,000 mg by mouth 3 times daily       Atorvastatin Calcium (LIPITOR PO) Take 40 mg by mouth At Bedtime       BASAGLAR 100 UNIT/ML injection 30 U SQ at HS       cholecalciferol (VITAMIN D) 1000 UNIT tablet Take 2,000 Units by mouth daily       Fluticasone Propionate (FLONASE NA) Spray 2 sprays into both nostrils daily       insulin aspart (NOVOLOG VIAL) 100 UNITS/ML injection 4U SQ with breakfast and lunch.       LEVOTHYROXINE SODIUM PO Take 25 mcg by mouth daily       lisinopril (PRINIVIL/ZESTRIL) 20 MG tablet Take 1 tablet (20 mg) by mouth 2 times daily Hold for SBP < 100 30 tablet 0     METOPROLOL TARTRATE PO Take 50 mg by mouth 2 times daily        polyethylene glycol (MIRALAX/GLYCOLAX) powder Take 17 g by mouth daily        rivaroxaban ANTICOAGULANT (XARELTO) 20 MG TABS tablet Take 1 tablet (20 mg) by mouth daily (with dinner) 30 tablet 1     tamsulosin (FLOMAX) 0.4 MG capsule Take 1 capsule (0.4 mg) by mouth daily 60 capsule 0     timolol, PF, (TIMOPTIC OCUDOSE) 0.5 % ophthalmic solution Place 1 drop into the right eye 2 times daily       TRAMADOL HCL PO Take 50 mg by mouth every 6 hours as needed for moderate to severe pain       Medications reviewed:  Medications reconciled to facility chart and changes were made to reflect current medications as identified as above med list. Below are the changes that were made:   Medications stopped since last EPIC medication reconciliation:   There are no discontinued medications.    Medications started since last [facility] medication reconciliation:  No orders of the defined types were placed in this encounter.  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      Central retinal vein occlusion, right eye     PVD (peripheral vascular disease) (H)     Type II diabetes mellitus with peripheral circulatory disorder (H)       REVIEW OF SYSTEMS:  Negative selected, CONSTITUTIONAL:  weight loss, weight gain, poor appetite, fevers and fatigue, EYES:  Positive for right eye surgery and ongiong issues with vision, pain and redness., ENT:  Positive for hearing loss.  Had bilateral hearing aides  Has upper and lower dentures., CV:  chest pain, dyspnea on exertion and Positive for atrial fibrillation, RESPIRATORY: shortness of breath, cough, asthma and wheezing, :  dysuria and Positive for h/o prostate cancer, GI:  abdominal pain, constipation, diarrhea, nausea, vomiting and Positive for some dysphagia following CVA., NEURO:  headaches and Positive for CVA in 2017 with left sided paralysis and some dysphagia. with mild cognitive impairent., PSYCH: anxiety and depression, MUSCULOSKELETAL: back pain, joint pain  "and fibromyalgia and SKIN: Positive for fungal fingernails on left hand and bilateral pedal fungal nails.    Physical Exam:  BP 90/59  Pulse 77  Temp 97.3  F (36.3  C)  Resp 18  Ht 5' 9\" (1.753 m)  Wt 184 lb (83.5 kg)  BMI 27.17 kg/m2  GENERAL APPEARANCE:  Alert and able to express self verbally, Denies any acute distress. Does appear sleepy.  Head: Normocephalic with slight left sided mouth droop.  Eye: Right eye more open than usual.  No redness noted in sclera  No discharge.  ENIT:  No rhinitis.  Hearing well. Moist oral cavity. No exudate.  CV:  Irregularly irregular rhythmn.  No murmur.  DP pulses +1 bilaterally. Trace LE edema.   Skin cool and dry No pedal wounds..  RESP:  No cough.  Quiet, effortless respirations.  Clear to auscultation bilaterally.   ABDOMEN:  Soft rounded abdomen.  Bowel sounds positive all four quadrants.  No tenderness with palpation.  M/S:   No pain with gentle ROM of extremities,Left fingers curled slightly and resting in hand splint.     NEURO:   Strong hand grasp right hand and only minimal ability to move left hand and arm.  Left sided facial droop.  Strong ability to raise right leg against resistance and moderate left leg strength.     PSYCH:  Bright and alert after care conference.  Many positive comments. Smiling often.      Recent Labs:     CBC RESULTS:   Recent Labs   Lab Test 04/19/18 12/21/17  10/30/17  06/08/17  03/20/17   1225  02/27/17   0746   WBC   --    --    --   7.5   --   5.0  5.8  8.4   RBC   --    --    --   4.19*   --   3.79*  3.70*  3.11*   HGB  13.2*  11.9*   < >  12.9*   < >  11.8*  11.3*  9.6*   HCT   --    --    --   38.6*   --   35.1*  33.7*  28.1*   MCV   --    --    --   92.1   --   92.6  91  90   MCH   --    --    --    --    --    --   30.5  30.9   MCHC   --    --    --    --    --    --   33.5  34.2   RDW   --    --    --   15.4*   --   15.6*  14.2  14.9   PLT   --    --    --   173   --   163  139*  148*    < > = values in this interval not " displayed.     Last Basic Metabolic Panel:  18: BUN: 19, creatinine: 1.19, est GFR: 55, Na: 137, K; 4.4, Chl: 104, CO2: 26  Recent Labs   Lab Test 12/14/17 10/30/17   NA  134*  135*   POTASSIUM  4.5  4.8   CHLORIDE  102  99   TRENT  9.4  9.3   CO2  28  27   BUN  18  24   CR  1.13  1.39*   GLC  127*  194*     GFR Estimate   Date Value Ref Range Status   2017 59 (L) >60 ml/min/1.73m2 Final   10/30/2017 46 (L) >60 ml/min/1.73m2 Final   10/09/2017 57 (L) >60 mL/min/1.73m2 Final     Liver Function Studies -   18:  Alk Phos: 67, ALT: 10, AST: 14, Tot Bili: 0.5, Dir Bili: 0.2, albumin: 3.6, Total Protein: 6.9  Recent Labs   Lab Test  17   0940  16   0930  16   1022   PROTTOTAL   --   7.1  7.2   ALBUMIN   --   3.7  3.7   BILITOTAL   --   0.3  0.3   ALKPHOS   --   80  87   AST   --   13  13   ALT  29  24  25     Lab Results   Component Value Date    CHOL 105 2017     Lab Results   Component Value Date    HDL 28 2017     Lab Results   Component Value Date    LDL 49 2017     Lab Results   Component Value Date    TRIG 140 2017     Lab Results   Component Value Date    CHOLHDLRATIO 5.2 2014:  Cho: 116, Tri, HDL: 28, LDL: 56.    TSH   Date Value Ref Range Status   2017 3.66 0.30 - 4.50 uIU/mL Final   2017 5.96 (H) 0.40 - 4.00 mU/L Final   ]    Lab Results   Component Value Date    A1C 7.2 2017    A1C 7.8 10/09/2017   4/19/18: 6.5%    Family conference:  Met with family, pt and staff to discuss status in addition to private exam.      Assessment/Plan:  (E11.21,  Z79.4) Type 2 diabetes mellitus with diabetic nephropathy, with long-term current use of insulin (H)  (primary encounter diagnosis)  Comment: Chronic, controlled with current insulin dose.  HgbA1C goal <8%, at goal  Plan: Continue current POC.    (N18.3) CKD (chronic kidney disease) stage 3, GFR 30-59 ml/min  Comment: Chronic, but stable  Plan: Renally adjust dose of  medications.  BMP on Thursday.    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  Continue BP control and anticoagulation,    (I69.919) Cognitive deficits as late effect of cerebrovascular disease  Comment: Chronic  Plan: Continue with care in skilled ns facility.    (I69.354) Hemiparesis affecting left side as late effect of cerebrovascular accident (H)  Comment: Chronic  Plan: Continue hand splint and walking program.  Monitor for progressive neuro changes.    (I12.9,  N18.3) Benign hypertension with CKD (chronic kidney disease) stage III  Comment: BP goal; <150/90, now with episodes of hypotension and sleepiness  Plan: Decrease metoprolol to 25 mg bid and hold for systolic BP <100 or pulse <58.  Nsg to update NP in one week with BP's    (E03.4) Hypothyroidism due to acquired atrophy of thyroid  Comment: Chronic  Plan: TSH next lab day with HGb.  Adjust levothyroxine dose as indicated.    Total time spent with patient visit at the [facility] nursing [facility] was 38 min including patient visit, review of past records and care conference with facility staff, patient and family.. Greater than 50% of total time spent with counseling and coordinating care due to sleepiness and hypotension    Electronically signed by  TOBY Momin CNP

## 2018-06-12 ENCOUNTER — NURSING HOME VISIT (OUTPATIENT)
Dept: GERIATRICS | Facility: CLINIC | Age: 83
End: 2018-06-12
Payer: COMMERCIAL

## 2018-06-12 DIAGNOSIS — N18.30 CKD (CHRONIC KIDNEY DISEASE) STAGE 3, GFR 30-59 ML/MIN (H): ICD-10-CM

## 2018-06-12 DIAGNOSIS — Z86.73 H/O: CVA (CEREBROVASCULAR ACCIDENT): ICD-10-CM

## 2018-06-12 DIAGNOSIS — E03.4 HYPOTHYROIDISM DUE TO ACQUIRED ATROPHY OF THYROID: ICD-10-CM

## 2018-06-12 DIAGNOSIS — I69.919 COGNITIVE DEFICITS AS LATE EFFECT OF CEREBROVASCULAR DISEASE: ICD-10-CM

## 2018-06-12 DIAGNOSIS — N18.30 BENIGN HYPERTENSION WITH CKD (CHRONIC KIDNEY DISEASE) STAGE III (H): ICD-10-CM

## 2018-06-12 DIAGNOSIS — Z79.4 TYPE 2 DIABETES MELLITUS WITH DIABETIC NEPHROPATHY, WITH LONG-TERM CURRENT USE OF INSULIN (H): Primary | ICD-10-CM

## 2018-06-12 DIAGNOSIS — E11.21 TYPE 2 DIABETES MELLITUS WITH DIABETIC NEPHROPATHY, WITH LONG-TERM CURRENT USE OF INSULIN (H): Primary | ICD-10-CM

## 2018-06-12 DIAGNOSIS — I12.9 BENIGN HYPERTENSION WITH CKD (CHRONIC KIDNEY DISEASE) STAGE III (H): ICD-10-CM

## 2018-06-12 DIAGNOSIS — I69.354 HEMIPARESIS AFFECTING LEFT SIDE AS LATE EFFECT OF CEREBROVASCULAR ACCIDENT (H): ICD-10-CM

## 2018-06-12 PROCEDURE — 99310 SBSQ NF CARE HIGH MDM 45: CPT | Mod: GW | Performed by: NURSE PRACTITIONER

## 2018-06-12 RX ORDER — METOPROLOL TARTRATE 25 MG/1
25 TABLET, FILM COATED ORAL 2 TIMES DAILY
Qty: 60 TABLET
Start: 2018-06-12 | End: 2018-07-01

## 2018-06-14 ENCOUNTER — TRANSFERRED RECORDS (OUTPATIENT)
Dept: HEALTH INFORMATION MANAGEMENT | Facility: CLINIC | Age: 83
End: 2018-06-14

## 2018-06-14 LAB
BUN SERPL-MCNC: 18 MG/DL (ref 9–26)
CALCIUM SERPL-MCNC: 9.3 MG/DL (ref 8.4–10.4)
CHLORIDE SERPLBLD-SCNC: 105 MMOL/L (ref 98–109)
CO2 SERPL-SCNC: 25 MMOL/L (ref 22–31)
CREAT SERPL-MCNC: 1.12 MG/DL (ref 0.73–1.18)
GFR SERPL CREATININE-BSD FRML MDRD: 59 ML/MIN/1.73M2
GLUCOSE SERPL-MCNC: 170 MG/DL (ref 70–100)
HEMOGLOBIN: 11.1 G/DL (ref 13.4–17.5)
POTASSIUM SERPL-SCNC: 4.5 MMOL/L (ref 3.5–5.2)
SODIUM SERPL-SCNC: 139 MMOL/L (ref 136–145)
TSH SERPL-ACNC: 3.62 UIU/ML (ref 0.3–4.5)

## 2018-06-25 ENCOUNTER — NURSING HOME VISIT (OUTPATIENT)
Dept: GERIATRICS | Facility: CLINIC | Age: 83
End: 2018-06-25
Payer: COMMERCIAL

## 2018-06-25 DIAGNOSIS — E11.21 TYPE 2 DIABETES MELLITUS WITH DIABETIC NEPHROPATHY, WITH LONG-TERM CURRENT USE OF INSULIN (H): ICD-10-CM

## 2018-06-25 DIAGNOSIS — Z79.4 TYPE 2 DIABETES MELLITUS WITH DIABETIC NEPHROPATHY, WITH LONG-TERM CURRENT USE OF INSULIN (H): ICD-10-CM

## 2018-06-25 DIAGNOSIS — I12.9 BENIGN HYPERTENSION WITH CKD (CHRONIC KIDNEY DISEASE) STAGE III (H): ICD-10-CM

## 2018-06-25 DIAGNOSIS — I69.354 HEMIPARESIS AFFECTING LEFT SIDE AS LATE EFFECT OF CEREBROVASCULAR ACCIDENT (H): Primary | ICD-10-CM

## 2018-06-25 DIAGNOSIS — N18.30 BENIGN HYPERTENSION WITH CKD (CHRONIC KIDNEY DISEASE) STAGE III (H): ICD-10-CM

## 2018-06-25 DIAGNOSIS — I48.20 CHRONIC ATRIAL FIBRILLATION (H): ICD-10-CM

## 2018-06-29 NOTE — PROGRESS NOTES
Pt was seen for a regulatory LTC visit  Case reviewed with NP    Metoprolol dose was recently reduced secondary to episodic low BP  BP has been stable most recently    Pt is sleepy this am, denies any specific physical concerns    BG generally 100s  Lungs clear  CV irreg, HR 70s  Abd soft  L sided weakness, as before  No LE edema B    BMP wnl  TSH nl      Assessment    S/p CVA with L sided weakness, stable, on Xarelto    Chronic afib, HR controlled, on metoprolol, Xarelto    HTN, stable on reduced dose of Metoprolol     Hypothyroidism, on replacement    DM type 2, on Basaglar     Plan  Continue current tx  Monitor BP and HR  Routine BG and lab monitoring

## 2018-07-01 ENCOUNTER — APPOINTMENT (OUTPATIENT)
Dept: CT IMAGING | Facility: CLINIC | Age: 83
DRG: 309 | End: 2018-07-01
Attending: EMERGENCY MEDICINE
Payer: COMMERCIAL

## 2018-07-01 ENCOUNTER — HOSPITAL ENCOUNTER (INPATIENT)
Facility: CLINIC | Age: 83
LOS: 2 days | Discharge: NURSING, REHAB OR LONG-TERM CARE | DRG: 309 | End: 2018-07-03
Attending: EMERGENCY MEDICINE | Admitting: INTERNAL MEDICINE
Payer: COMMERCIAL

## 2018-07-01 DIAGNOSIS — R55 SYNCOPE AND COLLAPSE: Primary | ICD-10-CM

## 2018-07-01 DIAGNOSIS — N19 RENAL FAILURE, UNSPECIFIED CHRONICITY: ICD-10-CM

## 2018-07-01 DIAGNOSIS — I10 BENIGN ESSENTIAL HYPERTENSION: ICD-10-CM

## 2018-07-01 DIAGNOSIS — R00.1 BRADYCARDIA: ICD-10-CM

## 2018-07-01 DIAGNOSIS — E11.51 TYPE II DIABETES MELLITUS WITH PERIPHERAL CIRCULATORY DISORDER (H): ICD-10-CM

## 2018-07-01 LAB
ALBUMIN SERPL-MCNC: 3.1 G/DL (ref 3.4–5)
ALBUMIN UR-MCNC: NEGATIVE MG/DL
ALP SERPL-CCNC: 68 U/L (ref 40–150)
ALT SERPL W P-5'-P-CCNC: 14 U/L (ref 0–70)
ANION GAP SERPL CALCULATED.3IONS-SCNC: 8 MMOL/L (ref 3–14)
APPEARANCE UR: CLEAR
APTT PPP: 37 SEC (ref 22–37)
AST SERPL W P-5'-P-CCNC: 10 U/L (ref 0–45)
BASOPHILS # BLD AUTO: 0 10E9/L (ref 0–0.2)
BASOPHILS NFR BLD AUTO: 0.3 %
BILIRUB SERPL-MCNC: 0.4 MG/DL (ref 0.2–1.3)
BILIRUB UR QL STRIP: NEGATIVE
BUN SERPL-MCNC: 29 MG/DL (ref 7–30)
CALCIUM SERPL-MCNC: 8.5 MG/DL (ref 8.5–10.1)
CHLORIDE SERPL-SCNC: 104 MMOL/L (ref 94–109)
CO2 SERPL-SCNC: 26 MMOL/L (ref 20–32)
COLOR UR AUTO: ABNORMAL
CREAT BLD-MCNC: 1.8 MG/DL (ref 0.66–1.25)
CREAT SERPL-MCNC: 1.75 MG/DL (ref 0.66–1.25)
DIFFERENTIAL METHOD BLD: ABNORMAL
EOSINOPHIL # BLD AUTO: 0.1 10E9/L (ref 0–0.7)
EOSINOPHIL NFR BLD AUTO: 0.8 %
ERYTHROCYTE [DISTWIDTH] IN BLOOD BY AUTOMATED COUNT: 16.3 % (ref 10–15)
GFR SERPL CREATININE-BSD FRML MDRD: 36 ML/MIN/1.7M2
GFR SERPL CREATININE-BSD FRML MDRD: 37 ML/MIN/1.7M2
GLUCOSE BLDC GLUCOMTR-MCNC: 134 MG/DL (ref 70–99)
GLUCOSE BLDC GLUCOMTR-MCNC: 98 MG/DL (ref 70–99)
GLUCOSE SERPL-MCNC: 160 MG/DL (ref 70–99)
GLUCOSE UR STRIP-MCNC: NEGATIVE MG/DL
HBA1C MFR BLD: 7.4 % (ref 0–5.6)
HCT VFR BLD AUTO: 33.7 % (ref 40–53)
HGB BLD-MCNC: 11.4 G/DL (ref 13.3–17.7)
HGB UR QL STRIP: NEGATIVE
IMM GRANULOCYTES # BLD: 0 10E9/L (ref 0–0.4)
IMM GRANULOCYTES NFR BLD: 0.3 %
INR PPP: 1.3 (ref 0.86–1.14)
INTERPRETATION ECG - MUSE: NORMAL
KETONES UR STRIP-MCNC: NEGATIVE MG/DL
LACTATE BLD-SCNC: 1.7 MMOL/L (ref 0.7–2)
LEUKOCYTE ESTERASE UR QL STRIP: ABNORMAL
LYMPHOCYTES # BLD AUTO: 0.9 10E9/L (ref 0.8–5.3)
LYMPHOCYTES NFR BLD AUTO: 11.9 %
MCH RBC QN AUTO: 30.8 PG (ref 26.5–33)
MCHC RBC AUTO-ENTMCNC: 33.8 G/DL (ref 31.5–36.5)
MCV RBC AUTO: 91 FL (ref 78–100)
MONOCYTES # BLD AUTO: 1.3 10E9/L (ref 0–1.3)
MONOCYTES NFR BLD AUTO: 17.7 %
NEUTROPHILS # BLD AUTO: 5.2 10E9/L (ref 1.6–8.3)
NEUTROPHILS NFR BLD AUTO: 69 %
NITRATE UR QL: NEGATIVE
NRBC # BLD AUTO: 0 10*3/UL
NRBC BLD AUTO-RTO: 0 /100
PH UR STRIP: 5.5 PH (ref 5–7)
PLATELET # BLD AUTO: 157 10E9/L (ref 150–450)
POTASSIUM SERPL-SCNC: 4.8 MMOL/L (ref 3.4–5.3)
PROT SERPL-MCNC: 6.7 G/DL (ref 6.8–8.8)
RBC # BLD AUTO: 3.7 10E12/L (ref 4.4–5.9)
RBC #/AREA URNS AUTO: 66 /HPF (ref 0–2)
SODIUM SERPL-SCNC: 138 MMOL/L (ref 133–144)
SOURCE: ABNORMAL
SP GR UR STRIP: 1.04 (ref 1–1.03)
TROPONIN I SERPL-MCNC: <0.015 UG/L (ref 0–0.04)
UROBILINOGEN UR STRIP-MCNC: NORMAL MG/DL (ref 0–2)
WBC # BLD AUTO: 7.5 10E9/L (ref 4–11)
WBC #/AREA URNS AUTO: 41 /HPF (ref 0–5)

## 2018-07-01 PROCEDURE — 84484 ASSAY OF TROPONIN QUANT: CPT | Performed by: EMERGENCY MEDICINE

## 2018-07-01 PROCEDURE — 81001 URINALYSIS AUTO W/SCOPE: CPT | Performed by: INTERNAL MEDICINE

## 2018-07-01 PROCEDURE — 83036 HEMOGLOBIN GLYCOSYLATED A1C: CPT | Performed by: EMERGENCY MEDICINE

## 2018-07-01 PROCEDURE — 25000132 ZZH RX MED GY IP 250 OP 250 PS 637: Performed by: INTERNAL MEDICINE

## 2018-07-01 PROCEDURE — 25000128 H RX IP 250 OP 636: Performed by: INTERNAL MEDICINE

## 2018-07-01 PROCEDURE — 82565 ASSAY OF CREATININE: CPT

## 2018-07-01 PROCEDURE — 87086 URINE CULTURE/COLONY COUNT: CPT | Performed by: INTERNAL MEDICINE

## 2018-07-01 PROCEDURE — 00000146 ZZHCL STATISTIC GLUCOSE BY METER IP

## 2018-07-01 PROCEDURE — 25000131 ZZH RX MED GY IP 250 OP 636 PS 637: Performed by: INTERNAL MEDICINE

## 2018-07-01 PROCEDURE — 83605 ASSAY OF LACTIC ACID: CPT | Performed by: EMERGENCY MEDICINE

## 2018-07-01 PROCEDURE — 99223 1ST HOSP IP/OBS HIGH 75: CPT | Mod: AI | Performed by: INTERNAL MEDICINE

## 2018-07-01 PROCEDURE — 87088 URINE BACTERIA CULTURE: CPT | Performed by: INTERNAL MEDICINE

## 2018-07-01 PROCEDURE — 96360 HYDRATION IV INFUSION INIT: CPT | Mod: 59

## 2018-07-01 PROCEDURE — 71260 CT THORAX DX C+: CPT

## 2018-07-01 PROCEDURE — 25000125 ZZHC RX 250: Performed by: EMERGENCY MEDICINE

## 2018-07-01 PROCEDURE — 99221 1ST HOSP IP/OBS SF/LOW 40: CPT | Performed by: SURGERY

## 2018-07-01 PROCEDURE — 80053 COMPREHEN METABOLIC PANEL: CPT | Performed by: EMERGENCY MEDICINE

## 2018-07-01 PROCEDURE — 85025 COMPLETE CBC W/AUTO DIFF WBC: CPT | Performed by: EMERGENCY MEDICINE

## 2018-07-01 PROCEDURE — 25000128 H RX IP 250 OP 636: Performed by: EMERGENCY MEDICINE

## 2018-07-01 PROCEDURE — 93005 ELECTROCARDIOGRAM TRACING: CPT

## 2018-07-01 PROCEDURE — 99285 EMERGENCY DEPT VISIT HI MDM: CPT | Mod: 25

## 2018-07-01 PROCEDURE — 87186 SC STD MICRODIL/AGAR DIL: CPT | Performed by: INTERNAL MEDICINE

## 2018-07-01 PROCEDURE — 25000125 ZZHC RX 250: Performed by: INTERNAL MEDICINE

## 2018-07-01 PROCEDURE — 85730 THROMBOPLASTIN TIME PARTIAL: CPT | Performed by: EMERGENCY MEDICINE

## 2018-07-01 PROCEDURE — 21000001 ZZH R&B HEART CARE

## 2018-07-01 PROCEDURE — 85610 PROTHROMBIN TIME: CPT | Performed by: EMERGENCY MEDICINE

## 2018-07-01 PROCEDURE — 99222 1ST HOSP IP/OBS MODERATE 55: CPT | Mod: GW | Performed by: INTERNAL MEDICINE

## 2018-07-01 RX ORDER — TIMOLOL MALEATE 5 MG/ML
1 SOLUTION/ DROPS OPHTHALMIC 2 TIMES DAILY
Status: DISCONTINUED | OUTPATIENT
Start: 2018-07-01 | End: 2018-07-03 | Stop reason: HOSPADM

## 2018-07-01 RX ORDER — POLYETHYLENE GLYCOL 3350 17 G/17G
17 POWDER, FOR SOLUTION ORAL DAILY
Status: DISCONTINUED | OUTPATIENT
Start: 2018-07-01 | End: 2018-07-03 | Stop reason: HOSPADM

## 2018-07-01 RX ORDER — SODIUM CHLORIDE 9 MG/ML
INJECTION, SOLUTION INTRAVENOUS CONTINUOUS
Status: ACTIVE | OUTPATIENT
Start: 2018-07-01 | End: 2018-07-01

## 2018-07-01 RX ORDER — LEVOTHYROXINE SODIUM 25 UG/1
25 TABLET ORAL DAILY
Status: DISCONTINUED | OUTPATIENT
Start: 2018-07-01 | End: 2018-07-03 | Stop reason: HOSPADM

## 2018-07-01 RX ORDER — SODIUM CHLORIDE 9 MG/ML
INJECTION, SOLUTION INTRAVENOUS ONCE
Status: COMPLETED | OUTPATIENT
Start: 2018-07-01 | End: 2018-07-01

## 2018-07-01 RX ORDER — NALOXONE HYDROCHLORIDE 0.4 MG/ML
.1-.4 INJECTION, SOLUTION INTRAMUSCULAR; INTRAVENOUS; SUBCUTANEOUS
Status: DISCONTINUED | OUTPATIENT
Start: 2018-07-01 | End: 2018-07-03 | Stop reason: HOSPADM

## 2018-07-01 RX ORDER — ACETAMINOPHEN 500 MG
1000 TABLET ORAL 3 TIMES DAILY
Status: DISCONTINUED | OUTPATIENT
Start: 2018-07-01 | End: 2018-07-03 | Stop reason: HOSPADM

## 2018-07-01 RX ORDER — NITROGLYCERIN 0.4 MG/1
0.4 TABLET SUBLINGUAL EVERY 5 MIN PRN
Status: DISCONTINUED | OUTPATIENT
Start: 2018-07-01 | End: 2018-07-03 | Stop reason: HOSPADM

## 2018-07-01 RX ORDER — TRAMADOL HYDROCHLORIDE 50 MG/1
50 TABLET ORAL EVERY 6 HOURS PRN
Status: DISCONTINUED | OUTPATIENT
Start: 2018-07-01 | End: 2018-07-03 | Stop reason: HOSPADM

## 2018-07-01 RX ORDER — AMOXICILLIN 250 MG
2 CAPSULE ORAL 2 TIMES DAILY PRN
Status: DISCONTINUED | OUTPATIENT
Start: 2018-07-01 | End: 2018-07-03 | Stop reason: HOSPADM

## 2018-07-01 RX ORDER — HYDRALAZINE HYDROCHLORIDE 20 MG/ML
10 INJECTION INTRAMUSCULAR; INTRAVENOUS EVERY 4 HOURS PRN
Status: DISCONTINUED | OUTPATIENT
Start: 2018-07-01 | End: 2018-07-03 | Stop reason: HOSPADM

## 2018-07-01 RX ORDER — TAMSULOSIN HYDROCHLORIDE 0.4 MG/1
0.4 CAPSULE ORAL DAILY
Status: DISCONTINUED | OUTPATIENT
Start: 2018-07-01 | End: 2018-07-03 | Stop reason: HOSPADM

## 2018-07-01 RX ORDER — ACETAMINOPHEN 325 MG/1
650 TABLET ORAL EVERY 4 HOURS PRN
Status: DISCONTINUED | OUTPATIENT
Start: 2018-07-01 | End: 2018-07-03 | Stop reason: HOSPADM

## 2018-07-01 RX ORDER — IOPAMIDOL 755 MG/ML
80 INJECTION, SOLUTION INTRAVASCULAR ONCE
Status: COMPLETED | OUTPATIENT
Start: 2018-07-01 | End: 2018-07-01

## 2018-07-01 RX ORDER — AMOXICILLIN 250 MG
1 CAPSULE ORAL 2 TIMES DAILY PRN
Status: DISCONTINUED | OUTPATIENT
Start: 2018-07-01 | End: 2018-07-03 | Stop reason: HOSPADM

## 2018-07-01 RX ORDER — DEXTROSE MONOHYDRATE 25 G/50ML
25-50 INJECTION, SOLUTION INTRAVENOUS
Status: DISCONTINUED | OUTPATIENT
Start: 2018-07-01 | End: 2018-07-03 | Stop reason: HOSPADM

## 2018-07-01 RX ORDER — ATORVASTATIN CALCIUM 40 MG/1
40 TABLET, FILM COATED ORAL AT BEDTIME
Status: DISCONTINUED | OUTPATIENT
Start: 2018-07-01 | End: 2018-07-03 | Stop reason: HOSPADM

## 2018-07-01 RX ORDER — ONDANSETRON 4 MG/1
4 TABLET, ORALLY DISINTEGRATING ORAL EVERY 6 HOURS PRN
Status: DISCONTINUED | OUTPATIENT
Start: 2018-07-01 | End: 2018-07-03 | Stop reason: HOSPADM

## 2018-07-01 RX ORDER — NICOTINE POLACRILEX 4 MG
15-30 LOZENGE BUCCAL
Status: DISCONTINUED | OUTPATIENT
Start: 2018-07-01 | End: 2018-07-03 | Stop reason: HOSPADM

## 2018-07-01 RX ORDER — SODIUM CHLORIDE 9 MG/ML
INJECTION, SOLUTION INTRAVENOUS ONCE
Status: DISCONTINUED | OUTPATIENT
Start: 2018-07-01 | End: 2018-07-01

## 2018-07-01 RX ORDER — PROCHLORPERAZINE MALEATE 5 MG
5 TABLET ORAL EVERY 6 HOURS PRN
Status: DISCONTINUED | OUTPATIENT
Start: 2018-07-01 | End: 2018-07-03 | Stop reason: HOSPADM

## 2018-07-01 RX ORDER — ACETAMINOPHEN 650 MG/1
650 SUPPOSITORY RECTAL EVERY 4 HOURS PRN
Status: DISCONTINUED | OUTPATIENT
Start: 2018-07-01 | End: 2018-07-03 | Stop reason: HOSPADM

## 2018-07-01 RX ORDER — PROCHLORPERAZINE 25 MG
12.5 SUPPOSITORY, RECTAL RECTAL EVERY 12 HOURS PRN
Status: DISCONTINUED | OUTPATIENT
Start: 2018-07-01 | End: 2018-07-03 | Stop reason: HOSPADM

## 2018-07-01 RX ORDER — BISACODYL 10 MG
10 SUPPOSITORY, RECTAL RECTAL DAILY PRN
Status: DISCONTINUED | OUTPATIENT
Start: 2018-07-01 | End: 2018-07-03 | Stop reason: HOSPADM

## 2018-07-01 RX ORDER — ONDANSETRON 2 MG/ML
4 INJECTION INTRAMUSCULAR; INTRAVENOUS EVERY 6 HOURS PRN
Status: DISCONTINUED | OUTPATIENT
Start: 2018-07-01 | End: 2018-07-03 | Stop reason: HOSPADM

## 2018-07-01 RX ORDER — LIDOCAINE 40 MG/G
CREAM TOPICAL
Status: DISCONTINUED | OUTPATIENT
Start: 2018-07-01 | End: 2018-07-03 | Stop reason: HOSPADM

## 2018-07-01 RX ORDER — INSULIN GLARGINE 100 [IU]/ML
30 INJECTION, SOLUTION SUBCUTANEOUS AT BEDTIME
Status: ON HOLD | COMMUNITY
End: 2018-07-03

## 2018-07-01 RX ADMIN — SODIUM CHLORIDE 80 ML: 9 INJECTION, SOLUTION INTRAVENOUS at 09:58

## 2018-07-01 RX ADMIN — SODIUM CHLORIDE: 9 INJECTION, SOLUTION INTRAVENOUS at 10:07

## 2018-07-01 RX ADMIN — IOPAMIDOL 80 ML: 755 INJECTION, SOLUTION INTRAVENOUS at 09:57

## 2018-07-01 RX ADMIN — ATORVASTATIN CALCIUM 40 MG: 40 TABLET, FILM COATED ORAL at 20:57

## 2018-07-01 RX ADMIN — TIMOLOL MALEATE 1 DROP: 5 SOLUTION OPHTHALMIC at 20:56

## 2018-07-01 RX ADMIN — ACETAMINOPHEN 1000 MG: 500 TABLET, FILM COATED ORAL at 16:38

## 2018-07-01 RX ADMIN — INSULIN GLARGINE 20 UNITS: 100 INJECTION, SOLUTION SUBCUTANEOUS at 22:39

## 2018-07-01 RX ADMIN — TAMSULOSIN HYDROCHLORIDE 0.4 MG: 0.4 CAPSULE ORAL at 20:57

## 2018-07-01 RX ADMIN — RIVAROXABAN 15 MG: 15 TABLET, FILM COATED ORAL at 16:38

## 2018-07-01 RX ADMIN — INSULIN ASPART 5 UNITS: 100 INJECTION, SOLUTION INTRAVENOUS; SUBCUTANEOUS at 16:38

## 2018-07-01 RX ADMIN — ACETAMINOPHEN 1000 MG: 500 TABLET, FILM COATED ORAL at 21:00

## 2018-07-01 RX ADMIN — SODIUM CHLORIDE: 9 INJECTION, SOLUTION INTRAVENOUS at 13:58

## 2018-07-01 ASSESSMENT — ACTIVITIES OF DAILY LIVING (ADL)
AMBULATION: 4-->COMPLETELY DEPENDENT
SWALLOWING: 0-->SWALLOWS FOODS/LIQUIDS WITHOUT DIFFICULTY
RETIRED_EATING: 0-->INDEPENDENT
WHICH_OF_THE_ABOVE_FUNCTIONAL_RISKS_HAD_A_RECENT_ONSET_OR_CHANGE?: AMBULATION
FALL_HISTORY_WITHIN_LAST_SIX_MONTHS: YES
BATHING: 4-->COMPLETELY DEPENDENT
DRESS: 3-->ASSISTIVE EQUIPMENT AND PERSON
TRANSFERRING: 4-->COMPLETELY DEPENDENT
TOILETING: 4-->COMPLETELY DEPENDENT
NUMBER_OF_TIMES_PATIENT_HAS_FALLEN_WITHIN_LAST_SIX_MONTHS: 6
COGNITION: 0 - NO COGNITION ISSUES REPORTED
RETIRED_COMMUNICATION: 0-->UNDERSTANDS/COMMUNICATES WITHOUT DIFFICULTY

## 2018-07-01 NOTE — IP AVS SNAPSHOT
"` `     [place] [address] [facility]: [phone]                                              INTERAGENCY TRANSFER FORM - NURSING   2018                    Hospital Admission Date: 2018  PETER H STEUERWALD   : 8/10/1931  Sex: Male        Attending Provider: Socrates Laureano DO     Allergies:  No Known Allergies    Infection:  None   Service:  HOSPITALIST    Ht:  1.753 m (5' 9\")   Wt:  84 kg (185 lb 3 oz)   Admission Wt:  82.9 kg (182 lb 12.2 oz)    BMI:  27.35 kg/m 2   BSA:  2.02 m 2            Patient PCP Information     Provider PCP Type    TOBY Momin CNP General      Current Code Status     Date Active Code Status Order ID Comments User Context       Prior      Code Status History     Date Active Date Inactive Code Status Order ID Comments User Context    7/3/2018 11:10 AM  Full Code [id number]  Cyndi Padilla PA-C Outpatient    2018  1:15 PM 7/3/2018 11:10 AM Full Code [id number]  Juan Diego Branham DO Inpatient    2017  1:43 PM 3/21/2017  3:41 PM Full Code [id number]  Tjjace Kevin Inpatient    2017 11:11 PM 2017  1:43 PM Full Code [id number]  Juan Diego Branham DO Inpatient    2013  5:24 PM 2017 11:11 PM Full Code [id number]  Maria A Moon PA Outpatient    2013  3:50 PM 2013  5:24 PM Full Code [id number]  Shaunna Woo, CORNELIO Inpatient      Advance Directives        Scanned docmt in ACP Activity?           Yes, scanned ACP docmt        Hospital Problems as of 7/3/2018              Priority Class Noted POA    Syncope Medium  2018 Yes      Non-Hospital Problems as of 7/3/2018              Priority Class Noted    Chronic ischemic heart disease Medium  2008    Personal history of other diseases of circulatory system Medium  2008    HYPERLIPIDEMIA LDL GOAL <100 Medium  10/31/2010    Advance Care Planning Medium  2011    CKD (chronic kidney disease) stage 3, GFR 30-59 ml/min Medium  " 2/29/2012    Leg weakness Medium  1/21/2013    Ataxia Medium  4/10/2013    BPH (benign prostatic hyperplasia) Medium  5/29/2014    Type 2 diabetes mellitus with diabetic nephropathy (H) Medium  10/31/2015    History of actinic keratoses Medium  7/6/2016    History of squamous cell carcinoma Medium  7/6/2016    S/P Mohs surgery for basal cell carcinoma Medium  7/6/2016    H/O: CVA (cerebrovascular accident), Right MCA cardioembolic stroke 2/2017 Medium  6/5/2017    Cognitive deficits as late effect of cerebrovascular disease Medium  6/5/2017    Hemiparesis affecting left side as late effect of cerebrovascular accident (H) Medium  6/5/2017    Dysphagia due to recent cerebrovascular accident (CVA) Medium  6/5/2017    Type 2 diabetes mellitus with stage 3 chronic kidney disease (H) Medium  6/5/2017    Benign hypertension with CKD (chronic kidney disease) stage III Medium  6/5/2017    Hypothyroidism due to acquired atrophy of thyroid Medium  6/5/2017    Chronic atrial fibrillation (H) Medium  6/5/2017    Slow transit constipation Medium  6/5/2017    Onychomycosis Medium  6/5/2017    H/O prostate cancer, brachytherapy seeds.  Medium  7/6/2017    Central retinal vein occlusion, right eye Medium  11/15/2017    PVD (peripheral vascular disease) (H) Medium  12/12/2017    Type II diabetes mellitus with peripheral circulatory disorder (H) Medium  12/12/2017      Immunizations     Name Date      Influenza (High Dose) 3 valent vaccine 10/05/17     Influenza (IIV3) PF 01/22/13     Pneumo Conj 13-V (2010&after) 10/19/17     Pneumococcal 23 valent 09/10/08     TD (ADULT, 7+) 09/10/08          END      ASSESSMENT     Discharge Profile Flowsheet     DISCHARGE NEEDS ASSESSMENT     COMMUNICATION ASSESSMENT      Anticipated Changes Related to Illness  inability to care for self 07/01/18 1456   Patient's communication style  spoken language (English or Bilingual) 07/01/18 0950    Equipment Currently Used at Home  raised toilet;walker,  "standard (Pt reports using walker out in [facility]) 07/02/18 1233   SKIN      Transportation Available  family or friend will provide 07/02/18 1233   Inspection of bony prominences  Full 07/03/18 1205    # of Referrals Placed by CTS  Post Acute Facilities 07/02/18 1553   Inspection under devices  Full 07/02/18 1747    Equipment Used at Home  straight cane 01/22/13 1257   Skin WDL  ex 07/03/18 1205    GASTROINTESTINAL (ADULT,PEDIATRIC,OB)     Skin Integrity  bruise(s) 07/03/18 1205    GI WDL  WDL 07/03/18 1203   SAFETY      Last Bowel Movement  07/03/18 07/03/18 1203   Safety WDL  WDL 07/03/18 1205    GI Signs/Symptoms  fecal incontinence 07/02/18 1131   All Alarms  alarm(s) activated and audible 07/03/18 1205    Passing flatus  yes 07/03/18 1204   Additional Documentation  Airway Safety Measures (Row) 07/01/18 1633                 Assessment WDL (Within Defined Limits) Definitions           Safety WDL     Effective: 09/28/15    Row Information: <b>WDL Definition:</b> Bed in low position, wheels locked; call light in reach; upper side rails up x 2; ID band on<br> <font color=\"gray\"><i>Item=AS safety wdl>>List=AS safety wdl>>Version=F14</i></font>      Skin WDL     Effective: 09/28/15    Row Information: <b>WDL Definition:</b> Warm; dry; intact; elastic; without discoloration; pressure points without redness<br> <font color=\"gray\"><i>Item=AS skin wdl>>List=AS skin wdl>>Version=F14</i></font>      Vitals     Vital Signs Flowsheet     VITAL SIGNS     Response to Interventions  Absence of nonverbal indicators of pain 07/02/18 0526    Temp  99.1  F (37.3  C) 07/03/18 1535   ANALGESIA SIDE EFFECTS MONITORING      Temp src  Oral 07/03/18 1535   Side Effects Monitoring: Respiratory Quality  R 07/03/18 0040    Resp  16 07/03/18 1535   Side Effects Monitoring: Respiratory Depth  N 07/03/18 0040    Pulse  79 07/03/18 1535   Side Effects Monitoring: Sedation Level  S 07/03/18 0040    Heart Rate  53 07/02/18 1814   HEIGHT AND " WEIGHT      Pulse/Heart Rate Source  Monitor 07/03/18 1535   Weight  84 kg (185 lb 3 oz) 07/02/18 0537    BP  133/78 07/03/18 1535   Weight Method  Bed scale 07/02/18 0537    BP Location  Left arm 07/03/18 1535   Bed Scale  Standard (fitted sheet, draw sheet/ pad, cover/flat sheet, blanket, two pillows) 07/02/18 0537    LYING ORTHOSTATIC BP     POSITIONING      Lying Orthostatic BP  163/79 07/03/18 1038   Body Position  supine;supine, head elevated 07/03/18 1535    Lying Orthostatic Pulse  163 bpm 07/03/18 1038   Head of Bed (HOB)  HOB at 20-30 degrees 07/03/18 1535    SITTING ORTHOSTATIC BP     Chair  Upright in chair 07/03/18 1058    Sitting Orthostatic BP  145/76 07/03/18 1038   Positioning/Transfer Devices  pillows;in use 07/03/18 1535    Sitting Orthostatic Pulse  85 bpm 07/03/18 1038   DAILY CARE      STANDING ORTHOSTATIC BP     Activity Management  standing at bedside 07/03/18 1314    Standing Orthostatic BP  159/100 07/02/18 1442   Activity Assistance Provided  assistance, 2 people 07/03/18 1314    Standing Orthostatic Pulse  107 bpm 07/02/18 1442   Assistive Device Utilized  gait belt 07/03/18 1314    OXYGEN THERAPY     EKG MONITORING      SpO2  97 % 07/03/18 1535   Cardiac Regularity  Irregular 07/02/18 0526    O2 Device  None (Room air) 07/03/18 1535   Cardiac Rhythm  Atrial fibrillation 07/02/18 0526    PAIN/COMFORT     GRADY COMA SCALE      Patient Currently in Pain  denies 07/03/18 0040   Best Eye Response  4-->(E4) spontaneous 07/03/18 0914    Preferred Pain Scale  number (Numeric Rating Pain Scale) 07/02/18 1730   Best Motor Response  6-->(M6) obeys commands 07/03/18 0914    Patient's Stated Pain Goal  No pain 07/02/18 1730   Best Verbal Response  5-->(V5) oriented 07/03/18 0914    0-10 Pain Scale  0 07/02/18 2224   [place] Coma Scale Score  15 07/03/18 0914    Pain Location  Hand 07/02/18 1817   ECG      Pain Orientation  Left 07/02/18 1817   ECG Rhythm  Atrial fibrillation 07/02/18 1730     Pain Intervention(s)  Medication (See eMAR) (scheduled Tylenol) 07/01/18 1653                 Patient Lines/Drains/Airways Status    Active LINES/DRAINS/AIRWAYS     Name: Placement date: Placement time: Site: Days: Last dressing change:    Peripheral IV 03/18/17 Right Lower forearm 03/18/17   1230   Lower forearm   472     Pressure Injury 02/23/17 Coccyx 02/23/17       495     Wound 02/27/17 Coccyx 02/27/17   1530   Coccyx   491     Wound 02/27/17 Groin 02/27/17   1530      491     Incision/Surgical Site 02/24/17 Right Groin 02/24/17   2000    493             Patient Lines/Drains/Airways Status    Active PICC/CVC     None            Intake/Output Detail Report     Date Intake     Output Net    Shift P.O. I.V. IV Piggyback Total Urine Total       Day 07/02/18 0700 - 07/02/18 1459 250 500 -- 750 450 450 300    Jennifer 07/02/18 1500 - 07/02/18 2259 180 -- -- 180 400 400 -220    Noc 07/02/18 2300 - 07/03/18 0659 -- -- -- -- 650 650 -650    Day 07/03/18 0700 - 07/03/18 5337 634 2965 -- 1549     Jennifer 07/03/18 1500 - 07/03/18 2259 120 -- -- 120 150 150 -30      Last Void/BM       Most Recent Value    Urine Occurrence 1 at 07/03/2018 1048    Stool Occurrence 1 at 07/03/2018 1048      Case Management/Discharge Planning     Case Management/Discharge Planning Flowsheet     REFERRAL INFORMATION     EMPLOYMENT      Did the Initial Social Work Assessment result in a Social Work Case?  Yes 07/02/18 1553   Do you work full or part-time?  no 07/02/18 1553    Admission Type  inpatient 07/02/18 1553   COPING/STRESS      Arrived From  skilled nursing facility ([facility]) 07/02/18 1553   Major Change/Loss/Stressor  none 07/01/18 1509    Referral Source  physician 07/02/18 1553   DISCHARGE PLANNING      # of Referrals Placed by CTS  Post Acute Facilities 07/02/18 1553   Anticipated Changes Related to Illness  inability to care for self 07/01/18 1456    Post Acute Facilities  SNF ([facility]) 07/02/18 1553   Transportation  Available  family or friend will provide 07/02/18 1233    Reason For Consult  discharge planning 07/02/18 1553   Equipment Used at Home  straight cane 01/22/13 1257    Record Reviewed  medical record 07/02/18 1553   FINAL RESOURCES      CTS Assigned to Case  Kena Moon 07/02/18 1553   Equipment Currently Used at Home  raised toilet;walker, standard ([facility]) 07/02/18 1233    LIVING ENVIRONMENT     ABUSE RISK SCREEN      Lives With  facility resident ([facility]) 07/02/18 1553   QUESTION TO PATIENT:  Has a member of your family or a partner(now or in the past) intimidated, hurt, manipulated, or controlled you in any way?  no 07/01/18 1018    Living Arrangements  extended care facility ([facility]) 07/02/18 1553   QUESTION TO PATIENT: Do you feel safe going back to the place where you are living?  yes 07/01/18 1018    Quality Of Family Relationships  supportive;involved 07/02/18 1553   OBSERVATION: Is there reason to believe there has been maltreatment of a vulnerable adult (ie. Physical/Sexual/Emotional abuse, self neglect, lack of adequate food, shelter, medical care, or financial exploitation)?  no 07/01/18 1018    ASSESSMENT OF FAMILY/SOCIAL SUPPORT     OTHER      Marital Status  Single 07/02/18 1553   Are you depressed or being treated for depression?  No 07/01/18 1507    Who is your support system?  Children 07/02/18 1553   HOMICIDE RISK      Description of Support System  Supportive;Involved 07/02/18 1553   Feels Like Hurting Others  no 07/01/18 1018    Support Assessment  Adequate family and caregiver support 07/02/18 1553

## 2018-07-01 NOTE — ED NOTES
[facility]  ED Nurse Handoff Report    ED Chief complaint: Bradycardia (Pt found to be unresponsive by staff at nursing home at 0830 and EMS called, EMS foudn pt to have HR 20-30s with BP 70/30, 500mg Atropine given, HR came up to 70s.)      ED Diagnosis:   Final diagnoses:   None       Code Status: Full Code    Allergies:   Allergies   Allergen Reactions     No Known Allergies        Activity level - Baseline/Home:  Total Care    Activity Level - Current:   Total Care     Needed?: No    Isolation: No  Infection: Not Applicable  Bariatric?: No    Vital Signs:   Vitals:    07/01/18 0945 07/01/18 1003 07/01/18 1006 07/01/18 1018   BP: (!) 86/61 101/66  115/58   Resp: 22  20 13   Temp: 97.3  F (36.3  C)      SpO2: 98% 94% 96%        Cardiac Rhythm: ,   Cardiac  Cardiac Rhythm: Atrial fibrillation    Pain level:      Is this patient confused?: Yes   [place] - Suicide Severity Rating Scale Completed?  Yes  If yes, what color did the patient score?  White    Patient Report: Initial Complaint: Pt presents to the ED after being found unresponsive in bed by nursing home staff.    Focused Assessment: EMS called after nursing home staff found the pt to be unresponsive at 0830.  EMS called and pt was found to have a HR in the 20s-30s with a BP of 70/30.  Pt given 500mg of atropine IV , and HR imprved to 70-80 after atropine.  Pt arrived to ED in a-fib with a HR  in the 50s/60s.  BP 86/61,  Pt's BPs have since improved, MAP has maintained above 65 since arrival.  Pt O2 sats in the mid 90s on RA, lungs CTA.  Pt alert and oriented x2-3.  Per EMS pt has some dementia at basline as well as left sided deficits from a previous CVA.  Pt initially complained of chest and back pain, but currently denies pain with the exception of chronic pain in his left knee.  Tests Performed: CBC, PT/INR, Troponin, Lactic acid, CMP, Chest and pelvis CT scan.  Abnormal Results: Hgb 11.4, INR 1.3, Creat 1.8, GFR 36,  Ct  results read as follows:   1. ,No acute aortic abnormality. No aortic dissection. Moderate areas of atherosclerotic disease of the aorta and its branches. 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  2. Renal cortical thinning bilaterally. Indeterminant small hypoenhancing region within the lower right kidney. Consider  outpatient renal MRI for further assessment of this indeterminate lesion. Further, the kidneys have a mildly heterogeneous enhancement pattern. Correlate with urinalysis for any evidence for a urinary tract infection and pyelonephritis.  3. No other acute abnormality.  4. A few faint gallstones.  Treatments provided: 18 G IV placed in right AC, pt given 1 liter NS    Family Comments: Daughter at bedside, involved in cares.    OBS brochure/video discussed/provided to patient: N/A    ED Medications:   Medications   sodium chloride 0.9% infusion ( Intravenous New Bag 7/1/18 1007)   iopamidol (ISOVUE-370) solution 80 mL (80 mLs Intravenous Given 7/1/18 0957)   Saline flush (80 mLs Intravenous Given 7/1/18 0958)       Drips infusing?:  No    For the majority of the shift this patient was Green.   Interventions performed were NA.    Severe Sepsis OR Septic Shock Diagnosis Present: No      ED NURSE PHONE NUMBER: RN 1 in Ed

## 2018-07-01 NOTE — ED PROVIDER NOTES
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 Per medics, patient awoke as normal at 0630 today per nursing home staff and ate breakfast without issue and then went back to his room. At 0830 staff rechecked and found him in bed and \"unresponsive,\" at least not responding to his name, for 20-30 minutes with possible seizure activity described as shaking of his legs. True seizure activity is unclear. He has no previous history of seizure disorder. On medics' arrival he was responsive only to pain, bradycardic in 20-30s, hypoxic, and hypotensive at 70/50. They established an IV to right AC and administered atropine 0.5mg, with improvement in responsiveness and pulse to 70-90s. Last BP was 81/48 and they initiated 1L of normal saline en route. His hypoxia improved with 2L via nasal cannula. Blood glucose was 186 en route.  No recent falls or trauma. Medics performed EKG which showed atrial fibrillation vs slow junctional rhythm without STEMI.    The patient himself complains of chest pain and back pain. He is somewhat cantankerous and provides poor history.      CARDIAC RISK FACTORS:  Sex:    M  Tobacco/Illicit drugs: Y  Hypertension:   Y  Hyperlipidemia:  Y  Diabetes:   Y  Family History:  N  Personal History: Y       Code status:  Full code as of 2/2017    Allergies:  no known drug allergies      Medications:    Xarelto  Tramadol  Tamsulosin  Metoprolol  Lisinopril  Levothyroxine  Insulin  basaglar  Atorvastatin  Vitamin D  Flonase      Past Medical History:    CVA x2 with residual left hemiplegia, cognitive deficit, and dysphagia, 6/2017  DM type II  atrial fibrillation   Hyperlipidemia  Hypertension "   CAD with hx MI  peripheral vascular disease   Hx central retinal vein occlusion, right   CKD stage III  Ca, prostate  Acquired hypothyroidism     Past Surgical History:    Prostate cancer seed implantation    Family History:    History reviewed. No pertinent family history.      Social History:  Former smoker, 20packyear history, quit 1973.  Marital Status:  [2]  Resides in [facility]    Review of Systems   Unable to perform ROS: Mental status change       Physical Exam     Patient Vitals for the past 24 hrs:   BP Temp Heart Rate Resp SpO2   07/01/18 1230 124/83 - 61 14 96 %   07/01/18 1200 111/76 - 67 14 96 %   07/01/18 1153 - - 58 18 96 %   07/01/18 1145 124/66 - 54 15 97 %   07/01/18 1130 - - 66 19 98 %   07/01/18 1107 113/51 - 64 21 97 %   07/01/18 1101 - - 54 14 -   07/01/18 1045 102/58 - 55 10 95 %   07/01/18 1030 108/53 - 61 9 94 %   07/01/18 1018 115/58 - 56 13 -   07/01/18 1006 - - 66 20 96 %   07/01/18 1003 101/66 - 61 - 94 %   07/01/18 0945 (!) 86/61 97.3  F (36.3  C) 57 22 98 %        Physical Exam  Constitutional:  Patient is oriented to person and time, but not place. They appear well-developed and well-nourished. Moderate distress secondary to chest pain   HENT:   Mouth/Throat:   Oropharynx is clear and moist.   Eyes:    Conjunctivae normal and EOM are normal. Pupils are equal, round, and reactive to light.   Neck:    Normal range of motion.   Cardiovascular: Bradycardic rate, regular rhythm and normal heart sounds.  Exam reveals no gallop and no friction rub.  No murmur heard. Peripheral pulses intact and equal  Pulmonary/Chest:  Effort normal and breath sounds normal. Patient has no wheezes. Patient has no rales.   Abdominal:   Soft. Bowel sounds are normal. Patient exhibits no mass. There is no tenderness. There is no rebound and no guarding.   Musculoskeletal:  Normal range of motion. Patient exhibits no edema.   Neurological:   Patient is oriented to person and time, but  not place. Stable left hemiplegia. No cranial nerve deficit or sensory deficit. GCS 15  Skin:   Skin is warm and dry. No rash noted. No erythema.   Psychiatric:   Agitated, unable to assess further.       Emergency Department Course   ECG:  ECG (09:41:13):  Indication: chest pain    Rate 69 bpm. MD interval *. QRS duration 80. QT/QTc 408/437. P-R-T axes *, 25, 98.   atrial fibrillation  Nonspecific T wave abnormality  abnormal ECG  agree with computer interpretation  No previous ECGs available for comparison.   Interpreted at 0942 by Crystal Bradshaw MD.    Imaging:  Radiographic findings were communicated with the patient and family who voiced understanding of the findings.  CT Aortic survey w/ contrast:   1. No acute aortic abnormality. No aortic dissection. Moderate areas of atherosclerotic disease of the aorta and its branches. 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   2. Renal cortical thinning bilaterally. Indeterminant small hypoenhancing region within the lower right kidney. Consider outpatient renal MRI for further assessment of this indeterminate lesion. Further, the kidneys have a mildly heterogeneous enhancement pattern. Correlate with urinalysis for any evidence for a urinary tract infection and pyelonephritis.  3. No other acute abnormality.  4. A few faint gallstones. Results per Radiology.     Laboratory:  Laboratory findings were communicated with the patient and family who voiced understanding of the findings.  POC Creatinine: Cr 1.8 (H), eGFR 36 (L)    CBC w/diff: RBC 3.70 (L), Hgb 11.4 (L), Hct 33.7 (L),  o/w WNL (WBC 7.5, Plt 157)  CMP: Glu 160 (H), Cr 1.75 (H), eGFR 37 (L), alb 3.1 (L), protein 6.7 (L),  o/w WNL  Lactic acid (at 1005): 1.7   Troponin-i (at 0940): <0.015  INR: 1.30   PTT: 37      Emergency Department Course:  0926: Call from medics in field for 86M with bradycardia at 34, improved to 70-80 after atropine en route.   ETA 10 minutes  Stab room 2  prepped.    0933: patient transferred to ED bed.  History obtained as above. Rapid physical exam performed as above.    0940: IV access established. Blood drawn and sent.    0943: pacer pads placed   0944: patient taken to CT    1004: I rechecked patient.     1007: Normal Saline 0.9% 1L, IV     1042: discussed the case with Dr. 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    1046: I rechecked patient. Daughter at bedside. Updated on findings and plan.      Findings and plan explained to the daughter and patient who consent to admission.     1100: Discussed the patient with Dr. Branham of the Hospitalist Service, who will admit the patient to a [facility] bed for further monitoring, evaluation, and treatment.     1103: Patient relocated to ED room 3 prior to admission.        Impression & Plan    Medical Decision Making:  Elias Mckee is a 86 year old male who presents to the ED via EMS after he was noted to be unresponsive and bradycardic at his nursing home. He had eaten breakfast just prior to that without any issue and awoke normally this morning. When medics arrived to nursing facitily they noted his blood pressure to be low and to be profoundly bradycardic. After administrating 0.5mg atropine his mentation and blood pressure  He complained of chest and back to the medics and to myself upon arrival. History was difficult as there appears to be some underlying cognitive deficit. He was also quite agitated when he initially arrived and not answering questions approriately. Due to his complaints of chest and back pain and hypotension I sent him emergently to CT to do an aortic survey to better evaluate for a vascular catastrophe. Findings as noted above. I did speak with Dr. 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 Diagnostic blood work does show he has some renal failure. Cardiac enzymes were within normal limits. His lactic acid is normal. White blood cell count is unremarkable and he has stable mild anemia.    At this time I suspect that patient had symptomatic bradycardia. Although medics do report some shaking activity of his legs this does not sound like myoclonic jerking and he does have some restless leg syndrome on exam here. His daughter did present to the ED and states that he appears to be back at baseline. His blood pressure has significantly improved as well; he has been consistently within the 110-120s and is still in atrial fibrillation. I will admit to [facility] under Dr. Branham.     Diagnosis:    ICD-10-CM    1. Bradycardia R00.1    2. Syncope and collapse R55    3. Renal failure, unspecified chronicity N19        Disposition:   Admission    Critical Care time was 35 minutes for this patient excluding procedures.       Scribe Disclosure:  Chencho PHOENIX, am serving as a scribe at 9:36 AM on 7/1/2018 to document services personally performed by Crystal Bradshaw MD based on my observations and the provider's statements to me.    Chencho Ervin  7/1/2018   EMERGENCY DEPARTMENT .Crystal Bo MD  07/02/18 1032

## 2018-07-01 NOTE — IP AVS SNAPSHOT
"          [place] [address] [facility]: [phone]                                              INTERAGENCY TRANSFER FORM - LAB / IMAGING / EKG / EMG RESULTS   2018                    Hospital Admission Date: 2018  PETER H STEUERWALD   : 8/10/1931  Sex: Male        Attending Provider: Socrates Laureano DO     Allergies:  No Known Allergies    Infection:  None   Service:  HOSPITALIST    Ht:  1.753 m (5' 9\")   Wt:  84 kg (185 lb 3 oz)   Admission Wt:  82.9 kg (182 lb 12.2 oz)    BMI:  27.35 kg/m 2   BSA:  2.02 m 2            Patient PCP Information     Provider PCP Type    Yvette Bonner, TOBY CNP General         Lab Results - 3 Days      Glucose by meter [[id number]] (Abnormal)  Resulted: 18 1324, Result status: Final result    Ordering provider: Juan Diego Branham DO  18 1306 Resulting lab: POINT OF CARE TEST, GLUCOSE    Specimen Information    Type Source Collected On     18 1306          Components       Value Reference Range Flag Lab   Glucose 174 70 - 99 mg/dL H 170            Basic metabolic panel [[id number]] (Abnormal)  Resulted: 18 0958, Result status: Final result    Ordering provider: Cyndi Padilla PA-C  18 0000 Resulting lab: [facility]    Specimen Information    Type Source Collected On   Blood  18 0835          Components       Value Reference Range Flag Lab   Sodium 139 133 - 144 mmol/L  FrStHsLb   Potassium 4.3 3.4 - 5.3 mmol/L  FrStHsLb   Chloride 108 94 - 109 mmol/L  FrStHsLb   Carbon Dioxide 23 20 - 32 mmol/L  FrStHsLb   Anion Gap 8 3 - 14 mmol/L  FrStHsLb   Glucose 118 70 - 99 mg/dL H FrStHsLb   Urea Nitrogen 16 7 - 30 mg/dL  FrStHsLb   Creatinine 1.04 0.66 - 1.25 mg/dL  FrStHsLb   GFR Estimate 68 >60 mL/min/1.7m2  FrStHsLb   Comment:  Non  GFR Calc   GFR Estimate If Black 82 >60 mL/min/1.7m2  FrStHsLb   Comment:  African American GFR Calc   Calcium 8.4 8.5 - 10.1 mg/dL L " FrStHsLb            Glucose by meter [[id number]] (Abnormal)  Resulted: 07/03/18 0927, Result status: Final result    Ordering provider: Juan Diego Branham DO  07/03/18 0915 Resulting lab: POINT OF CARE TEST, GLUCOSE    Specimen Information    Type Source Collected On     07/03/18 0915          Components       Value Reference Range Flag Lab   Glucose 114 70 - 99 mg/dL H 170            CBC with platelets differential [[id number]] (Abnormal)  Resulted: 07/03/18 0923, Result status: Final result    Ordering provider: Cyndi Padilla PA-C  07/03/18 0000 Resulting lab: [facility]    Specimen Information    Type Source Collected On   Blood  07/03/18 0835          Components       Value Reference Range Flag Lab   WBC 6.0 4.0 - 11.0 10e9/L  FrStHsLb   RBC Count 3.64 4.4 - 5.9 10e12/L L FrStHsLb   Hemoglobin 11.3 13.3 - 17.7 g/dL L FrStHsLb   Hematocrit 32.8 40.0 - 53.0 % L FrStHsLb   MCV 90 78 - 100 fl  FrStHsLb   MCH 31.0 26.5 - 33.0 pg  FrStHsLb   MCHC 34.5 31.5 - 36.5 g/dL  FrStHsLb   RDW 16.0 10.0 - 15.0 % H FrStHsLb   Platelet Count 159 150 - 450 10e9/L  FrStHsLb   Diff Method Automated Method   FrStHsLb   % Neutrophils 62.6 %  FrStHsLb   % Lymphocytes 16.3 %  FrStHsLb   % Monocytes 18.8 %  FrStHsLb   % Eosinophils 1.8 %  FrStHsLb   % Basophils 0.2 %  FrStHsLb   % Immature Granulocytes 0.3 %  FrStHsLb   Nucleated RBCs 0 0 /100  FrStHsLb   Absolute Neutrophil 3.8 1.6 - 8.3 10e9/L  FrStHsLb   Absolute Lymphocytes 1.0 0.8 - 5.3 10e9/L  FrStHsLb   Absolute Monocytes 1.1 0.0 - 1.3 10e9/L  FrStHsLb   Absolute Eosinophils 0.1 0.0 - 0.7 10e9/L  FrStHsLb   Absolute Basophils 0.0 0.0 - 0.2 10e9/L  FrStHsLb   Abs Immature Granulocytes 0.0 0 - 0.4 10e9/L  FrStHsLb   Absolute Nucleated RBC 0.0   FrStHsLb            Urine Culture Aerobic Bacterial [[id number]] (Abnormal)  Resulted: 07/03/18 0902, Result status: Preliminary result    Ordering provider: Juan Diego Branham DO  07/01/18 5991 Resulting  lab: [facility]    Specimen Information    Type Source Collected On   Midstream Urine  07/01/18 1705          Components       Value Reference Range Flag Lab   Specimen Description Midstream Urine      Special Requests Specimen received in preservative   75   Culture Micro --  A 75   Result:         >100,000 colonies/mL  Coagulase negative Staphylococcus  Susceptibility testing in progress              Glucose by meter [[id number]] (Abnormal)  Resulted: 07/03/18 0244, Result status: Final result    Ordering provider: Juan Diego Branham,   07/03/18 0214 Resulting lab: POINT OF CARE TEST, GLUCOSE    Specimen Information    Type Source Collected On     07/03/18 0214          Components       Value Reference Range Flag Lab   Glucose 105 70 - 99 mg/dL H 170            Glucose by meter [[id number]] (Abnormal)  Resulted: 07/02/18 2206, Result status: Final result    Ordering provider: Juan Diego Branham,   07/02/18 2147 Resulting lab: POINT OF CARE TEST, GLUCOSE    Specimen Information    Type Source Collected On     07/02/18 2147          Components       Value Reference Range Flag Lab   Glucose 154 70 - 99 mg/dL H 170            Glucose by meter [[id number]]  Resulted: 07/02/18 1900, Result status: Final result    Ordering provider: Juan Diego Branham,   07/02/18 1813 Resulting lab: POINT OF CARE TEST, GLUCOSE    Specimen Information    Type Source Collected On     07/02/18 1813          Components       Value Reference Range Flag Lab   Glucose 85 70 - 99 mg/dL  170            Glucose by meter [[id number]]  Resulted: 07/02/18 1759, Result status: Final result    Ordering provider: Juan Diego Branham,   07/02/18 1743 Resulting lab: POINT OF CARE TEST, GLUCOSE    Specimen Information    Type Source Collected On     07/02/18 1743          Components       Value Reference Range Flag Lab   Glucose 94 70 - 99 mg/dL  170            Procalcitonin [[id number]]  Resulted: 07/02/18 1605,  Result status: Final result    Ordering provider: Cyndi Padilla PA-C  07/02/18 1430 Resulting lab: [facility]    Specimen Information    Type Source Collected On   Blood  07/02/18 1505          Components       Value Reference Range Flag Lab   Procalcitonin <0.05 ng/ml  FrStHsLb   Comment:         <0.05 ng/ml  Normal  Recommendation: Very low risk of bacterial infection.   Discourage antibiotics unless strong clinical suspicion for serious infection.              Glucose by meter [[id number]] (Abnormal)  Resulted: 07/02/18 1336, Result status: Final result    Ordering provider: Juan Diego Branham,   07/02/18 1322 Resulting lab: POINT OF CARE TEST, GLUCOSE    Specimen Information    Type Source Collected On     07/02/18 1322          Components       Value Reference Range Flag Lab   Glucose 157 70 - 99 mg/dL H 170            Troponin I [[id number]]  Resulted: 07/02/18 1334, Result status: Final result    Ordering provider: Juan Diego Branham,   07/02/18 0622 Resulting lab: [facility]    Specimen Information    Type Source Collected On     07/02/18 0622          Components       Value Reference Range Flag Lab   Troponin I ES <0.015 0.000 - 0.045 ug/L  FrStHsLb   Comment:         The 99th percentile for upper reference range is 0.045 ug/L.  Troponin values   in the range of 0.045 - 0.120 ug/L may be associated with risks of adverse   clinical events.              TSH with free T4 reflex [[id number]]  Resulted: 07/02/18 1045, Result status: Final result    Ordering provider: Juan Diego Branham,   07/02/18 0622 Resulting lab: [facility]    Specimen Information    Type Source Collected On     07/02/18 0622          Components       Value Reference Range Flag Lab   TSH 2.20 0.40 - 4.00 mU/L  FrStHsLb            Magnesium [[id number]]  Resulted: 07/02/18 1042, Result status: Final result    Ordering provider: Juan Diego Branham,   07/02/18 0622  Resulting lab: [facility]    Specimen Information    Type Source Collected On     07/02/18 0622          Components       Value Reference Range Flag Lab   Magnesium 1.8 1.6 - 2.3 mg/dL  FrStHsLb            Glucose by meter [[id number]] (Abnormal)  Resulted: 07/02/18 0951, Result status: Final result    Ordering provider: Juan Diego Branham, DO  07/02/18 0932 Resulting lab: POINT OF CARE TEST, GLUCOSE    Specimen Information    Type Source Collected On     07/02/18 0932          Components       Value Reference Range Flag Lab   Glucose 114 70 - 99 mg/dL H 170            Basic metabolic panel [[id number]] (Abnormal)  Resulted: 07/02/18 0658, Result status: Final result    Ordering provider: Juan Diego Branham,   07/02/18 0000 Resulting lab: [facility]    Specimen Information    Type Source Collected On   Blood  07/02/18 0622          Components       Value Reference Range Flag Lab   Sodium 139 133 - 144 mmol/L  FrStHsLb   Potassium 4.1 3.4 - 5.3 mmol/L  FrStHsLb   Chloride 107 94 - 109 mmol/L  FrStHsLb   Carbon Dioxide 23 20 - 32 mmol/L  FrStHsLb   Anion Gap 9 3 - 14 mmol/L  FrStHsLb   Glucose 103 70 - 99 mg/dL H FrStHsLb   Urea Nitrogen 27 7 - 30 mg/dL  FrStHsLb   Creatinine 1.19 0.66 - 1.25 mg/dL  FrStHsLb   GFR Estimate 58 >60 mL/min/1.7m2 L FrStHsLb   Comment:  Non  GFR Calc   GFR Estimate If Black 70 >60 mL/min/1.7m2  FrStHsLb   Comment:  African American GFR Calc   Calcium 8.2 8.5 - 10.1 mg/dL L FrStHsLb            CBC with platelets [[id number]] (Abnormal)  Resulted: 07/02/18 0639, Result status: Final result    Ordering provider: Juan Diego Branham,   07/02/18 0000 Resulting lab: [facility]    Specimen Information    Type Source Collected On   Blood  07/02/18 0622          Components       Value Reference Range Flag Lab   WBC 6.9 4.0 - 11.0 10e9/L  FrStHsLb   RBC Count 3.60 4.4 - 5.9 10e12/L L FrStHsLb   Hemoglobin 11.0 13.3 - 17.7 g/dL  L FrStHsLb   Hematocrit 32.5 40.0 - 53.0 % L FrStHsLb   MCV 90 78 - 100 fl  FrStHsLb   MCH 30.6 26.5 - 33.0 pg  FrStHsLb   MCHC 33.8 31.5 - 36.5 g/dL  FrStHsLb   RDW 16.1 10.0 - 15.0 % H FrStHsLb   Platelet Count 156 150 - 450 10e9/L  FrStHsLb            Glucose by meter [[id number]]  Resulted: 07/02/18 0220, Result status: Final result    Ordering provider: Juan Diego Branham,   07/02/18 0208 Resulting lab: POINT OF CARE TEST, GLUCOSE    Specimen Information    Type Source Collected On     07/02/18 0208          Components       Value Reference Range Flag Lab   Glucose 95 70 - 99 mg/dL  170            Glucose by meter [[id number]]  Resulted: 07/01/18 2118, Result status: Final result    Ordering provider: Juan Diego Branham,   07/01/18 2106 Resulting lab: POINT OF CARE TEST, GLUCOSE    Specimen Information    Type Source Collected On     07/01/18 2106          Components       Value Reference Range Flag Lab   Glucose 98 70 - 99 mg/dL  170            UA with Microscopic reflex to Culture [[id number]] (Abnormal)  Resulted: 07/01/18 1740, Result status: Final result    Ordering provider: Juan Diego Branham,   07/01/18 1315 Resulting lab: [facility]    Specimen Information    Type Source Collected On   Midstream Urine Urine clean catch 07/01/18 1705          Components       Value Reference Range Flag Lab   Color Urine Light Yellow   FrStHsLb   Appearance Urine Clear   FrStHsLb   Glucose Urine Negative NEG^Negative mg/dL  FrStHsLb   Bilirubin Urine Negative NEG^Negative  FrStHsLb   Ketones Urine Negative NEG^Negative mg/dL  FrStHsLb   Specific [place] Urine 1.042 1.003 - 1.035 H FrStHsLb   Blood Urine Negative NEG^Negative  FrStHsLb   pH Urine 5.5 5.0 - 7.0 pH  FrStHsLb   Protein Albumin Urine Negative NEG^Negative mg/dL  FrStHsLb   Urobilinogen mg/dL Normal 0.0 - 2.0 mg/dL  FrStHsLb   Nitrite Urine Negative NEG^Negative  FrStHsLb   Leukocyte Esterase Urine Large NEG^Negative A FrStHsLb    Source Midstream Urine   FrStHsLb   WBC Urine 41 0 - 5 /HPF H FrStHsLb   RBC Urine 66 0 - 2 /HPF H FrStHsLb            Hemoglobin A1c [[id number]] (Abnormal)  Resulted: 07/01/18 1447, Result status: Final result    Ordering provider: Crystal Bradshaw MD  07/01/18 0940 Resulting lab: [facility]    Specimen Information    Type Source Collected On     07/01/18 0940          Components       Value Reference Range Flag Lab   Hemoglobin A1C 7.4 0 - 5.6 % H FrStHsLb   Comment:         Normal <5.7% Prediabetes 5.7-6.4%  Diabetes 6.5% or higher - adopted from ADA   consensus guidelines.              Glucose by meter [[id number]] (Abnormal)  Resulted: 07/01/18 1441, Result status: Final result    Ordering provider: Juan Diego Branham DO  07/01/18 1428 Resulting lab: POINT OF CARE TEST, GLUCOSE    Specimen Information    Type Source Collected On     07/01/18 1428          Components       Value Reference Range Flag Lab   Glucose 134 70 - 99 mg/dL H 170            Lactic acid whole blood [[id number]]  Resulted: 07/01/18 1035, Result status: Final result    Ordering provider: Crystal Bradshaw MD  07/01/18 0910 Resulting lab: [facility]    Specimen Information    Type Source Collected On   Blood  07/01/18 1005          Components       Value Reference Range Flag Lab   Lactic Acid 1.7 0.7 - 2.0 mmol/L  FrStHsLb            Comprehensive metabolic panel [[id number]] (Abnormal)  Resulted: 07/01/18 1022, Result status: Final result    Ordering provider: Crystal Bradshaw MD  07/01/18 0958 Resulting lab: [facility]    Specimen Information    Type Source Collected On     07/01/18 0940          Components       Value Reference Range Flag Lab   Sodium 138 133 - 144 mmol/L  FrStHsLb   Potassium 4.8 3.4 - 5.3 mmol/L  FrStHsLb   Chloride 104 94 - 109 mmol/L  FrStHsLb   Carbon Dioxide 26 20 - 32 mmol/L  FrStHsLb   Anion Gap 8 3 - 14 mmol/L  FrStHsLb   Glucose 160 70 - 99  mg/dL H FrStHsLb   Urea Nitrogen 29 7 - 30 mg/dL  FrStHsLb   Creatinine 1.75 0.66 - 1.25 mg/dL H FrStHsLb   GFR Estimate 37 >60 mL/min/1.7m2 L FrStHsLb   Comment:  Non  GFR Calc   GFR Estimate If Black 45 >60 mL/min/1.7m2 L FrStHsLb   Comment:  African American GFR Calc   Calcium 8.5 8.5 - 10.1 mg/dL  FrStHsLb   Bilirubin Total 0.4 0.2 - 1.3 mg/dL  FrStHsLb   Albumin 3.1 3.4 - 5.0 g/dL L FrStHsLb   Protein Total 6.7 6.8 - 8.8 g/dL L FrStHsLb   Alkaline Phosphatase 68 40 - 150 U/L  FrStHsLb   ALT 14 0 - 70 U/L  FrStHsLb   AST 10 0 - 45 U/L  FrStHsLb            Troponin I [[id number]]  Resulted: 07/01/18 1022, Result status: Final result    Ordering provider: Crystal Bradshaw MD  07/01/18 0940 Resulting lab: [facility]    Specimen Information    Type Source Collected On     07/01/18 0940          Components       Value Reference Range Flag Lab   Troponin I ES <0.015 0.000 - 0.045 ug/L  FrStHsLb   Comment:         The 99th percentile for upper reference range is 0.045 ug/L.  Troponin values   in the range of 0.045 - 0.120 ug/L may be associated with risks of adverse   clinical events.              CBC with platelets differential [[id number]] (Abnormal)  Resulted: 07/01/18 1002, Result status: Final result    Ordering provider: Crystal Bradshaw MD  07/01/18 0945 Resulting lab: [facility]    Specimen Information    Type Source Collected On     07/01/18 0940          Components       Value Reference Range Flag Lab   WBC 7.5 4.0 - 11.0 10e9/L  FrStHsLb   RBC Count 3.70 4.4 - 5.9 10e12/L L FrStHsLb   Hemoglobin 11.4 13.3 - 17.7 g/dL L FrStHsLb   Hematocrit 33.7 40.0 - 53.0 % L FrStHsLb   MCV 91 78 - 100 fl  FrStHsLb   MCH 30.8 26.5 - 33.0 pg  FrStHsLb   MCHC 33.8 31.5 - 36.5 g/dL  FrStHsLb   RDW 16.3 10.0 - 15.0 % H FrStHsLb   Platelet Count 157 150 - 450 10e9/L  FrStHsLb   Diff Method Automated Method   FrStHsLb   % Neutrophils 69.0 %  FrStHsLb   % Lymphocytes  11.9 %  FrStHsLb   % Monocytes 17.7 %  FrStHsLb   % Eosinophils 0.8 %  FrStHsLb   % Basophils 0.3 %  FrStHsLb   % Immature Granulocytes 0.3 %  FrStHsLb   Nucleated RBCs 0 0 /100  FrStHsLb   Absolute Neutrophil 5.2 1.6 - 8.3 10e9/L  FrStHsLb   Absolute Lymphocytes 0.9 0.8 - 5.3 10e9/L  FrStHsLb   Absolute Monocytes 1.3 0.0 - 1.3 10e9/L  FrStHsLb   Absolute Eosinophils 0.1 0.0 - 0.7 10e9/L  FrStHsLb   Absolute Basophils 0.0 0.0 - 0.2 10e9/L  FrStHsLb   Abs Immature Granulocytes 0.0 0 - 0.4 10e9/L  FrStHsLb   Absolute Nucleated RBC 0.0   FrStHsLb            INR [[id number]] (Abnormal)  Resulted: 07/01/18 1001, Result status: Final result    Ordering provider: Crystal Bradshaw MD  07/01/18 0940 Resulting lab: [facility]    Specimen Information    Type Source Collected On   Blood  07/01/18 0940          Components       Value Reference Range Flag Lab   INR 1.30 0.86 - 1.14 H FrStHsLb            PTT [[id number]]  Resulted: 07/01/18 1001, Result status: Final result    Ordering provider: Crystal Bradshaw MD  07/01/18 0995 Resulting lab: [facility]    Specimen Information    Type Source Collected On   Blood  07/01/18 0940          Components       Value Reference Range Flag Lab   PTT 37 22 - 37 sec  FrStHsLb            Creatinine POCT [[id number]] (Abnormal)  Resulted: 07/01/18 0950, Result status: Final result    Ordering provider: Crystal Bradshaw MD  07/01/18 0958 Resulting lab: POINT OF CARE TEST, HANDHELD METER    Specimen Information    Type Source Collected On     07/01/18 0947          Components       Value Reference Range Flag Lab   Creatinine 1.8 0.66 - 1.25 mg/dL H 171   GFR Estimate 36 >60 mL/min/1.7m2 L 171   GFR Estimate If Black 44 >60 mL/min/1.7m2 L 171            Testing Performed By     Lab - Abbreviation Name Director Address Valid Date Range    14 - FrStHsLb [facility] Unknown [address] [place] [address] 05/08/15 1057 - Present     "75 - Unknown [facility] Unknown [address] [facility] [address] 01/15/15 1019 - Present    170 - Unknown POINT OF CARE TEST, GLUCOSE Unknown Unknown 10/31/11 1114 - Present    171 - Unknown POINT OF CARE TEST, HANDHELD METER Unknown Unknown 10/31/11 1113 - Present            Unresulted Labs (24h ago through future)    Start       Ordered    07/06/18 0600  Creatinine  EVERY THREE DAYS,   Routine      07/03/18 0833    Unscheduled  Magnesium  (Magnesium Replacement - Adult - \"High\" - Replacement for all levels less than or equal to 2 mg/dL)  CONDITIONAL (SPECIFY),   Routine     Comments:  Obtain Magnesium Level for these conditions:  *IF no magnesium result within 24 hrs before initiation of order set, draw magnesium level with next lab collect.    *2 HOURS AFTER last magnesium replacement dose when magnesium replacement given for level less than 1.6  *Next morning after magnesium dose.     Repeat Magnesium Replacement if necessary.    07/02/18 1056         Imaging Results - 3 Days      US Carotid Bilateral [[id number]]  Resulted: 07/02/18 1319, Result status: Final result    Ordering provider: Cyndi Padilla PA-C  07/02/18 0959 Resulted by: Austen Palacios MD    Performed: 07/02/18 1215 - 07/02/18 1314 Resulting lab: RADIOLOGY RESULTS    Narrative:       US CAROTID BILATERAL 7/2/2018 1:14 PM    HISTORY: Syncope.    COMPARISON: Carotid ultrasound dated 1/21/2013    FINDINGS:     Right side:   On the grayscale images, calcified plaque is identified at the carotid  bifurcation extending into the internal and external carotid  arteries..  Spectral waveform analysis was performed. Peak systolic and diastolic  velocities in the right internal carotid artery are 181 and 38 cm/s  versus 174 and 40 cm/s on the prior exam. Per sonographic criteria,  degree of stenosis in the right internal carotid artery is 50-69%.  Flow in the right vertebral artery is antegrade.     Left " side:   On the grayscale images, calcified plaque is identified at the carotid  bifurcation.  Spectral waveform analysis was performed. Peak systolic and diastolic   velocities in the left internal carotid artery are 228 and 52 cm/s  versus 255 cm/s on the prior exam. Per sonographic criteria, degree of  stenosis in the left internal carotid artery is 50-69%.  Flow in the left vertebral artery is antegrade but difficult to  adequately visualize.       Impression:       IMPRESSION: Per sonographic criteria, there are 50-69% stenoses in the  internal carotid arteries bilaterally.    Degree of stenosis measured relative to the diameter of the normal  internal carotid artery per NASCET or NASCET type criteria    ALEA LIANG MD      CT Aortic Survey w Contrast [[id number]]  Resulted: 07/01/18 1412, Result status: Final result    Ordering provider: Crystal Bradshaw MD  07/01/18 0940 Resulted by: Juan Diego Boles MD    Performed: 07/01/18 0947 - 07/01/18 1003 Resulting lab: RADIOLOGY RESULTS    Narrative:       CT AORTIC SURVEY WITH CONTRAST  7/1/2018 10:03 AM    HISTORY:  Chest and back pain.    TECHNIQUE: CT scan obtained of the chest, abdomen, and pelvis with  oral and IV contrast. 80 mL Isovue-370 injected. Aortic survey  protocol. Radiation dose for this scan was reduced using automated  exposure control, adjustment of the mA and/or kV according to patient  size, or iterative reconstruction technique.    COMPARISON:  None.    FINDINGS:  Chest:   No acute thoracic aortic abnormality.  No thoracic aortic dissection  or aneurysm is seen. Scattered areas of atherosclerotic plaque  throughout the thoracic aorta with small areas of penetrating ulcer  calcification. In particular, penetrating ulcer can be seen along the  descending thoracic aorta without adjacent inflammation. Coronary  artery calcifications. Cannot fully assess for pulmonary embolism. No  large central pulmonary embolism. Nondiagnostic  assessment of the  middle and small branch vessels.    No evidence for adenopathy. No acute airspace disease. Minor bibasilar  atelectasis. Calcified granuloma at the right upper lobe incidentally  seen.    Abdomen/pelvis:   No acute abdominal aortic abnormality. No abdominal aortic dissection.  Atherosclerotic plaque seen within the abdominal aorta with areas of  penetrating ulcer at the proximal abdominal aorta. 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 Scattered areas of atherosclerotic plaque  within the aortic and arterial vessels. Patency of the celiac,  superior mesenteric, inferior mesenteric, and right and left renal  artery origins.    The liver, gallbladder, adrenals, spleen, and pancreas show no acute  abnormalities. Bilateral renal cortical thinning. Hypodense focus at  the lower right kidney is 0.9 cm, series 4 image 130, and is  ultimately indeterminant. Kidneys have a somewhat heterogeneous  perfusion pattern that is subtle. No hydronephrosis. Prostate  radiation seeds. No bowel obstruction. Colonic diverticula without  diverticulitis. No enlarged lymph nodes. Faint gallstones identified.    No aggressive bony lesion is seen.      Impression:       IMPRESSION:  1. No acute aortic abnormality. No convincing acute aortic dissection.  Moderate areas of atherosclerotic disease of the aorta and its  branches. 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 There is atherosclerosis throughout the aorta and its branches.  There are areas of penetrating ulcer visualized at the descending  thoracic aorta and upper abdomen without convincing acute  inflammation.  2. Renal cortical thinning bilaterally. Indeterminant small  hypoenhancing region within the lower right kidney. Consider  outpatient renal MRI for further assessment of this indeterminate  lesion. Further, the kidneys have a mildly heterogeneous  enhancement  pattern. Correlate with urinalysis for any evidence for a urinary  tract infection and pyelonephritis.  3. No other acute abnormality.  4. A few faint gallstones.    VILMA LEW MD      Testing Performed By     Lab - Abbreviation Name Director Address Valid Date Range    104 - Rad Rslts RADIOLOGY RESULTS Unknown Unknown 05 1553 - Present               ECG/EMG Results      ECHO COMPLETE WITH OPTISON [[id number]]  Resulted: 18, Result status: Edited Result - FINAL    Ordering provider: Cyndi Vogt PA-C  18 Resulted by: Kevin Shahid MD    Performed: 18 - 18 Resulting lab: RADIOLOGY RESULTS    Narrative:       [id number]  [id number]  [id number]  [id number]^SIN^CYNDI^EMMANUEL           [facility]  Echocardiography Laboratory  [address] [place] [address]        Name: MIRANDA BGEUM  MRN: [id number]  : 08/10/1931  Study Date: 2018 08:25 AM  Age: 86 yrs  Gender: Male  Patient Location: [facility]  Reason For Study: Syncope  Ordering Physician: CYNDI VOGT  Referring Physician: Yvette Bonner  Performed By: Luann Harvey     BSA: 2.0 m2  Height: 69 in  Weight: 185 lb  HR: 89  BP: 115/73 mmHg  _____________________________________________________________________________  __        Procedure  Complete Portable Echo Adult. Contrast [facility].  _____________________________________________________________________________  __        Interpretation Summary     The left ventricle is normal in size.  There is mild concentric left ventricular hypertrophy.  The visual ejection fraction is estimated at 55-60%.  No regional wall motion abnormalities noted, but technically challenging  imaging limits sensitivity and specificty.  No significant valvular heart disease.     Compared to previous echo 2017, the patient is in atrial fibrillation  today (previously sinus  rhythm).  _____________________________________________________________________________  __        Left Ventricle  The left ventricle is normal in size. There is mild concentric left  ventricular hypertrophy. The visual ejection fraction is estimated at 55-60%.  Diastolic Doppler findings (E/E' ratio and/or other parameters) suggest left  ventricular filling pressures are increased. No regional wall motion  abnormalities noted.     Right Ventricle  The right ventricle is normal in size and function.     Atria  Normal left atrial size. Right atrial size is normal. There is no color  Doppler evidence of an atrial shunt.     Mitral Valve  The mitral valve leaflets are mildly thickened. There is trace mitral  regurgitation.        Tricuspid Valve  There is trace tricuspid regurgitation. Normal IVC (1.5-2.5cm) with >50%  respiratory collapse; right atrial pressure is estimated at 5-10mmHg.     Aortic Valve  The aortic valve is not well visualized. No aortic regurgitation is present.     Pulmonic Valve  There is trace pulmonic valvular regurgitation.     Vessels  Normal size aorta. Borderline aortic root dilatation.     Pericardium  There is no pericardial effusion.        Rhythm  The rhythm was atrial fibrillation.  _____________________________________________________________________________  __  MMode/2D Measurements & Calculations  IVSd: 1.3 cm     LVIDd: 4.5 cm  LVIDs: 2.8 cm  LVPWd: 1.4 cm  FS: 36.9 %  LV mass(C)d: 230.1 grams  LV mass(C)dI: 115.1 grams/m2  Ao root diam: 3.6 cm  LA dimension: 4.1 cm  asc Aorta Diam: 3.4 cm  LA/Ao: 1.1  LA Volume (BP): 57.7 ml  LA Volume Index (BP): 28.9 ml/m2  RWT: 0.62           Doppler Measurements & Calculations  MV E max marley: 95.4 cm/sec  Ao V2 max: 115.6 cm/sec  Ao max P.0 mmHg  PA acc time: 0.10 sec  Medial E/e': 36.4           _____________________________________________________________________________  __           Report approved by: Xiomy Oneil 2018 09:42  AM       1    Type Source Collected On     07/03/18 0825          View Image (below)        Echocardiogram Complete [[id number]]  Resulted: 07/03/18 0847, Result status: In process    Ordering provider: Cyndi Padilla PA-C  07/02/18 0958 Performed: 07/03/18 0847 - 07/03/18 0847    Resulting lab: [facility]                   Encounter-Level Documents:     There are no encounter-level documents.      Order-Level Documents:     There are no order-level documents.

## 2018-07-01 NOTE — IP AVS SNAPSHOT
MRN:[id number]                      After Visit Summary   7/1/2018    Elias Mckee    MRN: [id number]           Thank you!     Thank you for choosing [facility] for your care. Our goal is always to provide you with excellent care. Hearing back from our patients is one way we can continue to improve our services. Please take a few minutes to complete the written survey that you may receive in the mail after you visit with us. Thank you!        Patient Information     Date Of Birth          8/10/1931        Designated Caregiver       Most Recent Value    Caregiver    Will someone help with your care after discharge? yes    Name of designated caregiver [facility]     Phone number of caregiver [facility]     Caregiver address [facility]       About your hospital stay     You were admitted on:  July 1, 2018 You last received care in the:  [facility] [address] [facility]    You were discharged on:  July 3, 2018        Reason for your hospital stay       You were hospitalized for further evaluation and treatment of an event where you reportedly loss consciousness.                  Who to Call     For medical emergencies, please call 911.  For non-urgent questions about your medical care, please call your primary care provider or clinic, [phone]          Attending Provider     Provider Specialty    Crystal Bradshaw MD Emergency Medicine    Juan Diego Branham,  Internal Medicine    Socrates Laureano, DO HOSPITALIST       Primary Care Provider Office Phone # Fax #    TOBY Momin [phone]      After Care Instructions     Activity - Up with nursing assistance           Advance Diet as [facility]       Follow this diet upon discharge: Resume normal diet.            Daily weights       Call Provider for weight gain of more than 2 pounds per day or 5 pounds per week.            Encourage PO fluids           Fall precautions    "        Hip precautions           Intake and output       Every shift                  Follow-up Appointments     Follow-up and recommended labs and tests        Follow up with primary care provider, Yvette Bonner, within 7 days for hospital follow-up.  Outpatient renal MRI recommended     Follow up with Cardiology to go over event monitor                  Your next 10 appointments already scheduled     Sep 24, 2018  1:45 PM CDT   Return Visit with Félix Teixeira MD   [facility]    [address] [place] [id number] [phone] OPT 2              Additional Services     Follow-Up with Electrophysiologist                 Future tests that were ordered for you     Cardiac Event Monitor - Peds/Adult       Follow up with Dr. Tran                  Pending Results     Date and Time Order Name Status Description    7/3/2018 1532 Cardiac event monitor In process     7/1/2018 1705 Urine Culture Aerobic Bacterial Preliminary             Statement of Approval     Ordered          07/03/18 1132  I have reviewed and agree with all the recommendations and orders detailed in this document.  EFFECTIVE NOW     Approved and electronically signed by:  Cyndi Padilla PA-C             Admission Information     Date & Time Provider Department Dept. Phone    7/1/2018 Socrates Laureano,  [place] [address] [facility] [phone]      Your Vitals Were     Blood Pressure Pulse Temperature Respirations Weight Pulse Oximetry    133/78 (BP Location: Left arm) 79 99.1  F (37.3  C) (Oral) 16 84 kg (185 lb 3 oz) 97%    BMI (Body Mass Index)                   27.35 kg/m2           MyChart Information     [employer] lets you send messages to your doctor, view your test results, renew your prescriptions, schedule appointments and more. To sign up, go to [email or web address] . Click on \"Log in\" on the left side of the " "screen, which will take you to the Welcome page. Then click on \"Sign up Now\" on the right side of the page.     You will be asked to enter the access code listed below, as well as some personal information. Please follow the directions to create your username and password.     Your access code is: [email or web address]  Expires: 2018 12:27 PM     Your access code will  in 90 days. If you need help or a new code, please call your [facility] clinic or [phone].        Care EveryWhere ID     This is your Care EveryWhere ID. This could be used by other organizations to access your [facility] medical records  [id number]        Equal Access to Services     MELINDA SERRANO : Mark Ro, mahin blancas, america donovan, boaz daly. So [facility] [phone].    ATENCIÓN: Si habla español, tiene a whitehead disposición servicios gratuitos de asistencia lingüística. Llame al [phone].    We comply with applicable federal civil rights laws and [place] laws. We do not discriminate on the basis of race, color, national origin, age, disability, sex, sexual orientation, or gender identity.               Review of your medicines      CONTINUE these medicines which may have CHANGED, or have new prescriptions. If we are uncertain of the size of tablets/capsules you have at home, strength may be listed as something that might have changed.        Dose / Directions    BASAGLAR 100 UNIT/ML injection   This may have changed:  how much to take   Used for:  Type II diabetes mellitus with peripheral circulatory disorder (H)        Dose:  20 Units   Inject 20 Units Subcutaneous At Bedtime   Refills:  0       lisinopril 20 MG tablet   Commonly known as:  PRINIVIL/ZESTRIL   This may have changed:    - medication strength  - when to take this   Used for:  Benign essential hypertension        Dose:  20 mg   Start taking on:  2018   Take 1 tablet (20 mg) by mouth daily   Quantity:  30 " tablet   Refills:  0         CONTINUE these medicines which have NOT CHANGED        Dose / Directions    ACETAMINOPHEN PO        Dose:  1000 mg   Take 1,000 mg by mouth 3 times daily For pain   Refills:  0       cholecalciferol 1000 UNIT tablet   Commonly known as:  vitamin D3        Dose:  2000 Units   Take 2,000 Units by mouth daily   Refills:  0       eucerin cream        Apply to toes two times a day   Refills:  0       FLONASE NA        Dose:  2 spray   Spray 2 sprays into both nostrils daily   Refills:  0       LEVOTHYROXINE SODIUM PO        Dose:  25 mcg   Take 25 mcg by mouth daily   Refills:  0       LIPITOR PO        Dose:  40 mg   Take 40 mg by mouth At Bedtime   Refills:  0       NOVOLOG SC        Dose:  4 Units   Inject 4 Units Subcutaneous 2 times daily With breakfast and lunch   Refills:  0       polyethylene glycol powder   Commonly known as:  MIRALAX/GLYCOLAX        Dose:  17 g   Take 17 g by mouth daily   Refills:  0       rivaroxaban ANTICOAGULANT 20 MG Tabs tablet   Commonly known as:  XARELTO   Used for:  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)        Dose:  20 mg   Take 1 tablet (20 mg) by mouth daily (with dinner)   Quantity:  30 tablet   Refills:  1       tamsulosin 0.4 MG capsule   Commonly known as:  FLOMAX   Used for:  Benign prostatic hyperplasia, presence of lower urinary tract symptoms unspecified, unspecified morphology        Dose:  0.4 mg   Take 1 capsule (0.4 mg) by mouth daily   Quantity:  60 capsule   Refills:  0       timolol (PF) 0.5 % ophthalmic solution   Commonly known as:  TIMOPTIC OCUDOSE   Used for:  Central retinal vein occlusion, right eye        Dose:  1 drop   Place 1 drop into the right eye 2 times daily   Refills:  0         STOP taking     METOPROLOL TARTRATE PO           TRAMADOL HCL PO                Where to get your medicines      Some of these will need a paper prescription and others can be bought over the counter. Ask your  nurse if you have questions.     You don't need a prescription for these medications     BASAGLAR 100 UNIT/ML injection    lisinopril 20 MG tablet                Protect others around you: Learn how to safely use, store and throw away your medicines at www.disposemymeds.org.             Medication List: This is a list of all your medications and when to take them. Check marks below indicate your daily home schedule. Keep this list as a reference.      Medications           Morning Afternoon Evening Bedtime As Needed    ACETAMINOPHEN PO   Take 1,000 mg by mouth 3 times daily For pain   Last time this was given:  1,000 mg on 7/3/2018  5:11 PM                                BASAGLAR 100 UNIT/ML injection   Inject 20 Units Subcutaneous At Bedtime   Last time this was given:  20 Units on 7/2/2018 10:24 PM                                cholecalciferol 1000 UNIT tablet   Commonly known as:  vitamin D3   Take 2,000 Units by mouth daily                                eucerin cream   Apply to toes two times a day                                FLONASE NA   Spray 2 sprays into both nostrils daily                                LEVOTHYROXINE SODIUM PO   Take 25 mcg by mouth daily   Last time this was given:  25 mcg on 7/3/2018  8:59 AM                                LIPITOR PO   Take 40 mg by mouth At Bedtime   Last time this was given:  40 mg on 7/2/2018 10:24 PM                                lisinopril 20 MG tablet   Commonly known as:  PRINIVIL/ZESTRIL   Take 1 tablet (20 mg) by mouth daily   Start taking on:  7/4/2018   Last time this was given:  20 mg on 7/3/2018  8:59 AM                                NOVOLOG SC   Inject 4 Units Subcutaneous 2 times daily With breakfast and lunch                                polyethylene glycol powder   Commonly known as:  MIRALAX/GLYCOLAX   Take 17 g by mouth daily   Last time this was given:  17 g on 7/3/2018  9:02 AM                                rivaroxaban ANTICOAGULANT 20 MG  Tabs tablet   Commonly known as:  XARELTO   Take 1 tablet (20 mg) by mouth daily (with dinner)   Last time this was given:  20 mg on 7/3/2018  5:11 PM                                tamsulosin 0.4 MG capsule   Commonly known as:  FLOMAX   Take 1 capsule (0.4 mg) by mouth daily   Last time this was given:  0.4 mg on 7/2/2018 10:24 PM                                timolol (PF) 0.5 % ophthalmic solution   Commonly known as:  TIMOPTIC OCUDOSE   Place 1 drop into the right eye 2 times daily   Last time this was given:  1 drop on 7/3/2018  8:58 AM

## 2018-07-01 NOTE — IP AVS SNAPSHOT
` `     [place] [address] [facility]: [phone]            Medication Administration Report for Elias Mckee as of 07/03/18 1713   Legend:    Given Hold Not Given Due Canceled Entry Other Actions    Time Time (Time) Time  Time-Action       Inactive    Active    Linked        Medications 06/27/18 06/28/18 06/29/18 06/30/18 07/01/18 07/02/18 07/03/18    acetaminophen (TYLENOL) Suppository 650 mg  Dose: 650 mg  Freq: EVERY 4 HOURS PRN Route: RE  PRN Reason: mild pain  Start: 07/01/18 1315   Admin Instructions: Alternate ibuprofen (if ordered) with acetaminophen.  Maximum acetaminophen dose from all sources = 75 mg/kg/day not to exceed 4 grams/day.    Admin. Amount: 1 suppository (1 × 650 mg suppository)  Dispense Loc: [facility]               acetaminophen (TYLENOL) tablet 1,000 mg  Dose: 1,000 mg  Freq: 3 TIMES DAILY Route: PO  Start: 07/01/18 1600   Admin Instructions: Maximum acetaminophen dose from all sources = 75 mg/kg/day not to exceed 4 gram    Admin. Amount: 2 tablet (2 × 500 mg tablet)  Last Admin: 07/03/18 1711  Dispense Loc: [address]         1638 (1,000 mg)-Given       2100 (1,000 mg)-Given        1018 (1,000 mg)-Given       1630 (1,000 mg)-Given       2224 (1,000 mg)-Given        0859 (1,000 mg)-Given       1711 (1,000 mg)-Given       [ ] 2200           acetaminophen (TYLENOL) tablet 650 mg  Dose: 650 mg  Freq: EVERY 4 HOURS PRN Route: PO  PRN Reason: mild pain  Start: 07/01/18 1315   Admin Instructions: Alternate ibuprofen (if ordered) with acetaminophen.  Maximum acetaminophen dose from all sources = 75 mg/kg/day not to exceed 4 grams/day.    Admin. Amount: 2 tablet (2 × 325 mg tablet)  Dispense Loc: [place] [address]               atorvastatin (LIPITOR) tablet 40 mg  Dose: 40 mg  Freq: AT BEDTIME Route: PO  Start: 07/01/18 2200   Admin. Amount: 1 tablet (1 × 40 mg tablet)  Last Admin: 07/02/18 2224  Dispense Loc: [address]         0461 (40 mg)-Given               2224 (40  mg)-Given        [ ] 2200           bisacodyl (DULCOLAX) Suppository 10 mg  Dose: 10 mg  Freq: DAILY PRN Route: RE  PRN Reason: constipation  Start: 07/01/18 1315   Admin Instructions: Hold for loose stools.  This is the third step of a three step constipation treatment.    Admin. Amount: 1 suppository (1 × 10 mg suppository)  Dispense Loc:  [facility]               cefTRIAXone (ROCEPHIN) 1 g vial to attach to  mL bag for ADULTS or NS 50 mL bag for PEDS  Dose: 1 g  Freq: EVERY 24 HOURS Route: IV  Indications of Use: URINARY TRACT INFECTION  Last Dose: 1 g (07/03/18 0033)  Start: 07/02/18 0015   Admin. Amount: 1 g  Last Admin: 07/03/18 0033  Dispense Loc:  [facility]  Infused Over: 15-30 Minutes  Volume: 10 mL          0036 (1 g)-New Bag        0033 (1 g)-New Bag           glucose gel 15-30 g  Dose: 15-30 g  Freq: EVERY 15 MIN PRN Route: PO  PRN Reason: low blood sugar  Start: 07/01/18 1315   Admin Instructions: Give 15 g for BG 51 to 69 mg/dL IF patient is conscious and able to swallow. Give 30 g for BG less than or equal to 50 mg/dL IF patient is conscious and able to swallow. Do NOT give glucose gel via enteral tube.  IF patient has enteral tube: give apple juice 120 mL (4 oz or 15 g of CHO) via enteral tube for BG 51 to 69 mg/dL.  Give apple juice 240 mL (8 oz or 30 g of CHO) via enteral tube for BG less than or equal to 50 mg/dL.    ~Oral gel is preferable for conscious and able to swallow patient.   ~IF gel unavailable or patient refuses may provide apple juice 120 mL (4 oz or 15 g of CHO). Document juice on I and O flowsheet.    Admin. Amount: 15-30 g  Dispense Loc: [facility]  Volume: 93.75 mL              Or  dextrose 50 % injection 25-50 mL  Dose: 25-50 mL  Freq: EVERY 15 MIN PRN Route: IV  PRN Reason: low blood sugar  Start: 07/01/18 1315   Admin Instructions: Use if have IV access, BG less than 70 mg/dL and meet dose criteria below:  Dose if conscious and alert (or disorientated) and NPO = 25 mL  Dose  if unconscious / not alert = 50 mL  Vesicant. For ordered doses up to 25 g, give IV Push undiluted. Give each 5g over 1 minute.    Admin. Amount: 25-50 mL  Dispense Loc:  [facility]  Infused Over: 1-5 Minutes  Volume: 50 mL              Or  glucagon injection 1 mg  Dose: 1 mg  Freq: EVERY 15 MIN PRN Route: SC  PRN Reason: low blood sugar  PRN Comment: May repeat x 1 only  Start: 07/01/18 1315   Admin Instructions: May give SQ or IM. ONLY use glucagon IF patient has NO IV access AND is UNABLE to swallow AND blood glucose is LESS than or EQUAL to 50 mg/dL.  If ordered IV, give IV Push over 1 minute. Reconstitute with 1mL sterile water.    Admin. Amount: 1 mg  Dispense Loc: [facility] 66[facility]               hydrALAZINE (APRESOLINE) injection 10 mg  Dose: 10 mg  Freq: EVERY 4 HOURS PRN Route: IV  PRN Reason: high blood pressure  PRN Comment: give for SBP > 180  Start: 07/01/18 1315   Admin Instructions: For ordered doses up to 40 mg, give IV Push undiluted over 1 minute.    Admin. Amount: 10 mg = 0.5 mL Conc: 20 mg/mL  Dispense Loc:  [facility]  Volume: 0.5 mL               insulin aspart (NovoLOG) inj (RAPID ACTING)  Freq: 3 TIMES DAILY WITH MEALS Route: SC  Start: 07/01/18 1330   Admin Instructions: Dose = 1 units/carb unit  If given at mealtime, must be administered 5 min before meal or immediately after.    Last Admin: 07/03/18 1443  Dispense Loc: Contact Rx for dose  Volume: 3 mL         1638 (5 Units)-Given               1018 (4 Units)-Given [C]       1429 (3 Units)-Given [C]       1919 (6 Units)-Given [C]        1046 (2 Units)-Given [C]       1443 (2 Units)-Given       (1712)-Not Given [C]           insulin aspart (NovoLOG) inj (RAPID ACTING)  Dose: 1-5 Units  Freq: AT BEDTIME Route: SC  Start: 07/01/18 2200   Admin Instructions: MEDIUM INSULIN RESISTANCE DOSING    Do Not give Bedtime Correction Insulin if BG less than  200.   For  - 249 give 1 units.   For  - 299 give 2 units.   For  - 349 give 3 units.    For  -399 give 4 units.   For BG greater than or equal to 400 give 5 units.  Notify provider if glucose greater than or equal to 350 mg/dL after administration of correction dose.  If given at mealtime, must be administered 5 min before meal or immediately after.    Admin. Amount: 1-5 Units  Dispense Loc: Contact Rx for dose  Volume: 3 mL         (2238)-Not Given [C]        (2225)-Not Given        [ ] 2200           insulin aspart (NovoLOG) inj (RAPID ACTING)  Dose: 1-7 Units  Freq: 3 TIMES DAILY BEFORE MEALS Route: SC  Start: 07/01/18 1330   Admin Instructions: Correction Scale - MEDIUM INSULIN RESISTANCE DOSING     Do Not give Correction Insulin if Pre-Meal BG less than 140.   For Pre-Meal  - 189 give 1 unit.   For Pre-Meal  - 239 give 2 units.   For Pre-Meal  - 289 give 3 units.   For Pre-Meal  - 339 give 4 units.   For Pre-Meal - 399 give 5 units.   For Pre-Meal -449 give 6 units  For Pre-Meal BG greater than or equal to 450 give 7 units.   To be given with prandial insulin, and based on pre-meal blood glucose.    Notify provider if glucose greater than or equal to 350 mg/dL after administration of correction dose.  If given at mealtime, must be administered 5 min before meal or immediately after.    Admin. Amount: 1-7 Units  Last Admin: 07/03/18 1443  Dispense Loc: Contact Rx for dose  Volume: 3 mL         (1501)-Not Given       (1638)-Not Given        (0952)-Not Given       1428 (1 Units)-Given       (1750)-Not Given [C]        (0916)-Not Given       1443 (1 Units)-Given       (1711)-Not Given [C]           insulin glargine (LANTUS) injection 20 Units  Dose: 20 Units  Freq: AT BEDTIME Route: SC  Start: 07/01/18 2200   Admin. Amount: 20 Units  Last Admin: 07/02/18 2224  Dispense Loc:  Main Pharmacy  Volume: 0.2 mL         2239 (20 Units)-Given        2224 (20 Units)-Given        [ ] 2200           levothyroxine (SYNTHROID/LEVOTHROID) tablet 25 mcg  Dose: 25  "mcg  Freq: DAILY Route: PO  Start: 07/01/18 1330   Admin. Amount: 1 tablet (1 × 25 mcg tablet)  Last Admin: 07/03/18 0859  Dispense Loc: [facility] 66                 1017 (25 mcg)-Given        0859 (25 mcg)-Given           lidocaine (LMX4) cream  Freq: EVERY 1 HOUR PRN Route: Top  PRN Reason: pain  PRN Comment: with VAD insertion or accessing implanted port.  Start: 07/01/18 1315   Admin Instructions: Do NOT give if patient has a history of allergy to any local anesthetic or any \"maurilio\" product.   Apply 30 minutes prior to VAD insertion or port access.  MAX Dose:  2.5 g (  of 5 g tube)    Dispense Loc: Contact Rx for dose               lidocaine 1 % 1 mL  Dose: 1 mL  Freq: EVERY 1 HOUR PRN Route: OTHER  PRN Comment: mild pain with VAD insertion or accessing implanted port  Start: 07/01/18 1315   Admin Instructions: Do NOT give if patient has a history of allergy to any local anesthetic or any \"maurilio\" product. MAX dose 1 mL subcutaneous OR intradermal in divided doses.    Admin. Amount: 1 mL  Dispense Loc: [facility] 66C  Volume: 20 mL               lisinopril (PRINIVIL/ZESTRIL) tablet 20 mg  Dose: 20 mg  Freq: DAILY Route: PO  Start: 07/03/18 0900   Admin Instructions: Hold if SBP <100    Admin. Amount: 1 tablet (1 × 20 mg tablet)  Last Admin: 07/03/18 0859  Dispense Loc: [address]           0859 (20 mg)-Given           magnesium hydroxide (MILK OF MAGNESIA) suspension 30 mL  Dose: 30 mL  Freq: DAILY PRN Route: PO  PRN Reason: constipation  Start: 07/01/18 1315   Admin Instructions: Hold for loose stools.  This is the second step of a three step constipation treatment.    Admin. Amount: 30 mL  Dispense Loc:  [facility]  Volume: 30 mL               magnesium sulfate 2 g in water intermittent infusion  Dose: 2 g  Freq: DAILY PRN Route: IV  PRN Reason: magnesium supplementation  Last Dose: 2 g (07/02/18 1431)  Start: 07/02/18 1056   Admin Instructions: For Serum Mg++ 1.6 - 2 mg/dL  Give 2 g and recheck magnesium level next " AM.    Admin. Amount: 2 g = 50 mL Conc: 0.04 g/mL  Last Admin: 07/02/18 1431  Dispense Loc: Contact Rx for dose  Infused Over: 60 Minutes  Volume: 50 mL          1431 (2 g)-New Bag            magnesium sulfate 4 g in 100 mL sterile water (premade)  Dose: 4 g  Freq: EVERY 4 HOURS PRN Route: IV  PRN Reason: magnesium supplementation  Start: 07/02/18 1056   Admin Instructions: For serum Mg++ less than 1.6 mg/dL  Give 4 g and recheck magnesium level 2 hours after dose, and next AM.    Admin. Amount: 4 g = 100 mL Conc: 4 g/100 mL  Dispense Loc: Contact Rx for dose  Infused Over: 120 Minutes  Volume: 100 mL               melatonin tablet 1 mg  Dose: 1 mg  Freq: AT BEDTIME PRN Route: PO  PRN Reason: sleep  Start: 07/01/18 1315   Admin Instructions: Do not give unless at least 6 hours of uninterrupted sleep is expected.    Admin. Amount: 1 tablet (1 × 1 mg tablet)  Dispense Loc:  [facility]               naloxone (NARCAN) injection 0.1-0.4 mg  Dose: 0.1-0.4 mg  Freq: EVERY 2 MIN PRN Route: IV  PRN Reason: opioid reversal  Start: 07/01/18 1315   Admin Instructions: For respiratory rate LESS than or EQUAL to 8.  Partial reversal dose:  0.1 mg titrated q 2 minutes for Analgesia Side Effects Monitoring Sedation Level of 3 (frequently drowsy, arousable, drifts to sleep during conversation).Full reversal dose:  0.4 mg bolus for Analgesia Side Effects Monitoring Sedation Level of 4 (somnolent, minimal or no response to stimulation).  For ordered doses up to 2mg give IVP. Give each 0.4mg over 15 seconds in emergency situations. For non-emergent situations further dilute in 9mL of NS to facilitate titration of response.    Admin. Amount: 0.1-0.4 mg = 0.25-1 mL Conc: 0.4 mg/mL  Dispense Loc:  [facility]  Volume: 1 mL               nitroGLYcerin (NITROSTAT) sublingual tablet 0.4 mg  Dose: 0.4 mg  Freq: EVERY 5 MIN PRN Route: SL  PRN Reason: chest pain  Start: 07/01/18 1315   Admin Instructions: Maximum 3 doses in 15 minutes.  Notify  provider if no relief after 3 doses.    Do NOT give nitroglycerin SL IF the patient has taken avanafil (STENDRA), sildenafil (VIAGRA) or (REVATIO) within the last 8 hours, vardenafil (LEVITRA) or (STAXYN) within the last 18 hours, tadalafil (CIALIS) or (ADCIRCA) within the last 36 hours. Inform provider if patient has taken one of these medications.  If patient is still having acute angina requiring treatment, an alternative treatment option may be used such as: IV beta-blocker [2.5 mg - 5 mg metoprolol (LOPRESSOR)] if ordered by a provider.    Admin. Amount: 1 tablet (1 × 0.4 mg tablet)  Dispense Loc: [facility]               ondansetron (ZOFRAN-ODT) ODT tab 4 mg  Dose: 4 mg  Freq: EVERY 6 HOURS PRN Route: PO  PRN Reasons: nausea,vomiting  Start: 07/01/18 1315   Admin Instructions: This is Step 1 of nausea and vomiting management.  If nausea not resolved in 15 minutes, go to Step 2 prochlorperazine (COMPAZINE). Do not push through foil backing. Peel back foil and gently remove. Place on tongue immediately. Administration with liquid unnecessary  With dry hands, peel back foil backing and gently remove tablet; do not push oral disintegrating tablet through foil backing; administer immediately on tongue and oral disintegrating tablet dissolves in seconds; then swallow with saliva; liquid not required.    Admin. Amount: 1 tablet (1 × 4 mg tablet)  Dispense Loc: [facility]              Or  ondansetron (ZOFRAN) injection 4 mg  Dose: 4 mg  Freq: EVERY 6 HOURS PRN Route: IV  PRN Reasons: nausea,vomiting  Start: 07/01/18 1315   Admin Instructions: This is Step 1 of nausea and vomiting management.  If nausea not resolved in 15 minutes, go to Step 2 prochlorperazine (COMPAZINE).  Irritant. For ordered doses up to 4 mg, give IV Push undiluted over 2-5 minutes.    Admin. Amount: 4 mg = 2 mL Conc: 4 mg/2 mL  Dispense Loc: [facility]  Infused Over: 2-5 Minutes  Volume: 2 mL               Patient is already receiving  anticoagulation with heparin, enoxaparin (LOVENOX), warfarin (COUMADIN)  or other anticoagulant medication  Freq: CONTINUOUS PRN Route: XX  Start: 07/01/18 1315   Dispense Loc: [facility] Floor Stock               polyethylene glycol (MIRALAX/GLYCOLAX) powder 17 g  Dose: 17 g  Freq: DAILY Route: PO  Start: 07/01/18 1330   Admin Instructions: 1 Packet = 17 grams. Mixed prescribed dose in 8 ounces of water.    Admin. Amount: 17 g  Last Admin: 07/03/18 0902  Dispense Loc:  Main Pharmacy                 (0952)-Not Given [C]        0902 (17 g)-Given           prochlorperazine (COMPAZINE) injection 5 mg  Dose: 5 mg  Freq: EVERY 6 HOURS PRN Route: IV  PRN Reasons: nausea,vomiting  Start: 07/01/18 1315   Admin Instructions: This is Step 2 of nausea and vomiting management. Give if nausea not resolved 15 minutes after giving ondansetron (ZOFRAN). If nausea not resolved in 15 minutes, go to Step 3 metoclopramide (REGLAN), if ordered.  For ordered doses up to 10 mg, give IV Push undiluted. Each 5mg over 1 minute.    Admin. Amount: 5 mg = 1 mL Conc: 5 mg/mL  Dispense Loc:  [facility]  Infused Over: 1-2 Minutes  Volume: 1 mL              Or  prochlorperazine (COMPAZINE) tablet 5 mg  Dose: 5 mg  Freq: EVERY 6 HOURS PRN Route: PO  PRN Reason: vomiting  Start: 07/01/18 1315   Admin Instructions: This is Step 2 of nausea and vomiting management. Give if nausea not resolved 15 minutes after giving ondansetron (ZOFRAN). If nausea not resolved in 15 minutes, go to Step 3 metoclopramide (REGLAN), if ordered.    Admin. Amount: 1 tablet (1 × 5 mg tablet)  Dispense Loc:  [facility]              Or  prochlorperazine (COMPAZINE) Suppository 12.5 mg  Dose: 12.5 mg  Freq: EVERY 12 HOURS PRN Route: RE  PRN Reasons: nausea,vomiting  Start: 07/01/18 1315   Admin Instructions: This is Step 2 of nausea and vomiting management. Give if nausea not resolved 15 minutes after giving ondansetron (ZOFRAN). If nausea not resolved in 15 minutes, go to Step 3  metoclopramide (REGLAN), if ordered.    Admin. Amount: 0.5 suppository (0.5 × 25 mg suppository)  Dispense Loc:  Main Pharmacy               rivaroxaban ANTICOAGULANT (XARELTO) tablet 20 mg  Dose: 20 mg  Freq: DAILY WITH SUPPER Route: PO  Start: 07/02/18 1700   Admin Instructions: Dose adjusted per renal dosing policy.  Estimated CrCl = 53 ml/min    Admin. Amount: 1 tablet (1 × 20 mg tablet)  Last Admin: 07/03/18 1711  Dispense Loc: [facility] 66C          1631 (20 mg)-Given        1711 (20 mg)-Given           senna-docusate (SENOKOT-S;PERICOLACE) 8.6-50 MG per tablet 1 tablet  Dose: 1 tablet  Freq: 2 TIMES DAILY PRN Route: PO  PRN Reason: constipation  Start: 07/01/18 1315   Admin Instructions: If no bowel movement in 24 hours, increase to 2 tablets PO.  Hold for loose stools.  This is the first step of a three step constipation treatment.    Admin. Amount: 1 tablet  Dispense Loc:  [facility]              Or  senna-docusate (SENOKOT-S;PERICOLACE) 8.6-50 MG per tablet 2 tablet  Dose: 2 tablet  Freq: 2 TIMES DAILY PRN Route: PO  PRN Reason: constipation  Start: 07/01/18 1315   Admin Instructions: Hold for loose stools.  This is the first step of a three step constipation treatment.    Admin. Amount: 2 tablet  Dispense Loc: [facility] 66C               sodium chloride (PF) 0.9% PF flush 10 mL  Dose: 10 mL  Freq: EVERY 8 HOURS Route: IK  Start: 07/03/18 0900   Admin. Amount: 10 mL  Last Admin: 07/03/18 0900  Dispense Loc: [facility] Floor Stock  Volume: 10 mL           0900 (10 mL)-Given       (1651)-Not Given           sodium chloride (PF) 0.9% PF flush 3 mL  Dose: 3 mL  Freq: EVERY 8 HOURS Route: IK  Start: 07/01/18 1330   Admin Instructions: And Q1H PRN, to lock peripheral IV dormant line.    Admin. Amount: 3 mL  Last Admin: 07/02/18 0454  Dispense Loc: [facility] Floor Stock  Volume: 3 mL         (1503)-Not Given       2239 (3 mL)-Given        0454 (3 mL)-Given       (1342)-Not Given       (2051)-Not Given        (0433)-Not Given        (1251)-Not Given [C]       [ ] 2130           sodium chloride (PF) 0.9% PF flush 3 mL  Dose: 3 mL  Freq: EVERY 1 HOUR PRN Route: IK  PRN Reason: line flush  PRN Comment: for peripheral IV flush post IV meds  Start: 18 1315   Admin. Amount: 3 mL  [facility]  Volume: 3 mL                 Rate: 75 mL/hr   Freq: CONTINUOUS Route: IV  Start: 18 1615   End: 18   Last Admin: 18  [facility]  Volume: 1,000 mL          1631 ( )-New Bag       2316 ( )-New Bag        0214-Med Discontinued       tamsulosin (FLOMAX) capsule 0.4 mg  Dose: 0.4 mg  Freq: DAILY Route: PO  Start: 18 1330   Admin Instructions: Administer 30 minutes after the same meal each day.  Capsules should be swallowed whole; do not crush chew or open.    Admin. Amount: 1 capsule (1 × 0.4 mg capsule)  Last Admin: 18 222  Dispense Loc:  [facility]                 (0.4 mg)-Given        2224 (0.4 mg)-Given        [ ] 2100           timolol (TIMOPTIC) 0.5 % ophthalmic solution 1 drop  Dose: 1 drop  Freq: 2 TIMES DAILY Route: RIGHT EYE  Start: 18   Admin. Amount: 1 drop  Last Admin: 18 0858  Dispense Loc:  Main Pharmacy  Volume: 5 mL         205 (1 drop)-Given        1018 (1 drop)-Given       2224 (1 drop)-Given        0858 (1 drop)-Given       [ ] 2100           traMADol (ULTRAM) tablet 50 mg  Dose: 50 mg  Freq: EVERY 6 HOURS PRN Route: PO  PRN Reason: moderate to severe pain  Start: 18 1318   Admin. Amount: 1 tablet (1 × 50 mg tablet)  Dispense Loc: [facility] 66C              Completed Medications  Medications 18         Dose: 500 mL  Freq: ONCE Route: IV  Last Dose: 500 mL (18 1201)  Start: 18 1145   End: 18 1401   Admin. Amount: 500 mL  Last Admin: 18 1201  Dispense Loc: [facility] Floor Stock  Infused Over: 2 Hours  Administrations Remainin  Volume: 500 mL          1201  (500 mL)-New Bag              Dose: 80 mL  Freq: ONCE Route: IV  Start: 18   End: 18   Admin. Amount: 80 mL  Last Admin: 18  Dispense Loc:  [facility] FLOOR STOCK  Administrations Remainin  Volume: 80 mL         0957 (80 mL)-Given               Dose: 3 mL  Freq: ONCE Route: IV  Start: 18   End: 18   Admin Instructions: NDC  [id number]    Admin. Amount: 3 mL  Last Admin: 18  Dispense Loc:  [facility] FLOOR STOCK  Administrations Remainin  Volume: 9 mL   Current Line: Peripheral IV 17 Right Lower forearm          0900 (3 mL)-Given             Dose: 80 mL  Freq: ONCE Route: IV  Start: 18   End: 18   Admin. Amount: 80 mL  Last Admin: 18  Dispense Loc:  [facility] FLOOR STOCK  Administrations Remainin  Volume: 500 mL         0958 (80 mL)-Given               Freq: ONCE Route: IV  Last Dose: Stopped (18 104)  Start: 1845   End: 18 104   Last Admin: 18  Dispense Loc: [facility] Floor Stock  Administrations Remainin  Volume: 1,000 mL         1007 ( )-New Bag       1047-Stopped            Discontinued Medications  Medications 18         Dose: 15 mg  Freq: DAILY WITH SUPPER Route: PO  Start: 18 1700   End: 18 1331   Admin Instructions: Dose adjusted per renal dosing policy.  Estimated CrCl = 30-50 mL/min. <br>    Admin. Amount: 1 tablet (1 × 15 mg tablet)  Last Admin: 18 163  Dispense Loc:  [facility]         1638 (15 mg)-Given        1331-Med Discontinued          Rate: 100 mL/hr   Freq: CONTINUOUS Route: IV  Last Dose: Stopped (18 2341)  Start: 18 1330   End: 189   Admin Instructions: Stop after 1 liter    Last Admin: 18  Dispense Loc: [facility] Floor Stock  Volume: 1,000 mL         1358 ( )-New Bag        ( )-Rate/Dose Verify        ( )-Rate/Dose Verify       -Med  Discontinued  2341-Stopped               Freq: ONCE Route: IV  Start: 18   End: 18 1315   Dispense Loc: [facility] Floor Stock  Administrations Remainin  Volume: 1,000 mL         [ ] 1104       1315-Med Discontinued

## 2018-07-01 NOTE — CONSULTS
[facility]    Cardiac Electrophysiology Consultation     Date of Admission:  7/1/2018  Date of Consult (When I saw the patient): 07/01/18    Assessment & Plan   Elias Mckee is a 86 year old male who was admitted on 7/1/2018. I was asked to see the patient for unresponsive episode. Permanent AF on Metoprolol 25 mg bid (reduced recently for fatigue) with recurrent CVA with persistent left-sided hemiplegia, CAD with multiple PCI in the past with last stress test 2014 showing inferior and inferoseptal infarct noted to be unresponsive while eating his meal briefly. EMS reported bp 70/30 and rate of 30 bpm. No ECG documentation in chart.  ECG in ED shows afib with VR of 70 bpm. CT shows no head bleed.    Currently rate in high 50-70's. Metoprolol held.   Unfortunately, no ECG documentation of bola episodes as it could have been CHB with junctional escape. As it is agree with holding BB and no significant bola note overnight recommending 30 days Event monitor. Continue with Xarelto.    Ricky Swenson    Code Status    Full Code    Primary Care Physician   Yvette Bonner    History is obtained from the patient    Past Medical History   I have reviewed this patient's medical history and updated it with pertinent information if needed.   Past Medical History:   Diagnosis Date     Acute, but ill-defined, cerebrovascular disease 1-2008    Left hemiparesis, left side neglect     Atrial fibrillation (H)      Benign hypertension with CKD (chronic kidney disease) stage III 6/5/2017     Central retinal vein occlusion, right eye 11/15/2017     Chronic atrial fibrillation (H) 6/5/2017     Chronic ischemic heart disease, unspecified      Cognitive deficits as late effect of cerebrovascular disease 6/5/2017     Coronary artery disease      CVA (cerebral infarction)      Dysphagia due to recent cerebrovascular accident (CVA) 6/5/2017     Elevated cholesterol      Essential hypertension, benign      H/O  prostate cancer 7/6/2017     H/O: CVA (cerebrovascular accident) 6/5/2017     Hemiparesis affecting left side as late effect of cerebrovascular accident (H) 6/5/2017     Hyperlipidaemia      MEDICAL HISTORY OF - 1- 2008    V fib arrest      Myocardial infarction      Onychomycosis 6/5/2017     Other and unspecified hyperlipidemia      PVD (peripheral vascular disease) (H) 12/12/2017     Type 2 diabetes mellitus with diabetic peripheral angiopathy with gangrene (H) 12/12/2017     Type 2 diabetes mellitus with stage 3 chronic kidney disease (H) 6/5/2017     Type II diabetes mellitus with peripheral circulatory disorder (H) 12/12/2017     Type II or unspecified type diabetes mellitus without mention of complication, not stated as uncontrolled 1-2008       Past Surgical History   I have reviewed this patient's surgical history and updated it with pertinent information if needed.  Past Surgical History:   Procedure Laterality Date     PHACOEMULSIFICATION CLEAR CORNEA WITH STANDARD INTRAOCULAR LENS IMPLANT Right 10/7/2014    Procedure: PHACOEMULSIFICATION CLEAR CORNEA WITH STANDARD INTRAOCULAR LENS IMPLANT;  Surgeon: German Thomas MD;  Location:  [facility]     SEED IMPLANTATION  2002    prostate cancer     VITRECTOMY ANTERIOR Right 10/7/2014    Procedure: VITRECTOMY ANTERIOR;  Surgeon: German Thomas MD;  Location:  [facility]       Prior to Admission Medications   Prior to Admission Medications   Prescriptions Last Dose Informant Patient Reported? Taking?   ACETAMINOPHEN PO 6/30/2018 at x3 Nursing Home Yes Yes   Sig: Take 1,000 mg by mouth 3 times daily For pain   Atorvastatin Calcium (LIPITOR PO) 6/30/2018 at Unknown time Nursing Home Yes Yes   Sig: Take 40 mg by mouth At Bedtime   BASAGLAR 100 UNIT/ML injection 6/30/2018 at Unknown time Nursing Home Yes Yes   Sig: Inject 30 Units Subcutaneous At Bedtime   Fluticasone Propionate (FLONASE NA) 6/30/2018 at Unknown time Nursing Home Yes Yes   Sig: Spray 2 sprays into  both nostrils daily   Insulin Aspart (NOVOLOG SC) 6/30/2018 at x2 Nursing Home Yes Yes   Sig: Inject 4 Units Subcutaneous 2 times daily With breakfast and lunch   LEVOTHYROXINE SODIUM PO 6/30/2018 at Unknown time Nursing Home Yes Yes   Sig: Take 25 mcg by mouth daily   LISINOPRIL PO 6/30/2018 at x2 Nursing Home Yes Yes   Sig: Take 20 mg by mouth 2 times daily   METOPROLOL TARTRATE PO 6/30/2018 at x2 Nursing Home Yes Yes   Sig: Take 25 mg by mouth 2 times daily Hold for systolic BP < 100 or pulse < 58.   Skin Protectants, Misc. (EUCERIN) cream 6/30/2018 at x2 Nursing Home Yes Yes   Sig: Apply to toes two times a day   TRAMADOL HCL PO 6/30/2018 at Unknown time Nursing Home Yes Yes   Sig: Take 50 mg by mouth every 6 hours as needed for moderate to severe pain   cholecalciferol (VITAMIN D) 1000 UNIT tablet 6/30/2018 at Unknown time Nursing Home Yes Yes   Sig: Take 2,000 Units by mouth daily   polyethylene glycol (MIRALAX/GLYCOLAX) powder 6/30/2018 at Unknown time Nursing Home Yes Yes   Sig: Take 17 g by mouth daily    rivaroxaban ANTICOAGULANT (XARELTO) 20 MG TABS tablet 6/30/2018 at Unknown time senior care No Yes   Sig: Take 1 tablet (20 mg) by mouth daily (with dinner)   tamsulosin (FLOMAX) 0.4 MG capsule 6/30/2018 at Unknown time senior care No Yes   Sig: Take 1 capsule (0.4 mg) by mouth daily   timolol, PF, (TIMOPTIC OCUDOSE) 0.5 % ophthalmic solution 6/30/2018 at x2 Nursing Home No Yes   Sig: Place 1 drop into the right eye 2 times daily      Facility-Administered Medications: None     Allergies   Allergies   Allergen Reactions     No Known Allergies        Social History   I have reviewed this patient's social history and updated it with pertinent information if needed. Elias EDUARDO Rylee  reports that he quit smoking about 45 years ago. His smoking use included Cigarettes. He started smoking about 65 years ago. He has a 20.00 pack-year smoking history. He has never used smokeless tobacco. He reports that he  drinks about 0.6 oz of alcohol per week  He reports that he does not use illicit drugs.    Family History   I have reviewed this patient's family history and updated it with pertinent information if needed.   No family history on file.    Review of Systems   Comprehensive review of systems was performed with pertinent positives and negatives listed in assessment and plan section.    Physical Exam   Temp: 97.3  F (36.3  C)   BP: 124/83   Heart Rate: 61 Resp: 16 SpO2: 96 %      Vital Signs with Ranges  Temp:  [97.3  F (36.3  C)] 97.3  F (36.3  C)  Heart Rate:  [54-67] 61  Resp:  [9-22] 16  BP: ()/(51-83) 124/83  SpO2:  [94 %-98 %] 96 %  182 lbs 12.18 oz    Constitutional: awake, alert, cooperative, no apparent distress, and appears stated age  Eyes: Lids and lashes normal, pupils equal, round and reactive to light, extra ocular muscles intact, sclera clear, conjunctiva normal  ENT: Normocephalic, without obvious abnormality, atraumatic, sinuses nontender on palpation, external ears without lesions, oral pharynx with moist mucous membranes, tonsils without erythema or exudates, gums normal and good dentition.  Hematologic / Lymphatic: no cervical lymphadenopathy  Respiratory: No increased work of breathing, good air exchange, clear to auscultation bilaterally, no crackles or wheezing  Cardiovascular: irregularly irregular rhythm  GI: No scars, normal bowel sounds, soft, non-distended, non-tender, no masses palpated, no hepatosplenomegally  Skin: no bruising or bleeding  Musculoskeletal: there is no redness, warmth, or swelling of the joints, left sided weakness  Neurologic: Awake, alert, oriented to name, place and time.  Neuropsychiatric: General: normal, calm and normal eye contact    Data   I personally reviewed all recent ECGs and images.  Results for orders placed or performed during the hospital encounter of 07/01/18 (from the past 24 hour(s))   INR   Result Value Ref Range    INR 1.30 (H) 0.86 - 1.14   PTT    Result Value Ref Range    PTT 37 22 - 37 sec   CBC with platelets differential   Result Value Ref Range    WBC 7.5 4.0 - 11.0 10e9/L    RBC Count 3.70 (L) 4.4 - 5.9 10e12/L    Hemoglobin 11.4 (L) 13.3 - 17.7 g/dL    Hematocrit 33.7 (L) 40.0 - 53.0 %    MCV 91 78 - 100 fl    MCH 30.8 26.5 - 33.0 pg    MCHC 33.8 31.5 - 36.5 g/dL    RDW 16.3 (H) 10.0 - 15.0 %    Platelet Count 157 150 - 450 10e9/L    Diff Method Automated Method     % Neutrophils 69.0 %    % Lymphocytes 11.9 %    % Monocytes 17.7 %    % Eosinophils 0.8 %    % Basophils 0.3 %    % Immature Granulocytes 0.3 %    Nucleated RBCs 0 0 /100    Absolute Neutrophil 5.2 1.6 - 8.3 10e9/L    Absolute Lymphocytes 0.9 0.8 - 5.3 10e9/L    Absolute Monocytes 1.3 0.0 - 1.3 10e9/L    Absolute Eosinophils 0.1 0.0 - 0.7 10e9/L    Absolute Basophils 0.0 0.0 - 0.2 10e9/L    Abs Immature Granulocytes 0.0 0 - 0.4 10e9/L    Absolute Nucleated RBC 0.0    Comprehensive metabolic panel   Result Value Ref Range    Sodium 138 133 - 144 mmol/L    Potassium 4.8 3.4 - 5.3 mmol/L    Chloride 104 94 - 109 mmol/L    Carbon Dioxide 26 20 - 32 mmol/L    Anion Gap 8 3 - 14 mmol/L    Glucose 160 (H) 70 - 99 mg/dL    Urea Nitrogen 29 7 - 30 mg/dL    Creatinine 1.75 (H) 0.66 - 1.25 mg/dL    GFR Estimate 37 (L) >60 mL/min/1.7m2    GFR Estimate If Black 45 (L) >60 mL/min/1.7m2    Calcium 8.5 8.5 - 10.1 mg/dL    Bilirubin Total 0.4 0.2 - 1.3 mg/dL    Albumin 3.1 (L) 3.4 - 5.0 g/dL    Protein Total 6.7 (L) 6.8 - 8.8 g/dL    Alkaline Phosphatase 68 40 - 150 U/L    ALT 14 0 - 70 U/L    AST 10 0 - 45 U/L   Troponin I   Result Value Ref Range    Troponin I ES <0.015 0.000 - 0.045 ug/L   Hemoglobin A1c   Result Value Ref Range    Hemoglobin A1C 7.4 (H) 0 - 5.6 %   EKG 12-lead, tracing only   Result Value Ref Range    Interpretation ECG Click View Image link to view waveform and result    Creatinine POCT   Result Value Ref Range    Creatinine 1.8 (H) 0.66 - 1.25 mg/dL    GFR Estimate 36 (L) >60  mL/min/1.7m2    GFR Estimate If Black 44 (L) >60 mL/min/1.7m2   CT Aortic Survey w Contrast    Narrative    CT AORTIC SURVEY WITH CONTRAST  7/1/2018 10:03 AM    HISTORY:  Chest and back pain.    TECHNIQUE: CT scan obtained of the chest, abdomen, and pelvis with  oral and IV contrast. 80 mL Isovue-370 injected. Aortic survey  protocol. Radiation dose for this scan was reduced using automated  exposure control, adjustment of the mA and/or kV according to patient  size, or iterative reconstruction technique.    COMPARISON:  None.    FINDINGS:  Chest:   No acute thoracic aortic abnormality.  No thoracic aortic dissection  or aneurysm is seen. Scattered areas of atherosclerotic plaque  throughout the thoracic aorta with small areas of penetrating ulcer  calcification. In particular, penetrating ulcer can be seen along the  descending thoracic aorta without adjacent inflammation. Coronary  artery calcifications. Cannot fully assess for pulmonary embolism. No  large central pulmonary embolism. Nondiagnostic assessment of the  middle and small branch vessels.    No evidence for adenopathy. No acute airspace disease. Minor bibasilar  atelectasis. Calcified granuloma at the right upper lobe incidentally  seen.    Abdomen/pelvis:   No acute abdominal aortic abnormality. No abdominal aortic dissection.  Atherosclerotic plaque seen within the abdominal aorta with areas of  penetrating ulcer at the proximal abdominal aorta. 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 Scattered areas of atherosclerotic plaque  within the aortic and arterial vessels. Patency of the celiac,  superior mesenteric, inferior mesenteric, and right and left renal  artery origins.    The liver, gallbladder, adrenals, spleen, and pancreas show no acute  abnormalities. Bilateral renal cortical thinning. Hypodense focus at  the lower right kidney is 0.9 cm, series 4 image 130, and  is  ultimately indeterminant. Kidneys have a somewhat heterogeneous  perfusion pattern that is subtle. No hydronephrosis. Prostate  radiation seeds. No bowel obstruction. Colonic diverticula without  diverticulitis. No enlarged lymph nodes. Faint gallstones identified.    No aggressive bony lesion is seen.      Impression    IMPRESSION:  1. No acute aortic abnormality. No convincing acute aortic dissection.  Moderate areas of atherosclerotic disease of the aorta and its  branches. 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 There is atherosclerosis throughout the aorta and its branches.  There are areas of penetrating ulcer visualized at the descending  thoracic aorta and upper abdomen without convincing acute  inflammation.  2. Renal cortical thinning bilaterally. Indeterminant small  hypoenhancing region within the lower right kidney. Consider  outpatient renal MRI for further assessment of this indeterminate  lesion. Further, the kidneys have a mildly heterogeneous enhancement  pattern. Correlate with urinalysis for any evidence for a urinary  tract infection and pyelonephritis.  3. No other acute abnormality.  4. A few faint gallstones.    VILMA LEW MD   Lactic acid whole blood   Result Value Ref Range    Lactic Acid 1.7 0.7 - 2.0 mmol/L   Glucose by meter   Result Value Ref Range    Glucose 134 (H) 70 - 99 mg/dL

## 2018-07-01 NOTE — PHARMACY-ADMISSION MEDICATION HISTORY
Admission medication history interview status for the 7/1/2018  admission is complete. See EPIC admission navigator for prior to admission medications     Medication history source reliability:Good    Actions taken by pharmacist (provider contacted, etc):None     Additional medication history information not noted on PTA med list :None    Medication added: Eucerin    Medication reconciliation/reorder completed by provider prior to medication history? No    Time spent in this activity: 20 minutes    Prior to Admission medications    Medication Sig Last Dose Taking? Auth Provider   ACETAMINOPHEN PO Take 1,000 mg by mouth 3 times daily For pain 6/30/2018 at x3 Yes Unknown, Entered By History   Atorvastatin Calcium (LIPITOR PO) Take 40 mg by mouth At Bedtime 6/30/2018 at Unknown time Yes Reported, Patient   BASAGLAR 100 UNIT/ML injection Inject 30 Units Subcutaneous At Bedtime 6/30/2018 at Unknown time Yes Unknown, Entered By History   cholecalciferol (VITAMIN D) 1000 UNIT tablet Take 2,000 Units by mouth daily 6/30/2018 at Unknown time Yes Reported, Patient   Fluticasone Propionate (FLONASE NA) Spray 2 sprays into both nostrils daily 6/30/2018 at Unknown time Yes Reported, Patient   Insulin Aspart (NOVOLOG SC) Inject 4 Units Subcutaneous 2 times daily With breakfast and lunch 6/30/2018 at x2 Yes Unknown, Entered By History   LEVOTHYROXINE SODIUM PO Take 25 mcg by mouth daily 6/30/2018 at Unknown time Yes Reported, Patient   LISINOPRIL PO Take 20 mg by mouth 2 times daily 6/30/2018 at x2 Yes Unknown, Entered By History   METOPROLOL TARTRATE PO Take 25 mg by mouth 2 times daily Hold for systolic BP < 100 or pulse < 58. 6/30/2018 at x2 Yes Unknown, Entered By History   polyethylene glycol (MIRALAX/GLYCOLAX) powder Take 17 g by mouth daily  6/30/2018 at Unknown time Yes Unknown, Entered By History   rivaroxaban ANTICOAGULANT (XARELTO) 20 MG TABS tablet Take 1 tablet (20 mg) by mouth daily (with dinner) 6/30/2018 at Unknown  time Yes Leona Hughes MD   Skin Protectants, Misc. (EUCERIN) cream Apply to toes two times a day 6/30/2018 at x2 Yes Unknown, Entered By History   tamsulosin (FLOMAX) 0.4 MG capsule Take 1 capsule (0.4 mg) by mouth daily 6/30/2018 at Unknown time Yes Kevin Bond MD   timolol, PF, (TIMOPTIC OCUDOSE) 0.5 % ophthalmic solution Place 1 drop into the right eye 2 times daily 6/30/2018 at x2 Yes Yvette Bonner APRN CNP   TRAMADOL HCL PO Take 50 mg by mouth every 6 hours as needed for moderate to severe pain 6/30/2018 at Unknown time Yes Reported, Patient

## 2018-07-01 NOTE — H&P
Admitted:     07/01/2018      PRIMARY CARE PHYSICIAN:  TOBY Salinas, CNP      CODE STATUS:  FULL CODE which was discussed with daughter at bedside.      CHIEF COMPLAINT:  Syncope.      HISTORY OF PRESENT ILLNESS:  Mr. Mckee is an 86-year-old male with a past medical history significant for previous stroke x 2 with left-sided hemiplegia, coronary artery disease, chronic atrial fibrillation, diabetes, peripheral vascular disease who presents to the emergency department with the above concerns.  History is obtained through discussion with the ED physician as well as the patient's daughter who is at bedside.  The patient lives in a care facility where he per report was taken down to  breakfast today being fairly normal and then was returned to his room again normal.  At some point after that he was found to be unresponsive and so EMS was summoned.  Per report, there was some slight arm twitching, but no tonic-clonic seizure activity.  EMS when they arrived in the field noted that his blood pressure was 70/30 with a heart rate in the 30s.  They gave him some atropine and his heart rate came up into the normal range and remaining in atrial fibrillation.  His mentation also improved.  He was sent to the emergency department where he initially was agitated.  There was also some report of him having some back or chest discomfort so a CT of the chest was done showing no evidence of dissection, but did show a mural thrombus and a descending thoracic aortic artery penetrating ulcer without dissection.        When I saw the patient, he was back to his baseline per the daughter resting comfortably in bed and able to answer questions.  The daughter notes that at baseline he is able to speak and will answer questions, though is not a huge conversationalist.  He also gets around with assistance at the nursing home, but does have some left-sided hemiplegia due to his previous strokes.      When I asked the patient,  he denied any chest discomfort, abdominal pain or nausea.  He felt just mildly short of breath.  His heart rate is now back up into the 50s-60s.  No other acute complaints at this point.  He does not have a history of seizures.  The daughter does note that sometime in the relatively recent past they did lower his dose of metoprolol.  The daughter believes this is because she asked them to look at his meds because he seemed to be more sleepy at times, especially on Sunday while going to [employer].      PAST MEDICAL HISTORY:   1.  CVA x 2:  Most recent was about a year ago in 2017.  He has some left-sided hemiplegia because of this and cognitive deficits.   2.  Coronary artery disease:  Per report 10 stents have been placed with the last angiogram done in 2008.  Preserved EF as of last year.   4.  Chronic atrial fibrillation, maintained on metoprolol and Xarelto.   5.  Hypertension.   6.  Diabetes.   7.  Peripheral vascular disease.   8.  Dyslipidemia.   9.  Prostate cancer with BPH.   10.  Right central vein occlusion of eye.   11.  Chronic kidney disease stage III, with a baseline creatinine of roughly 1.2.   12.  Basal cell carcinoma.   13.  Hypothyroidism.   14.  Glaucoma.      MEDICATIONS:    Prior to Admission medications    Medication Sig Last Dose Taking? Auth Provider   ACETAMINOPHEN PO Take 1,000 mg by mouth 3 times daily For pain 6/30/2018 at x3 Yes Unknown, Entered By History   Atorvastatin Calcium (LIPITOR PO) Take 40 mg by mouth At Bedtime 6/30/2018 at Unknown time Yes Reported, Patient   BASAGLAR 100 UNIT/ML injection Inject 20 Units Subcutaneous At Bedtime  Yes Cyndi Boland PA-C   cholecalciferol (VITAMIN D) 1000 UNIT tablet Take 2,000 Units by mouth daily 6/30/2018 at Unknown time Yes Reported, Patient   Fluticasone Propionate (FLONASE NA) Spray 2 sprays into both nostrils daily 6/30/2018 at Unknown time Yes Reported, Patient   Insulin Aspart (NOVOLOG SC) Inject 4 Units Subcutaneous  2 times daily With breakfast and lunch 6/30/2018 at x2 Yes Unknown, Entered By History   LEVOTHYROXINE SODIUM PO Take 25 mcg by mouth daily 6/30/2018 at Unknown time Yes Reported, Patient   lisinopril (PRINIVIL/ZESTRIL) 20 MG tablet Take 1 tablet (20 mg) by mouth daily  Yes Cyndi Boland PA-C   polyethylene glycol (MIRALAX/GLYCOLAX) powder Take 17 g by mouth daily  6/30/2018 at Unknown time Yes Unknown, Entered By History   rivaroxaban ANTICOAGULANT (XARELTO) 20 MG TABS tablet Take 1 tablet (20 mg) by mouth daily (with dinner) 6/30/2018 at Unknown time Yes Leona Hughes MD   Skin Protectants, Misc. (EUCERIN) cream Apply to toes two times a day 6/30/2018 at x2 Yes Unknown, Entered By History   tamsulosin (FLOMAX) 0.4 MG capsule Take 1 capsule (0.4 mg) by mouth daily 6/30/2018 at Unknown time Yes Kevin Bond MD   timolol, PF, (TIMOPTIC OCUDOSE) 0.5 % ophthalmic solution Place 1 drop into the right eye 2 times daily 6/30/2018 at x2 Yes Yvette Bonner APRN CNP           SOCIAL HISTORY:  The patient lives in a care facility.  He denies any use of tobacco or alcohol.      FAMILY HISTORY:  Mother had a stroke.      ALLERGIES:  NO KNOWN DRUG ALLERGIES.      REVIEW OF SYSTEMS:  The complete review of systems reviewed and negative, save for the pertinent positives recorded in the HPI.      PHYSICAL EXAMINATION:   VITAL SIGNS:  Show a blood pressure of 113/51, heart rate 66, respirations 19, saturating 98% on room air, temperature 97.3 degrees Fahrenheit.   GENERAL:  The patient is lying in bed and appears comfortable.   HEENT:  Pupils equal, round, reactive to light, extraocular muscle function intact.  No scleral icterus.  Oropharynx is clear.   NECK:  No lymphadenopathy or thyromegaly.   CARDIOVASCULAR:  Heart is irregular with apparently controlled rates.  I do not appreciate any murmurs.   PULMONARY:  Clear to auscultation bilaterally without wheeze or rhonchi.    GASTROINTESTINAL:  Positive bowel sounds, soft, nontender and nondistended.   SKIN:  No rashes or lesions.   LYMPHATICS:  No peripheral edema.   PSYCHIATRIC:  Alert and oriented x 3, normal affect.   NEUROLOGIC:  Cranial nerves II-XII are grossly intact.  He is able to move all extremities, though he is weaker and slower on the left as compared to the right.      LABORATORY DATA:  WBC count 7.5, hemoglobin 11.4, and platelets of 157.  Sodium 138, potassium 4.8, chloride 104, CO2 26, BUN 29, creatinine 1.75.  Unremarkable LFTs.  Troponin is negative as is lactic acid.      I personally reviewed his EKG which shows atrial fibrillation with a rate in the 60s.        CT aortic survey shows no acute abnormalities or evidence of aortic dissection.  There is atherosclerosis throughout the aorta and its branches, and there are areas of penetrating ulcer visualized at the descending thoracic aorta and upper abdomen without convincing acute inflammation.  Renal cortical thinning bilaterally with an indeterminate small hypo-enhancement region within the right lower kidney.  Consider outpatient renal MRI for further assessment.        ASSESSMENT AND PLAN:  Mr. Mckee is an 86-year-old male with a past medical history significant for previous stroke with left-sided hemiplegia and cognitive deficits, coronary artery disease, chronic atrial fibrillation, hypertension, diabetes, peripheral vascular disease and chronic kidney disease who presents to the emergency department with syncope.   1.  Syncope:  The patient had a period of unresponsiveness which is likely due to hypoperfusion from bradycardia with rates in the 30s and hypotension of 70/30.  No evidence of a seizure at this point.  The patient also has no new neuro deficits and is currently at baseline per the daughter.  With the bradycardia, I will hold metoprolol and monitor on telemetry.  We will see if there is any recurrence.   2.  Atrial fibrillation with  bradycardia:  Heart rate in the 30s on beta blocker.  I am going to hold the metoprolol for now and monitor on telemetry.  Per the daughter, he just had a dose decrease at some point in the recent past.  We will consult cardiology.  Continue with Xarelto.   3.  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  I am going to consult vascular surgery.  The patient is on Xarelto.   4.  Coronary artery disease:  Continue with Xarelto, Lipitor.  Holding beta blocker as above.   5.  Hypothyroidism:  We will continue with Synthroid.   6.  Hypertension:  Holding lisinopril due to acute kidney injury and metoprolol due to bradycardia as above.  We will monitor blood pressure for now.  Consider alternative medications and have p.r.n. hydralazine available.   7.  Diabetes:  We will continue with Basaglar at a reduced dose for now.  Sliding scale will be ordered.   8.  Acute kidney injury on chronic kidney disease stage III:  I wonder if this is due to hypoperfusion from bradycardia and hypotension earlier.  I am going to give a liter of normal saline and so hold lisinopril for now.  Repeat BMP in the morning.   9.  DVT prophylaxis:  On Xarelto.         VILMA CLEANING DO             D: 2018   T: 2018   MT: PAUL      Name:     MIRANDA BEGUM   MRN:      [id number]        Account:      [id number]   :      08/10/1931        Admitted:     2018                   Document: [id number]

## 2018-07-01 NOTE — CONSULTS
"VASCULAR SURGERY CONSULTATION:     History is obtained from the chart. Speaking to the patient, he is quite confused so I could not reliably interview him. He was admitted for an episode of unresponsiveness. His history from the emergency department provider makes note of the fact that patient himself complained of chest and back pain.  When I asked the patient, he denies chest or back pain.    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 Per medics, patient awoke as normal at 0630 today per nursing home staff and ate breakfast without issue and then went back to his room. At 0830 staff rechecked and found him in bed and \"unresponsive,\" at least not responding to his name, for 20-30 minutes with possible seizure activity described as shaking of his legs. True seizure activity is unclear. He has no previous history of seizure disorder. On medics' arrival he was responsive only to pain, bradycardic in 20-30s, hypoxic, and hypotensive at 70/50. They established an IV to right AC and administered atropine 0.5mg, with improvement in responsiveness and pulse to 70-90s. Last BP was 81/48 and they initiated 1L of normal saline en route. His hypoxia improved with 2L via nasal cannula. Blood glucose was 186 en route.  No recent falls or trauma. Medics performed EKG which showed atrial fibrillation vs slow junctional rhythm without STEMI.     The patient himself complains of chest pain and back pain. He is somewhat cantankerous and provides poor history.        CARDIAC RISK FACTORS:  Sex:                                                           M  Tobacco/Illicit drugs:       "       Y  Hypertension:                                  Y  Hyperlipidemia:                     Y  Diabetes:                                         Y  Family History:                      N  Personal History:                  Y         Code status:  Full code as of 2/2017     Allergies:  no known drug allergies       Medications:    Xarelto  Tramadol  Tamsulosin  Metoprolol  Lisinopril  Levothyroxine  Insulin  basaglar  Atorvastatin  Vitamin D  Flonase       Past Medical History:    CVA x2 with residual left hemiplegia, cognitive deficit, and dysphagia, 6/2017  DM type II  atrial fibrillation   Hyperlipidemia  Hypertension   CAD with hx MI  peripheral vascular disease   Hx central retinal vein occlusion, right   CKD stage III  Ca, prostate  Acquired hypothyroidism      Past Surgical History:    Prostate cancer seed implantation     Family History:    History reviewed. No pertinent family history.       Social History:  Former smoker, 20packyear history, quit 1973.  Marital Status:  [2]  Resides in [facility]    On my examination he is awake and alert.  He is moving both upper and lower extremities.  His radial and posterior tibial pulses are 2+ palpable.   There is no evidence of distal embolization.  He does not have any abdominal tenderness.    I also personally reviewed the CT angiogram of the chest abdomen and pelvis.  He does not have any evidence of aortic dissection, aneurysm or rupture.  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    I do not believe the finding on CT angiogram suggest any acute pathology.  Nor do I believe that the findings are the cause of his unresponsive episode today.  These are purely incidental.  No surgical intervention is advised.  Vascular surgery will sign off.

## 2018-07-01 NOTE — IP AVS SNAPSHOT
"[place] [address] [facility]: [phone]                                              INTERAGENCY TRANSFER FORM - PHYSICIAN ORDERS   2018                    Hospital Admission Date: 2018  PETER H STEUERWALD   : 8/10/1931  Sex: Male        Attending Provider: Socrates Laureano DO     Allergies:  No Known Allergies    Infection:  None   Service:  HOSPITALIST    Ht:  1.753 m (5' 9\")   Wt:  84 kg (185 lb 3 oz)   Admission Wt:  82.9 kg (182 lb 12.2 oz)    BMI:  27.35 kg/m 2   BSA:  2.02 m 2            Patient PCP Information     Provider PCP Type    TOBY Momin CNP General      ED Clinical Impression     Diagnosis Description Comment Added By Time Added    Bradycardia [R00.1] Bradycardia [R00.1]  Dyan Liu 2018 11:04 AM    Syncope and collapse [R55] Syncope and collapse [R55]  Crystal Bradshaw MD 2018 12:19 PM    Renal failure, unspecified chronicity [N19] Renal failure, unspecified chronicity [N19]  Crystal rBadshaw MD 2018 12:19 PM      Hospital Problems as of 7/3/2018              Priority Class Noted POA    Syncope Medium  2018 Yes      Non-Hospital Problems as of 7/3/2018              Priority Class Noted    Chronic ischemic heart disease Medium  2008    Personal history of other diseases of circulatory system Medium  2008    HYPERLIPIDEMIA LDL GOAL <100 Medium  10/31/2010    Advance Care Planning Medium  2011    CKD (chronic kidney disease) stage 3, GFR 30-59 ml/min Medium  2012    Leg weakness Medium  2013    Ataxia Medium  4/10/2013    BPH (benign prostatic hyperplasia) Medium  2014    Type 2 diabetes mellitus with diabetic nephropathy (H) Medium  10/31/2015    History of actinic keratoses Medium  2016    History of squamous cell carcinoma Medium  2016    S/P Mohs surgery for basal cell carcinoma Medium  2016    H/O: CVA (cerebrovascular accident), Right MCA cardioembolic stroke " 2/2017 Medium  6/5/2017    Cognitive deficits as late effect of cerebrovascular disease Medium  6/5/2017    Hemiparesis affecting left side as late effect of cerebrovascular accident (H) Medium  6/5/2017    Dysphagia due to recent cerebrovascular accident (CVA) Medium  6/5/2017    Type 2 diabetes mellitus with stage 3 chronic kidney disease (H) Medium  6/5/2017    Benign hypertension with CKD (chronic kidney disease) stage III Medium  6/5/2017    Hypothyroidism due to acquired atrophy of thyroid Medium  6/5/2017    Chronic atrial fibrillation (H) Medium  6/5/2017    Slow transit constipation Medium  6/5/2017    Onychomycosis Medium  6/5/2017    H/O prostate cancer, brachytherapy seeds.  Medium  7/6/2017    Central retinal vein occlusion, right eye Medium  11/15/2017    PVD (peripheral vascular disease) (H) Medium  12/12/2017    Type II diabetes mellitus with peripheral circulatory disorder (H) Medium  12/12/2017      Code Status History     Date Active Date Inactive Code Status Order ID Comments User Context    7/3/2018 11:10 AM  Full Code [id number]  Cyndi Padilla PA-C Outpatient    7/1/2018  1:15 PM 7/3/2018 11:10 AM Full Code [id number]  Juan Diego Branham DO Inpatient    2/27/2017  1:43 PM 3/21/2017  3:41 PM Full Code [id number]  Kevin Bond Inpatient    2/20/2017 11:11 PM 2/27/2017  1:43 PM Full Code [id number]  Juan Diego Branham DO Inpatient    1/22/2013  5:24 PM 2/20/2017 11:11 PM Full Code [id number]  Maria A Moon PA Outpatient    1/21/2013  3:50 PM 1/22/2013  5:24 PM Full Code [id number]  Shaunna Woo, CORNELIO Inpatient         Medication Review      CONTINUE these medications which may have CHANGED, or have new prescriptions. If we are uncertain of the size of tablets/capsules you have at home, strength may be listed as something that might have changed.        Dose / Directions Comments    BASAGLAR 100 UNIT/ML injection   This may have changed:  how much to take   Used for:  Type  II diabetes mellitus with peripheral circulatory disorder (H)        Dose:  20 Units   Inject 20 Units Subcutaneous At Bedtime   Refills:  0        lisinopril 20 MG tablet   Commonly known as:  PRINIVIL/ZESTRIL   This may have changed:    - medication strength  - when to take this   Used for:  Benign essential hypertension        Dose:  20 mg   Start taking on:  7/4/2018   Take 1 tablet (20 mg) by mouth daily   Quantity:  30 tablet   Refills:  0          CONTINUE these medications which have NOT CHANGED        Dose / Directions Comments    ACETAMINOPHEN PO        Dose:  1000 mg   Take 1,000 mg by mouth 3 times daily For pain   Refills:  0        cholecalciferol 1000 UNIT tablet   Commonly known as:  vitamin D3        Dose:  2000 Units   Take 2,000 Units by mouth daily   Refills:  0        eucerin cream        Apply to toes two times a day   Refills:  0        FLONASE NA        Dose:  2 spray   Spray 2 sprays into both nostrils daily   Refills:  0        LEVOTHYROXINE SODIUM PO        Dose:  25 mcg   Take 25 mcg by mouth daily   Refills:  0        LIPITOR PO        Dose:  40 mg   Take 40 mg by mouth At Bedtime   Refills:  0        NOVOLOG SC        Dose:  4 Units   Inject 4 Units Subcutaneous 2 times daily With breakfast and lunch   Refills:  0        polyethylene glycol powder   Commonly known as:  MIRALAX/GLYCOLAX        Dose:  17 g   Take 17 g by mouth daily   Refills:  0        rivaroxaban ANTICOAGULANT 20 MG Tabs tablet   Commonly known as:  XARELTO   Used for:  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)        Dose:  20 mg   Take 1 tablet (20 mg) by mouth daily (with dinner)   Quantity:  30 tablet   Refills:  1        tamsulosin 0.4 MG capsule   Commonly known as:  FLOMAX   Used for:  Benign prostatic hyperplasia, presence of lower urinary tract symptoms unspecified, unspecified morphology        Dose:  0.4 mg   Take 1 capsule (0.4 mg) by mouth daily   Quantity:  60 capsule    Refills:  0        timolol (PF) 0.5 % ophthalmic solution   Commonly known as:  TIMOPTIC OCUDOSE   Used for:  Central retinal vein occlusion, right eye        Dose:  1 drop   Place 1 drop into the right eye 2 times daily   Refills:  0          STOP taking     METOPROLOL TARTRATE PO           TRAMADOL HCL PO                   Summary of Visit     Reason for your hospital stay       You were hospitalized for further evaluation and treatment of an event where you reportedly loss consciousness.             After Care     Activity - Up with nursing assistance           Advance Diet as Tolerated       Follow this diet upon discharge: Resume normal diet.       Daily weights       Call Provider for weight gain of more than 2 pounds per day or 5 pounds per week.       Encourage PO fluids           Fall precautions           Hip precautions           Intake and output       Every shift             Referrals     Follow-Up with Electrophysiologist                 Radiology & Cardiology Orders     Future Labs/Procedures Complete By Expires    Cardiac Event Monitor - Peds/Adult  As directed 8/17/2018    Comments:    Follow up with Dr. Tran      Radiology & Cardiology Orders     Cardiac Event Monitor - Peds/Adult       Follow up with Dr. Tran             Your next 10 appointments already scheduled     Sep 24, 2018  1:45 PM CDT   Return Visit with Félix Teixeira MD   [facility]    [address] [place] [id number] [phone] OPT [address]              Follow-Up Appointment Instructions     Future Labs/Procedures    Follow-up and recommended labs and tests      Comments:    Follow up with primary care provider, Yvette Bonner, within 7 days for hospital follow-up.  Outpatient renal MRI recommended     Follow up with Cardiology to go over event monitor      Follow-Up Appointment Instructions     Follow-up and recommended labs and tests        Follow  up with primary care provider, Yvette Bonner, within 7 days for hospital follow-up.  Outpatient renal MRI recommended     Follow up with Cardiology to go over event monitor             Statement of Approval     Ordered          07/03/18 1132  I have reviewed and agree with all the recommendations and orders detailed in this document.  EFFECTIVE NOW     Approved and electronically signed by:  Cyndi Padilla PA-C

## 2018-07-01 NOTE — ED NOTES
Bed: ST02  Expected date:   Expected time:   Means of arrival:   Comments:  HEMS 447 86 m bola was 34 not 70 with atropine 89/57

## 2018-07-01 NOTE — IP AVS SNAPSHOT
` `           [place] [address] [facility]: [phone]                 INTERAGENCY TRANSFER FORM - NOTES (H&P, Discharge Summary, Consults, Procedures, Therapies)   2018                    Hospital Admission Date: 2018  PETER H STEUERWALD   : 8/10/1931  Sex: Male        Patient PCP Information     Provider PCP Type    TOBY Momin CNP General      History & Physicals     No notes of this type exist for this encounter.         Discharge Summaries      Discharge Summaries by Cyndi Padilla PA-C at 7/3/2018 11:04 AM     Author:  Cyndi Padilla PA-C Service:  Hospitalist Author Type:  Physician Assistant - C    Filed:  7/3/2018 11:34 AM Date of Service:  7/3/2018 11:04 AM Creation Time:  7/3/2018 10:58 AM    Status:  Attested :  Cyndi Padilla PA-C (Physician Assistant - C)    Cosigner:  Socrates Laureano DO at 7/3/2018  4:37 PM        Attestation signed by Socrates Laureano DO at 7/3/2018  4:37 PM        Physician Attestation   I, Socrates Laureano, have reviewed and discussed with the advanced practice provider their discharge plan for Elias Mckee. I did not participate in a shared visit by interviewing or examining the patient and this should be billed as an advanced practice provider only discharge.    Socrates Lauerano  Date of Service (when I saw the patient): I did not personally see this patient today.                               [facility]    Discharge Summary  Hospitalist    Date of Admission:  2018  Date of Discharge:[KE1.1]  7/3/2018[KE1.2]  Discharging Provider: Cyndi Padilla PA-C  Date of Service (when I saw the patient):[KE1.1] 18[KE1.2]    Discharge Diagnoses[KE1.1]   Syncope   Atrial fibrillation with SVR   Orthostatic hypotension   Generalized weakness   Asymptomatic Bacteruria[KE1.3]     History of Present Illness   Mr. Mckee is an 86-year-old  male with a past medical history significant for previous stroke with left-sided hemiplegia and cognitive deficits, coronary artery disease with prior stenting, hx of OOH vfib arrest, chronic atrial fibrillation and anticoagulation with Xarelto, hypertension, T2DM, peripheral vascular disease and chronic kidney disease III who presented to the emergency department on 7/1/18 with syncope and findings of hypotension and bradycardia, [place] [facility] [place] and treatment.    [KE1.1]  No acute events overnight. Tele with a fib and SVR. BB on hold. Denies chest pain, SOB,dizziness, lightheadedness. Anxious to discharge. With patient permission, called daughter, Jazmin, and updated on discharge plans.[KE1.4]     Hospital Course   Elias Mckee was admitted on 7/1/2018.  The following problems were addressed during his hospitalization:    Syncope  Atrial fibrillation, permanent, with SVR  Orthostatic hypotension:  Noted period of unresponsiveness with findings of bradycardia with rates in the 30s and hypotension of 70/30; pt given atropine per EMS. CMP with MARIA DEL CARMEN on admission, since resolved. CBC WNL aside from baseline anemia. Lactic acid WNL. Trop negative. INR 1.30 on admission (pt on Xarelto). EKG showing atrial fibrillation with VR 69. Pt hydrated with 1 L IVF overnight. Last echo from 2/2017 with EF 55-60%, mild mitral regurg, no WMA. PTA Lopressor 25 mg BID held on admission, PTA Lisinopril 20 mg po BID held on admission[KE1.1]. Tele without evidence of overt arrhythmia aside from a fib with SVR.[KE1.3] Echocardiogram ordered; EF 55-60%, no RWMA[KE1.1].[KE1.3] Carotid U/S given hx of carotid artery stenosis below; 50-69% stenosis in the internal carotid arteries bilaterally[KE1.1].[KE1.3] TSH and magnesium WNL[KE1.1].[KE1.3] Check[KE1.1]ed[KE1.3] orthostatic BPs on 7/2; noted positive per nursing (see flowsheet)--pt felt dizzy at that time, 500 cc bolus over 2 hrs ordered with improvement in BPs    -- Cardiology consulted, appreciate assistance; recommending 30 day event monitor at discharge, close f/u with Dr. 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   -- Vascular Surgery consulted on admission, see formal note by Dr. Bruno; no surgical intervention advised      Acute kidney injury on chronic kidney disease stage III, resolved:  Creatinine 1.8>1.19. Suspect MARIA DEL CARMEN related to hypotension and bradycardia with resultant hypoperfusion.      Asymptomatic bacteruria   Indeterminant small hypoenhancing region within the lower right kidney, incidental finding: Noted on CT. Also with note that the kidneys have a mildly heterogenous enhancement pattern. UA with large LE and WBC 41. WBC WNL. Afebrile.   -- Empirically started on Ceftriaxone   -- Follow urine culture; >[id number] of gram positive cocci, urine culture growing coagulase negative staph   -- Procalcitonin ordered and negative   -- No antibiotics at discharge   -- Outpatient renal MRI recommended      Carotid artery stenosis: US from 2013 with 50-69% diameter stenosis of the right and left ICA relative to the distal ICA diameter.   -- Carotid U/S as above      Normocytic anemia: Stable. No active signs of bleeding.   -- Monitor     Coronary artery disease  OOH ventricular fibrillation arrest (2008)  HTN, hyperlipidemia: Followed by Dr. Teixeira of Cardiology, last saw in 2015. Noted to have severe diffuse disease involving both circumflex and LAD,  hx of multiple Taxol stents into the LAD and cx balloon angioplasty of the small posterior artery was performed. Also with hx of in-stent restenosis of the distal left anterior descending artery and endeavor SOHAIL was placed. Angioplasty performed to a first septal . Distal circumflex stenosis was revascularized with SOHAIL. Stress testing in 2014 demonstrated no significant inducible ischemia. Inferior and inferoseptal infarction with EF 44%.  -- Continue Lipitor 40 mg po qd  -- ACE-I reduced to Lisinopril 20 mg po qd from 20 mg po BID  -- Continue to hold BB given presentation above      Hypothyroidism:  TSH WNL. Maintained on Synthroid 25 mcg daily.      T2DM, non-insulin dependent, uncontrolled: A1C 7.4. We will continue with Basaglar at a reduced dose for now.  Sliding scale will be ordered.   [Basaglar 30 U qhs, Novolog 4 U BID with breakfast and lunch]  -- Lantus 20 U qhs, Novolog 1 U/carb U, and medium dose SSI ordered during admission   -- At discharge, will resume PTA regimen with Basaglar reduced to 20 U whs, continue Novolog 4 U BID with breakfast and lunch[KE1.1]     Recent Labs  Lab 07/03/18  0915 07/03/18  0835 07/03/18  0214 07/02/18  2147 07/02/18  1813 07/02/18  1743 07/02/18  1322  07/02/18  0622  07/01/18  0940   GLC  --  118*  --   --   --   --   --   --  103*  --  160*   *  --  105* 154* 85 94 157*  < >  --   < >  --    < > = values in this interval not displayed.[KE1.6]    CVA, right middle cerebral artery (2017)  Mild cognitive impariment: Received TPA and s/p IR thombectomy. Neurology suspected CVA was cardioembolic and hx of PAF. Oriented X3 on my interview.   -- Continue statin and Xarelto as above      BPH: Continue PTA Flomax       # Discharge Pain Plan:[KE1.1]   - Patient currently has NO PAIN and is not being prescribed pain medications on discharge.[KE1.3]    Cyndi Padilla PA-C[KE1.1]    This patient was discussed with Dr. Laureano of the Hospitalist Service  who agrees with current plans as outlined above.[KE1.3]     Significant Results and Procedures[KE1.1]   See below[KE1.3]    Pending Results   These results will be followed up by[KE1.1] PCP[KE1.3]   Unresulted Labs Ordered in the Past 30 Days of this Admission     Date and Time Order Name Status Description    7/1/2018 1705 Urine Culture Aerobic Bacterial Preliminary         Code Status[KE1.1]   Full Code[KE1.3]       Primary Care Physician   Yvette Bonner[KE1.1]    Physical Exam   Temp: 98.2  F (36.8  C) Temp src: Oral BP: (!) 152/95 Pulse: 75 Heart Rate: 53 Resp: 16 SpO2: 94 % O2 Device: None (Room air)    Vitals:    07/01/18 1400 07/02/18 0500   Weight: 82.9 kg (182 lb 12.2 oz) 84 kg (185 lb 3 oz)[KE1.7]     Vital Signs with Ranges[KE1.3]  Temp:  [97.4  F (36.3  C)-98.6  F (37  C)] 98.2  F (36.8  C)  Pulse:  [56-75] 75  Heart Rate:  [53-59] 53  Resp:  [16-18] 16  BP: (152-162)/(82-95) 152/95  SpO2:  [94 %-97 %] 94 %  I/O last 3 completed shifts:  In: 930 [P.O.:430; I.V.:500]  Out: 1500 [Urine:1500][KE1.7]    CONSTITUTIONAL: Pt laying in bed, dressed in hospital garb. Appears comfortable, eating breakfast. Cooperative with interview.   HEENT: Normocephalic, atraumatic. Negative for conjunctival redness or scleral icterus.    CARDIOVASCULAR:[KE1.3] Irregularly irregular rhythm, bradycardic. N[KE1.4]o murmurs, rubs, or extra heart sounds appreciated. Pulses +2/4 and regular in upper and lower extremities, bilaterally.   RESPIRATORY: No increased work of breathing.  CTA, bilat; no wheezes, rales, or rhonchi appreciated.  GASTROINTESTINAL:  Abdomen soft, non-distended. BS auscultated in all four quadrants. Negative for tenderness to palpation.  No masses or organomegaly noted.  MUSCULOSKELETAL: Baseline left-sided hemiplegia. No gross deformities noted. Normal muscle tone.   HEMATOLOGIC/LYMPHATIC/IMMUNOLOGIC:  Negative for lower extremity edema, bilaterally.  NEUROLOGIC: Alert and oriented to person, place, and  time. Left-sided hemiplegia.  SKIN: Warm, dry, intact. No jaundice noted. Negative for suspicious lesions, rashes, bruising, open sores or abrasions.[KE1.3]     Discharge Disposition[KE1.1]   Discharged to home[KE1.3]  Condition at discharge:[KE1.1] Stable[KE1.3]    Consultations This Hospital Stay   CARDIOLOGY IP CONSULT  VASCULAR SURGERY IP CONSULT  PHYSICAL THERAPY ADULT IP CONSULT  SOCIAL WORK IP CONSULT  SOCIAL WORK IP CONSULT    Time Spent on this Encounter[KE1.1]   ICyndi, personally saw the patient today and spent greater than 30 minutes discharging this patient.[KE1.3]    Discharge Orders   No discharge procedures on file.  Discharge Medications   Current Discharge Medication List      CONTINUE these medications which have NOT CHANGED    Details   ACETAMINOPHEN PO Take 1,000 mg by mouth 3 times daily For pain      Atorvastatin Calcium (LIPITOR PO) Take 40 mg by mouth At Bedtime      BASAGLAR 100 UNIT/ML injection Inject 30 Units Subcutaneous At Bedtime      cholecalciferol (VITAMIN D) 1000 UNIT tablet Take 2,000 Units by mouth daily      Fluticasone Propionate (FLONASE NA) Spray 2 sprays into both nostrils daily      Insulin Aspart (NOVOLOG SC) Inject 4 Units Subcutaneous 2 times daily With breakfast and lunch      LEVOTHYROXINE SODIUM PO Take 25 mcg by mouth daily      LISINOPRIL PO Take 20 mg by mouth 2 times daily      METOPROLOL TARTRATE PO Take 25 mg by mouth 2 times daily Hold for systolic BP < 100 or pulse < 58.      polyethylene glycol (MIRALAX/GLYCOLAX) powder Take 17 g by mouth daily       rivaroxaban ANTICOAGULANT (XARELTO) 20 MG TABS tablet Take 1 tablet (20 mg) by mouth daily (with dinner)  Qty: 30 tablet, Refills: 1    Associated Diagnoses: 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)      Skin Protectants, Misc. (EUCERIN) cream Apply to toes two times a day      tamsulosin (FLOMAX) 0.4 MG capsule Take 1 capsule (0.4 mg) by mouth daily  Qty: 60  capsule, Refills: 0    Associated Diagnoses: Benign prostatic hyperplasia, presence of lower urinary tract symptoms unspecified, unspecified morphology      timolol, PF, (TIMOPTIC OCUDOSE) 0.5 % ophthalmic solution Place 1 drop into the right eye 2 times daily    Associated Diagnoses: Central retinal vein occlusion, right eye      TRAMADOL HCL PO Take 50 mg by mouth every 6 hours as needed for moderate to severe pain           Allergies   Allergies   Allergen Reactions     No Known Allergies      Data[KE1.1]   Most Recent 3 CBC's:[KE1.3]  Recent Labs   Lab Test  07/03/18   0835  07/02/18   0622 07/01/18   0940   WBC  6.0  6.9  7.5   HGB  11.3*  11.0*  11.4*   MCV  90  90  91   PLT  159  156  157[KE1.7]      Most Recent 3 BMP's:[KE1.3]  Recent Labs   Lab Test  07/03/18   0835  07/02/18   0622  07/01/18   0940   NA  139  139  138   POTASSIUM  4.3  4.1  4.8   CHLORIDE  108  107  104   CO2  23  23  26   BUN  16  27  29   CR  1.04  1.19  1.75*   ANIONGAP  8  9  8   TRENT  8.4*  8.2*  8.5   GLC  118*  103*  160*[KE1.7]     Most Recent 2 LFT's:[KE1.3]  Recent Labs   Lab Test  07/01/18   0940 04/19/18   AST  10  14   ALT  14  10   ALKPHOS  68  67   BILITOTAL  0.4  0.5[KE1.7]     Most Recent INR's and Anticoagulation Dosing History:  Anticoagulation Dose History     Recent Dosing and Labs Latest Ref Rng & Units 6/13/2008 2/20/2017 7/1/2018    INR 0.86 - 1.14 0.97 0.99 1.30(H)        Most Recent 3 Troponin's:[KE1.3]  Recent Labs   Lab Test  07/02/18   0622  07/01/18   0940  02/21/17   1700   TROPI  <0.015  <0.015  <0.015  The 99th percentile for upper reference range is 0.045 ug/L.  Troponin values in   the range of 0.045 - 0.120 ug/L may be associated with risks of adverse   clinical events.[KE1.7]       Most Recent Cholesterol Panel:[KE1.3]  Recent Labs   Lab Test 04/19/18   CHOL  116   LDL  56   HDL  28*   TRIG  162*[KE1.7]     Most Recent 6 Bacteria Isolates From Any Culture (See EPIC Reports for Culture  Details):[KE1.3]  Recent Labs   Lab Test  07/01/18   1705  01/21/13   2050   CULT  >100,000 colonies/mL  Coagulase negative Staphylococcus  Susceptibility testing in progress  *  No growth[KE1.7]     Most Recent TSH, T4 and A1c Labs:[KE1.3]  Recent Labs   Lab Test  07/02/18   0622  07/01/18   0940   TSH  2.20   --    A1C   --   7.4*     Results for orders placed or performed during the hospital encounter of 07/01/18   CT Aortic Survey w Contrast    Narrative    CT AORTIC SURVEY WITH CONTRAST  7/1/2018 10:03 AM    HISTORY:  Chest and back pain.    TECHNIQUE: CT scan obtained of the chest, abdomen, and pelvis with  oral and IV contrast. 80 mL Isovue-370 injected. Aortic survey  protocol. Radiation dose for this scan was reduced using automated  exposure control, adjustment of the mA and/or kV according to patient  size, or iterative reconstruction technique.    COMPARISON:  None.    FINDINGS:  Chest:   No acute thoracic aortic abnormality.  No thoracic aortic dissection  or aneurysm is seen. Scattered areas of atherosclerotic plaque  throughout the thoracic aorta with small areas of penetrating ulcer  calcification. In particular, penetrating ulcer can be seen along the  descending thoracic aorta without adjacent inflammation. Coronary  artery calcifications. Cannot fully assess for pulmonary embolism. No  large central pulmonary embolism. Nondiagnostic assessment of the  middle and small branch vessels.    No evidence for adenopathy. No acute airspace disease. Minor bibasilar  atelectasis. Calcified granuloma at the right upper lobe incidentally  seen.    Abdomen/pelvis:   No acute abdominal aortic abnormality. No abdominal aortic dissection.  Atherosclerotic plaque seen within the abdominal aorta with areas of  penetrating ulcer at the proximal abdominal aorta. 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 Scattered areas of atherosclerotic  plaque  within the aortic and arterial vessels. Patency of the celiac,  superior mesenteric, inferior mesenteric, and right and left renal  artery origins.    The liver, gallbladder, adrenals, spleen, and pancreas show no acute  abnormalities. Bilateral renal cortical thinning. Hypodense focus at  the lower right kidney is 0.9 cm, series 4 image 130, and is  ultimately indeterminant. Kidneys have a somewhat heterogeneous  perfusion pattern that is subtle. No hydronephrosis. Prostate  radiation seeds. No bowel obstruction. Colonic diverticula without  diverticulitis. No enlarged lymph nodes. Faint gallstones identified.    No aggressive bony lesion is seen.      Impression    IMPRESSION:  1. No acute aortic abnormality. No convincing acute aortic dissection.  Moderate areas of atherosclerotic disease of the aorta and its  branches. 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 There is atherosclerosis throughout the aorta and its branches.  There are areas of penetrating ulcer visualized at the descending  thoracic aorta and upper abdomen without convincing acute  inflammation.  2. Renal cortical thinning bilaterally. Indeterminant small  hypoenhancing region within the lower right kidney. Consider  outpatient renal MRI for further assessment of this indeterminate  lesion. Further, the kidneys have a mildly heterogeneous enhancement  pattern. Correlate with urinalysis for any evidence for a urinary  tract infection and pyelonephritis.  3. No other acute abnormality.  4. A few faint gallstones.    VILMA LEW MD   US Carotid Bilateral    Narrative    US CAROTID BILATERAL 7/2/2018 1:14 PM    HISTORY: Syncope.    COMPARISON: Carotid ultrasound dated 1/21/2013    FINDINGS:     Right side:   On the grayscale images, calcified plaque is identified at the carotid  bifurcation extending into the internal and external carotid  arteries..  Spectral waveform analysis was performed. Peak systolic and  diastolic  velocities in the right internal carotid artery are 181 and 38 cm/s  versus 174 and 40 cm/s on the prior exam. Per sonographic criteria,  degree of stenosis in the right internal carotid artery is 50-69%.  Flow in the right vertebral artery is antegrade.     Left side:   On the grayscale images, calcified plaque is identified at the carotid  bifurcation.  Spectral waveform analysis was performed. Peak systolic and diastolic   velocities in the left internal carotid artery are 228 and 52 cm/s  versus 255 cm/s on the prior exam. Per sonographic criteria, degree of  stenosis in the left internal carotid artery is 50-69%.  Flow in the left vertebral artery is antegrade but difficult to  adequately visualize.       Impression    IMPRESSION: Per sonographic criteria, there are 50-69% stenoses in the  internal carotid arteries bilaterally.    Degree of stenosis measured relative to the diameter of the normal  internal carotid artery per NASCET or NASCET type criteria    ALEA LIANG MD[KE1.7]          Revision History        User Key Date/Time User Provider Type Action    > KE1.5 7/3/2018 11:34 AM Cyndi Padilla PA-C Physician Assistant - C Sign     KE1.4 7/3/2018 11:32 AM Cyndi Padilla PA-C Physician Assistant - C Sign     KE1.7 7/3/2018 11:22 AM Cyndi Padilla PA-C Physician Assistant - C      KE1.6 7/3/2018 11:21 AM Cyndi Padilla PA-C Physician Assistant - C      KE1.3 7/3/2018 11:18 AM Cyndi Padilla PA-C Physician Assistant - C      KE1.2 7/3/2018 10:59 AM Cyndi Padilla PA-C Physician Assistant - C      KE1.1 7/3/2018 10:58 AM Cynid Padilla PA-C Physician Assistant - C                      Consult Notes      Consults by Laurence Aleman RN at 7/3/2018  1:12 PM     Author:  Laurence Aleman RN Service:  Care Coordinator Author Type:      Filed:  7/3/2018  1:12 PM Date of Service:  7/3/2018  1:12 PM  Creation Time:  7/3/2018  1:00 PM    Status:  Signed :  Laurence Aleman RN ()     Consult Orders:    1. Care Coordinator IP Consult [[id number]] ordered by Cyndi Padilla PA-C at 07/03/18 1129                Care Transition Initial Assessment - RN    Reason For Consult: discharge planning   Met with: Patient and Family.  DATA   Active Problems:    Syncope       Cognitive Status: alert and oriented.   Contact information and PCP information verified: Yes  Lives With: facility resident ([facility])  Living Arrangements: extended care facility ([facility])  Quality Of Family Relationships: supportive, involved  Description of Support System: Supportive, Involved   Who is your support system?: Children   Support Assessment: Adequate family and caregiver support   Insurance concerns: No Insurance issues identified  ASSESSMENT  Patient currently receives the following services:  From NH        Identified issues/concerns regarding health management:    Consulted for f/u appointment with Cardiology. Patient discharging with a Holter monitor and appointment made with DR VALDEZ in September.  PLAN  Financial costs for the patient include none .  Patient given options and choices for discharge yes  Patient/family is agreeable to the plan?  Yes:   Patient anticipates discharging to NH.        Patient anticipates needs for home equipment: No  Plan/Disposition: TCU  Appointments:  See AVS  Care  (CTS) will continue to follow as needed.[BV1.1]             Revision History        User Key Date/Time User Provider Type Action    > BV1.1 7/3/2018  1:12 PM Laurence Aleman RN Case Manager Sign            Consults by Kena Moon LICSW at 7/2/2018  4:04 PM     Author:  Kena Moon LICSW Service:  Social Work Author Type:      Filed:  7/2/2018  4:04 PM Date of Service:  7/2/2018  4:04 PM Creation Time:  7/2/2018  3:54 PM    Status:  Signed :  Red  FORREST Chung ()     Consult Orders:    1. Social Work IP Consult [[id number]] ordered by Cyndi Padilla PA-C at 07/02/18 1407     2. Social Work IP Consult [[id number]] ordered by Cyndi Padilla PA-C at 07/02/18 1001                Care Transition Initial Assessment -   Reason For Consult: discharge planning  Met with: Patient[SJ1.1]    Active Problems:    Syncope[SJ1.2]       DATA  Lives With: facility resident ([facility])  Living Arrangements: extended care facility ([facility])  Description of Support System: Supportive, Involved  Who is your support system?: Children  Support Assessment: Adequate family and caregiver support.   Identified issues/concerns regarding health management:       Quality Of Family Relationships: supportive, involved  Transportation Available: family or friend will provide    Per social service protocol for discharge planning.  Patient was admitted on 7-1-18 after a syncopal episode.  The tentative date of discharge is yet to be determined.  Reviewed chart.  Patient was admitted from [facility].  Call placed and message left for [facility] to check the bed hold status.  Patient has MA therefore he should have a 18 day MA bed hold, however awaiting a return call from [facility] with a confirmation on this.      ASSESSMENT  Cognitive Status:  awake  Concerns to be addressed: discharge planning.     PLAN  Financial costs for the patient includes N/A.  Patient given options and choices for discharge N/A.  Patient/family is agreeable to the plan?  Yes  Patient Goals and Preferences: Return to [facility] on discharge.  Patient anticipates discharging to:  [facility].    Will continue to follow and assist with a safe discharge plan.[SJ1.1]    Kena Moon[SJ1.3], MS, [facility]  [phone][SJ1.1]           Revision History        User Key Date/Time User Provider Type Action    > SJ1.3 7/2/2018  4:04 PM Kena Moon LICSW  " Sign     SJ1.2 7/2/2018  3:55 PM Kena Moon LICSW       SJ1.1 7/2/2018  3:54 PM Kena Moon LICSW              Consults by Ramos Bruno MD at 7/1/2018  6:30 PM     Author:  Ramos Bruno MD Service:  Surgery Author Type:  Physician    Filed:  7/1/2018  6:34 PM Date of Service:  7/1/2018  6:30 PM Creation Time:  7/1/2018  6:25 PM    Status:  Signed :  Ramos Bruno MD (Physician)     Consult Orders:    1. Vascular Surgery IP Consult: Patient to be seen: Routine - within 24 hours; mural thrombus, pentrating descending thoracic aorta ulcer without dissection; Consultant may enter orders: Yes [[id number]] ordered by Juan Diego Branham DO at 07/01/18 1134                VASCULAR SURGERY CONSULTATION:     History is obtained from the chart. Speaking to the patient, he is quite confused so I could not reliably interview him. He was admitted for an episode of unresponsiveness. His history from the emergency department provider makes note of the fact that patient himself complained of chest and back pain.  When I asked the patient, he denies chest or back pain.    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 Per medics, patient awoke as normal at 0630 today per nursing home staff and ate breakfast without issue and then went back to his room. At 0830 staff rechecked and found him in bed and \"unresponsive,\" at least not responding to his name, for 20-30 minutes with possible seizure activity described as shaking of his legs. True seizure activity is unclear. He has no previous history of " seizure disorder. On medics' arrival he was responsive only to pain, bradycardic in 20-30s, hypoxic, and hypotensive at 70/50. They established an IV to right AC and administered atropine 0.5mg, with improvement in responsiveness and pulse to 70-90s. Last BP was 81/48 and they initiated 1L of normal saline en route. His hypoxia improved with 2L via nasal cannula. Blood glucose was 186 en route.  No recent falls or trauma. Medics performed EKG which showed atrial fibrillation vs slow junctional rhythm without STEMI.     The patient himself complains of chest pain and back pain. He is somewhat cantankerous and provides poor history.        CARDIAC RISK FACTORS:  Sex:                                                           M  Tobacco/Illicit drugs:             Y  Hypertension:                                  Y  Hyperlipidemia:                     Y  Diabetes:                                         Y  Family History:                      N  Personal History:                  Y         Code status:  Full code as of 2/2017     Allergies:  no known drug allergies       Medications:    Xarelto  Tramadol  Tamsulosin  Metoprolol  Lisinopril  Levothyroxine  Insulin  basaglar  Atorvastatin  Vitamin D  Flonase       Past Medical History:    CVA x2 with residual left hemiplegia, cognitive deficit, and dysphagia, 6/2017  DM type II  atrial fibrillation   Hyperlipidemia  Hypertension   CAD with hx MI  peripheral vascular disease   Hx central retinal vein occlusion, right   CKD stage III  Ca, prostate  Acquired hypothyroidism      Past Surgical History:    Prostate cancer seed implantation     Family History:    History reviewed. No pertinent family history.       Social History:  Former smoker, 20packyear history, quit 1973.  Marital Status:  [2]  Resides in [facility]    On my examination he is awake and alert.  He is moving both upper and lower extremities.  His radial and posterior tibial pulses are 2+  palpable.   There is no evidence of distal embolization.  He does not have any abdominal tenderness.    I also personally reviewed the CT angiogram of the chest abdomen and pelvis.  He does not have any evidence of aortic dissection, aneurysm or rupture.  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    I do not believe the finding on CT angiogram suggest any acute pathology.  Nor do I believe that the findings are the cause of his unresponsive episode today.  These are purely incidental.  No surgical intervention is advised.  Vascular surgery will sign off.[KK1.1]           Revision History        User Key Date/Time User Provider Type Action    > KK1.1 7/1/2018  6:34 PM Ramos Bruno MD Physician Sign            Consults by Ricky Almonte MD at 7/1/2018  3:16 PM     Author:  Ricky Almonte MD Service:  Electrophysiology Author Type:  Physician    Filed:  7/1/2018  3:16 PM Date of Service:  7/1/2018  3:16 PM Creation Time:  7/1/2018  3:06 PM    Status:  Signed :  Ricky Almonte MD (Physician)     Consult Orders:    1. Cardiology IP Consult: Patient to be seen: Routine - within 24 hours; syncope, afib with bradycardia; Consultant may enter orders: Yes [[id number]] ordered by Juan Diego Branham DO at 07/01/18 1134                [facility]    Cardiac Electrophysiology Consultation     Date of Admission:  7/1/2018  Date of Consult (When I saw the patient): 07/01/18    Assessment & Plan   Elias Mckee is a 86 year old male who was admitted on 7/1/2018. I was asked to see the patient for unresponsive episode. Permanent AF on Metoprolol 25 mg bid (reduced recently for fatigue) with recurrent CVA with persistent left-sided hemiplegia, CAD with multiple PCI in the past with last stress test 2014 showing inferior and inferoseptal infarct noted to be unresponsive while eating his meal briefly. EMS reported bp 70/30 and rate of 30 bpm. No ECG  documentation in chart.  ECG in ED shows afib with VR of 70 bpm. CT shows no head bleed.    Currently rate in high 50-70's. Metoprolol held.   Unfortunately, no ECG documentation of bola episodes as it could have been CHB with junctional escape. As it is agree with holding BB and no significant bola note overnight recommending 30 days Event monitor. Continue with Xarelto.    Ricky Swenson    Code Status    Full Code    Primary Care Physician   Yvette Bonner    History is obtained from the patient    Past Medical History   I have reviewed this patient's medical history and updated it with pertinent information if needed.   Past Medical History:   Diagnosis Date     Acute, but ill-defined, cerebrovascular disease 1-2008    Left hemiparesis, left side neglect     Atrial fibrillation (H)      Benign hypertension with CKD (chronic kidney disease) stage III 6/5/2017     Central retinal vein occlusion, right eye 11/15/2017     Chronic atrial fibrillation (H) 6/5/2017     Chronic ischemic heart disease, unspecified      Cognitive deficits as late effect of cerebrovascular disease 6/5/2017     Coronary artery disease      CVA (cerebral infarction)      Dysphagia due to recent cerebrovascular accident (CVA) 6/5/2017     Elevated cholesterol      Essential hypertension, benign      H/O prostate cancer 7/6/2017     H/O: CVA (cerebrovascular accident) 6/5/2017     Hemiparesis affecting left side as late effect of cerebrovascular accident (H) 6/5/2017     Hyperlipidaemia      MEDICAL HISTORY OF - 1- 2008    V fib arrest      Myocardial infarction      Onychomycosis 6/5/2017     Other and unspecified hyperlipidemia      PVD (peripheral vascular disease) (H) 12/12/2017     Type 2 diabetes mellitus with diabetic peripheral angiopathy with gangrene (H) 12/12/2017     Type 2 diabetes mellitus with stage 3 chronic kidney disease (H) 6/5/2017     Type II diabetes mellitus with peripheral circulatory disorder (H) 12/12/2017      Type II or unspecified type diabetes mellitus without mention of complication, not stated as uncontrolled 1-2008       Past Surgical History   I have reviewed this patient's surgical history and updated it with pertinent information if needed.  Past Surgical History:   Procedure Laterality Date     PHACOEMULSIFICATION CLEAR CORNEA WITH STANDARD INTRAOCULAR LENS IMPLANT Right 10/7/2014    Procedure: PHACOEMULSIFICATION CLEAR CORNEA WITH STANDARD INTRAOCULAR LENS IMPLANT;  Surgeon: German Thomas MD;  Location:  [facility]     SEED IMPLANTATION  2002    prostate cancer     VITRECTOMY ANTERIOR Right 10/7/2014    Procedure: VITRECTOMY ANTERIOR;  Surgeon: German Thomas MD;  Location: [facility]       Prior to Admission Medications   Prior to Admission Medications   Prescriptions Last Dose Informant Patient Reported? Taking?   ACETAMINOPHEN PO 6/30/2018 at x3 Nursing Home Yes Yes   Sig: Take 1,000 mg by mouth 3 times daily For pain   Atorvastatin Calcium (LIPITOR PO) 6/30/2018 at Unknown time Nursing Home Yes Yes   Sig: Take 40 mg by mouth At Bedtime   BASAGLAR 100 UNIT/ML injection 6/30/2018 at Unknown time Nursing Home Yes Yes   Sig: Inject 30 Units Subcutaneous At Bedtime   Fluticasone Propionate (FLONASE NA) 6/30/2018 at Unknown time Nursing Home Yes Yes   Sig: Spray 2 sprays into both nostrils daily   Insulin Aspart (NOVOLOG SC) 6/30/2018 at x2 Nursing Home Yes Yes   Sig: Inject 4 Units Subcutaneous 2 times daily With breakfast and lunch   LEVOTHYROXINE SODIUM PO 6/30/2018 at Unknown time Nursing Home Yes Yes   Sig: Take 25 mcg by mouth daily   LISINOPRIL PO 6/30/2018 at x2 Nursing Home Yes Yes   Sig: Take 20 mg by mouth 2 times daily   METOPROLOL TARTRATE PO 6/30/2018 at x2 Nursing Home Yes Yes   Sig: Take 25 mg by mouth 2 times daily Hold for systolic BP < 100 or pulse < 58.   Skin Protectants, Misc. (EUCERIN) cream 6/30/2018 at x2 Nursing Home Yes Yes   Sig: Apply to toes two times a day   TRAMADOL HCL PO  6/30/2018 at Unknown time Nursing Home Yes Yes   Sig: Take 50 mg by mouth every 6 hours as needed for moderate to severe pain   cholecalciferol (VITAMIN D) 1000 UNIT tablet 6/30/2018 at Unknown time Nursing Home Yes Yes   Sig: Take 2,000 Units by mouth daily   polyethylene glycol (MIRALAX/GLYCOLAX) powder 6/30/2018 at Unknown time Nursing Home Yes Yes   Sig: Take 17 g by mouth daily    rivaroxaban ANTICOAGULANT (XARELTO) 20 MG TABS tablet 6/30/2018 at Unknown time snf No Yes   Sig: Take 1 tablet (20 mg) by mouth daily (with dinner)   tamsulosin (FLOMAX) 0.4 MG capsule 6/30/2018 at Unknown time snf No Yes   Sig: Take 1 capsule (0.4 mg) by mouth daily   timolol, PF, (TIMOPTIC OCUDOSE) 0.5 % ophthalmic solution 6/30/2018 at x2 Nursing Home No Yes   Sig: Place 1 drop into the right eye 2 times daily      Facility-Administered Medications: None     Allergies   Allergies   Allergen Reactions     No Known Allergies        Social History   I have reviewed this patient's social history and updated it with pertinent information if needed. Elias Mckee  reports that he quit smoking about 45 years ago. His smoking use included Cigarettes. He started smoking about 65 years ago. He has a 20.00 pack-year smoking history. He has never used smokeless tobacco. He reports that he drinks about 0.6 oz of alcohol per week  He reports that he does not use illicit drugs.    Family History   I have reviewed this patient's family history and updated it with pertinent information if needed.   No family history on file.    Review of Systems   Comprehensive review of systems was performed with pertinent positives and negatives listed in assessment and plan section.    Physical Exam   Temp: 97.3  F (36.3  C)   BP: 124/83   Heart Rate: 61 Resp: 16 SpO2: 96 %      Vital Signs with Ranges  Temp:  [97.3  F (36.3  C)] 97.3  F (36.3  C)  Heart Rate:  [54-67] 61  Resp:  [9-22] 16  BP: ()/(51-83) 124/83  SpO2:  [94 %-98 %]  96 %  182 lbs 12.18 oz    Constitutional: awake, alert, cooperative, no apparent distress, and appears stated age  Eyes: Lids and lashes normal, pupils equal, round and reactive to light, extra ocular muscles intact, sclera clear, conjunctiva normal  ENT: Normocephalic, without obvious abnormality, atraumatic, sinuses nontender on palpation, external ears without lesions, oral pharynx with moist mucous membranes, tonsils without erythema or exudates, gums normal and good dentition.  Hematologic / Lymphatic: no cervical lymphadenopathy  Respiratory: No increased work of breathing, good air exchange, clear to auscultation bilaterally, no crackles or wheezing  Cardiovascular: irregularly irregular rhythm  GI: No scars, normal bowel sounds, soft, non-distended, non-tender, no masses palpated, no hepatosplenomegally  Skin: no bruising or bleeding  Musculoskeletal: there is no redness, warmth, or swelling of the joints, left sided weakness  Neurologic: Awake, alert, oriented to name, place and time.  Neuropsychiatric: General: normal, calm and normal eye contact    Data   I personally reviewed all recent ECGs and images.  Results for orders placed or performed during the hospital encounter of 07/01/18 (from the past 24 hour(s))   INR   Result Value Ref Range    INR 1.30 (H) 0.86 - 1.14   PTT   Result Value Ref Range    PTT 37 22 - 37 sec   CBC with platelets differential   Result Value Ref Range    WBC 7.5 4.0 - 11.0 10e9/L    RBC Count 3.70 (L) 4.4 - 5.9 10e12/L    Hemoglobin 11.4 (L) 13.3 - 17.7 g/dL    Hematocrit 33.7 (L) 40.0 - 53.0 %    MCV 91 78 - 100 fl    MCH 30.8 26.5 - 33.0 pg    MCHC 33.8 31.5 - 36.5 g/dL    RDW 16.3 (H) 10.0 - 15.0 %    Platelet Count 157 150 - 450 10e9/L    Diff Method Automated Method     % Neutrophils 69.0 %    % Lymphocytes 11.9 %    % Monocytes 17.7 %    % Eosinophils 0.8 %    % Basophils 0.3 %    % Immature Granulocytes 0.3 %    Nucleated RBCs 0 0 /100    Absolute Neutrophil 5.2 1.6 -  8.3 10e9/L    Absolute Lymphocytes 0.9 0.8 - 5.3 10e9/L    Absolute Monocytes 1.3 0.0 - 1.3 10e9/L    Absolute Eosinophils 0.1 0.0 - 0.7 10e9/L    Absolute Basophils 0.0 0.0 - 0.2 10e9/L    Abs Immature Granulocytes 0.0 0 - 0.4 10e9/L    Absolute Nucleated RBC 0.0    Comprehensive metabolic panel   Result Value Ref Range    Sodium 138 133 - 144 mmol/L    Potassium 4.8 3.4 - 5.3 mmol/L    Chloride 104 94 - 109 mmol/L    Carbon Dioxide 26 20 - 32 mmol/L    Anion Gap 8 3 - 14 mmol/L    Glucose 160 (H) 70 - 99 mg/dL    Urea Nitrogen 29 7 - 30 mg/dL    Creatinine 1.75 (H) 0.66 - 1.25 mg/dL    GFR Estimate 37 (L) >60 mL/min/1.7m2    GFR Estimate If Black 45 (L) >60 mL/min/1.7m2    Calcium 8.5 8.5 - 10.1 mg/dL    Bilirubin Total 0.4 0.2 - 1.3 mg/dL    Albumin 3.1 (L) 3.4 - 5.0 g/dL    Protein Total 6.7 (L) 6.8 - 8.8 g/dL    Alkaline Phosphatase 68 40 - 150 U/L    ALT 14 0 - 70 U/L    AST 10 0 - 45 U/L   Troponin I   Result Value Ref Range    Troponin I ES <0.015 0.000 - 0.045 ug/L   Hemoglobin A1c   Result Value Ref Range    Hemoglobin A1C 7.4 (H) 0 - 5.6 %   EKG 12-lead, tracing only   Result Value Ref Range    Interpretation ECG Click View Image link to view waveform and result    Creatinine POCT   Result Value Ref Range    Creatinine 1.8 (H) 0.66 - 1.25 mg/dL    GFR Estimate 36 (L) >60 mL/min/1.7m2    GFR Estimate If Black 44 (L) >60 mL/min/1.7m2   CT Aortic Survey w Contrast    Narrative    CT AORTIC SURVEY WITH CONTRAST  7/1/2018 10:03 AM    HISTORY:  Chest and back pain.    TECHNIQUE: CT scan obtained of the chest, abdomen, and pelvis with  oral and IV contrast. 80 mL Isovue-370 injected. Aortic survey  protocol. Radiation dose for this scan was reduced using automated  exposure control, adjustment of the mA and/or kV according to patient  size, or iterative reconstruction technique.    COMPARISON:  None.    FINDINGS:  Chest:   No acute thoracic aortic abnormality.  No thoracic aortic dissection  or aneurysm is seen.  Scattered areas of atherosclerotic plaque  throughout the thoracic aorta with small areas of penetrating ulcer  calcification. In particular, penetrating ulcer can be seen along the  descending thoracic aorta without adjacent inflammation. Coronary  artery calcifications. Cannot fully assess for pulmonary embolism. No  large central pulmonary embolism. Nondiagnostic assessment of the  middle and small branch vessels.    No evidence for adenopathy. No acute airspace disease. Minor bibasilar  atelectasis. Calcified granuloma at the right upper lobe incidentally  seen.    Abdomen/pelvis:   No acute abdominal aortic abnormality. No abdominal aortic dissection.  Atherosclerotic plaque seen within the abdominal aorta with areas of  penetrating ulcer at the proximal abdominal aorta. 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 Scattered areas of atherosclerotic plaque  within the aortic and arterial vessels. Patency of the celiac,  superior mesenteric, inferior mesenteric, and right and left renal  artery origins.    The liver, gallbladder, adrenals, spleen, and pancreas show no acute  abnormalities. Bilateral renal cortical thinning. Hypodense focus at  the lower right kidney is 0.9 cm, series 4 image 130, and is  ultimately indeterminant. Kidneys have a somewhat heterogeneous  perfusion pattern that is subtle. No hydronephrosis. Prostate  radiation seeds. No bowel obstruction. Colonic diverticula without  diverticulitis. No enlarged lymph nodes. Faint gallstones identified.    No aggressive bony lesion is seen.      Impression    IMPRESSION:  1. No acute aortic abnormality. No convincing acute aortic dissection.  Moderate areas of atherosclerotic disease of the aorta and its  branches. 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 There is atherosclerosis throughout the aorta and its branches.  There are areas of penetrating ulcer  visualized at the descending  thoracic aorta and upper abdomen without convincing acute  inflammation.  2. Renal cortical thinning bilaterally. Indeterminant small  hypoenhancing region within the lower right kidney. Consider  outpatient renal MRI for further assessment of this indeterminate  lesion. Further, the kidneys have a mildly heterogeneous enhancement  pattern. Correlate with urinalysis for any evidence for a urinary  tract infection and pyelonephritis.  3. No other acute abnormality.  4. A few faint gallstones.    VILMA LEW MD   Lactic acid whole blood   Result Value Ref Range    Lactic Acid 1.7 0.7 - 2.0 mmol/L   Glucose by meter   Result Value Ref Range    Glucose 134 (H) 70 - 99 mg/dL[QP1.1]              Revision History        User Key Date/Time User Provider Type Action    > QP1.1 7/1/2018  3:16 PM Ricky Almonte MD Physician Sign                     Progress Notes - Physician (Notes from 06/30/18 through 07/03/18)      Progress Notes by Yvette Carrero LICSW at 7/3/2018  4:42 PM     Author:  Yvette Carrero LICSW Service:  (none) Author Type:      Filed:  7/3/2018  4:43 PM Date of Service:  7/3/2018  4:42 PM Creation Time:  7/3/2018  4:42 PM    Status:  Signed :  Yvette Carrero LICSW ()         SW:  D:  Patient discharging today back to [facility].  Transportation was arranged, paid by his insurance.   Bedside nurse notified daughter of time.  Writer spoke with [facility] admission and faxed orders.[KH1.1]     Revision History        User Key Date/Time User Provider Type Action    > KH1.1 7/3/2018  4:43 PM Yvette Carrero LICSW  Sign            Progress Notes by Kena Moon LICSW at 7/3/2018 10:42 AM     Author:  Kena Moon LICSW Service:  Social Work Author Type:      Filed:  7/3/2018 10:42 AM Date of Service:  7/3/2018 10:42 AM Creation Time:  7/3/2018 10:42 AM    Status:  Signed :  Red  FORREST Chung ()         SW:  D:  Received a message back from [facility] confirming patient has a bed hold and can return there on discharge.  P:  Will continue to follow.[SJ1.1]     Revision History        User Key Date/Time User Provider Type Action    > SJ1.1 7/3/2018 10:42 AM Kena Moon LICSW  Sign            Progress Notes by Samantha Meyer RN at 7/2/2018  5:02 PM     Author:  Samantha Meyer RN Service:  (none) Author Type:  Registered Nurse    Filed:  7/2/2018  5:03 PM Date of Service:  7/2/2018  5:02 PM Creation Time:  7/2/2018  5:02 PM    Status:  Signed :  Samantha Meyer RN (Registered Nurse)         Report called and RN will call back to unit with report.[BN1.1]     Revision History        User Key Date/Time User Provider Type Action    > BN1.1 7/2/2018  5:03 PM Samantha Meyer RN Registered Nurse Sign            Progress Notes by Cyndi Padilla PA-C at 7/2/2018  8:05 AM     Author:  Cyndi Padilla PA-C Service:  Hospitalist Author Type:  Physician Assistant - C    Filed:  7/2/2018  2:29 PM Date of Service:  7/2/2018  8:05 AM Creation Time:  7/2/2018  8:05 AM    Status:  Attested :  Cyndi Padilla PA-C (Physician Assistant - C)    Cosigner:  Socrates Laureano DO at 7/2/2018  3:31 PM        Attestation signed by Socrates Laureano DO at 7/2/2018  3:31 PM        Physician Attestation   I, Socrates Laureano, have reviewed and discussed with the advanced practice provider their history, physical and plan for Elias Mckee. I did not participate in a shared visit by interviewing or examining the patient and this should be billed as an advanced practice provider only visit.    Socrates Laureano  Date of Service (when I saw the patient): I did not personally see this patient today.                               [facility]    Hospitalist Progress Note    Date of Service (when I saw the  patient): 07/02/2018    Assessment & Plan   Mr. Mckee is an 86-year-old male with a past medical history significant for previous stroke with left-sided hemiplegia and cognitive deficits, coronary artery disease[KE1.1] with prior stenting[KE1.2],[KE1.1] hx of OOH vfib arrest,[KE1.2] chronic atrial fibrillation[KE1.1] and anticoagulation with Xarelto[KE1.2], hypertension,[KE1.1] T2DM[KE1.2], peripheral vascular disease and chronic kidney disease[KE1.1] III[KE1.2] who present[KE1.1]ed[KE1.2] to the emergency department[KE1.1] on 7/1/18 with syncope and findings of hypotension and bradycardia, admitted to the [facility] for [place] evaluation and treatment.[KE1.2]     Syncope[KE1.1]  Atrial fibrillation, permanent, with SVR[KE1.2]  Orthostatic hypotension[KE1.3]:[KE1.1]  Noted p[KE1.2]eriod of unresponsiveness[KE1.1] with findings of[KE1.2] bradycardia with rates in the 30s and hypotension of 70/30[KE1.1]; pt given atropine per EMS[KE1.2].[KE1.1] CMP with [facility] DEL [facility] on admission, since resolved. CBC WNL aside from baseline anemia. Lactic acid WNL. Trop negative. INR 1.30 on admission (pt on Xarelto). EKG showing atrial fibrillation with VR 69. Pt hydrated with 1 L IVF overnight. Last echo from 2/2017 with EF 55-60%, mild mitral regurg, no WMA.   -- PTA Lopressor 25 mg BID held on admission, PTA Lisinopril 20 mg po BID held on admission[KE1.2]   -- Continue telemetry[KE1.4]   -- Cardiology consulted, appreciate assistance; recommending 30 day event monitor at discharge[KE1.2], close f/u with Dr. Teixeira; signed off 7/2.   -- C[KE1.5]ontinue Xarelto    -- Echocardiogram ordered   -- Carotid U/S given hx of carotid artery stenosis below   -- Check TSH and Magnesium   -- Check orthostatic BPs this AM[KE1.2]; noted positive per nursing (see flowsheet)--pt felt dizzy at that time, 500 cc bolus over 2 hrs ordered[KE1.3]   -- Care Coordinator to help facilitate Cardiology follow-up    Generalized weakness: RN notes indicating pt requiring  A2, 4WW.   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 Bruno; no surgical intervention advised[KE1.2]     Acute kidney injury on chronic kidney disease stage III[KE1.1], resolved[KE1.2]:[KE1.1]  Creatinine 1.8>1.19. Suspect [facility] DEL CARMEN related to hypotension and bradycardia with resultant hypoperfusion.     Abnormal UA  Indeterminant small hypoenhancing region within the lower right kidney, incidental finding: Noted on CT. Also with note that the kidneys have a mildly heterogenous enhancement pattern. UA with large LE and WBC 41. WBC WNL. Afebrile.   -- Empirically started on Ceftriaxone overnight   -- Follow urine culture[KE1.2]; >124278 of gram positive cocci, if pt were to spike fever or develop leukocytosis, low threshold to start vancomycin  -- Procalcitonin ordered[KE1.6]   -- Outpatient renal MRI recommended[KE1.5]     Carotid artery stenosis: US from 2013 with 50-69% diameter stenosis of the right and left ICA relative to the distal ICA diameter.   --[KE1.2] C[KE1.5]arotid U/S[KE1.2] as above[KE1.5]     Normocytic anemia: Stable. No active signs of bleeding.   -- Monitor[KE1.2]     Recent Labs  Lab 07/02/18  0622 07/01/18  0940   HGB 11.0* 11.4*[KE1.7]     Coronary artery disease[KE1.1]  OOH ventricular fibrillation arrest (2008)  HTN, hyperlipidemia[KE1.2]:[KE1.1] Followed by Dr. Teixeira of Cardiology, last saw in 2015. Noted to have severe diffuse disease involving both circumflex and LAD, hx of multiple Taxol stents into the LAD and cx balloon angioplasty of the small posterior artery was performed. Also with hx of in-stent restenosis of the distal left anterior descending artery and endeavor SOHAIL was placed. Angioplasty performed to a first septal . Distal circumflex stenosis was  revascularized with SOHAIL. Stress testing in 2014 demonstrated no significant inducible ischemia. Inferior and inferoseptal infarction with EF 44%.  -- Continue Lipitor 40 mg po qd[KE1.2]  -- ACE-I and BB on hold given hypotension and bradycardia above[KE1.5]     Hypothyroidism:[KE1.1]  Maintained on Synthroid 25 mcg daily.   -- Check TSH     T2DM, non-insulin dependent, uncontrolled: A1C 7.4.[KE1.2] We will continue with Basaglar at a reduced dose for now.  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 Neurology suspected CVA was cardioembolic and hx of PAF.[KE1.2] Oriented X3 on my interview.   -- Continue statin and Xarelto as above[KE1.4]     BPH: Continue PTA Flomax[KE1.2]     # Pain Assessment:  Current Pain Score 7/2/2018   Patient currently in pain? denies   Pain score (0-10) -   Pain location -   Pain descriptors -[KE1.1]   Elias s pain level was assessed and he currently denies pain.[KE1.4]      DVT Prophylaxis:[KE1.1] Xarelto[KE1.4]   Code Status: Full Code    Disposition: Expected discharge[KE1.1] pending clinical course[KE1.4]     Cyndi Padilla PA-C[KE1.1]    This patient was discussed with Dr. Laureano of the Hospitalist Service who agrees with current plans as outlined above.[KE1.4]     Interval History[KE1.1]   Denies chest pain, SOB, dizziness, lightheadedness. Oriented X3, unable to tell me specific details of syncopal event. Denies prodromal symptoms. Feels overall weak. Appetite good. Denies abdominal pain, dysuria.  Baseline left-sided hemiplegia from prior CVA. No acute events reported overnight.[KE1.4]     -Data reviewed today:[KE1.1] See below.[KE1.4]     Physical Exam   Temp: 98.7  F (37.1  C) Temp src: Oral BP: 115/73   Heart Rate: 63 Resp: 16 SpO2: 94 % O2 Device: None (Room air)    Vitals:    07/01/18 1400 07/02/18 0500   Weight: 82.9 kg (182 lb 12.2 oz) 84 kg (185 lb 3 oz)     Vital Signs with Ranges  Temp:  [97.3  F (36.3  C)-98.7  F (37.1  C)] 98.7  F (37.1  C)  Heart Rate:  [54-75] 63  Resp:  [9-22] 16  BP: ()/(48-83) 115/73  SpO2:  [94 %-98 %] 94 %  I/O last 3 completed shifts:  In: 1620 [P.O.:820; I.V.:800]  Out: 500 [Urine:500]    CONSTITUTIONAL: Pt laying in bed, dressed in[KE1.1] hospital garb[KE1.4]. Appears[KE1.1] comfortable, eating breakfast[KE1.4]. Cooperative with interview.   HEENT: Normocephalic, atraumatic. Negative for conjunctival redness or scleral icterus.    CARDIOVASCULAR: RRR, no murmurs, rubs, or extra heart sounds appreciated. Pulses +2/4 and regular in upper and lower extremities, bilaterally.   RESPIRATORY: No increased work of breathing.  CTA, bilat; no wheezes, rales, or rhonchi appreciated.  GASTROINTESTINAL:  Abdomen soft, non-distended. BS auscultated in all four quadrants. Negative for tenderness to palpation.  No masses or organomegaly noted.  MUSCULOSKELETAL:[KE1.1] Baseline left-sided hemiplegia.[KE1.4] No gross deformities noted. Normal muscle tone.   HEMATOLOGIC/LYMPHATIC/IMMUNOLOGIC:  Negative for lower extremity edema, bilaterally.  NEUROLOGIC: Alert and oriented to person, place, and time.[KE1.1] Left-sided hemiplegia.[KE1.4]  SKIN: Warm, dry, intact. No jaundice noted. Negative for suspicious lesions, rashes, bruising, open sores or abrasions.     Medications     - MEDICATION INSTRUCTIONS -         acetaminophen (TYLENOL) tablet 1,000 mg  1,000 mg Oral TID     atorvastatin (LIPITOR) tablet 40 mg  40 mg Oral At Bedtime     cefTRIAXone  1 g Intravenous Q24H     insulin  aspart  1-7 Units Subcutaneous TID AC     insulin aspart  1-5 Units Subcutaneous At Bedtime     insulin aspart   Subcutaneous TID w/meals     insulin glargine  20 Units Subcutaneous At Bedtime     levothyroxine (SYNTHROID/LEVOTHROID) tablet 25 mcg  25 mcg Oral Daily     polyethylene glycol  17 g Oral Daily     rivaroxaban ANTICOAGULANT  15 mg Oral Daily with supper     sodium chloride (PF)  3 mL Intracatheter Q8H     tamsulosin  0.4 mg Oral Daily     timolol  1 drop Right Eye BID       Data     Recent Labs  Lab 07/02/18  0622 07/01/18  0940   WBC 6.9 7.5   HGB 11.0* 11.4*   MCV 90 91    157   INR  --  1.30*    138   POTASSIUM 4.1 4.8   CHLORIDE 107 104   CO2 23 26   BUN 27 29   CR 1.19 1.75*   ANIONGAP 9 8   TRENT 8.2* 8.5   * 160*   ALBUMIN  --  3.1*   PROTTOTAL  --  6.7*   BILITOTAL  --  0.4   ALKPHOS  --  68   ALT  --  14   AST  --  10   TROPI  --  <0.015       Recent Results (from the past 24 hour(s))   CT Aortic Survey w Contrast    Narrative    CT AORTIC SURVEY WITH CONTRAST  7/1/2018 10:03 AM    HISTORY:  Chest and back pain.    TECHNIQUE: CT scan obtained of the chest, abdomen, and pelvis with  oral and IV contrast. 80 mL Isovue-370 injected. Aortic survey  protocol. Radiation dose for this scan was reduced using automated  exposure control, adjustment of the mA and/or kV according to patient  size, or iterative reconstruction technique.    COMPARISON:  None.    FINDINGS:  Chest:   No acute thoracic aortic abnormality.  No thoracic aortic dissection  or aneurysm is seen. Scattered areas of atherosclerotic plaque  throughout the thoracic aorta with small areas of penetrating ulcer  calcification. In particular, penetrating ulcer can be seen along the  descending thoracic aorta without adjacent inflammation. Coronary  artery calcifications. Cannot fully assess for pulmonary embolism. No  large central pulmonary embolism. Nondiagnostic assessment of the  middle and small branch vessels.    No  evidence for adenopathy. No acute airspace disease. Minor bibasilar  atelectasis. Calcified granuloma at the right upper lobe incidentally  seen.    Abdomen/pelvis:   No acute abdominal aortic abnormality. No abdominal aortic dissection.  Atherosclerotic plaque seen within the abdominal aorta with areas of  penetrating ulcer at the proximal abdominal aorta. 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 Scattered areas of atherosclerotic plaque  within the aortic and arterial vessels. Patency of the celiac,  superior mesenteric, inferior mesenteric, and right and left renal  artery origins.    The liver, gallbladder, adrenals, spleen, and pancreas show no acute  abnormalities. Bilateral renal cortical thinning. Hypodense focus at  the lower right kidney is 0.9 cm, series 4 image 130, and is  ultimately indeterminant. Kidneys have a somewhat heterogeneous  perfusion pattern that is subtle. No hydronephrosis. Prostate  radiation seeds. No bowel obstruction. Colonic diverticula without  diverticulitis. No enlarged lymph nodes. Faint gallstones identified.    No aggressive bony lesion is seen.      Impression    IMPRESSION:  1. No acute aortic abnormality. No convincing acute aortic dissection.  Moderate areas of atherosclerotic disease of the aorta and its  branches. 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 There is atherosclerosis throughout the aorta and its branches.  There are areas of penetrating ulcer visualized at the descending  thoracic aorta and upper abdomen without convincing acute  inflammation.  2. Renal cortical thinning bilaterally. Indeterminant small  hypoenhancing region within the lower right kidney. Consider  outpatient renal MRI for further assessment of this indeterminate  lesion. Further, the kidneys have a mildly heterogeneous enhancement  pattern. Correlate with urinalysis for any evidence for a  urinary  tract infection and pyelonephritis.  3. No other acute abnormality.  4. A few faint gallstones.    VILMA LEW MD[KE1.1]          Revision History        User Key Date/Time User Provider Type Action    > KE1.6 7/2/2018  2:29 PM Cyndi Padilla PA-C Physician Assistant - C Sign     [N/A] 7/2/2018 12:01 PM Cyndi Padilla PA-C Physician Assistant - C Sign     KE1.3 7/2/2018 11:41 AM Cyndi Padilla PA-C Physician Assistant - C Sign     KE1.8 7/2/2018 10:12 AM Cyndi Padilla PA-C Physician Assistant - C Sign     KE1.4 7/2/2018 10:06 AM Cyndi Padilla PA-C Physician Assistant - C      KE1.5 7/2/2018 10:01 AM Cyndi Padilla PA-C Physician Assistant - C      KE1.7 7/2/2018  9:29 AM Cyndi Padilla PA-C Physician Assistant - C      KE1.2 7/2/2018  9:14 AM Cyndi Padilla PA-C Physician Assistant - C      KE1.1 7/2/2018  8:05 AM Cyndi Padilla PA-C Physician Assistant - C             Progress Notes by Marisela Lane PT at 7/2/2018  2:28 PM     Author:  Marisela Lane PT Service:  (none) Author Type:  Physical Therapist    Filed:  7/2/2018  2:28 PM Date of Service:  7/2/2018  2:28 PM Creation Time:  7/2/2018  2:28 PM    Status:  Signed :  Marisela Lane PT (Physical Therapist)            07/02/18 1124   Quick Adds   Type of Visit Initial PT Evaluation   Living Environment   Lives With facility resident   Living Arrangements [facility]  ([facility])   Home Accessibility bed and bath on same level   Number of Stairs Within Home 0   Transportation Available family or friend will provide   Self-Care   Usual Activity Tolerance good   Current Activity Tolerance fair   Regular Exercise no   Equipment Currently Used at Home raised toilet;walker, standard  (Pt reports using walker out in Replaced by [facility])   Functional Level Prior   Ambulation 4-->completely dependent  (w/c bound, can  self-propel)   Transferring 3-->assistive equipment and person   Toileting 3-->assistive equipment and person  (Uses EZ stand )   Bathing 4-->completely dependent   Dressing 3-->assistive equipment and person   Fall history within last six months yes   Number of times patient has fallen within last six months 6   Which of the above functional risks had a recent onset or change? ambulation;transferring;toileting;bathing;dressing   General Information   Onset of Illness/Injury or Date of Surgery - Date 07/01/18   Referring Physician Cyndi Padilla PA-C   Patient/Family Goals Statement To go home    Pertinent History of Current Problem (include personal factors and/or comorbidities that impact the POC) 87 y/o M admitted with syncope likely due to hypoperfusion. PMH including CVA with residual L sided hemiplegia and cognitive deficits.    Precautions/Limitations fall precautions   General Observations Pt supine in bed upon arrival   General Info Comments Activity: Up with assist    Cognitive Status Examination   Orientation orientation to person, place and time   Level of Consciousness lethargic/somnolent   Follows Commands and Answers Questions 100% of the time   Personal Safety and Judgment impaired   Pain Assessment   Patient Currently in Pain No   Integumentary/Edema   Integumentary/Edema no deficits were identifed   Posture    Posture Forward head position;Protracted shoulders   Range of Motion (ROM)   ROM Comment Not formally assessed, demonstrates adequate ROM during functional mobility   Strength   Strength Comments Not formally assessed, demonstrates 3/5 grossly in B LE's during functional transfers    Bed Mobility   Bed Mobility Comments Supine<>sit EOB with min A x2   Transfer Skills   Transfer Comments Sit<>stand with mod A x2 and FWW   Gait   Gait Comments Not assessed due to increased pain and unsteadiness    Balance   Balance Comments Demonstrates good balance in sitting EOB, fair balance in  "standing due to increased pain and unsteadiness    Sensory Examination   Sensory Perception no deficits were identified   General Therapy Interventions   Planned Therapy Interventions bed mobility training;strengthening;transfer training;progressive activity/exercise   Clinical Impression   Criteria for Skilled Therapeutic Intervention yes, treatment indicated   PT Diagnosis difficulty with transfers    Influenced by the following impairments pain, generalized weakness, L sided hemiplegia    Functional limitations due to impairments limited functional mobility with transfers requiring A x2    Clinical Presentation Evolving/Changing   Clinical Presentation Rationale Based on PMH and current presentation   Clinical Decision Making (Complexity) Low complexity   Therapy Frequency` daily   Predicted Duration of Therapy Intervention (days/wks) 3 days    Anticipated Discharge Disposition Long Term Care Facility   Risk & Benefits of therapy have been explained Yes   Patient, Family & other staff in agreement with plan of care Yes   [facility] AM-PAC  \"6 Clicks\" V.2 Basic Mobility Inpatient Short Form   1. Turning from your back to your side while in a flat bed without using bedrails? 2 - A Lot   2. Moving from lying on your back to sitting on the side of a flat bed without using bedrails? 2 - A Lot   3. Moving to and from a bed to a chair (including a wheelchair)? 2 - A Lot   4. Standing up from a chair using your arms (e.g., wheelchair, or bedside chair)? 2 - A Lot   5. To walk in hospital room? 1 - Total   6. Climbing 3-5 steps with a railing? 1 - Total   Basic Mobility Raw Score (Score out of 24.Lower scores equate to lower levels of function) 10   Total Evaluation Time   Total Evaluation Time (Minutes) 10[JH1.1]        Revision History        User Key Date/Time User Provider Type Action    > JH1.1 7/2/2018  2:28 PM Marisela Lane, PT Physical Therapist Sign            ED Provider Notes by Crystal Bradshaw, " "MD at 7/1/2018  9:34 AM     Author:  Crystal Bradshaw MD Service:  Emergency Medicine Author Type:  Physician    Filed:  7/2/2018 10:32 AM Date of Service:  7/1/2018  9:34 AM Creation Time:  7/1/2018  9:35 AM    Status:  Signed :  Crystal Bradshaw MD (Physician)           History     Chief Complaint:[MM1.1]  Unresponsive episode[MM1.2]    HPI   History provided primarily by medics  Elias Mckee is a diabetic 86 year old male with a history of CVA with residual hemiplegia and cognitive deficits, CKD stage III, hypertension, and atrial fibrillation on Xarelto who presents from nursing home via EMS for evaluation of[MM1.1] unresponsive episode.[MM1.2] Per medics, patient awoke as normal at 0630 today per nursing home staff and ate breakfast without issue and then went back to his room. At 0830 staff rechecked and found him[MM1.1] in bed and \"[MM1.3]unresponsive[MM1.1],\" at least not responding to his name,[MM1.3] for 20-30 minutes with possible seizure activity[MM1.1] described as[MM1.4] shaking of[MM1.3] his[MM1.4] legs. True seizure activity is unclear.[MM1.3] He has no previous history of seizure disorder. On medics' arrival he was responsive only to pain, bradycardic in 20-30s,[MM1.1] hypoxic, and hypotensive at 70/50. They established an IV to[MM1.4] right AC and administered atropine 0.5mg, with improvement in responsiveness and pulse to 70-90s. Last BP was 81/48 and they initiated 1L of normal saline en route.[MM1.1] His hypoxia improved with 2L via nasal cannula.[MM1.4] Blood glucose was 186[MM1.1] en route.[MM1.4]  No recent falls or trauma. Medics performed EKG which showed atrial fibrillation vs slow junctional rhythm without STEMI.[MM1.3]    The patient himself complains of chest pain and back pain. He is somewhat cantankerous and provides poor history.      CARDIAC RISK FACTORS:  Sex:    M  Tobacco/Illicit drugs: Y  Hypertension:   Y  Hyperlipidemia:  Y  Diabetes:   Y  Family " History:  N  Personal History: Y       Code status:  Full[MM1.1] code[MM1.3] as of 2/2017    Allergies:  no known drug allergies      Medications:[MM1.1]    X[MM1.3]arelto  Tramadol  Tamsulosin  Metoprolol  Lisinopril  Levothyroxine  Insulin  basaglar  Atorvastatin  Vitamin D  Flonase      Past Medical History:    CVA[MM1.1] x2[MM1.3] with residual left hemiplegia, cognitive deficit, and dysphagia, 6/2017  DM type II  atrial fibrillation   Hyperlipidemia  Hypertension   CAD with hx MI  peripheral vascular disease   Hx central retinal vein occlusion, right   CKD stage III  Ca, prostate  Acquired hypothyroidism     Past Surgical History:    Prostate cancer seed implantation    Family History:    History reviewed. No pertinent family history.      Social History:  Former smoker, 20packyear history, quit 1973.  Marital Status:  [2]  [facility]ides in [facility]    Review of Systems   Unable to perform ROS: Mental status change       Physical Exam[MM1.1]     Patient Vitals for the past 24 hrs:   BP Temp Heart Rate Resp SpO2   07/01/18 1230 124/83 - 61 14 96 %   07/01/18 1200 111/76 - 67 14 96 %   07/01/18 1153 - - 58 18 96 %   07/01/18 1145 124/66 - 54 15 97 %   07/01/18 1130 - - 66 19 98 %   07/01/18 1107 113/51 - 64 21 97 %   07/01/18 1101 - - 54 14 -   07/01/18 1045 102/58 - 55 10 95 %   07/01/18 1030 108/53 - 61 9 94 %   07/01/18 1018 115/58 - 56 13 -   07/01/18 1006 - - 66 20 96 %   07/01/18 1003 101/66 - 61 - 94 %   07/01/18 0945 (!) 86/61 97.3  F (36.3  C) 57 22 98 %[MM1.5]        Physical Exam  Constitutional:  Patient is oriented to person[MM1.1] and time, but not place.[MM1.6] They appear well-developed and well-nourished. Moderate distress secondary to chest pain   HENT:   Mouth/Throat:   Oropharynx is clear and moist.   Eyes:    Conjunctivae normal and EOM are normal. Pupils are equal, round, and reactive to light.   Neck:    Normal range of motion.   Cardiovascular:[MM1.1] Bradycardic[MM1.3]  rate, regular rhythm and normal heart sounds.  Exam reveals no gallop and no friction rub.  No murmur heard.[MM1.1] Peripheral pulses intact and equal[MM1.2]  Pulmonary/Chest:  Effort normal and breath sounds normal. Patient has no wheezes. Patient has no rales.   Abdominal:   Soft. Bowel sounds are normal. Patient exhibits no mass. There is no tenderness. There is no rebound and no guarding.   Musculoskeletal:  Normal range of motion. Patient exhibits no edema.   Neurological:   Patient is oriented to person[MM1.1] and time, but not place. Stable left hemiplegia.[MM1.6] No cranial nerve deficit or sensory deficit. GCS 1[MM1.1]5[MM1.6]  Skin:   Skin is warm and dry. No rash noted. No erythema.[MM1.1]   Psychiatric:   Agitated, unable to assess further.[MM1.6]       Emergency Department Course   ECG:  ECG ([MM1.1]09[MM1.7]:[MM1.1]41[MM1.7]:[MM1.1]13[MM1.7]):  Indication:[MM1.1] chest pain[MM1.7]    Rate[MM1.1] 69[MM1.7] bpm. RI interval[MM1.1] *[MM1.7]. QRS duration[MM1.1] 80[MM1.7]. QT/QTc[MM1.1] 408[MM1.7]/[MM1.1]437[MM1.7]. P-R-T axes[MM1.1] *[MM1.7],[MM1.1] 25[MM1.7],[MM1.1] 98[MM1.7].[MM1.1]   atrial fibrillation  Nonspecific T wave abnormality  abnormal ECG[MM1.7]  agree with computer interpretation[MM1.1]  No previous ECGs available for comparison.[MM1.7]   Interpreted at[MM1.1] 0942[MM1.7] by Crystal Bradshaw MD.    Imaging:  Radiographic findings were communicated with the[MM1.1] patient and family[MM1.7] who voiced understanding of the findings.  CT Aortic survey w/ contrast:[MM1.1]   1. No acute aortic abnormality. No aortic dissection. Moderate areas of atherosclerotic disease of the aorta and its branches. 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   2. Renal cortical thinning bilaterally. Indeterminant small hypoenhancing region within the lower right kidney. Consider outpatient renal MRI for further assessment of this indeterminate lesion. Further, the kidneys have a mildly  heterogeneous enhancement pattern. Correlate with urinalysis for any evidence for a urinary tract infection and pyelonephritis.  3. No other acute abnormality.  4. A few faint gallstones.[MM1.8] Results per Radiology.     Laboratory:  Laboratory findings were communicated with the[MM1.1] patient and family[MM1.7] who voiced understanding of the findings.  POC Creatinine: Cr 1.8 (H), eGFR 36 (L)    CBC w/diff:[MM1.1] RBC 3.70 (L), Hgb 11.4 (L), Hct 33.7 (L),[MM1.7]  o/w WNL (WBC[MM1.1] 7.5[MM1.7], Plt[MM1.1] 157[MM1.7])  CMP: Glu[MM1.1] 160 (H),[MM1.2] Cr[MM1.1] 1.75 (H[MM1.2])[MM1.1], eGFR 37 (L), alb 3.1 (L), protein 6.7 (L),  o/w WNL[MM1.2]  Lactic acid (at[MM1.1] 10[MM1.8]0[MM1.2]5[MM1.8]):[MM1.1] 1.7[MM1.8]   Troponin-i (at[MM1.1] 0940[MM1.2]): <0.015  INR:[MM1.1] 1.30[MM1.7]   PTT:[MM1.1] 37[MM1.7]      Emergency Department Course:  0926: Call from medics in field for 86M with bradycardia at 34, improved to 70-80 after atropine en route.   ETA 10 minutes  Stab room 2 prepped.    0933: patient transferred to ED bed.  History obtained as above. Rapid physical exam performed as above.[MM1.1]    0940:[MM1.2] IV access established. Blood drawn and sent.    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    1046[MM1.8]: I rechecked patient. Daughter at bedside. Updated on findings and plan.[MM1.1]      Findings and plan explained to the daughter and patient who consent to admission.     1100: Discussed the patient with Dr. Branham of the Hospitalist Service, who will admit the patient to a [facility] bed for further monitoring, evaluation, and treatment.[MM1.3]     1103:  Patient relocated to ED room 3 prior to admission.[MM1.9]        Impression & Plan    Medical Decision Making:  Elias Mckee is a 86 year old male[MM1.1] who presents to the ED via EMS after he was noted to be unresponsive and bradycardic at his nursing home. He had eaten breakfast just prior to that without any issue and awoke normally this morning. When medics arrived to nursing facitily they noted his blood pressure to be low and to be profoundly bradycardic. After administrating 0.5mg atropine his mentation and blood pressure[MM1.6]  He complained of chest and back[MM1.10] to the medics and to myself upon arrival. History was difficult as there appears to be some underlying cognitive deficit. He was also quite agitated when he initially arrived[MM1.6] and not answering questions approriately. Due to his complaints of chest and back pain[MM1.10] and hypotension[MM1.6] I sent him[MM1.10] emergently[MM1.6] to CT[MM1.10] to do an aortic survey to better evaluate for a vascular catastrophe.[MM1.6] Findings as noted above. I did speak with Dr. Boles as well to verify that there was no dissection, and there is not but there i[MM1.10]s a mural thrombus with a penetrating ulcer. Diagnostic blood work does show[MM1.6] he has some renal failure. Cardiac enzymes were within normal limits.[MM1.10] His lactic acid is normal. White blood cell count is unremarkable[MM1.6] and he[MM1.10] has[MM1.6] stable mild anemia.[MM1.10]    A[MM1.6]t this time I suspect that patient ha[MM1.10]d[MM1.6] symptomatic bradycardia. Although medics do report some shaking activit[MM1.10]y[MM1.6] of his legs this does not sound like[MM1.10] myoclonic jerking and[MM1.6] he does have some restless leg[MM1.10] syndrome[MM1.6] on exam here.[MM1.10] His daughter did present to the ED and states that he appears to be back at baseline.[MM1.6] His blood pressure has significantly improved as well; he has been consistently within the 1[MM1.10]10-120s and  is still in atrial fibrillation.[MM1.6] I will admit to [facility] under Dr. Branham.[MM1.10]     Diagnosis:[MM1.1]    ICD-10-CM    1. Bradycardia R00.1    2. Syncope and collapse R55    3. Renal failure, unspecified chronicity N19[MM1.5]        Disposition:[MM1.1]   Admission[MM1.3]    Critical Care time was[MM1.1] 35[MM1.3] minutes for this patient excluding procedures.       Scribe Disclosure:  I, Chencho Ervin, am serving as a scribe at 9:36 AM on 7/1/2018 to document services personally performed by Crystal Bradshaw MD based on my observations and the provider's statements to me.    Chencho Ervin  7/1/2018   EMERGENCY DEPARTMENT .l[MM1.1]     Crystal Bradshaw MD  07/02/18 1032  [MB1.1]     Revision History        User Key Date/Time User Provider Type Action    > MB1.1 7/2/2018 10:32 AM Crystal Bradshaw MD Physician Sign     MM1.5 7/1/2018 12:40 PM Arcadio, Max Scribe Share     MM1.6 7/1/2018 12:26 PM Arcadio, Max Scribe      MM1.10 7/1/2018 12:18 PM [place], Max Scribe Share     [N/A] 7/1/2018 12:09 PM Arcadio, Max Scribe Share     [N/A] 7/1/2018 11:57 AM [place], Max Scribe Share     MM1.4 7/1/2018 11:52 AM [place], Max Scribe Share     [N/A] 7/1/2018 11:19 AM [place], Max Scribe Share     MM1.9 7/1/2018 11:19 AM Arcadio, Max Scribe Share     MM1.3 7/1/2018 11:05 AM [place], Max Scribe Share     MM1.8 7/1/2018 10:50 AM Arcadio, Max Scribe Share     MM1.2 7/1/2018 10:22 AM [place], Max Scribe Share     MM1.7 7/1/2018 10:13 AM Chencho Ervin     MM1.1 7/1/2018  9:54 AM Chencho Ervin            Progress Notes by Castillo Tran MD at 7/2/2018  9:50 AM     Author:  Castillo Tran MD Service:  Cardiology Author Type:  Physician    Filed:  7/2/2018  9:55 AM Date of Service:  7/2/2018  9:50 AM Creation Time:  7/2/2018  9:50 AM    Status:  Signed :  Castillo Tran MD (Physician)           Cardiology Progress Note          Assessment and Plan:   Admitted for syncope. Found to  have atrial fib with slow HR. Metoprolol has been stopped. Both HR and BP are stable now. He complains mild fatigue.    History of CAD with normal EF. Stenting in 2008. No recent angina or CHF. Was seen by Dr. Teixeira up to 2015.    Newly diagnosed UTI, asymptomatic. Will be managed by the hospitalist service.    History of hypertension, type II diabetes, PAD, hyperlipidemia, CVA, prostate cancer.    Agree for outpatient cardiac event monitor for 1 month.  Resume cardiology follow up with Dr. Teixeira in 1-3 months.    Sign off    Castillo Tran MD  Cardiology   [phone]                Diagnosis:     Patient Active Problem List   Diagnosis     Chronic ischemic heart disease     Personal history of other diseases of circulatory system     HYPERLIPIDEMIA LDL GOAL <100     Advance Care Planning     CKD (chronic kidney disease) stage 3, GFR 30-59 ml/min     Leg weakness     Ataxia     BPH (benign prostatic hyperplasia)     Type 2 diabetes mellitus with diabetic nephropathy (H)     History of actinic keratoses     History of squamous cell carcinoma     S/P Mohs surgery for basal cell carcinoma     H/O: CVA (cerebrovascular accident), 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      Central retinal vein occlusion, right eye     PVD (peripheral vascular disease) (H)     Type II diabetes mellitus with peripheral circulatory disorder (H)     Syncope                Physical Exam:   Blood pressure 115/73, temperature 98.7  F  (37.1  C), temperature source Oral, resp. rate 16, weight 84 kg (185 lb 3 oz), SpO2 94 %.  Wt Readings from Last 4 Encounters:   07/02/18 84 kg (185 lb 3 oz)   06/11/18 83.5 kg (184 lb)   05/17/18 84.4 kg (186 lb)   04/17/18 83 kg (183 lb)      I/O last 3 completed shifts:  In: 1620 [P.O.:820; I.V.:800]  Out: 500 [Urine:500]    Constitutional: Alert, no apparent distress,    Lungs: No crackles or wheezing,    Cardiovascular: irregular rate and rhythm, normal S1 and S2, and no murmur,    Lymphm node  Neck  ENT  Neurologic  Abdomen: No enlargement  No jugular vein extension or carotid bruit  No apparent abnormality  No focal deficit  Normal bowel sounds, soft, no distension, no tender   Skin: No rashes, no cyanosis   Extremity: No edema          Medications:     Current Facility-Administered Medications:      acetaminophen (TYLENOL) Suppository 650 mg, 650 mg, Rectal, Q4H PRN, Juan Diego Branham DO     acetaminophen (TYLENOL) tablet 1,000 mg, 1,000 mg, Oral, TID, Juan Diego Branham DO, 1,000 mg at 07/01/18 2100     acetaminophen (TYLENOL) tablet 650 mg, 650 mg, Oral, Q4H PRN, Juan Diego Branham DO     atorvastatin (LIPITOR) tablet 40 mg, 40 mg, Oral, At Bedtime, Juan Diego Branham DO, 40 mg at 07/01/18 2057     bisacodyl (DULCOLAX) Suppository 10 mg, 10 mg, Rectal, Daily PRN, Juan Diego Branham DO     cefTRIAXone (ROCEPHIN) 1 g vial to attach to  mL bag for ADULTS or NS 50 mL bag for PEDS, 1 g, Intravenous, Q24H, Renato Guerrero MD, 1 g at 07/02/18 0036     glucose gel 15-30 g, 15-30 g, Oral, Q15 Min PRN **OR** dextrose 50 % injection 25-50 mL, 25-50 mL, Intravenous, Q15 Min PRN **OR** glucagon injection 1 mg, 1 mg, Subcutaneous, Q15 Min PRN, Juan Diego Branham, DO     hydrALAZINE (APRESOLINE) injection 10 mg, 10 mg, Intravenous, Q4H PRN, Juan Diego Branham,      insulin aspart (NovoLOG) inj (RAPID ACTING), 1-7 Units, Subcutaneous, TID AC, Juan Diego Branham, DO     insulin aspart  (NovoLOG) inj (RAPID ACTING), 1-5 Units, Subcutaneous, At Bedtime, Juan Diego Branham DO     insulin aspart (NovoLOG) inj (RAPID ACTING), , Subcutaneous, TID w/meals, Juan Diego Branham DO, 5 Units at 07/01/18 1638     insulin glargine (LANTUS) injection 20 Units, 20 Units, Subcutaneous, At Bedtime, Juan Diego Branham DO, 20 Units at 07/01/18 2239     levothyroxine (SYNTHROID/LEVOTHROID) tablet 25 mcg, 25 mcg, Oral, Daily, Juan Diego Branham DO     lidocaine (LMX4) cream, , Topical, Q1H PRN, Juan Diego Branham DO     lidocaine 1 % 1 mL, 1 mL, Other, Q1H PRN, Juan Diego Branham DO     magnesium hydroxide (MILK OF MAGNESIA) suspension 30 mL, 30 mL, Oral, Daily PRN, Juan Diego Branham DO     melatonin tablet 1 mg, 1 mg, Oral, At Bedtime PRN, Juan Diego Branham DO     naloxone (NARCAN) injection 0.1-0.4 mg, 0.1-0.4 mg, Intravenous, Q2 Min PRN, Juan Diego Branham DO     nitroGLYcerin (NITROSTAT) sublingual tablet 0.4 mg, 0.4 mg, Sublingual, Q5 Min PRN, Juan Diego Branham DO     ondansetron (ZOFRAN-ODT) ODT tab 4 mg, 4 mg, Oral, Q6H PRN **OR** ondansetron (ZOFRAN) injection 4 mg, 4 mg, Intravenous, Q6H PRN, Juan Diego Branham DO     Patient is already receiving anticoagulation with heparin, enoxaparin (LOVENOX), warfarin (COUMADIN)  or other anticoagulant medication, , Does not apply, Continuous PRN, Juan Diego Branham DO     polyethylene glycol (MIRALAX/GLYCOLAX) powder 17 g, 17 g, Oral, Daily, Juan Diego Branham DO     prochlorperazine (COMPAZINE) injection 5 mg, 5 mg, Intravenous, Q6H PRN **OR** prochlorperazine (COMPAZINE) tablet 5 mg, 5 mg, Oral, Q6H PRN **OR** prochlorperazine (COMPAZINE) Suppository 12.5 mg, 12.5 mg, Rectal, Q12H PRN, Juan Diego Branham DO     rivaroxaban ANTICOAGULANT (XARELTO) tablet 15 mg, 15 mg, Oral, Daily with supper, Juan Diego Branham DO, 15 mg at 07/01/18 1638     senna-docusate (SENOKOT-S;PERICOLACE) 8.6-50 MG per tablet 1 tablet, 1 tablet,  Oral, BID PRN **OR** senna-docusate (SENOKOT-S;PERICOLACE) 8.6-50 MG per tablet 2 tablet, 2 tablet, Oral, BID PRN, Juan Diego Branham DO     sodium chloride (PF) 0.9% PF flush 3 mL, 3 mL, Intracatheter, Q1H PRN, Juan Diego Branham DO     sodium chloride (PF) 0.9% PF flush 3 mL, 3 mL, Intracatheter, Q8H, Juan Diego Branham DO, 3 mL at 07/02/18 0454     tamsulosin (FLOMAX) capsule 0.4 mg, 0.4 mg, Oral, Daily, Juan Diego Branham DO, 0.4 mg at 07/01/18 2057     timolol (TIMOPTIC) 0.5 % ophthalmic solution 1 drop, 1 drop, Right Eye, BID, Juan Diego Branham DO, 1 drop at 07/01/18 2056     traMADol (ULTRAM) tablet 50 mg, 50 mg, Oral, Q6H PRN, Juan Diego Branham DO           Lab results:        Recent Labs   Lab Test  07/02/18 0622 07/01/18   0940 06/14/18   NA  139  138  139   POTASSIUM  4.1  4.8  4.5   CHLORIDE  107  104  105   TRENT  8.2*  8.5  9.3   CO2  23  26  25   BUN  27  29  18   CR  1.19  1.75*  1.12   GLC  103*  160*  170*     Recent Labs   Lab Test  07/02/18 0622  07/01/18   0940 06/14/18  10/30/17   HGB  11.0*  11.4*  11.1*   < >  12.9*   WBC  6.9  7.5   --    --   7.5   PLT  156  157   --    --   173    < > = values in this interval not displayed.     Recent Labs   Lab Test  07/01/18 0940  02/20/17   1905   INR  1.30*  0.99     Recent Labs   Lab Test  07/01/18   0940  02/21/17   1700  02/21/17   0410   TROPI  <0.015  <0.015  The 99th percentile for upper reference range is 0.045 ug/L.  Troponin values in   the range of 0.045 - 0.120 ug/L may be associated with risks of adverse   clinical events.    <0.015  The 99th percentile for upper reference range is 0.045 ug/L.  Troponin values in   the range of 0.045 - 0.120 ug/L may be associated with risks of adverse   clinical events.[HL1.1]            Revision History        User Key Date/Time User Provider Type Action    > HL1.1 7/2/2018  9:55 AM Castillo Tran MD Physician Sign            ED Notes by Jesse Gan RN at 7/1/2018 10:30 AM      Author:  Jesse Gan RN Service:  (none) Author Type:  Registered Nurse    Filed:  7/1/2018 11:28 AM Date of Service:  7/1/2018 10:30 AM Creation Time:  7/1/2018 10:30 AM    Status:  Addendum :  Jesse Gan RN (Registered Nurse)         [facility]  ED Nurse Handoff Report    ED Chief complaint: Bradycardia (Pt found to be unresponsive by staff at nursing home at 0830 and EMS called, EMS foudn pt to have HR 20-30s with BP 70/30, 500mg Atropine given, HR came up to 70s.)      ED Diagnosis:   Final diagnoses:   None       Code Status: Full Code    Allergies:   Allergies   Allergen Reactions     No Known Allergies        Activity level - Baseline/Home:  Total Care    Activity Level - Current:   Total Care     Needed?: No    Isolation: No  Infection: Not Applicable  Bariatric?: No    Vital Signs:   Vitals:    07/01/18 0945 07/01/18 1003 07/01/18 1006 07/01/18 1018   BP: (!) 86/61 101/66  115/58   Resp: 22  20 13   Temp: 97.3  F (36.3  C)      SpO2: 98% 94% 96%        Cardiac Rhythm: ,   Cardiac  Cardiac Rhythm: Atrial fibrillation    Pain level:      Is this patient confused?: Yes   [place] - Suicide Severity Rating Scale Completed?  Yes  If yes, what color did the patient score?  White    Patient Report: Initial Complaint: Pt presents to the ED after being found unresponsive in bed by nursing home staff.    Focused Assessment: EMS called after nursing home staff found the pt to be unresponsive at 0830.  EMS called and pt was found to have a HR in the 20s-30s with a BP of 70/30.  Pt given 500mg of atropine IV , and HR imprved to 70-80 after atropine.  Pt arrived to ED in a-fib with a HR  in the 50s/60s.  BP 86/61,  Pt's BPs have since improved, MAP has maintained above 65 since arrival.  Pt O2 sats in the mid 90s on RA, lungs CTA.  Pt alert and oriented x2-3.  Per EMS pt has some dementia at basline as well as left sided deficits from a previous CVA.  Pt initially  complained of chest and back pain, but currently denies pain with the exception of chronic pain in his left knee.  Tests Performed: CBC, PT/INR, Troponin, Lactic acid, CMP, Chest and pelvis CT scan.  Abnormal Results:[MC1.1] Hgb 11.4, INR 1.3, Creat 1.8, GFR 36,  Ct results read as follows:   1. ,No acute aortic abnormality. No aortic dissection. Moderate areas of atherosclerotic disease of the aorta and its branches. 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  2. Renal cortical thinning bilaterally. Indeterminant small hypoenhancing region within the lower right kidney. Consider  outpatient renal MRI for further assessment of this indeterminate lesion. Further, the kidneys have a mildly heterogeneous enhancement pattern. Correlate with urinalysis for any evidence for a urinary tract infection and pyelonephritis.  3. No other acute abnormality.  4. A few faint gallstones.[MC1.2]  Treatments provided:[MC1.1] 18 G IV placed in right AC, pt given 1 liter NS[MC1.2]    Family Comments:[MC1.1] Daughter at bedside, involved in cares.[MC1.2]    OBS brochure/video discussed/provided to patient:[MC1.1] N/A[MC1.2]    ED Medications:   Medications   sodium chloride 0.9% infusion ( Intravenous New Bag 7/1/18 1007)   iopamidol (ISOVUE-370) solution 80 mL (80 mLs Intravenous Given 7/1/18 0957)   Saline flush (80 mLs Intravenous Given 7/1/18 0958)       Drips infusing?:[MC1.1]  No[MC1.2]    For the majority of the shift this patient was[MC1.1] Green[MC1.2].   Interventions performed were[MC1.1] NA[MC1.2].    Severe Sepsis OR Septic Shock Diagnosis Present:[MC1.1] No[MC1.2]      ED NURSE PHONE NUMBER:[MC1.1] RN 1 in Ed[MC1.3]            Revision History        User Key Date/Time User Provider Type Action    > MC1.3 7/1/2018 11:28 AM Jesse Gan, RN Registered Nurse Addend     MC1.2 7/1/2018 10:45 AM Jesse Gan, RN Registered Nurse Sign     MC1.1 7/1/2018 10:30 AM Jesse Gan, RN  Registered Nurse             ED Notes signed by Scan Non-Provider at 7/1/2018 11:19 AM      Author:  Nolvia, Non-Provider Service:  (none) Author Type:  (none)    Filed:  7/1/2018 11:19 AM Date of Service:  7/1/2018 10:24 AM Creation Time:  7/1/2018 11:19 AM    Status:  Signed :  Nolvia, Non-Provider     Scan on 7/1/2018 11:19 AM by Scan, Non-Provider : [facility]          Revision History        User Key Date/Time User Provider Type Action    > [N/A] 7/1/2018 11:19 AM Scan, Non-Provider (none) Sign            ED Notes by Jesse Gan RN at 7/1/2018 11:03 AM     Author:  Jesse Gan RN Service:  (none) Author Type:  Registered Nurse    Filed:  7/1/2018 11:03 AM Date of Service:  7/1/2018 11:03 AM Creation Time:  7/1/2018 11:03 AM    Status:  Signed :  Jesse Gan RN (Registered Nurse)         Bed: ED03  Expected date:   Expected time:   Means of arrival:   Comments:  Stabe 2     Revision History        User Key Date/Time User Provider Type Action    > MC1.1 7/1/2018 11:03 AM Jesse Gan RN Registered Nurse Sign            ED Notes by Jesse Gan RN at 7/1/2018 10:26 AM     Author:  Jesse Gan RN Service:  (none) Author Type:  Registered Nurse    Filed:  7/1/2018 10:27 AM Date of Service:  7/1/2018 10:26 AM Creation Time:  7/1/2018 10:26 AM    Status:  Signed :  Jesse Gan RN (Registered Nurse)         Pt denies pain at this time with the exception of chronic pain in his left knee.[MC1.1]     Revision History        User Key Date/Time User Provider Type Action    > MC1.1 7/1/2018 10:27 AM Jesse Gan RN Registered Nurse Sign            ED Notes by Marisela Santa RN at 7/1/2018  9:35 AM     Author:  Marisela Santa RN Service:  (none) Author Type:  Registered Nurse    Filed:  7/1/2018  9:35 AM Date of Service:  7/1/2018  9:35 AM Creation Time:  7/1/2018  9:35 AM    Status:  Signed :  Marisela Santa RN  (Registered Nurse)         Bed: ST02  Expected date:   Expected time:   Means of arrival:   Comments:  HEMS 447 86 m bola was 34 not 70 with atropine 89/57      Revision History        User Key Date/Time User Provider Type Action    > JJ1.1 7/1/2018  9:35 AM Marisela Santa, RN Registered Nurse Sign                  Procedure Notes     No notes of this type exist for this encounter.         Progress Notes - Therapies (Notes from 06/30/18 through 07/03/18)      Progress Notes by Marisela Lane PT at 7/2/2018  2:28 PM     Author:  Marisela Lane PT Service:  (none) Author Type:  Physical Therapist    Filed:  7/2/2018  2:28 PM Date of Service:  7/2/2018  2:28 PM Creation Time:  7/2/2018  2:28 PM    Status:  Signed :  Marisela Lane PT (Physical Therapist)            07/02/18 1124   Quick Adds   Type of Visit Initial PT Evaluation   Living Environment   Lives With facility resident   Living Arrangements extended care [facility]  ([facility])   Home Accessibility bed and bath on same level   Number of Stairs Within Home 0   Transportation Available family or friend will provide   Self-Care   Usual Activity Tolerance good   Current Activity Tolerance fair   Regular Exercise no   Equipment Currently Used at Home raised toilet;walker, standard  ([facility]   Functional Level Prior   Ambulation 4-->completely dependent  (w/c bound, can self-propel)   Transferring 3-->assistive equipment and person   Toileting 3-->assistive equipment and person  (Uses EZ stand )   Bathing 4-->completely dependent   Dressing 3-->assistive equipment and person   Fall history within last six months yes   Number of times patient has fallen within last six months 6   Which of the above functional risks had a recent onset or change? ambulation;transferring;toileting;bathing;dressing   General Information   Onset of Illness/Injury or Date of Surgery - Date 07/01/18   Referring Physician Valerie  Cyndi Lerma PA-C   Patient/Family Goals Statement To go home    Pertinent History of Current Problem (include personal factors and/or comorbidities that impact the POC) 85 y/o M admitted with syncope likely due to hypoperfusion. PMH including CVA with residual L sided hemiplegia and cognitive deficits.    Precautions/Limitations fall precautions   General Observations Pt supine in bed upon arrival   General Info Comments Activity: Up with assist    Cognitive Status Examination   Orientation orientation to person, place and time   Level of Consciousness lethargic/somnolent   Follows Commands and Answers Questions 100% of the time   Personal Safety and Judgment impaired   Pain Assessment   Patient Currently in Pain No   Integumentary/Edema   Integumentary/Edema no deficits were identifed   Posture    Posture Forward head position;Protracted shoulders   Range of Motion (ROM)   ROM Comment Not formally assessed, demonstrates adequate ROM during functional mobility   Strength   Strength Comments Not formally assessed, demonstrates 3/5 grossly in B LE's during functional transfers    Bed Mobility   Bed Mobility Comments Supine<>sit EOB with min A x2   Transfer Skills   Transfer Comments Sit<>stand with mod A x2 and FWW   Gait   Gait Comments Not assessed due to increased pain and unsteadiness    Balance   Balance Comments Demonstrates good balance in sitting EOB, fair balance in standing due to increased pain and unsteadiness    Sensory Examination   Sensory Perception no deficits were identified   General Therapy Interventions   Planned Therapy Interventions bed mobility training;strengthening;transfer training;progressive activity/exercise   Clinical Impression   Criteria for Skilled Therapeutic Intervention yes, treatment indicated   PT Diagnosis difficulty with transfers    Influenced by the following impairments pain, generalized weakness, L sided hemiplegia    Functional limitations due to impairments limited  "functional mobility with transfers requiring A x2    Clinical Presentation Evolving/Changing   Clinical Presentation Rationale Based on PMH and current presentation   Clinical Decision Making (Complexity) Low complexity   Therapy Frequency` daily   Predicted Duration of Therapy Intervention (days/wks) 3 days    Anticipated Discharge Disposition Long Term Care Facility   Risk & Benefits of therapy have been explained Yes   Patient, Family & other staff in agreement with plan of care Yes   [facility] AM-PAC  \"6 Clicks\" V.2 Basic Mobility Inpatient Short Form   1. Turning from your back to your side while in a flat bed without using bedrails? 2 - A Lot   2. Moving from lying on your back to sitting on the side of a flat bed without using bedrails? 2 - A Lot   3. Moving to and from a bed to a chair (including a wheelchair)? 2 - A Lot   4. Standing up from a chair using your arms (e.g., wheelchair, or bedside chair)? 2 - A Lot   5. To walk in hospital room? 1 - Total   6. Climbing 3-5 steps with a railing? 1 - Total   Basic Mobility Raw Score (Score out of 24.Lower scores equate to lower levels of function) 10   Total Evaluation Time   Total Evaluation Time (Minutes) 10[JH1.1]        Revision History        User Key Date/Time User Provider Type Action    > JH1.1 7/2/2018  2:28 PM Marisela Lane, PT Physical Therapist Sign            "

## 2018-07-01 NOTE — PLAN OF CARE
Problem: Arrhythmia/Dysrhythmia (Symptomatic) (Adult)  Goal: Signs and Symptoms of Listed Potential Problems Will be Absent, Minimized or Managed (Arrhythmia/Dysrhythmia)  Signs and symptoms of listed potential problems will be absent, minimized or managed by discharge/transition of care (reference Arrhythmia/Dysrhythmia (Symptomatic) (Adult) CPG).  Outcome: No Change  [id number]: A+Ox4, forgetful. Tele Afib with SVR, asymptomatic, all other VSS. Neuros appear at baseline with L sided hemiparesis, facial droop, and slightly slurred speech at times - all baseline per daughter. Pt denies pain at rest, reports back and R foot pain when standing and transferring. Scheduled Tylenol given. Up to commode with heavy A2, RW and gait belt. Small BM and voided, UA sent. Hospitalist and cardiology following, vascular surgery consult done, no need for surgical intervention, signed off.

## 2018-07-02 ENCOUNTER — APPOINTMENT (OUTPATIENT)
Dept: ULTRASOUND IMAGING | Facility: CLINIC | Age: 83
DRG: 309 | End: 2018-07-02
Attending: PHYSICIAN ASSISTANT
Payer: COMMERCIAL

## 2018-07-02 ENCOUNTER — APPOINTMENT (OUTPATIENT)
Dept: PHYSICAL THERAPY | Facility: CLINIC | Age: 83
DRG: 309 | End: 2018-07-02
Attending: PHYSICIAN ASSISTANT
Payer: COMMERCIAL

## 2018-07-02 LAB
ANION GAP SERPL CALCULATED.3IONS-SCNC: 9 MMOL/L (ref 3–14)
BUN SERPL-MCNC: 27 MG/DL (ref 7–30)
CALCIUM SERPL-MCNC: 8.2 MG/DL (ref 8.5–10.1)
CHLORIDE SERPL-SCNC: 107 MMOL/L (ref 94–109)
CO2 SERPL-SCNC: 23 MMOL/L (ref 20–32)
CREAT SERPL-MCNC: 1.19 MG/DL (ref 0.66–1.25)
ERYTHROCYTE [DISTWIDTH] IN BLOOD BY AUTOMATED COUNT: 16.1 % (ref 10–15)
GFR SERPL CREATININE-BSD FRML MDRD: 58 ML/MIN/1.7M2
GLUCOSE BLDC GLUCOMTR-MCNC: 114 MG/DL (ref 70–99)
GLUCOSE BLDC GLUCOMTR-MCNC: 154 MG/DL (ref 70–99)
GLUCOSE BLDC GLUCOMTR-MCNC: 157 MG/DL (ref 70–99)
GLUCOSE BLDC GLUCOMTR-MCNC: 85 MG/DL (ref 70–99)
GLUCOSE BLDC GLUCOMTR-MCNC: 94 MG/DL (ref 70–99)
GLUCOSE BLDC GLUCOMTR-MCNC: 95 MG/DL (ref 70–99)
GLUCOSE SERPL-MCNC: 103 MG/DL (ref 70–99)
HCT VFR BLD AUTO: 32.5 % (ref 40–53)
HGB BLD-MCNC: 11 G/DL (ref 13.3–17.7)
MAGNESIUM SERPL-MCNC: 1.8 MG/DL (ref 1.6–2.3)
MCH RBC QN AUTO: 30.6 PG (ref 26.5–33)
MCHC RBC AUTO-ENTMCNC: 33.8 G/DL (ref 31.5–36.5)
MCV RBC AUTO: 90 FL (ref 78–100)
PLATELET # BLD AUTO: 156 10E9/L (ref 150–450)
POTASSIUM SERPL-SCNC: 4.1 MMOL/L (ref 3.4–5.3)
PROCALCITONIN SERPL-MCNC: <0.05 NG/ML
RBC # BLD AUTO: 3.6 10E12/L (ref 4.4–5.9)
SODIUM SERPL-SCNC: 139 MMOL/L (ref 133–144)
TROPONIN I SERPL-MCNC: <0.015 UG/L (ref 0–0.04)
TSH SERPL DL<=0.005 MIU/L-ACNC: 2.2 MU/L (ref 0.4–4)
WBC # BLD AUTO: 6.9 10E9/L (ref 4–11)

## 2018-07-02 PROCEDURE — 99232 SBSQ HOSP IP/OBS MODERATE 35: CPT | Performed by: PHYSICIAN ASSISTANT

## 2018-07-02 PROCEDURE — 36415 COLL VENOUS BLD VENIPUNCTURE: CPT | Performed by: INTERNAL MEDICINE

## 2018-07-02 PROCEDURE — 84484 ASSAY OF TROPONIN QUANT: CPT | Performed by: INTERNAL MEDICINE

## 2018-07-02 PROCEDURE — 80048 BASIC METABOLIC PNL TOTAL CA: CPT | Performed by: INTERNAL MEDICINE

## 2018-07-02 PROCEDURE — 00000146 ZZHCL STATISTIC GLUCOSE BY METER IP

## 2018-07-02 PROCEDURE — 97530 THERAPEUTIC ACTIVITIES: CPT | Mod: GP | Performed by: PHYSICAL THERAPIST

## 2018-07-02 PROCEDURE — 85027 COMPLETE CBC AUTOMATED: CPT | Performed by: INTERNAL MEDICINE

## 2018-07-02 PROCEDURE — 36415 COLL VENOUS BLD VENIPUNCTURE: CPT | Performed by: PHYSICIAN ASSISTANT

## 2018-07-02 PROCEDURE — 25000128 H RX IP 250 OP 636: Performed by: PHYSICIAN ASSISTANT

## 2018-07-02 PROCEDURE — 83735 ASSAY OF MAGNESIUM: CPT | Performed by: INTERNAL MEDICINE

## 2018-07-02 PROCEDURE — 40000193 ZZH STATISTIC PT WARD VISIT: Performed by: PHYSICAL THERAPIST

## 2018-07-02 PROCEDURE — 25000132 ZZH RX MED GY IP 250 OP 250 PS 637: Performed by: INTERNAL MEDICINE

## 2018-07-02 PROCEDURE — 99207 ZZC CDG-MDM COMPONENT: MEETS LOW - DOWN CODED: CPT | Performed by: PHYSICIAN ASSISTANT

## 2018-07-02 PROCEDURE — 83735 ASSAY OF MAGNESIUM: CPT | Performed by: PHYSICIAN ASSISTANT

## 2018-07-02 PROCEDURE — 99232 SBSQ HOSP IP/OBS MODERATE 35: CPT | Mod: 25 | Performed by: INTERNAL MEDICINE

## 2018-07-02 PROCEDURE — 25000131 ZZH RX MED GY IP 250 OP 636 PS 637: Performed by: INTERNAL MEDICINE

## 2018-07-02 PROCEDURE — 97161 PT EVAL LOW COMPLEX 20 MIN: CPT | Mod: GP | Performed by: PHYSICAL THERAPIST

## 2018-07-02 PROCEDURE — 12000000 ZZH R&B MED SURG/OB

## 2018-07-02 PROCEDURE — 93880 EXTRACRANIAL BILAT STUDY: CPT

## 2018-07-02 PROCEDURE — 84443 ASSAY THYROID STIM HORMONE: CPT | Performed by: INTERNAL MEDICINE

## 2018-07-02 PROCEDURE — 25000128 H RX IP 250 OP 636: Performed by: INTERNAL MEDICINE

## 2018-07-02 PROCEDURE — 84145 PROCALCITONIN (PCT): CPT | Performed by: PHYSICIAN ASSISTANT

## 2018-07-02 RX ORDER — MAGNESIUM SULFATE HEPTAHYDRATE 40 MG/ML
4 INJECTION, SOLUTION INTRAVENOUS EVERY 4 HOURS PRN
Status: DISCONTINUED | OUTPATIENT
Start: 2018-07-02 | End: 2018-07-03 | Stop reason: HOSPADM

## 2018-07-02 RX ORDER — MAGNESIUM SULFATE HEPTAHYDRATE 40 MG/ML
2 INJECTION, SOLUTION INTRAVENOUS DAILY PRN
Status: DISCONTINUED | OUTPATIENT
Start: 2018-07-02 | End: 2018-07-03 | Stop reason: HOSPADM

## 2018-07-02 RX ORDER — CEFTRIAXONE 1 G/1
1 INJECTION, POWDER, FOR SOLUTION INTRAMUSCULAR; INTRAVENOUS EVERY 24 HOURS
Status: DISCONTINUED | OUTPATIENT
Start: 2018-07-02 | End: 2018-07-03 | Stop reason: HOSPADM

## 2018-07-02 RX ORDER — SODIUM CHLORIDE 9 MG/ML
INJECTION, SOLUTION INTRAVENOUS CONTINUOUS
Status: ACTIVE | OUTPATIENT
Start: 2018-07-02 | End: 2018-07-03

## 2018-07-02 RX ADMIN — SODIUM CHLORIDE: 9 INJECTION, SOLUTION INTRAVENOUS at 23:16

## 2018-07-02 RX ADMIN — ACETAMINOPHEN 1000 MG: 500 TABLET, FILM COATED ORAL at 10:18

## 2018-07-02 RX ADMIN — LEVOTHYROXINE SODIUM 25 MCG: 25 TABLET ORAL at 10:17

## 2018-07-02 RX ADMIN — TIMOLOL MALEATE 1 DROP: 5 SOLUTION OPHTHALMIC at 10:18

## 2018-07-02 RX ADMIN — TAMSULOSIN HYDROCHLORIDE 0.4 MG: 0.4 CAPSULE ORAL at 22:24

## 2018-07-02 RX ADMIN — INSULIN ASPART 4 UNITS: 100 INJECTION, SOLUTION INTRAVENOUS; SUBCUTANEOUS at 10:18

## 2018-07-02 RX ADMIN — SODIUM CHLORIDE: 9 INJECTION, SOLUTION INTRAVENOUS at 16:31

## 2018-07-02 RX ADMIN — MAGNESIUM SULFATE HEPTAHYDRATE 2 G: 40 INJECTION, SOLUTION INTRAVENOUS at 14:31

## 2018-07-02 RX ADMIN — ACETAMINOPHEN 1000 MG: 500 TABLET, FILM COATED ORAL at 16:30

## 2018-07-02 RX ADMIN — SODIUM CHLORIDE 500 ML: 9 INJECTION, SOLUTION INTRAVENOUS at 12:01

## 2018-07-02 RX ADMIN — CEFTRIAXONE 1 G: 1 INJECTION, POWDER, FOR SOLUTION INTRAMUSCULAR; INTRAVENOUS at 00:36

## 2018-07-02 RX ADMIN — INSULIN ASPART 3 UNITS: 100 INJECTION, SOLUTION INTRAVENOUS; SUBCUTANEOUS at 14:29

## 2018-07-02 RX ADMIN — ATORVASTATIN CALCIUM 40 MG: 40 TABLET, FILM COATED ORAL at 22:24

## 2018-07-02 RX ADMIN — RIVAROXABAN 20 MG: 10 TABLET, FILM COATED ORAL at 16:31

## 2018-07-02 RX ADMIN — INSULIN ASPART 1 UNITS: 100 INJECTION, SOLUTION INTRAVENOUS; SUBCUTANEOUS at 14:28

## 2018-07-02 RX ADMIN — INSULIN GLARGINE 20 UNITS: 100 INJECTION, SOLUTION SUBCUTANEOUS at 22:24

## 2018-07-02 RX ADMIN — ACETAMINOPHEN 1000 MG: 500 TABLET, FILM COATED ORAL at 22:24

## 2018-07-02 RX ADMIN — TIMOLOL MALEATE 1 DROP: 5 SOLUTION OPHTHALMIC at 22:24

## 2018-07-02 RX ADMIN — INSULIN ASPART 6 UNITS: 100 INJECTION, SOLUTION INTRAVENOUS; SUBCUTANEOUS at 19:19

## 2018-07-02 ASSESSMENT — VISUAL ACUITY
OU: BLINKS TO THREAT

## 2018-07-02 NOTE — PROVIDER NOTIFICATION
Brief update:    Paged re: abnormal urinalysis    Note renal abnormalities on CT aortic survey including indeterminate hypodensity of right lower kidney and heterogenous enhancement bilaterally    Urinalysis with pyuria as well as hematuria.    Urine culture has been sent, will initiate on ceftriaxone.  Can be discontinued if urine culture inconsistent w urinary tract infection.    Renato Guerrero MD  12:04 AM

## 2018-07-02 NOTE — PROVIDER NOTIFICATION
MD Notification    Notified Person: MD    Notified Person Name: Dr. Guerrero    Notification Date/Time: 7/1/2018 4727    Notification Interaction: Telephone    Purpose of Notification: RE: abnormal UA    Orders Received: Begin IV abx ceftriaxone.    Comments:

## 2018-07-02 NOTE — PLAN OF CARE
Problem: Patient Care Overview  Goal: Plan of Care/Patient Progress Review  Outcome: No Change  A&Ox4, forgetful.  VSS on RA.  Tele A-fib SVR, 2.3 and 2.6 pauses noted, asymptomatic.  Denies pain.  Neuros at baseline left-sided weakness, left-sided hemiparesis, facial droop.  Slurred speech at times.  Up w/heavy A2/4WW/GB to BSC.  Incontinent of bladder.  IV SL.  Abnormal UA, see results.  IV abx started.  Mod carb diet.  Hospitalist and cardiology following.  Nursing to continue to monitor.

## 2018-07-02 NOTE — PLAN OF CARE
Problem: Patient Care Overview  Goal: Plan of Care/Patient Progress Review  Outcome: No Change  A+Ox4, forgetful. Tele Afib with SVR, asymptomatic, all other VSS. Neuros appear at baseline per pts  . Pt denies pain at rest, reports back and R foot pain when standing and transferring. Scheduled Tylenol given.  heavy A2, RW and gait belt.  Hospitalist and cardiology following,  He had a BM. Tolerating diet. He does pull of his tele patches but now is sleeping between cares.

## 2018-07-02 NOTE — PROGRESS NOTES
07/02/18 1124   Quick Adds   Type of Visit Initial PT Evaluation   Living Environment   Lives With facility resident   Living Arrangements extended care facility  ([facility])   Home Accessibility bed and bath on same level   Number of Stairs Within Home 0   Transportation Available family or friend will provide   Self-Care   Usual Activity Tolerance good   Current Activity Tolerance fair   Regular Exercise no   Equipment Currently Used at Home raised toilet;walker, standard  (Pt [facility])   Functional Level Prior   Ambulation 4-->completely dependent  (w/c bound, can self-propel)   Transferring 3-->assistive equipment and person   Toileting 3-->assistive equipment and person  (Uses EZ stand )   Bathing 4-->completely dependent   Dressing 3-->assistive equipment and person   Fall history within last six months yes   Number of times patient has fallen within last six months 6   Which of the above functional risks had a recent onset or change? ambulation;transferring;toileting;bathing;dressing   General Information   Onset of Illness/Injury or Date of Surgery - Date 07/01/18   Referring Physician Cyndi Padilla PA-C   Patient/Family Goals Statement To go home    Pertinent History of Current Problem (include personal factors and/or comorbidities that impact the POC) 87 y/o M admitted with syncope likely due to hypoperfusion. PMH including CVA with residual L sided hemiplegia and cognitive deficits.    Precautions/Limitations fall precautions   General Observations Pt supine in bed upon arrival   General Info Comments Activity: Up with assist    Cognitive Status Examination   Orientation orientation to person, place and time   Level of Consciousness lethargic/somnolent   Follows Commands and Answers Questions 100% of the time   Personal Safety and Judgment impaired   Pain Assessment   Patient Currently in Pain No   Integumentary/Edema   Integumentary/Edema no deficits  "were identifed   Posture    Posture Forward head position;Protracted shoulders   Range of Motion (ROM)   ROM Comment Not formally assessed, demonstrates adequate ROM during functional mobility   Strength   Strength Comments Not formally assessed, demonstrates 3/5 grossly in B LE's during functional transfers    Bed Mobility   Bed Mobility Comments Supine<>sit EOB with min A x2   Transfer Skills   Transfer Comments Sit<>stand with mod A x2 and FWW   Gait   Gait Comments Not assessed due to increased pain and unsteadiness    Balance   Balance Comments Demonstrates good balance in sitting EOB, fair balance in standing due to increased pain and unsteadiness    Sensory Examination   Sensory Perception no deficits were identified   General Therapy Interventions   Planned Therapy Interventions bed mobility training;strengthening;transfer training;progressive activity/exercise   Clinical Impression   Criteria for Skilled Therapeutic Intervention yes, treatment indicated   PT Diagnosis difficulty with transfers    Influenced by the following impairments pain, generalized weakness, L sided hemiplegia    Functional limitations due to impairments limited functional mobility with transfers requiring A x2    Clinical Presentation Evolving/Changing   Clinical Presentation Rationale Based on PMH and current presentation   Clinical Decision Making (Complexity) Low complexity   Therapy Frequency` daily   Predicted Duration of Therapy Intervention (days/wks) 3 days    Anticipated Discharge Disposition Long Term Care Facility   Risk & Benefits of therapy have been explained Yes   Patient, Family & other staff in agreement with plan of care Yes   [facility] AM-PAC  \"6 Clicks\" V.2 Basic Mobility Inpatient Short Form   1. Turning from your back to your side while in a flat bed without using bedrails? 2 - A Lot   2. Moving from lying on your back to sitting on the side of a flat bed without using bedrails? 2 - A Lot   3. Moving to " and from a bed to a chair (including a wheelchair)? 2 - A Lot   4. Standing up from a chair using your arms (e.g., wheelchair, or bedside chair)? 2 - A Lot   5. To walk in hospital room? 1 - Total   6. Climbing 3-5 steps with a railing? 1 - Total   Basic Mobility Raw Score (Score out of 24.Lower scores equate to lower levels of function) 10   Total Evaluation Time   Total Evaluation Time (Minutes) 10

## 2018-07-02 NOTE — PLAN OF CARE
Problem: Patient Care Overview  Goal: Plan of Care/Patient Progress Review  Discharge Planner PT   Patient plan for discharge: LTC facility   Current status: Orders received, eval complete, and treatment initiated. Pt is a 85 y/o M admitted with syncope likely due to hypoperfusion. Pt was very lethargic/somnolent throughout session. Monitored resting BP and HR at 118/62 and 54 bpm, respectively. Supine<>sit EOB with min A x2 and sit<>stand x2 with mod A x2. Pt required extra cueing for forward leaning. Unable to initiate amb due to pt unsteadiness and increased pain in low back. Completed seated exercises x 5-10 reps.   Barriers to return to prior living situation: Fall risk, generalized weakness, L sided hemiplegia, currently requiring Ax2   Recommendations for discharge: LTC facility if able to provide appropriate assistance   Rationale for recommendations: Pt is currently not at baseline and requires mod A x2 with functional mobility. Anticipate pt will benefit from continued skilled PT intervention at LTC facility in order to progress strength, endurance, safety, and activity tolerance.       Entered by: Aaron Nissen 07/02/2018 12:44 PM

## 2018-07-02 NOTE — PROGRESS NOTES
Cardiology Progress Note          Assessment and Plan:   Admitted for syncope. Found to have atrial fib with slow HR. Metoprolol has been stopped. Both HR and BP are stable now. He complains mild fatigue.    History of CAD with normal EF. Stenting in 2008. No recent angina or CHF. Was seen by Dr. Teixeira up to 2015.    Newly diagnosed UTI, asymptomatic. Will be managed by the hospitalist service.    History of hypertension, type II diabetes, PAD, hyperlipidemia, CVA, prostate cancer.    Agree for outpatient cardiac event monitor for 1 month.  Resume cardiology follow up with Dr. Teixeira in 1-3 months.    Sign off    Castillo Tran MD  Cardiology   [phone]                Diagnosis:     Patient Active Problem List   Diagnosis     Chronic ischemic heart disease     Personal history of other diseases of circulatory system     HYPERLIPIDEMIA LDL GOAL <100     Advance Care Planning     CKD (chronic kidney disease) stage 3, GFR 30-59 ml/min     Leg weakness     Ataxia     BPH (benign prostatic hyperplasia)     Type 2 diabetes mellitus with diabetic nephropathy (H)     History of actinic keratoses     History of squamous cell carcinoma     S/P Mohs surgery for basal cell carcinoma     H/O: CVA (cerebrovascular accident), 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      Central retinal vein occlusion, right eye     PVD (peripheral vascular disease) (H)     Type II diabetes mellitus with peripheral circulatory disorder  (H)     Syncope                Physical Exam:   Blood pressure 115/73, temperature 98.7  F (37.1  C), temperature source Oral, resp. rate 16, weight 84 kg (185 lb 3 oz), SpO2 94 %.  Wt Readings from Last 4 Encounters:   07/02/18 84 kg (185 lb 3 oz)   06/11/18 83.5 kg (184 lb)   05/17/18 84.4 kg (186 lb)   04/17/18 83 kg (183 lb)      I/O last 3 completed shifts:  In: 1620 [P.O.:820; I.V.:800]  Out: 500 [Urine:500]    Constitutional: Alert, no apparent distress,    Lungs: No crackles or wheezing,    Cardiovascular: irregular rate and rhythm, normal S1 and S2, and no murmur,    Lymphm node  Neck  ENT  Neurologic  Abdomen: No enlargement  No jugular vein extension or carotid bruit  No apparent abnormality  No focal deficit  Normal bowel sounds, soft, no distension, no tender   Skin: No rashes, no cyanosis   Extremity: No edema          Medications:     Current Facility-Administered Medications:      acetaminophen (TYLENOL) Suppository 650 mg, 650 mg, Rectal, Q4H PRN, Juan Diego Branham DO     acetaminophen (TYLENOL) tablet 1,000 mg, 1,000 mg, Oral, TID, Juan Diego Branham DO, 1,000 mg at 07/01/18 2100     acetaminophen (TYLENOL) tablet 650 mg, 650 mg, Oral, Q4H PRN, Juan Diego Branham DO     atorvastatin (LIPITOR) tablet 40 mg, 40 mg, Oral, At Bedtime, Juan Diego Branham DO, 40 mg at 07/01/18 2057     bisacodyl (DULCOLAX) Suppository 10 mg, 10 mg, Rectal, Daily PRN, Juan Diego Branham DO     cefTRIAXone (ROCEPHIN) 1 g vial to attach to  mL bag for ADULTS or NS 50 mL bag for PEDS, 1 g, Intravenous, Q24H, Renato Guerrero MD, 1 g at 07/02/18 0036     glucose gel 15-30 g, 15-30 g, Oral, Q15 Min PRN **OR** dextrose 50 % injection 25-50 mL, 25-50 mL, Intravenous, Q15 Min PRN **OR** glucagon injection 1 mg, 1 mg, Subcutaneous, Q15 Min PRN, Juan Diego Branham,      hydrALAZINE (APRESOLINE) injection 10 mg, 10 mg, Intravenous, Q4H PRN, Juan Diego Branham,      insulin aspart (NovoLOG) inj (RAPID  ACTING), 1-7 Units, Subcutaneous, TID AC, Juan Diego Branham DO     insulin aspart (NovoLOG) inj (RAPID ACTING), 1-5 Units, Subcutaneous, At Bedtime, Juan Diego Branham DO     insulin aspart (NovoLOG) inj (RAPID ACTING), , Subcutaneous, TID w/meals, Juan Diego Branham DO, 5 Units at 07/01/18 1638     insulin glargine (LANTUS) injection 20 Units, 20 Units, Subcutaneous, At Bedtime, Juan Diego Branham DO, 20 Units at 07/01/18 2239     levothyroxine (SYNTHROID/LEVOTHROID) tablet 25 mcg, 25 mcg, Oral, Daily, Juan Diego Branham DO     lidocaine (LMX4) cream, , Topical, Q1H PRN, Juan Diego Branham DO     lidocaine 1 % 1 mL, 1 mL, Other, Q1H PRN, Juan Diego Branham DO     magnesium hydroxide (MILK OF MAGNESIA) suspension 30 mL, 30 mL, Oral, Daily PRN, Juan Diego Branham DO     melatonin tablet 1 mg, 1 mg, Oral, At Bedtime PRN, Juan Diego Branham DO     naloxone (NARCAN) injection 0.1-0.4 mg, 0.1-0.4 mg, Intravenous, Q2 Min PRN, Juan Diego Branham DO     nitroGLYcerin (NITROSTAT) sublingual tablet 0.4 mg, 0.4 mg, Sublingual, Q5 Min PRN, Juan Diego Branham DO     ondansetron (ZOFRAN-ODT) ODT tab 4 mg, 4 mg, Oral, Q6H PRN **OR** ondansetron (ZOFRAN) injection 4 mg, 4 mg, Intravenous, Q6H PRN, Juan Diego Branham DO     Patient is already receiving anticoagulation with heparin, enoxaparin (LOVENOX), warfarin (COUMADIN)  or other anticoagulant medication, , Does not apply, Continuous PRN, Juan Diego Branham DO     polyethylene glycol (MIRALAX/GLYCOLAX) powder 17 g, 17 g, Oral, Daily, Juan Diego Branham DO     prochlorperazine (COMPAZINE) injection 5 mg, 5 mg, Intravenous, Q6H PRN **OR** prochlorperazine (COMPAZINE) tablet 5 mg, 5 mg, Oral, Q6H PRN **OR** prochlorperazine (COMPAZINE) Suppository 12.5 mg, 12.5 mg, Rectal, Q12H PRN, Juan Diego Branham DO     rivaroxaban ANTICOAGULANT (XARELTO) tablet 15 mg, 15 mg, Oral, Daily with supper, Juan Diego Branham DO, 15 mg at 07/01/18  1638     senna-docusate (SENOKOT-S;PERICOLACE) 8.6-50 MG per tablet 1 tablet, 1 tablet, Oral, BID PRN **OR** senna-docusate (SENOKOT-S;PERICOLACE) 8.6-50 MG per tablet 2 tablet, 2 tablet, Oral, BID PRN, Juan Diego Branham,      sodium chloride (PF) 0.9% PF flush 3 mL, 3 mL, Intracatheter, Q1H PRN, Juan Diego Branham DO     sodium chloride (PF) 0.9% PF flush 3 mL, 3 mL, Intracatheter, Q8H, Juan Diego Branham DO, 3 mL at 07/02/18 0454     tamsulosin (FLOMAX) capsule 0.4 mg, 0.4 mg, Oral, Daily, Juan Diego Branham DO, 0.4 mg at 07/01/18 2057     timolol (TIMOPTIC) 0.5 % ophthalmic solution 1 drop, 1 drop, Right Eye, BID, Juan Diego Branham DO, 1 drop at 07/01/18 2056     traMADol (ULTRAM) tablet 50 mg, 50 mg, Oral, Q6H PRN, Juan Diego Branham DO           Lab results:        Recent Labs   Lab Test  07/02/18 0622 07/01/18 0940 06/14/18   NA  139  138  139   POTASSIUM  4.1  4.8  4.5   CHLORIDE  107  104  105   TRENT  8.2*  8.5  9.3   CO2  23  26  25   BUN  27  29  18   CR  1.19  1.75*  1.12   GLC  103*  160*  170*     Recent Labs   Lab Test  07/02/18 0622 07/01/18 0940 06/14/18  10/30/17   HGB  11.0*  11.4*  11.1*   < >  12.9*   WBC  6.9  7.5   --    --   7.5   PLT  156  157   --    --   173    < > = values in this interval not displayed.     Recent Labs   Lab Test  07/01/18 0940  02/20/17   1905   INR  1.30*  0.99     Recent Labs   Lab Test  07/01/18 0940  02/21/17   1700  02/21/17   0410   TROPI  <0.015  <0.015  The 99th percentile for upper reference range is 0.045 ug/L.  Troponin values in   the range of 0.045 - 0.120 ug/L may be associated with risks of adverse   clinical events.    <0.015  The 99th percentile for upper reference range is 0.045 ug/L.  Troponin values in   the range of 0.045 - 0.120 ug/L may be associated with risks of adverse   clinical events.

## 2018-07-02 NOTE — CONSULTS
Care Transition Initial Assessment -   Reason For Consult: discharge planning  Met with: Patient    Active Problems:    Syncope       DATA  Lives With: facility resident ([facility])  Living Arrangements: extended care facility ([facility])  Description of Support System: Supportive, Involved  Who is your support system?: Children  Support Assessment: Adequate family and caregiver support.   Identified issues/concerns regarding health management:       Quality Of Family Relationships: supportive, involved  Transportation Available: family or friend will provide    Per social service protocol for discharge planning.  Patient was admitted on 7-1-18 after a syncopal episode.  The tentative date of discharge is yet to be determined.  Reviewed chart.  Patient was admitted from [facility].  Call placed and message left for [facility] Rhodes to check the bed hold status.  Patient has MA therefore he should have a 18 day MA bed hold, however awaiting a return call from Dat Rhodes with a confirmation on this.      ASSESSMENT  Cognitive Status:  awake  Concerns to be addressed: discharge planning.     PLAN  Financial costs for the patient includes N/A.  Patient given options and choices for discharge N/A.  Patient/family is agreeable to the plan?  Yes  Patient Goals and Preferences: Return to [facility] on discharge.  Patient anticipates discharging to:  [facility].    Will continue to follow and assist with a safe discharge plan.    CHEVY Rendon, [facility]  [phone]

## 2018-07-02 NOTE — PLAN OF CARE
"Problem: Patient Care Overview  Goal: Plan of Care/Patient Progress Review   Pt disoriented to time. VS hypotensive, pt reports \"feeling dizzy\" when sitting up from bed. Orthostatics performed x2. 500cc NS bolus given. Mag replaced, recheck tomorrow AM. neuros have L facial droop, LS hemiparesis (RUE > RLE), slurred speech at times. Pt up to commode, large BM, continent but incontinent of smears. LS diminished.      "

## 2018-07-02 NOTE — PROGRESS NOTES
[facility]    Hospitalist Progress Note    Date of Service (when I saw the patient): 07/02/2018    Assessment & Plan   Mr. Mckee is an 86-year-old male with a past medical history significant for previous stroke with left-sided hemiplegia and cognitive deficits, coronary artery disease with prior stenting, hx of OOH vfib arrest, chronic atrial fibrillation and anticoagulation with Xarelto, hypertension, T2DM, peripheral vascular disease and chronic kidney disease III who presented to the emergency department on 7/1/18 with syncope and findings of hypotension and bradycardia, admitted to the [facility] [place] for further evaluation and treatment.     Syncope  Atrial fibrillation, permanent, with SVR  Orthostatic hypotension:  Noted period of unresponsiveness with findings of bradycardia with rates in the 30s and hypotension of 70/30; pt given atropine per EMS. CMP with MARIA DEL CARMEN on admission, since resolved. CBC WNL aside from baseline anemia. Lactic acid WNL. Trop negative. INR 1.30 on admission (pt on Xarelto). EKG showing atrial fibrillation with VR 69. Pt hydrated with 1 L IVF overnight. Last echo from 2/2017 with EF 55-60%, mild mitral regurg, no WMA.   -- PTA Lopressor 25 mg BID held on admission, PTA Lisinopril 20 mg po BID held on admission   -- Continue telemetry   -- Cardiology consulted, appreciate assistance; recommending 30 day event monitor at discharge, close f/u with Dr. Teixeira; signed off 7/2.   -- Continue Xarelto    -- Echocardiogram ordered   -- Carotid U/S given hx of carotid artery stenosis below   -- Check TSH and Magnesium   -- Check orthostatic BPs this AM; noted positive per nursing (see flowsheet)--pt felt dizzy at that time, 500 cc bolus over 2 hrs ordered   -- Care Coordinator to help facilitate Cardiology follow-up    Generalized weakness: RN notes indicating pt requiring A2, 4WW.   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   -- Vascular Surgery consulted on admission, see formal note by Dr. Bruno; no surgical intervention advised     Acute kidney injury on chronic kidney disease stage III, resolved:  Creatinine 1.8>1.19. Suspect MARIA DEL CARMEN related to hypotension and bradycardia with resultant hypoperfusion.     Abnormal UA  Indeterminant small hypoenhancing region within the lower right kidney, incidental finding: Noted on CT. Also with note that the kidneys have a mildly heterogenous enhancement pattern. UA with large LE and WBC 41. WBC WNL. Afebrile.   -- Empirically started on Ceftriaxone overnight   -- Follow urine culture; >[id number] of gram positive cocci, if pt were to spike fever or develop leukocytosis, low threshold to start vancomycin  -- Procalcitonin ordered   -- Outpatient renal MRI recommended     Carotid artery stenosis: US from 2013 with 50-69% diameter stenosis of the right and left ICA relative to the distal ICA diameter.   -- Carotid U/S as above     Normocytic anemia: Stable. No active signs of bleeding.   -- Monitor     Recent Labs  Lab 07/02/18  0622 07/01/18  0940   HGB 11.0* 11.4*     Coronary artery disease  OOH ventricular fibrillation arrest (2008)  HTN, hyperlipidemia: Followed by Dr. Teixeira of Cardiology, last saw in 2015. Noted to have severe diffuse disease involving both circumflex and LAD, hx of multiple Taxol stents into the LAD and cx balloon angioplasty of the small posterior artery was performed. Also with hx of in-stent restenosis of the distal left anterior descending artery and endeavor SOHAIL was placed. Angioplasty performed to a first septal . Distal circumflex stenosis was revascularized with SOHAIL. Stress testing in 2014 demonstrated no significant inducible ischemia. Inferior and inferoseptal infarction with EF 44%.  -- Continue Lipitor 40 mg po qd  -- ACE-I and BB on hold given hypotension and bradycardia above      Hypothyroidism:  Maintained on Synthroid 25 mcg daily.   -- Check TSH     T2DM, non-insulin dependent, uncontrolled: A1C 7.4. We will continue with Basaglar at a reduced dose for now.  Sliding scale will be ordered.   [Basaglar 30 U qhs, Novolog 4 U BID with breakfast and lunch]  -- Lantus 20 U qhs, Novolog 1 U/carb U, and medium dose SSI ordered on admission    Recent Labs  Lab 07/02/18  0932 07/02/18  0622 07/02/18  0208 07/01/18  2106 07/01/18  1428 07/01/18  0940   GLC  --  103*  --   --   --  160*   *  --  95 98 134*  --      CVA, right middle cerebral artery (2017)  Mild cognitive impariment: Received TPA and s/p IR thombectomy. Neurology suspected CVA was cardioembolic and hx of PAF. Oriented X3 on my interview.   -- Continue statin and Xarelto as above     BPH: Continue PTA Flomax     # Pain Assessment:  Current Pain Score 7/2/2018   Patient currently in pain? denies   Pain score (0-10) -   Pain location -   Pain descriptors -   Elias carrera pain level was assessed and he currently denies pain.      DVT Prophylaxis: Xarelto   Code Status: Full Code    Disposition: Expected discharge pending clinical course     Cyndi Padilla PA-C    This patient was discussed with Dr. Laureano of the Hospitalist Service who agrees with current plans as outlined above.     Interval History   Denies chest pain, SOB, dizziness, lightheadedness. Oriented X3, unable to tell me specific details of syncopal event. Denies prodromal symptoms. Feels overall weak. Appetite good. Denies abdominal pain, dysuria. Baseline left-sided hemiplegia from prior CVA. No acute events reported overnight.     -Data reviewed today: See below.     Physical Exam   Temp: 98.7  F (37.1  C) Temp src: Oral BP: 115/73   Heart Rate: 63 Resp: 16 SpO2: 94 % O2 Device: None (Room air)    Vitals:    07/01/18 1400 07/02/18 0500   Weight: 82.9 kg (182 lb 12.2 oz) 84 kg (185 lb 3 oz)     Vital Signs with Ranges  Temp:  [97.3  F (36.3  C)-98.7  F  (37.1  C)] 98.7  F (37.1  C)  Heart Rate:  [54-75] 63  Resp:  [9-22] 16  BP: ()/(48-83) 115/73  SpO2:  [94 %-98 %] 94 %  I/O last 3 completed shifts:  In: 1620 [P.O.:820; I.V.:800]  Out: 500 [Urine:500]    CONSTITUTIONAL: Pt laying in bed, dressed in hospital garb. Appears comfortable, eating breakfast. Cooperative with interview.   HEENT: Normocephalic, atraumatic. Negative for conjunctival redness or scleral icterus.    CARDIOVASCULAR: RRR, no murmurs, rubs, or extra heart sounds appreciated. Pulses +2/4 and regular in upper and lower extremities, bilaterally.   RESPIRATORY: No increased work of breathing.  CTA, bilat; no wheezes, rales, or rhonchi appreciated.  GASTROINTESTINAL:  Abdomen soft, non-distended. BS auscultated in all four quadrants. Negative for tenderness to palpation.  No masses or organomegaly noted.  MUSCULOSKELETAL: Baseline left-sided hemiplegia. No gross deformities noted. Normal muscle tone.   HEMATOLOGIC/LYMPHATIC/IMMUNOLOGIC:  Negative for lower extremity edema, bilaterally.  NEUROLOGIC: Alert and oriented to person, place, and time. Left-sided hemiplegia.  SKIN: Warm, dry, intact. No jaundice noted. Negative for suspicious lesions, rashes, bruising, open sores or abrasions.     Medications     - MEDICATION INSTRUCTIONS -         acetaminophen (TYLENOL) tablet 1,000 mg  1,000 mg Oral TID     atorvastatin (LIPITOR) tablet 40 mg  40 mg Oral At Bedtime     cefTRIAXone  1 g Intravenous Q24H     insulin aspart  1-7 Units Subcutaneous TID AC     insulin aspart  1-5 Units Subcutaneous At Bedtime     insulin aspart   Subcutaneous TID w/meals     insulin glargine  20 Units Subcutaneous At Bedtime     levothyroxine (SYNTHROID/LEVOTHROID) tablet 25 mcg  25 mcg Oral Daily     polyethylene glycol  17 g Oral Daily     rivaroxaban ANTICOAGULANT  15 mg Oral Daily with supper     sodium chloride (PF)  3 mL Intracatheter Q8H     tamsulosin  0.4 mg Oral Daily     timolol  1 drop Right Eye BID       Data      Recent Labs  Lab 07/02/18  0622 07/01/18  0940   WBC 6.9 7.5   HGB 11.0* 11.4*   MCV 90 91    157   INR  --  1.30*    138   POTASSIUM 4.1 4.8   CHLORIDE 107 104   CO2 23 26   BUN 27 29   CR 1.19 1.75*   ANIONGAP 9 8   TRENT 8.2* 8.5   * 160*   ALBUMIN  --  3.1*   PROTTOTAL  --  6.7*   BILITOTAL  --  0.4   ALKPHOS  --  68   ALT  --  14   AST  --  10   TROPI  --  <0.015       Recent Results (from the past 24 hour(s))   CT Aortic Survey w Contrast    Narrative    CT AORTIC SURVEY WITH CONTRAST  7/1/2018 10:03 AM    HISTORY:  Chest and back pain.    TECHNIQUE: CT scan obtained of the chest, abdomen, and pelvis with  oral and IV contrast. 80 mL Isovue-370 injected. Aortic survey  protocol. Radiation dose for this scan was reduced using automated  exposure control, adjustment of the mA and/or kV according to patient  size, or iterative reconstruction technique.    COMPARISON:  None.    FINDINGS:  Chest:   No acute thoracic aortic abnormality.  No thoracic aortic dissection  or aneurysm is seen. Scattered areas of atherosclerotic plaque  throughout the thoracic aorta with small areas of penetrating ulcer  calcification. In particular, penetrating ulcer can be seen along the  descending thoracic aorta without adjacent inflammation. Coronary  artery calcifications. Cannot fully assess for pulmonary embolism. No  large central pulmonary embolism. Nondiagnostic assessment of the  middle and small branch vessels.    No evidence for adenopathy. No acute airspace disease. Minor bibasilar  atelectasis. Calcified granuloma at the right upper lobe incidentally  seen.    Abdomen/pelvis:   No acute abdominal aortic abnormality. No abdominal aortic dissection.  Atherosclerotic plaque seen within the abdominal aorta with areas of  penetrating ulcer at the proximal abdominal aorta. 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 Scattered areas of  atherosclerotic plaque  within the aortic and arterial vessels. Patency of the celiac,  superior mesenteric, inferior mesenteric, and right and left renal  artery origins.    The liver, gallbladder, adrenals, spleen, and pancreas show no acute  abnormalities. Bilateral renal cortical thinning. Hypodense focus at  the lower right kidney is 0.9 cm, series 4 image 130, and is  ultimately indeterminant. Kidneys have a somewhat heterogeneous  perfusion pattern that is subtle. No hydronephrosis. Prostate  radiation seeds. No bowel obstruction. Colonic diverticula without  diverticulitis. No enlarged lymph nodes. Faint gallstones identified.    No aggressive bony lesion is seen.      Impression    IMPRESSION:  1. No acute aortic abnormality. No convincing acute aortic dissection.  Moderate areas of atherosclerotic disease of the aorta and its  branches. 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 There is atherosclerosis throughout the aorta and its branches.  There are areas of penetrating ulcer visualized at the descending  thoracic aorta and upper abdomen without convincing acute  inflammation.  2. Renal cortical thinning bilaterally. Indeterminant small  hypoenhancing region within the lower right kidney. Consider  outpatient renal MRI for further assessment of this indeterminate  lesion. Further, the kidneys have a mildly heterogeneous enhancement  pattern. Correlate with urinalysis for any evidence for a urinary  tract infection and pyelonephritis.  3. No other acute abnormality.  4. A few faint gallstones.    VILMA LEW MD

## 2018-07-03 ENCOUNTER — APPOINTMENT (OUTPATIENT)
Dept: CARDIOLOGY | Facility: CLINIC | Age: 83
DRG: 309 | End: 2018-07-03
Attending: PHYSICIAN ASSISTANT
Payer: COMMERCIAL

## 2018-07-03 VITALS
RESPIRATION RATE: 16 BRPM | WEIGHT: 185.19 LBS | OXYGEN SATURATION: 97 % | DIASTOLIC BLOOD PRESSURE: 78 MMHG | SYSTOLIC BLOOD PRESSURE: 133 MMHG | HEART RATE: 79 BPM | TEMPERATURE: 99.1 F | BODY MASS INDEX: 27.35 KG/M2

## 2018-07-03 LAB
ANION GAP SERPL CALCULATED.3IONS-SCNC: 8 MMOL/L (ref 3–14)
BASOPHILS # BLD AUTO: 0 10E9/L (ref 0–0.2)
BASOPHILS NFR BLD AUTO: 0.2 %
BUN SERPL-MCNC: 16 MG/DL (ref 7–30)
CALCIUM SERPL-MCNC: 8.4 MG/DL (ref 8.5–10.1)
CHLORIDE SERPL-SCNC: 108 MMOL/L (ref 94–109)
CO2 SERPL-SCNC: 23 MMOL/L (ref 20–32)
CREAT SERPL-MCNC: 1.04 MG/DL (ref 0.66–1.25)
DIFFERENTIAL METHOD BLD: ABNORMAL
EOSINOPHIL # BLD AUTO: 0.1 10E9/L (ref 0–0.7)
EOSINOPHIL NFR BLD AUTO: 1.8 %
ERYTHROCYTE [DISTWIDTH] IN BLOOD BY AUTOMATED COUNT: 16 % (ref 10–15)
GFR SERPL CREATININE-BSD FRML MDRD: 68 ML/MIN/1.7M2
GLUCOSE BLDC GLUCOMTR-MCNC: 105 MG/DL (ref 70–99)
GLUCOSE BLDC GLUCOMTR-MCNC: 114 MG/DL (ref 70–99)
GLUCOSE BLDC GLUCOMTR-MCNC: 174 MG/DL (ref 70–99)
GLUCOSE SERPL-MCNC: 118 MG/DL (ref 70–99)
HCT VFR BLD AUTO: 32.8 % (ref 40–53)
HGB BLD-MCNC: 11.3 G/DL (ref 13.3–17.7)
IMM GRANULOCYTES # BLD: 0 10E9/L (ref 0–0.4)
IMM GRANULOCYTES NFR BLD: 0.3 %
LYMPHOCYTES # BLD AUTO: 1 10E9/L (ref 0.8–5.3)
LYMPHOCYTES NFR BLD AUTO: 16.3 %
MCH RBC QN AUTO: 31 PG (ref 26.5–33)
MCHC RBC AUTO-ENTMCNC: 34.5 G/DL (ref 31.5–36.5)
MCV RBC AUTO: 90 FL (ref 78–100)
MONOCYTES # BLD AUTO: 1.1 10E9/L (ref 0–1.3)
MONOCYTES NFR BLD AUTO: 18.8 %
NEUTROPHILS # BLD AUTO: 3.8 10E9/L (ref 1.6–8.3)
NEUTROPHILS NFR BLD AUTO: 62.6 %
NRBC # BLD AUTO: 0 10*3/UL
NRBC BLD AUTO-RTO: 0 /100
PLATELET # BLD AUTO: 159 10E9/L (ref 150–450)
POTASSIUM SERPL-SCNC: 4.3 MMOL/L (ref 3.4–5.3)
RBC # BLD AUTO: 3.64 10E12/L (ref 4.4–5.9)
SODIUM SERPL-SCNC: 139 MMOL/L (ref 133–144)
WBC # BLD AUTO: 6 10E9/L (ref 4–11)

## 2018-07-03 PROCEDURE — 99239 HOSP IP/OBS DSCHRG MGMT >30: CPT | Performed by: PHYSICIAN ASSISTANT

## 2018-07-03 PROCEDURE — 80048 BASIC METABOLIC PNL TOTAL CA: CPT | Performed by: PHYSICIAN ASSISTANT

## 2018-07-03 PROCEDURE — 25000132 ZZH RX MED GY IP 250 OP 250 PS 637: Performed by: INTERNAL MEDICINE

## 2018-07-03 PROCEDURE — 25000132 ZZH RX MED GY IP 250 OP 250 PS 637: Performed by: PHYSICIAN ASSISTANT

## 2018-07-03 PROCEDURE — 93272 ECG/REVIEW INTERPRET ONLY: CPT | Performed by: INTERNAL MEDICINE

## 2018-07-03 PROCEDURE — 93306 TTE W/DOPPLER COMPLETE: CPT

## 2018-07-03 PROCEDURE — 25500064 ZZH RX 255 OP 636: Performed by: HOSPITALIST

## 2018-07-03 PROCEDURE — 85025 COMPLETE CBC W/AUTO DIFF WBC: CPT | Performed by: PHYSICIAN ASSISTANT

## 2018-07-03 PROCEDURE — 25000128 H RX IP 250 OP 636: Performed by: INTERNAL MEDICINE

## 2018-07-03 PROCEDURE — 36415 COLL VENOUS BLD VENIPUNCTURE: CPT | Performed by: PHYSICIAN ASSISTANT

## 2018-07-03 PROCEDURE — 93306 TTE W/DOPPLER COMPLETE: CPT | Mod: 26 | Performed by: INTERNAL MEDICINE

## 2018-07-03 PROCEDURE — 93270 REMOTE 30 DAY ECG REV/REPORT: CPT | Performed by: PHYSICIAN ASSISTANT

## 2018-07-03 PROCEDURE — 00000146 ZZHCL STATISTIC GLUCOSE BY METER IP

## 2018-07-03 RX ORDER — LISINOPRIL 20 MG/1
20 TABLET ORAL DAILY
Qty: 30 TABLET | DISCHARGE
Start: 2018-07-04 | End: 2018-08-14

## 2018-07-03 RX ORDER — LISINOPRIL 20 MG/1
20 TABLET ORAL DAILY
Status: DISCONTINUED | OUTPATIENT
Start: 2018-07-03 | End: 2018-07-03 | Stop reason: HOSPADM

## 2018-07-03 RX ORDER — INSULIN GLARGINE 100 [IU]/ML
20 INJECTION, SOLUTION SUBCUTANEOUS AT BEDTIME
DISCHARGE
Start: 2018-07-03 | End: 2018-07-27

## 2018-07-03 RX ADMIN — INSULIN ASPART 2 UNITS: 100 INJECTION, SOLUTION INTRAVENOUS; SUBCUTANEOUS at 14:43

## 2018-07-03 RX ADMIN — HUMAN ALBUMIN MICROSPHERES AND PERFLUTREN 3 ML: 10; .22 INJECTION, SOLUTION INTRAVENOUS at 09:00

## 2018-07-03 RX ADMIN — INSULIN ASPART 1 UNITS: 100 INJECTION, SOLUTION INTRAVENOUS; SUBCUTANEOUS at 14:43

## 2018-07-03 RX ADMIN — TIMOLOL MALEATE 1 DROP: 5 SOLUTION OPHTHALMIC at 08:58

## 2018-07-03 RX ADMIN — CEFTRIAXONE 1 G: 1 INJECTION, POWDER, FOR SOLUTION INTRAMUSCULAR; INTRAVENOUS at 00:33

## 2018-07-03 RX ADMIN — POLYETHYLENE GLYCOL 3350 17 G: 17 POWDER, FOR SOLUTION ORAL at 09:02

## 2018-07-03 RX ADMIN — ACETAMINOPHEN 1000 MG: 500 TABLET, FILM COATED ORAL at 08:59

## 2018-07-03 RX ADMIN — LISINOPRIL 20 MG: 20 TABLET ORAL at 08:59

## 2018-07-03 RX ADMIN — RIVAROXABAN 20 MG: 10 TABLET, FILM COATED ORAL at 17:11

## 2018-07-03 RX ADMIN — INSULIN ASPART 2 UNITS: 100 INJECTION, SOLUTION INTRAVENOUS; SUBCUTANEOUS at 10:46

## 2018-07-03 RX ADMIN — ACETAMINOPHEN 1000 MG: 500 TABLET, FILM COATED ORAL at 17:11

## 2018-07-03 RX ADMIN — LEVOTHYROXINE SODIUM 25 MCG: 25 TABLET ORAL at 08:59

## 2018-07-03 ASSESSMENT — VISUAL ACUITY
OU: BLINKS TO THREAT

## 2018-07-03 NOTE — PROGRESS NOTES
Discharge    Patient discharged back to [facility] via wheelchair with [facility] transport  Care plan note: A&Ox4.  Ax2 pivot transfer.  Left-sided weakness.  IV removed, bled, pressure applied for 10 min, clot established.  Provided with Tylenol and Xarelto before discharge.  Refused to eat before discharge.  Holter monitor on, box sent with.  Denies pain.  Doesn't have any questions.    Listed belongings gathered and returned to patient. Yes  Care Plan and Patient education resolved: Yes  Prescriptions if needed, hard copies sent with patient  NA  Home and hospital acquired medications returned to patient: Yes  Medication Bin checked and emptied on discharge Yes  Follow up appointment made for patient: No

## 2018-07-03 NOTE — PROGRESS NOTES
SW:  D:  Patient discharging today back to [facility].  Transportation was arranged, paid by his insurance.   Bedside nurse notified daughter of time.  Writer spoke with [facility] admission and faxed orders.

## 2018-07-03 NOTE — PLAN OF CARE
Problem: Patient Care Overview  Goal: Plan of Care/Patient Progress Review  Outcome: No Change  A&O x2, d/o situation, time. VSS on RA; bola when sleeping. Tele - Afib with SVR, HR 54. Neuros Q4H intact. IVF infusing. IV abx. . L side weakness/hemiplegia for Hx CVA. Voids in bedside urinal, some incont. PT/SW follow for DC planning.

## 2018-07-03 NOTE — PLAN OF CARE
Problem: Patient Care Overview  Goal: Plan of Care/Patient Progress Review  Outcome: No Change  Care Plan Summary Note: vss, alert x4 with intermittent confusion, forgetful. +BS, lungs clear, on RA. /174, covered with SSI and carb counting. Echo done. Pt is to d/c back to SNF, ride set up at 1730. Tele Afib with CVR. Daughter, Jazmin, updated over the phone by PA and writer. Left side weakness and left side facial droop noted at baseline. Monitor.

## 2018-07-03 NOTE — DISCHARGE SUMMARY
[facility]    Discharge Summary  Hospitalist    Date of Admission:  7/1/2018  Date of Discharge:  7/3/2018  Discharging Provider: Cyndi Padilla PA-C  Date of Service (when I saw the patient): 07/03/18    Discharge Diagnoses   Syncope   Atrial fibrillation with SVR   Orthostatic hypotension   Generalized weakness   Asymptomatic Bacteruria     History of Present Illness   Mr. Mckee is an 86-year-old male with a past medical history significant for previous stroke with left-sided hemiplegia and cognitive deficits, coronary artery disease with prior stenting, hx of OOH vfib arrest, chronic atrial fibrillation and anticoagulation with Xarelto, hypertension, T2DM, peripheral vascular disease and chronic kidney disease III who presented to the emergency department on 7/1/18 with syncope and findings of hypotension and bradycardia, admitted to the [facility] for further evaluation and treatment.      No acute events overnight. Tele with a fib and SVR. BB on hold. Denies chest pain, SOB,dizziness, lightheadedness. Anxious to discharge. With patient permission, called daughter, Jazmin, and updated on discharge plans.     Hospital Course   Elias Mckee was admitted on 7/1/2018.  The following problems were addressed during his hospitalization:    Syncope  Atrial fibrillation, permanent, with SVR  Orthostatic hypotension:  Noted period of unresponsiveness with findings of bradycardia with rates in the 30s and hypotension of 70/30; pt given atropine per EMS. CMP with MARIA DEL CARMEN on admission, since resolved. CBC WNL aside from baseline anemia. Lactic acid WNL. Trop negative. INR 1.30 on admission (pt on Xarelto). EKG showing atrial fibrillation with VR 69. Pt hydrated with 1 L IVF overnight. Last echo from 2/2017 with EF 55-60%, mild mitral regurg, no WMA. PTA Lopressor 25 mg BID held on admission, PTA Lisinopril 20 mg po BID held on admission. Tele without evidence of overt arrhythmia aside from a fib  with SVR. Echocardiogram ordered; EF 55-60%, no RWMA. Carotid U/S given hx of carotid artery stenosis below; 50-69% stenosis in the internal carotid arteries bilaterally. TSH and magnesium WNL. Checked orthostatic BPs on 7/2; noted positive per nursing (see flowsheet)--pt felt dizzy at that time, 500 cc bolus over 2 hrs ordered with improvement in BPs   -- Cardiology consulted, appreciate assistance; recommending 30 day event monitor at discharge, close f/u with Dr. Teixeira; signed off 7/2.  Cardiology f/u orders placed prior to discharge   -- Continue Xarelto    -- Care Coordinator to help facilitate Cardiology follow-up  -- SW for discharge planning as below     Generalized weakness: WC bound at baseline, mod assistance of 2 with standing.   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   -- Vascular Surgery consulted on admission, see formal note by Dr. Bruno; no surgical intervention advised      Acute kidney injury on chronic kidney disease stage III, resolved:  Creatinine 1.8>1.19. Suspect MARIA DEL CARMEN related to hypotension and bradycardia with resultant hypoperfusion.      Asymptomatic bacteruria   Indeterminant small hypoenhancing region within the lower right kidney, incidental finding: Noted on CT. Also with note that the kidneys have a mildly heterogenous enhancement pattern. UA with large LE and WBC 41. WBC WNL. Afebrile.   -- Empirically started on Ceftriaxone   -- Follow urine culture; >[id number] of gram positive cocci, urine culture growing coagulase negative staph   -- Procalcitonin ordered and negative   -- No antibiotics at discharge   -- Outpatient renal MRI recommended      Carotid artery stenosis: US from 2013 with 50-69% diameter stenosis of the right and left ICA relative to the distal ICA diameter.   -- Carotid U/S as  above      Normocytic anemia: Stable. No active signs of bleeding.   -- Monitor     Coronary artery disease  OOH ventricular fibrillation arrest (2008)  HTN, hyperlipidemia: Followed by Dr. Teixeira of Cardiology, last saw in 2015. Noted to have severe diffuse disease involving both circumflex and LAD, hx of multiple Taxol stents into the LAD and cx balloon angioplasty of the small posterior artery was performed. Also with hx of in-stent restenosis of the distal left anterior descending artery and endeavor SOHAIL was placed. Angioplasty performed to a first septal . Distal circumflex stenosis was revascularized with SOHAIL. Stress testing in 2014 demonstrated no significant inducible ischemia. Inferior and inferoseptal infarction with EF 44%.  -- Continue Lipitor 40 mg po qd  -- ACE-I reduced to Lisinopril 20 mg po qd from 20 mg po BID  -- Continue to hold BB given presentation above      Hypothyroidism:  TSH WNL. Maintained on Synthroid 25 mcg daily.      T2DM, non-insulin dependent, uncontrolled: A1C 7.4. We will continue with Basaglar at a reduced dose for now.  Sliding scale will be ordered.   [Basaglar 30 U qhs, Novolog 4 U BID with breakfast and lunch]  -- Lantus 20 U qhs, Novolog 1 U/carb U, and medium dose SSI ordered during admission   -- At discharge, will resume PTA regimen with Basaglar reduced to 20 U whs, continue Novolog 4 U BID with breakfast and lunch     Recent Labs  Lab 07/03/18  0915 07/03/18  0835 07/03/18  0214 07/02/18  2147 07/02/18  1813 07/02/18  1743 07/02/18  1322  07/02/18  0622  07/01/18  0940   GLC  --  118*  --   --   --   --   --   --  103*  --  160*   *  --  105* 154* 85 94 157*  < >  --   < >  --    < > = values in this interval not displayed.    CVA, right middle cerebral artery (2017)  Mild cognitive impariment: Received TPA and s/p IR thombectomy. Neurology suspected CVA was cardioembolic and hx of PAF. Oriented X3 on my interview.   -- Continue statin and Xarelto as above       BPH: Continue PTA Flomax       # Discharge Pain Plan:   - Patient currently has NO PAIN and is not being prescribed pain medications on discharge.    Cyndi Padilla PA-C    This patient was discussed with Dr. Laureano of the Hospitalist Service who agrees with current plans as outlined above.     Significant Results and Procedures   See below    Pending Results   These results will be followed up by PCP   Unresulted Labs Ordered in the Past 30 Days of this Admission     Date and Time Order Name Status Description    7/1/2018 1705 Urine Culture Aerobic Bacterial Preliminary         Code Status   Full Code       Primary Care Physician   Yvette Bonner    Physical Exam   Temp: 98.2  F (36.8  C) Temp src: Oral BP: (!) 152/95 Pulse: 75 Heart Rate: 53 Resp: 16 SpO2: 94 % O2 Device: None (Room air)    Vitals:    07/01/18 1400 07/02/18 0500   Weight: 82.9 kg (182 lb 12.2 oz) 84 kg (185 lb 3 oz)     Vital Signs with Ranges  Temp:  [97.4  F (36.3  C)-98.6  F (37  C)] 98.2  F (36.8  C)  Pulse:  [56-75] 75  Heart Rate:  [53-59] 53  Resp:  [16-18] 16  BP: (152-162)/(82-95) 152/95  SpO2:  [94 %-97 %] 94 %  I/O last 3 completed shifts:  In: 930 [P.O.:430; I.V.:500]  Out: 1500 [Urine:1500]    CONSTITUTIONAL: Pt laying in bed, dressed in hospital garb. Appears comfortable, eating breakfast. Cooperative with interview.   HEENT: Normocephalic, atraumatic. Negative for conjunctival redness or scleral icterus.    CARDIOVASCULAR: Irregularly irregular rhythm, bradycardic. No murmurs, rubs, or extra heart sounds appreciated. Pulses +2/4 and regular in upper and lower extremities, bilaterally.   RESPIRATORY: No increased work of breathing.  CTA, bilat; no wheezes, rales, or rhonchi appreciated.  GASTROINTESTINAL:  Abdomen soft, non-distended. BS auscultated in all four quadrants. Negative for tenderness to palpation.  No masses or organomegaly noted.  MUSCULOSKELETAL: Baseline left-sided hemiplegia. No gross  deformities noted. Normal muscle tone.   HEMATOLOGIC/LYMPHATIC/IMMUNOLOGIC:  Negative for lower extremity edema, bilaterally.  NEUROLOGIC: Alert and oriented to person, place, and time. Left-sided hemiplegia.  SKIN: Warm, dry, intact. No jaundice noted. Negative for suspicious lesions, rashes, bruising, open sores or abrasions.     Discharge Disposition   Discharged to home  Condition at discharge: Stable    Consultations This Hospital Stay   CARDIOLOGY IP CONSULT  VASCULAR SURGERY IP CONSULT  PHYSICAL THERAPY ADULT IP CONSULT  SOCIAL WORK IP CONSULT  SOCIAL WORK IP CONSULT    Time Spent on this Encounter   I, Cyndi Padilla, personally saw the patient today and spent greater than 30 minutes discharging this patient.    Discharge Orders   No discharge procedures on file.  Discharge Medications   Current Discharge Medication List      CONTINUE these medications which have NOT CHANGED    Details   ACETAMINOPHEN PO Take 1,000 mg by mouth 3 times daily For pain      Atorvastatin Calcium (LIPITOR PO) Take 40 mg by mouth At Bedtime      BASAGLAR 100 UNIT/ML injection Inject 30 Units Subcutaneous At Bedtime      cholecalciferol (VITAMIN D) 1000 UNIT tablet Take 2,000 Units by mouth daily      Fluticasone Propionate (FLONASE NA) Spray 2 sprays into both nostrils daily      Insulin Aspart (NOVOLOG SC) Inject 4 Units Subcutaneous 2 times daily With breakfast and lunch      LEVOTHYROXINE SODIUM PO Take 25 mcg by mouth daily      LISINOPRIL PO Take 20 mg by mouth 2 times daily      METOPROLOL TARTRATE PO Take 25 mg by mouth 2 times daily Hold for systolic BP < 100 or pulse < 58.      polyethylene glycol (MIRALAX/GLYCOLAX) powder Take 17 g by mouth daily       rivaroxaban ANTICOAGULANT (XARELTO) 20 MG TABS tablet Take 1 tablet (20 mg) by mouth daily (with dinner)  Qty: 30 tablet, Refills: 1    Associated Diagnoses: 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)      Skin Protectants, Misc.  (EUCERIN) cream Apply to toes two times a day      tamsulosin (FLOMAX) 0.4 MG capsule Take 1 capsule (0.4 mg) by mouth daily  Qty: 60 capsule, Refills: 0    Associated Diagnoses: Benign prostatic hyperplasia, presence of lower urinary tract symptoms unspecified, unspecified morphology      timolol, PF, (TIMOPTIC OCUDOSE) 0.5 % ophthalmic solution Place 1 drop into the right eye 2 times daily    Associated Diagnoses: Central retinal vein occlusion, right eye      TRAMADOL HCL PO Take 50 mg by mouth every 6 hours as needed for moderate to severe pain           Allergies   Allergies   Allergen Reactions     No Known Allergies      Data   Most Recent 3 CBC's:  Recent Labs   Lab Test  07/03/18   0835  07/02/18   0622  07/01/18   0940   WBC  6.0  6.9  7.5   HGB  11.3*  11.0*  11.4*   MCV  90  90  91   PLT  159  156  157      Most Recent 3 BMP's:  Recent Labs   Lab Test  07/03/18   0835  07/02/18   0622  07/01/18   0940   NA  139  139  138   POTASSIUM  4.3  4.1  4.8   CHLORIDE  108  107  104   CO2  23  23  26   BUN  16  27  29   CR  1.04  1.19  1.75*   ANIONGAP  8  9  8   TRENT  8.4*  8.2*  8.5   GLC  118*  103*  160*     Most Recent 2 LFT's:  Recent Labs   Lab Test  07/01/18   0940 04/19/18   AST  10  14   ALT  14  10   ALKPHOS  68  67   BILITOTAL  0.4  0.5     Most Recent INR's and Anticoagulation Dosing History:  Anticoagulation Dose History     Recent Dosing and Labs Latest Ref Rng & Units 6/13/2008 2/20/2017 7/1/2018    INR 0.86 - 1.14 0.97 0.99 1.30(H)        Most Recent 3 Troponin's:  Recent Labs   Lab Test  07/02/18   0622  07/01/18   0940  02/21/17   1700   TROPI  <0.015  <0.015  <0.015  The 99th percentile for upper reference range is 0.045 ug/L.  Troponin values in   the range of 0.045 - 0.120 ug/L may be associated with risks of adverse   clinical events.       Most Recent Cholesterol Panel:  Recent Labs   Lab Test 04/19/18   CHOL  116   LDL  56   HDL  28*   TRIG  162*     Most Recent 6 Bacteria Isolates From Any  Culture (See EPIC Reports for Culture Details):  Recent Labs   Lab Test  07/01/18   1705  01/21/13   2050   CULT  >100,000 colonies/mL  Coagulase negative Staphylococcus  Susceptibility testing in progress  *  No growth     Most Recent TSH, T4 and A1c Labs:  Recent Labs   Lab Test  07/02/18   0622  07/01/18   0940   TSH  2.20   --    A1C   --   7.4*     Results for orders placed or performed during the hospital encounter of 07/01/18   CT Aortic Survey w Contrast    Narrative    CT AORTIC SURVEY WITH CONTRAST  7/1/2018 10:03 AM    HISTORY:  Chest and back pain.    TECHNIQUE: CT scan obtained of the chest, abdomen, and pelvis with  oral and IV contrast. 80 mL Isovue-370 injected. Aortic survey  protocol. Radiation dose for this scan was reduced using automated  exposure control, adjustment of the mA and/or kV according to patient  size, or iterative reconstruction technique.    COMPARISON:  None.    FINDINGS:  Chest:   No acute thoracic aortic abnormality.  No thoracic aortic dissection  or aneurysm is seen. Scattered areas of atherosclerotic plaque  throughout the thoracic aorta with small areas of penetrating ulcer  calcification. In particular, penetrating ulcer can be seen along the  descending thoracic aorta without adjacent inflammation. Coronary  artery calcifications. Cannot fully assess for pulmonary embolism. No  large central pulmonary embolism. Nondiagnostic assessment of the  middle and small branch vessels.    No evidence for adenopathy. No acute airspace disease. Minor bibasilar  atelectasis. Calcified granuloma at the right upper lobe incidentally  seen.    Abdomen/pelvis:   No acute abdominal aortic abnormality. No abdominal aortic dissection.  Atherosclerotic plaque seen within the abdominal aorta with areas of  penetrating ulcer at the proximal abdominal aorta. 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 Scattered areas of  atherosclerotic plaque  within the aortic and arterial vessels. Patency of the celiac,  superior mesenteric, inferior mesenteric, and right and left renal  artery origins.    The liver, gallbladder, adrenals, spleen, and pancreas show no acute  abnormalities. Bilateral renal cortical thinning. Hypodense focus at  the lower right kidney is 0.9 cm, series 4 image 130, and is  ultimately indeterminant. Kidneys have a somewhat heterogeneous  perfusion pattern that is subtle. No hydronephrosis. Prostate  radiation seeds. No bowel obstruction. Colonic diverticula without  diverticulitis. No enlarged lymph nodes. Faint gallstones identified.    No aggressive bony lesion is seen.      Impression    IMPRESSION:  1. No acute aortic abnormality. No convincing acute aortic dissection.  Moderate areas of atherosclerotic disease of the aorta and its  branches. 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 There is atherosclerosis throughout the aorta and its branches.  There are areas of penetrating ulcer visualized at the descending  thoracic aorta and upper abdomen without convincing acute  inflammation.  2. Renal cortical thinning bilaterally. Indeterminant small  hypoenhancing region within the lower right kidney. Consider  outpatient renal MRI for further assessment of this indeterminate  lesion. Further, the kidneys have a mildly heterogeneous enhancement  pattern. Correlate with urinalysis for any evidence for a urinary  tract infection and pyelonephritis.  3. No other acute abnormality.  4. A few faint gallstones.    VILMA LEW MD   US Carotid Bilateral    Narrative    US CAROTID BILATERAL 7/2/2018 1:14 PM    HISTORY: Syncope.    COMPARISON: Carotid ultrasound dated 1/21/2013    FINDINGS:     Right side:   On the grayscale images, calcified plaque is identified at the carotid  bifurcation extending into the internal and external carotid  arteries..  Spectral waveform analysis was performed. Peak  systolic and diastolic  velocities in the right internal carotid artery are 181 and 38 cm/s  versus 174 and 40 cm/s on the prior exam. Per sonographic criteria,  degree of stenosis in the right internal carotid artery is 50-69%.  Flow in the right vertebral artery is antegrade.     Left side:   On the grayscale images, calcified plaque is identified at the carotid  bifurcation.  Spectral waveform analysis was performed. Peak systolic and diastolic   velocities in the left internal carotid artery are 228 and 52 cm/s  versus 255 cm/s on the prior exam. Per sonographic criteria, degree of  stenosis in the left internal carotid artery is 50-69%.  Flow in the left vertebral artery is antegrade but difficult to  adequately visualize.       Impression    IMPRESSION: Per sonographic criteria, there are 50-69% stenoses in the  internal carotid arteries bilaterally.    Degree of stenosis measured relative to the diameter of the normal  internal carotid artery per NASCET or NASCET type criteria    ALEA LIANG MD

## 2018-07-03 NOTE — CONSULTS
Care Transition Initial Assessment - RN    Reason For Consult: discharge planning   Met with: Patient and Family.  DATA   Active Problems:    Syncope       Cognitive Status: alert and oriented.   Contact information and PCP information verified: Yes  Lives With: facility resident ([facility])  Living Arrangements: extended care facility ([facility])  Quality Of Family Relationships: supportive, involved  Description of Support System: Supportive, Involved   Who is your support system?: Children   Support Assessment: Adequate family and caregiver support   Insurance concerns: No Insurance issues identified  ASSESSMENT  Patient currently receives the following services:  From NH        Identified issues/concerns regarding health management:    Consulted for f/u appointment with Cardiology. Patient discharging with a Holter monitor and appointment made with DR VALDEZ in September.  PLAN  Financial costs for the patient include none .  Patient given options and choices for discharge yes  Patient/family is agreeable to the plan?  Yes:   Patient anticipates discharging to NH.        Patient anticipates needs for home equipment: No  Plan/Disposition: TCU  Appointments:  See AVS  Care  (CTS) will continue to follow as needed.

## 2018-07-03 NOTE — PLAN OF CARE
Problem: Patient Care Overview  Goal: Plan of Care/Patient Progress Review  PT pt not seen today, discharging back to NH for TCU    Physical Therapy Discharge Summary    Reason for therapy discharge:    Discharged to transitional care facility.    Progress towards therapy goal(s). See goals on Care Plan in [facility] electronic health record for goal details.  Goals not met.  Barriers to achieving goals:   discharge from facility.    Therapy recommendation(s):    Continued therapy is recommended.  Rationale/Recommendations:  ro maximize functional mob I, safety and jessy.

## 2018-07-03 NOTE — PLAN OF CARE
Problem: Patient Care Overview  Goal: Plan of Care/Patient Progress Review  Outcome: No Change  Care Plan Summary Note: Pt A&OX2  Easily agitated. Left sided weakness- hemiplegia.   neuros Q 4 hrs. Denies pain, headache, SOB, dyspnea. BG 85 and 154. Tele monitoring. Mod CHO diet, IVF infusing, Strong A2. PT /SW following. Continue to monitor.

## 2018-07-03 NOTE — PROGRESS NOTES
SW:  D:  Received a message back from [facility] confirming patient has a bed hold and can return there on discharge.  P:  Will continue to follow.

## 2018-07-04 LAB
BACTERIA SPEC CULT: ABNORMAL
Lab: ABNORMAL
SPECIMEN SOURCE: ABNORMAL

## 2018-07-05 ENCOUNTER — CARE COORDINATION (OUTPATIENT)
Dept: CARDIOLOGY | Facility: CLINIC | Age: 83
End: 2018-07-05

## 2018-07-05 ENCOUNTER — TRANSFERRED RECORDS (OUTPATIENT)
Dept: HEALTH INFORMATION MANAGEMENT | Facility: CLINIC | Age: 83
End: 2018-07-05

## 2018-07-05 ENCOUNTER — NURSING HOME VISIT (OUTPATIENT)
Dept: GERIATRICS | Facility: CLINIC | Age: 83
End: 2018-07-05
Payer: COMMERCIAL

## 2018-07-05 VITALS
DIASTOLIC BLOOD PRESSURE: 94 MMHG | WEIGHT: 175 LBS | HEART RATE: 90 BPM | RESPIRATION RATE: 18 BRPM | TEMPERATURE: 97.2 F | BODY MASS INDEX: 25.92 KG/M2 | HEIGHT: 69 IN | SYSTOLIC BLOOD PRESSURE: 166 MMHG

## 2018-07-05 DIAGNOSIS — N18.30 BENIGN HYPERTENSION WITH CKD (CHRONIC KIDNEY DISEASE) STAGE III (H): ICD-10-CM

## 2018-07-05 DIAGNOSIS — N17.0 ACUTE RENAL FAILURE WITH ACUTE TUBULAR NECROSIS SUPERIMPOSED ON STAGE 3 CHRONIC KIDNEY DISEASE (H): ICD-10-CM

## 2018-07-05 DIAGNOSIS — N18.30 ACUTE RENAL FAILURE WITH ACUTE TUBULAR NECROSIS SUPERIMPOSED ON STAGE 3 CHRONIC KIDNEY DISEASE (H): ICD-10-CM

## 2018-07-05 DIAGNOSIS — E11.21 TYPE 2 DIABETES MELLITUS WITH DIABETIC NEPHROPATHY, WITH LONG-TERM CURRENT USE OF INSULIN (H): ICD-10-CM

## 2018-07-05 DIAGNOSIS — Z79.4 TYPE 2 DIABETES MELLITUS WITH DIABETIC NEPHROPATHY, WITH LONG-TERM CURRENT USE OF INSULIN (H): ICD-10-CM

## 2018-07-05 DIAGNOSIS — I48.20 CHRONIC ATRIAL FIBRILLATION (H): ICD-10-CM

## 2018-07-05 DIAGNOSIS — I69.919 COGNITIVE DEFICITS AS LATE EFFECT OF CEREBROVASCULAR DISEASE: ICD-10-CM

## 2018-07-05 DIAGNOSIS — I12.9 BENIGN HYPERTENSION WITH CKD (CHRONIC KIDNEY DISEASE) STAGE III (H): ICD-10-CM

## 2018-07-05 DIAGNOSIS — R40.4 UNRESPONSIVE EPISODE: Primary | ICD-10-CM

## 2018-07-05 LAB — HBA1C MFR BLD: 7.2 % (ref 4–5.6)

## 2018-07-05 PROCEDURE — 99310 SBSQ NF CARE HIGH MDM 45: CPT | Mod: GW | Performed by: NURSE PRACTITIONER

## 2018-07-05 NOTE — PROGRESS NOTES
[facility]  PRIMARY CARE PROVIDER AND CLINIC:  Yvette Bonner [address] [facility] / [place] [address]  Chief Complaint   Patient presents with     Hospital F/U     [place] Medical Record Number:  [id number]    HPI:    Elias Mckee is a 86 year old  (8/10/1931), re-admitted to the [facility] from [facility].  Hospital stay 7/1/18 through 7/3/18.  Admitted to this facility for  rehab, medical management and nursing care.  HPI information obtained from: facility chart records, facility staff, patient report and [facility] chart review. Pt is unreliable historian due to cognitive loss. Current issues are:      Unresponsive episode  Pt went to [facility] on 7/1 due to a syncopal event with hypotension and bradycardia.  Monitored and with no further recurrence, was sent back to NH on 7/3 with an event monitor with recommendation to f/u with cardiology.  Lisinopril and metoprolol held during hospital stay and on discharge the lisinopril was decreased to daily and metoprolol remains on hold pending event monitor.  Pt has been at baseline since hospital return per the [facility] staff.    Acute renal failure with acute tubular necrosis superimposed on stage 3 chronic kidney disease (H)  Creatinine was 1.8 on admission and with hydration decreased to 1.19.  Creatinine at the NH on 6/14 was 1.12.    Type 2 diabetes mellitus with diabetic nephropathy, with long-term current use of insulin (H)  Dose of basaglar insulin decreased.  Accuchecks since 7/3: 0730: [id number].: 276, 5pm: 194.  Continues on novolog scheduled with breakfast and lunch.    Chronic atrial fibrillation (H)  Heart rates since hospital return: 63-90.  Remains on Xarelto for anticoagulation.    Benign hypertension with CKD (chronic kidney disease) stage III  BP ranges since hospital return: 157-194/94. No reports of chest pain.    Cognitive deficits as late effect of cerebrovascular  disease  Has returned to baseline per [facility] staff.     CODE STATUS/ADVANCE DIRECTIVES DISCUSSION:   CPR/Full code   Patient's living condition: lives in a skilled nursing facility    ALLERGIES:No known allergies  PAST MEDICAL HISTORY:  has a past medical history of Acute, but ill-defined, cerebrovascular disease (1-2008); Atrial fibrillation (H); Benign hypertension with CKD (chronic kidney disease) stage III (6/5/2017); Central retinal vein occlusion, right eye (11/15/2017); Chronic atrial fibrillation (H) (6/5/2017); Chronic ischemic heart disease, unspecified; Cognitive deficits as late effect of cerebrovascular disease (6/5/2017); Coronary artery disease; CVA (cerebral infarction); Dysphagia due to recent cerebrovascular accident (CVA) (6/5/2017); Elevated cholesterol; Essential hypertension, benign; H/O prostate cancer (7/6/2017); H/O: CVA (cerebrovascular accident) (6/5/2017); Hemiparesis affecting left side as late effect of cerebrovascular accident (H) (6/5/2017); Hyperlipidaemia; MEDICAL HISTORY OF - (1- 2008); Myocardial infarction; Onychomycosis (6/5/2017); Other and unspecified hyperlipidemia; PVD (peripheral vascular disease) (H) (12/12/2017); Type 2 diabetes mellitus with diabetic peripheral angiopathy with gangrene (H) (12/12/2017); Type 2 diabetes mellitus with stage 3 chronic kidney disease (H) (6/5/2017); Type II diabetes mellitus with peripheral circulatory disorder (H) (12/12/2017); and Type II or unspecified type diabetes mellitus without mention of complication, not stated as uncontrolled (1-2008).  PAST SURGICAL HISTORY:  has a past surgical history that includes seed implantation (2002); Phacoemulsification clear cornea with standard intraocular lens implant (Right, 10/7/2014); and Vitrectomy anterior (Right, 10/7/2014).  FAMILY HISTORY: family history is not on file.  SOCIAL HISTORY:  reports that he quit smoking about 45 years ago. His smoking use included Cigarettes. He started smoking about  65 years ago. He has a 20.00 pack-year smoking history. He has never used smokeless tobacco. He reports that he drinks about 0.6 oz of alcohol per week  He reports that he does not use illicit drugs.    Post Discharge Medication Reconciliation Status: discharge medications reconciled, continue medications without change.  Current Outpatient Prescriptions   Medication Sig Dispense Refill     ACETAMINOPHEN PO Take 1,000 mg by mouth 3 times daily For pain       Atorvastatin Calcium (LIPITOR PO) Take 40 mg by mouth At Bedtime       BASAGLAR 100 UNIT/ML injection Inject 20 Units Subcutaneous At Bedtime       cholecalciferol (VITAMIN D) 1000 UNIT tablet Take 2,000 Units by mouth daily       Fluticasone Propionate (FLONASE NA) Spray 2 sprays into both nostrils daily       Insulin Aspart (NOVOLOG SC) Inject 4 Units Subcutaneous 2 times daily With breakfast and lunch       LEVOTHYROXINE SODIUM PO Take 25 mcg by mouth daily       lisinopril (PRINIVIL/ZESTRIL) 20 MG tablet Take 1 tablet (20 mg) by mouth daily 30 tablet      polyethylene glycol (MIRALAX/GLYCOLAX) powder Take 17 g by mouth daily        rivaroxaban ANTICOAGULANT (XARELTO) 20 MG TABS tablet Take 1 tablet (20 mg) by mouth daily (with dinner) 30 tablet 1     Skin Protectants, Misc. (EUCERIN) cream Apply to toes two times a day       tamsulosin (FLOMAX) 0.4 MG capsule Take 1 capsule (0.4 mg) by mouth daily 60 capsule 0     timolol, PF, (TIMOPTIC OCUDOSE) 0.5 % ophthalmic solution Place 1 drop into the right eye 2 times daily         ROS:  Negative selected, CONSTITUTIONAL:  weight loss, weight gain, poor appetite, fevers and fatigue, EYES:  Positive for right eye surgery and ongiong issues with vision, pain and redness., ENT:  Positive for hearing loss.  Had bilateral hearing aides  Has upper and lower dentures., CV:  chest pain, dyspnea on exertion and Positive for atrial fibrillation and h/o ventricular fibrillation and CAD.See HPI., RESPIRATORY: shortness of  "breath, cough, asthma and wheezing, :  dysuria and Positive for h/o prostate cancer, GI:  abdominal pain, constipation, diarrhea, nausea, vomiting and Positive for some dysphagia following CVA., NEURO:  headaches and Positive for CVA in 2017 with left sided paralysis and some dysphagia. with mild cognitive impairent., PSYCH: anxiety and depression, MUSCULOSKELETAL: back pain, joint pain and fibromyalgia and SKIN: Positive for fungal fingernails on left hand and bilateral pedal fungal nails.    Exam:  BP (!) 166/94  Pulse 90  Temp 97.2  F (36.2  C)  Resp 18  Ht 5' 9\" (1.753 m)  Wt 175 lb (79.4 kg)  BMI 25.84 kg/m2  GENERAL APPEARANCE:  Alert and able to express self verbally, Denies any acute distress. Does appear sleepy.  Head: Normocephalic with slight left sided mouth droop.  Eye: Right eye more open than usual.  No redness noted in sclera  No discharge.  ENIT:  No rhinitis.  Hearing well. Moist oral cavity. No exudate.  CV:  Irregularly irregular rhythm.  No murmur.  DP pulses +1 bilaterally. Trace LE edema.    RESP:  No cough.  Quiet, effortless respirations.  Clear to auscultation bilaterally.   ABDOMEN:  Soft rounded abdomen.  Bowel sounds positive all four quadrants.  No tenderness with palpation.  M/S:   No pain with gentle ROM of extremities,Left fingers curled slightly and resting in hand splint.     NEURO:   Strong hand grasp right hand and only minimal ability to move left hand and arm.  Left sided facial droop.  Strong ability to raise right leg against resistance and moderate left leg strength.     PSYCH:  Bright and alert after care conference.  Many positive comments. Smiling often.   States that he feels well and is happy to be back in NH.      Lab/Diagnostic data:  CBC RESULTS:   Recent Labs   Lab Test  07/03/18   0835  07/02/18   0622   WBC  6.0  6.9   RBC  3.64*  3.60*   HGB  11.3*  11.0*   HCT  32.8*  32.5*   MCV  90  90   MCH  31.0  30.6   MCHC  34.5  33.8   RDW  16.0*  16.1*   PLT  159 "  156       Last Basic Metabolic Panel:  Recent Labs   Lab Test  07/03/18   0835  07/02/18   0622   NA  139  139   POTASSIUM  4.3  4.1   CHLORIDE  108  107   TRENT  8.4*  8.2*   CO2  23  23   BUN  16  27   CR  1.04  1.19   GLC  118*  103*     GFR Estimate   Date Value Ref Range Status   07/03/2018 68 >60 mL/min/1.7m2 Final     Comment:     Non  GFR Calc   07/02/2018 58 (L) >60 mL/min/1.7m2 Final     Comment:     Non  GFR Calc   07/01/2018 36 (L) >60 mL/min/1.7m2 Final   07/01/2018 37 (L) >60 mL/min/1.7m2 Final     Comment:     Non  GFR Calc   06/14/2018 59 (L) >60 ml/min/1.73m2 Final       Liver Function Studies -   Recent Labs   Lab Test  07/01/18   0940 04/19/18   PROTTOTAL  6.7*  6.9   ALBUMIN  3.1*  3.6   BILITOTAL  0.4  0.5   ALKPHOS  68  67   AST  10  14   ALT  14  10       TSH   Date Value Ref Range Status   07/02/2018 2.20 0.40 - 4.00 mU/L Final   06/14/2018 3.62 0.30 - 4.50 uIU/mL Final       Lab Results   Component Value Date    A1C 7.4 07/01/2018    A1C 6.5 04/19/2018     Family Communication:  Daughter updated via voicemail.     ASSESSMENT/PLAN:  (R41.89) Unresponsive episode  (primary encounter diagnosis)  Comment: No recurrence  Plan: Nsg to monitor BP and pulse bid and update cardiologist (Dr. Teixeira) on Monday with values since med changes.  Schedule appt for electrophysiology to evaluate event monitor.      (N17.0,  N18.3) Acute renal failure with acute tubular necrosis superimposed on stage 3 chronic kidney disease (H)  Comment: Chronic CKD, improved on hospital discharge  Plan: Renally adjust dose of medications and check BMP tomorrow.    (E11.21,  Z79.4) Type 2 diabetes mellitus with diabetic nephropathy, with long-term current use of insulin (H)  Comment: Chronic, with recent decrease of basaglar insulin  Plan: Continue current dose of insulin, but nsg to update NP on 7/9 with accuchecks for consideration of slight dose increase.    (I48.2) Chronic atrial  fibrillation (H)  Comment: Chronic, now off metoprolol pending event monitor results  Plan: Cardiology to be updated on Monday with BP and pulses.  Continue Xarelto for anticoagulation per cardiology orders.    (I12.9,  N18.3) Benign hypertension with CKD (chronic kidney disease) stage III  Comment: BP goal: <150/90, slightly above goal since medication changes  Plan: Monitor and notify Dr Teixeira on Monday.  Readdress advanced directives with daughter.    (I05.300) Cognitive deficits as late effect of cerebrovascular disease  Comment: Chronic  Plan: Continue current POC in skilled nsg facility..     Electronically signed by:  TOBY Momin CNP

## 2018-07-05 NOTE — PROGRESS NOTES
Patient was evaluated by cardiology while inpatient for an unresponsive episode. Called patient to discuss any post hospital d/c questions he may have, review medication changes, and confirm f/u appts. Patient denied any questions regarding new medications or changes to some of his current medications that he was taking prior to admission. Patient denied any elevated HR, SOB, chest pain, or light headedness. RN confirmed patient has event monitor and has device number for questions RN confirmed with patient that 8/7/18 has an apt scheduled on with Dr. Lopez. Patient advised to call clinic with any cardiac related questions or concerns prior to this scheduled vicki't. Patient verbalized understanding and agreed with plan.     RN called daughter and left a VM per the request of her father. Left the appt date/time for Dr. Lopez on her VM. RN left phone number to call for any questions or concerns.

## 2018-07-06 ENCOUNTER — TRANSFERRED RECORDS (OUTPATIENT)
Dept: HEALTH INFORMATION MANAGEMENT | Facility: CLINIC | Age: 83
End: 2018-07-06

## 2018-07-06 LAB
BUN SERPL-MCNC: 16 MG/DL (ref 9–26)
CALCIUM SERPL-MCNC: 9 MG/DL (ref 8.4–10.4)
CHLORIDE SERPLBLD-SCNC: 102 MMOL/L (ref 98–109)
CO2 SERPL-SCNC: 25 MMOL/L (ref 22–31)
CREAT SERPL-MCNC: 1.24 MG/DL (ref 0.73–1.18)
GFR SERPL CREATININE-BSD FRML MDRD: 52 ML/MIN/1.73M2
GLUCOSE SERPL-MCNC: 210 MG/DL (ref 70–100)
HEMOGLOBIN: 11.4 G/DL (ref 13.4–17.5)
POTASSIUM SERPL-SCNC: 4.6 MMOL/L (ref 3.5–5.2)
SODIUM SERPL-SCNC: 136 MMOL/L (ref 136–145)

## 2018-07-09 ENCOUNTER — NURSING HOME VISIT (OUTPATIENT)
Dept: GERIATRICS | Facility: CLINIC | Age: 83
End: 2018-07-09
Payer: COMMERCIAL

## 2018-07-09 DIAGNOSIS — I12.9 BENIGN HYPERTENSION WITH CKD (CHRONIC KIDNEY DISEASE) STAGE III (H): ICD-10-CM

## 2018-07-09 DIAGNOSIS — Z79.4 TYPE 2 DIABETES MELLITUS WITH DIABETIC NEPHROPATHY, WITH LONG-TERM CURRENT USE OF INSULIN (H): ICD-10-CM

## 2018-07-09 DIAGNOSIS — N17.0 ACUTE RENAL FAILURE WITH ACUTE TUBULAR NECROSIS SUPERIMPOSED ON STAGE 3 CHRONIC KIDNEY DISEASE (H): ICD-10-CM

## 2018-07-09 DIAGNOSIS — N18.30 ACUTE RENAL FAILURE WITH ACUTE TUBULAR NECROSIS SUPERIMPOSED ON STAGE 3 CHRONIC KIDNEY DISEASE (H): ICD-10-CM

## 2018-07-09 DIAGNOSIS — E11.21 TYPE 2 DIABETES MELLITUS WITH DIABETIC NEPHROPATHY, WITH LONG-TERM CURRENT USE OF INSULIN (H): ICD-10-CM

## 2018-07-09 DIAGNOSIS — R40.4 UNRESPONSIVE EPISODE: Primary | ICD-10-CM

## 2018-07-09 DIAGNOSIS — N18.30 BENIGN HYPERTENSION WITH CKD (CHRONIC KIDNEY DISEASE) STAGE III (H): ICD-10-CM

## 2018-07-09 DIAGNOSIS — I48.20 CHRONIC ATRIAL FIBRILLATION (H): ICD-10-CM

## 2018-07-12 NOTE — PROGRESS NOTES
Pt was seen for a regulatory LTC visit  Case reviewed with NP    Pt was hospitalized at [facility] for the evaluation of a syncopal episode, felt secondary to hypotension and bradycardia  Status improved with IV fluids, d/c of Metoprolol and reduction in lisinopril    Vitals have been stable since readmission to the NH  HR 60-80  BG 100s-200s    There has been no recurrent syncope    Pt states he feels fine  He denies chest pain, SOB or dizziness    Sitting in chair in DR  Alert, oriented to person, place  Lungs clear  CV irreg  Abd soft  L sided weakness, as before      Assessment    Syncope, likely secondary to hypotension, bradycardia, stable    Bradycardia, associated with underlying afib, improved    Chronic afib, on Xarelto    HTN, stable on reduced lisinopril alone    DM type 2, fair control    Plan  Continue to monitor BP and HR  Avoid hypotension  Monitor BMP  Adjust insulin as BG dictate

## 2018-07-17 ENCOUNTER — HOSPITAL ENCOUNTER (OUTPATIENT)
Dept: MRI IMAGING | Facility: CLINIC | Age: 83
Discharge: HOME | End: 2018-07-17
Attending: PHYSICIAN ASSISTANT | Admitting: PHYSICIAN ASSISTANT
Payer: COMMERCIAL

## 2018-07-17 DIAGNOSIS — K76.9 LESION OF LIVER: ICD-10-CM

## 2018-07-17 PROCEDURE — 25000128 H RX IP 250 OP 636: Performed by: PHYSICIAN ASSISTANT

## 2018-07-17 PROCEDURE — 27210995 ZZH RX 272: Performed by: PHYSICIAN ASSISTANT

## 2018-07-17 PROCEDURE — A9585 GADOBUTROL INJECTION: HCPCS | Performed by: PHYSICIAN ASSISTANT

## 2018-07-17 PROCEDURE — 74183 MRI ABD W/O CNTR FLWD CNTR: CPT

## 2018-07-17 RX ORDER — GADOBUTROL 604.72 MG/ML
8 INJECTION INTRAVENOUS ONCE
Status: COMPLETED | OUTPATIENT
Start: 2018-07-17 | End: 2018-07-17

## 2018-07-17 RX ORDER — ACYCLOVIR 200 MG/1
60 CAPSULE ORAL ONCE
Status: COMPLETED | OUTPATIENT
Start: 2018-07-17 | End: 2018-07-17

## 2018-07-17 RX ADMIN — GADOBUTROL 8 ML: 604.72 INJECTION INTRAVENOUS at 09:54

## 2018-07-17 RX ADMIN — SODIUM CHLORIDE 60 ML: 9 INJECTION, SOLUTION INTRAMUSCULAR; INTRAVENOUS; SUBCUTANEOUS at 09:54

## 2018-07-18 ENCOUNTER — TELEPHONE (OUTPATIENT)
Dept: CARDIOLOGY | Facility: CLINIC | Age: 83
End: 2018-07-18

## 2018-07-18 ENCOUNTER — NURSING HOME VISIT (OUTPATIENT)
Dept: GERIATRICS | Facility: CLINIC | Age: 83
End: 2018-07-18
Payer: COMMERCIAL

## 2018-07-18 VITALS
RESPIRATION RATE: 16 BRPM | TEMPERATURE: 97.2 F | HEART RATE: 62 BPM | SYSTOLIC BLOOD PRESSURE: 122 MMHG | OXYGEN SATURATION: 96 % | WEIGHT: 177 LBS | BODY MASS INDEX: 26.14 KG/M2 | DIASTOLIC BLOOD PRESSURE: 63 MMHG

## 2018-07-18 DIAGNOSIS — I48.20 CHRONIC ATRIAL FIBRILLATION (H): ICD-10-CM

## 2018-07-18 DIAGNOSIS — I44.1 MOBITZ TYPE 1 SECOND DEGREE ATRIOVENTRICULAR BLOCK: Primary | ICD-10-CM

## 2018-07-18 DIAGNOSIS — N18.30 BENIGN HYPERTENSION WITH CKD (CHRONIC KIDNEY DISEASE) STAGE III (H): ICD-10-CM

## 2018-07-18 DIAGNOSIS — I12.9 BENIGN HYPERTENSION WITH CKD (CHRONIC KIDNEY DISEASE) STAGE III (H): ICD-10-CM

## 2018-07-18 DIAGNOSIS — I69.354 HEMIPARESIS AFFECTING LEFT SIDE AS LATE EFFECT OF CEREBROVASCULAR ACCIDENT (H): ICD-10-CM

## 2018-07-18 DIAGNOSIS — Z79.4 TYPE 2 DIABETES MELLITUS WITH STAGE 3 CHRONIC KIDNEY DISEASE, WITH LONG-TERM CURRENT USE OF INSULIN (H): ICD-10-CM

## 2018-07-18 DIAGNOSIS — E11.22 TYPE 2 DIABETES MELLITUS WITH STAGE 3 CHRONIC KIDNEY DISEASE, WITH LONG-TERM CURRENT USE OF INSULIN (H): ICD-10-CM

## 2018-07-18 DIAGNOSIS — I44.1 MOBITZ TYPE I WENCKEBACH ATRIOVENTRICULAR BLOCK: Primary | ICD-10-CM

## 2018-07-18 DIAGNOSIS — N18.30 TYPE 2 DIABETES MELLITUS WITH STAGE 3 CHRONIC KIDNEY DISEASE, WITH LONG-TERM CURRENT USE OF INSULIN (H): ICD-10-CM

## 2018-07-18 DIAGNOSIS — I69.919 COGNITIVE DEFICITS AS LATE EFFECT OF CEREBROVASCULAR DISEASE: ICD-10-CM

## 2018-07-18 PROCEDURE — 99358 PROLONG SERVICE W/O CONTACT: CPT | Performed by: NURSE PRACTITIONER

## 2018-07-18 PROCEDURE — 99310 SBSQ NF CARE HIGH MDM 45: CPT | Performed by: NURSE PRACTITIONER

## 2018-07-18 RX ORDER — CEFAZOLIN SODIUM 2 G/100ML
2 INJECTION, SOLUTION INTRAVENOUS
Status: CANCELLED | OUTPATIENT
Start: 2018-07-18 | End: 2038-07-19

## 2018-07-18 RX ORDER — LIDOCAINE 40 MG/G
CREAM TOPICAL
Status: CANCELLED | OUTPATIENT
Start: 2018-07-18 | End: 2019-07-18

## 2018-07-18 RX ORDER — SODIUM CHLORIDE 450 MG/100ML
INJECTION, SOLUTION INTRAVENOUS CONTINUOUS
Status: CANCELLED | OUTPATIENT
Start: 2018-07-18 | End: 2019-07-18

## 2018-07-18 NOTE — PROGRESS NOTES
[facility]    Chief Complaint   Patient presents with     BOO     [place] Medical Record Number:  [id number]    HPI:    Elias Mckee is a 86 year old  (8/10/1931), who is being seen today for an episodic care visit at [facility] (formerly [facility]).  HPI information obtained from: facility chart records, facility staff, patient report and [place] Epic chart review. Pt is unreliable historian due to his cognitive losses. 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  This was started during hospitalization starting on 7/1 with a syncopal event at the NH.  Discharged to NH with holter and f/u appt pending with Dr. Lopez for 8/7. His betablocker remains on hold.  He has continued with episodes of significant fatigue.  EKG transmissions have revealed a second degree AV block with ventricular rates in the 30's. [employer] reported two episodes yesterday and one this morning.  Pt's heart rate is currently in the 60's and he is alert. Past cardiac history positive for ventricular fib arrest and CAD with stenting as well as atrial fibrillation.    Chronic atrial fibrillation (H)  Ongoing baseline atrial fibrillation.  He had a CVA in 2017 and this was felt to be cardioembolic.    Benign hypertension with CKD (chronic kidney disease) stage III  BP ranges in past week: 90//80.    Type 2 diabetes mellitus with stage 3 chronic kidney disease, with long-term current use of insulin (H)  Accuchecks this week: 0730: 139-182, 11am: 152-203, 5pm: 111-197.    Hemiparesis affecting left side as late effect of cerebrovascular accident (H)  Left sided hemiparesis following CVA.  Wears left hand splint.  Ambulates with 4 pronged cane and stand by assist.    Cognitive deficits as late effect of cerebrovascular disease  Pt has some memory issues following his  CVA.  Pleasant and cooperative but has difficulty with complex decision making.  His daughter Jazmin and her  are very supportive and assist with decision making and have been involved today with developing POC for his heart block.    ALLERGIES: No known allergies  Past Medical, Surgical, Family and Social History reviewed and updated in [facility].    Current Outpatient Prescriptions   Medication Sig Dispense Refill     ACETAMINOPHEN PO Take 1,000 mg by mouth 3 times daily For pain       Atorvastatin Calcium (LIPITOR PO) Take 40 mg by mouth At Bedtime       BASAGLAR 100 UNIT/ML injection Inject 20 Units Subcutaneous At Bedtime       cholecalciferol (VITAMIN D) 1000 UNIT tablet Take 2,000 Units by mouth daily       Fluticasone Propionate (FLONASE NA) Spray 2 sprays into both nostrils daily       Insulin Aspart (NOVOLOG SC) Inject 4 Units Subcutaneous 2 times daily With breakfast and lunch       LEVOTHYROXINE SODIUM PO Take 25 mcg by mouth daily       lisinopril (PRINIVIL/ZESTRIL) 20 MG tablet Take 1 tablet (20 mg) by mouth daily 30 tablet      polyethylene glycol (MIRALAX/GLYCOLAX) powder Take 17 g by mouth daily        rivaroxaban ANTICOAGULANT (XARELTO) 20 MG TABS tablet Take 1 tablet (20 mg) by mouth daily (with dinner) 30 tablet 1     Skin Protectants, Misc. (EUCERIN) cream Apply to toes two times a day       tamsulosin (FLOMAX) 0.4 MG capsule Take 1 capsule (0.4 mg) by mouth daily 60 capsule 0     timolol, PF, (TIMOPTIC OCUDOSE) 0.5 % ophthalmic solution Place 1 drop into the right eye 2 times daily       Medications reviewed:  Medications reconciled to facility chart and changes were made to reflect current medications as identified as above med list. Below are the changes that were made:   Medications stopped since last EPIC medication reconciliation:   There are no discontinued medications.    Medications started since last [facility] medication reconciliation:  No orders of the defined types were placed in this  encounter.    Patient Active Problem List   Diagnosis     Chronic ischemic heart disease     Personal history of other diseases of circulatory system     HYPERLIPIDEMIA LDL GOAL <100     Advance Care Planning     CKD (chronic kidney disease) stage 3, GFR 30-59 ml/min     Leg weakness     Ataxia     BPH (benign prostatic hyperplasia)     Type 2 diabetes mellitus with diabetic nephropathy (H)     History of actinic keratoses     History of squamous cell carcinoma     S/P Mohs surgery for basal cell carcinoma     H/O: CVA (cerebrovascular accident), 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  Had bilateral hearing aides  Has upper and lower dentures., CV:  chest pain, dyspnea on exertion and Positive for atrial fibrillation and h/o ventricular fibrillation and CAD.See HPI., RESPIRATORY: shortness of breath, cough, asthma and wheezing, :  dysuria and Positive for h/o prostate cancer, GI:  abdominal pain, constipation, diarrhea, nausea,  vomiting and Positive for some dysphagia following CVA., NEURO:  headaches and Positive for CVA in 2017 with left sided paralysis and some dysphagia. with mild cognitive impairent., PSYCH: anxiety and depression, MUSCULOSKELETAL: back pain, joint pain and fibromyalgia and SKIN: Positive for fungal fingernails on left hand and bilateral pedal fungal nails.    Physical Exam:  /63  Pulse 62  Temp 97.2  F (36.2  C)  Resp 16  Wt 177 lb (80.3 kg)  SpO2 96%  BMI 26.14 kg/m2  GENERAL APPEARANCE:  Sleepy but easily arouseable. Able to express self verbally, Denies any acute distress. .  Head: Normocephalic with slight left sided mouth droop.  Eye: Right eye more open than usual.  No redness noted in sclera  No discharge.  ENIT:  No rhinitis.  Hearing well. Moist oral cavity. No exudate.  CV:  Irregularly irregular rhythm.  No murmur.  DP pulses +1 bilaterally. Trace LE edema.    RESP:  No cough.  Quiet, effortless respirations.  Clear to auscultation bilaterally.   ABDOMEN:  Soft rounded abdomen.  Bowel sounds positive all four quadrants.  No tenderness with palpation.  M/S:   No pain with gentle ROM of extremities,Left fingers curled slightly and resting in hand splint.     NEURO:   Strong hand grasp right hand and only minimal ability to move left hand and arm.  Left sided facial droop.  Strong ability to raise right leg against resistance and moderate left leg strength.     PSYCH: Sleepy, but no signs of sadness.. Following discussion with Cardiology and daughter Jazmin, informed him of plans for pacemaker insertion on 7/20 and he seemed calm and accepting of this, but was happy to hear that his daughter would be present.     Recent Labs:     CBC RESULTS:   Recent Labs   Lab Test 07/06/18 07/03/18   0835  07/02/18   0622   WBC   --   6.0  6.9   RBC   --   3.64*  3.60*   HGB  11.4*  11.3*  11.0*   HCT   --   32.8*  32.5*   MCV   --   90  90   MCH   --   31.0  30.6   MCHC   --   34.5  33.8   RDW   --   16.0*   16.1*   PLT   --   159  156       Last Basic Metabolic Panel:  Recent Labs   Lab Test 07/06/18 07/03/18   0835   NA  136  139   POTASSIUM  4.6  4.3   CHLORIDE  102  108   TRENT  9.0  8.4*   CO2  25  23   BUN  16  16   CR  1.24*  1.04   GLC  210*  118*       Liver Function Studies -   Recent Labs   Lab Test  07/01/18   0940 04/19/18   PROTTOTAL  6.7*  6.9   ALBUMIN  3.1*  3.6   BILITOTAL  0.4  0.5   ALKPHOS  68  67   AST  10  14   ALT  14  10       TSH   Date Value Ref Range Status   07/02/2018 2.20 0.40 - 4.00 mU/L Final   06/14/2018 3.62 0.30 - 4.50 uIU/mL Final       Lab Results   Component Value Date    A1C 7.2 07/05/2018    A1C 7.4 07/01/2018     GFR Estimate   Date Value Ref Range Status   07/06/2018 52 (A) >60 mL/min/1.73m2 Final   07/03/2018 68 >60 mL/min/1.7m2 Final     Comment:     Non  GFR Calc   07/02/2018 58 (L) >60 mL/min/1.7m2 Final     Comment:     Non  GFR Calc   07/01/2018 36 (L) >60 mL/min/1.7m2 Final   07/01/2018 37 (L) >60 mL/min/1.7m2 Final     Comment:     Non  GFR Calc     EKG's from 7/17 and 7/18.  Three episodes of 2nd degree AV block, type 1, Wenckebach with 2:1 conduction.    Care Coordination:  Following notification of EKG report by [employer] staff and exam of patient, contacted Shaunna at Dr. Lopez office.  She was able to review the [employer] information on line and had Dr. Boles review it, who agreed that this patient would need a pacemaker.  Since he has been doing this since 7/1, per the rhythm strips, decision was made by cardiology to proceed with Pre procedure visit at the heart center on 7/20 and pacemaker placement to follow by Dr. Lopez.  Daughter Jazmin was also on another phone line and was able to participate in this and following call with cardiology, I explained the heart block as well as the intervention.  She expressed understanding and consent to proceed with the plan.  Informed her of the pre procedure orders, to including nothing by  mouth after midnight preceding procedure except for his lisinopril with small sip of water, decrease of insulin the night before and holding his anticoagulant starting tomorrow.  Either she or her  will be at the heart center for the appt on Friday. Expressed gratitude for the coordination of the interventions needed.    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  Plan: Pacemaker insertion by Dr. Lopez on 7/20.  NPO after midnight preceding procedure. Will hold his xarelto tomorrow per Dr. Boles, and no meds on Friday except for lisinopril. Will also decrease HS insulin on 7/19 and no insulin the morning of the procedure.  Is he should have another syncopal event prior to the procedure, would require emergency room care.    (I48.2) Chronic atrial fibrillation (H)  Comment: Chronic, now with 2nd degree AV block  Plan: Same as above.    (I12.9,  N18.3) Benign hypertension with CKD (chronic kidney disease) stage III  Comment: BP goal: <150/90, at goal  Plan: Following completion of procedure, if his BP remains low, may need further decrease of his lisinopril  Monitor.    (E11.22,  N18.3,  Z79.4) Type 2 diabetes mellitus with stage 3 chronic kidney disease, with long-term current use of insulin (H)  Comment: Chronic  Plan: Tomorrow evening reduce basaglar by half and hold morning insulin on 7/20 for pending pacemaker insertion on 7/20.    (I69.354) Hemiparesis affecting left side as late effect of cerebrovascular accident (H)  Comment: Chronic  Plan: Continue left hand splint and assistance with transfers and ambulation.    (I69.919) Cognitive deficits as late effect of cerebrovascular disease  Comment: Chronic  Plan: Daughter or  will be present on 7/21 at appts for decision making.    Total time spent with patient visit at the skilled  nursing facility was 72 min (9:30 -10:42) including patient visit, review of past records, phone call to patient contact and phone calls to [facility] and [facility]. Greater than 50% of total time spent with counseling and coordinating care due to Heart Block.  Phone time with family, heart center and EKG company was 42 minutes of the above 72 min.    [facility]  NON-FACE TO FACE PROLONGED SERVICE    Elias Mckee 8/10/1931    Date of Related Face to Face Service: 7/18/2018    INTENT OF SERVICE  To coordinate urgent pacemaker placement due to new dx of 2nd degree AV block    Regarding the following diagnoses:     Mobitz type I Wenckebach atrioventricular block  Chronic atrial fibrillation (H)  Benign hypertension with CKD (chronic kidney disease) stage III  Type 2 diabetes mellitus with stage 3 chronic kidney disease, with long-term current use of insulin (H)  Hemiparesis affecting left side as late effect of cerebrovascular accident (H)  Cognitive deficits as late effect of cerebrovascular disease,     ASSESSMENT/PLAN     Mobitz type I Wenckebach atrioventricular block  Chronic atrial fibrillation (H)  Benign hypertension with CKD (chronic kidney disease) stage III  Type 2 diabetes mellitus with stage 3 chronic kidney disease, with long-term current use of insulin (H)  Hemiparesis affecting left side as late effect of cerebrovascular accident (H)  Cognitive deficits as late effect of cerebrovascular disease      TIME  Total time spent with Non-Face to Face prolonged service 42 minutes.     Electronically signed by:  TOBY Momin CNP

## 2018-07-18 NOTE — TELEPHONE ENCOUNTER
Belen Bonner NP at [facility] called and stated that she had received a call from Sujatha that pt was in 2nd degree AVB, Lexy HR in the 30's.  Reviewed strip with Dr Tran who recommended pt have a PPM.  Belen spoke with pt daughter Jazmin, who will bring pt in on Friday 7/20 and see Carmella at 1000 and then will be set up to have a PPM at 1300.  Belen is aware that pt is to hold his Xarelto on Thursday night and to take AM meds with sips of water and be NPO after midnight and this will be relayed to care provider for pt in NH.  Orders to be placed and Luann to be made aware. JNelsonCORNELIO

## 2018-07-18 NOTE — PROGRESS NOTES
Called patient with pre-procedure instructions for device implant:     Anticoagulation: xarelto - hold night before procedure   Oral diabetes meds: N/A  Insulin: Basaglar and novolog   Diuretic: N/A  Contrast allergy: N/A  Pt informed to be NPO at midnight, may have clear liquid breakfast before 8am    Pt has post-procedure transportation and 24 hours monitoring set up.   Pt aware of no driving for 24 hours post procedure due to sedation.     Pt aware of arrival time (1100) and location. Pt verbalized understanding of instructions. Spoke to patient's daughter, verbalized understanding. SK

## 2018-07-20 ENCOUNTER — HOSPITAL ENCOUNTER (OUTPATIENT)
Facility: CLINIC | Age: 83
Discharge: NURSING, REHAB OR LONG-TERM CARE | End: 2018-07-20
Attending: INTERNAL MEDICINE | Admitting: INTERNAL MEDICINE
Payer: COMMERCIAL

## 2018-07-20 ENCOUNTER — APPOINTMENT (OUTPATIENT)
Dept: CARDIOLOGY | Facility: CLINIC | Age: 83
End: 2018-07-20
Attending: INTERNAL MEDICINE
Payer: COMMERCIAL

## 2018-07-20 ENCOUNTER — OFFICE VISIT (OUTPATIENT)
Dept: CARDIOLOGY | Facility: CLINIC | Age: 83
End: 2018-07-20
Payer: COMMERCIAL

## 2018-07-20 ENCOUNTER — APPOINTMENT (OUTPATIENT)
Dept: GENERAL RADIOLOGY | Facility: CLINIC | Age: 83
End: 2018-07-20
Attending: INTERNAL MEDICINE
Payer: COMMERCIAL

## 2018-07-20 VITALS
WEIGHT: 185 LBS | HEART RATE: 97 BPM | BODY MASS INDEX: 26.48 KG/M2 | SYSTOLIC BLOOD PRESSURE: 96 MMHG | HEIGHT: 70 IN | DIASTOLIC BLOOD PRESSURE: 66 MMHG

## 2018-07-20 VITALS
TEMPERATURE: 97.9 F | RESPIRATION RATE: 18 BRPM | HEART RATE: 94 BPM | SYSTOLIC BLOOD PRESSURE: 129 MMHG | OXYGEN SATURATION: 98 % | DIASTOLIC BLOOD PRESSURE: 71 MMHG

## 2018-07-20 DIAGNOSIS — I46.9 CARDIAC ARREST (H): ICD-10-CM

## 2018-07-20 DIAGNOSIS — I44.1 2ND DEGREE ATRIOVENTRICULAR BLOCK: Primary | ICD-10-CM

## 2018-07-20 DIAGNOSIS — I63.411 CEREBRAL INFARCTION DUE TO EMBOLISM OF RIGHT MIDDLE CEREBRAL ARTERY (H): ICD-10-CM

## 2018-07-20 DIAGNOSIS — I44.1 MOBITZ TYPE 1 SECOND DEGREE ATRIOVENTRICULAR BLOCK: ICD-10-CM

## 2018-07-20 DIAGNOSIS — I25.10 CORONARY ARTERY DISEASE INVOLVING NATIVE CORONARY ARTERY OF NATIVE HEART WITHOUT ANGINA PECTORIS: ICD-10-CM

## 2018-07-20 LAB
ANION GAP SERPL CALCULATED.3IONS-SCNC: 9 MMOL/L (ref 3–14)
BUN SERPL-MCNC: 23 MG/DL (ref 7–30)
CALCIUM SERPL-MCNC: 9.1 MG/DL (ref 8.5–10.1)
CHLORIDE SERPL-SCNC: 104 MMOL/L (ref 94–109)
CO2 SERPL-SCNC: 25 MMOL/L (ref 20–32)
CREAT SERPL-MCNC: 1.19 MG/DL (ref 0.66–1.25)
ERYTHROCYTE [DISTWIDTH] IN BLOOD BY AUTOMATED COUNT: 16.3 % (ref 10–15)
GFR SERPL CREATININE-BSD FRML MDRD: 58 ML/MIN/1.7M2
GLUCOSE SERPL-MCNC: 141 MG/DL (ref 70–99)
HCT VFR BLD AUTO: 34.3 % (ref 40–53)
HGB BLD-MCNC: 11.9 G/DL (ref 13.3–17.7)
MCH RBC QN AUTO: 31.4 PG (ref 26.5–33)
MCHC RBC AUTO-ENTMCNC: 34.7 G/DL (ref 31.5–36.5)
MCV RBC AUTO: 91 FL (ref 78–100)
PLATELET # BLD AUTO: 171 10E9/L (ref 150–450)
POTASSIUM SERPL-SCNC: 4.6 MMOL/L (ref 3.4–5.3)
RBC # BLD AUTO: 3.79 10E12/L (ref 4.4–5.9)
SODIUM SERPL-SCNC: 138 MMOL/L (ref 133–144)
WBC # BLD AUTO: 7.8 10E9/L (ref 4–11)

## 2018-07-20 PROCEDURE — 36415 COLL VENOUS BLD VENIPUNCTURE: CPT | Performed by: INTERNAL MEDICINE

## 2018-07-20 PROCEDURE — 27210784 ZZH KIT PACEMAKER CR8

## 2018-07-20 PROCEDURE — 36415 COLL VENOUS BLD VENIPUNCTURE: CPT

## 2018-07-20 PROCEDURE — 40000852 ZZH STATISTIC HEART CATH LAB OR EP LAB

## 2018-07-20 PROCEDURE — 27210886 ZZH ACCESSORY CR5

## 2018-07-20 PROCEDURE — C1898 LEAD, PMKR, OTHER THAN TRANS: HCPCS

## 2018-07-20 PROCEDURE — 25000128 H RX IP 250 OP 636: Performed by: INTERNAL MEDICINE

## 2018-07-20 PROCEDURE — 27210995 ZZH RX 272: Performed by: INTERNAL MEDICINE

## 2018-07-20 PROCEDURE — 99213 OFFICE O/P EST LOW 20 MIN: CPT | Performed by: PHYSICIAN ASSISTANT

## 2018-07-20 PROCEDURE — 99152 MOD SED SAME PHYS/QHP 5/>YRS: CPT

## 2018-07-20 PROCEDURE — C1785 PMKR, DUAL, RATE-RESP: HCPCS

## 2018-07-20 PROCEDURE — 27210795 ZZH PAD DEFIB QUICK CR4

## 2018-07-20 PROCEDURE — 40000235 ZZH STATISTIC TELEMETRY

## 2018-07-20 PROCEDURE — 80048 BASIC METABOLIC PNL TOTAL CA: CPT | Performed by: INTERNAL MEDICINE

## 2018-07-20 PROCEDURE — 40000986 XR CHEST 2 VW

## 2018-07-20 PROCEDURE — 33208 INSRT HEART PM ATRIAL & VENT: CPT | Mod: KX

## 2018-07-20 PROCEDURE — C1892 INTRO/SHEATH,FIXED,PEEL-AWAY: HCPCS

## 2018-07-20 PROCEDURE — 99153 MOD SED SAME PHYS/QHP EA: CPT

## 2018-07-20 PROCEDURE — 85027 COMPLETE CBC AUTOMATED: CPT | Performed by: INTERNAL MEDICINE

## 2018-07-20 PROCEDURE — 33208 INSRT HEART PM ATRIAL & VENT: CPT | Mod: KX | Performed by: INTERNAL MEDICINE

## 2018-07-20 PROCEDURE — 25000125 ZZHC RX 250: Performed by: INTERNAL MEDICINE

## 2018-07-20 RX ORDER — LIDOCAINE HYDROCHLORIDE AND EPINEPHRINE 10; 10 MG/ML; UG/ML
10-30 INJECTION, SOLUTION INFILTRATION; PERINEURAL
Status: DISCONTINUED | OUTPATIENT
Start: 2018-07-20 | End: 2018-07-20 | Stop reason: HOSPADM

## 2018-07-20 RX ORDER — FLUMAZENIL 0.1 MG/ML
0.2 INJECTION, SOLUTION INTRAVENOUS
Status: DISCONTINUED | OUTPATIENT
Start: 2018-07-20 | End: 2018-07-20 | Stop reason: HOSPADM

## 2018-07-20 RX ORDER — ACETAMINOPHEN 325 MG/1
650 TABLET ORAL EVERY 4 HOURS PRN
Status: DISCONTINUED | OUTPATIENT
Start: 2018-07-20 | End: 2018-07-20 | Stop reason: HOSPADM

## 2018-07-20 RX ORDER — MORPHINE SULFATE 2 MG/ML
1-2 INJECTION, SOLUTION INTRAMUSCULAR; INTRAVENOUS EVERY 5 MIN PRN
Status: DISCONTINUED | OUTPATIENT
Start: 2018-07-20 | End: 2018-07-20 | Stop reason: HOSPADM

## 2018-07-20 RX ORDER — HEPARIN SODIUM 1000 [USP'U]/ML
1000-10000 INJECTION, SOLUTION INTRAVENOUS; SUBCUTANEOUS EVERY 5 MIN PRN
Status: DISCONTINUED | OUTPATIENT
Start: 2018-07-20 | End: 2018-07-20 | Stop reason: HOSPADM

## 2018-07-20 RX ORDER — PROTAMINE SULFATE 10 MG/ML
1-5 INJECTION, SOLUTION INTRAVENOUS
Status: DISCONTINUED | OUTPATIENT
Start: 2018-07-20 | End: 2018-07-20 | Stop reason: HOSPADM

## 2018-07-20 RX ORDER — LIDOCAINE 40 MG/G
CREAM TOPICAL
Status: DISCONTINUED | OUTPATIENT
Start: 2018-07-20 | End: 2018-07-20 | Stop reason: HOSPADM

## 2018-07-20 RX ORDER — NALOXONE HYDROCHLORIDE 0.4 MG/ML
.1-.4 INJECTION, SOLUTION INTRAMUSCULAR; INTRAVENOUS; SUBCUTANEOUS
Status: DISCONTINUED | OUTPATIENT
Start: 2018-07-20 | End: 2018-07-20 | Stop reason: HOSPADM

## 2018-07-20 RX ORDER — LIDOCAINE HYDROCHLORIDE 10 MG/ML
10-30 INJECTION, SOLUTION EPIDURAL; INFILTRATION; INTRACAUDAL; PERINEURAL
Status: COMPLETED | OUTPATIENT
Start: 2018-07-20 | End: 2018-07-20

## 2018-07-20 RX ORDER — BUPIVACAINE HYDROCHLORIDE 2.5 MG/ML
10-30 INJECTION, SOLUTION EPIDURAL; INFILTRATION; INTRACAUDAL
Status: DISCONTINUED | OUTPATIENT
Start: 2018-07-20 | End: 2018-07-20 | Stop reason: HOSPADM

## 2018-07-20 RX ORDER — LORAZEPAM 2 MG/ML
.5-2 INJECTION INTRAMUSCULAR EVERY 10 MIN PRN
Status: DISCONTINUED | OUTPATIENT
Start: 2018-07-20 | End: 2018-07-20 | Stop reason: HOSPADM

## 2018-07-20 RX ORDER — DIPHENHYDRAMINE HYDROCHLORIDE 50 MG/ML
25-50 INJECTION INTRAMUSCULAR; INTRAVENOUS
Status: DISCONTINUED | OUTPATIENT
Start: 2018-07-20 | End: 2018-07-20 | Stop reason: HOSPADM

## 2018-07-20 RX ORDER — PROTAMINE SULFATE 10 MG/ML
5-40 INJECTION, SOLUTION INTRAVENOUS EVERY 10 MIN PRN
Status: DISCONTINUED | OUTPATIENT
Start: 2018-07-20 | End: 2018-07-20 | Stop reason: HOSPADM

## 2018-07-20 RX ORDER — FENTANYL CITRATE 50 UG/ML
25-50 INJECTION, SOLUTION INTRAMUSCULAR; INTRAVENOUS
Status: DISCONTINUED | OUTPATIENT
Start: 2018-07-20 | End: 2018-07-20 | Stop reason: HOSPADM

## 2018-07-20 RX ORDER — NALOXONE HYDROCHLORIDE 0.4 MG/ML
0.4 INJECTION, SOLUTION INTRAMUSCULAR; INTRAVENOUS; SUBCUTANEOUS EVERY 5 MIN PRN
Status: DISCONTINUED | OUTPATIENT
Start: 2018-07-20 | End: 2018-07-20 | Stop reason: HOSPADM

## 2018-07-20 RX ORDER — SODIUM CHLORIDE 450 MG/100ML
INJECTION, SOLUTION INTRAVENOUS CONTINUOUS
Status: DISCONTINUED | OUTPATIENT
Start: 2018-07-20 | End: 2018-07-20 | Stop reason: HOSPADM

## 2018-07-20 RX ORDER — IBUTILIDE FUMARATE 1 MG/10ML
1 INJECTION, SOLUTION INTRAVENOUS
Status: DISCONTINUED | OUTPATIENT
Start: 2018-07-20 | End: 2018-07-20 | Stop reason: HOSPADM

## 2018-07-20 RX ORDER — ATROPINE SULFATE 0.1 MG/ML
.5-1 INJECTION INTRAVENOUS
Status: DISCONTINUED | OUTPATIENT
Start: 2018-07-20 | End: 2018-07-20 | Stop reason: HOSPADM

## 2018-07-20 RX ORDER — CEFAZOLIN SODIUM 2 G/100ML
2 INJECTION, SOLUTION INTRAVENOUS
Status: COMPLETED | OUTPATIENT
Start: 2018-07-20 | End: 2018-07-20

## 2018-07-20 RX ORDER — ONDANSETRON 2 MG/ML
4 INJECTION INTRAMUSCULAR; INTRAVENOUS EVERY 4 HOURS PRN
Status: DISCONTINUED | OUTPATIENT
Start: 2018-07-20 | End: 2018-07-20 | Stop reason: HOSPADM

## 2018-07-20 RX ORDER — KETOROLAC TROMETHAMINE 30 MG/ML
15 INJECTION, SOLUTION INTRAMUSCULAR; INTRAVENOUS
Status: DISCONTINUED | OUTPATIENT
Start: 2018-07-20 | End: 2018-07-20 | Stop reason: HOSPADM

## 2018-07-20 RX ORDER — LIDOCAINE HYDROCHLORIDE 10 MG/ML
10-30 INJECTION, SOLUTION EPIDURAL; INFILTRATION; INTRACAUDAL; PERINEURAL
Status: DISCONTINUED | OUTPATIENT
Start: 2018-07-20 | End: 2018-07-20 | Stop reason: HOSPADM

## 2018-07-20 RX ORDER — FUROSEMIDE 10 MG/ML
20-100 INJECTION INTRAMUSCULAR; INTRAVENOUS
Status: DISCONTINUED | OUTPATIENT
Start: 2018-07-20 | End: 2018-07-20 | Stop reason: HOSPADM

## 2018-07-20 RX ORDER — ADENOSINE 3 MG/ML
6-12 INJECTION, SOLUTION INTRAVENOUS EVERY 5 MIN PRN
Status: DISCONTINUED | OUTPATIENT
Start: 2018-07-20 | End: 2018-07-20 | Stop reason: HOSPADM

## 2018-07-20 RX ORDER — IBUTILIDE FUMARATE 1 MG/10ML
0.01 INJECTION, SOLUTION INTRAVENOUS
Status: DISCONTINUED | OUTPATIENT
Start: 2018-07-20 | End: 2018-07-20 | Stop reason: HOSPADM

## 2018-07-20 RX ORDER — BUPIVACAINE HYDROCHLORIDE 2.5 MG/ML
10-30 INJECTION, SOLUTION EPIDURAL; INFILTRATION; INTRACAUDAL
Status: COMPLETED | OUTPATIENT
Start: 2018-07-20 | End: 2018-07-20

## 2018-07-20 RX ADMIN — LIDOCAINE HYDROCHLORIDE 10 ML: 10 INJECTION, SOLUTION EPIDURAL; INFILTRATION; INTRACAUDAL; PERINEURAL at 14:03

## 2018-07-20 RX ADMIN — BUPIVACAINE HYDROCHLORIDE 25 MG: 2.5 INJECTION, SOLUTION EPIDURAL; INFILTRATION; INTRACAUDAL at 13:43

## 2018-07-20 RX ADMIN — MIDAZOLAM 0.5 MG: 1 INJECTION INTRAMUSCULAR; INTRAVENOUS at 13:36

## 2018-07-20 RX ADMIN — FENTANYL CITRATE 25 MCG: 50 INJECTION, SOLUTION INTRAMUSCULAR; INTRAVENOUS at 13:36

## 2018-07-20 RX ADMIN — CEFAZOLIN SODIUM 2 G: 2 INJECTION, SOLUTION INTRAVENOUS at 13:18

## 2018-07-20 RX ADMIN — FENTANYL CITRATE 25 MCG: 50 INJECTION, SOLUTION INTRAMUSCULAR; INTRAVENOUS at 14:04

## 2018-07-20 RX ADMIN — MIDAZOLAM 0.5 MG: 1 INJECTION INTRAMUSCULAR; INTRAVENOUS at 14:04

## 2018-07-20 RX ADMIN — SODIUM CHLORIDE: 4.5 INJECTION, SOLUTION INTRAVENOUS at 11:55

## 2018-07-20 NOTE — IP AVS SNAPSHOT
MRN:[id number]                      After Visit Summary   7/20/2018    Elias Mckee    MRN: [id number]           Visit Information        Department      7/20/2018 10:38 AM [facility]          Review of your medicines      UNREVIEWED medicines. Ask your doctor about these medicines        Dose / Directions    ACETAMINOPHEN PO        Dose:  1000 mg   Take 1,000 mg by mouth 3 times daily For pain   Refills:  0       BASAGLAR 100 UNIT/ML injection   Used for:  Type II diabetes mellitus with peripheral circulatory disorder (H)        Dose:  20 Units   Inject 20 Units Subcutaneous At Bedtime   Refills:  0       cholecalciferol 1000 UNIT tablet   Commonly known as:  vitamin D3        Dose:  2000 Units   Take 2,000 Units by mouth daily   Refills:  0       eucerin cream        Apply to toes two times a day   Refills:  0       FLONASE NA        Dose:  2 spray   Spray 2 sprays into both nostrils daily   Refills:  0       LEVOTHYROXINE SODIUM PO        Dose:  25 mcg   Take 25 mcg by mouth daily   Refills:  0       LIPITOR PO        Dose:  40 mg   Take 40 mg by mouth At Bedtime   Refills:  0       lisinopril 20 MG tablet   Commonly known as:  PRINIVIL/ZESTRIL   Used for:  Benign essential hypertension        Dose:  20 mg   Take 1 tablet (20 mg) by mouth daily   Quantity:  30 tablet   Refills:  0       NOVOLOG SC        Dose:  4 Units   Inject 4 Units Subcutaneous 2 times daily With breakfast and lunch   Refills:  0       polyethylene glycol powder   Commonly known as:  MIRALAX/GLYCOLAX        Dose:  17 g   Take 17 g by mouth daily   Refills:  0       tamsulosin 0.4 MG capsule   Commonly known as:  FLOMAX   Used for:  Benign prostatic hyperplasia, presence of lower urinary tract symptoms unspecified, unspecified morphology        Dose:  0.4 mg   Take 1 capsule (0.4 mg) by mouth daily   Quantity:  60 capsule   Refills:  0       timolol (PF) 0.5 % ophthalmic solution   Commonly known as:   TIMOPTIC OCUDOSE   Used for:  Central retinal vein occlusion, right eye        Dose:  1 drop   Place 1 drop into the right eye 2 times daily   Refills:  0         CONTINUE these medicines which have NOT CHANGED        Dose / Directions    rivaroxaban ANTICOAGULANT 20 MG Tabs tablet   Commonly known as:  XARELTO   Used for:  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         Follow-ups after your visit        Your next 10 appointments already scheduled     Aug 07, 2018 12:45 PM CDT   EP NEW with Kaushik Lopez MD   [facility] ([facility])    [address] [place] [id number] [phone] OPT 2            Sep 24, 2018  3:45 PM CDT   Return Visit with Félix Teixeira MD   [facility] ([facility])    [address] [place] [id number]   [phone] OPT 2               Care Instructions        After Care Instructions     Discharge Instructions - Do not use arm on implant side to lift       Do not use arm on implant side to lift in excess of 10 pounds for 2 weeks.            Discharge Instructions - Follow up with Device Check RN        Follow up with Device Check RN in 7-10 days.            Discharge Instructions - Keep incision dry for 72 hours       Keep incision dry UNTIL Tuesday 7/24 am            Discharge Instructions - No driving for 1 day       No driving for 1 day and limit to necessary driving for 1 week.            Discharge Instructions - No soaking incision for 2 weeks       No soaking incision (swimming pool, bathtub, hot tub) for 2 weeks.                  Further instructions from your care  team       Pacemaker Implant Discharge Instructions     After you go home:      Have an adult stay with you until tomorrow.    You may resume your normal diet.       For 24 hours - due to the sedation you received:    Relax and take it easy.    Do NOT make any important or legal decisions.    Do NOT drive or operate machines at home or at work.    Do NOT drink alcohol.    Care of Chest Incision:      Keep the bandage on at least 4 days. You may remove the dressing on Tuesday July 24th . Change it only if it gets loose or soaked. If you need to change it, use 4x4-inch gauze and a large clear bandage.     If there is a pressure dressing (gauze & tape) - 24 hours after your procedure you may remove ONLY the top dressing. Leave the bottom dressing on.    Leave the strips of tape on. They will fall off on their own, or we will remove them at your first check-up.    Check your wound daily for signs of infection, such as increased redness, severe swelling or draining. Fever may also be a sign of infection. Call us if you see any of these signs.    If there are no signs of infection, you may shower after the bandage comes off in 3 days. If you take a tub bath, keep the wound dry.    No soaking the incision (swimming pool, bathtub, hot tub) for 2 weeks.    You may have mild to medium pain for 3 to 5 days. Take Acetaminophen (Tylenol) or Ibuprofen (Advil) for the pain. If the pain persists or is severe, call us.    Activity:      For at least 2 weeks: Do not raise your elbow above your shoulder. You can begin to use your arm as it feels comfortable to you.    Do not use arm on implant side to lift more than 10 pounds for 2 weeks.    In 6 to 8 weeks: You may begin to golf, play tennis, swim and do similar activities.    No driving for one day & limit to necessary driving for one week.    Bleeding:      If you start bleeding from the incision site, sit down and press firmly on the site for 10 minutes.     Once bleeding stops,  call [facility] as soon as you can.       Call 911 right away if you have heavy bleeding or bleeding that does not stop.      Medicines:      Take your medications, including blood thinners, unless your provider tells you not to.  Resume Xeralto tomorrow July 21st.    If you have stopped any medicines, check with your provider about when to restart them.    Follow Up Appointments:      Follow up with Device Clinic at [facility] of patient preference in 7-10 days.    Call the clinic if:      You have a large or growing hard lump around the site.    The site is red, swollen, hot or tender.    Blood or fluid is draining from the site.    You have chills or a fever greater than 101 F (38 C).    You feel dizzy or light-headed.    Questions or concerns    Telling others about your device:      Before you leave the hospital, you will receive a temporary ID card. A permanent card will be mailed to you about 6 to 8 weeks later. Always carry the ID card with you. It has important details about your device.    You may also get a Medical Alert bracelet or tag that says you have a pacemaker.  Go to [email or web address].     Always tell doctors, dentists and other care providers that you have a device implanted in you.    Let us know before you plan any surgeries. Your care team must take special steps to keep you safe during certain procedures. These steps will depend on the type of device you have. Your provider will need to see your ID card. They may need to call us for instructions.    Device Safety:      Please refer to device  s booklet for further information.        [facility] [place]:    [phone] [facility] (7 days a week)               Additional Information About Your Visit        MyChart Information     [employer] lets you send messages to your doctor, view your test results, renew your prescriptions, schedule appointments and more. To sign up, go to  [email or web address] . Click on \"Log in\" on the left side of the screen, which will take you to the Welcome page. Then click on \"Sign up Now\" on the right side of the page.     You will be asked to enter the access code listed below, as well as some personal information. Please follow the directions to create your username and password.     Your access code is: [email or web address]  Expires: 2018 12:27 PM     Your access code will  in 90 days. If you need help or a new code, please call your [facility] clinic or [phone].        Care EveryWhere ID     This is your Care EveryWhere ID. This could be used by other organizations to access your [facility] medical records  [id number]        Your Vitals Were     Blood Pressure Pulse Temperature Respirations Pulse Oximetry       157/106 94 97.9  F (36.6  C) (Oral) 18 97%        Primary Care Provider Office Phone # Fax #    Yvette TOBY Romeo [phone]      Equal Access to Services     [facility]: Hadii aad ku hadasho Soomaali, waaxda luqadaha, qaybta kaalmada adelida, boaz ayala . So [facility] [phone].    ATENCIÓN: Si habla español, tiene a whitehead disposición servicios gratuitos de asistencia lingüística. Chely al [phone].    We comply with applicable federal civil rights laws and [place] laws. We do not discriminate on the basis of race, color, national origin, age, disability, sex, sexual orientation, or gender identity.            Thank you!     Thank you for choosing [facility] for your care. Our goal is always to provide you with excellent care. Hearing back from our patients is one way we can continue to improve our services. Please take a few minutes to complete the written survey that you may receive in the mail after you visit with us. Thank you!             Medication List: This is a list of all your medications and when to take them. Check marks below indicate your daily home schedule. Keep " this list as a reference.      Medications           Morning Afternoon Evening Bedtime As Needed    ACETAMINOPHEN PO   Take 1,000 mg by mouth 3 times daily For pain                                BASAGLAR 100 UNIT/ML injection   Inject 20 Units Subcutaneous At Bedtime                                cholecalciferol 1000 UNIT tablet   Commonly known as:  vitamin D3   Take 2,000 Units by mouth daily                                eucerin cream   Apply to toes two times a day                                FLONASE NA   Spray 2 sprays into both nostrils daily                                LEVOTHYROXINE SODIUM PO   Take 25 mcg by mouth daily                                LIPITOR PO   Take 40 mg by mouth At Bedtime                                lisinopril 20 MG tablet   Commonly known as:  PRINIVIL/ZESTRIL   Take 1 tablet (20 mg) by mouth daily                                NOVOLOG SC   Inject 4 Units Subcutaneous 2 times daily With breakfast and lunch                                polyethylene glycol powder   Commonly known as:  MIRALAX/GLYCOLAX   Take 17 g by mouth daily                                rivaroxaban ANTICOAGULANT 20 MG Tabs tablet   Commonly known as:  XARELTO   Take 1 tablet (20 mg) by mouth daily (with dinner)                                tamsulosin 0.4 MG capsule   Commonly known as:  FLOMAX   Take 1 capsule (0.4 mg) by mouth daily                                timolol (PF) 0.5 % ophthalmic solution   Commonly known as:  TIMOPTIC OCUDOSE   Place 1 drop into the right eye 2 times daily

## 2018-07-20 NOTE — DISCHARGE INSTRUCTIONS
Pacemaker Implant Discharge Instructions     After you go home:      Have an adult stay with you until tomorrow.    You may resume your normal diet.       For 24 hours - due to the sedation you received:    Relax and take it easy.    Do NOT make any important or legal decisions.    Do NOT drive or operate machines at home or at work.    Do NOT drink alcohol.    Care of Chest Incision:      Keep the bandage on at least 4 days. You may remove the dressing on Tuesday July 24th . Change it only if it gets loose or soaked. If you need to change it, use 4x4-inch gauze and a large clear bandage.     If there is a pressure dressing (gauze & tape) - 24 hours after your procedure you may remove ONLY the top dressing. Leave the bottom dressing on.    Leave the strips of tape on. They will fall off on their own, or we will remove them at your first check-up.    Check your wound daily for signs of infection, such as increased redness, severe swelling or draining. Fever may also be a sign of infection. Call us if you see any of these signs.    If there are no signs of infection, you may shower after the bandage comes off in 3 days. If you take a tub bath, keep the wound dry.    No soaking the incision (swimming pool, bathtub, hot tub) for 2 weeks.    You may have mild to medium pain for 3 to 5 days. Take Acetaminophen (Tylenol) or Ibuprofen (Advil) for the pain. If the pain persists or is severe, call us.    Activity:      For at least 2 weeks: Do not raise your elbow above your shoulder. You can begin to use your arm as it feels comfortable to you.    Do not use arm on implant side to lift more than 10 pounds for 2 weeks.    In 6 to 8 weeks: You may begin to golf, play tennis, swim and do similar activities.    No driving for one day & limit to necessary driving for one week.    Bleeding:      If you start bleeding from the incision site, sit down and press firmly on the site for 10 minutes.     Once bleeding stops, call [facility] as soon as you can.       Call 911 right away if you have heavy bleeding or bleeding that does not stop.      Medicines:      Take your medications, including blood thinners, unless your provider tells you not to.  Resume Xeralto tomorrow July 21st.    If you have stopped any medicines, check with your provider about when to restart them.    Follow Up Appointments:      Follow up with Device Clinic at [facility] of patient preference in 7-10 days.    Call the clinic if:      You have a large or growing hard lump around the site.    The site is red, swollen, hot or tender.    Blood or fluid is draining from the site.    You have chills or a fever greater than 101 F (38 C).    You feel dizzy or light-headed.    Questions or concerns    Telling others about your device:      Before you leave the hospital, you will receive a temporary ID card. A permanent card will be mailed to you about 6 to 8 weeks later. Always carry the ID card with you. It has important details about your device.    You may also get a Medical Alert bracelet or tag that says you have a pacemaker.  Go to [email or web address].     Always tell doctors, dentists and other care providers that you have a device implanted in you.    Let us know before you plan any surgeries. Your care team must take special steps to keep you safe during certain procedures. These steps will depend on the type of device you have. Your provider will need to see your ID card. They may need to call us for instructions.    Device Safety:      Please refer to device  s booklet for further information.        [facility] [place]:    [phone] [facility] (7 days a week)

## 2018-07-20 NOTE — LETTER
7/20/2018    vYette Bonenr, APRN CNP  [address] [place] [address]    RE: Elias Mckee       Dear Colleague,    I had the pleasure of seeing Elias Mckee in the [facility].    HPI and Plan:   See dictation #[id number]    No orders of the defined types were placed in this encounter.      No orders of the defined types were placed in this encounter.      There are no discontinued medications.      Encounter Diagnoses   Name Primary?     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       ALLERGIES     Allergies   Allergen Reactions     No Known Allergies        PAST MEDICAL HISTORY:  Past Medical History:   Diagnosis Date     Acute, but ill-defined, cerebrovascular disease 1-2008    Left hemiparesis, left side neglect     Atrial fibrillation (H)      Benign hypertension with CKD (chronic kidney disease) stage III 6/5/2017     Central retinal vein occlusion, right eye 11/15/2017     Chronic atrial fibrillation (H) 6/5/2017     Chronic ischemic heart disease, unspecified      Cognitive deficits as late effect of cerebrovascular disease 6/5/2017     Coronary artery disease      CVA (cerebral infarction)      Dysphagia due to recent cerebrovascular accident (CVA) 6/5/2017     Elevated cholesterol      Essential hypertension, benign      H/O prostate cancer 7/6/2017     H/O: CVA (cerebrovascular accident) 6/5/2017     Hemiparesis affecting left side as late effect of cerebrovascular accident (H) 6/5/2017     Hyperlipidaemia      MEDICAL HISTORY OF - 1- 2008    V fib arrest      Mobitz type I Wenckebach atrioventricular block, 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     Myocardial infarction      Onychomycosis 6/5/2017     Other and unspecified hyperlipidemia      PVD (peripheral vascular disease) (H)  2017     Type 2 diabetes mellitus with diabetic peripheral angiopathy with gangrene (H) 2017     Type 2 diabetes mellitus with stage 3 chronic kidney disease (H) 2017     Type II diabetes mellitus with peripheral circulatory disorder (H) 2017     Type II or unspecified type diabetes mellitus without mention of complication, not stated as uncontrolled -       PAST SURGICAL HISTORY:  Past Surgical History:   Procedure Laterality Date     PHACOEMULSIFICATION CLEAR CORNEA WITH STANDARD INTRAOCULAR LENS IMPLANT Right 10/7/2014    Procedure: PHACOEMULSIFICATION CLEAR CORNEA WITH STANDARD INTRAOCULAR LENS IMPLANT;  Surgeon: German Thomas MD;  Location: [facility]     SEED IMPLANTATION  2002    prostate cancer     VITRECTOMY ANTERIOR Right 10/7/2014    Procedure: VITRECTOMY ANTERIOR;  Surgeon: German Thomas MD;  Location:  [facility]       FAMILY HISTORY:  No family history on file.    SOCIAL HISTORY:  Social History     Social History     Marital status:      Spouse name: N/A     Number of children: N/A     Years of education: N/A     Social History Main Topics     Smoking status: Former Smoker     Packs/day: 1.00     Years: 20.00     Types: Cigarettes     Start date: 1953     Quit date: 1973     Smokeless tobacco: Never Used     Alcohol use 0.6 oz/week     1 Cans of beer per week      Comment: Occasional     Drug use: No     Sexual activity: Not Currently     Other Topics Concern     Sleep Concern Yes     pt using sleep aid     Stress Concern No     Weight Concern No     Special Diet No     Exercise Yes     everyday     Seat Belt Yes     Social History Narrative    Born and raised in [facility].  His parents  when he was young and his father moved to [place].  His mother  when he was 11 from a brain tumor.  He was raised by his [place] and graduated from .  Met is wife in [facility] and  in 1949.  She  in  from COPD.   "They had four children (3 sons and 1 daughter).  Sons live in [place] and daughter lives in [place] and is his POA.  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  Lived at the [facility] in [place] until his stroke in Feb 2017.         Review of Systems:  Skin:  Negative       Eyes:  Positive for glasses    ENT:  Negative      Respiratory:  Negative for dyspnea on exertion;cough;shortness of breath     Cardiovascular:  Negative;Negative for;palpitations;chest pain;edema exercise intolerance;fatigue;Positive for    Gastroenterology: Negative for melena;hematochezia    Genitourinary:  Negative      Musculoskeletal:  Negative back pain    Neurologic:  Positive for memory problems    Psychiatric:  Negative      Heme/Lymph/Imm:  Negative      Endocrine:  Positive for diabetes      Physical Exam:  Vitals: BP 96/66  Pulse 97  Ht 1.778 m (5' 10\")  Wt 83.9 kg (185 lb)  BMI 26.54 kg/m2    Constitutional:  cooperative, alert and oriented, well developed, well nourished, in no acute distress        Skin:  warm and dry to the touch, no apparent skin lesions or masses noted          Head:  normocephalic, no masses or lesions        Eyes:  pupils equal and round;conjunctivae and lids unremarkable;sclera white        Lymph:      ENT:  no pallor or cyanosis, dentition good        Neck:  JVP normal;no carotid bruit        Respiratory:  normal breath sounds, clear to auscultation, normal A-P diameter, normal symmetry, normal respiratory excursion, no use of accessory muscles         Cardiac: regular rhythm;normal S1 and S2;apical impulse not displaced       systolic murmur        pulses full and equal venous stasis changes bilaterally                                      GI:  abdomen soft        Extremities and Muscular Skeletal:  no deformities, clubbing, cyanosis, erythema observed;no edema              Neurological:    left sided weakness      Psych:  Alert and " Oriented x 3                  Thank you for allowing me to participate in the care of your patient.      Sincerely,     Jyotsna Cook PA-C     [facility] [place]    cc:   No referring provider defined for this encounter.

## 2018-07-20 NOTE — IP AVS SNAPSHOT
[facility] [address]    [place] [address]    Phone:  [phone]                                       After Visit Summary   7/20/2018    Elias Mckee    MRN: [id number]           After Visit Summary Signature Page     I have received my discharge instructions, and my questions have been answered. I have discussed any challenges I see with this plan with the nurse or doctor.    ..........................................................................................................................................  Patient/Patient Representative Signature      ..........................................................................................................................................  Patient Representative Print Name and Relationship to Patient    ..................................................               ................................................  Date                                            Time    ..........................................................................................................................................  Reviewed by Signature/Title    ...................................................              ..............................................  Date                                                            Time

## 2018-07-20 NOTE — PROGRESS NOTES
1158 procedure explained. dgtr here. Dr abbasi here for consent. Pt fought and screamed over iv starts. Fluids infusing now. Pt from [facility] came with transport JOEY [phone]. Pt transferred from his wheel chair to bed with help of 2, but very difficult to stand and pivot. Watch to dgtr. No hearing aids in place.  1500 pt back to room about 1430. dgtr here and dr abbasi out to speak with her. Device rep here and gave pt device box for monitoring and showed to dgtr and gave box to dgtr. Pt sleepy no complaints. VSS. Pacer site is CDI no blood  On drsg.  1530 report to casey

## 2018-07-20 NOTE — PROCEDURES
Dictated.    Successful dual-chamber pacemaker implantation ([employer]).    Programmed DDD 60 / 120 ppm  No apparent complication.    EBL = 15 cc.      Plan:  - CXR and device interrogation later today  - back to nursing home later today, if all is well   - hold Xarelto today; RESUME tomorrow 07/21

## 2018-07-20 NOTE — MR AVS SNAPSHOT
After Visit Summary   7/20/2018    Elias Mckee    MRN: [id number]           Patient Information     Date Of Birth          8/10/1931        Visit Information        Provider Department      7/20/2018 10:00 AM Jyotsna Cook PA-C 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 [place] [facility] [address]   [phone]            Aug 07, 2018 12:45 PM CDT   EP NEW with Kaushik Lopez MD   [facility] ([facility])    [address]  [facility] [id number] [phone] OPT 2            Sep 24, 2018  3:45 PM CDT   Return Visit with Félix Teixeira MD   [facility] ([facility])    [address]  [facility] [address] [phone] OPT 2              Who to contact     If you have questions or need follow up information about today's clinic visit or your schedule please contact [facility] directly at [phone].  Normal or non-critical lab and imaging results will be communicated to you by MyChart, letter or phone within 4 business days after the clinic has received the results. If you do not hear from us within 7 days, please contact the clinic through MyChart or phone. If you have a critical or abnormal lab result, we will notify you by phone as soon as possible.  Submit refill requests through [employer] or call your pharmacy and they will forward the refill request to us. Please  "allow 3 business days for your refill to be completed.          Additional Information About Your Visit        MyChart Information     [employer] lets you send messages to your doctor, view your test results, renew your prescriptions, schedule appointments and more. To sign up, go to [email or web address] . Click on \"Log in\" on the left side of the screen, which will take you to the Welcome page. Then click on \"Sign up Now\" on the right side of the page.     You will be asked to enter the access code listed below, as well as some personal information. Please follow the directions to create your username and password.     Your access code is: [email or web address]  Expires: 2018 12:27 PM     Your access code will  in 90 days. If you need help or a new code, please call your [place] clinic or [phone].        Care EveryWhere ID     This is your Care EveryWhere ID. This could be used by other organizations to access your [place] medical records  [id number]        Your Vitals Were     Pulse Height BMI (Body Mass Index)             97 1.778 m (5' 10\") 26.54 kg/m2          Blood Pressure from Last 3 Encounters:   18 96/66   18 122/63   18 (!) 166/94    Weight from Last 3 Encounters:   18 83.9 kg (185 lb)   18 80.3 kg (177 lb)   18 79.4 kg (175 lb)              Today, you had the following     No orders found for display       Primary Care Provider Office Phone # Fax #    Yvette Kenzie Bonner, TOBY [phone]       [address] [facility] [address]  [facility] [address]        Equal Access to Services     [facility]: Hadii drake Ro, wacatinada luyung, qaybta kaalmada venus, boaz daly. So [facility] [phone].    ATENCIÓN: Si habla español, tiene a whitehead disposición servicios gratuitos de asistencia lingüística. Llame al [phone].    We comply with applicable federal civil rights laws and [place] laws. We do not discriminate " on the basis of race, color, national origin, age, disability, sex, sexual orientation, or gender identity.            Thank you!     Thank you for choosing [facility]  for your care. Our goal is always to provide you with excellent care. Hearing back from our patients is one way we can continue to improve our services. Please take a few minutes to complete the written survey that you may receive in the mail after your visit with us. Thank you!             Your Updated Medication List - Protect others around you: Learn how to safely use, store and throw away your medicines at www.disposemymeds.org.          This list is accurate as of 7/20/18 10:38 AM.  Always use your most recent med list.                   Brand Name Dispense Instructions for use Diagnosis    ACETAMINOPHEN PO      Take 1,000 mg by mouth 3 times daily For pain        BASAGLAR 100 UNIT/ML injection      Inject 20 Units Subcutaneous At Bedtime    Type II diabetes mellitus with peripheral circulatory disorder (H)       cholecalciferol 1000 UNIT tablet    vitamin D3     Take 2,000 Units by mouth daily        eucerin cream      Apply to toes two times a day        FLONASE NA      Spray 2 sprays into both nostrils daily        LEVOTHYROXINE SODIUM PO      Take 25 mcg by mouth daily        LIPITOR PO      Take 40 mg by mouth At Bedtime        lisinopril 20 MG tablet    PRINIVIL/ZESTRIL    30 tablet    Take 1 tablet (20 mg) by mouth daily    Benign essential hypertension       NOVOLOG SC      Inject 4 Units Subcutaneous 2 times daily With breakfast and lunch        polyethylene glycol powder    MIRALAX/GLYCOLAX     Take 17 g by mouth daily        rivaroxaban ANTICOAGULANT 20 MG Tabs tablet    XARELTO    30 tablet    Take 1 tablet (20 mg) by mouth daily (with dinner)    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

## 2018-07-20 NOTE — LETTER
7/20/2018      Yvette Bonner, APRN CNP  [address]  [place] [address]      RE: Elias EDUARDO Rylee       Dear Colleague,    I had the pleasure of seeing Elias Mckee in the [facility].    Service Date: 07/20/2018      REASON FOR VISIT:  I had the pleasure of meeting Elias today when he came accompanied by his daughter, Jazmin, for a preoperative evaluation prior to planned pacemaker implantation.        HISTORY OF PRESENT ILLNESS:  The patient is a pleasant 86-year-old who Dr. Teixeira his previously seen for his history of:   1.  History of ventricular fibrillation arrest in 01/2008 treated at [facility] with multivessel stenting (LAD and circumflex) and plain old balloon angioplasty to the posterolateral.  He subsequently had chest discomfort leading to an abnormal stress test and in 06/2008 had drug-eluting stent placed to the distal LAD which had in-stent restenosis, plain old angioplasty to the first septal  and drug-eluting stent placed at the distal circumflex.  Last stress test 08/2014 showed no evidence of significant ischemia.   2.  Preserved ejection fraction with most recent echocardiogram 07/2018 showing an EF of 55%-60% without significant valvular abnormalities.   3.  History of stroke noted first at the time of his VF arrest in 01/2008.  He subsequently was admitted 02/2017 with an acute right MCA stroke treated with TPA.  He was placed on Xarelto at that time for concerns regarding thromboembolism.  He has residual left-sided weakness which has been longstanding.   4.  Paroxysmal atrial fibrillation on Xarelto as above.   5.  Diabetes.           Torey was hospitalized from 07/01-07/03/2018 after an episode of unresponsiveness.  Heart rate was noted to be in the 30s but no strips were sent to accompany the patient.  Dr. Almonte saw him and reviewed an echocardiogram showing a normal ejection fraction.  Metoprolol tartrate 25 mg twice daily was  discontinued and heart rate responded nicely, typically in the 60s-70s.  No further bradycardia was seen and it was recommended he be sent home with a 30-day event monitor.      A 30-day event monitor was initially placed 07/03 and was to be continued until 08/01.  Early on in the monitoring period, he did have episodes of atrial fibrillation with overall controlled heart rate anywhere from the 70-110s.  He also had episodes of second-degree type 1 AV block (Wenckebach) with heart rates in the 60s and 70s.  Unfortunately, on 07/17 and 07/18, he was noted to have at least 3 episodes Wenckebach with heart rates down into the 30s.  Dr. Tran reviewed this and recommended he be set up for a pacemaker implantation, which is scheduled for later today.      Torey is accompanied by his daughter.  He does have memory issues, but she confirms that he has had no problems with further fainting spells.  He denies edema, orthopnea or PND.  Denies chest pain, pressure or tightness.  His left side continues to be weak, which has been longstanding since his first stroke in 2008.      I called his nursing home today to confirm that he did not get Xarelto 20 mg daily last night.  He did get lisinopril this morning.  All other medications were held.  He has been n.p.o. since midnight in preparation for his procedure today.        ASSESSMENT AND PLAN:      1. 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        Dr. Tran reviewed his event monitor strips from 7/17/2018 and recommended the patient proceed with a dual-chamber pacemaker implantation.  We discussed the risks, benefits and indications of this procedure including but not limited to peripheral vessel injury bruising/bleeding (though he did hold Xarelto last night which I confirmed with his nursing home), cardiac puncture/tamponade, pneumothorax, device failure, device infection necessitating explantation of the device with long-term antibiotics and  routine followup.      Miranda and Jazmin agreed to proceed.  Jazmin tells me that Miranda signs his own consent forms but with his memory issues, she requests that she be present for any risk/benefit conversation.      I explained that he may be sent home the same day but given the fact that it is a later case and he would require medical transportation (not his daughter through [employer]: [phone]) for discharge back to his nursing home, he may stay overnight.  He would require a chest x-ray and device interrogation before leaving.  Likely Xarelto will be resumed tonight or tomorrow night and written instructions will be provided.      I explained that the device nurses would be contacting both Jazmin and his nurses at the nursing home to ensure followup is made.  This is typically done within 7-10 days.      We discussed the limitations to movement that we would recommend after the procedure but as he has left-sided weakness already, he does not anticipate that being an issue.      It has been a pleasure to see Miranda in clinic.         NOAH PERLA PA-C             D: 2018   T: 2018   MT: ARYAN      Name:     MIRANDA BEGUM   MRN:      [id number]        Account:      [id number]   :      08/10/1931           Service Date: 2018      Document: [id number]           Outpatient Encounter Prescriptions as of 2018   Medication Sig Dispense Refill     ACETAMINOPHEN PO Take 1,000 mg by mouth 3 times daily For pain       Atorvastatin Calcium (LIPITOR PO) Take 40 mg by mouth At Bedtime       BASAGLAR 100 UNIT/ML injection Inject 20 Units Subcutaneous At Bedtime       cholecalciferol (VITAMIN D) 1000 UNIT tablet Take 2,000 Units by mouth daily       Fluticasone Propionate (FLONASE NA) Spray 2 sprays into both nostrils daily       Insulin Aspart (NOVOLOG SC) Inject 4 Units Subcutaneous 2 times daily With breakfast and lunch       LEVOTHYROXINE SODIUM PO Take 25 mcg by mouth daily        lisinopril (PRINIVIL/ZESTRIL) 20 MG tablet Take 1 tablet (20 mg) by mouth daily 30 tablet      polyethylene glycol (MIRALAX/GLYCOLAX) powder Take 17 g by mouth daily        Skin Protectants, Misc. (EUCERIN) cream Apply to toes two times a day       tamsulosin (FLOMAX) 0.4 MG capsule Take 1 capsule (0.4 mg) by mouth daily 60 capsule 0     timolol, PF, (TIMOPTIC OCUDOSE) 0.5 % ophthalmic solution Place 1 drop into the right eye 2 times daily       [DISCONTINUED] rivaroxaban ANTICOAGULANT (XARELTO) 20 MG TABS tablet Take 1 tablet (20 mg) by mouth daily (with dinner) 30 tablet 1     No facility-administered encounter medications on file as of 7/20/2018.      Again, thank you for allowing me to participate in the care of your patient.      Sincerely,    Jyotsna Cook PA-C     [facility]

## 2018-07-20 NOTE — PROGRESS NOTES
HPI and Plan:   See dictation #[id number]    No orders of the defined types were placed in this encounter.      No orders of the defined types were placed in this encounter.      There are no discontinued medications.      Encounter Diagnoses   Name Primary?     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       ALLERGIES     Allergies   Allergen Reactions     No Known Allergies        PAST MEDICAL HISTORY:  Past Medical History:   Diagnosis Date     Acute, but ill-defined, cerebrovascular disease 1-2008    Left hemiparesis, left side neglect     Atrial fibrillation (H)      Benign hypertension with CKD (chronic kidney disease) stage III 6/5/2017     Central retinal vein occlusion, right eye 11/15/2017     Chronic atrial fibrillation (H) 6/5/2017     Chronic ischemic heart disease, unspecified      Cognitive deficits as late effect of cerebrovascular disease 6/5/2017     Coronary artery disease      CVA (cerebral infarction)      Dysphagia due to recent cerebrovascular accident (CVA) 6/5/2017     Elevated cholesterol      Essential hypertension, benign      H/O prostate cancer 7/6/2017     H/O: CVA (cerebrovascular accident) 6/5/2017     Hemiparesis affecting left side as late effect of cerebrovascular accident (H) 6/5/2017     Hyperlipidaemia      MEDICAL HISTORY OF - 1- 2008    V fib arrest      Mobitz type I Wenckebach atrioventricular block, 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     Myocardial infarction      Onychomycosis 6/5/2017     Other and unspecified hyperlipidemia      PVD (peripheral vascular disease) (H) 12/12/2017     Type 2 diabetes mellitus with diabetic peripheral angiopathy with gangrene (H) 12/12/2017     Type 2 diabetes mellitus with stage 3 chronic kidney disease (H) 6/5/2017     Type II diabetes mellitus with peripheral circulatory  disorder (H) 2017     Type II or unspecified type diabetes mellitus without mention of complication, not stated as uncontrolled        PAST SURGICAL HISTORY:  Past Surgical History:   Procedure Laterality Date     PHACOEMULSIFICATION CLEAR CORNEA WITH STANDARD INTRAOCULAR LENS IMPLANT Right 10/7/2014    Procedure: PHACOEMULSIFICATION CLEAR CORNEA WITH STANDARD INTRAOCULAR LENS IMPLANT;  Surgeon: German Thomas MD;  Location:  [facility]     SEED IMPLANTATION  2002    prostate cancer     VITRECTOMY ANTERIOR Right 10/7/2014    Procedure: VITRECTOMY ANTERIOR;  Surgeon: German Thomas MD;  Location:  [facility]       FAMILY HISTORY:  No family history on file.    SOCIAL HISTORY:  Social History     Social History     Marital status:      Spouse name: N/A     Number of children: N/A     Years of education: N/A     Social History Main Topics     Smoking status: Former Smoker     Packs/day: 1.00     Years: 20.00     Types: Cigarettes     Start date: 1953     Quit date: 1973     Smokeless tobacco: Never Used     Alcohol use 0.6 oz/week     1 Cans of beer per week      Comment: Occasional     Drug use: No     Sexual activity: Not Currently     Other Topics Concern     Sleep Concern Yes     pt using sleep aid     Stress Concern No     Weight Concern No     Special Diet No     Exercise Yes     everyday     Seat Belt Yes     Social History Narrative    Born and raised in [facility].  His parents  when he was young and his father moved to [place].  His mother  when he was 11 from a brain tumor.  [place] and graduated from .  Met is wife in [facility] and  in 1949.  She  in  from COPD.  They had four children (3 sons and 1 daughter).  Sons live in [place] and daughter lives in [place] and is his POA.  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  Lived at the  "[employer] in [place] until his stroke in Feb 2017.         Review of Systems:  Skin:  Negative       Eyes:  Positive for glasses    ENT:  Negative      Respiratory:  Negative for dyspnea on exertion;cough;shortness of breath     Cardiovascular:  Negative;Negative for;palpitations;chest pain;edema exercise intolerance;fatigue;Positive for    Gastroenterology: Negative for melena;hematochezia    Genitourinary:  Negative      Musculoskeletal:  Negative back pain    Neurologic:  Positive for memory problems    Psychiatric:  Negative      Heme/Lymph/Imm:  Negative      Endocrine:  Positive for diabetes      Physical Exam:  Vitals: BP 96/66  Pulse 97  Ht 1.778 m (5' 10\")  Wt 83.9 kg (185 lb)  BMI 26.54 kg/m2    Constitutional:  cooperative, alert and oriented, well developed, well nourished, in no acute distress        Skin:  warm and dry to the touch, no apparent skin lesions or masses noted          Head:  normocephalic, no masses or lesions        Eyes:  pupils equal and round;conjunctivae and lids unremarkable;sclera white        Lymph:      ENT:  no pallor or cyanosis, dentition good        Neck:  JVP normal;no carotid bruit        Respiratory:  normal breath sounds, clear to auscultation, normal A-P diameter, normal symmetry, normal respiratory excursion, no use of accessory muscles         Cardiac: regular rhythm;normal S1 and S2;apical impulse not displaced       systolic murmur        pulses full and equal venous stasis changes bilaterally                                      GI:  abdomen soft        Extremities and Muscular Skeletal:  no deformities, clubbing, cyanosis, erythema observed;no edema              Neurological:    left sided weakness      Psych:  Alert and Oriented x 3                "

## 2018-07-21 NOTE — PROGRESS NOTES
Pt ate - + urination   VSS  Pacer site dry and intact  Interrogation done  Post CXR > done > no pneumo  EKG down to  old Holter monitor  Pt incont right before discharge > cleaned > changed > new attends placed  Pt ready for discharge back Dat [place] via  via [employer] transportation

## 2018-07-23 ENCOUNTER — DOCUMENTATION ONLY (OUTPATIENT)
Dept: CARDIOLOGY | Facility: CLINIC | Age: 83
End: 2018-07-23

## 2018-07-23 ENCOUNTER — NURSING HOME VISIT (OUTPATIENT)
Dept: GERIATRICS | Facility: CLINIC | Age: 83
End: 2018-07-23
Payer: COMMERCIAL

## 2018-07-23 VITALS
SYSTOLIC BLOOD PRESSURE: 93 MMHG | BODY MASS INDEX: 25.97 KG/M2 | RESPIRATION RATE: 18 BRPM | WEIGHT: 181 LBS | DIASTOLIC BLOOD PRESSURE: 68 MMHG | OXYGEN SATURATION: 95 % | TEMPERATURE: 97.7 F | HEART RATE: 97 BPM

## 2018-07-23 DIAGNOSIS — I69.919 COGNITIVE DEFICITS AS LATE EFFECT OF CEREBROVASCULAR DISEASE: ICD-10-CM

## 2018-07-23 DIAGNOSIS — Z95.0 CARDIAC PACEMAKER IN SITU: ICD-10-CM

## 2018-07-23 DIAGNOSIS — I44.1 MOBITZ TYPE I WENCKEBACH ATRIOVENTRICULAR BLOCK: Primary | ICD-10-CM

## 2018-07-23 DIAGNOSIS — I12.9 BENIGN HYPERTENSION WITH CKD (CHRONIC KIDNEY DISEASE) STAGE III (H): ICD-10-CM

## 2018-07-23 DIAGNOSIS — N18.30 BENIGN HYPERTENSION WITH CKD (CHRONIC KIDNEY DISEASE) STAGE III (H): ICD-10-CM

## 2018-07-23 DIAGNOSIS — I69.354 HEMIPARESIS AFFECTING LEFT SIDE AS LATE EFFECT OF CEREBROVASCULAR ACCIDENT (H): ICD-10-CM

## 2018-07-23 PROCEDURE — 99309 SBSQ NF CARE MODERATE MDM 30: CPT | Mod: GW | Performed by: NURSE PRACTITIONER

## 2018-07-23 RX ORDER — TRAMADOL HYDROCHLORIDE 50 MG/1
25 TABLET ORAL EVERY 6 HOURS PRN
Qty: 10 TABLET | Refills: 0
Start: 2018-07-23 | End: 2018-11-19 | Stop reason: DRUGHIGH

## 2018-07-23 NOTE — PROGRESS NOTES
[facility]    Chief Complaint   Patient presents with     half-way Acute     [facility] Medical Record Number:  [id number]    HPI:    Elias Mckee is a 86 year old  (8/10/1931), who is being seen today for an episodic care visit at [facility].  HPI information obtained from: facility chart records, facility staff and patient report.  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 Lopez with discharge same day.  Pt has done well over the past three days with occasional c/o pain and daughter is requesting prn tramadol be made available.. Pulse has been noted to be .    Benign hypertension with CKD (chronic kidney disease) stage III  BP has fluctuated over the past three days from 80/54 - 162/75.  No reports of chest pain.    Hemiparesis affecting left side as late effect of cerebrovascular accident (H)  Chronic hemiparesis.  Nsg has no reports of changes    Cognitive deficits as late effect of cerebrovascular disease  Requires assistance with most ADL's.  Does feed himself. Not reliable to report change in condition.    ALLERGIES: No known allergies  Past Medical, Surgical, Family and Social History reviewed and updated in [employer].    Current Outpatient Prescriptions   Medication Sig Dispense Refill     ACETAMINOPHEN PO Take 1,000 mg by mouth 3 times daily For pain       Atorvastatin Calcium (LIPITOR PO) Take 40 mg by mouth At Bedtime       BASAGLAR 100 UNIT/ML injection Inject 20 Units Subcutaneous At Bedtime       cholecalciferol (VITAMIN D) 1000 UNIT tablet Take 2,000 Units by mouth daily       Fluticasone Propionate (FLONASE NA) Spray 2 sprays into both nostrils daily       Insulin Aspart (NOVOLOG SC) Inject 4 Units Subcutaneous 2 times daily With breakfast and lunch       LEVOTHYROXINE SODIUM PO Take 25 mcg by mouth daily       lisinopril  (PRINIVIL/ZESTRIL) 20 MG tablet Take 1 tablet (20 mg) by mouth daily 30 tablet      polyethylene glycol (MIRALAX/GLYCOLAX) powder Take 17 g by mouth daily        rivaroxaban ANTICOAGULANT (XARELTO) 20 MG TABS tablet Take 1 tablet (20 mg) by mouth daily (with dinner) 30 tablet 1     Skin Protectants, Misc. (EUCERIN) cream Apply to toes two times a day       tamsulosin (FLOMAX) 0.4 MG capsule Take 1 capsule (0.4 mg) by mouth daily 60 capsule 0     timolol, PF, (TIMOPTIC OCUDOSE) 0.5 % ophthalmic solution Place 1 drop into the right eye 2 times daily       Medications reviewed:  Medications reconciled to facility chart and changes were made to reflect current medications as identified as above med list. Below are the changes that were made:   Medications stopped since last EPIC medication reconciliation:   There are no discontinued medications.    Medications started since last [facility] medication reconciliation:  No orders of the defined types were placed in this encounter.    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  Had bilateral hearing aides  Has upper and lower dentures., CV:  chest pain, dyspnea on exertion and Positive for atrial fibrillation and h/o ventricular fibrillation and CAD.See HPI., RESPIRATORY: shortness of breath, cough, asthma and wheezing, :  dysuria and Positive for h/o prostate cancer, GI:  abdominal pain, constipation, diarrhea, nausea, vomiting and Positive for some dysphagia following CVA., NEURO:  headaches and Positive for CVA in 2017 with left sided paralysis and some dysphagia. with mild cognitive impairent., PSYCH: anxiety and depression, MUSCULOSKELETAL: back pain, joint pain and fibromyalgia and SKIN: Positive for fungal fingernails on left hand and bilateral pedal fungal nails.       Physical Exam:  BP 93/68  Pulse 97  Temp 97.7  F (36.5  C)  Resp 18  Wt 181 lb (82.1 kg)  SpO2 95%  BMI 25.97 kg/m2  GENERAL APPEARANCE:  Sleepy but easily arouseable.  Resting in bed, late afternoon.  Able to express self verbally, Denies any acute distress. .  Head: Normocephalic with slight left sided mouth droop.  Eye: Right eye more open than usual.  No redness noted in sclera  No discharge.  ENIT:  No rhinitis.  Hearing well. Moist oral cavity. No exudate.  CV:  Irregularly irregular rhythm.  No murmur.  DP pulses +1 bilaterally. Trace LE edema. Dry,  intact dressing in left upper chest.  Pt denies pain.    RESP:  No cough.  Quiet, effortless respirations.  Clear to auscultation bilaterally.   ABDOMEN:  Soft rounded abdomen.  Bowel sounds positive all four quadrants.  No tenderness with palpation.  NEURO:   Strong hand grasp right hand and only minimal ability to move left hand and arm.  Left sided facial droop.  Strong ability to raise right leg against resistance and moderate left leg strength.   Oriented to person.  Could not recall what had been placed in his left upper chest.   PSYCH: Pleasant and calm.     Recent Labs:     CBC RESULTS:   Recent Labs   Lab Test  07/20/18   1135 07/06/18 07/03/18   0835   WBC  7.8   --   6.0   RBC  3.79*   --   3.64*   HGB  11.9*  11.4*  11.3*   HCT  34.3*   --   32.8*   MCV  91   --   90   MCH  31.4   --   31.0   MCHC  34.7   --   34.5   RDW  16.3*   --   16.0*   PLT  171   --   159       Last Basic Metabolic Panel:  Recent Labs   Lab Test  07/20/18   1135 07/06/18   NA  138  136   POTASSIUM  4.6  4.6   CHLORIDE  104  102   TRENT  9.1  9.0   CO2  25  25   BUN  23  16   CR  1.19  1.24*   GLC  141*  210*       Liver Function Studies -   Recent Labs   Lab Test  07/01/18   0940 04/19/18   PROTTOTAL  6.7*  6.9   ALBUMIN  3.1*  3.6   BILITOTAL  0.4  0.5   ALKPHOS  68  67   AST  10  14   ALT  14  10       TSH   Date Value Ref Range Status   07/02/2018 2.20 0.40 - 4.00 mU/L Final   06/14/2018 3.62 0.30 - 4.50 uIU/mL Final     Lab Results   Component Value Date    A1C 7.2 07/05/2018    A1C 7.4 07/01/2018     GFR Estimate   Date Value Ref Range Status   07/20/2018 58 (L) >60 mL/min/1.7m2 Final     Comment:     Non  GFR Calc   07/06/2018 52 (A) >60 mL/min/1.73m2 Final   07/03/2018 68 >60 mL/min/1.7m2 Final     Comment:     Non  GFR Calc   07/02/2018 58 (L) >60 mL/min/1.7m2 Final     Comment:     Non  GFR Calc   07/01/2018 36 (L) >60 mL/min/1.7m2 Final     Family Communication:  Contacted  "daughter Jazmin and discussed current status.  She noted yesterday, he c/o pain \"all over.  No c/o pain today, but will order prn Tramadol for breakthrough pain.    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  OK to shower, but not to soak the area.  Will prescribe prn tramadol in the event of breakthrough pain.  Continue to monitor.    (I12.9,  N18.3) Benign hypertension with CKD (chronic kidney disease) stage III  Comment: BP goal: <150/90, at goal  Plan: Need to monitor for hypotension.  Recheck Thursday.     (I69.354) Hemiparesis affecting left side as late effect of cerebrovascular accident (H)  Comment: Chronic  Plan: Continue with BP control.  Continue with anticoagulation.  MOnitor.    (I69.919) Cognitive deficits as late effect of cerebrovascular disease  Comment: Slowly advancing  Plan: Continue current care in skilled nursing facility.    Electronically signed by  TOBY Momin CNP                  "

## 2018-07-23 NOTE — PROGRESS NOTES
Post device implant discharge phone call - [employer] (D) PPM    Reviewed the following:    No raising arm above shoulder on the side of implant for 3 weeks  Remove outer dressing 3 days after implant. May shower after outer dressing removed. Leave steri-strips in place, will be removed at 1 week device check  No driving for: 1 week (PPM)  Watch for redness, drainage, warmth, or fever. Call device clinic if any signs of infection.     1 week device check scheduled: 7/30/18 at 9:00 in [place]     Pt states understanding of all instructions.       *Left message for patient's daughter to call me back. SK      Received a call from Sam at patient's rehab looking for physical limitations after ppm placement. Left message discussing arm and weight restrictions. GEOFF Wu returned my call, discussed instructions with her, verbalized understanding. We did cancel the appointment with Dr Lopez as it is not really necessary to follow up on the event monitor at this point. He is seeing Dr Teixeira in September. SK

## 2018-07-26 VITALS
WEIGHT: 169 LBS | BODY MASS INDEX: 19.96 KG/M2 | HEIGHT: 77 IN | SYSTOLIC BLOOD PRESSURE: 106 MMHG | DIASTOLIC BLOOD PRESSURE: 77 MMHG | RESPIRATION RATE: 20 BRPM | TEMPERATURE: 97.9 F | HEART RATE: 71 BPM

## 2018-07-26 NOTE — PROGRESS NOTES
[facility]    Chief Complaint   Patient presents with     BOO     [facility] Medical Record Number:  [id number]    HPI:    Elias Mckee is a 86 year old  (8/10/1931), who is being seen today for an episodic care visit at [facility].  HPI information obtained from: facility chart records, facility staff, patient report and [facility] chart review. Pt is unreliable historian due to cognitive deficits. 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  Pulse has been reported as 71-99. Did have fall on 7/25 but this was due to instability during transfer and VS's were stable at that time.    Cardiac pacemaker in situ, Placed on 7/20/18  Placed by Dr. Lopez on 7/20.  No chest pain or syncopal episodes since.     Type 2 diabetes mellitus with stage 3 chronic kidney disease, with long-term current use of insulin (H)  Accuchecks in past three days: 0730: 186-196, 11am: 199-250, 5pm: 119-195.  Basaglar dose was decreased during hospitalization on July 1.     Benign hypertension with CKD (chronic kidney disease) stage III  BP ranges in past three days: 104/62 - 128/78.    Chronic atrial fibrillation (H)  Heart rates as noted above,  Remains on Xarelto for anticoagulation.    Hemiparesis affecting left side as late effect of cerebrovascular accident (H)  Long standing issue following CVA that was felt to be cardiogenic.  No recent changes.  Has left hand splint and does ambulate with standby assist.    Cognitive deficits as late effect of cerebrovascular disease  Impulsive and will forget to ask for help with transfers.  Falls occur, with last fall on 7/25 without obvious injury.    ALLERGIES: No known allergies  Past Medical, Surgical, Family and Social History reviewed and updated in [facility].    Current Outpatient Prescriptions   Medication Sig Dispense Refill     ACETAMINOPHEN PO Take  1,000 mg by mouth 3 times daily For pain       Atorvastatin Calcium (LIPITOR PO) Take 40 mg by mouth At Bedtime       BASAGLAR 100 UNIT/ML injection Inject 20 Units Subcutaneous At Bedtime       cholecalciferol (VITAMIN D) 1000 UNIT tablet Take 2,000 Units by mouth daily       Fluticasone Propionate (FLONASE NA) Spray 2 sprays into both nostrils daily       Insulin Aspart (NOVOLOG SC) Inject 4 Units Subcutaneous 2 times daily With breakfast and lunch       LEVOTHYROXINE SODIUM PO Take 25 mcg by mouth daily       lisinopril (PRINIVIL/ZESTRIL) 20 MG tablet Take 1 tablet (20 mg) by mouth daily 30 tablet      polyethylene glycol (MIRALAX/GLYCOLAX) powder Take 17 g by mouth daily        rivaroxaban ANTICOAGULANT (XARELTO) 20 MG TABS tablet Take 1 tablet (20 mg) by mouth daily (with dinner) 30 tablet 1     Skin Protectants, Misc. (EUCERIN) cream Apply to toes two times a day       tamsulosin (FLOMAX) 0.4 MG capsule Take 1 capsule (0.4 mg) by mouth daily 60 capsule 0     timolol, PF, (TIMOPTIC OCUDOSE) 0.5 % ophthalmic solution Place 1 drop into the right eye 2 times daily       traMADol (ULTRAM) 50 MG tablet Take 0.5 tablets (25 mg) by mouth every 6 hours as needed for severe pain 10 tablet 0     Medications reviewed:  Medications reconciled to facility chart and changes were made to reflect current medications as identified as above med list. Below are the changes that were made:   Medications stopped since last EPIC medication reconciliation:   There are no discontinued medications.    Medications started since last [facility] medication reconciliation:  No orders of the defined types were placed in this encounter.    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  Had bilateral hearing aides  Has upper and lower dentures., CV:  chest pain, dyspnea on exertion and Positive for atrial fibrillation and h/o ventricular fibrillation and CAD.See HPI., RESPIRATORY: shortness of breath, cough, asthma and wheezing, :  dysuria and Positive for h/o prostate cancer, GI:  abdominal pain, constipation, diarrhea, nausea, vomiting and Positive for some dysphagia following CVA., NEURO:  headaches and Positive for CVA in 2017 with left sided paralysis and some dysphagia. with mild cognitive impairent., PSYCH: anxiety and depression, MUSCULOSKELETAL: back pain, joint pain and fibromyalgia and  "SKIN: Positive for fungal fingernails on left hand and bilateral pedal fungal nails.    Physical Exam:  /77  Pulse 71  Temp 97.9  F (36.6  C)  Resp 20  Ht 6' 5\" (1.956 m)  Wt 169 lb (76.7 kg)  BMI 20.04 kg/m2  GENERAL APPEARANCE:  Sleepy but easily arouseable.  Resting in his recliner, mid afternoon.  Able to express self verbally, Denies any acute distress. .  Head: Normocephalic with slight left sided mouth droop.  Eye:  No redness noted in sclera  No discharge.  ENIT:  No rhinitis.  Hearing well. Moist oral cavity. No exudate.  CV:  Irregularly irregular rhythm.  No murmur.  DP pulses +1 bilaterally. Trace LE edema. Incision over pacemaker site remains closed in left upper chest and steristrips are patent.  No surrounding redness or pain and area is dry - without exudate.     RESP:  No cough.  Quiet, effortless respirations.  Clear to auscultation bilaterally.   ABDOMEN:  Soft rounded abdomen.  Bowel sounds positive all four quadrants.  No tenderness with palpation.  NEURO:   Strong hand grasp right hand and only minimal ability to move left hand and arm.  Left sided facial droop.  Strong ability to raise right leg against resistance and moderate left leg strength.   Oriented to person.  Could not recall what had been placed in his left upper chest.   PSYCH: Pleasant and calm.     Recent Labs:   CBC RESULTS:   Recent Labs   Lab Test  07/20/18   1135 07/06/18 07/03/18   0835   WBC  7.8   --   6.0   RBC  3.79*   --   3.64*   HGB  11.9*  11.4*  11.3*   HCT  34.3*   --   32.8*   MCV  91   --   90   MCH  31.4   --   31.0   MCHC  34.7   --   34.5   RDW  16.3*   --   16.0*   PLT  171   --   159       Last Basic Metabolic Panel:  Recent Labs   Lab Test  07/20/18   1135 07/06/18   NA  138  136   POTASSIUM  4.6  4.6   CHLORIDE  104  102   TRENT  9.1  9.0   CO2  25  25   BUN  23  16   CR  1.19  1.24*   GLC  141*  210*     GFR Estimate   Date Value Ref Range Status   07/20/2018 58 (L) >60 mL/min/1.7m2 Final     " Comment:     Non  GFR Calc   07/06/2018 52 (A) >60 mL/min/1.73m2 Final   07/03/2018 68 >60 mL/min/1.7m2 Final     Comment:     Non  GFR Calc   07/02/2018 58 (L) >60 mL/min/1.7m2 Final     Comment:     Non  GFR Calc   07/01/2018 36 (L) >60 mL/min/1.7m2 Final       Liver Function Studies -   Recent Labs   Lab Test  07/01/18   0940 04/19/18   PROTTOTAL  6.7*  6.9   ALBUMIN  3.1*  3.6   BILITOTAL  0.4  0.5   ALKPHOS  68  67   AST  10  14   ALT  14  10       TSH   Date Value Ref Range Status   07/02/2018 2.20 0.40 - 4.00 mU/L Final   06/14/2018 3.62 0.30 - 4.50 uIU/mL Final       Lab Results   Component Value Date    A1C 7.2 07/05/2018    A1C 7.4 07/01/2018     Assessment/Plan:  (I44.1) Mobitz type I Wenckebach atrioventricular block, 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    (Z95.0) Cardiac pacemaker in situ, Placed on 7/20/18  Comment: Incision healing well  Plan: F/u with cardiology as directed    (E11.22,  N18.3,  Z79.4) Type 2 diabetes mellitus with stage 3 chronic kidney disease, with long-term current use of insulin (H)  Comment:Chronic, with increased blood sugars since Basaglar decrease during hospital stay.  Plan: Increase basaglar insulin to 22 U at HS.  Continue with current novolog dose.  Nsg to continue with accuchecks and update NP on 8/2.    (I12.9,  N18.3) Benign hypertension with CKD (chronic kidney disease) stage III  Comment: BP goal: <150/90, at goal  Plan: Continues on lisinopril due to heart disease. Monitor BP's as he may need loer dose if low BPs continue.    (I48.2) Chronic atrial fibrillation (H)  Comment: Chronic, rate controlled  Plan: Continue to monitor and continue xarelto for anticoagulation.    (I69.354) Hemiparesis affecting left side as late effect of cerebrovascular accident (H)  Comment: Chronic  Plan: Continue with assistance with cares as well  as left arm support.  Monitor as he remains impulsive and is a fall risk with unaided transfers.    (I05.888) Cognitive deficits as late effect of cerebrovascular disease  Comment: Chronic  Plan: Monitor.    Electronically signed by  TOBY Momin CNP

## 2018-07-27 ENCOUNTER — NURSING HOME VISIT (OUTPATIENT)
Dept: GERIATRICS | Facility: CLINIC | Age: 83
End: 2018-07-27
Payer: COMMERCIAL

## 2018-07-27 DIAGNOSIS — E11.51 TYPE II DIABETES MELLITUS WITH PERIPHERAL CIRCULATORY DISORDER (H): ICD-10-CM

## 2018-07-27 DIAGNOSIS — E11.22 TYPE 2 DIABETES MELLITUS WITH STAGE 3 CHRONIC KIDNEY DISEASE, WITH LONG-TERM CURRENT USE OF INSULIN (H): ICD-10-CM

## 2018-07-27 DIAGNOSIS — I44.1 MOBITZ TYPE I WENCKEBACH ATRIOVENTRICULAR BLOCK: Primary | ICD-10-CM

## 2018-07-27 DIAGNOSIS — I12.9 BENIGN HYPERTENSION WITH CKD (CHRONIC KIDNEY DISEASE) STAGE III (H): ICD-10-CM

## 2018-07-27 DIAGNOSIS — I69.919 COGNITIVE DEFICITS AS LATE EFFECT OF CEREBROVASCULAR DISEASE: ICD-10-CM

## 2018-07-27 DIAGNOSIS — I69.354 HEMIPARESIS AFFECTING LEFT SIDE AS LATE EFFECT OF CEREBROVASCULAR ACCIDENT (H): ICD-10-CM

## 2018-07-27 DIAGNOSIS — N18.30 TYPE 2 DIABETES MELLITUS WITH STAGE 3 CHRONIC KIDNEY DISEASE, WITH LONG-TERM CURRENT USE OF INSULIN (H): ICD-10-CM

## 2018-07-27 DIAGNOSIS — I48.20 CHRONIC ATRIAL FIBRILLATION (H): ICD-10-CM

## 2018-07-27 DIAGNOSIS — N18.30 BENIGN HYPERTENSION WITH CKD (CHRONIC KIDNEY DISEASE) STAGE III (H): ICD-10-CM

## 2018-07-27 DIAGNOSIS — Z95.0 CARDIAC PACEMAKER IN SITU: ICD-10-CM

## 2018-07-27 DIAGNOSIS — Z79.4 TYPE 2 DIABETES MELLITUS WITH STAGE 3 CHRONIC KIDNEY DISEASE, WITH LONG-TERM CURRENT USE OF INSULIN (H): ICD-10-CM

## 2018-07-27 PROCEDURE — 99310 SBSQ NF CARE HIGH MDM 45: CPT | Mod: GW | Performed by: NURSE PRACTITIONER

## 2018-07-27 RX ORDER — INSULIN GLARGINE 100 [IU]/ML
22 INJECTION, SOLUTION SUBCUTANEOUS AT BEDTIME
Start: 2018-07-27 | End: 2018-08-14

## 2018-07-30 ENCOUNTER — ALLIED HEALTH/NURSE VISIT (OUTPATIENT)
Dept: CARDIOLOGY | Facility: CLINIC | Age: 83
End: 2018-07-30
Payer: COMMERCIAL

## 2018-07-30 DIAGNOSIS — Z95.0 CARDIAC PACEMAKER IN SITU: Primary | ICD-10-CM

## 2018-07-30 DIAGNOSIS — I49.5 SSS (SICK SINUS SYNDROME) (H): ICD-10-CM

## 2018-07-30 PROCEDURE — 93280 PM DEVICE PROGR EVAL DUAL: CPT | Mod: GW | Performed by: INTERNAL MEDICINE

## 2018-07-30 NOTE — PROGRESS NOTES
[employer] (D) 7-10 day Post Pacemaker Device Check - MRI compatible     AP: 1 % : 20 %  Mode: DDD         Underlying Rhythm: SR 80s with a 320 ms AV delay   Heart Rate: good variability, about 20-25% of beats greater than 100 bpm   Sensing: WNL    Pacing Threshold: WNL    Impedance: WNL  Battery Status: estimated 10 years   Incision/Complications: removed steri strips, incision is CDI with no swelling, redness or bruising   Atrial Arrhythmia: 5 episodes logged as ATR - longest episode lasting 19 hours 40 minutes. Known history, on xarelto   Ventricular Arrhythmia: none  Setting Change: no changes made today     Care Plan: Scheduled 6 week check for 9/24, seeing Dr Teixeira after. Discussed arm restrictions, remote monitoring, when to call clinic etc with daughter. SK

## 2018-07-30 NOTE — MR AVS SNAPSHOT
"              After Visit Summary   7/30/2018    Elias Mckee    MRN: [id number]           Patient Information     Date Of Birth          8/10/1931        Visit Information        Provider Department      7/30/2018 9:00 AM JOHNNY TEMPLETON2 [facility]        Today's Diagnoses     Cardiac pacemaker in situ    -  1    SSS (sick sinus syndrome) (H)           Follow-ups after your visit        Your next 10 appointments already scheduled     Sep 24, 2018  3:15 PM CDT   Pacemaker Check with JOHNNY RAMOS   [facility] ([facility])    [address] [place], [place] [address]  [facility] [id number] [phone] OPT 2            Sep 24, 2018  3:45 PM CDT   Return Visit with Félix Teixeira MD   [facility] ([facility])    [address] [place], [place] [address]  [facility] [id number] [phone] OPT 2              Who to contact     If you have questions or need follow up information about today's clinic visit or your schedule please contact [facility] directly at [phone].  Normal or non-critical lab and imaging results will be communicated to you by [employer], letter or phone within 4 business days after the clinic has received the results. If you do not hear from us within 7 days, please contact the clinic through [employer] or phone. If you have a critical or abnormal lab result, we will notify you by phone as soon as possible.  Submit refill requests through [employer] or call your pharmacy and they will forward the refill request to us. Please allow 3 business days for your refill to be completed.          Additional Information About Your Visit        [employer] Information     [employer] lets you send messages to your doctor, view your test results, renew your prescriptions, schedule appointments and more. To sign up, go to [email or web address] . Click on \"Log in\" on the left " "side of the screen, which will take you to the Welcome page. Then click on \"Sign up Now\" on the right side of the page.     You will be asked to enter the access code listed below, as well as some personal information. Please follow the directions to create your username and password.     Your access code is: [email or web address]  Expires: 2018 12:27 PM     Your access code will  in 90 days. If you need help or a new code, please call your [place] clinic or [phone].        Care EveryWhere ID     This is your Care EveryWhere ID. This could be used by other organizations to access your [place] medical records  [id number]         Blood Pressure from Last 3 Encounters:   18 106/77   18 93/68   18 129/71    Weight from Last 3 Encounters:   18 76.7 kg (169 lb)   18 82.1 kg (181 lb)   18 83.9 kg (185 lb)              We Performed the Following     PM DEVICE PROGRAMMING EVAL, DUAL LEAD PACER ([id number])        Primary Care Provider Office Phone # Fax #    Yvette Kenzie Bonner, TOBY [phone]       [address] [place] [address]        Equal Access to Services     [facility] Center[facility]DJA : Hadii aad ku hadasho Soomaali, waaxda luqadaha, qaybta kaalmada adeegyada, waxay idiin hayaan sharla ayala . So [facility] [phone].    ATENCIÓN: Si habla español, tiene a whitehead disposición servicios gratuitos de asistencia lingüística. Llame al [phone].    We comply with applicable federal civil rights laws and [place] laws. We do not discriminate on the basis of race, color, national origin, age, disability, sex, sexual orientation, or gender identity.            Thank you!     Thank you for choosing [facility]  for your care. Our goal is always to provide you with excellent care. Hearing back from our patients is one way we can continue to improve our services. Please take a few minutes to complete the written survey that you " may receive in the mail after your visit with us. Thank you!             Your Updated Medication List - Protect others around you: Learn how to safely use, store and throw away your medicines at www.disposemymeds.org.          This list is accurate as of 7/30/18  9:22 AM.  Always use your most recent med list.                   Brand Name Dispense Instructions for use Diagnosis    ACETAMINOPHEN PO      Take 1,000 mg by mouth 3 times daily For pain        BASAGLAR 100 UNIT/ML injection      Inject 22 Units Subcutaneous At Bedtime    Type II diabetes mellitus with peripheral circulatory disorder (H)       cholecalciferol 1000 UNIT tablet    vitamin D3     Take 2,000 Units by mouth daily        eucerin cream      Apply to toes two times a day        FLONASE NA      Spray 2 sprays into both nostrils daily        LEVOTHYROXINE SODIUM PO      Take 25 mcg by mouth daily        LIPITOR PO      Take 40 mg by mouth At Bedtime        lisinopril 20 MG tablet    PRINIVIL/ZESTRIL    30 tablet    Take 1 tablet (20 mg) by mouth daily    Benign essential hypertension       NOVOLOG SC      Inject 4 Units Subcutaneous 2 times daily With breakfast and lunch        polyethylene glycol powder    MIRALAX/GLYCOLAX     Take 17 g by mouth daily        rivaroxaban ANTICOAGULANT 20 MG Tabs tablet    XARELTO    30 tablet    Take 1 tablet (20 mg) by mouth daily (with dinner)    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

## 2018-08-13 VITALS
WEIGHT: 178 LBS | BODY MASS INDEX: 26.36 KG/M2 | HEART RATE: 79 BPM | DIASTOLIC BLOOD PRESSURE: 62 MMHG | HEIGHT: 69 IN | SYSTOLIC BLOOD PRESSURE: 85 MMHG

## 2018-08-13 NOTE — PROGRESS NOTES
[facility]    Chief Complaint   Patient presents with     assisted Acute       [facility] Medical Record Number:  [id number]    HPI:    Elias Mckee is a 87 year old  (8/10/1931), who is being seen today for an episodic care visit at [facility].  HPI information obtained from: facility chart records, facility staff, patient report and [facility] chart review. Pt is unreliable historian due to cognitive loss. Today's concern is:  Agitation  Nsg has noted at times in the past two weeks a sudden onset of pt being very upset and anxious, and difficult to redirect or soothe him  Once c/o pain all over and pt was given a tramadol.  In review of his recent VS's and blood sugars, it has appeared that both have been low at times.  Insulin held yesterday morning and pt was bright and interactive all afternoon.  Today, he denies any pain.  CBC and BMP pending.  Pt is afebrile.  Daughter reported that his nursing asst did tell her that he has reported that he has had to urinate, but then could not urinate.     CKD (chronic kidney disease) stage 3, GFR 30-59 ml/min  BMP pending. 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  Does ambulate with stand by assist and walker, but has no use of left arm.    Type 2 diabetes mellitus with stage 3 chronic kidney disease, with long-term current use of insulin (H)  Accuchecks in past week: 0730: , 11am: 103-169, 5pm: .    Benign hypertension with CKD (chronic kidney disease) stage III  BP ranges in past week: 86/52 - 122/74. Metoprolol stopped in recent past due to hypotension and bradycardia.  Has a new pacemaker and pulse ranges in past week: 68-88.  No reports of chest pain.  Pt remains on lisinopril.    ALLERGIES: No known allergies  Past Medical, Surgical, Family and Social History reviewed  and updated in [facility].    Current Outpatient Prescriptions   Medication Sig Dispense Refill     ACETAMINOPHEN PO Take 1,000 mg by mouth 3 times daily For pain       Atorvastatin Calcium (LIPITOR PO) Take 40 mg by mouth At Bedtime       BASAGLAR 100 UNIT/ML injection Inject 22 Units Subcutaneous At Bedtime       cholecalciferol (VITAMIN D) 1000 UNIT tablet Take 2,000 Units by mouth daily       Fluticasone Propionate (FLONASE NA) Spray 2 sprays into both nostrils daily       Insulin Aspart (NOVOLOG SC) Inject 4 Units Subcutaneous 2 times daily With breakfast and lunch       LEVOTHYROXINE SODIUM PO Take 25 mcg by mouth daily       lisinopril (PRINIVIL/ZESTRIL) 20 MG tablet Take 1 tablet (20 mg) by mouth daily 30 tablet      polyethylene glycol (MIRALAX/GLYCOLAX) powder Take 17 g by mouth daily        rivaroxaban ANTICOAGULANT (XARELTO) 20 MG TABS tablet Take 1 tablet (20 mg) by mouth daily (with dinner) 30 tablet 1     Skin Protectants, Misc. (EUCERIN) cream Apply to toes two times a day       tamsulosin (FLOMAX) 0.4 MG capsule Take 1 capsule (0.4 mg) by mouth daily 60 capsule 0     timolol, PF, (TIMOPTIC OCUDOSE) 0.5 % ophthalmic solution Place 1 drop into the right eye 2 times daily       traMADol (ULTRAM) 50 MG tablet Take 0.5 tablets (25 mg) by mouth every 6 hours as needed for severe pain 10 tablet 0     Medications reviewed:  Medications reconciled to facility chart and changes were made to reflect current medications as identified as above med list. Below are the changes that were made:   Medications stopped since last EPIC medication reconciliation:   There are no discontinued medications.    Medications started since last [facility] medication reconciliation:  No orders of the defined types were placed in this encounter.    Patient Active Problem List   Diagnosis     Chronic ischemic heart disease     Personal history of other diseases of circulatory system     HYPERLIPIDEMIA LDL GOAL <100     Advance Care Planning      CKD (chronic kidney disease) stage 3, GFR 30-59 ml/min, est GFR: 10/30/17: 46,, 12/14/17: 59     Leg weakness     Ataxia     BPH (benign prostatic hyperplasia)     Type 2 diabetes mellitus with diabetic nephropathy (H)     History of actinic keratoses     History of squamous cell carcinoma     S/P Mohs surgery for basal cell carcinoma     H/O: CVA (cerebrovascular accident), 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  Had bilateral hearing aides  Has upper and lower dentures., CV:  chest pain, dyspnea on exertion and Positive for atrial fibrillation and h/o ventricular fibrillation and CAD.See HPI., RESPIRATORY: shortness of breath, cough, asthma and wheezing, :  dysuria and Positive for h/o prostate cancer, GI:  abdominal pain, constipation, diarrhea, nausea, vomiting and Positive for some dysphagia following CVA., NEURO:  headaches and Positive for CVA in 2017 with left sided  "paralysis and some dysphagia. with mild cognitive impairent., PSYCH: anxiety and depression, MUSCULOSKELETAL: back pain, joint pain and fibromyalgia and SKIN: Positive for fungal fingernails on left hand and bilateral pedal fungal nails.    Physical Exam:  BP (!) 85/62  Pulse 79  Ht 5' 9\" (1.753 m)  Wt 178 lb (80.7 kg)  BMI 26.29 kg/m2  GENERAL APPEARANCE:  Sleepy but easily arouseable.  Resting in his recliner, mid afternoon.  Able to express self verbally, Denies any acute distress. .  Head: Normocephalic with slight left sided mouth droop.  Eye:  No redness noted in sclera  No discharge.  ENIT:  No rhinitis.  Hearing well. Moist oral cavity. No exudate.  CV:  Irregularly irregular rhythm.  No murmur.  DP pulses +1 bilaterally. Trace LE edema. Incision over pacemaker site remains closed in left upper chest and steristrips are patent.  No surrounding redness or pain and area is dry - without exudate.     RESP:  No cough.  Quiet, effortless respirations.  Clear to auscultation bilaterally.   ABDOMEN:  Soft rounded abdomen.  Bowel sounds positive all four quadrants.  No tenderness with palpation.  NEURO:   Strong hand grasp right hand and only minimal ability to move left hand and arm.  Left sided facial droop.  Strong ability to raise right leg against resistance and moderate left leg strength.   Oriented to person.  Could not recall what had been placed in his left upper chest.   PSYCH: Pleasant and calm.       Recent Labs:   CBC RESULTS:   Recent Labs   Lab Test  07/20/18   1135 07/06/18 07/03/18   0835   WBC  7.8   --   6.0   RBC  3.79*   --   3.64*   HGB  11.9*  11.4*  11.3*   HCT  34.3*   --   32.8*   MCV  91   --   90   MCH  31.4   --   31.0   MCHC  34.7   --   34.5   RDW  16.3*   --   16.0*   PLT  171   --   159       Last Basic Metabolic Panel:  Recent Labs   Lab Test  07/20/18   1135 07/06/18   NA  138  136   POTASSIUM  4.6  4.6   CHLORIDE  104  102   TRENT  9.1  9.0   CO2  25  25   BUN  23  16   CR  " 1.19  1.24*   GLC  141*  210*     GFR Estimate   Date Value Ref Range Status   07/20/2018 58 (L) >60 mL/min/1.7m2 Final     Comment:     Non  GFR Calc   07/06/2018 52 (A) >60 mL/min/1.73m2 Final   07/03/2018 68 >60 mL/min/1.7m2 Final     Comment:     Non  GFR Calc   07/02/2018 58 (L) >60 mL/min/1.7m2 Final     Comment:     Non  GFR Calc   07/01/2018 36 (L) >60 mL/min/1.7m2 Final       Liver Function Studies -   Recent Labs   Lab Test  07/01/18   0940 04/19/18   PROTTOTAL  6.7*  6.9   ALBUMIN  3.1*  3.6   BILITOTAL  0.4  0.5   ALKPHOS  68  67   AST  10  14   ALT  14  10       TSH   Date Value Ref Range Status   07/02/2018 2.20 0.40 - 4.00 mU/L Final   06/14/2018 3.62 0.30 - 4.50 uIU/mL Final       Lab Results   Component Value Date    A1C 7.2 07/05/2018    A1C 7.4 07/01/2018     Family Communication:  Updated daughter Jazmin about current status. Lengthy discussion about acute current issues including combativeness and slurred speech after he refused to eat at family reunion this weekend.  Discussed high suspicions that he is experiencing low blood sugar along with low blood pressure which could cause this behavior.  Also she reported that his aide had reported to her that he has had requests to urinate, but when she took him, he could not void. Will do PVR bladder scans to r/o retention as well as adjust insulin and lisinopril.  She expressed understanding and consent for POC.    Assessment/Plan:  (R45.1) Agitation  (primary encounter diagnosis)  Comment: Increased frequency, possibly due to hypoglycemic events as well as hypotension.  Plan: DC lisinopril and DC novolog.  CBC and BMP pending. MOnitor VS's and reevaluate on Friday.  PVR bladder scans every day for three days.  If positive, will need to consider UA/UC.    (N18.3) CKD (chronic kidney disease) stage 3, GFR 30-59 ml/min  Comment: Chronic  Plan: Renally adjust dose of medications.  BMP pending.    (I69.354)  Hemiparesis affecting left side as late effect of cerebrovascular accident (H)  Comment: Chronic  Plan: Continue POC.  Will order elbow protector for left elbow per daughter's request.    (E11.22,  N18.3,  Z79.4) Type 2 diabetes mellitus with stage 3 chronic kidney disease, with long-term current use of insulin (H)  Comment: Chronic, now with hypoglycemic events.  Plan: DC Novolog.  Decrease basaglar to 20 U subcutaneous at HS. Continue accuchecks.  Reevaluate on Friday.      (I12.9,  N18.3) Benign hypertension with CKD (chronic kidney disease) stage III  Comment: BP goal; <150/90, now hypotensive at times  Plan: DC lisinopril and monitor.  Reevaluate on Friday.    Electronically signed by  TOBY Momin CNP

## 2018-08-14 ENCOUNTER — NURSING HOME VISIT (OUTPATIENT)
Dept: GERIATRICS | Facility: CLINIC | Age: 83
End: 2018-08-14
Payer: COMMERCIAL

## 2018-08-14 DIAGNOSIS — I12.9 BENIGN HYPERTENSION WITH CKD (CHRONIC KIDNEY DISEASE) STAGE III (H): ICD-10-CM

## 2018-08-14 DIAGNOSIS — R45.1 AGITATION: Primary | ICD-10-CM

## 2018-08-14 DIAGNOSIS — I69.354 HEMIPARESIS AFFECTING LEFT SIDE AS LATE EFFECT OF CEREBROVASCULAR ACCIDENT (H): ICD-10-CM

## 2018-08-14 DIAGNOSIS — N18.30 BENIGN HYPERTENSION WITH CKD (CHRONIC KIDNEY DISEASE) STAGE III (H): ICD-10-CM

## 2018-08-14 DIAGNOSIS — E11.22 TYPE 2 DIABETES MELLITUS WITH STAGE 3 CHRONIC KIDNEY DISEASE, WITH LONG-TERM CURRENT USE OF INSULIN (H): ICD-10-CM

## 2018-08-14 DIAGNOSIS — N18.30 TYPE 2 DIABETES MELLITUS WITH STAGE 3 CHRONIC KIDNEY DISEASE, WITH LONG-TERM CURRENT USE OF INSULIN (H): ICD-10-CM

## 2018-08-14 DIAGNOSIS — N18.30 CKD (CHRONIC KIDNEY DISEASE) STAGE 3, GFR 30-59 ML/MIN (H): ICD-10-CM

## 2018-08-14 DIAGNOSIS — Z79.4 TYPE 2 DIABETES MELLITUS WITH STAGE 3 CHRONIC KIDNEY DISEASE, WITH LONG-TERM CURRENT USE OF INSULIN (H): ICD-10-CM

## 2018-08-14 PROCEDURE — 99310 SBSQ NF CARE HIGH MDM 45: CPT | Mod: GW | Performed by: NURSE PRACTITIONER

## 2018-08-14 RX ORDER — INSULIN GLARGINE 100 [IU]/ML
20 INJECTION, SOLUTION SUBCUTANEOUS AT BEDTIME
Start: 2018-08-14 | End: 2018-10-02

## 2018-08-15 ENCOUNTER — TRANSFERRED RECORDS (OUTPATIENT)
Dept: HEALTH INFORMATION MANAGEMENT | Facility: CLINIC | Age: 83
End: 2018-08-15

## 2018-08-15 LAB
BUN SERPL-MCNC: 27 MG/DL (ref 9–26)
CALCIUM SERPL-MCNC: 9.4 MG/DL (ref 8.4–10.4)
CHLORIDE SERPLBLD-SCNC: 105 MMOL/L (ref 98–109)
CO2 SERPL-SCNC: 21 MMOL/L (ref 22–31)
CREAT SERPL-MCNC: 1.21 MG/DL (ref 0.73–1.18)
DIFFERENTIAL: ABNORMAL
ERYTHROCYTE [DISTWIDTH] IN BLOOD BY AUTOMATED COUNT: 16.7 % (ref 11–15)
GFR SERPL CREATININE-BSD FRML MDRD: 54 ML/MIN/1.73M2
GLUCOSE SERPL-MCNC: 144 MG/DL (ref 70–100)
HCT VFR BLD AUTO: 35.9 % (ref 39–51)
HEMOGLOBIN: 11.7 G/DL (ref 13.4–17.5)
MCV RBC AUTO: 95 FL (ref 80–100)
PLATELET # BLD AUTO: 146 K/CMM (ref 140–450)
POTASSIUM SERPL-SCNC: 4.3 MMOL/L (ref 3.5–5.2)
RBC # BLD AUTO: 3.78 M/CMM (ref 4.2–5.9)
SODIUM SERPL-SCNC: 139 MMOL/L (ref 136–145)
WBC # BLD AUTO: 5.3 K/CMM (ref 3.8–11)

## 2018-08-16 NOTE — PROGRESS NOTES
[facility]    Chief Complaint   Patient presents with     [facility]     [place] Medical Record Number:  [id number]    HPI:    Elias Mckee is a 87 year old  (8/10/1931), who is being seen today for a federally mandated E/M visit at [facility].  HPI information obtained from: facility chart records, facility staff, patient report and [facility] Epic chart review. Pt is unreliable historian due to cognitive loss.  Pt is unreliable historian due to cognitive loss. Today's concerns are:  Type 2 diabetes mellitus with stage 3 chronic kidney disease, with long-term current use of insulin (H)  Novolog insulin discontinued on 8/14 and basaglar insulin decreased to 20U subcutaneous.  Accuchecks since then: 0730: 102-11, 11am: 131-214, 1700: 117-133.  Pt's mood has been much brighter with less agitation.    Benign hypertension with CKD (chronic kidney disease) stage III  BP ranges since lisinopril dc'd on 8/14 due to hypotension: 99//72. No reports of chest pain.    Chronic atrial fibrillation (H)  Has pacemaker now in place due to AV block and pulse ranges in past three days: 66-95.    Hypothyroidism due to acquired atrophy of thyroid  TSH in July was 2.20.  No recent changes to levothyroxine dose.    Cardiac pacemaker in situ, Placed on 7/20/18  Incision has healed. F/u appt on 9/24.    CKD (chronic kidney disease) stage 3, GFR 30-59 ml/min, est GFR: 10/30/17: 46,, 12/14/17: 59  Labs on 8/15/18 showed a creatinine of 1.21 and an est gFR of 54.    Cognitive deficits as late effect of cerebrovascular disease  Lacks insight into physical issues and functional decline.  Can be impulsive and attempt transfer on his own, which can result in a fall.  Had been demonstrating increased agitation and combativeness, but this seems improved with changes in insulin and BP meds.  PVR's completed last two days, due to family concern about UTI and the results were 75 and  120.  WBC was WNL.    Hemiparesis affecting left side as late effect of cerebrovascular accident (H)  Chronic, but able to ambulate with walker and stand by assist.  Wears left arm and hand splint.    ALLERGIES: No known allergies  PAST MEDICAL HISTORY:  has a past medical history of Acute, but ill-defined, cerebrovascular disease (1-2008); Atrial fibrillation (H); Benign hypertension with CKD (chronic kidney disease) stage III (6/5/2017); Cancer (H); Cardiac pacemaker in situ, Placed on 7/20/18 (7/23/2018); Central retinal vein occlusion, right eye (11/15/2017); Chronic atrial fibrillation (H) (6/5/2017); Chronic ischemic heart disease, unspecified; CKD (chronic kidney disease) stage 3, GFR 30-59 ml/min, est GFR: 10/30/17: 46,, 12/14/17: 59 (2/29/2012); Cognitive deficits as late effect of cerebrovascular disease (6/5/2017); Coronary artery disease; CVA (cerebral infarction); Dysphagia due to recent cerebrovascular accident (CVA) (6/5/2017); Elevated cholesterol; Essential hypertension, benign; H/O prostate cancer (7/6/2017); H/O: CVA (cerebrovascular accident) (6/5/2017); Hemiparesis affecting left side as late effect of cerebrovascular accident (H) (6/5/2017); Hyperlipidaemia; MEDICAL HISTORY OF - (1- 2008); Mobitz type I Wenckebach atrioventricular block, 2:1 (7/18/2018); Myocardial infarction; Onychomycosis (6/5/2017); Other and unspecified hyperlipidemia; PVD (peripheral vascular disease) (H) (12/12/2017); Type 2 diabetes mellitus with diabetic peripheral angiopathy with gangrene (H) (12/12/2017); Type 2 diabetes mellitus with stage 3 chronic kidney disease (H) (6/5/2017); Type II diabetes mellitus with peripheral circulatory disorder (H) (12/12/2017); and Type II or unspecified type diabetes mellitus without mention of complication, not stated as uncontrolled (1-2008).  PAST SURGICAL HISTORY:  has a past surgical history that includes seed implantation (2002); Phacoemulsification clear cornea with standard  intraocular lens implant (Right, 10/7/2014); and Vitrectomy anterior (Right, 10/7/2014).  FAMILY HISTORY: family history is not on file.  SOCIAL HISTORY:  reports that he quit smoking about 45 years ago. His smoking use included Cigarettes. He started smoking about 65 years ago. He has a 20.00 pack-year smoking history. He has never used smokeless tobacco. He reports that he drinks about 0.6 oz of alcohol per week  He reports that he does not use illicit drugs.    MEDICATIONS:  Current Outpatient Prescriptions   Medication Sig Dispense Refill     ACETAMINOPHEN PO Take 1,000 mg by mouth 3 times daily For pain       Atorvastatin Calcium (LIPITOR PO) Take 40 mg by mouth At Bedtime       BASAGLAR 100 UNIT/ML injection Inject 20 Units Subcutaneous At Bedtime       cholecalciferol (VITAMIN D) 1000 UNIT tablet Take 2,000 Units by mouth daily       Fluticasone Propionate (FLONASE NA) Spray 2 sprays into both nostrils daily       LEVOTHYROXINE SODIUM PO Take 25 mcg by mouth daily       polyethylene glycol (MIRALAX/GLYCOLAX) powder Take 17 g by mouth daily        rivaroxaban ANTICOAGULANT (XARELTO) 20 MG TABS tablet Take 1 tablet (20 mg) by mouth daily (with dinner) 30 tablet 1     Skin Protectants, Misc. (EUCERIN) cream Apply to toes two times a day       tamsulosin (FLOMAX) 0.4 MG capsule Take 1 capsule (0.4 mg) by mouth daily 60 capsule 0     timolol, PF, (TIMOPTIC OCUDOSE) 0.5 % ophthalmic solution Place 1 drop into the right eye 2 times daily       traMADol (ULTRAM) 50 MG tablet Take 0.5 tablets (25 mg) by mouth every 6 hours as needed for severe pain 10 tablet 0     Medications reviewed:  Medications reconciled to facility chart and changes were made to reflect current medications as identified as above med list. Below are the changes that were made:   Medications stopped since last EPIC medication reconciliation:   There are no discontinued medications.    Medications started since last EPIC medication  reconciliation:  No orders of the defined types were placed in this encounter.    Patient Active Problem List   Diagnosis     Chronic ischemic heart disease     Personal history of other diseases of circulatory system     HYPERLIPIDEMIA LDL GOAL <100     Advance Care Planning     CKD (chronic kidney disease) stage 3, GFR 30-59 ml/min, est GFR: 10/30/17: 46,, 12/14/17: 59     Leg weakness     Ataxia     BPH (benign prostatic hyperplasia)     Type 2 diabetes mellitus with diabetic nephropathy (H)     History of actinic keratoses     History of squamous cell carcinoma     S/P Mohs surgery for basal cell carcinoma     H/O: CVA (cerebrovascular accident), 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 Patient's desire to return to the community is present, but is not able due to care needs .  Information reviewed:  Medications, vital signs, orders, and nursing notes.    ROS:  Negative selected, CONSTITUTIONAL:  weight loss, weight gain, poor appetite, fevers and fatigue, EYES:  Positive for right eye surgery and ongiong issues with vision, pain and redness., ENT:  " Positive for hearing loss.  Had bilateral hearing aides  Has upper and lower dentures., CV:  chest pain, dyspnea on exertion and Positive for atrial fibrillation and h/o ventricular fibrillation and CAD.See HPI., RESPIRATORY: shortness of breath, cough, asthma and wheezing, :  dysuria and Positive for h/o prostate cancer, GI:  abdominal pain, constipation, diarrhea, nausea, vomiting and Positive for some dysphagia following CVA., NEURO:  headaches and Positive for CVA in 2017 with left sided paralysis and some dysphagia. with mild cognitive impairent., PSYCH: anxiety and depression, MUSCULOSKELETAL: back pain, joint pain and fibromyalgia and SKIN: Positive for fungal fingernails on left hand and bilateral pedal fungal nails.    Exam:  Vitals: /72  Pulse 68  Temp 97.6  F (36.4  C)  Resp 20  Ht 5' 9\" (1.753 m)  Wt 169 lb 9.6 oz (76.9 kg)  BMI 25.05 kg/m2  BMI= Body mass index is 25.05 kg/(m^2).  GENERAL APPEARANCE:  Sleepy but easily arouseable.  Resting in his bed, late morning.   Able to express self verbally, Denies any acute distress. .  Head: Normocephalic with slight left sided mouth droop.  Eye:  No redness noted in sclera  No discharge.  ENIT:  No rhinitis.  Hearing well. Moist oral cavity. No exudate.  CV:  Irregularly irregular rhythm.  No murmur.  DP pulses +1 bilaterally. Trace LE edema. Incision over pacemaker site healed  No surrounding redness or pain and area is dry - without exudate.     RESP:  No cough.  Quiet, effortless respirations.  Clear to auscultation bilaterally.   ABDOMEN:  Soft rounded abdomen.  Bowel sounds positive all four quadrants.  No tenderness with palpation.  NEURO:   Strong hand grasp right hand and only minimal ability to move left hand and arm.  Left sided facial droop.  Strong ability to raise right leg against resistance and moderate left leg strength.   Oriented to person.    PSYCH: Pleasant and calm.     Lab/Diagnostic data:   CBC RESULTS:   Recent Labs   Lab " Test 08/15/18  07/20/18   1135   07/03/18   0835   WBC  5.3  7.8   --   6.0   RBC  3.78*  3.79*   --   3.64*   HGB  11.7*  11.9*   < >  11.3*   HCT  35.9*  34.3*   --   32.8*   MCV  95.0  91   --   90   MCH   --   31.4   --   31.0   MCHC   --   34.7   --   34.5   RDW  16.7*  16.3*   --   16.0*   PLT  146  171   --   159    < > = values in this interval not displayed.       Last Basic Metabolic Panel:  Recent Labs   Lab Test 08/15/18  07/20/18   1135   NA  139  138   POTASSIUM  4.3  4.6   CHLORIDE  105  104   TRENT  9.4  9.1   CO2  21*  25   BUN  27*  23   CR  1.21*  1.19   GLC  144*  141*     GFR Estimate   Date Value Ref Range Status   08/15/2018 54 (L) >60 ml/min/1.73m2 Final   07/20/2018 58 (L) >60 mL/min/1.7m2 Final     Comment:     Non  GFR Calc   07/06/2018 52 (A) >60 mL/min/1.73m2 Final   07/03/2018 68 >60 mL/min/1.7m2 Final     Comment:     Non  GFR Calc   07/02/2018 58 (L) >60 mL/min/1.7m2 Final     Comment:     Non  GFR Calc     Liver Function Studies -   Recent Labs   Lab Test  07/01/18   0940 04/19/18   PROTTOTAL  6.7*  6.9   ALBUMIN  3.1*  3.6   BILITOTAL  0.4  0.5   ALKPHOS  68  67   AST  10  14   ALT  14  10       TSH   Date Value Ref Range Status   07/02/2018 2.20 0.40 - 4.00 mU/L Final   06/14/2018 3.62 0.30 - 4.50 uIU/mL Final       Lab Results   Component Value Date    A1C 7.2 07/05/2018    A1C 7.4 07/01/2018     Family Communication:  Daughter Jazmin updated.  She noted that her father appeared better last night and was pleased with the improvement.  She will continue to monitor and expressed gratitude for the ongoing monitoring.    ASSESSMENT/PLAN  (E11.22,  N18.3,  Z79.4) Type 2 diabetes mellitus with stage 3 chronic kidney disease, with long-term current use of insulin (H)  (primary encounter diagnosis)  Comment: Chronic, with recent episodes of hypglycemia, improved since insulin doses changed.  Plan: Continue current insulin doses and monitoring.   Nsg to update NP next Wednesday with accuchecks and BP's.    (I12.9,  N18.3) Benign hypertension with CKD (chronic kidney disease) stage III  Comment: Chronic, BP goal <150/90,  Less hypotension since lisinopril dc'd.  Plan: Monitor and nsg to update NP on Wednesday with accuchecks.    (I48.2) Chronic atrial fibrillation (H)  Comment: Rate controlled and anticoagulated with xarelto  Plan: Continue to monitor.    (E03.4) Hypothyroidism due to acquired atrophy of thyroid  Comment: Chronic, but controlled with levothyroxine  Plan: Continue current dose of levothyroxine and check TSH annually.    (Z95.0) Cardiac pacemaker in situ, Placed on 7/20/18  Comment: Chronic  Plan: Follow up appt in September with Dr. Lopez, cardiologist.    (N18.3) CKD (chronic kidney disease) stage 3, GFR 30-59 ml/min, est GFR: 10/30/17: 46,, 12/14/17: 59  Comment: Chronic, but stable  Plan: Renally adjust dose of medications.  BMP q3m or with acute change in condition.    (I69.919) Cognitive deficits as late effect of cerebrovascular disease  Comment: Chronic, but some improvement with lowered dose of insulin and dc of HTN med  Plan: Continue to monitor.    (I69.354) Hemiparesis affecting left side as late effect of cerebrovascular accident (H)  Comment: Chronic  Plan: Continue current POC.     Electronically signed by:  TOBY Momin CNP

## 2018-08-17 ENCOUNTER — NURSING HOME VISIT (OUTPATIENT)
Dept: GERIATRICS | Facility: CLINIC | Age: 83
End: 2018-08-17
Payer: COMMERCIAL

## 2018-08-17 VITALS
BODY MASS INDEX: 25.12 KG/M2 | DIASTOLIC BLOOD PRESSURE: 72 MMHG | TEMPERATURE: 97.6 F | SYSTOLIC BLOOD PRESSURE: 113 MMHG | RESPIRATION RATE: 20 BRPM | WEIGHT: 169.6 LBS | HEART RATE: 68 BPM | HEIGHT: 69 IN

## 2018-08-17 DIAGNOSIS — N18.30 TYPE 2 DIABETES MELLITUS WITH STAGE 3 CHRONIC KIDNEY DISEASE, WITH LONG-TERM CURRENT USE OF INSULIN (H): Primary | ICD-10-CM

## 2018-08-17 DIAGNOSIS — E11.22 TYPE 2 DIABETES MELLITUS WITH STAGE 3 CHRONIC KIDNEY DISEASE, WITH LONG-TERM CURRENT USE OF INSULIN (H): Primary | ICD-10-CM

## 2018-08-17 DIAGNOSIS — N18.30 CKD (CHRONIC KIDNEY DISEASE) STAGE 3, GFR 30-59 ML/MIN (H): ICD-10-CM

## 2018-08-17 DIAGNOSIS — N18.30 BENIGN HYPERTENSION WITH CKD (CHRONIC KIDNEY DISEASE) STAGE III (H): ICD-10-CM

## 2018-08-17 DIAGNOSIS — Z95.0 CARDIAC PACEMAKER IN SITU: ICD-10-CM

## 2018-08-17 DIAGNOSIS — I48.20 CHRONIC ATRIAL FIBRILLATION (H): ICD-10-CM

## 2018-08-17 DIAGNOSIS — I69.919 COGNITIVE DEFICITS AS LATE EFFECT OF CEREBROVASCULAR DISEASE: ICD-10-CM

## 2018-08-17 DIAGNOSIS — I69.354 HEMIPARESIS AFFECTING LEFT SIDE AS LATE EFFECT OF CEREBROVASCULAR ACCIDENT (H): ICD-10-CM

## 2018-08-17 DIAGNOSIS — Z79.4 TYPE 2 DIABETES MELLITUS WITH STAGE 3 CHRONIC KIDNEY DISEASE, WITH LONG-TERM CURRENT USE OF INSULIN (H): Primary | ICD-10-CM

## 2018-08-17 DIAGNOSIS — E03.4 HYPOTHYROIDISM DUE TO ACQUIRED ATROPHY OF THYROID: ICD-10-CM

## 2018-08-17 DIAGNOSIS — I12.9 BENIGN HYPERTENSION WITH CKD (CHRONIC KIDNEY DISEASE) STAGE III (H): ICD-10-CM

## 2018-08-17 PROBLEM — R55 SYNCOPE: Status: RESOLVED | Noted: 2018-07-01 | Resolved: 2018-08-17

## 2018-08-17 PROCEDURE — 99309 SBSQ NF CARE MODERATE MDM 30: CPT | Mod: GW | Performed by: NURSE PRACTITIONER

## 2018-08-29 ENCOUNTER — CLINICAL UPDATE (OUTPATIENT)
Dept: PHARMACY | Facility: CLINIC | Age: 83
End: 2018-08-29

## 2018-08-29 NOTE — PROGRESS NOTES
This patient's medication list and chart were reviewed as part of the service provided by [facility].    Assessment/Recommendations:  1. (Diabetes):  A1c goal <8-8.5% appropriate for this elderly patient at risk for hypoglycemia and falls, with limited life expectancy, function, and multiple comorbidity.  Per chart review, has done better with lowering of Basaglar and d'c of Novolog.  If blood sugars remain well under 200mg/dL, may benefit from further reduction in Basaglar dose.  2. (Afib):  As per last med review, recommend reduction in Xarelto dose to 15mg daily due to patient's kidney function, and therefore further increased risk of bleed with current dose of Xarelto.  Est CrCl ([place]-Gault) is 47ml/min, and for CrCl 15-50ml/min, in nonvalvular afib, recommended Xarelto dose is 15mg daily with evening meal.  NP indicated following last med review that cardiology is managing Xarelto.  Would be of benefit to contact cardiology to discuss potential reduction in dose.      Andria Vo, Pharm.D.,[facility]  Board Certified Geriatric Pharmacist  Medication Therapy Management Pharmacist  [phone]

## 2018-09-24 ENCOUNTER — OFFICE VISIT (OUTPATIENT)
Dept: CARDIOLOGY | Facility: CLINIC | Age: 83
End: 2018-09-24
Payer: COMMERCIAL

## 2018-09-24 VITALS
DIASTOLIC BLOOD PRESSURE: 68 MMHG | HEART RATE: 76 BPM | BODY MASS INDEX: 25.48 KG/M2 | SYSTOLIC BLOOD PRESSURE: 124 MMHG | WEIGHT: 178 LBS | HEIGHT: 70 IN

## 2018-09-24 DIAGNOSIS — I25.10 CORONARY ARTERY DISEASE INVOLVING NATIVE CORONARY ARTERY OF NATIVE HEART WITHOUT ANGINA PECTORIS: ICD-10-CM

## 2018-09-24 DIAGNOSIS — Z95.0 CARDIAC PACEMAKER IN SITU: Primary | ICD-10-CM

## 2018-09-24 PROCEDURE — 99214 OFFICE O/P EST MOD 30 MIN: CPT | Mod: 24 | Performed by: INTERNAL MEDICINE

## 2018-09-24 NOTE — PROGRESS NOTES
HPI and Plan:   See dictation    Orders Placed This Encounter   Procedures     Follow-Up with Cardiologist       No orders of the defined types were placed in this encounter.      Encounter Diagnoses   Name Primary?     Cardiac pacemaker in situ Yes     Coronary artery disease involving native coronary artery of native heart without angina pectoris        CURRENT MEDICATIONS:  Current Outpatient Prescriptions   Medication Sig Dispense Refill     ACETAMINOPHEN PO Take 1,000 mg by mouth 3 times daily For pain       Atorvastatin Calcium (LIPITOR PO) Take 40 mg by mouth At Bedtime       BASAGLAR 100 UNIT/ML injection Inject 20 Units Subcutaneous At Bedtime       cholecalciferol (VITAMIN D) 1000 UNIT tablet Take 2,000 Units by mouth daily       Fluticasone Propionate (FLONASE NA) Spray 2 sprays into both nostrils daily       LEVOTHYROXINE SODIUM PO Take 25 mcg by mouth daily       polyethylene glycol (MIRALAX/GLYCOLAX) powder Take 17 g by mouth daily        rivaroxaban ANTICOAGULANT (XARELTO) 20 MG TABS tablet Take 1 tablet (20 mg) by mouth daily (with dinner) 30 tablet 1     Skin Protectants, Misc. (EUCERIN) cream Apply to toes two times a day       tamsulosin (FLOMAX) 0.4 MG capsule Take 1 capsule (0.4 mg) by mouth daily 60 capsule 0     timolol, PF, (TIMOPTIC OCUDOSE) 0.5 % ophthalmic solution Place 1 drop into the right eye 2 times daily       traMADol (ULTRAM) 50 MG tablet Take 0.5 tablets (25 mg) by mouth every 6 hours as needed for severe pain 10 tablet 0       ALLERGIES     Allergies   Allergen Reactions     No Known Allergies        PAST MEDICAL HISTORY:  Past Medical History:   Diagnosis Date     Acute, but ill-defined, cerebrovascular disease 1-2008    Left hemiparesis, left side neglect     Atrial fibrillation (H)      Benign hypertension with CKD (chronic kidney disease) stage III 6/5/2017     Cancer (H)      Cardiac pacemaker in situ, Placed on 7/20/18 7/23/2018     Central retinal vein occlusion, right eye  11/15/2017     Chronic atrial fibrillation (H) 6/5/2017     Chronic ischemic heart disease, unspecified      CKD (chronic kidney disease) stage 3, GFR 30-59 ml/min, est GFR: 10/30/17: 46,, 12/14/17: 59 2/29/2012     Cognitive deficits as late effect of cerebrovascular disease 6/5/2017     Coronary artery disease      CVA (cerebral infarction)      Dysphagia due to recent cerebrovascular accident (CVA) 6/5/2017     Elevated cholesterol      Essential hypertension, benign      H/O prostate cancer 7/6/2017     H/O: CVA (cerebrovascular accident) 6/5/2017     Hemiparesis affecting left side as late effect of cerebrovascular accident (H) 6/5/2017     Hyperlipidaemia      MEDICAL HISTORY OF - 1- 2008    V fib arrest      Mobitz type I Wenckebach atrioventricular block, 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     Myocardial infarction      Onychomycosis 6/5/2017     Other and unspecified hyperlipidemia      PVD (peripheral vascular disease) (H) 12/12/2017     Type 2 diabetes mellitus with diabetic peripheral angiopathy with gangrene (H) 12/12/2017     Type 2 diabetes mellitus with stage 3 chronic kidney disease (H) 6/5/2017     Type II diabetes mellitus with peripheral circulatory disorder (H) 12/12/2017     Type II or unspecified type diabetes mellitus without mention of complication, not stated as uncontrolled 1-2008       PAST SURGICAL HISTORY:  Past Surgical History:   Procedure Laterality Date     PHACOEMULSIFICATION CLEAR CORNEA WITH STANDARD INTRAOCULAR LENS IMPLANT Right 10/7/2014    Procedure: PHACOEMULSIFICATION CLEAR CORNEA WITH STANDARD INTRAOCULAR LENS IMPLANT;  Surgeon: German Thomas MD;  Location: [facility]     SEED IMPLANTATION  2002    prostate cancer     VITRECTOMY ANTERIOR Right 10/7/2014    Procedure: VITRECTOMY ANTERIOR;  Surgeon: German Thomas MD;  Location: [facility]       FAMILY HISTORY:  History reviewed. No pertinent family history.    SOCIAL HISTORY:  Social History     Social History     Marital status:       "Spouse name: N/A     Number of children: N/A     Years of education: N/A     Social History Main Topics     Smoking status: Former Smoker     Packs/day: 1.00     Years: 20.00     Types: Cigarettes     Start date: 1953     Quit date: 1973     Smokeless tobacco: Never Used     Alcohol use 0.6 oz/week     1 Cans of beer per week      Comment: Occasional     Drug use: No     Sexual activity: Not Currently     Other Topics Concern     Sleep Concern Yes     pt using sleep aid     Stress Concern No     Weight Concern No     Special Diet No     Exercise Yes     everyday     Seat Belt Yes     Social History Narrative    Born and raised in [facility].  His parents  when he was young and his father moved to [place].  His mother  when he was 11 from a brain tumor.  He was [place] and graduated from .  Met is wife in [facility] and  in 1949.  She  in  from COPD.  They had four children (3 sons and 1 daughter).  Sons live in [place] and daughter lives in [place] and is his POA.  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  Lived at the [facility] in [place] until his stroke in 2017.         Review of Systems:  Skin:  Negative     Eyes:  Positive for glasses  ENT:  Negative    Respiratory:  Negative    Cardiovascular:  Negative    Gastroenterology: Negative    Genitourinary:  Negative    Musculoskeletal:  Negative    Neurologic:  Negative    Psychiatric:  Negative    Heme/Lymph/Imm:  Negative    Endocrine:  Positive for thyroid disorder    Physical Exam:  Vitals: /68  Pulse 76  Ht 1.778 m (5' 10\")  Wt 80.7 kg (178 lb)  BMI 25.54 kg/m2    Constitutional:  cooperative, alert and oriented, well developed, well nourished, in no acute distress        Skin:  warm and dry to the touch, no apparent skin lesions or masses noted   pacemaker incision in the left infraclavicular area " was well-healed      Head:  normocephalic, no masses or lesions        Eyes:  pupils equal and round;conjunctivae and lids unremarkable;sclera white        Lymph:      ENT:  no pallor or cyanosis, dentition good        Neck:  JVP normal;no carotid bruit        Respiratory:  normal breath sounds, clear to auscultation, normal A-P diameter, normal symmetry, normal respiratory excursion, no use of accessory muscles         Cardiac: regular rhythm;normal S1 and S2;apical impulse not displaced       systolic murmur        pulses full and equal venous stasis changes bilaterally                             right femoral bruit (+)        GI:  abdomen soft        Extremities and Muscular Skeletal:  no deformities, clubbing, cyanosis, erythema observed;no edema              Neurological:    left sided weakness      Psych:  Alert and Oriented x 3        Recent Lab Results:  LIPID RESULTS:  Lab Results   Component Value Date    CHOL 116 04/19/2018    HDL 28 (L) 04/19/2018    LDL 56 04/19/2018    TRIG 162 (H) 04/19/2018    CHOLHDLRATIO 5.2 (H) 05/29/2014       LIVER ENZYME RESULTS:  Lab Results   Component Value Date    AST 10 07/01/2018    ALT 14 07/01/2018       CBC RESULTS:  Lab Results   Component Value Date    WBC 5.3 08/15/2018    RBC 3.78 (L) 08/15/2018    HGB 11.7 (L) 08/15/2018    HCT 35.9 (L) 08/15/2018    MCV 95.0 08/15/2018    MCH 31.4 07/20/2018    MCHC 34.7 07/20/2018    RDW 16.7 (H) 08/15/2018     08/15/2018       BMP RESULTS:  Lab Results   Component Value Date     08/15/2018    POTASSIUM 4.3 08/15/2018    CHLORIDE 105 08/15/2018    CO2 21 (L) 08/15/2018    ANIONGAP 9 07/20/2018     (H) 08/15/2018    BUN 27 (H) 08/15/2018    CR 1.21 (H) 08/15/2018    GFRESTIMATED 54 (L) 08/15/2018    GFRESTBLACK >60 08/15/2018    TRENT 9.4 08/15/2018        A1C RESULTS:  Lab Results   Component Value Date    A1C 7.2 (A) 07/05/2018       INR RESULTS:  Lab Results   Component Value Date    INR 1.30 (H) 07/01/2018     INR 0.99 02/20/2017           CC  Yvette Bonner, APRN CNP  [address] [facility] [address]  [place] [address]

## 2018-09-24 NOTE — MR AVS SNAPSHOT
"              After Visit Summary   9/24/2018    Elias Mckee    MRN: [id number]           Patient Information     Date Of Birth          8/10/1931        Visit Information        Provider Department      9/24/2018 3:45 PM Félix Teixeira MD [facility]         Follow-ups after your visit        Your next 10 appointments already scheduled     Sep 28, 2018 11:45 AM CDT   Pacemaker Check with JOHNNY RAMOS   [facility] ([facility])    [address] [place] [address] [address]  [facility] [address] [phone] OPT 2              Who to contact     If you have questions or need follow up information about today's clinic visit or your schedule please contact [facility] directly at [phone].  Normal or non-critical lab and imaging results will be communicated to you by MyChart, letter or phone within 4 business days after the clinic has received the results. If you do not hear from us within 7 days, please contact the clinic through MyChart or phone. If you have a critical or abnormal lab result, we will notify you by phone as soon as possible.  Submit refill requests through [employer] or call your pharmacy and they will forward the refill request to us. Please allow 3 business days for your refill to be completed.          Additional Information About Your Visit        Care EveryWhere ID     This is your Care EveryWhere ID. This could be used by other organizations to access your [place] medical records  [id number]        Your Vitals Were     Pulse Height BMI (Body Mass Index)             76 1.778 m (5' 10\") 25.54 kg/m2          Blood Pressure from Last 3 Encounters:   09/24/18 124/68   08/16/18 113/72   08/13/18 (!) 85/62    Weight from Last 3 Encounters:   09/24/18 80.7 kg (178 lb)   08/16/18 76.9 kg (169 lb 9.6 oz)   08/13/18 80.7 kg (178 lb)              Today, you had the following     No " orders found for display       Primary Care Provider Office Phone # Fax #    Yvette Bonner, TOBY [phone]       [address] [address]        Equal Access to Services     MELINDA SERRANO : Hadii aad ku hadanupamao Sogloriaali, waaxda luqadaha, qaybta kaalmada adeegyada, waxmorena jenyin hayaavioleta magdaleno josenelson daly. [facility] [phone].    ATENCIÓN: Si habla español, tiene a whitehead disposición servicios gratuitos de asistencia lingüística. Llame al [phone].    We comply with applicable federal civil rights laws and [place] laws. We do not discriminate on the basis of race, color, national origin, age, disability, sex, sexual orientation, or gender identity.            Thank you!     Thank you for choosing [facility]  for your care. Our goal is always to provide you with excellent care. Hearing back from our patients is one way we can continue to improve our services. Please take a few minutes to complete the written survey that you may receive in the mail after your visit with us. Thank you!             Your Updated Medication List - Protect others around you: Learn how to safely use, store and throw away your medicines at www.disposemymeds.org.          This list is accurate as of 9/24/18  4:21 PM.  Always use your most recent med list.                   Brand Name Dispense Instructions for use Diagnosis    ACETAMINOPHEN PO      Take 1,000 mg by mouth 3 times daily For pain        BASAGLAR 100 UNIT/ML injection      Inject 20 Units Subcutaneous At Bedtime        cholecalciferol 1000 UNIT tablet    vitamin D3     Take 2,000 Units by mouth daily        eucerin cream      Apply to toes two times a day        FLONASE NA      Spray 2 sprays into both nostrils daily        LEVOTHYROXINE SODIUM PO      Take 25 mcg by mouth daily        LIPITOR PO      Take 40 mg by mouth At Bedtime        polyethylene glycol powder    MIRALAX/GLYCOLAX     Take 17 g  by mouth daily        rivaroxaban ANTICOAGULANT 20 MG Tabs tablet    XARELTO    30 tablet    Take 1 tablet (20 mg) by mouth daily (with dinner)    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

## 2018-09-24 NOTE — LETTER
9/24/2018    Yvette Ann Ha, APRN CNP  [address]  [facility] [address]    RE: Elias Mckee       Dear Colleague,    I had the pleasure of seeing Elias Mckee in the [facility].    HPI and Plan:   See dictation    Orders Placed This Encounter   Procedures     Follow-Up with Cardiologist       No orders of the defined types were placed in this encounter.      Encounter Diagnoses   Name Primary?     Cardiac pacemaker in situ Yes     Coronary artery disease involving native coronary artery of native heart without angina pectoris        CURRENT MEDICATIONS:  Current Outpatient Prescriptions   Medication Sig Dispense Refill     ACETAMINOPHEN PO Take 1,000 mg by mouth 3 times daily For pain       Atorvastatin Calcium (LIPITOR PO) Take 40 mg by mouth At Bedtime       BASAGLAR 100 UNIT/ML injection Inject 20 Units Subcutaneous At Bedtime       cholecalciferol (VITAMIN D) 1000 UNIT tablet Take 2,000 Units by mouth daily       Fluticasone Propionate (FLONASE NA) Spray 2 sprays into both nostrils daily       LEVOTHYROXINE SODIUM PO Take 25 mcg by mouth daily       polyethylene glycol (MIRALAX/GLYCOLAX) powder Take 17 g by mouth daily        rivaroxaban ANTICOAGULANT (XARELTO) 20 MG TABS tablet Take 1 tablet (20 mg) by mouth daily (with dinner) 30 tablet 1     Skin Protectants, Misc. (EUCERIN) cream Apply to toes two times a day       tamsulosin (FLOMAX) 0.4 MG capsule Take 1 capsule (0.4 mg) by mouth daily 60 capsule 0     timolol, PF, (TIMOPTIC OCUDOSE) 0.5 % ophthalmic solution Place 1 drop into the right eye 2 times daily       traMADol (ULTRAM) 50 MG tablet Take 0.5 tablets (25 mg) by mouth every 6 hours as needed for severe pain 10 tablet 0       ALLERGIES     Allergies   Allergen Reactions     No Known Allergies        PAST MEDICAL HISTORY:  Past Medical History:   Diagnosis Date     Acute, but ill-defined, cerebrovascular disease 1-2008    Left hemiparesis, left  side neglect     Atrial fibrillation (H)      Benign hypertension with CKD (chronic kidney disease) stage III 6/5/2017     Cancer (H)      Cardiac pacemaker in situ, Placed on 7/20/18 7/23/2018     Central retinal vein occlusion, right eye 11/15/2017     Chronic atrial fibrillation (H) 6/5/2017     Chronic ischemic heart disease, unspecified      CKD (chronic kidney disease) stage 3, GFR 30-59 ml/min, est GFR: 10/30/17: 46,, 12/14/17: 59 2/29/2012     Cognitive deficits as late effect of cerebrovascular disease 6/5/2017     Coronary artery disease      CVA (cerebral infarction)      Dysphagia due to recent cerebrovascular accident (CVA) 6/5/2017     Elevated cholesterol      Essential hypertension, benign      H/O prostate cancer 7/6/2017     H/O: CVA (cerebrovascular accident) 6/5/2017     Hemiparesis affecting left side as late effect of cerebrovascular accident (H) 6/5/2017     Hyperlipidaemia      MEDICAL HISTORY OF - 1- 2008    V fib arrest      Mobitz type I Wenckebach atrioventricular block, 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     Myocardial infarction      Onychomycosis 6/5/2017     Other and unspecified hyperlipidemia      PVD (peripheral vascular disease) (H) 12/12/2017     Type 2 diabetes mellitus with diabetic peripheral angiopathy with gangrene (H) 12/12/2017     Type 2 diabetes mellitus with stage 3 chronic kidney disease (H) 6/5/2017     Type II diabetes mellitus with peripheral circulatory disorder (H) 12/12/2017     Type II or unspecified type diabetes mellitus without mention of complication, not stated as uncontrolled 1-2008       PAST SURGICAL HISTORY:  Past Surgical History:   Procedure Laterality Date     PHACOEMULSIFICATION CLEAR CORNEA WITH STANDARD INTRAOCULAR LENS IMPLANT Right 10/7/2014    Procedure: PHACOEMULSIFICATION CLEAR CORNEA WITH STANDARD INTRAOCULAR LENS IMPLANT;  Surgeon: German Thomas MD;  Location: [facility]     SEED IMPLANTATION  2002    prostate cancer     VITRECTOMY ANTERIOR Right 10/7/2014     "Procedure: VITRECTOMY ANTERIOR;  Surgeon: German Thomas MD;  Location[facility]       FAMILY HISTORY:  History reviewed. No pertinent family history.    SOCIAL HISTORY:  Social History     Social History     Marital status:      Spouse name: N/A     Number of children: N/A     Years of education: N/A     Social History Main Topics     Smoking status: Former Smoker     Packs/day: 1.00     Years: 20.00     Types: Cigarettes     Start date: 1953     Quit date: 1973     Smokeless tobacco: Never Used     Alcohol use 0.6 oz/week     1 Cans of beer per week      Comment: Occasional     Drug use: No     Sexual activity: Not Currently     Other Topics Concern     Sleep Concern Yes     pt using sleep aid     Stress Concern No     Weight Concern No     Special Diet No     Exercise Yes     everyday     Seat Belt Yes     Social History Narrative    Born and raised in [place].  His parents  when he was young and his father moved to [place].  His mother  when he was 11 from a brain tumor.  [place] was [place] and graduated from .  Met is wife in [facility] and  in 1949.  She  in  from COPD.  They had four children (3 sons and 1 daughter).  Sons live in [place] and daughter lives in [place] and is his POA.  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  Lived at the [facility] in [place] until his stroke in 2017.         Review of Systems:  Skin:  Negative     Eyes:  Positive for glasses  ENT:  Negative    Respiratory:  Negative    Cardiovascular:  Negative    Gastroenterology: Negative    Genitourinary:  Negative    Musculoskeletal:  Negative    Neurologic:  Negative    Psychiatric:  Negative    Heme/Lymph/Imm:  Negative    Endocrine:  Positive for thyroid disorder    Physical Exam:  Vitals: /68  Pulse 76  Ht 1.778 m (5' 10\")  Wt 80.7 kg (178 lb)  BMI 25.54 " kg/m2    Constitutional:  cooperative, alert and oriented, well developed, well nourished, in no acute distress        Skin:  warm and dry to the touch, no apparent skin lesions or masses noted   pacemaker incision in the left infraclavicular area was well-healed      Head:  normocephalic, no masses or lesions        Eyes:  pupils equal and round;conjunctivae and lids unremarkable;sclera white        Lymph:      ENT:  no pallor or cyanosis, dentition good        Neck:  JVP normal;no carotid bruit        Respiratory:  normal breath sounds, clear to auscultation, normal A-P diameter, normal symmetry, normal respiratory excursion, no use of accessory muscles         Cardiac: regular rhythm;normal S1 and S2;apical impulse not displaced       systolic murmur        pulses full and equal venous stasis changes bilaterally                             right femoral bruit (+)        GI:  abdomen soft        Extremities and Muscular Skeletal:  no deformities, clubbing, cyanosis, erythema observed;no edema              Neurological:    left sided weakness      Psych:  Alert and Oriented x 3        Recent Lab Results:  LIPID RESULTS:  Lab Results   Component Value Date    CHOL 116 04/19/2018    HDL 28 (L) 04/19/2018    LDL 56 04/19/2018    TRIG 162 (H) 04/19/2018    CHOLHDLRATIO 5.2 (H) 05/29/2014       LIVER ENZYME RESULTS:  Lab Results   Component Value Date    AST 10 07/01/2018    ALT 14 07/01/2018       CBC RESULTS:  Lab Results   Component Value Date    WBC 5.3 08/15/2018    RBC 3.78 (L) 08/15/2018    HGB 11.7 (L) 08/15/2018    HCT 35.9 (L) 08/15/2018    MCV 95.0 08/15/2018    MCH 31.4 07/20/2018    MCHC 34.7 07/20/2018    RDW 16.7 (H) 08/15/2018     08/15/2018       BMP RESULTS:  Lab Results   Component Value Date     08/15/2018    POTASSIUM 4.3 08/15/2018    CHLORIDE 105 08/15/2018    CO2 21 (L) 08/15/2018    ANIONGAP 9 07/20/2018     (H) 08/15/2018    BUN 27 (H) 08/15/2018    CR 1.21 (H) 08/15/2018     GFRESTIMATED 54 (L) 08/15/2018    GFRESTBLACK >60 08/15/2018    TRENT 9.4 08/15/2018        A1C RESULTS:  Lab Results   Component Value Date    A1C 7.2 (A) 07/05/2018       INR RESULTS:  Lab Results   Component Value Date    INR 1.30 (H) 07/01/2018    INR 0.99 02/20/2017           CC  TOBY Momin CNP  [address] [facility] [address]  [place] [address]                    Thank you for allowing me to participate in the care of your patient.      Sincerely,     DR JEYSON VALDEZ MD     [facility]    cc:   TOBY Momin CNP  [address] [facility] [address]  [place] [address]

## 2018-09-24 NOTE — LETTER
9/24/2018      Yvette Bonner, APRN CNP  [address]  [facility] [address]      RE: Elias Mckee       Dear Colleague,    I had the pleasure of seeing Elias Mckee in the [facility].    Service Date: 09/24/2018      HISTORY OF PRESENT ILLNESS:  It is a pleasure for me to see Mr. Mckee today.  I have not seen him for 3 years.  When I last saw him, he was doing well.  I follow him for coronary artery disease.  In 2008 he had a ventricular fibrillation arrest following an acute myocardial infarction.  He was found to have multivessel coronary artery disease.  He had a CVA with his cardiac presentation.  Multivessel coronary angioplasty and stenting were performed.  He had stents placed in the LAD and circumflex with a PTCA done to the RCA.  He made an extremely nice recovery from that with preserved left ventricular systolic function and minimal neurologic deficit.  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  He was found to be in atrial fibrillation, and he was put on Xarelto.  Three months ago he was found unresponsive and rhythm monitoring demonstrated second-degree AV block with a severe sinus bradycardia.  A dual-chamber pacer has been placed without incident, and since then he has not had any further syncopal or unresponsive episodes.  Currently he has no chest pains or shortness of breath.      IMPRESSION:   1.  Paroxysmal atrial fibrillation.  He is on Xarelto.  No bleeding problems.   2.  Right MCA CVA.  Significant residual neurologic deficit.   3.  Coronary artery disease.  No symptoms of angina.   4.  Stable blood pressure.   5.  Dyslipidemia, on chronic moderate-dose statin with LDL at 56, which is excellent.      I will see him again when he is next scheduled to have an in-person pacemaker check.         JE[facility] VALDEZ MD, [facility]      D: 09/24/2018   T: 09/24/2018   MT: SE       Name:     MIRANDA BEGUM   MRN:      [id number]        Account:      [id number]   :      08/10/1931           Service Date: 2018      Document: [id number]      Outpatient Encounter Prescriptions as of 2018   Medication Sig Dispense Refill     ACETAMINOPHEN PO Take 1,000 mg by mouth 3 times daily For pain       Atorvastatin Calcium (LIPITOR PO) Take 40 mg by mouth At Bedtime       BASAGLAR 100 UNIT/ML injection Inject 20 Units Subcutaneous At Bedtime       cholecalciferol (VITAMIN D) 1000 UNIT tablet Take 2,000 Units by mouth daily       Fluticasone Propionate (FLONASE NA) Spray 2 sprays into both nostrils daily       LEVOTHYROXINE SODIUM PO Take 25 mcg by mouth daily       polyethylene glycol (MIRALAX/GLYCOLAX) powder Take 17 g by mouth daily        rivaroxaban ANTICOAGULANT (XARELTO) 20 MG TABS tablet Take 1 tablet (20 mg) by mouth daily (with dinner) 30 tablet 1     Skin Protectants, Misc. (EUCERIN) cream Apply to toes two times a day       tamsulosin (FLOMAX) 0.4 MG capsule Take 1 capsule (0.4 mg) by mouth daily 60 capsule 0     timolol, PF, (TIMOPTIC OCUDOSE) 0.5 % ophthalmic solution Place 1 drop into the right eye 2 times daily       traMADol (ULTRAM) 50 MG tablet Take 0.5 tablets (25 mg) by mouth every 6 hours as needed for severe pain 10 tablet 0     No facility-administered encounter medications on file as of 2018.      Again, thank you for allowing me to participate in the care of your patient.      Sincerely,    DR JEYSON VALDEZ MD     [facility]

## 2018-09-25 NOTE — PROGRESS NOTES
Service Date: 2018      HISTORY OF PRESENT ILLNESS:  It is a pleasure for me to see Mr. Mckee today.  I have not seen him for 3 years.  When I last saw him, he was doing well.  I follow him for coronary artery disease.  In  he had a ventricular fibrillation arrest following an acute myocardial infarction.  He was found to have multivessel coronary artery disease.  He had a CVA with his cardiac presentation.  Multivessel coronary angioplasty and stenting were performed.  He had stents placed in the LAD and circumflex with a PTCA done to the RCA.  He made an extremely nice recovery from that with preserved left ventricular systolic function and minimal neurologic deficit.  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  He was found to be in atrial fibrillation, and he was put on Xarelto.  Three months ago he was found unresponsive and rhythm monitoring demonstrated second-degree AV block with a severe sinus bradycardia.  A dual-chamber pacer has been placed without incident, and since then he has not had any further syncopal or unresponsive episodes.  Currently he has no chest pains or shortness of breath.      IMPRESSION:   1.  Paroxysmal atrial fibrillation.  He is on Xarelto.  No bleeding problems.   2.  Right MCA CVA.  Significant residual neurologic deficit.   3.  Coronary artery disease.  No symptoms of angina.   4.  Stable blood pressure.   5.  Dyslipidemia, on chronic moderate-dose statin with LDL at 56, which is excellent.      I will see him again when he is next scheduled to have an in-person pacemaker check.         JEYSON VALDEZ MD, FACC             D: 2018   T: 2018   MT: SE      Name:     MIRANDA MCKEE   MRN:      [id number]        Account:      [id number]   :      08/10/1931           Service Date: 2018      Document: [id number]

## 2018-09-28 ENCOUNTER — DOCUMENTATION ONLY (OUTPATIENT)
Dept: CARDIOLOGY | Facility: CLINIC | Age: 83
End: 2018-09-28

## 2018-09-28 NOTE — PROGRESS NOTES
Received word from Dr Teixeira's staff that he had seen pt as scheduled on 9-24-18, but the device check had been orphaned due to a staff family emergency. This was R/S to a later date but Dr Teixeira had told pt he did not need to make the appt and it was cancelled. I contacted patients daughter. He lives at a nursing facility. He has a remote monitor at the bedside. Since both leads are on auto capture I will obtain a remote check for his 6 week check. CORNELIO Brown

## 2018-10-01 NOTE — PROGRESS NOTES
[facility]    Chief Complaint   Patient presents with     BOO     [facility] Medical Record Number:  [id number]    HPI:    Elias Mckee is a 87 year old  (8/10/1931), who is being seen today for an episodic care visit at [facility] .  HPI information obtained from: facility chart records, facility staff, patient report and [facility] chart review.  Pt is unreliable historian due to cognitive loss. Today's concern is:  Type 2 diabetes mellitus with stage 3 chronic kidney disease, with long-term current use of insulin (H)  Novolog dc'd on  and Basaglar dose decreased due to low blood sugars and lethargy.  Accuchecks in past week: 0730: 174-181, 11am: 226-280, 1700: 157-206.  HgbA1C was 7.2% in July.     CKD (chronic kidney disease) stage 3, GFR 30-59 ml/min, est GFR: 10/30/17: 46,, 17: 59  Labs in August showed a creatinine of 1.21 and an est GFR of 54.    Hemiparesis affecting left side as late effect of cerebrovascular accident (H)  Ambulates with 4 prong walker and stand by assist.  No reports of recent decline functionally.    Chronic atrial fibrillation (H)  No reports of heart rates >100/min.  Remains on Xarelto for anticoagulation.    Hypothyroidism due to acquired atrophy of thyroid  TSH was 2.20 in July.  No recent changes to levothyroxine dose.    Cognitive deficits as late effect of cerebrovascular disease  BIMS scofre is 15, but pt does have short term memory loss as well as lack of awareness of functional losses. Will forget to ask for assistance, increasing his fall risk.    Cardiac pacemaker in situ, Placed on 18  Evaluated by cardiology on  and next follow up visit in 9 months.    Benign hypertension with CKD (chronic kidney disease) stage III (H)  BP ranges are quite variable with BP ranges in the morning of 166/85 - 179/91 and in the evenin/72 -129/73.  No reports of chest pain.    Weight loss  Current weight reflects 20 lbs weight loss in  "one year.  Pt expressed surprise at this and stated \"I think I eat pretty well\" and denies GI upset.    ALLERGIES: No known allergies  Past Medical, Surgical, Family and Social History reviewed and updated in [facility].    Current Outpatient Prescriptions   Medication Sig Dispense Refill     ACETAMINOPHEN PO Take 1,000 mg by mouth 3 times daily For pain       Atorvastatin Calcium (LIPITOR PO) Take 40 mg by mouth At Bedtime       BASAGLAR 100 UNIT/ML injection Inject 20 Units Subcutaneous At Bedtime       cholecalciferol (VITAMIN D) 1000 UNIT tablet Take 2,000 Units by mouth daily       Fluticasone Propionate (FLONASE NA) Spray 2 sprays into both nostrils daily       LEVOTHYROXINE SODIUM PO Take 25 mcg by mouth daily       polyethylene glycol (MIRALAX/GLYCOLAX) powder Take 17 g by mouth daily        rivaroxaban ANTICOAGULANT (XARELTO) 20 MG TABS tablet Take 1 tablet (20 mg) by mouth daily (with dinner) 30 tablet 1     Skin Protectants, Misc. (EUCERIN) cream Apply to toes two times a day       tamsulosin (FLOMAX) 0.4 MG capsule Take 1 capsule (0.4 mg) by mouth daily 60 capsule 0     timolol, PF, (TIMOPTIC OCUDOSE) 0.5 % ophthalmic solution Place 1 drop into the right eye 2 times daily       traMADol (ULTRAM) 50 MG tablet Take 0.5 tablets (25 mg) by mouth every 6 hours as needed for severe pain 10 tablet 0     Medications reviewed:  Medications reconciled to facility chart and changes were made to reflect current medications as identified as above med list. Below are the changes that were made:   Medications stopped since last EPIC medication reconciliation:   There are no discontinued medications.    Medications started since last [facility] medication reconciliation:  No orders of the defined types were placed in this encounter.    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  Had bilateral hearing aides  Has upper and lower dentures., CV:  chest pain, dyspnea on exertion and Positive for atrial fibrillation and h/o ventricular fibrillation and CAD.Now with pacemaker due to AV block. RESPIRATORY: shortness of breath, cough, asthma and wheezing, :  dysuria and Positive for h/o prostate cancer, GI:  abdominal pain, constipation, diarrhea, nausea, vomiting and Positive for some dysphagia following CVA., NEURO:  headaches and Positive for  "CVA in 2017 with left sided paralysis and some dysphagia. with mild cognitive impairent., PSYCH: anxiety and depression, MUSCULOSKELETAL: back pain, joint pain and fibromyalgia and SKIN: Positive for fungal fingernails on left hand and bilateral pedal fungal nails.    Physical Exam:  /73  Pulse 68  Temp 96.5  F (35.8  C)  Resp 20  Ht 5' 9\" (1.753 m)  Wt 173 lb (78.5 kg)  BMI 25.55 kg/m2  GENERAL APPEARANCE:  Sleepy but easily arouseable.  Resting in his recliner after breakfast.   Able to express self verbally, Denies any acute distress. .  Head: Normocephalic with slight left sided mouth droop.  Eye:  No redness noted in sclera  No discharge.  ENIT:  No rhinitis.  Hearing well. Moist oral cavity. No exudate.  CV:  Irregularly irregular rhythm.  No murmur.  DP pulses +1 bilaterally.     RESP:  No cough.  Quiet, effortless respirations.  Clear to auscultation bilaterally.   ABDOMEN:  Soft rounded abdomen.  Bowel sounds positive all four quadrants.  No tenderness with palpation.  NEURO:   Strong hand grasp right hand and only minimal ability to move left hand and arm.  Left sided facial droop.  Strong ability to raise right leg against resistance and moderate left leg strength.   Oriented to person.    PSYCH: Pleasant and calm.  Requesting to walk more often.     Recent Labs:   CBC RESULTS:   Recent Labs   Lab Test 08/15/18  07/20/18   1135  07/03/18   0835   WBC  5.3  7.8  6.0   RBC  3.78*  3.79*  3.64*   HGB  11.7*  11.9*  11.3*   HCT  35.9*  34.3*  32.8*   MCV  95.0  91  90   MCH   --   31.4  31.0   MCHC   --   34.7  34.5   RDW  16.7*  16.3*  16.0*   PLT  146  171  159       Last Basic Metabolic Panel:  Recent Labs   Lab Test 08/15/18  07/20/18   1135   NA  139  138   POTASSIUM  4.3  4.6   CHLORIDE  105  104   TRENT  9.4  9.1   CO2  21*  25   BUN  27*  23   CR  1.21*  1.19   GLC  144*  141*     GFR Estimate   Date Value Ref Range Status   08/15/2018 54 (L) >60 ml/min/1.73m2 Final   07/20/2018 58 (L) >60 " mL/min/1.7m2 Final     Comment:     Non  GFR Calc   07/06/2018 52 (A) >60 mL/min/1.73m2 Final   07/03/2018 68 >60 mL/min/1.7m2 Final     Comment:     Non  GFR Calc   07/02/2018 58 (L) >60 mL/min/1.7m2 Final     Comment:     Non  GFR Calc     Liver Function Studies -   Recent Labs   Lab Test  07/01/18   0940 04/19/18   PROTTOTAL  6.7*  6.9   ALBUMIN  3.1*  3.6   BILITOTAL  0.4  0.5   ALKPHOS  68  67   AST  10  14   ALT  14  10       TSH   Date Value Ref Range Status   07/02/2018 2.20 0.40 - 4.00 mU/L Final   06/14/2018 3.62 0.30 - 4.50 uIU/mL Final        Lab Results   Component Value Date    A1C 7.2 07/05/2018    A1C 7.4 07/01/2018       Assessment/Plan:  (E11.22,  N18.3,  Z79.4) Type 2 diabetes mellitus with stage 3 chronic kidney disease, with long-term current use of insulin (H)  (primary encounter diagnosis)  Comment: Chronic, with recent increase in blood sugars.  Plan: Increase basaglar to 22U and nsg to update me in one week on accuchecks.  Continue with current schedule of monitoring.  HgbA1C next lab day.     (N18.3) CKD (chronic kidney disease) stage 3, GFR 30-59 ml/min, est GFR: 10/30/17: 46,, 12/14/17: 59  Comment: Chronic  Plan: Renally adjust dose of medications.  BMP next lab day.    (I69.354) Hemiparesis affecting left side as late effect of cerebrovascular accident (H)  Comment: Chronic  Plan: Continue xarelto and monitoring.  Nsg to ambulate pt bid.     (I48.2) Chronic atrial fibrillation (H)  Comment: Rate controlled  Plan: Continue current dose of xarelto for anticoagulation and monitor heart rates.    (E03.4) Hypothyroidism due to acquired atrophy of thyroid  Comment: Chronic, controlled with current dose of levothyroxine  Plan: Continue current dose of levothyroxine and check TSH annually.    (I69.919) Cognitive deficits as late effect of cerebrovascular disease  Comment: Chronic  Plan: Monitor for further neurological changes.     (Z95.0) Cardiac  pacemaker in situ, Placed on 7/20/18  Comment: Chronic  Plan: Continue monitoring as recommended by cardiology.    (I12.9,  N18.3) Benign hypertension with CKD (chronic kidney disease) stage III (H)  Comment: Based on JNC-8 goals,  patients age of 87 year old, presence of diabetes or CKD, and goals of care goal BP is <150/90 mm Hg. Noted patients BP is higher than goal and will adjust plan of care by starting lisinopril 2.5 mg at HS.  Nsg to update NP in one week on BP.s    (R63.4) Loss of weight  Comment: Unclear cause  Plan: Nsg to monitor     Electronically signed by  TOBY Momin CNP

## 2018-10-02 ENCOUNTER — NURSING HOME VISIT (OUTPATIENT)
Dept: GERIATRICS | Facility: CLINIC | Age: 83
End: 2018-10-02
Payer: COMMERCIAL

## 2018-10-02 VITALS
HEIGHT: 69 IN | HEART RATE: 68 BPM | SYSTOLIC BLOOD PRESSURE: 129 MMHG | BODY MASS INDEX: 25.62 KG/M2 | TEMPERATURE: 96.5 F | WEIGHT: 173 LBS | DIASTOLIC BLOOD PRESSURE: 73 MMHG | RESPIRATION RATE: 20 BRPM

## 2018-10-02 DIAGNOSIS — I12.9 BENIGN HYPERTENSION WITH CKD (CHRONIC KIDNEY DISEASE) STAGE III (H): ICD-10-CM

## 2018-10-02 DIAGNOSIS — I69.354 HEMIPARESIS AFFECTING LEFT SIDE AS LATE EFFECT OF CEREBROVASCULAR ACCIDENT (H): ICD-10-CM

## 2018-10-02 DIAGNOSIS — I69.919 COGNITIVE DEFICITS AS LATE EFFECT OF CEREBROVASCULAR DISEASE: ICD-10-CM

## 2018-10-02 DIAGNOSIS — N18.30 CKD (CHRONIC KIDNEY DISEASE) STAGE 3, GFR 30-59 ML/MIN (H): ICD-10-CM

## 2018-10-02 DIAGNOSIS — E03.4 HYPOTHYROIDISM DUE TO ACQUIRED ATROPHY OF THYROID: ICD-10-CM

## 2018-10-02 DIAGNOSIS — E11.22 TYPE 2 DIABETES MELLITUS WITH STAGE 3 CHRONIC KIDNEY DISEASE, WITH LONG-TERM CURRENT USE OF INSULIN (H): Primary | ICD-10-CM

## 2018-10-02 DIAGNOSIS — Z95.0 CARDIAC PACEMAKER IN SITU: ICD-10-CM

## 2018-10-02 DIAGNOSIS — R63.4 LOSS OF WEIGHT: ICD-10-CM

## 2018-10-02 DIAGNOSIS — N18.30 BENIGN HYPERTENSION WITH CKD (CHRONIC KIDNEY DISEASE) STAGE III (H): ICD-10-CM

## 2018-10-02 DIAGNOSIS — I48.20 CHRONIC ATRIAL FIBRILLATION (H): ICD-10-CM

## 2018-10-02 DIAGNOSIS — N18.30 TYPE 2 DIABETES MELLITUS WITH STAGE 3 CHRONIC KIDNEY DISEASE, WITH LONG-TERM CURRENT USE OF INSULIN (H): Primary | ICD-10-CM

## 2018-10-02 DIAGNOSIS — Z79.4 TYPE 2 DIABETES MELLITUS WITH STAGE 3 CHRONIC KIDNEY DISEASE, WITH LONG-TERM CURRENT USE OF INSULIN (H): Primary | ICD-10-CM

## 2018-10-02 PROCEDURE — 99309 SBSQ NF CARE MODERATE MDM 30: CPT | Performed by: NURSE PRACTITIONER

## 2018-10-02 RX ORDER — LISINOPRIL 2.5 MG/1
2.5 TABLET ORAL AT BEDTIME
Qty: 30 TABLET | Refills: 1
Start: 2018-10-02 | End: 2019-01-24

## 2018-10-02 RX ORDER — INSULIN GLARGINE 100 [IU]/ML
22 INJECTION, SOLUTION SUBCUTANEOUS AT BEDTIME
Start: 2018-10-02 | End: 2018-11-19

## 2018-10-04 ENCOUNTER — TRANSFERRED RECORDS (OUTPATIENT)
Dept: HEALTH INFORMATION MANAGEMENT | Facility: CLINIC | Age: 83
End: 2018-10-04

## 2018-10-04 LAB
BUN SERPL-MCNC: 22 MG/DL (ref 9–26)
CALCIUM SERPL-MCNC: 9.4 MG/DL (ref 8.4–10.4)
CHLORIDE SERPLBLD-SCNC: 101 MMOL/L (ref 98–109)
CO2 SERPL-SCNC: 23 MMOL/L (ref 22–31)
CREAT SERPL-MCNC: 1.14 MG/DL (ref 0.73–1.18)
GFR SERPL CREATININE-BSD FRML MDRD: 58 ML/MIN/1.73M2
GLUCOSE SERPL-MCNC: 163 MG/DL (ref 70–100)
HBA1C MFR BLD: 7.2 % (ref 4–5.6)
HEMOGLOBIN: 12 G/DL (ref 13.4–17.5)
POTASSIUM SERPL-SCNC: 4.1 MMOL/L (ref 3.5–5.2)
SODIUM SERPL-SCNC: 134 MMOL/L (ref 136–145)

## 2018-10-05 ENCOUNTER — ALLIED HEALTH/NURSE VISIT (OUTPATIENT)
Dept: CARDIOLOGY | Facility: CLINIC | Age: 83
End: 2018-10-05
Payer: COMMERCIAL

## 2018-10-05 DIAGNOSIS — Z95.0 CARDIAC PACEMAKER IN SITU: Primary | ICD-10-CM

## 2018-10-05 PROCEDURE — 93294 REM INTERROG EVL PM/LDLS PM: CPT | Performed by: INTERNAL MEDICINE

## 2018-10-05 PROCEDURE — 93296 REM INTERROG EVL PM/IDS: CPT | Performed by: INTERNAL MEDICINE

## 2018-10-05 NOTE — PROGRESS NOTES
[employer] (D) 6 week Remote PPM Device Check  AP: 1 % : 23 %  Mode: DDD        Presenting Rhythm: AS/VS, rates 68-70bpm  Heart Rate: Adequate rates per histogram  Sensing: Stable    Pacing Threshold: Stable    Impedance: Stable  Battery Status: 9.5 years  Atrial Arrhythmia: 10 mode switch episodes comprising 3% of the time. Longest episode lasted 5 hours, ventricular rates controlled. Taking Xarelto.    Ventricular Arrhythmia: None     Care Plan: F/u PPM [place] q 3 months. Faxed results to nursing staff. ABIMAEL OrtizT

## 2018-10-05 NOTE — MR AVS SNAPSHOT
After Visit Summary   10/5/2018    Elias Mckee    MRN: [id number]           Patient Information     Date Of Birth          8/10/1931        Visit Information        Provider Department      10/5/2018 2:45 PM [facility]        Today's Diagnoses     Cardiac pacemaker in situ    -  1       Follow-ups after your visit        Your next 10 appointments already scheduled     Oct 05, 2018  2:45 PM CDT   Remote PPM Check with TURNER TECH1   [facility] ([facility])    [address] [id number] [phone] OPT 2           This appointment is for a remote check of your pacemaker.  This is not an appointment at the office.            Jan 11, 2019  2:45 PM CST   Remote PPM Check with TURNER TECH1   [facility] ([facility])    [address] [id number] [phone] OPT 2           This appointment is for a remote check of your pacemaker.  This is not an appointment at the office.              Who to contact     If you have questions or need follow up information about today's clinic visit or your schedule please contact [facility] directly at [phone].  Normal or non-critical lab and imaging results will be communicated to you by MyChart, letter or phone within 4 business days after the clinic has received the results. If you do not hear from us within 7 days, please contact the clinic through MyChart or phone. If you have a critical or abnormal lab result, we will notify you by phone as soon as possible.  Submit refill requests through [employer] or call your pharmacy and they will forward the refill request to us. Please allow 3 business days for your refill to be completed.          Additional Information About Your Visit        Care EveryWhere ID     This is your Care  EveryWhere ID. This could be used by other organizations to access your [facility] medical records  [id number]         Blood Pressure from Last 3 Encounters:   10/01/18 129/73   09/24/18 124/68   08/16/18 113/72    Weight from Last 3 Encounters:   10/01/18 78.5 kg (173 lb)   09/24/18 80.7 kg (178 lb)   08/16/18 76.9 kg (169 lb 9.6 oz)              We Performed the Following     INTERROGATION DEVICE EVAL REMOTE, PACER/ICD (30425)     PM DEVICE INTERROGATE REMOTE (72853)        Primary Care Provider Office Phone # Fax #    Yvette Bonner, APRN [phone]       [address] [place] [address]        Equal Access to Services     MELINDA SERRANO : Hadii aad ku hadasho Soomaali, waaxda luqadaha, qaybta kaalmada adeegyada, waxay jenyin hayvamshi ayala . So [facility] [phone].    ATENCIÓN: Si habla español, tiene a whitehead disposición servicios gratuitos de asistencia lingüística. Llame al [phone].    We comply with applicable federal civil rights laws and [place] laws. We do not discriminate on the basis of race, color, national origin, age, disability, sex, sexual orientation, or gender identity.            Thank you!     Thank you for choosing [facility]  for your care. Our goal is always to provide you with excellent care. Hearing back from our patients is one way we can continue to improve our services. Please take a few minutes to complete the written survey that you may receive in the mail after your visit with us. Thank you!             Your Updated Medication List - Protect others around you: Learn how to safely use, store and throw away your medicines at www.disposemymeds.org.          This list is accurate as of 10/5/18  9:12 AM.  Always use your most recent med list.                   Brand Name Dispense Instructions for use Diagnosis    ACETAMINOPHEN PO      Take 1,000 mg by mouth 3 times daily For pain        BASAGLAR 100 UNIT/ML  injection      Inject 22 Units Subcutaneous At Bedtime    Type 2 diabetes mellitus with stage 3 chronic kidney disease, with long-term current use of insulin (H)       cholecalciferol 1000 UNIT tablet    vitamin D3     Take 2,000 Units by mouth daily        eucerin cream      Apply to toes two times a day        FLONASE NA      Spray 2 sprays into both nostrils daily        LEVOTHYROXINE SODIUM PO      Take 25 mcg by mouth daily        LIPITOR PO      Take 40 mg by mouth At Bedtime        lisinopril 2.5 MG tablet    PRINIVIL/Zestril    30 tablet    Take 1 tablet (2.5 mg) by mouth At Bedtime    Benign hypertension with CKD (chronic kidney disease) stage III (H)       polyethylene glycol powder    MIRALAX/GLYCOLAX     Take 17 g by mouth daily        rivaroxaban ANTICOAGULANT 20 MG Tabs tablet    XARELTO    30 tablet    Take 1 tablet (20 mg) by mouth daily (with dinner)    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

## 2018-10-24 NOTE — PLAN OF CARE
Cardiology Heme/Onc Heme/Onc Heme/Onc Heme/Onc Heme/Onc Hospitalist Hospitalist Hospitalist Hospitalist Hospitalist Hospitalist Infectious Disease Infectious Disease Nephrology Nephrology Nephrology Nephrology Nephrology Nephrology Problem: Goal/Outcome  Goal: Goal Outcome Summary  Outcome: No Change  FOCUS/GOAL  Bladder management, Nutrition/Feeding/Swallowing precautions, Medication management and Mobility     ASSESSMENT, INTERVENTIONS AND CONTINUING PLAN FOR GOAL:  Pt alert to person and situation. Used call light appropriately this shift. Denied pain. Needs assistance to place urinal. Ao2 pivot transfer from wc<>bed. Needs repositioning q2h. Noticed more coughing throughout shift-non productive. Pt ate 100% and received insulin coverage before dinner and for carb coverage. TF held tonight. Pt received x2 60mL flushes this shift via [place]. Dry skin on feet and back-lotion applied. Pt reported he doesn't think he can read a clock correctly anymore. Staff tested this throughout the night and pt was incorrect with the time each attempt. Pt was unable to determine if it was his vision of a cognitive deficit. Pt asked his daughter to bring a watch so he knows the time of day. Continue with poc.            Hospitalist Heme/Onc Hospitalist Infectious Disease

## 2018-10-29 ENCOUNTER — NURSING HOME VISIT (OUTPATIENT)
Dept: GERIATRICS | Facility: CLINIC | Age: 83
End: 2018-10-29

## 2018-10-29 DIAGNOSIS — I48.20 CHRONIC ATRIAL FIBRILLATION (H): ICD-10-CM

## 2018-10-29 DIAGNOSIS — I69.354 HEMIPARESIS AFFECTING LEFT SIDE AS LATE EFFECT OF CEREBROVASCULAR ACCIDENT (H): ICD-10-CM

## 2018-10-29 DIAGNOSIS — Z79.4 TYPE 2 DIABETES MELLITUS WITH STAGE 3 CHRONIC KIDNEY DISEASE, WITH LONG-TERM CURRENT USE OF INSULIN (H): Primary | ICD-10-CM

## 2018-10-29 DIAGNOSIS — N18.30 TYPE 2 DIABETES MELLITUS WITH STAGE 3 CHRONIC KIDNEY DISEASE, WITH LONG-TERM CURRENT USE OF INSULIN (H): Primary | ICD-10-CM

## 2018-10-29 DIAGNOSIS — R63.4 LOSS OF WEIGHT: ICD-10-CM

## 2018-10-29 DIAGNOSIS — E11.22 TYPE 2 DIABETES MELLITUS WITH STAGE 3 CHRONIC KIDNEY DISEASE, WITH LONG-TERM CURRENT USE OF INSULIN (H): Primary | ICD-10-CM

## 2018-11-01 ENCOUNTER — TRANSFERRED RECORDS (OUTPATIENT)
Dept: HEALTH INFORMATION MANAGEMENT | Facility: CLINIC | Age: 83
End: 2018-11-01

## 2018-11-01 LAB
BUN SERPL-MCNC: 19 MG/DL (ref 9–26)
CALCIUM SERPL-MCNC: 9.2 MG/DL (ref 8.4–10.4)
CHLORIDE SERPLBLD-SCNC: 104 MMOL/L (ref 98–109)
CO2 SERPL-SCNC: 25 MMOL/L (ref 22–31)
CREAT SERPL-MCNC: 1.15 MG/DL (ref 0.73–1.18)
DIFFERENTIAL: ABNORMAL
ERYTHROCYTE [DISTWIDTH] IN BLOOD BY AUTOMATED COUNT: 15.9 % (ref 11–15)
GFR SERPL CREATININE-BSD FRML MDRD: 57 ML/MIN/1.73M2
GLUCOSE SERPL-MCNC: 146 MG/DL (ref 70–100)
HCT VFR BLD AUTO: 35.5 % (ref 39–51)
HEMOGLOBIN: 12 G/DL (ref 13.4–17.5)
MCV RBC AUTO: 92.9 FL (ref 80–100)
PLATELET # BLD AUTO: 161 K/CMM (ref 140–450)
POTASSIUM SERPL-SCNC: 4.1 MMOL/L (ref 3.5–5.2)
RBC # BLD AUTO: 3.82 M/CMM (ref 4.2–5.9)
SODIUM SERPL-SCNC: 139 MMOL/L (ref 136–145)
WBC # BLD AUTO: 7.2 K/CMM (ref 3.8–11)

## 2018-11-01 NOTE — PROGRESS NOTES
"Pt was seen for a regulatory LTC visit  Case reviewed with NP    Pt has lost 20 pounds over the last year  He has denied abd pain, nausea, feels like he eats well  This am, he states he feels \"lousy,\" but does not elaborate  Lisinopril has recently been started secondary to elevated BP  Basaglar and am novolog insulin have been increased recently    VSS  BP wnl  BG 100s-200s  Sleepy, in NAD, sitting at dining room table  Lungs clear  CV irreg  Abd soft  L sided weakness, as before      BMP stable  Hgb A1C 7.2 Hgb 12    Assessment    S/p CVA with L sided hemiparesis, stable    HTN, stable after initiation of lisinopril    DM fair control    Wt loss 20 # over the last year, etiology unclear    Plan  Monitor vitals, BG, wt, caloric intake  Further evaluation if wt loss continues    "

## 2018-11-19 ENCOUNTER — NURSING HOME VISIT (OUTPATIENT)
Dept: GERIATRICS | Facility: CLINIC | Age: 83
End: 2018-11-19
Payer: COMMERCIAL

## 2018-11-19 ENCOUNTER — TRANSFERRED RECORDS (OUTPATIENT)
Dept: HEALTH INFORMATION MANAGEMENT | Facility: CLINIC | Age: 83
End: 2018-11-19

## 2018-11-19 VITALS
WEIGHT: 176 LBS | SYSTOLIC BLOOD PRESSURE: 129 MMHG | DIASTOLIC BLOOD PRESSURE: 77 MMHG | BODY MASS INDEX: 26.07 KG/M2 | HEIGHT: 69 IN | HEART RATE: 95 BPM

## 2018-11-19 DIAGNOSIS — N18.30 CKD (CHRONIC KIDNEY DISEASE) STAGE 3, GFR 30-59 ML/MIN (H): ICD-10-CM

## 2018-11-19 DIAGNOSIS — Z79.4 TYPE 2 DIABETES MELLITUS WITH STAGE 3 CHRONIC KIDNEY DISEASE, WITH LONG-TERM CURRENT USE OF INSULIN (H): ICD-10-CM

## 2018-11-19 DIAGNOSIS — I69.919 COGNITIVE DEFICITS AS LATE EFFECT OF CEREBROVASCULAR DISEASE: ICD-10-CM

## 2018-11-19 DIAGNOSIS — N18.30 TYPE 2 DIABETES MELLITUS WITH STAGE 3 CHRONIC KIDNEY DISEASE, WITH LONG-TERM CURRENT USE OF INSULIN (H): ICD-10-CM

## 2018-11-19 DIAGNOSIS — R44.3 HALLUCINATIONS: Primary | ICD-10-CM

## 2018-11-19 DIAGNOSIS — I25.9 CHRONIC ISCHEMIC HEART DISEASE: ICD-10-CM

## 2018-11-19 DIAGNOSIS — I12.9 BENIGN HYPERTENSION WITH CKD (CHRONIC KIDNEY DISEASE) STAGE III (H): ICD-10-CM

## 2018-11-19 DIAGNOSIS — E11.22 TYPE 2 DIABETES MELLITUS WITH STAGE 3 CHRONIC KIDNEY DISEASE, WITH LONG-TERM CURRENT USE OF INSULIN (H): ICD-10-CM

## 2018-11-19 DIAGNOSIS — N18.30 BENIGN HYPERTENSION WITH CKD (CHRONIC KIDNEY DISEASE) STAGE III (H): ICD-10-CM

## 2018-11-19 DIAGNOSIS — Z86.73 H/O: CVA (CEREBROVASCULAR ACCIDENT): ICD-10-CM

## 2018-11-19 LAB
BUN SERPL-MCNC: 26 MG/DL (ref 9–26)
CALCIUM SERPL-MCNC: 9.4 MG/DL (ref 8.4–10.4)
CHLORIDE SERPLBLD-SCNC: 105 MMOL/L (ref 98–109)
CO2 SERPL-SCNC: 26 MMOL/L (ref 22–31)
CREAT SERPL-MCNC: 1.2 MG/DL (ref 0.73–1.18)
DIFFERENTIAL: ABNORMAL
ERYTHROCYTE [DISTWIDTH] IN BLOOD BY AUTOMATED COUNT: 16 % (ref 11–15)
GFR SERPL CREATININE-BSD FRML MDRD: 54 ML/MIN/1.73M2
GLUCOSE SERPL-MCNC: 131 MG/DL (ref 70–100)
HCT VFR BLD AUTO: 36 % (ref 39–51)
HEMOGLOBIN: 12.1 G/DL (ref 13.4–17.5)
MCV RBC AUTO: 92.3 FL (ref 80–100)
PLATELET # BLD AUTO: 173 K/CMM (ref 140–450)
POTASSIUM SERPL-SCNC: 4.3 MMOL/L (ref 3.5–5.2)
RBC # BLD AUTO: 3.9 M/CMM (ref 4.2–5.9)
SODIUM SERPL-SCNC: 139 MMOL/L (ref 136–145)
WBC # BLD AUTO: 6.5 K/CMM (ref 3.8–11)

## 2018-11-19 PROCEDURE — 99310 SBSQ NF CARE HIGH MDM 45: CPT | Mod: GW | Performed by: NURSE PRACTITIONER

## 2018-11-19 RX ORDER — ATROPINE SULFATE 10 MG/ML
1 SOLUTION/ DROPS OPHTHALMIC 2 TIMES DAILY
COMMUNITY
End: 2022-01-01

## 2018-11-19 RX ORDER — PREDNISOLONE ACETATE 10 MG/ML
1 SUSPENSION/ DROPS OPHTHALMIC 3 TIMES DAILY
COMMUNITY
End: 2022-01-01

## 2018-11-19 NOTE — PROGRESS NOTES
[place] [facility]    Chief Complaint   Patient presents with     FDC Acute     [facility] Record Number:  [id number]  Place of Service where encounter took place:  [facility] ([facility] [[id number]]    HPI:    Elias Mckee is a 87 year old  (8/10/1931), who is being seen today for an episodic care visit.  HPI information obtained from: facility chart records, facility staff, patient report and [facility] chart review. Pt is unreliable historian due to cognitive loss.  Today's concern is:  Hallucinations  Nsg and family reported yesterday that Elias had numerous comments about his cat.  [facility] that they were hallucinations.  None noted today.  Recent start of Tramadol tid (on 11/1) due to pain causing him to call out, which has improved significantly. Pt is much happier per nsg and more conversational.  Outbursts are much more infrequent and of shorter duration.   Labs today showed a normal WBC and improved BMP.    Cognitive deficits as late effect of cerebrovascular disease  Does score a 15 on the BIMS but has significant lack of insight into his physical or functional issues.  Has noted impulse control following his last CVA.    Type 2 diabetes mellitus with stage 3 chronic kidney disease, with long-term current use of insulin (H)  Accuchecks in past 7 days: 0730: 152-181, 11am: 151-247, 1700: 154-197,  HgbA1C one month ago was 7.2%.  Last dose change of insulin was an increase of his basaglar on 10/29.    Chronic ischemic heart disease  No obvious signs of chest pain.  CVA was felt to be cardioembolic and remains on Xarelto.    Benign hypertension with CKD (chronic kidney disease) stage III (H)  BP ranges in past week: 85//88, with a BP of 171/98 this morning.    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  Has new pacemaker.    CKD (chronic kidney disease) stage 3, GFR 30-59 ml/min, est GFR:  10/30/17: 46,, 12/14/17: 59  Labs today showed a creatinine of 1.20 and an est GFR of 54.     ALLERGIES: No known allergies  Past Medical, Surgical, Family and Social History reviewed and updated in [facility].    Current Outpatient Prescriptions   Medication Sig Dispense Refill     ACETAMINOPHEN PO Take 1,000 mg by mouth 3 times daily For pain       Atorvastatin Calcium (LIPITOR PO) Take 40 mg by mouth At Bedtime       atropine 1 % ophthalmic solution Place 1 drop into the right eye 2 times daily       cholecalciferol (VITAMIN D) 1000 UNIT tablet Take 2,000 Units by mouth daily       Fluticasone Propionate (FLONASE NA) Spray 2 sprays into both nostrils daily       INSULIN ASPART SC Inject 2 Units Subcutaneous daily (with breakfast)       Insulin Glargine (BASAGLAR KWIKPEN SC) Inject 24 Units Subcutaneous At Bedtime       LEVOTHYROXINE SODIUM PO Take 25 mcg by mouth daily       lisinopril (PRINIVIL/ZESTRIL) 2.5 MG tablet Take 1 tablet (2.5 mg) by mouth At Bedtime 30 tablet 1     polyethylene glycol (MIRALAX/GLYCOLAX) powder Take 17 g by mouth daily        prednisoLONE acetate (PRED FORTE) 1 % ophthalmic susp Place 1 drop into the right eye 2 times daily       rivaroxaban ANTICOAGULANT (XARELTO) 20 MG TABS tablet Take 1 tablet (20 mg) by mouth daily (with dinner) 30 tablet 1     Skin Protectants, Misc. (EUCERIN) cream Apply to toes two times a day       tamsulosin (FLOMAX) 0.4 MG capsule Take 1 capsule (0.4 mg) by mouth daily 60 capsule 0     timolol, PF, (TIMOPTIC OCUDOSE) 0.5 % ophthalmic solution Place 1 drop into the right eye 2 times daily       TRAMADOL HCL PO Take 50 mg by mouth 3 times daily And q4h prn       Medications reviewed:  Medications reconciled to facility chart and changes were made to reflect current medications as identified as above med list. Below are the changes that were made:   Medications stopped since last EPIC medication reconciliation:   There are no discontinued medications.    Medications  started since last EPIC medication reconciliation:  No orders of the defined types were placed in this encounter.    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  Had bilateral hearing aides  Has upper and lower dentures., CV:  chest pain, dyspnea on exertion and Positive for atrial fibrillation and h/o  "ventricular fibrillation and CAD.Now with pacemaker due to AV block. RESPIRATORY: shortness of breath, cough, asthma and wheezing, :  dysuria and Positive for h/o prostate cancer, GI:  abdominal pain, constipation, diarrhea, nausea, vomiting and Positive for some dysphagia following CVA., NEURO:  headaches and Positive for CVA in 2017 with left sided paralysis and some dysphagia. with mild cognitive impairent., PSYCH: anxiety and depression, MUSCULOSKELETAL: back pain, joint pain and fibromyalgia and SKIN: Positive for fungal fingernails on left hand and bilateral pedal fungal nails.    Physical Exam:  /77  Pulse 95  Ht 5' 9\" (1.753 m)  Wt 176 lb (79.8 kg)  BMI 25.99 kg/m2  GENERAL APPEARANCE:  Bright, calm and pleasant. Transferred from w/c to bed. Able to express self verbally, Denies any acute distress. .  Head: Normocephalic with slight left sided mouth droop.  Eye:  No redness noted in sclera  No discharge. Holding eyes closed per usual  ENIT:  No rhinitis.  Hearing well. Moist oral cavity. No exudate.  CV:  Irregularly irregular rhythm.  No murmur.  DP pulses +1 bilaterally.     RESP:  No cough.  Quiet, effortless respirations.  Clear to auscultation bilaterally.   ABDOMEN:  Soft rounded abdomen.  Bowel sounds positive all four quadrants.  No tenderness with palpation.  NEURO:   Strong hand grasp right hand and only minimal ability to move left hand and arm.  Left sided facial droop.  Strong ability to raise right leg against resistance and moderate left leg strength.   Oriented to person.    PSYCH: Pleasant and calm. . When asked why he was holding his eyes closed, he laughed and stated \"I am checking my eyelids for leaks\".       Recent Labs:   CBC RESULTS:  11/19/18:  WBC: 6.5, Hgb: 12.1, MCV: 92.3, Platelet: 173   Recent Labs   Lab Test 11/01/18 10/04/18 08/15/18  07/20/18   1135  07/03/18   0835   WBC  7.2   --   5.3  7.8  6.0   RBC  3.82*   --   3.78*  3.79*  3.64*   HGB  12.0*  12.0*  11.7*  " 11.9*  11.3*   HCT  35.5*   --   35.9*  34.3*  32.8*   MCV  92.9   --   95.0  91  90   MCH   --    --    --   31.4  31.0   MCHC   --    --    --   34.7  34.5   RDW  15.9*   --   16.7*  16.3*  16.0*   PLT  161   --   146  171  159       Last Basic Metabolic Panel:  11/19/18:  BUN: 26, creat: 1.20, est GFR: 54, Glucose: 131, Calcium: 9.4, Na: 139, K: 4.3, Chl: 105, CO2: 26  Recent Labs   Lab Test 11/01/18 10/04/18   NA  139  134*   POTASSIUM  4.1  4.1   CHLORIDE  104  101   TRENT  9.2  9.4   CO2  25  23   BUN  19  22   CR  1.15  1.14   GLC  146*  163*     GFR Estimate   Date Value Ref Range Status   11/01/2018 57 (L) >60 ml/min/1.73m2 Final   10/04/2018 58 (L) >60 mL/min/1.73m2 Final   08/15/2018 54 (L) >60 ml/min/1.73m2 Final   07/20/2018 58 (L) >60 mL/min/1.7m2 Final     Comment:     Non  GFR Calc   07/06/2018 52 (A) >60 mL/min/1.73m2 Final     Liver Function Studies -   Recent Labs   Lab Test  07/01/18   0940 04/19/18   PROTTOTAL  6.7*  6.9   ALBUMIN  3.1*  3.6   BILITOTAL  0.4  0.5   ALKPHOS  68  67   AST  10  14   ALT  14  10       TSH   Date Value Ref Range Status   07/02/2018 2.20 0.40 - 4.00 mU/L Final   06/14/2018 3.62 0.30 - 4.50 uIU/mL Final       Lab Results   Component Value Date    A1C 7.2 10/04/2018    A1C 7.2 07/05/2018     Family Communication:  Message left for daughter with update.    Assessment/Plan:  (R44.3) Hallucinations  (primary encounter diagnosis)  Comment: No evidence of hallucinations today  Plan: Continue to monitor.  IF they recur, likely related to his vascular disease vs the tramadol.    (I69.919) Cognitive deficits as late effect of cerebrovascular disease  Comment: Slowly advancing  Plan: Continue to provide full assist with all ADL'.s and monitor for any progression of decline in cognition.    (E11.22,  N18.3,  Z79.4) Type 2 diabetes mellitus with stage 3 chronic kidney disease, with long-term current use of insulin (H)  Comment: Chronic with improved control.  HgbA1C  goal <9%, at goal  Plan: Continue with current dose of insulin and current schedule of accuchecks.  HgbA1C q3m    (I25.9) Chronic ischemic heart disease  Comment: Chronic  Plan: Continue current POC.     (I12.9,  N18.3) Benign hypertension with CKD (chronic kidney disease) stage III (H)  Comment: BP goal: <150/90, at goal  Plan: Continue current POC.     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    (N18.3) CKD (chronic kidney disease) stage 3, GFR 30-59 ml/min, est GFR: 10/30/17: 46,, 12/14/17: 59  Comment: Chronic, but stable  Plan: Renally adjust dose of medications.  BMP q3m or with acute change in condition.    Total time spent with patient visit was 37 min including patient visit, review of past records and phone call to patient contact. Greater than 50% of total time spent with counseling and coordinating care due to reports of hallucinations.     Electronically signed by  TOBY Momin CNP

## 2018-11-28 ENCOUNTER — NURSING HOME VISIT (OUTPATIENT)
Dept: GERIATRICS | Facility: CLINIC | Age: 83
End: 2018-11-28
Payer: COMMERCIAL

## 2018-11-28 VITALS
SYSTOLIC BLOOD PRESSURE: 129 MMHG | BODY MASS INDEX: 26.07 KG/M2 | DIASTOLIC BLOOD PRESSURE: 77 MMHG | HEART RATE: 95 BPM | RESPIRATION RATE: 20 BRPM | HEIGHT: 69 IN | WEIGHT: 176 LBS

## 2018-11-28 DIAGNOSIS — I10 BENIGN ESSENTIAL HYPERTENSION: ICD-10-CM

## 2018-11-28 DIAGNOSIS — Z79.4 TYPE 2 DIABETES MELLITUS WITH DIABETIC NEPHROPATHY, WITH LONG-TERM CURRENT USE OF INSULIN (H): Primary | ICD-10-CM

## 2018-11-28 DIAGNOSIS — I69.391 DYSPHAGIA DUE TO RECENT CEREBROVASCULAR ACCIDENT (CVA): ICD-10-CM

## 2018-11-28 DIAGNOSIS — I69.354 HEMIPARESIS AFFECTING LEFT SIDE AS LATE EFFECT OF CEREBROVASCULAR ACCIDENT (H): ICD-10-CM

## 2018-11-28 DIAGNOSIS — E11.21 TYPE 2 DIABETES MELLITUS WITH DIABETIC NEPHROPATHY, WITH LONG-TERM CURRENT USE OF INSULIN (H): Primary | ICD-10-CM

## 2018-11-28 PROCEDURE — 99318 ZZC ANNUAL NURSING FAC ASSESSMNT, STABLE: CPT | Mod: GW | Performed by: NURSE PRACTITIONER

## 2018-11-28 NOTE — LETTER
"    11/28/2018        RE: Elias Mckee  [address] [address]        [place] [facility]  Chief Complaint   Patient presents with     Annual Comprehensive Nursing Home       [facility] Medical Record Number:  [id number]  Place of Service where encounter took place:  [facility] [[id number]]      HPI:    Elias Mckee is a 87 year old  (8/10/1931), who is being seen today for an annual comprehensive visit.  HPI information obtained from: facility chart records, facility staff, patient report and [facility] chart review.  Today's concerns are:     Type 2 diabetes mellitus with diabetic nephropathy, with long-term current use of insulin (H)  Hemiparesis affecting left side as late effect of cerebrovascular accident (H)  Dysphagia due to recent cerebrovascular accident (CVA)  Benign essential hypertension     Patient seen today sitting in WC prior to lunch, states wanting to \"check my eyelids for leaks\", limited verbal transaction but pleasantly confused, L hand brace on, per staff had periods of screaming/yelling but has subsided with use of tramadol, requires meal setup and cutting due to hemiparesis, overall status is WNL with no acute concerns.     BP Readings from Last 3 Encounters:   11/28/18 129/77   11/19/18 129/77   10/01/18 129/73     Pulse Readings from Last 4 Encounters:   11/28/18 95   11/19/18 95   10/01/18 68   09/24/18 76     Wt Readings from Last 5 Encounters:   11/28/18 176 lb (79.8 kg)   11/19/18 176 lb (79.8 kg)   10/01/18 173 lb (78.5 kg)   09/24/18 178 lb (80.7 kg)   08/16/18 169 lb 9.6 oz (76.9 kg)       ALLERGIES: No known allergies  PROBLEM LIST:  Patient Active Problem List   Diagnosis     Chronic ischemic heart disease     Personal history of other diseases of circulatory system     HYPERLIPIDEMIA LDL GOAL <100     Advance Care Planning     CKD (chronic kidney disease) stage 3, GFR 30-59 ml/min, est GFR: 10/30/17: 46,, 12/14/17: 59     Leg " weakness     Ataxia     BPH (benign prostatic hyperplasia)     Type 2 diabetes mellitus with diabetic nephropathy (H)     History of actinic keratoses     History of squamous cell carcinoma     S/P Mohs surgery for basal cell carcinoma     H/O: CVA (cerebrovascular accident), 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     Cardiac pacemaker in situ, Placed on 7/20/18     Loss of weight     PAST MEDICAL HISTORY:  has a past medical history of Acute, but ill-defined, cerebrovascular disease (1-2008); Atrial fibrillation (H); Benign hypertension with CKD (chronic kidney disease) stage III (H) (6/5/2017); Cancer (H); Cardiac pacemaker in situ, Placed on 7/20/18 (7/23/2018); Central retinal vein occlusion, right eye (11/15/2017); Chronic atrial fibrillation (H) (6/5/2017); Chronic ischemic heart disease, unspecified; CKD (chronic kidney disease) stage 3, GFR 30-59 ml/min, est GFR: 10/30/17: 46,, 12/14/17: 59 (2/29/2012); Cognitive deficits as late effect of cerebrovascular disease (6/5/2017); Coronary artery disease; CVA (cerebral infarction); Dysphagia due to recent cerebrovascular accident (CVA) (6/5/2017); Elevated cholesterol; Essential hypertension, benign; H/O prostate cancer  (7/6/2017); H/O: CVA (cerebrovascular accident) (6/5/2017); Hemiparesis affecting left side as late effect of cerebrovascular accident (H) (6/5/2017); Hyperlipidaemia; Loss of weight (10/2/2018); MEDICAL HISTORY OF - (1- 2008); Mobitz type I Wenckebach atrioventricular block, 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  PAST SURGICAL HISTORY:  has a past surgical history that includes seed implantation (2002); Phacoemulsification clear cornea with standard intraocular lens implant (Right, 10/7/2014); and Vitrectomy anterior (Right, 10/7/2014).  FAMILY HISTORY: family history is not on file.  SOCIAL HISTORY:  reports that he quit smoking about 45 years ago. His smoking use included Cigarettes. He started smoking about 65 years ago. He has a 20.00 pack-year smoking history. He has never used smokeless tobacco. He reports that he drinks about 0.6 oz of alcohol per week  He reports that he does not use illicit drugs.  IMMUNIZATIONS:  Most Recent Immunizations   Administered Date(s) Administered     Influenza (High Dose) 3 valent vaccine 10/05/2017     Influenza (IIV3) PF 10/15/2018     Pneumo Conj 13-V (2010&after) 10/19/2017     Pneumococcal 23 valent 09/10/2008     TD (ADULT, 7+) 09/10/2008     Above immunizations pulled from [facility]. [facility] and facility records also reconciled. Outstanding information sent to  to update [facility].  Future immunizations needed:  yearly influenza per facility  protocol  MEDICATIONS:  Current Outpatient Prescriptions   Medication Sig Dispense Refill     ACETAMINOPHEN PO Take 1,000 mg by mouth 3 times daily For pain       Atorvastatin Calcium (LIPITOR PO) Take 40 mg by mouth At Bedtime       atropine 1 % ophthalmic solution Place 1 drop into the right eye 2 times daily       cholecalciferol (VITAMIN D) 1000 UNIT tablet Take 2,000 Units by mouth daily       Fluticasone Propionate (FLONASE NA) Spray 2 sprays into both nostrils daily       INSULIN ASPART SC Inject 2 Units Subcutaneous daily (with breakfast)       Insulin Glargine (BASAGLAR KWIKPEN SC) Inject 24 Units Subcutaneous At Bedtime       LEVOTHYROXINE SODIUM PO Take 25 mcg by mouth daily       lisinopril (PRINIVIL/ZESTRIL) 2.5 MG tablet Take 1 tablet (2.5 mg) by mouth At Bedtime 30 tablet 1     polyethylene glycol (MIRALAX/GLYCOLAX) powder Take 17 g by mouth daily        prednisoLONE acetate (PRED FORTE) 1 % ophthalmic susp Place 1 drop into the right eye 2 times daily       rivaroxaban ANTICOAGULANT (XARELTO) 20 MG TABS tablet Take 1 tablet (20 mg) by mouth daily (with dinner) 30 tablet 1     Skin Protectants, Misc. (EUCERIN) cream Apply to toes two times a day       tamsulosin (FLOMAX) 0.4 MG capsule Take 1 capsule (0.4 mg) by mouth daily 60 capsule 0     timolol, PF, (TIMOPTIC OCUDOSE) 0.5 % ophthalmic solution Place 1 drop into the right eye 2 times daily       TRAMADOL HCL PO Take 50 mg by mouth 3 times daily And q4h prn       Medications reviewed:  Medications reconciled to facility chart and changes were made to reflect current medications as identified as above med list. Below are the changes that were made:   Medications stopped since last EPIC medication reconciliation:   There are no discontinued medications.    Medications started since last [facility] medication reconciliation:  No orders of the defined types were placed in this encounter.    Case Management:  I have reviewed the facility/SNF care plan/MDS  "which was done Nov 2018, including the falls risk, nutrition and pain screening. I also reviewed the current immunizations, and preventive care..Future cancer screening is not clinically indicated secondary to age/goals of care Patient's desire to return to the community is present, but is not able due to care needs . Current Level of Care is appropriate.      Advance Directive Discussion:    I reviewed the current advanced directives as reflected in EPIC, the POLST and the facility chart, and verified the congruency of orders.  I did not due to cognitive impairment review the advance directives with the resident.     Team Discussion:  I communicated with the appropriate disciplines involved with the Plan of Care:   Nursing      Patient Goal:  Patient's goal is unobtainable secondary to cognitive impairment.    Information reviewed:  Medications, vital signs, orders, and nursing notes.    ROS:  Unobtainable secondary to cognitive impairment.     Exam:  /77  Pulse 95  Resp 20  Ht 5' 9\" (1.753 m)  Wt 176 lb (79.8 kg)  BMI 25.99 kg/m2  GENERAL APPEARANCE:  in no distress, somnolent  ENT:  Mouth and posterior oropharynx normal, moist mucous membranes, normal hearing acuity  RESP:  lungs clear to auscultation , no respiratory distress  CV:  regular rate and rhythm, no murmur, rub, or gallop, no edema  ABDOMEN:  no guarding or rebound, bowel sounds normal  M/S:   Gait and station abnormal requires ADL assist, WC mobility  SKIN:  Inspection of skin and subcutaneous tissue baseline, Palpation of skin and subcutaneous tissue baseline  NEURO:   Cranial nerves 2-12 are normal tested and grossly at patient's baseline, Examination of sensation by touch normal  PSYCH:  oriented to unknown, affect and mood normal       Lab/Diagnostic data:   CBC RESULTS:   Recent Labs   Lab Test 11/19/18 11/01/18 07/20/18   1135   07/03/18   0835   WBC  6.5  7.2   < >  7.8   --   6.0   RBC  3.90*  3.82*   < >  3.79*   --   3.64*   HGB "  12.1*  12.0*   < >  11.9*   < >  11.3*   HCT  36.0*  35.5*   < >  34.3*   --   32.8*   MCV  92.3  92.9   < >  91   --   90   MCH   --    --    --   31.4   --   31.0   MCHC   --    --    --   34.7   --   34.5   RDW  16.0*  15.9*   < >  16.3*   --   16.0*   PLT  173  161   < >  171   --   159    < > = values in this interval not displayed.       Last Basic Metabolic Panel:  Recent Labs   Lab Test 11/19/18 11/01/18   NA  139  139   POTASSIUM  4.3  4.1   CHLORIDE  105  104   TRENT  9.4  9.2   CO2  26  25   BUN  26  19   CR  1.20*  1.15   GLC  131*  146*       Liver Function Studies -   Recent Labs   Lab Test  07/01/18   0940 04/19/18   PROTTOTAL  6.7*  6.9   ALBUMIN  3.1*  3.6   BILITOTAL  0.4  0.5   ALKPHOS  68  67   AST  10  14   ALT  14  10       TSH   Date Value Ref Range Status   07/02/2018 2.20 0.40 - 4.00 mU/L Final   06/14/2018 3.62 0.30 - 4.50 uIU/mL Final       Lab Results   Component Value Date    A1C 7.2 10/04/2018    A1C 7.2 07/05/2018         ASSESSMENT/PLAN  Type 2 diabetes mellitus with diabetic nephropathy, with long-term current use of insulin (H)  -BG prior to lunch was 219  -A1C on 10/4/18 was 7.2  -recheck A1C in 6-12 months  -continue basaglar 24u at bedtime and aspart 2u with breakfast  -consider discontinue of aspart and change BG checks to Qam in future with any status change    Hemiparesis affecting left side as late effect of cerebrovascular accident (H)  Dysphagia due to recent cerebrovascular accident (CVA)  -patient stable in supportive environment  -Hx of hallucinations, staff report no changes in status  -per staff hallucinations have improved since starting tramadol TID  -continue xarelto 20mg and lipitor 40mg at bedtime     Benign essential hypertension  Based on JNC-8 goals,  patients age of 87 year old, presence of diabetes or CKD, and goals of care goal BP is  <140/90 mm Hg. Patient is stable with current plan of care and routine assessment.  -continue lisinopril 2.5mg Qday  -staff to  check VS daily, report anomalies          Electronically signed by:  TOBY Alejandro CNP          Sincerely,        TOBY Alejandro CNP

## 2018-11-28 NOTE — PROGRESS NOTES
"[facility]  Chief Complaint   Patient presents with     Annual Comprehensive Nursing Home       [facility] Record Number:  [id number]  Place of Service where encounter took place:  [facility] ([facility]) [[id number]]      HPI:    Elias Mckee is a 87 year old  (8/10/1931), who is being seen today for an annual comprehensive visit.  HPI information obtained from: facility chart records, facility staff, patient report and [facility] chart review.  Today's concerns are:     Type 2 diabetes mellitus with diabetic nephropathy, with long-term current use of insulin (H)  Hemiparesis affecting left side as late effect of cerebrovascular accident (H)  Dysphagia due to recent cerebrovascular accident (CVA)  Benign essential hypertension     Patient seen today sitting in WC prior to lunch, states wanting to \"check my eyelids for leaks\", limited verbal transaction but pleasantly confused, L hand brace on, per staff had periods of screaming/yelling but has subsided with use of tramadol, requires meal setup and cutting due to hemiparesis, overall status is WNL with no acute concerns.     BP Readings from Last 3 Encounters:   11/28/18 129/77   11/19/18 129/77   10/01/18 129/73     Pulse Readings from Last 4 Encounters:   11/28/18 95   11/19/18 95   10/01/18 68   09/24/18 76     Wt Readings from Last 5 Encounters:   11/28/18 176 lb (79.8 kg)   11/19/18 176 lb (79.8 kg)   10/01/18 173 lb (78.5 kg)   09/24/18 178 lb (80.7 kg)   08/16/18 169 lb 9.6 oz (76.9 kg)       ALLERGIES: No known allergies  PROBLEM LIST:  Patient Active Problem List   Diagnosis     Chronic ischemic heart disease     Personal history of other diseases of circulatory system     HYPERLIPIDEMIA LDL GOAL <100     Advance Care Planning     CKD (chronic kidney disease) stage 3, GFR 30-59 ml/min, est GFR: 10/30/17: 46,, 12/14/17: 59     Leg weakness     Ataxia     BPH (benign prostatic hyperplasia)     Type 2 diabetes mellitus with " diabetic nephropathy (H)     History of actinic keratoses     History of squamous cell carcinoma     S/P Mohs surgery for basal cell carcinoma     H/O: CVA (cerebrovascular accident), 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     Cardiac pacemaker in situ, Placed on 7/20/18     Loss of weight     PAST MEDICAL HISTORY:  has a past medical history of Acute, but ill-defined, cerebrovascular disease (1-2008); Atrial fibrillation (H); Benign hypertension with CKD (chronic kidney disease) stage III (H) (6/5/2017); Cancer (H); Cardiac pacemaker in situ, Placed on 7/20/18 (7/23/2018); Central retinal vein occlusion, right eye (11/15/2017); Chronic atrial fibrillation (H) (6/5/2017); Chronic ischemic heart disease, unspecified; CKD (chronic kidney disease) stage 3, GFR 30-59 ml/min, est GFR: 10/30/17: 46,, 12/14/17: 59 (2/29/2012); Cognitive deficits as late effect of cerebrovascular disease (6/5/2017); Coronary artery disease; CVA (cerebral infarction); Dysphagia due to recent cerebrovascular accident (CVA) (6/5/2017); Elevated cholesterol; Essential hypertension, benign; H/O prostate cancer (7/6/2017); H/O: CVA (cerebrovascular accident) (6/5/2017); Hemiparesis affecting left side as late  effect of cerebrovascular accident (H) (6/5/2017); Hyperlipidaemia; Loss of weight (10/2/2018); MEDICAL HISTORY OF - (1- 2008); Mobitz type I Wenckebach atrioventricular block, 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  PAST SURGICAL HISTORY:  has a past surgical history that includes seed implantation (2002); Phacoemulsification clear cornea with standard intraocular lens implant (Right, 10/7/2014); and Vitrectomy anterior (Right, 10/7/2014).  FAMILY HISTORY: family history is not on file.  SOCIAL HISTORY:  reports that he quit smoking about 45 years ago. His smoking use included Cigarettes. He started smoking about 65 years ago. He has a 20.00 pack-year smoking history. He has never used smokeless tobacco. He reports that he drinks about 0.6 oz of alcohol per week  He reports that he does not use illicit drugs.  IMMUNIZATIONS:  Most Recent Immunizations   Administered Date(s) Administered     Influenza (High Dose) 3 valent vaccine 10/05/2017     Influenza (IIV3) PF 10/15/2018     Pneumo Conj 13-V (2010&after) 10/19/2017     Pneumococcal 23 valent 09/10/2008     TD (ADULT, 7+) 09/10/2008     Above immunizations pulled from [facility]. [facility] and facility records also reconciled. Outstanding information sent to  to update [facility].  Future immunizations needed:  yearly influenza per facility protocol  MEDICATIONS:  Current Outpatient Prescriptions   Medication Sig Dispense Refill     ACETAMINOPHEN PO Take 1,000 mg  by mouth 3 times daily For pain       Atorvastatin Calcium (LIPITOR PO) Take 40 mg by mouth At Bedtime       atropine 1 % ophthalmic solution Place 1 drop into the right eye 2 times daily       cholecalciferol (VITAMIN D) 1000 UNIT tablet Take 2,000 Units by mouth daily       Fluticasone Propionate (FLONASE NA) Spray 2 sprays into both nostrils daily       INSULIN ASPART SC Inject 2 Units Subcutaneous daily (with breakfast)       Insulin Glargine (BASAGLAR KWIKPEN SC) Inject 24 Units Subcutaneous At Bedtime       LEVOTHYROXINE SODIUM PO Take 25 mcg by mouth daily       lisinopril (PRINIVIL/ZESTRIL) 2.5 MG tablet Take 1 tablet (2.5 mg) by mouth At Bedtime 30 tablet 1     polyethylene glycol (MIRALAX/GLYCOLAX) powder Take 17 g by mouth daily        prednisoLONE acetate (PRED FORTE) 1 % ophthalmic susp Place 1 drop into the right eye 2 times daily       rivaroxaban ANTICOAGULANT (XARELTO) 20 MG TABS tablet Take 1 tablet (20 mg) by mouth daily (with dinner) 30 tablet 1     Skin Protectants, Misc. (EUCERIN) cream Apply to toes two times a day       tamsulosin (FLOMAX) 0.4 MG capsule Take 1 capsule (0.4 mg) by mouth daily 60 capsule 0     timolol, PF, (TIMOPTIC OCUDOSE) 0.5 % ophthalmic solution Place 1 drop into the right eye 2 times daily       TRAMADOL HCL PO Take 50 mg by mouth 3 times daily And q4h prn       Medications reviewed:  Medications reconciled to facility chart and changes were made to reflect current medications as identified as above med list. Below are the changes that were made:   Medications stopped since last EPIC medication reconciliation:   There are no discontinued medications.    Medications started since last [facility] medication reconciliation:  No orders of the defined types were placed in this encounter.    Case Management:  I have reviewed the facility/SNF care plan/MDS which was done Nov 2018, including the falls risk, nutrition and pain screening. I also reviewed the current immunizations, and  "preventive care..Future cancer screening is not clinically indicated secondary to age/goals of care Patient's desire to return to the community is present, but is not able due to care needs . Current Level of Care is appropriate.      Advance Directive Discussion:    I reviewed the current advanced directives as reflected in EPIC, the POLST and the facility chart, and verified the congruency of orders.  I did not due to cognitive impairment review the advance directives with the resident.     Team Discussion:  I communicated with the appropriate disciplines involved with the Plan of Care:   Nursing      Patient Goal:  Patient's goal is unobtainable secondary to cognitive impairment.    Information reviewed:  Medications, vital signs, orders, and nursing notes.    ROS:  Unobtainable secondary to cognitive impairment.     Exam:  /77  Pulse 95  Resp 20  Ht 5' 9\" (1.753 m)  Wt 176 lb (79.8 kg)  BMI 25.99 kg/m2  GENERAL APPEARANCE:  in no distress, somnolent  ENT:  Mouth and posterior oropharynx normal, moist mucous membranes, normal hearing acuity  RESP:  lungs clear to auscultation , no respiratory distress  CV:  regular rate and rhythm, no murmur, rub, or gallop, no edema  ABDOMEN:  no guarding or rebound, bowel sounds normal  M/S:   Gait and station abnormal requires ADL assist, WC mobility  SKIN:  Inspection of skin and subcutaneous tissue baseline, Palpation of skin and subcutaneous tissue baseline  NEURO:   Cranial nerves 2-12 are normal tested and grossly at patient's baseline, Examination of sensation by touch normal  PSYCH:  oriented to unknown, affect and mood normal       Lab/Diagnostic data:   CBC RESULTS:   Recent Labs   Lab Test 11/19/18 11/01/18 07/20/18   1135   07/03/18   0835   WBC  6.5  7.2   < >  7.8   --   6.0   RBC  3.90*  3.82*   < >  3.79*   --   3.64*   HGB  12.1*  12.0*   < >  11.9*   < >  11.3*   HCT  36.0*  35.5*   < >  34.3*   --   32.8*   MCV  92.3  92.9   < >  91   --   90 "   MCH   --    --    --   31.4   --   31.0   MCHC   --    --    --   34.7   --   34.5   RDW  16.0*  15.9*   < >  16.3*   --   16.0*   PLT  173  161   < >  171   --   159    < > = values in this interval not displayed.       Last Basic Metabolic Panel:  Recent Labs   Lab Test 11/19/18 11/01/18   NA  139  139   POTASSIUM  4.3  4.1   CHLORIDE  105  104   TRENT  9.4  9.2   CO2  26  25   BUN  26  19   CR  1.20*  1.15   GLC  131*  146*       Liver Function Studies -   Recent Labs   Lab Test  07/01/18   0940 04/19/18   PROTTOTAL  6.7*  6.9   ALBUMIN  3.1*  3.6   BILITOTAL  0.4  0.5   ALKPHOS  68  67   AST  10  14   ALT  14  10       TSH   Date Value Ref Range Status   07/02/2018 2.20 0.40 - 4.00 mU/L Final   06/14/2018 3.62 0.30 - 4.50 uIU/mL Final       Lab Results   Component Value Date    A1C 7.2 10/04/2018    A1C 7.2 07/05/2018         ASSESSMENT/PLAN  Type 2 diabetes mellitus with diabetic nephropathy, with long-term current use of insulin (H)  -BG prior to lunch was 219  -A1C on 10/4/18 was 7.2  -recheck A1C in 6-12 months  -continue basaglar 24u at bedtime and aspart 2u with breakfast  -consider discontinue of aspart and change BG checks to Qam in future with any status change    Hemiparesis affecting left side as late effect of cerebrovascular accident (H)  Dysphagia due to recent cerebrovascular accident (CVA)  -patient stable in supportive environment  -Hx of hallucinations, staff report no changes in status  -per staff hallucinations have improved since starting tramadol TID  -continue xarelto 20mg and lipitor 40mg at bedtime     Benign essential hypertension  Based on JNC-8 goals,  patients age of 87 year old, presence of diabetes or CKD, and goals of care goal BP is  <140/90 mm Hg. Patient is stable with current plan of care and routine assessment.  -continue lisinopril 2.5mg Qday  -staff to check VS daily, report anomalies          Electronically signed by:  TOBY Alejandro CNP

## 2019-01-03 ENCOUNTER — TELEPHONE (OUTPATIENT)
Dept: GERIATRICS | Facility: CLINIC | Age: 84
End: 2019-01-03

## 2019-01-03 DIAGNOSIS — M54.2 NECK PAIN: Primary | ICD-10-CM

## 2019-01-07 VITALS
DIASTOLIC BLOOD PRESSURE: 73 MMHG | HEIGHT: 69 IN | WEIGHT: 172 LBS | SYSTOLIC BLOOD PRESSURE: 118 MMHG | RESPIRATION RATE: 18 BRPM | TEMPERATURE: 97.1 F | HEART RATE: 74 BPM | BODY MASS INDEX: 25.48 KG/M2

## 2019-01-07 ASSESSMENT — MIFFLIN-ST. JEOR: SCORE: 1445.57

## 2019-01-07 NOTE — PROGRESS NOTES
[facility]    Chief Complaint   Patient presents with     PERLAECK       [facility] Record Number:  [id number]  Place of Service where encounter took place:  [facility] ([facility]) [[id number]]    HPI:    Elias Mckee is a 87 year old  (8/10/1931), who is being seen today for an episodic care visit.  HPI information obtained from: facility chart records, facility staff, patient report and [facility] chart review. Pt is unreliable historian due to cognitive loss.      Today's concern is:  Neck pain  Nsg reports ongoing calling out with pain when he is up in his w/c especially at meals.  He is on tid tramadol, which is not always effective.  When he is resting in bed, he reports no pain. No clear recent injury.    PVD (peripheral vascular disease) (H)  No reports of pedal wounds.    Chronic atrial fibrillation (H)  Pulse ranges in past two weeks: .  Remains on xarelto for anticoagulation    Hemiparesis affecting left side as late effect of cerebrovascular accident (H)  Chronic loss.  Can ambulate with stand by assist and 4 prong walker.  No recent change in neurological function.    Type 2 diabetes mellitus with diabetic nephropathy, with long-term current use of insulin (H)  Accuchecks in past week: 0730: , 11am: 118-201, 1700: 112-185.  HgbA1C in October was 7.2%    Benign essential hypertension  BP ranges in past two weeks: 114/67 - 138/72.  No reports of chest pain.    Cognitive deficits as late effect of cerebrovascular disease  BIMS score is 14, but he lacks the insight into his functional losses as well as his overall health status.  Complex decision making is difficult and his daughter Jazmin is his health care agent and available for decision making.     ALLERGIES: No known allergies  Past Medical, Surgical, Family and Social History reviewed and updated in [facility].    Current Outpatient Medications   Medication Sig Dispense Refill     ACETAMINOPHEN PO Take 1,000 mg  by mouth 3 times daily For pain       Atorvastatin Calcium (LIPITOR PO) Take 40 mg by mouth At Bedtime       atropine 1 % ophthalmic solution Place 1 drop into the right eye 2 times daily       cholecalciferol (VITAMIN D) 1000 UNIT tablet Take 2,000 Units by mouth daily       diclofenac (VOLTAREN) 1 % topical gel Place 4 g onto the skin 2 times daily TO area of pain on neck.       Fluticasone Propionate (FLONASE NA) Spray 2 sprays into both nostrils daily       INSULIN ASPART SC Inject 2 Units Subcutaneous daily (with breakfast)       Insulin Glargine (BASAGLAR KWIKPEN SC) Inject 24 Units Subcutaneous At Bedtime       LEVOTHYROXINE SODIUM PO Take 25 mcg by mouth daily       lisinopril (PRINIVIL/ZESTRIL) 2.5 MG tablet Take 1 tablet (2.5 mg) by mouth At Bedtime 30 tablet 1     polyethylene glycol (MIRALAX/GLYCOLAX) powder Take 17 g by mouth daily        prednisoLONE acetate (PRED FORTE) 1 % ophthalmic susp Place 1 drop into the right eye 2 times daily       rivaroxaban ANTICOAGULANT (XARELTO) 20 MG TABS tablet Take 1 tablet (20 mg) by mouth daily (with dinner) 30 tablet 1     tamsulosin (FLOMAX) 0.4 MG capsule Take 1 capsule (0.4 mg) by mouth daily 60 capsule 0     timolol, PF, (TIMOPTIC OCUDOSE) 0.5 % ophthalmic solution Place 1 drop into the right eye 2 times daily       TRAMADOL HCL PO Take 50 mg by mouth 3 times daily And q4h prn       Medications reviewed:  Medications reconciled to facility chart and changes were made to reflect current medications as identified as above med list. Below are the changes that were made:   Medications stopped since last EPIC medication reconciliation:   Medications Discontinued During This Encounter   Medication Reason     Skin Protectants, Misc. (EUCERIN) cream        Medications started since last [facility] medication reconciliation:  No orders of the defined types were placed in this encounter.    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  Had bilateral hearing aides  Has upper and lower dentures., CV:  chest pain, dyspnea on exertion and Positive for atrial fibrillation and h/o ventricular fibrillation and CAD.Now with pacemaker due to AV block. RESPIRATORY: shortness of breath, cough, asthma and wheezing, :  dysuria and Positive for h/o prostate cancer, GI:  abdominal pain, constipation,  "diarrhea, nausea, vomiting and Positive for some dysphagia following CVA., NEURO:  headaches and Positive for CVA in 2017 with left sided paralysis and some dysphagia. with mild cognitive impairent., PSYCH: anxiety and depression, MUSCULOSKELETAL: back pain, joint pain and fibromyalgia and SKIN: Positive for fungal fingernails on left hand and bilateral pedal fungal nails.    Physical Exam:  /73   Pulse 74   Temp 97.1  F (36.2  C)   Resp 18   Ht 1.753 m (5' 9\")   Wt 78 kg (172 lb)   BMI 25.40 kg/m    GENERAL APPEARANCE:  Bright, calm and pleasant. Resting in bed and denying any pain.  He took my name rebecca and looked at it and said \"Oh Jazmin Cho\" (which is his daughter).  Denies any acute distress. .  Head: Normocephalic with slight left sided mouth droop.  Eye:  No redness noted in sclera  No discharge. Holding eyes closed per usual  ENIT:  No rhinitis.  Hearing well. Moist oral cavity. No exudate.  CV:  Irregularly irregular rhythm.  No murmur.  DP pulses +1 bilaterally.     RESP:  No cough.  Quiet, effortless respirations.  Clear to auscultation bilaterally.   ABDOMEN:  Soft rounded abdomen.  Bowel sounds positive all four quadrants.  No tenderness with palpation.  NEURO:   Strong hand grasp right hand and only minimal ability to move left hand and arm.  Left sided facial droop.  Strong ability to raise right leg against resistance and moderate left leg strength.   Oriented to person.    MUSC:  Resting in bed with HOB elevated slightly and head on a pillow.  Tolerated examiner to move head laterally back and forth and he denied pain.  No pain with palpation of spinal proces..  PSYCH: Pleasant and calm..  Expressed gratitude for visit.        Recent Labs:   CBC RESULTS:   Recent Labs   Lab Test 11/19/18 11/01/18 07/20/18  1135 07/03/18  0835   WBC 6.5 7.2 7.8 6.0   RBC 3.90* 3.82* 3.79* 3.64*   HGB 12.1* 12.0* 11.9* 11.3*   HCT 36.0* 35.5* 34.3* 32.8*   MCV 92.3 92.9 91 90   MCH  --   --  31.4 31.0 "   MCHC  --   --  34.7 34.5   RDW 16.0* 15.9* 16.3* 16.0*    161 171 159       Last Basic Metabolic Panel:  Recent Labs   Lab Test 11/19/18 11/01/18    139   POTASSIUM 4.3 4.1   CHLORIDE 105 104   TRENT 9.4 9.2   CO2 26 25   BUN 26 19   CR 1.20* 1.15   * 146*     GFR Estimate   Date Value Ref Range Status   11/19/2018 54 (L) >60 mL/min/1.73m2 Final   11/01/2018 57 (L) >60 ml/min/1.73m2 Final   10/04/2018 58 (L) >60 mL/min/1.73m2 Final   08/15/2018 54 (L) >60 ml/min/1.73m2 Final   07/20/2018 58 (L) >60 mL/min/1.7m2 Final     Comment:     Non  GFR Calc       Liver Function Studies -   Recent Labs   Lab Test 07/01/18  0940 04/19/18   PROTTOTAL 6.7* 6.9   ALBUMIN 3.1* 3.6   BILITOTAL 0.4 0.5   ALKPHOS 68 67   AST 10 14   ALT 14 10       TSH   Date Value Ref Range Status   07/02/2018 2.20 0.40 - 4.00 mU/L Final   06/14/2018 3.62 0.30 - 4.50 uIU/mL Final       Lab Results   Component Value Date    A1C 7.2 10/04/2018    A1C 7.2 07/05/2018     Family COmmunication: Message left for Jazmin Cho with update and plan    Assessment/Plan:  (M54.2) Neck pain  (primary encounter diagnosis)  Comment: Episodic and seems to be positional  Plan: Cervical spine XRays.  If no acute process seen, will start Physical Therapy.  Continue pain meds.    (I73.9) PVD (peripheral vascular disease) (H)  Comment: Chronic  Plan: Monitor pedal skin    (I48.2) Chronic atrial fibrillation (H)  Comment: Rate controlled  Plan: Continue to monitor and continue Xarelto for anticoagulation.  Hgb and BMP on Thursday.    (I69.354) Hemiparesis affecting left side as late effect of cerebrovascular accident (H)  Comment: Chronic  Plan: Continue current POC.    (E11.21,  Z79.4) Type 2 diabetes mellitus with diabetic nephropathy, with long-term current use of insulin (H)  Comment: Chronic, good control  Plan: Continue current dose of insulin.  HgbA1C on Thursday.     (I10) Benign essential hypertension  Comment: BP goal; <150/90,  at goal  Plan: Continue current POC.    (I69.919) Cognitive deficits as late effect of cerebrovascular disease  Comment: Chronic  Plan: Continue full time skilled nsg care.    Electronically signed by  TOBY Momin CNP

## 2019-01-08 ENCOUNTER — NURSING HOME VISIT (OUTPATIENT)
Dept: GERIATRICS | Facility: CLINIC | Age: 84
End: 2019-01-08
Payer: COMMERCIAL

## 2019-01-08 DIAGNOSIS — Z79.4 TYPE 2 DIABETES MELLITUS WITH DIABETIC NEPHROPATHY, WITH LONG-TERM CURRENT USE OF INSULIN (H): ICD-10-CM

## 2019-01-08 DIAGNOSIS — I69.354 HEMIPARESIS AFFECTING LEFT SIDE AS LATE EFFECT OF CEREBROVASCULAR ACCIDENT (H): ICD-10-CM

## 2019-01-08 DIAGNOSIS — I48.20 CHRONIC ATRIAL FIBRILLATION (H): ICD-10-CM

## 2019-01-08 DIAGNOSIS — I10 BENIGN ESSENTIAL HYPERTENSION: ICD-10-CM

## 2019-01-08 DIAGNOSIS — I73.9 PVD (PERIPHERAL VASCULAR DISEASE) (H): ICD-10-CM

## 2019-01-08 DIAGNOSIS — I69.919 COGNITIVE DEFICITS AS LATE EFFECT OF CEREBROVASCULAR DISEASE: ICD-10-CM

## 2019-01-08 DIAGNOSIS — M54.2 NECK PAIN: Primary | ICD-10-CM

## 2019-01-08 DIAGNOSIS — E11.21 TYPE 2 DIABETES MELLITUS WITH DIABETIC NEPHROPATHY, WITH LONG-TERM CURRENT USE OF INSULIN (H): ICD-10-CM

## 2019-01-08 PROCEDURE — 99309 SBSQ NF CARE MODERATE MDM 30: CPT | Performed by: NURSE PRACTITIONER

## 2019-01-10 ENCOUNTER — TRANSFERRED RECORDS (OUTPATIENT)
Dept: HEALTH INFORMATION MANAGEMENT | Facility: CLINIC | Age: 84
End: 2019-01-10

## 2019-01-10 LAB
BUN SERPL-MCNC: 32 MG/DL (ref 9–26)
CALCIUM SERPL-MCNC: 9.4 MG/DL (ref 8.4–10.4)
CHLORIDE SERPLBLD-SCNC: 102 MMOL/L (ref 98–109)
CO2 SERPL-SCNC: 25 MMOL/L (ref 22–31)
CREAT SERPL-MCNC: 1.36 MG/DL (ref 0.73–1.18)
GFR SERPL CREATININE-BSD FRML MDRD: 46 ML/MIN/1.73M2
GLUCOSE SERPL-MCNC: 182 MG/DL (ref 70–100)
HBA1C MFR BLD: 6.9 % (ref 4–5.6)
HEMOGLOBIN: 12.9 G/DL (ref 13.4–17.5)
POTASSIUM SERPL-SCNC: 4.6 MMOL/L (ref 3.5–5.2)
SODIUM SERPL-SCNC: 138 MMOL/L (ref 136–145)

## 2019-01-11 ENCOUNTER — ANCILLARY PROCEDURE (OUTPATIENT)
Dept: CARDIOLOGY | Facility: CLINIC | Age: 84
End: 2019-01-11
Attending: INTERNAL MEDICINE
Payer: COMMERCIAL

## 2019-01-11 DIAGNOSIS — I44.1 MOBITZ TYPE 1 SECOND DEGREE ATRIOVENTRICULAR BLOCK: ICD-10-CM

## 2019-01-11 DIAGNOSIS — I44.1 AV BLOCK, 2ND DEGREE: ICD-10-CM

## 2019-01-11 PROCEDURE — 93294 REM INTERROG EVL PM/LDLS PM: CPT | Performed by: INTERNAL MEDICINE

## 2019-01-11 PROCEDURE — 93296 REM INTERROG EVL PM/IDS: CPT | Performed by: INTERNAL MEDICINE

## 2019-01-21 ENCOUNTER — NURSING HOME VISIT (OUTPATIENT)
Dept: GERIATRICS | Facility: CLINIC | Age: 84
End: 2019-01-21
Payer: COMMERCIAL

## 2019-01-21 DIAGNOSIS — I69.354 HEMIPARESIS AFFECTING LEFT SIDE AS LATE EFFECT OF CEREBROVASCULAR ACCIDENT (H): ICD-10-CM

## 2019-01-21 DIAGNOSIS — M54.2 NECK PAIN: Primary | ICD-10-CM

## 2019-01-21 DIAGNOSIS — Z79.4 TYPE 2 DIABETES MELLITUS WITH DIABETIC NEPHROPATHY, WITH LONG-TERM CURRENT USE OF INSULIN (H): ICD-10-CM

## 2019-01-21 DIAGNOSIS — I48.20 CHRONIC ATRIAL FIBRILLATION (H): ICD-10-CM

## 2019-01-21 DIAGNOSIS — E11.21 TYPE 2 DIABETES MELLITUS WITH DIABETIC NEPHROPATHY, WITH LONG-TERM CURRENT USE OF INSULIN (H): ICD-10-CM

## 2019-01-23 NOTE — PROGRESS NOTES
Pt was seen for a regulatory LTC visit  Case reviewed with NP    Pt has had persistent R neck pain when sitting up   No recent known injury  C spine xray last week revealed no acute process, mild degenerative disease  No c/o pain or weakness R UE    Exam   Appears depressed, sitting up in chair, slowly eating breakfast, keeps eyes closedd  Oriented to person and place  Mild pain with palp R lateral neck  Neck supple  Lungs clear  CV irreg  R eye is erythematous, no drainage  L sided weakness, as before    Assessment    Neck pain, appears muscular, no worrisome findings on exam or plain film  S/p CVA with R sided weakness  DM type 2, stable  Possible depression, aggravated by pain  CKD with recent decline in GFR to 46    Plan  PT for neck pain  Consider trial of cymbalta for pain, depression  Discontinue lisinopril, monitor BP and BMP

## 2019-01-24 ENCOUNTER — TRANSFERRED RECORDS (OUTPATIENT)
Dept: HEALTH INFORMATION MANAGEMENT | Facility: CLINIC | Age: 84
End: 2019-01-24

## 2019-01-24 VITALS
DIASTOLIC BLOOD PRESSURE: 72 MMHG | RESPIRATION RATE: 19 BRPM | WEIGHT: 163 LBS | HEART RATE: 67 BPM | BODY MASS INDEX: 24.14 KG/M2 | TEMPERATURE: 97 F | SYSTOLIC BLOOD PRESSURE: 119 MMHG | HEIGHT: 69 IN

## 2019-01-24 LAB
BUN SERPL-MCNC: 34 MG/DL (ref 9–26)
CALCIUM SERPL-MCNC: 9.8 MG/DL (ref 8.4–10.4)
CHLORIDE SERPLBLD-SCNC: 102 MMOL/L (ref 98–109)
CO2 SERPL-SCNC: 24 MMOL/L (ref 22–31)
CREAT SERPL-MCNC: 1.32 MG/DL (ref 0.73–1.18)
GFR SERPL CREATININE-BSD FRML MDRD: 48 ML/MIN/1.73M2
GLUCOSE SERPL-MCNC: 85 MG/DL (ref 70–100)
POTASSIUM SERPL-SCNC: 4.3 MMOL/L (ref 3.5–5.2)
SODIUM SERPL-SCNC: 136 MMOL/L (ref 136–145)

## 2019-01-24 ASSESSMENT — MIFFLIN-ST. JEOR: SCORE: 1404.74

## 2019-01-24 NOTE — PROGRESS NOTES
[facility]    Chief Complaint   Patient presents with     RECHECK       [facility] Medical Record Number:  [id number]  Place of Service where encounter took place:  [facility] ([facility] [[id number]]    HPI:    Elias Mckee is a 87 year old  (8/10/1931), who is being seen today for an episodic care visit.  HPI information obtained from: facility chart records, facility staff, patient report and [facility] Epic chart review. Pt is unreliable historian due to cognitive loss.       Today's concern is:  Neck pain  Continues on tylenol, tramadol and Physical Therapy for tx of neck pain that occurs when he is up in his w/c.  No pain when in bed or his recliner.  When up in his w/c, pt has poor posture, leaning his head forward and resting it in his right hand, with the head leaning toward his right side in a flexed position.  Pain is variable and at times Physical Therapy has noted no pain, but at other times he is calling out in pain.    Benign essential hypertension  Lisinopril discontinue's on 1/21 due to altered renal function.  Recheck of labs on 1/24 showed a creatinine of 1.32, slight improvement.  BP ranges in past four days: 93//82.      Cognitive deficits as late effect of cerebrovascular disease  BIMS score of 14, but this does not reflect short term memory loss or his impulsivity.  Relies on [facility] staff for assistance with most of his ADL's due to his functional loss post CVA.  Has falls due to his lack of insight into the loss and need to call for help.    CKD (chronic kidney disease) stage 3, GFR 30-59 ml/min, est GFR: 10/30/17: 46,, 12/14/17: 59  Labs on 1/24/19 showed a creatinine of 1.32 and an est GFR of 48.    Type 2 diabetes mellitus with stage 3 chronic kidney disease, with long-term current use of insulin (H)  Accuchecks in past week: 0730: , 11am: 149-256, 1700: 127-140.  HgbA1C was 6.9% this month    Right eye pain.  Has central retinal occlusion and had been  seeing Dr. Moon, retinal specialist, but refused eye injections, so tx in limited to drops.  Eye pain increased and Dr. Moon increased his prednisololone drops to right eye to qid.  Unclear if this has improved his pain.  He holds his right eye shut most of the time.    ALLERGIES: No known allergies  Past Medical, Surgical, Family and Social History reviewed and updated in [facility].    Current Outpatient Medications   Medication Sig Dispense Refill     ACETAMINOPHEN PO Take 1,000 mg by mouth 3 times daily For pain       Atorvastatin Calcium (LIPITOR PO) Take 40 mg by mouth At Bedtime       atropine 1 % ophthalmic solution Place 1 drop into the right eye 2 times daily       cholecalciferol (VITAMIN D) 1000 UNIT tablet Take 2,000 Units by mouth daily       Fluticasone Propionate (FLONASE NA) Spray 2 sprays into both nostrils daily       INSULIN ASPART SC Inject 2 Units Subcutaneous daily (with breakfast)       Insulin Glargine (BASAGLAR KWIKPEN SC) Inject 24 Units Subcutaneous At Bedtime       LEVOTHYROXINE SODIUM PO Take 25 mcg by mouth daily       polyethylene glycol (MIRALAX/GLYCOLAX) powder Take 17 g by mouth daily        prednisoLONE acetate (PRED FORTE) 1 % ophthalmic susp Place 1 drop into the right eye 4 times daily        rivaroxaban ANTICOAGULANT (XARELTO) 20 MG TABS tablet Take 1 tablet (20 mg) by mouth daily (with dinner) 30 tablet 1     tamsulosin (FLOMAX) 0.4 MG capsule Take 1 capsule (0.4 mg) by mouth daily 60 capsule 0     timolol, PF, (TIMOPTIC OCUDOSE) 0.5 % ophthalmic solution Place 1 drop into the right eye 2 times daily       TRAMADOL HCL PO Take 50 mg by mouth 3 times daily And q4h prn       Medications reviewed:  Medications reconciled to facility chart and changes were made to reflect current medications as identified as above med list. Below are the changes that were made:   Medications stopped since last EPIC medication reconciliation:   There are no discontinued medications.    Medications  started since last EPIC medication reconciliation:  No orders of the defined types were placed in this encounter.    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  Had bilateral hearing aides  Has upper and lower dentures., CV:  chest pain, dyspnea on exertion and Positive for atrial  "fibrillation and h/o ventricular fibrillation and CAD.Now with pacemaker due to AV block. RESPIRATORY: shortness of breath, cough, asthma and wheezing, :  dysuria and Positive for h/o prostate cancer, GI:  abdominal pain, constipation, diarrhea, nausea, vomiting and Positive for some dysphagia following CVA., NEURO:  headaches and Positive for CVA in 2017 with left sided paralysis and some dysphagia. with mild cognitive impairent., PSYCH: anxiety and depression, MUSCULOSKELETAL: back pain, joint pain and fibromyalgia and SKIN: Positive for fungal fingernails on left hand and bilateral pedal fungal nails.    Physical Exam:  /72   Pulse 67   Temp 97  F (36.1  C)   Resp 19   Ht 1.753 m (5' 9\")   Wt 73.9 kg (163 lb)   BMI 24.07 kg/m    GENERAL APPEARANCE: Calm and pleasant. Up in w/c for breakfast.  Neck is in usual forward flexed position, supported in his right hand.  Alert and able to participate in conversation.   Head: Normocephalic with slight left sided mouth droop.  Eye:  No redness noted in sclera  No discharge. Holding right eye closed per usual  ENIT:  No rhinitis. Moist oral cavity. No exudate.  CV:  Irregularly irregular rhythm.  No murmur.  DP pulses +1 bilaterally.     RESP:  No cough.  Quiet, effortless respirations.  Clear to auscultation bilaterally.   ABDOMEN:  Soft rounded abdomen.  Bowel sounds positive all four quadrants.  No tenderness with palpation.  NEURO:   Strong hand grasp right hand and only minimal ability to move left hand and arm.  Left sided facial droop.  Strong ability to raise right leg against resistance and moderate left leg strength.   Oriented to person.    MUSC:  Tolerated examiner to move head laterally back and forth and he denied pain.  No pain with palpation of spinal process, but did have discomfort with palpation of right mid trapezius muscle..  PSYCH:  No delusional statements.  Concerned about neck and eye pain..  Expressed gratitude for visit.      Recent " Labs:   CBC RESULTS:   Recent Labs   Lab Test 01/10/19 11/19/18 11/01/18 07/20/18  1135 07/03/18  0835   WBC  --  6.5 7.2 7.8 6.0   RBC  --  3.90* 3.82* 3.79* 3.64*   HGB 12.9* 12.1* 12.0* 11.9* 11.3*   HCT  --  36.0* 35.5* 34.3* 32.8*   MCV  --  92.3 92.9 91 90   MCH  --   --   --  31.4 31.0   MCHC  --   --   --  34.7 34.5   RDW  --  16.0* 15.9* 16.3* 16.0*   PLT  --  173 161 171 159       Last Basic Metabolic Panel:  Recent Labs   Lab Test 01/10/19 11/19/18    139   POTASSIUM 4.6 4.3   CHLORIDE 102 105   TRENT 9.4 9.4   CO2 25 26   BUN 32* 26   CR 1.36* 1.20*   * 131*     GFR Estimate   Date Value Ref Range Status   01/24/2019 48 (L) >60 ml/min/1.73m2 Final   01/10/2019 46 (L) >60 ml/min/1.73m2 Final   11/19/2018 54 (L) >60 mL/min/1.73m2 Final   11/01/2018 57 (L) >60 ml/min/1.73m2 Final   10/04/2018 58 (L) >60 mL/min/1.73m2 Final     Liver Function Studies -   Recent Labs   Lab Test 07/01/18  0940 04/19/18   PROTTOTAL 6.7* 6.9   ALBUMIN 3.1* 3.6   BILITOTAL 0.4 0.5   ALKPHOS 68 67   AST 10 14   ALT 14 10       TSH   Date Value Ref Range Status   07/02/2018 2.20 0.40 - 4.00 mU/L Final   06/14/2018 3.62 0.30 - 4.50 uIU/mL Final       Lab Results   Component Value Date    A1C 6.9 01/10/2019    A1C 7.2 10/04/2018     1/9/19:  Neck XRays:  IMPRESSION:  No acute fracture or dislocations seen.  Mild degenerative spondylosis is seen in the mid to lower cervical spine.    Family Communication:  Contacted daughter Jazmin and discussed at length the above issues.  She notes that one of his quality of life issues is that he go to [employer] on Sundays and she has not been able to get her dad there the last two years due to his neck pain when upright. Discussed possibility of chair that reclines and will notify Physical Therapy of this.  Also discussed possibility of soft neck brace.  Unclear if Elias would agree, but at this time, he did state that he would try it and Physical Therapy has been notified.  Her other chief  concern is his hearing and will order audiology to see him at the next visit in the building.  She expressed gratitude for the call.    Assessment/Plan:  (M54.2) Neck pain  (primary encounter diagnosis)  Comment: Ongoing  Plan: Continue pain meds.  Continue Physical Therapy.  Consider soft neck brace when up in w/c.  Continue to evaluate w/c.  Pt needs likely two chairs.  One to self propel and one for activities such as [employer] where his daughter could push the chair and he can recline, easing the pressure on his neck. Nsg will also try warm packs when up in standard w/c..    (I10) Benign essential hypertension  Comment: BP goal: <150/90, at goal despite discontinuation of lisinopril  Plan: Continue to monitor.    (I69.919) Cognitive deficits as late effect of cerebrovascular disease  Comment: Chronic secondary to CVA, felt to be cardiogenic  Plan: Monitor.  Continue xarelto for anticoagulation.  Monitor for neurological changes.    (N18.3) CKD (chronic kidney disease) stage 3, GFR 30-59 ml/min, est GFR: 10/30/17: 46,, 12/14/17: 59  Comment: Chronic, slight improvement with discontinuation of lisinopril  Plan: Recheck BMP on 2/4.  Renally adjust medication doses.    (E11.22,  N18.3,  Z79.4) Type 2 diabetes mellitus with stage 3 chronic kidney disease, with long-term current use of insulin (H)  Comment: Chronic, HgbA1C goal <8%, at goal  Plan: Continue current insulin doses and current monitoring.  HgbA1C q4m    (H57.11) Ocular pain, right eye  Comment: Ongoing secondary to central retinal vein occlusion  Plan: Continue drops as ordered by retinal specialist.    Total time spent with patient visit was 38 min including patient visit, review of past records, phone call to patient contact and discussion with Physical Therapy. . Greater than 50% of total time spent with counseling and coordinating care due to neck pain and other comorbid conditions..     Electronically signed by  TOBY Momin  CNP

## 2019-01-25 ENCOUNTER — NURSING HOME VISIT (OUTPATIENT)
Dept: GERIATRICS | Facility: CLINIC | Age: 84
End: 2019-01-25
Payer: COMMERCIAL

## 2019-01-25 DIAGNOSIS — I10 BENIGN ESSENTIAL HYPERTENSION: ICD-10-CM

## 2019-01-25 DIAGNOSIS — Z79.4 TYPE 2 DIABETES MELLITUS WITH STAGE 3 CHRONIC KIDNEY DISEASE, WITH LONG-TERM CURRENT USE OF INSULIN (H): ICD-10-CM

## 2019-01-25 DIAGNOSIS — H57.11 OCULAR PAIN, RIGHT EYE: ICD-10-CM

## 2019-01-25 DIAGNOSIS — N18.30 TYPE 2 DIABETES MELLITUS WITH STAGE 3 CHRONIC KIDNEY DISEASE, WITH LONG-TERM CURRENT USE OF INSULIN (H): ICD-10-CM

## 2019-01-25 DIAGNOSIS — M54.2 NECK PAIN: Primary | ICD-10-CM

## 2019-01-25 DIAGNOSIS — I69.919 COGNITIVE DEFICITS AS LATE EFFECT OF CEREBROVASCULAR DISEASE: ICD-10-CM

## 2019-01-25 DIAGNOSIS — E11.22 TYPE 2 DIABETES MELLITUS WITH STAGE 3 CHRONIC KIDNEY DISEASE, WITH LONG-TERM CURRENT USE OF INSULIN (H): ICD-10-CM

## 2019-01-25 DIAGNOSIS — N18.30 CKD (CHRONIC KIDNEY DISEASE) STAGE 3, GFR 30-59 ML/MIN (H): ICD-10-CM

## 2019-01-25 PROCEDURE — 99310 SBSQ NF CARE HIGH MDM 45: CPT | Performed by: NURSE PRACTITIONER

## 2019-02-04 ENCOUNTER — TRANSFERRED RECORDS (OUTPATIENT)
Dept: HEALTH INFORMATION MANAGEMENT | Facility: CLINIC | Age: 84
End: 2019-02-04

## 2019-02-04 LAB
BUN SERPL-MCNC: 18 MG/DL (ref 9–26)
CALCIUM SERPL-MCNC: 9 MG/DL (ref 8.4–10.4)
CHLORIDE SERPLBLD-SCNC: 103 MMOL/L (ref 98–109)
CO2 SERPL-SCNC: 26 MMOL/L (ref 22–31)
CREAT SERPL-MCNC: 1.15 MG/DL (ref 0.73–1.18)
GFR SERPL CREATININE-BSD FRML MDRD: 57 ML/MIN/1.73M2
GLUCOSE SERPL-MCNC: 143 MG/DL (ref 70–100)
POTASSIUM SERPL-SCNC: 4.1 MMOL/L (ref 3.5–5.2)
SODIUM SERPL-SCNC: 139 MMOL/L (ref 136–145)

## 2019-02-04 NOTE — PROGRESS NOTES
[facility]    Chief Complaint   Patient presents with     PERLAECK       [facility] Record Number:  [id number]  Place of Service where encounter took place:  [facility] ([facility] [[id number]]    HPI:    Elias Mckee is a 87 year old  (8/10/1931), who is being seen today for an episodic care visit.  HPI information obtained from: facility chart records, facility staff, patient report and [facility] Epic chart review.Today's concern is:  Neck pain  Pt has completed physical therapy and now has order for soft collar.  Remains on tylenol and tramadol scheduled and received a prn dose yesterday as well.  Nsg notes that pt's neck pain is much improved and he has not been wearing the soft collar.     Benign essential hypertension  BP ranges in past week: 97//67.  Pulse ranges: 68-90.  No reports of chest pain.  Lisinopril dc'd on 1/21 due to low BP and an increase in his creatinine..     Mixed conductive and sensorineural hearing loss, unspecified laterality  Daughter has noted increased hearing loss and order placed on 1/21 for onsite hearing to evaluate him.  They will not be in the building until 2/20.  Daughter requested to be informed of that time.    Cognitive deficits as late effect of cerebrovascular disease  Lacks insight into his functional losses and will attempt self transfers without calling for help, increasing his fall risk.  Calls out when upset and will often forget to use the call light.    Type 2 diabetes mellitus with diabetic nephropathy, with long-term current use of insulin (H)  Accuchecks this month: 0730: 117-144, 11am: 140-216, 1700: 154-191.  HgbA1C was 6.9% in January.    CKD (chronic kidney disease) stage 3, GFR 30-59 ml/min, est GFR: 10/30/17: 46,, 12/14/17: 59  Labs yesterday showed a creatinine of 1.15, improved from 1.32 on 1/24.    Weight loss  Pt's weights reflect 10 lbs weight loss in one month. Nsg notes that pt had been eating poorly when he had  neck pain, but with improvement, they have noted in the past week, an improvement in appetite.    ALLERGIES: No known allergies  Past Medical, Surgical, Family and Social History reviewed and updated in [facility].    Current Outpatient Medications   Medication Sig Dispense Refill     ACETAMINOPHEN PO Take 1,000 mg by mouth 3 times daily For pain       Atorvastatin Calcium (LIPITOR PO) Take 40 mg by mouth At Bedtime       atropine 1 % ophthalmic solution Place 1 drop into the right eye 2 times daily       cholecalciferol (VITAMIN D) 1000 UNIT tablet Take 2,000 Units by mouth daily       Fluticasone Propionate (FLONASE NA) Spray 2 sprays into both nostrils daily       INSULIN ASPART SC Inject 2 Units Subcutaneous daily (with breakfast)       Insulin Glargine (BASAGLAR KWIKPEN SC) Inject 24 Units Subcutaneous At Bedtime       LEVOTHYROXINE SODIUM PO Take 25 mcg by mouth daily       polyethylene glycol (MIRALAX/GLYCOLAX) powder Take 17 g by mouth daily        prednisoLONE acetate (PRED FORTE) 1 % ophthalmic susp Place 1 drop into the right eye 4 times daily        rivaroxaban ANTICOAGULANT (XARELTO) 20 MG TABS tablet Take 1 tablet (20 mg) by mouth daily (with dinner) 30 tablet 1     tamsulosin (FLOMAX) 0.4 MG capsule Take 1 capsule (0.4 mg) by mouth daily 60 capsule 0     timolol, PF, (TIMOPTIC OCUDOSE) 0.5 % ophthalmic solution Place 1 drop into the right eye 2 times daily       TRAMADOL HCL PO Take 50 mg by mouth 3 times daily And q4h prn       Medications reviewed:  Medications reconciled to facility chart and changes were made to reflect current medications as identified as above med list. Below are the changes that were made:   Medications stopped since last EPIC medication reconciliation:   There are no discontinued medications.    Medications started since last [facility] medication reconciliation:  No orders of the defined types were placed in this encounter.    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  Had bilateral hearing aides  Has upper and lower dentures., CV:  chest pain, dyspnea on exertion and Positive for atrial fibrillation and h/o ventricular fibrillation and CAD.Now with pacemaker due to AV block. RESPIRATORY: shortness of breath, cough, asthma and wheezing, :  dysuria and Positive for h/o  prostate cancer, GI:  abdominal pain, constipation, diarrhea, nausea, vomiting and Positive for some dysphagia following CVA., NEURO:  headaches and Positive for CVA in 2017 with left sided paralysis and some dysphagia. with mild cognitive impairent., PSYCH: anxiety and depression, MUSCULOSKELETAL: back pain, joint pain and fibromyalgia and SKIN: Positive for fungal fingernails on left hand and bilateral pedal fungal nails.      Physical Exam:  BP 97/69   Pulse 89   Temp 98.2  F (36.8  C)   Resp 18   Wt 73.5 kg (162 lb)   BMI 23.92 kg/m      GENERAL APPEARANCE: Calm and pleasant. Up in w/c after breakfast.  Neck is in upright position compared to last visit..  Alert and able to participate in conversation.   Head: Normocephalic with slight left sided mouth droop.  Eye:  No redness noted in sclera  No discharge. Holding right eye closed per usual  ENIT:  No rhinitis. Moist oral cavity. No exudate. Attempted ear exam, but pt became very upset and called out.  Was able to visualize cerumen bilaterally, left greater than righ.  CV:  Irregularly irregular rhythm.  No murmur.  DP pulses +1 bilaterally.     RESP:  No cough.  Quiet, effortless respirations.  Clear to auscultation bilaterally.   ABDOMEN:  Soft rounded abdomen.  Bowel sounds positive all four quadrants.  No tenderness with palpation.  NEURO:   Strong hand grasp right hand and only minimal ability to move left hand and arm.  Left sided facial droop.  Strong ability to raise right leg against resistance and moderate left leg strength.   Oriented to person.    MUSC:  Tolerated examiner to move head laterally back and forth and he denied pain.  No pain with palpation of spinal process or trapezius muscles.  Pt denies any neck pain at this time.  PSYCH:  No delusional statements.  Concerned about neck and eye pain..  Expressed gratitude for visit.          Recent Labs:   CBC RESULTS:   Recent Labs   Lab Test 01/10/19 11/19/18 11/01/18 07/20/18  1132  07/03/18  0835   WBC  --  6.5 7.2 7.8 6.0   RBC  --  3.90* 3.82* 3.79* 3.64*   HGB 12.9* 12.1* 12.0* 11.9* 11.3*   HCT  --  36.0* 35.5* 34.3* 32.8*   MCV  --  92.3 92.9 91 90   MCH  --   --   --  31.4 31.0   MCHC  --   --   --  34.7 34.5   RDW  --  16.0* 15.9* 16.3* 16.0*   PLT  --  173 161 171 159       Last Basic Metabolic Panel:  Recent Labs   Lab Test 02/04/19 01/24/19    136   POTASSIUM 4.1 4.3   CHLORIDE 103 102   TRENT 9.0 9.8   CO2 26 24   BUN 18 34*   CR 1.15 1.32*   * 85     GFR Estimate   Date Value Ref Range Status   02/04/2019 57 (L) >60 ml/min/1.73m2 Final   01/24/2019 48 (L) >60 ml/min/1.73m2 Final   01/10/2019 46 (L) >60 ml/min/1.73m2 Final   11/19/2018 54 (L) >60 mL/min/1.73m2 Final   11/01/2018 57 (L) >60 ml/min/1.73m2 Final     Liver Function Studies -   Recent Labs   Lab Test 07/01/18  0940 04/19/18   PROTTOTAL 6.7* 6.9   ALBUMIN 3.1* 3.6   BILITOTAL 0.4 0.5   ALKPHOS 68 67   AST 10 14   ALT 14 10       TSH   Date Value Ref Range Status   07/02/2018 2.20 0.40 - 4.00 mU/L Final   06/14/2018 3.62 0.30 - 4.50 uIU/mL Final       Lab Results   Component Value Date    A1C 6.9 01/10/2019    A1C 7.2 10/04/2018     Family Communication:  Updated daughter Jazmin Cho on current status. Described ear findings today and plans for tx with debrox.  He is scheduled to see the audiologist on 2/20.  Also notified her of his weight loss and recent improvement in appetite, likely due to decreased neck pain.  Will start NDS (diabetic) to prevent further weight loss.  Also informed her of improvement in kidney function following discontinuation of ace inhibitor.  She expressed understanding and consent as well as gratitude for call.    Assessment/Plan:  (M54.2) Neck pain  (primary encounter diagnosis)  Comment: Improved  Plan: Continue current pain meds.  Offer soft collar if his neck pain recurs.    (I10) Benign essential hypertension  Comment: BP goal: <150/90, at goal  Plan: Continue current  POC.    (H90.8) Mixed conductive and sensorineural hearing loss, unspecified laterality  Comment: Cerumen impaction.  Audiology appt pending  Plan: Treat cerumen impaction and monitor for effect.  To see audiologist on 2/20.    (I69.589) Cognitive deficits as late effect of cerebrovascular disease  Comment: Ongoing  Plan: COntinue 24 hour skilled nsg care.    (E11.21,  Z79.4) Type 2 diabetes mellitus with diabetic nephropathy, with long-term current use of insulin (H)  Comment: Chronic, HgbA1C goal <8%, at goal  Plan: Continue current POC.  Check HgbA1C q4 m.    (N18.3) CKD (chronic kidney disease) stage 3, GFR 30-59 ml/min, est GFR: 10/30/17: 46,, 12/14/17: 59  Comment: Improved following discontinuation of ace inhibitor  Plan: Renally adjust dose of medications. BMP q4m or with acute illness    (R63.4) Weight loss  Comment: New issue  Plan: Monitor carefully.  Start diabetic nutritional supplement daily.    (H61.23) Bilateral impacted cerumen  Comment: Acute  Plan: Debrox protocol followed by ear wash.     Total time spent with patient visit was 38 min including patient visit, review of past records and phone call to patient contact. Greater than 50% of total time spent with counseling and coordinating care due to neck pain, weight loss and hearing loss..     Electronically signed by  TOBY Momin CNP

## 2019-02-05 ENCOUNTER — NURSING HOME VISIT (OUTPATIENT)
Dept: GERIATRICS | Facility: CLINIC | Age: 84
End: 2019-02-05
Payer: COMMERCIAL

## 2019-02-05 VITALS
DIASTOLIC BLOOD PRESSURE: 69 MMHG | SYSTOLIC BLOOD PRESSURE: 97 MMHG | WEIGHT: 162 LBS | HEART RATE: 89 BPM | BODY MASS INDEX: 23.92 KG/M2 | RESPIRATION RATE: 18 BRPM | TEMPERATURE: 98.2 F

## 2019-02-05 DIAGNOSIS — H61.23 BILATERAL IMPACTED CERUMEN: ICD-10-CM

## 2019-02-05 DIAGNOSIS — Z79.4 TYPE 2 DIABETES MELLITUS WITH DIABETIC NEPHROPATHY, WITH LONG-TERM CURRENT USE OF INSULIN (H): ICD-10-CM

## 2019-02-05 DIAGNOSIS — H90.8 MIXED CONDUCTIVE AND SENSORINEURAL HEARING LOSS, UNSPECIFIED LATERALITY: ICD-10-CM

## 2019-02-05 DIAGNOSIS — R63.4 WEIGHT LOSS: ICD-10-CM

## 2019-02-05 DIAGNOSIS — M54.2 NECK PAIN: Primary | ICD-10-CM

## 2019-02-05 DIAGNOSIS — N18.30 CKD (CHRONIC KIDNEY DISEASE) STAGE 3, GFR 30-59 ML/MIN (H): ICD-10-CM

## 2019-02-05 DIAGNOSIS — I10 BENIGN ESSENTIAL HYPERTENSION: ICD-10-CM

## 2019-02-05 DIAGNOSIS — E11.21 TYPE 2 DIABETES MELLITUS WITH DIABETIC NEPHROPATHY, WITH LONG-TERM CURRENT USE OF INSULIN (H): ICD-10-CM

## 2019-02-05 DIAGNOSIS — I69.919 COGNITIVE DEFICITS AS LATE EFFECT OF CEREBROVASCULAR DISEASE: ICD-10-CM

## 2019-02-05 PROCEDURE — 99310 SBSQ NF CARE HIGH MDM 45: CPT | Performed by: NURSE PRACTITIONER

## 2019-03-14 ENCOUNTER — NURSING HOME VISIT (OUTPATIENT)
Dept: GERIATRICS | Facility: CLINIC | Age: 84
End: 2019-03-14
Payer: COMMERCIAL

## 2019-03-14 VITALS
DIASTOLIC BLOOD PRESSURE: 72 MMHG | BODY MASS INDEX: 23.92 KG/M2 | RESPIRATION RATE: 18 BRPM | WEIGHT: 162 LBS | HEART RATE: 81 BPM | TEMPERATURE: 97 F | SYSTOLIC BLOOD PRESSURE: 127 MMHG

## 2019-03-14 DIAGNOSIS — I69.919 COGNITIVE DEFICITS AS LATE EFFECT OF CEREBROVASCULAR DISEASE: ICD-10-CM

## 2019-03-14 DIAGNOSIS — E11.21 TYPE 2 DIABETES MELLITUS WITH DIABETIC NEPHROPATHY, WITH LONG-TERM CURRENT USE OF INSULIN (H): ICD-10-CM

## 2019-03-14 DIAGNOSIS — N18.30 CKD (CHRONIC KIDNEY DISEASE) STAGE 3, GFR 30-59 ML/MIN (H): ICD-10-CM

## 2019-03-14 DIAGNOSIS — I48.20 CHRONIC ATRIAL FIBRILLATION (H): ICD-10-CM

## 2019-03-14 DIAGNOSIS — R63.4 WEIGHT LOSS: Primary | ICD-10-CM

## 2019-03-14 DIAGNOSIS — I10 BENIGN ESSENTIAL HYPERTENSION: ICD-10-CM

## 2019-03-14 DIAGNOSIS — M54.2 NECK PAIN: ICD-10-CM

## 2019-03-14 DIAGNOSIS — Z79.4 TYPE 2 DIABETES MELLITUS WITH DIABETIC NEPHROPATHY, WITH LONG-TERM CURRENT USE OF INSULIN (H): ICD-10-CM

## 2019-03-14 DIAGNOSIS — E78.5 HYPERLIPIDEMIA LDL GOAL <100: ICD-10-CM

## 2019-03-14 PROCEDURE — 99309 SBSQ NF CARE MODERATE MDM 30: CPT | Mod: GW | Performed by: NURSE PRACTITIONER

## 2019-03-14 NOTE — PROGRESS NOTES
[facility]  Chief Complaint   Patient presents with     detention Regulatory     [place] Medical Record Number:  [id number]  Place of Service where encounter took place:  [facility] [[id number]]    HPI:    Elias Mckee  is 87 year old (8/10/1931), who is being seen today for a federally mandated E/M visit.  HPI information obtained from: facility chart records, facility staff, patient report, [facility] chart review and pt is minimally reliable in ROS due to cognitive loss due to CVA.   Today's concerns are:  Weight loss  Pt's weight reflects 10 lb weight loss in past three months, but stable in past 30 days. Daily HNS started on 2/25.    Neck pain  Nursing notes that this is much improved, especially if pt lies down to rest after breakfast. No use of prn tramadol this month.    Benign essential hypertension  No recent changes to meds.  BP ranges this month: 119/74 - 147/87.  No reports of chest pain.    Cognitive deficits as late effect of cerebrovascular disease  BIMs score of 14/15, but this does not reflect is executive decision making and poor judgement.  He relies on staff for assistance with most ADL's due to her functional loss post CVA, but also will forget his limitations and will attempt self transfer at times.    Type 2 diabetes mellitus with diabetic nephropathy, with long-term current use of insulin (H)  Accuchecks past week: 0730: , 11am: 123-209, 1700:  108-182.  No recent changes to insulin doses.  HgbA1C in January was 6.9%.    CKD (chronic kidney disease) stage 3, GFR 30-59 ml/min, est GFR: 10/30/17: 46,, 12/14/17: 59  Creatinine in February was 1.15 with an est GFR of 57.    Chronic atrial fibrillation (H)  No reports of heart rates >100/min.  Remains on Xarelto for anticoagulation.    Hyperlipidemia  Remains on atorvastatin and lipids checked last April.    ALLERGIES:No known allergies  PAST MEDICAL HISTORY:   has a past medical history of  Acute, but ill-defined, cerebrovascular disease (1-2008), Atrial fibrillation (H), Benign hypertension with CKD (chronic kidney disease) stage III (H) (6/5/2017), Cancer (H), Cardiac pacemaker in situ, Placed on 7/20/18 (7/23/2018), Central retinal vein occlusion, right eye (11/15/2017), Chronic atrial fibrillation (H) (6/5/2017), Chronic ischemic heart disease, unspecified, CKD (chronic kidney disease) stage 3, GFR 30-59 ml/min, est GFR: 10/30/17: 46,, 12/14/17: 59 (2/29/2012), Cognitive deficits as late effect of cerebrovascular disease (6/5/2017), Coronary artery disease, CVA (cerebral infarction), Dysphagia due to recent cerebrovascular accident (CVA) (6/5/2017), Elevated cholesterol, Essential hypertension, benign, H/O prostate cancer (7/6/2017), H/O: CVA (cerebrovascular accident) (6/5/2017), Hemiparesis affecting left side as late effect of cerebrovascular accident (H) (6/5/2017), Hyperlipidaemia, Loss of weight (10/2/2018), MEDICAL HISTORY OF - (1- 2008), Mobitz type I Wenckebach atrioventricular block, 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  PAST SURGICAL HISTORY:   has a past surgical history that includes seed implantation (2002); Phacoemulsification clear cornea with standard intraocular lens implant (Right, 10/7/2014); and Vitrectomy anterior (Right, 10/7/2014).  FAMILY HISTORY: family history is not on file.  SOCIAL HISTORY:  reports that he quit smoking about 45 years ago. His smoking use included cigarettes. He  started smoking about 65 years ago. He has a 20.00 pack-year smoking history. he has never used smokeless tobacco. He reports that he drinks about 0.6 oz of alcohol per week. He reports that he does not use drugs.    MEDICATIONS:  Current Outpatient Medications   Medication Sig Dispense Refill     ACETAMINOPHEN PO Take 1,000 mg by mouth 3 times daily For pain       Atorvastatin Calcium (LIPITOR PO) Take 40 mg by mouth At Bedtime       atropine 1 % ophthalmic solution Place 1 drop into the right eye 2 times daily       cholecalciferol (VITAMIN D) 1000 UNIT tablet Take 2,000 Units by mouth daily       Fluticasone Propionate (FLONASE NA) Spray 2 sprays into both nostrils daily       INSULIN ASPART SC Inject 2 Units Subcutaneous daily (with breakfast)       Insulin Glargine (BASAGLAR KWIKPEN SC) Inject 24 Units Subcutaneous At Bedtime May use 2 units as needed to prime pen before each dose.       LEVOTHYROXINE SODIUM PO Take 25 mcg by mouth daily       polyethylene glycol (MIRALAX/GLYCOLAX) powder Take 17 g by mouth daily        prednisoLONE acetate (PRED FORTE) 1 % ophthalmic susp Place 1 drop into the right eye 4 times daily        rivaroxaban ANTICOAGULANT (XARELTO) 20 MG TABS tablet Take 1 tablet (20 mg) by mouth daily (with dinner) 30 tablet 1     tamsulosin (FLOMAX) 0.4 MG capsule Take 1 capsule (0.4 mg) by mouth daily 60 capsule 0     timolol, PF, (TIMOPTIC OCUDOSE) 0.5 % ophthalmic solution Place 1 drop into the right eye 2 times daily       TRAMADOL HCL PO Take 50 mg by mouth 3 times daily And q4h prn       Patient Active Problem List   Diagnosis     Chronic ischemic heart disease     Personal history of other diseases of circulatory system     HYPERLIPIDEMIA LDL GOAL <100     Advance Care Planning     CKD (chronic kidney disease) stage 3, GFR 30-59 ml/min, est GFR: 10/30/17: 46,, 12/14/17: 59     Leg weakness     Ataxia     BPH (benign prostatic hyperplasia)     Type 2 diabetes mellitus with diabetic  nephropathy (H)     History of actinic keratoses     History of squamous cell carcinoma     S/P Mohs surgery for basal cell carcinoma     H/O: CVA (cerebrovascular accident), 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 Patient's desire to return to the community is not assessible due to cognitive impairment. Information reviewed:  Medications, vital signs, orders, and nursing notes.    ROS:  Negative selected, CONSTITUTIONAL:  weight loss, weight gain, poor appetite, fevers and fatigue, EYES:  Positive for right eye surgery and ongiong issues with vision, pain and redness., ENT:  Positive for hearing loss.  Had bilateral hearing aides  Has upper and lower dentures., CV:  chest pain, dyspnea on exertion and Positive for atrial fibrillation and h/o ventricular fibrillation and CAD.Now with pacemaker due to AV block. RESPIRATORY: shortness of breath, cough, asthma and wheezing, :  dysuria and Positive for h/o prostate cancer, GI:  abdominal pain, constipation, diarrhea, nausea, vomiting and Positive for some  dysphagia following CVA., NEURO:  headaches and Positive for CVA in 2017 with left sided paralysis and some dysphagia. with mild cognitive impairent., PSYCH: anxiety and depression, MUSCULOSKELETAL: back pain, joint pain and fibromyalgia and positive for episodes of neck pain.SKIN: Positive for fungal fingernails on left hand and bilateral pedal fungal nails.     Vitals:  /72   Pulse 81   Temp 97  F (36.1  C)   Resp 18   Wt 73.5 kg (162 lb)   BMI 23.92 kg/m    Body mass index is 23.92 kg/m .  Exam:  GENERAL APPEARANCE: Calm and pleasant.Resting in bed after breakfast. Easily aroused and conversant.  Alert and able to participate in conversation.  Denies any neck pain at this time.   Head: Normocephalic with slight left sided mouth droop.  Eye:  No redness noted in sclera  No discharge. Holding right eye closed per usual  ENIT:  No rhinitis. Moist oral cavity. No exudate. No obvious hearing issues.  CV:  Irregularly irregular rhythm.  No murmur.  DP pulses +1 bilaterally.  Pedal skin cool and dry.   RESP:  No cough.  Quiet, effortless respirations.  Clear to auscultation bilaterally.   ABDOMEN:  Soft rounded abdomen.  Bowel sounds positive all four quadrants.  No tenderness with palpation.  NEURO:   Strong hand grasp right hand and only minimal ability to move left  arm.  Wearing left hand splint.  Left sided facial droop.  Strong ability to raise right leg against resistance and moderate left leg strength.   Oriented to person.    MUSC: No pain with ROM of knees, although left leg more resistant to ROM than right..  Pt denies any neck pain at this time.  PSYCH:  No delusional statements.  Expressed gratitude for visit and asked if I could stop by for any visit any time.       Lab/Diagnostic data:     CBC RESULTS:   Recent Labs   Lab Test 01/10/19 11/19/18 11/01/18 07/20/18  1135 07/03/18  0835   WBC  --  6.5 7.2 7.8 6.0   RBC  --  3.90* 3.82* 3.79* 3.64*   HGB 12.9* 12.1* 12.0* 11.9* 11.3*   HCT  --   36.0* 35.5* 34.3* 32.8*   MCV  --  92.3 92.9 91 90   MCH  --   --   --  31.4 31.0   MCHC  --   --   --  34.7 34.5   RDW  --  16.0* 15.9* 16.3* 16.0*   PLT  --  173 161 171 159       Last Basic Metabolic Panel:  Recent Labs   Lab Test 02/04/19 01/24/19    136   POTASSIUM 4.1 4.3   CHLORIDE 103 102   TRENT 9.0 9.8   CO2 26 24   BUN 18 34*   CR 1.15 1.32*   * 85     GFR Estimate   Date Value Ref Range Status   02/04/2019 57 (L) >60 ml/min/1.73m2 Final   01/24/2019 48 (L) >60 ml/min/1.73m2 Final   01/10/2019 46 (L) >60 ml/min/1.73m2 Final     Recent Labs   Lab Test 04/19/18 02/21/17  0410 05/29/14  0945 05/01/13  0944   CHOL 116 105 152 151   HDL 28* 28* 29* 33*   LDL 56 49 81 83   TRIG 162* 140 208* 179*   CHOLHDLRATIO  --   --  5.2* 4.6     Liver Function Studies -   Recent Labs   Lab Test 07/01/18  0940 04/19/18   PROTTOTAL 6.7* 6.9   ALBUMIN 3.1* 3.6   BILITOTAL 0.4 0.5   ALKPHOS 68 67   AST 10 14   ALT 14 10       TSH   Date Value Ref Range Status   07/02/2018 2.20 0.40 - 4.00 mU/L Final   06/14/2018 3.62 0.30 - 4.50 uIU/mL Final   ]    Lab Results   Component Value Date    A1C 6.9 01/10/2019    A1C 7.2 10/04/2018     Family Communication:  Message left for daughter Jazmin on her cell phone with update.    ASSESSMENT/PLAN  (R63.4) Weight loss  (primary encounter diagnosis)  Comment: Stabilized  Plan: Continue monitoring and nutritional supplement.    (M54.2) Neck pain  Comment: Improved  Plan: Continue current POC.    (I10) Benign essential hypertension  Comment: Chronic, BP goal; <150/90, at goal  Plan: Continue current POC.BMP in 4 weeks.    (I69.919) Cognitive deficits as late effect of cerebrovascular disease  Comment: Mild  Plan: Continue 24 hour skilled nsg care and monitor for progression.    (E11.21,  Z79.4) Type 2 diabetes mellitus with diabetic nephropathy, with long-term current use of insulin (H)  Comment: Chronic, HgbA1c goal: <8%, at goal  Plan: Continue current insulin doses and monitoring.   Recheck HgbA1C in 4 weeks.    (N18.3) CKD (chronic kidney disease) stage 3, GFR 30-59 ml/min, est GFR: 10/30/17: 46,, 12/14/17: 59  Comment: Chronic, but stable  Plan: Renally adjust dose of medications.  BMP in 4 weeks.    (I48.2) Chronic atrial fibrillation (H)  Comment: Rate controlled.  Anticoagulated with Xarelto  Plan: Continue current POC.  Check Hgb in 4 weeks.    (E78.5) Hyperlipidemia LDL goal <100  Comment: Chronic  Plan: Continue atorvastatin.  Check lipids and LFt's in 4 weeks    Electronically signed by:  TOBY Momin CNP

## 2019-04-11 ENCOUNTER — TRANSFERRED RECORDS (OUTPATIENT)
Dept: HEALTH INFORMATION MANAGEMENT | Facility: CLINIC | Age: 84
End: 2019-04-11

## 2019-04-11 LAB
ALBUMIN SERPL-MCNC: 3.6 G/DL (ref 3.5–5)
ALP SERPL-CCNC: 68 U/L (ref 40–150)
ALT SERPL-CCNC: 10 U/L (ref 0–55)
ANION GAP SERPL CALCULATED.3IONS-SCNC: 8 MMOL/L (ref 7–16)
AST SERPL-CCNC: 13 U/L (ref 10–40)
BILIRUB SERPL-MCNC: 0.3 MG/DL (ref 0.2–1.2)
BILIRUBIN DIRECT: 0.1 MG/DL (ref 0–0.5)
BUN SERPL-MCNC: 26 MG/DL (ref 7–26)
CALCIUM SERPL-MCNC: 9.4 MG/DL (ref 8.4–10.4)
CHLORIDE SERPLBLD-SCNC: 103 MMOL/L (ref 98–109)
CHOLEST SERPL-MCNC: 112 MG/DL (ref 0–199)
CO2 SERPL-SCNC: 27 MMOL/L (ref 20–29)
CREAT SERPL-MCNC: 1.08 MG/DL (ref 0.73–1.18)
GFR SERPL CREATININE-BSD FRML MDRD: >60 ML/MIN/1.73M2
GLUCOSE SERPL-MCNC: 130 MG/DL (ref 70–100)
HBA1C MFR BLD: 6.2 % (ref 0–5.7)
HDLC SERPL-MCNC: 32 MG/DL
HEMOGLOBIN: 11.6 G/DL (ref 13.5–17.5)
LDLC SERPL CALC-MCNC: 46 MG/DL
NONHDLC SERPL-MCNC: 80 MG/DL
POTASSIUM SERPL-SCNC: 4.4 MMOL/L (ref 3.5–5.1)
PROT SERPL-MCNC: 7 G/DL (ref 6.4–8.3)
SODIUM SERPL-SCNC: 138 MMOL/L (ref 136–145)
TRIGL SERPL-MCNC: 169 MG/DL

## 2019-04-12 ENCOUNTER — ANCILLARY PROCEDURE (OUTPATIENT)
Dept: CARDIOLOGY | Facility: CLINIC | Age: 84
End: 2019-04-12
Payer: COMMERCIAL

## 2019-04-12 DIAGNOSIS — I44.1 AV BLOCK, 2ND DEGREE: ICD-10-CM

## 2019-04-12 PROCEDURE — 93296 REM INTERROG EVL PM/IDS: CPT | Performed by: INTERNAL MEDICINE

## 2019-04-16 LAB
MDC_IDC_EPISODE_DTM: NORMAL
MDC_IDC_EPISODE_DURATION: 11 S
MDC_IDC_EPISODE_DURATION: 19 S
MDC_IDC_EPISODE_DURATION: 2 S
MDC_IDC_EPISODE_DURATION: 2 S
MDC_IDC_EPISODE_DURATION: 3648 S
MDC_IDC_EPISODE_DURATION: 8454 S
MDC_IDC_EPISODE_DURATION: NORMAL S
MDC_IDC_EPISODE_ID: NORMAL
MDC_IDC_EPISODE_TYPE: NORMAL
MDC_IDC_LEAD_IMPLANT_DT: NORMAL
MDC_IDC_LEAD_IMPLANT_DT: NORMAL
MDC_IDC_LEAD_LOCATION: NORMAL
MDC_IDC_LEAD_LOCATION: NORMAL
MDC_IDC_LEAD_LOCATION_DETAIL_1: NORMAL
MDC_IDC_LEAD_LOCATION_DETAIL_1: NORMAL
MDC_IDC_LEAD_MFG: NORMAL
MDC_IDC_LEAD_MFG: NORMAL
MDC_IDC_LEAD_MODEL: NORMAL
MDC_IDC_LEAD_MODEL: NORMAL
MDC_IDC_LEAD_POLARITY_TYPE: NORMAL
MDC_IDC_LEAD_POLARITY_TYPE: NORMAL
MDC_IDC_LEAD_SERIAL: NORMAL
MDC_IDC_LEAD_SERIAL: NORMAL
MDC_IDC_MSMT_BATTERY_DTM: NORMAL
MDC_IDC_MSMT_BATTERY_REMAINING_LONGEVITY: 108 MO
MDC_IDC_MSMT_BATTERY_REMAINING_PERCENTAGE: 100 %
MDC_IDC_MSMT_BATTERY_STATUS: NORMAL
MDC_IDC_MSMT_LEADCHNL_RA_IMPEDANCE_VALUE: 730 OHM
MDC_IDC_MSMT_LEADCHNL_RA_PACING_THRESHOLD_AMPLITUDE: 0.4 V
MDC_IDC_MSMT_LEADCHNL_RA_PACING_THRESHOLD_PULSEWIDTH: 0.4 MS
MDC_IDC_MSMT_LEADCHNL_RV_IMPEDANCE_VALUE: 785 OHM
MDC_IDC_MSMT_LEADCHNL_RV_PACING_THRESHOLD_AMPLITUDE: 0.8 V
MDC_IDC_MSMT_LEADCHNL_RV_PACING_THRESHOLD_PULSEWIDTH: 0.4 MS
MDC_IDC_PG_IMPLANT_DTM: NORMAL
MDC_IDC_PG_MFG: NORMAL
MDC_IDC_PG_MODEL: NORMAL
MDC_IDC_PG_SERIAL: NORMAL
MDC_IDC_PG_TYPE: NORMAL
MDC_IDC_SESS_CLINIC_NAME: NORMAL
MDC_IDC_SESS_DTM: NORMAL
MDC_IDC_SESS_TYPE: NORMAL
MDC_IDC_SET_BRADY_AT_MODE_SWITCH_MODE: NORMAL
MDC_IDC_SET_BRADY_AT_MODE_SWITCH_RATE: 170 {BEATS}/MIN
MDC_IDC_SET_BRADY_LOWRATE: 60 {BEATS}/MIN
MDC_IDC_SET_BRADY_MAX_SENSOR_RATE: 120 {BEATS}/MIN
MDC_IDC_SET_BRADY_MAX_TRACKING_RATE: 120 {BEATS}/MIN
MDC_IDC_SET_BRADY_MODE: NORMAL
MDC_IDC_SET_BRADY_PAV_DELAY_HIGH: 200 MS
MDC_IDC_SET_BRADY_PAV_DELAY_LOW: 300 MS
MDC_IDC_SET_BRADY_SAV_DELAY_HIGH: 200 MS
MDC_IDC_SET_BRADY_SAV_DELAY_LOW: 300 MS
MDC_IDC_SET_LEADCHNL_RA_PACING_AMPLITUDE: 2 V
MDC_IDC_SET_LEADCHNL_RA_PACING_CAPTURE_MODE: NORMAL
MDC_IDC_SET_LEADCHNL_RA_PACING_POLARITY: NORMAL
MDC_IDC_SET_LEADCHNL_RA_PACING_PULSEWIDTH: 0.4 MS
MDC_IDC_SET_LEADCHNL_RA_SENSING_ADAPTATION_MODE: NORMAL
MDC_IDC_SET_LEADCHNL_RA_SENSING_POLARITY: NORMAL
MDC_IDC_SET_LEADCHNL_RA_SENSING_SENSITIVITY: 0.25 MV
MDC_IDC_SET_LEADCHNL_RV_PACING_AMPLITUDE: 1.3 V
MDC_IDC_SET_LEADCHNL_RV_PACING_CAPTURE_MODE: NORMAL
MDC_IDC_SET_LEADCHNL_RV_PACING_POLARITY: NORMAL
MDC_IDC_SET_LEADCHNL_RV_PACING_PULSEWIDTH: 0.4 MS
MDC_IDC_SET_LEADCHNL_RV_SENSING_ADAPTATION_MODE: NORMAL
MDC_IDC_SET_LEADCHNL_RV_SENSING_POLARITY: NORMAL
MDC_IDC_SET_LEADCHNL_RV_SENSING_SENSITIVITY: 1.5 MV
MDC_IDC_SET_ZONE_DETECTION_INTERVAL: 375 MS
MDC_IDC_SET_ZONE_TYPE: NORMAL
MDC_IDC_SET_ZONE_VENDOR_TYPE: NORMAL
MDC_IDC_STAT_AT_BURDEN_PERCENT: 4 %
MDC_IDC_STAT_AT_DTM_END: NORMAL
MDC_IDC_STAT_AT_DTM_START: NORMAL
MDC_IDC_STAT_BRADY_DTM_END: NORMAL
MDC_IDC_STAT_BRADY_DTM_START: NORMAL
MDC_IDC_STAT_BRADY_RA_PERCENT_PACED: 1 %
MDC_IDC_STAT_BRADY_RV_PERCENT_PACED: 15 %
MDC_IDC_STAT_EPISODE_RECENT_COUNT: 0
MDC_IDC_STAT_EPISODE_RECENT_COUNT: 2
MDC_IDC_STAT_EPISODE_RECENT_COUNT: 65
MDC_IDC_STAT_EPISODE_RECENT_COUNT_DTM_END: NORMAL
MDC_IDC_STAT_EPISODE_RECENT_COUNT_DTM_START: NORMAL
MDC_IDC_STAT_EPISODE_TYPE: NORMAL
MDC_IDC_STAT_EPISODE_VENDOR_TYPE: NORMAL

## 2019-05-02 ENCOUNTER — TELEPHONE (OUTPATIENT)
Dept: GERIATRICS | Facility: CLINIC | Age: 84
End: 2019-05-02

## 2019-05-02 DIAGNOSIS — M54.2 NECK PAIN: Primary | ICD-10-CM

## 2019-05-02 RX ORDER — TRAMADOL HYDROCHLORIDE 50 MG/1
TABLET ORAL
Qty: 270 TABLET | Refills: 0 | Status: SHIPPED | OUTPATIENT
Start: 2019-05-02 | End: 2019-05-02

## 2019-05-02 RX ORDER — TRAMADOL HYDROCHLORIDE 50 MG/1
TABLET ORAL
Qty: 270 TABLET | Refills: 0 | Status: SHIPPED | OUTPATIENT
Start: 2019-05-02 | End: 2019-08-05

## 2019-05-06 ASSESSMENT — MIFFLIN-ST. JEOR: SCORE: 1488.65

## 2019-05-06 NOTE — PROGRESS NOTES
[facility]  [place] [facility] Record Number:  [id number]  Place of Service where encounter took place:  [facility] ([facility] [[id number]]  Chief Complaint   Patient presents with     RECHECK       HPI:    Elias Mckee  is a 87 year old (8/10/1931), who is being seen today for an episodic care visit.  HPI information obtained from: facility chart records, facility staff and [facility] chart review.  Pt is unreliable historian due to cognitive loss. Today's concern is:  Benign essential hypertension  BP ranges in past month: 96//78,  No reports of chest pain.    CKD (chronic kidney disease) stage 3, GFR 30-59 ml/min, est GFR: 10/30/17: 46,, 12/14/17: 59  Labs on 4/11 showed a creatinine of 1.08 and an est GFR>60.    Type 2 diabetes mellitus with stage 3 chronic kidney disease, with long-term current use of insulin (H)  No recent changes to insulin doses.  Accuchecks in past week: 0730: 125-154, 11am: 153-272, 1700: 105-181.  HgbA1C on 4/11 was 6.2%    Neck pain  Tramadol has been very helpful with controlling neck pain.    Chronic atrial fibrillation (H)  No reports of heart rates >100/min.  Pacemaker check on 4/12 and next due on 6/20 which will be cardiology office visit.    Cognitive deficits as late effect of cerebrovascular disease  Resides in skilled care facility for care needs.  Has impaired cognition, but is at baseline with no new concerns per staff.    Past Medical and Surgical History reviewed in Epic today.    MEDICATIONS:  Current Outpatient Medications   Medication Sig Dispense Refill     ACETAMINOPHEN PO Take 1,000 mg by mouth 3 times daily For pain       Atorvastatin Calcium (LIPITOR PO) Take 40 mg by mouth At Bedtime       atropine 1 % ophthalmic solution Place 1 drop into the right eye 2 times daily       cholecalciferol (VITAMIN D) 1000 UNIT tablet Take 2,000 Units by mouth daily       Fluticasone Propionate (FLONASE NA) Spray 2 sprays into both nostrils daily        INSULIN ASPART SC Inject 2 Units Subcutaneous daily (with breakfast)       Insulin Glargine (BASAGLAR KWIKPEN SC) Inject 24 Units Subcutaneous At Bedtime May use 2 units as needed to prime pen before each dose.       LEVOTHYROXINE SODIUM PO Take 25 mcg by mouth daily       polyethylene glycol (MIRALAX/GLYCOLAX) powder Take 17 g by mouth daily        prednisoLONE acetate (PRED FORTE) 1 % ophthalmic susp Place 1 drop into the right eye 4 times daily        rivaroxaban ANTICOAGULANT (XARELTO) 20 MG TABS tablet Take 1 tablet (20 mg) by mouth daily (with dinner) 30 tablet 1     tamsulosin (FLOMAX) 0.4 MG capsule Take 1 capsule (0.4 mg) by mouth daily 60 capsule 0     timolol, PF, (TIMOPTIC OCUDOSE) 0.5 % ophthalmic solution Place 1 drop into the right eye 2 times daily       traMADol (ULTRAM) 50 MG tablet 50 mg tid and q4h prn pain 270 tablet 0     Patient Active Problem List   Diagnosis     Chronic ischemic heart disease     Personal history of other diseases of circulatory system     HYPERLIPIDEMIA LDL GOAL <100     Advance Care Planning     CKD (chronic kidney disease) stage 3, GFR 30-59 ml/min, est GFR: 10/30/17: 46,, 12/14/17: 59     Leg weakness     Ataxia     BPH (benign prostatic hyperplasia)     Type 2 diabetes mellitus with diabetic nephropathy (H)     History of actinic keratoses     History of squamous cell carcinoma     S/P Mohs surgery for basal cell carcinoma     H/O: CVA (cerebrovascular accident), 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    Objective:  /75   Pulse 76   Temp 97.5  F (36.4  C)   Resp 18   Ht 1.778 m (5' 10\")   Wt 80.7 kg (178 lb)   BMI 25.54 kg/m    Exam:  GENERAL APPEARANCE: Calm and pleasant. Resting in bed early morning. Alert and conversant.  Alert and able to participate in conversation.   Head: Normocephalic with slight left sided mouth droop.  Eye:  No redness noted in sclera  No discharge. Holding right eye closed per usual  ENIT:  No rhinitis. Moist oral cavity. No exudate. No obvious hearing issues.  CV:  Irregularly irregular rhythm.  No murmur.  DP pulses +1 bilaterally.  Pedal skin cool and dry. No LE edema  RESP:  No cough.  Quiet, effortless respirations.  Clear to auscultation bilaterally.   ABDOMEN:  Soft rounded abdomen.  Bowel sounds positive all four quadrants.  No tenderness with palpation.  NEURO:   Strong hand grasp right hand and only minimal ability to move left  arm.  Wearing left hand splint.  Left sided facial droop.  Strong ability to raise right leg against resistance and moderate left leg strength.   Oriented to person.    MUSC: No pain with ROM of knees, although left leg more resistant to ROM than right..  Pt denies any neck pain at this time.  PSYCH:  No delusional statements. Anxious to get up for breakfast.    Labs:   CBC RESULTS:   Recent Labs   Lab Test 04/11/19 01/10/19 11/19/18 11/01/18 07/20/18  1135 07/03/18  0835   WBC  --   --  6.5 7.2 7.8 6.0   RBC  --   --  3.90* " 3.82* 3.79* 3.64*   HGB 11.6* 12.9* 12.1* 12.0* 11.9* 11.3*   HCT  --   --  36.0* 35.5* 34.3* 32.8*   MCV  --   --  92.3 92.9 91 90   MCH  --   --   --   --  31.4 31.0   MCHC  --   --   --   --  34.7 34.5   RDW  --   --  16.0* 15.9* 16.3* 16.0*   PLT  --   --  173 161 171 159       Last Basic Metabolic Panel:  Recent Labs   Lab Test 04/11/19 02/04/19    139   POTASSIUM 4.4 4.1   CHLORIDE 103 103   TRENT 9.4 9.0   CO2 27 26   BUN 26 18   CR 1.08 1.15   * 143*     GFR Estimate   Date Value Ref Range Status   04/11/2019 >60 >60 ml/min/1.73m2 Final   02/04/2019 57 (L) >60 ml/min/1.73m2 Final   01/24/2019 48 (L) >60 ml/min/1.73m2 Final     Liver Function Studies -   Recent Labs   Lab Test 04/11/19 07/01/18  0940   PROTTOTAL 7.0 6.7*   ALBUMIN 3.6 3.1*   BILITOTAL 0.3 0.4   ALKPHOS 68 68   AST 13 10   ALT 10 14     Lab Results   Component Value Date    CHOL 112 04/11/2019     Lab Results   Component Value Date    HDL 32 04/11/2019     Lab Results   Component Value Date    LDL 46 04/11/2019     Lab Results   Component Value Date    TRIG 169 04/11/2019     Lab Results   Component Value Date    CHOLHDLRATIO 5.2 05/29/2014       TSH   Date Value Ref Range Status   07/02/2018 2.20 0.40 - 4.00 mU/L Final   06/14/2018 3.62 0.30 - 4.50 uIU/mL Final   ]    Lab Results   Component Value Date    A1C 6.2 04/11/2019    A1C 6.9 01/10/2019       ASSESSMENT/PLAN:  (I10) Benign essential hypertension  (primary encounter diagnosis)  Comment: BP goal: <150/90, at goal  Plan: Continue current POC.   Dr. Boyd to see next week    (N18.3) CKD (chronic kidney disease) stage 3, GFR 30-59 ml/min, est GFR: 10/30/17: 46,, 12/14/17: 59  Comment: Chronic  Plan: Renally adjust dose of medications.  BMP q6m or with acute change in condition.    (E11.22,  N18.3,  Z79.4) Type 2 diabetes mellitus with stage 3 chronic kidney disease, with long-term current use of insulin (H)  Comment: CHronic, HgbA1C goal <8%, at goal  Plan: Continue current  POC.  Check HgbA1C q6m     (M54.2) Neck pain  Comment: Chronic, but relieved with tramadol.  Likely positional cause, as he sits in w/c with neck flexed to his chest.  Has completed therapy  Plan: Continue current POC    (I48.2) Chronic atrial fibrillation (H)  Comment: Rate controlled, anticoagulated with xarelto  Plan: Continue current POC.    (I69.329) Cognitive deficits as late effect of cerebrovascular disease  Comment: Chronic  Plan: Continue POC in skilled [facility] facility.    Electronically signed by:  TOBY Momin CNP

## 2019-05-07 ENCOUNTER — NURSING HOME VISIT (OUTPATIENT)
Dept: GERIATRICS | Facility: CLINIC | Age: 84
End: 2019-05-07
Payer: COMMERCIAL

## 2019-05-07 VITALS
BODY MASS INDEX: 25.48 KG/M2 | RESPIRATION RATE: 18 BRPM | SYSTOLIC BLOOD PRESSURE: 127 MMHG | TEMPERATURE: 97.5 F | DIASTOLIC BLOOD PRESSURE: 75 MMHG | WEIGHT: 178 LBS | HEART RATE: 76 BPM | HEIGHT: 70 IN

## 2019-05-07 DIAGNOSIS — I10 BENIGN ESSENTIAL HYPERTENSION: Primary | ICD-10-CM

## 2019-05-07 DIAGNOSIS — I48.20 CHRONIC ATRIAL FIBRILLATION (H): ICD-10-CM

## 2019-05-07 DIAGNOSIS — M54.2 NECK PAIN: ICD-10-CM

## 2019-05-07 DIAGNOSIS — N18.30 TYPE 2 DIABETES MELLITUS WITH STAGE 3 CHRONIC KIDNEY DISEASE, WITH LONG-TERM CURRENT USE OF INSULIN (H): ICD-10-CM

## 2019-05-07 DIAGNOSIS — N18.30 CKD (CHRONIC KIDNEY DISEASE) STAGE 3, GFR 30-59 ML/MIN (H): ICD-10-CM

## 2019-05-07 DIAGNOSIS — E11.22 TYPE 2 DIABETES MELLITUS WITH STAGE 3 CHRONIC KIDNEY DISEASE, WITH LONG-TERM CURRENT USE OF INSULIN (H): ICD-10-CM

## 2019-05-07 DIAGNOSIS — Z79.4 TYPE 2 DIABETES MELLITUS WITH STAGE 3 CHRONIC KIDNEY DISEASE, WITH LONG-TERM CURRENT USE OF INSULIN (H): ICD-10-CM

## 2019-05-07 DIAGNOSIS — I69.919 COGNITIVE DEFICITS AS LATE EFFECT OF CEREBROVASCULAR DISEASE: ICD-10-CM

## 2019-05-07 PROCEDURE — 99309 SBSQ NF CARE MODERATE MDM 30: CPT | Performed by: NURSE PRACTITIONER

## 2019-05-09 ENCOUNTER — TRANSFERRED RECORDS (OUTPATIENT)
Dept: HEALTH INFORMATION MANAGEMENT | Facility: CLINIC | Age: 84
End: 2019-05-09

## 2019-05-09 LAB — TSH SERPL-ACNC: 3.35 UIU/ML (ref 0.3–4.5)

## 2019-05-13 ENCOUNTER — NURSING HOME VISIT (OUTPATIENT)
Dept: GERIATRICS | Facility: CLINIC | Age: 84
End: 2019-05-13
Payer: COMMERCIAL

## 2019-05-13 DIAGNOSIS — E11.22 TYPE 2 DIABETES MELLITUS WITH STAGE 3 CHRONIC KIDNEY DISEASE, WITH LONG-TERM CURRENT USE OF INSULIN (H): ICD-10-CM

## 2019-05-13 DIAGNOSIS — N18.30 TYPE 2 DIABETES MELLITUS WITH STAGE 3 CHRONIC KIDNEY DISEASE, WITH LONG-TERM CURRENT USE OF INSULIN (H): ICD-10-CM

## 2019-05-13 DIAGNOSIS — M54.2 NECK PAIN: Primary | ICD-10-CM

## 2019-05-13 DIAGNOSIS — Z79.4 TYPE 2 DIABETES MELLITUS WITH STAGE 3 CHRONIC KIDNEY DISEASE, WITH LONG-TERM CURRENT USE OF INSULIN (H): ICD-10-CM

## 2019-05-13 DIAGNOSIS — I10 BENIGN ESSENTIAL HYPERTENSION: ICD-10-CM

## 2019-05-13 DIAGNOSIS — I69.354 HEMIPARESIS AFFECTING LEFT SIDE AS LATE EFFECT OF CEREBROVASCULAR ACCIDENT (H): ICD-10-CM

## 2019-05-16 NOTE — PROGRESS NOTES
Patient was seen for a regulatory long-term care visit.  Case reviewed with nurse practitioner.    Patient's overall status has been stable.  Tramadol has been helpful in managing chronic neck pain.    Elias denies any specific concerns today.  He states he has mild neck pain.    Vital signs stable  Blood pressure controlled  Blood glucoses have been in the 100s with most part    Alert, in good spirits, appears comfortable  Lungs clear  CV irregular, heart rate 70s  Abdomen soft  Left facial droop and left-sided weakness unchanged      Assessment    Hemiparesis affecting left side secondary to CVA, stable  Right-sided neck pain likely related to past CVA, improved with scheduled and PRN tramadol  Hypertension, controlled  Diabetes mellitus type 2 controlled with most recent hemoglobin A1c 6.2    Plan  Continue current medications including tramadol.  Monitor blood pressures and blood glucoses.  Routine lab monitoring.  Rivaroxaban for A. fib

## 2019-06-20 ENCOUNTER — OFFICE VISIT (OUTPATIENT)
Dept: CARDIOLOGY | Facility: CLINIC | Age: 84
End: 2019-06-20
Attending: INTERNAL MEDICINE
Payer: COMMERCIAL

## 2019-06-20 VITALS
DIASTOLIC BLOOD PRESSURE: 70 MMHG | BODY MASS INDEX: 25.54 KG/M2 | HEIGHT: 70 IN | SYSTOLIC BLOOD PRESSURE: 102 MMHG | HEART RATE: 84 BPM

## 2019-06-20 DIAGNOSIS — I25.10 CORONARY ARTERY DISEASE INVOLVING NATIVE CORONARY ARTERY OF NATIVE HEART WITHOUT ANGINA PECTORIS: ICD-10-CM

## 2019-06-20 DIAGNOSIS — Z95.0 CARDIAC PACEMAKER IN SITU: ICD-10-CM

## 2019-06-20 DIAGNOSIS — I44.1 AV BLOCK, 2ND DEGREE: ICD-10-CM

## 2019-06-20 PROCEDURE — 93280 PM DEVICE PROGR EVAL DUAL: CPT | Performed by: INTERNAL MEDICINE

## 2019-06-20 PROCEDURE — 99214 OFFICE O/P EST MOD 30 MIN: CPT | Mod: 25 | Performed by: INTERNAL MEDICINE

## 2019-06-20 NOTE — LETTER
6/20/2019      Yvette Kenzie Ha, APRN CNP  [address]  [facility] [address]      RE: Elias EDUARDO Rylee       Dear Colleague,    I had the pleasure of seeing Elias Mckee in the [facility].    Service Date: 06/20/2019      HISTORY OF PRESENT ILLNESS:  It is a pleasure to follow up with Mr. Mckee again today.  I see him for chronic coronary artery disease.      I first met this gentleman in 2008.  That year, he presented with ventricular fibrillation arrest following an acute myocardial infarction.  He was treated at [facility].  Angiography demonstrated multivessel coronary artery disease.  He had a CVA with this cardiac presentation.  He was not deemed a good candidate for coronary artery bypass surgery and multivessel coronary angioplasty and stenting was performed.  Stents were placed in LAD and circumflex with PTCA done to the RCA.  Left ventricular systolic function remained normal and we have not repeated invasive treatment again for his heart.  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  He was found to be in atrial fibrillation and he has been on Xarelto.  In 2008, he collapsed and rhythm monitoring demonstrated high-grade AV block with severe sinus bradycardia.  A dual-chamber pacer has been placed and he has done very well since then.  In the few months since I have seen him, he has remained well.  He has no cardiac symptoms.  His son-in-law who accompanied him today to clinic visit said that he is enjoying life in his nursing care facility.  He enjoys playing bingo.  They just had a health conference about him and the remarks were glowing.      PHYSICAL EXAMINATION:  Cardiac examination reveals a controlled heart rate, a clear chest and unremarkable heart sounds.      Pacer interrogation today showed a normally functioning pacer.          IMPRESSION:   1.  Stable  coronary artery disease.   2.  Right CVA with dense left hemiparesis.   3.  Paroxysmal atrial fibrillation.  On Xarelto.  No problems with bleeding.   4.  Dyslipidemia.  On moderate-dose atorvastatin with excellent lipid numbers.  LDL is at 46.      He will continue followup in the [facility].  I will plan to see him again in a year's time for further followup.         JEYSON VALDEZ MD, [facility]             D: 2019   T: 2019   MT: HAILEE      Name:     MIRANDA BEGUM   MRN:      [id number]        Account:      [id number]   :      08/10/1931           Service Date: 2019      Document: [id number]         Outpatient Encounter Medications as of 2019   Medication Sig Dispense Refill     ACETAMINOPHEN PO Take 1,000 mg by mouth 3 times daily For pain       Atorvastatin Calcium (LIPITOR PO) Take 40 mg by mouth At Bedtime       atropine 1 % ophthalmic solution Place 1 drop into the right eye 2 times daily       cholecalciferol (VITAMIN D) 1000 UNIT tablet Take 2,000 Units by mouth daily       Fluticasone Propionate (FLONASE NA) Spray 2 sprays into both nostrils daily       INSULIN ASPART SC Inject 2 Units Subcutaneous daily (with breakfast)       Insulin Glargine (BASAGLAR KWIKPEN SC) Inject 24 Units Subcutaneous At Bedtime May use 2 units as needed to prime pen before each dose.       LEVOTHYROXINE SODIUM PO Take 25 mcg by mouth daily       polyethylene glycol (MIRALAX/GLYCOLAX) powder Take 17 g by mouth daily        rivaroxaban ANTICOAGULANT (XARELTO) 20 MG TABS tablet Take 1 tablet (20 mg) by mouth daily (with dinner) 30 tablet 1     tamsulosin (FLOMAX) 0.4 MG capsule Take 1 capsule (0.4 mg) by mouth daily 60 capsule 0     timolol, PF, (TIMOPTIC OCUDOSE) 0.5 % ophthalmic solution Place 1 drop into the right eye 2 times daily       traMADol (ULTRAM) 50 MG tablet 50 mg tid and q4h prn pain 270 tablet 0     prednisoLONE acetate (PRED FORTE) 1 % ophthalmic susp Place 1 drop into the right eye 4 times  daily        No facility-administered encounter medications on file as of 6/20/2019.        Again, thank you for allowing me to participate in the care of your patient.      Sincerely,    DR JEYSON VALDEZ MD     [facility]

## 2019-06-20 NOTE — PROGRESS NOTES
HPI and Plan:   See dictation    Orders Placed This Encounter   Procedures     Follow-Up with Cardiologist       No orders of the defined types were placed in this encounter.      Encounter Diagnoses   Name Primary?     Cardiac pacemaker in situ      Coronary artery disease involving native coronary artery of native heart without angina pectoris        CURRENT MEDICATIONS:  Current Outpatient Medications   Medication Sig Dispense Refill     ACETAMINOPHEN PO Take 1,000 mg by mouth 3 times daily For pain       Atorvastatin Calcium (LIPITOR PO) Take 40 mg by mouth At Bedtime       atropine 1 % ophthalmic solution Place 1 drop into the right eye 2 times daily       cholecalciferol (VITAMIN D) 1000 UNIT tablet Take 2,000 Units by mouth daily       Fluticasone Propionate (FLONASE NA) Spray 2 sprays into both nostrils daily       INSULIN ASPART SC Inject 2 Units Subcutaneous daily (with breakfast)       Insulin Glargine (BASAGLAR KWIKPEN SC) Inject 24 Units Subcutaneous At Bedtime May use 2 units as needed to prime pen before each dose.       LEVOTHYROXINE SODIUM PO Take 25 mcg by mouth daily       polyethylene glycol (MIRALAX/GLYCOLAX) powder Take 17 g by mouth daily        rivaroxaban ANTICOAGULANT (XARELTO) 20 MG TABS tablet Take 1 tablet (20 mg) by mouth daily (with dinner) 30 tablet 1     tamsulosin (FLOMAX) 0.4 MG capsule Take 1 capsule (0.4 mg) by mouth daily 60 capsule 0     timolol, PF, (TIMOPTIC OCUDOSE) 0.5 % ophthalmic solution Place 1 drop into the right eye 2 times daily       traMADol (ULTRAM) 50 MG tablet 50 mg tid and q4h prn pain 270 tablet 0     prednisoLONE acetate (PRED FORTE) 1 % ophthalmic susp Place 1 drop into the right eye 4 times daily          ALLERGIES     Allergies   Allergen Reactions     No Known Allergies        PAST MEDICAL HISTORY:  Past Medical History:   Diagnosis Date     Acute, but ill-defined, cerebrovascular disease 1-2008    Left hemiparesis, left side neglect     Atrial  fibrillation (H)      Benign hypertension with CKD (chronic kidney disease) stage III (H) 6/5/2017     Cancer (H)      Cardiac pacemaker in situ, Placed on 7/20/18 7/23/2018     Central retinal vein occlusion, right eye 11/15/2017     Chronic atrial fibrillation (H) 6/5/2017     Chronic ischemic heart disease, unspecified      CKD (chronic kidney disease) stage 3, GFR 30-59 ml/min, est GFR: 10/30/17: 46,, 12/14/17: 59 2/29/2012     Cognitive deficits as late effect of cerebrovascular disease 6/5/2017     Coronary artery disease      CVA (cerebral infarction)      Dysphagia due to recent cerebrovascular accident (CVA) 6/5/2017     Elevated cholesterol      Essential hypertension, benign      H/O prostate cancer 7/6/2017     H/O: CVA (cerebrovascular accident) 6/5/2017     Hemiparesis affecting left side as late effect of cerebrovascular accident (H) 6/5/2017     Hyperlipidaemia      Loss of weight 10/2/2018     MEDICAL HISTORY OF - 1- 2008    V fib arrest      Mobitz type I Wenckebach atrioventricular block, 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     Myocardial infarction (H)      Onychomycosis 6/5/2017     Other and unspecified hyperlipidemia      PVD (peripheral vascular disease) (H) 12/12/2017     Type 2 diabetes mellitus with diabetic peripheral angiopathy with gangrene (H) 12/12/2017     Type 2 diabetes mellitus with stage 3 chronic kidney disease (H) 6/5/2017     Type II diabetes mellitus with peripheral circulatory disorder (H) 12/12/2017     Type II or unspecified type diabetes mellitus without mention of complication, not stated as uncontrolled 1-2008       PAST SURGICAL HISTORY:  Past Surgical History:   Procedure Laterality Date     PHACOEMULSIFICATION CLEAR CORNEA WITH STANDARD INTRAOCULAR LENS IMPLANT Right 10/7/2014    Procedure: PHACOEMULSIFICATION CLEAR CORNEA WITH STANDARD INTRAOCULAR LENS IMPLANT;  Surgeon: German Thomas MD;  Location: [facility]     SEED IMPLANTATION  2002    prostate cancer     VITRECTOMY ANTERIOR Right  10/7/2014    Procedure: VITRECTOMY ANTERIOR;  Surgeon: German Thomas MD;  Location: [facility]       FAMILY HISTORY:  History reviewed. No pertinent family history.    SOCIAL HISTORY:  Social History     Socioeconomic History     Marital status:      Spouse name: None     Number of children: None     Years of education: None     Highest education level: None   Occupational History     None   Social Needs     Financial resource strain: None     Food insecurity:     Worry: None     Inability: None     Transportation needs:     Medical: None     Non-medical: None   Tobacco Use     Smoking status: Former Smoker     Packs/day: 1.00     Years: 20.00     Pack years: 20.00     Types: Cigarettes     Start date: 1953     Last attempt to quit: 1973     Years since quittin.0     Smokeless tobacco: Never Used   Substance and Sexual Activity     Alcohol use: Yes     Alcohol/week: 0.6 oz     Types: 1 Cans of beer per week     Comment: Occasional     Drug use: No     Sexual activity: Not Currently   Lifestyle     Physical activity:     Days per week: None     Minutes per session: None     Stress: None   Relationships     Social connections:     Talks on phone: None     Gets together: None     Attends Christian service: None     Active member of club or organization: None     Attends meetings of clubs or organizations: None     Relationship status: None     Intimate partner violence:     Fear of current or ex partner: None     Emotionally abused: None     Physically abused: None     Forced sexual activity: None   Other Topics Concern     Parent/sibling w/ CABG, MI or angioplasty before 65F 55M? Not Asked      Service Not Asked     Blood Transfusions Not Asked     Caffeine Concern Not Asked     Occupational Exposure Not Asked     Hobby Hazards Not Asked     Sleep Concern Yes     Comment: pt using sleep aid     Stress Concern No     Weight Concern No     Special Diet No     Back Care Not Asked      "Exercise Yes     Comment: everyday     Bike Helmet Not Asked     Seat Belt Yes     Self-Exams Not Asked   Social History Narrative    Born and raised in [facility].  His parents  when he was young and his father moved to [place].  His mother  when he was 11 from a brain tumor.  [place] and graduated from .  Met is wife in [facility] and  in 1949.  She  in  from COPD.  They had four children (3 sons and 1 daughter).  Sons live in [place] and daughter lives in [place] and is his POA.  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  Lived at the [facility] in [place] until his stroke in 2017.         Review of Systems:  Skin:  Negative     Eyes:  Positive for glasses  ENT:  Negative    Respiratory:  Negative    Cardiovascular:  Negative    Gastroenterology: Negative    Genitourinary:  Negative    Musculoskeletal:  Negative    Neurologic:  Negative    Psychiatric:  Negative    Heme/Lymph/Imm:  Negative    Endocrine:  Positive for thyroid disorder    Physical Exam:  Vitals: /70   Pulse 84   Ht 1.778 m (5' 10\")   BMI 25.54 kg/m      Constitutional:  cooperative, alert and oriented, well developed, well nourished, in no acute distress        Skin:  warm and dry to the touch, no apparent skin lesions or masses noted   pacemaker incision in the left infraclavicular area was well-healed      Head:  normocephalic, no masses or lesions        Eyes:  pupils equal and round;conjunctivae and lids unremarkable;sclera white        Lymph:      ENT:  no pallor or cyanosis, dentition good        Neck:  JVP normal;no carotid bruit        Respiratory:  normal breath sounds, clear to auscultation, normal A-P diameter, normal symmetry, normal respiratory excursion, no use of accessory muscles         Cardiac: regular rhythm;normal S1 and S2;apical impulse not displaced       systolic " murmur        pulses full and equal venous stasis changes bilaterally                             right femoral bruit (+)        GI:  abdomen soft        Extremities and Muscular Skeletal:  no deformities, clubbing, cyanosis, erythema observed;no edema              Neurological:    left sided weakness      Psych:  Alert and Oriented x 3        Recent Lab Results:  LIPID RESULTS:  Lab Results   Component Value Date    CHOL 112 04/11/2019    HDL 32 (A) 04/11/2019    LDL 46 04/11/2019    TRIG 169 04/11/2019    CHOLHDLRATIO 5.2 (H) 05/29/2014       LIVER ENZYME RESULTS:  Lab Results   Component Value Date    AST 13 04/11/2019    ALT 10 04/11/2019       CBC RESULTS:  Lab Results   Component Value Date    WBC 6.5 11/19/2018    RBC 3.90 (A) 11/19/2018    HGB 11.6 (A) 04/11/2019    HCT 36.0 (A) 11/19/2018    MCV 92.3 11/19/2018    MCH 31.4 07/20/2018    MCHC 34.7 07/20/2018    RDW 16.0 (A) 11/19/2018     11/19/2018       BMP RESULTS:  Lab Results   Component Value Date     04/11/2019    POTASSIUM 4.4 04/11/2019    CHLORIDE 103 04/11/2019    CO2 27 04/11/2019    ANIONGAP 8 04/11/2019     (A) 04/11/2019    BUN 26 04/11/2019    CR 1.08 04/11/2019    GFRESTIMATED >60 04/11/2019    GFRESTBLACK >60 04/11/2019    TRENT 9.4 04/11/2019        A1C RESULTS:  Lab Results   Component Value Date    A1C 6.2 (A) 04/11/2019       INR RESULTS:  Lab Results   Component Value Date    INR 1.30 (H) 07/01/2018    INR 0.99 02/20/2017           CC  Félix Teixeira MD  [address]  [place] [id number]

## 2019-06-20 NOTE — LETTER
6/20/2019    Yvette Bonner, APRN CNP  [address]  [facility] [address]    RE: Elias Mckee       Dear Colleague,    I had the pleasure of seeing Elias Mckee in the [facility].    HPI and Plan:   See dictation    Orders Placed This Encounter   Procedures     Follow-Up with Cardiologist       No orders of the defined types were placed in this encounter.      Encounter Diagnoses   Name Primary?     Cardiac pacemaker in situ      Coronary artery disease involving native coronary artery of native heart without angina pectoris        CURRENT MEDICATIONS:  Current Outpatient Medications   Medication Sig Dispense Refill     ACETAMINOPHEN PO Take 1,000 mg by mouth 3 times daily For pain       Atorvastatin Calcium (LIPITOR PO) Take 40 mg by mouth At Bedtime       atropine 1 % ophthalmic solution Place 1 drop into the right eye 2 times daily       cholecalciferol (VITAMIN D) 1000 UNIT tablet Take 2,000 Units by mouth daily       Fluticasone Propionate (FLONASE NA) Spray 2 sprays into both nostrils daily       INSULIN ASPART SC Inject 2 Units Subcutaneous daily (with breakfast)       Insulin Glargine (BASAGLAR KWIKPEN SC) Inject 24 Units Subcutaneous At Bedtime May use 2 units as needed to prime pen before each dose.       LEVOTHYROXINE SODIUM PO Take 25 mcg by mouth daily       polyethylene glycol (MIRALAX/GLYCOLAX) powder Take 17 g by mouth daily        rivaroxaban ANTICOAGULANT (XARELTO) 20 MG TABS tablet Take 1 tablet (20 mg) by mouth daily (with dinner) 30 tablet 1     tamsulosin (FLOMAX) 0.4 MG capsule Take 1 capsule (0.4 mg) by mouth daily 60 capsule 0     timolol, PF, (TIMOPTIC OCUDOSE) 0.5 % ophthalmic solution Place 1 drop into the right eye 2 times daily       traMADol (ULTRAM) 50 MG tablet 50 mg tid and q4h prn pain 270 tablet 0     prednisoLONE acetate (PRED FORTE) 1 % ophthalmic susp Place 1 drop into the right eye 4 times daily          ALLERGIES      Allergies   Allergen Reactions     No Known Allergies        PAST MEDICAL HISTORY:  Past Medical History:   Diagnosis Date     Acute, but ill-defined, cerebrovascular disease 1-2008    Left hemiparesis, left side neglect     Atrial fibrillation (H)      Benign hypertension with CKD (chronic kidney disease) stage III (H) 6/5/2017     Cancer (H)      Cardiac pacemaker in situ, Placed on 7/20/18 7/23/2018     Central retinal vein occlusion, right eye 11/15/2017     Chronic atrial fibrillation (H) 6/5/2017     Chronic ischemic heart disease, unspecified      CKD (chronic kidney disease) stage 3, GFR 30-59 ml/min, est GFR: 10/30/17: 46,, 12/14/17: 59 2/29/2012     Cognitive deficits as late effect of cerebrovascular disease 6/5/2017     Coronary artery disease      CVA (cerebral infarction)      Dysphagia due to recent cerebrovascular accident (CVA) 6/5/2017     Elevated cholesterol      Essential hypertension, benign      H/O prostate cancer 7/6/2017     H/O: CVA (cerebrovascular accident) 6/5/2017     Hemiparesis affecting left side as late effect of cerebrovascular accident (H) 6/5/2017     Hyperlipidaemia      Loss of weight 10/2/2018     MEDICAL HISTORY OF - 1- 2008    V fib arrest      Mobitz type I Wenckebach atrioventricular block, 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     Myocardial infarction (H)      Onychomycosis 6/5/2017     Other and unspecified hyperlipidemia      PVD (peripheral vascular disease) (H) 12/12/2017     Type 2 diabetes mellitus with diabetic peripheral angiopathy with gangrene (H) 12/12/2017     Type 2 diabetes mellitus with stage 3 chronic kidney disease (H) 6/5/2017     Type II diabetes mellitus with peripheral circulatory disorder (H) 12/12/2017     Type II or unspecified type diabetes mellitus without mention of complication, not stated as uncontrolled 1-2008       PAST SURGICAL HISTORY:  Past Surgical History:   Procedure Laterality Date     PHACOEMULSIFICATION CLEAR CORNEA WITH STANDARD INTRAOCULAR LENS  IMPLANT Right 10/7/2014    Procedure: PHACOEMULSIFICATION CLEAR CORNEA WITH STANDARD INTRAOCULAR LENS IMPLANT;  Surgeon: German Thomas MD;  Location: [facility]     SEED IMPLANTATION  2002    prostate cancer     VITRECTOMY ANTERIOR Right 10/7/2014    Procedure: VITRECTOMY ANTERIOR;  Surgeon: German Thomas MD;  Location: [facility]       FAMILY HISTORY:  History reviewed. No pertinent family history.    SOCIAL HISTORY:  Social History     Socioeconomic History     Marital status:      Spouse name: None     Number of children: None     Years of education: None     Highest education level: None   Occupational History     None   Social Needs     Financial resource strain: None     Food insecurity:     Worry: None     Inability: None     Transportation needs:     Medical: None     Non-medical: None   Tobacco Use     Smoking status: Former Smoker     Packs/day: 1.00     Years: 20.00     Pack years: 20.00     Types: Cigarettes     Start date: 1953     Last attempt to quit: 1973     Years since quittin.0     Smokeless tobacco: Never Used   Substance and Sexual Activity     Alcohol use: Yes     Alcohol/week: 0.6 oz     Types: 1 Cans of beer per week     Comment: Occasional     Drug use: No     Sexual activity: Not Currently   Lifestyle     Physical activity:     Days per week: None     Minutes per session: None     Stress: None   Relationships     Social connections:     Talks on phone: None     Gets together: None     Attends [employer] service: None     Active member of club or organization: None     Attends meetings of clubs or organizations: None     Relationship status: None     Intimate partner violence:     Fear of current or ex partner: None     Emotionally abused: None     Physically abused: None     Forced sexual activity: None   Other Topics Concern     Parent/sibling w/ CABG, MI or angioplasty before 65F 55M? Not Asked      Service Not Asked     Blood Transfusions Not Asked      "Caffeine Concern Not Asked     Occupational Exposure Not Asked     Hobby Hazards Not Asked     Sleep Concern Yes     Comment: pt using sleep aid     Stress Concern No     Weight Concern No     Special Diet No     Back Care Not Asked     Exercise Yes     Comment: everyday     Bike Helmet Not Asked     Seat Belt Yes     Self-Exams Not Asked   Social History Narrative    Born and raised in [facility].  His parents  when he was young and his father moved to [place].  His mother  when he was 11 from a brain tumor.  [place] and graduated from .  Met is wife in [facility] and  in 1949.  She  in  from COPD.  They had four children (3 sons and 1 daughter).  Sons live in [place] and daughter lives in [place] and is his POA.  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  Lived at the [facility] in [place] until his stroke in 2017.         Review of Systems:  Skin:  Negative     Eyes:  Positive for glasses  ENT:  Negative    Respiratory:  Negative    Cardiovascular:  Negative    Gastroenterology: Negative    Genitourinary:  Negative    Musculoskeletal:  Negative    Neurologic:  Negative    Psychiatric:  Negative    Heme/Lymph/Imm:  Negative    Endocrine:  Positive for thyroid disorder    Physical Exam:  Vitals: /70   Pulse 84   Ht 1.778 m (5' 10\")   BMI 25.54 kg/m       Constitutional:  cooperative, alert and oriented, well developed, well nourished, in no acute distress        Skin:  warm and dry to the touch, no apparent skin lesions or masses noted   pacemaker incision in the left infraclavicular area was well-healed      Head:  normocephalic, no masses or lesions        Eyes:  pupils equal and round;conjunctivae and lids unremarkable;sclera white        Lymph:      ENT:  no pallor or cyanosis, dentition good        Neck:  JVP normal;no carotid bruit        Respiratory: "  normal breath sounds, clear to auscultation, normal A-P diameter, normal symmetry, normal respiratory excursion, no use of accessory muscles         Cardiac: regular rhythm;normal S1 and S2;apical impulse not displaced       systolic murmur        pulses full and equal venous stasis changes bilaterally                             right femoral bruit (+)        GI:  abdomen soft        Extremities and Muscular Skeletal:  no deformities, clubbing, cyanosis, erythema observed;no edema              Neurological:    left sided weakness      Psych:  Alert and Oriented x 3        Recent Lab Results:  LIPID RESULTS:  Lab Results   Component Value Date    CHOL 112 04/11/2019    HDL 32 (A) 04/11/2019    LDL 46 04/11/2019    TRIG 169 04/11/2019    CHOLHDLRATIO 5.2 (H) 05/29/2014       LIVER ENZYME RESULTS:  Lab Results   Component Value Date    AST 13 04/11/2019    ALT 10 04/11/2019       CBC RESULTS:  Lab Results   Component Value Date    WBC 6.5 11/19/2018    RBC 3.90 (A) 11/19/2018    HGB 11.6 (A) 04/11/2019    HCT 36.0 (A) 11/19/2018    MCV 92.3 11/19/2018    MCH 31.4 07/20/2018    MCHC 34.7 07/20/2018    RDW 16.0 (A) 11/19/2018     11/19/2018       BMP RESULTS:  Lab Results   Component Value Date     04/11/2019    POTASSIUM 4.4 04/11/2019    CHLORIDE 103 04/11/2019    CO2 27 04/11/2019    ANIONGAP 8 04/11/2019     (A) 04/11/2019    BUN 26 04/11/2019    CR 1.08 04/11/2019    GFRESTIMATED >60 04/11/2019    GFRESTBLACK >60 04/11/2019    TRENT 9.4 04/11/2019        A1C RESULTS:  Lab Results   Component Value Date    A1C 6.2 (A) 04/11/2019       INR RESULTS:  Lab Results   Component Value Date    INR 1.30 (H) 07/01/2018    INR 0.99 02/20/2017           CC  Jeyson Teixeira MD  [address]  [place] [address]                  Thank you for allowing me to participate in the care of your patient.      Sincerely,     DR JEYSON TEIXEIRA MD     [facility]    cc:   Jeyson López  Felipe Teixeira MD  [address]  [place] [id number]

## 2019-06-20 NOTE — PROGRESS NOTES
Service Date: 06/20/2019      HISTORY OF PRESENT ILLNESS:  It is a pleasure to follow up with Mr. Mckee again today.  I see him for chronic coronary artery disease.      I first met this gentleman in 2008.  That year, he presented with ventricular fibrillation arrest following an acute myocardial infarction.  He was treated at [facility].  Angiography demonstrated multivessel coronary artery disease.  He had a CVA with this cardiac presentation.  He was not deemed a good candidate for coronary artery bypass surgery and multivessel coronary angioplasty and stenting was performed.  Stents were placed in LAD and circumflex with PTCA done to the RCA.  Left ventricular systolic function remained normal and we have not repeated invasive treatment again for his heart.  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  He was found to be in atrial fibrillation and he has been on Xarelto.  In 2008, he collapsed and rhythm monitoring demonstrated high-grade AV block with severe sinus bradycardia.  A dual-chamber pacer has been placed and he has done very well since then.  In the few months since I have seen him, he has remained well.  He has no cardiac symptoms.  His son-in-law who accompanied him today to clinic visit said that he is enjoying life in his nursing care facility.  He enjoys playing [employer].  They just had a health conference about him and the remarks were glowing.      PHYSICAL EXAMINATION:  Cardiac examination reveals a controlled heart rate, a clear chest and unremarkable heart sounds.      Pacer interrogation today showed a normally functioning pacer.      IMPRESSION:   1.  Stable coronary artery disease.   2.  Right CVA with dense left hemiparesis.   3.  Paroxysmal atrial fibrillation.  On Xarelto.  No problems with bleeding.   4.  Dyslipidemia.  On moderate-dose atorvastatin with excellent lipid numbers.  LDL is at 46.       [facility]  I will plan to see him again in a year's time for further followup.         JEYSON VALDEZ MD, [facility]             D: 2019   T: 2019   MT: HAILEE      Name:     MIRANDA BEGUM   MRN:      [id number]        Account:      [id number]   :      08/10/1931           Service Date: 2019      Document: [id number]

## 2019-06-21 LAB
MDC_IDC_EPISODE_DTM: NORMAL
MDC_IDC_EPISODE_ID: NORMAL
MDC_IDC_EPISODE_TYPE: NORMAL
MDC_IDC_LEAD_IMPLANT_DT: NORMAL
MDC_IDC_LEAD_IMPLANT_DT: NORMAL
MDC_IDC_LEAD_LOCATION: NORMAL
MDC_IDC_LEAD_LOCATION: NORMAL
MDC_IDC_LEAD_LOCATION_DETAIL_1: NORMAL
MDC_IDC_LEAD_LOCATION_DETAIL_1: NORMAL
MDC_IDC_LEAD_MFG: NORMAL
MDC_IDC_LEAD_MFG: NORMAL
MDC_IDC_LEAD_MODEL: NORMAL
MDC_IDC_LEAD_MODEL: NORMAL
MDC_IDC_LEAD_POLARITY_TYPE: NORMAL
MDC_IDC_LEAD_POLARITY_TYPE: NORMAL
MDC_IDC_LEAD_SERIAL: NORMAL
MDC_IDC_LEAD_SERIAL: NORMAL
MDC_IDC_MSMT_BATTERY_STATUS: NORMAL
MDC_IDC_MSMT_LEADCHNL_RA_IMPEDANCE_VALUE: 775 OHM
MDC_IDC_MSMT_LEADCHNL_RA_PACING_THRESHOLD_AMPLITUDE: 0.7 V
MDC_IDC_MSMT_LEADCHNL_RA_PACING_THRESHOLD_PULSEWIDTH: 0.4 MS
MDC_IDC_MSMT_LEADCHNL_RA_SENSING_INTR_AMPL: 5.3 MV
MDC_IDC_MSMT_LEADCHNL_RV_IMPEDANCE_VALUE: 679 OHM
MDC_IDC_MSMT_LEADCHNL_RV_PACING_THRESHOLD_AMPLITUDE: 0.7 V
MDC_IDC_MSMT_LEADCHNL_RV_PACING_THRESHOLD_PULSEWIDTH: 0.4 MS
MDC_IDC_MSMT_LEADCHNL_RV_SENSING_INTR_AMPL: 15.6 MV
MDC_IDC_PG_IMPLANT_DTM: NORMAL
MDC_IDC_PG_MFG: NORMAL
MDC_IDC_PG_MODEL: NORMAL
MDC_IDC_PG_SERIAL: NORMAL
MDC_IDC_PG_TYPE: NORMAL
MDC_IDC_SESS_CLINIC_NAME: NORMAL
MDC_IDC_SESS_DTM: NORMAL
MDC_IDC_SESS_TYPE: NORMAL
MDC_IDC_SET_BRADY_AT_MODE_SWITCH_MODE: NORMAL
MDC_IDC_SET_BRADY_AT_MODE_SWITCH_RATE: 170 {BEATS}/MIN
MDC_IDC_SET_BRADY_HYSTRATE: NORMAL
MDC_IDC_SET_BRADY_LOWRATE: 60 {BEATS}/MIN
MDC_IDC_SET_BRADY_MAX_SENSOR_RATE: 120 {BEATS}/MIN
MDC_IDC_SET_BRADY_MAX_TRACKING_RATE: 120 {BEATS}/MIN
MDC_IDC_SET_BRADY_MODE: NORMAL
MDC_IDC_SET_BRADY_PAV_DELAY_HIGH: 200 MS
MDC_IDC_SET_BRADY_PAV_DELAY_LOW: 300 MS
MDC_IDC_SET_BRADY_SAV_DELAY_HIGH: 200 MS
MDC_IDC_SET_BRADY_SAV_DELAY_LOW: 300 MS
MDC_IDC_SET_LEADCHNL_RA_PACING_AMPLITUDE: 2 V
MDC_IDC_SET_LEADCHNL_RA_PACING_ANODE_ELECTRODE_1: NORMAL
MDC_IDC_SET_LEADCHNL_RA_PACING_ANODE_LOCATION_1: NORMAL
MDC_IDC_SET_LEADCHNL_RA_PACING_CAPTURE_MODE: NORMAL
MDC_IDC_SET_LEADCHNL_RA_PACING_CATHODE_ELECTRODE_1: NORMAL
MDC_IDC_SET_LEADCHNL_RA_PACING_CATHODE_LOCATION_1: NORMAL
MDC_IDC_SET_LEADCHNL_RA_PACING_POLARITY: NORMAL
MDC_IDC_SET_LEADCHNL_RA_PACING_PULSEWIDTH: 0.4 MS
MDC_IDC_SET_LEADCHNL_RA_SENSING_ADAPTATION_MODE: NORMAL
MDC_IDC_SET_LEADCHNL_RA_SENSING_ANODE_ELECTRODE_1: NORMAL
MDC_IDC_SET_LEADCHNL_RA_SENSING_ANODE_LOCATION_1: NORMAL
MDC_IDC_SET_LEADCHNL_RA_SENSING_CATHODE_ELECTRODE_1: NORMAL
MDC_IDC_SET_LEADCHNL_RA_SENSING_CATHODE_LOCATION_1: NORMAL
MDC_IDC_SET_LEADCHNL_RA_SENSING_POLARITY: NORMAL
MDC_IDC_SET_LEADCHNL_RA_SENSING_SENSITIVITY: 0.25 MV
MDC_IDC_SET_LEADCHNL_RV_PACING_AMPLITUDE: 1.2 V
MDC_IDC_SET_LEADCHNL_RV_PACING_ANODE_ELECTRODE_1: NORMAL
MDC_IDC_SET_LEADCHNL_RV_PACING_ANODE_LOCATION_1: NORMAL
MDC_IDC_SET_LEADCHNL_RV_PACING_CAPTURE_MODE: NORMAL
MDC_IDC_SET_LEADCHNL_RV_PACING_CATHODE_ELECTRODE_1: NORMAL
MDC_IDC_SET_LEADCHNL_RV_PACING_CATHODE_LOCATION_1: NORMAL
MDC_IDC_SET_LEADCHNL_RV_PACING_POLARITY: NORMAL
MDC_IDC_SET_LEADCHNL_RV_PACING_PULSEWIDTH: 0.4 MS
MDC_IDC_SET_LEADCHNL_RV_SENSING_ADAPTATION_MODE: NORMAL
MDC_IDC_SET_LEADCHNL_RV_SENSING_ANODE_ELECTRODE_1: NORMAL
MDC_IDC_SET_LEADCHNL_RV_SENSING_ANODE_LOCATION_1: NORMAL
MDC_IDC_SET_LEADCHNL_RV_SENSING_CATHODE_ELECTRODE_1: NORMAL
MDC_IDC_SET_LEADCHNL_RV_SENSING_CATHODE_LOCATION_1: NORMAL
MDC_IDC_SET_LEADCHNL_RV_SENSING_POLARITY: NORMAL
MDC_IDC_SET_LEADCHNL_RV_SENSING_SENSITIVITY: 1.5 MV
MDC_IDC_SET_ZONE_DETECTION_INTERVAL: 375 MS
MDC_IDC_SET_ZONE_TYPE: NORMAL
MDC_IDC_SET_ZONE_VENDOR_TYPE: NORMAL
MDC_IDC_STAT_EPISODE_RECENT_COUNT: 2
MDC_IDC_STAT_EPISODE_RECENT_COUNT_DTM_END: NORMAL
MDC_IDC_STAT_EPISODE_RECENT_COUNT_DTM_START: NORMAL
MDC_IDC_STAT_EPISODE_TOTAL_COUNT: 2
MDC_IDC_STAT_EPISODE_TOTAL_COUNT_DTM_END: NORMAL
MDC_IDC_STAT_EPISODE_TYPE: NORMAL
MDC_IDC_STAT_EPISODE_TYPE: NORMAL
MDC_IDC_STAT_EPISODE_VENDOR_TYPE: NORMAL
MDC_IDC_STAT_EPISODE_VENDOR_TYPE: NORMAL

## 2019-07-22 NOTE — PROGRESS NOTES
[facility]  Chief Complaint   Patient presents with     alf Regulatory     [facility] Medical Record Number:  [id number]  Place of Service where encounter took place:  [facility] [[id number]]    HPI:    Elias Mckee  is 87 year old (8/10/1931), who is being seen today for a federally mandated E/M visit.  HPI information obtained from: facility chart records, facility staff, patient report and [facility] chart review, Pt is not reliable historian due to cognitive loss. Today's concerns are:  Benign essential hypertension  BP ranges in past two weeks: 99/61 - 169/85.  Pulse ranges: 59-89.  No obvious chest pain.  Dr. Teixeira follows him for his CAD and other cardiac issues.  Last visit was 6/20/19    Hemiparesis affecting left side as late effect of cerebrovascular accident (H)  Ongoing paralysis   Can ambulate with assist and four prong walker.  No new neurological symptoms.    Type 2 diabetes mellitus with stage 3 chronic kidney disease, with long-term current use of insulin (H)  Accuchecks in past week: 0730: 1116-141, 11:30: 130-196, 1700: 113-152.  No recent changes in insulin dose.  HgbA1C was 6.2% in April.     CKD (chronic kidney disease) stage 3, GFR 30-59 ml/min, est GFR: 10/30/17: 46,, 12/14/17: 59  Labs on 4/11 showed a creatinine of 1.08 and an est GFRT of >60.    Chronic atrial fibrillation (H)  Has pacemaker.  No reports of heart rates >100/min.  Remains on Xarelto for anticoagulation.    Cognitive deficits as late effect of cerebrovascular disease  BIMS score is 15, but lacks judgement with complex decision making.    Hyperlipidemia LDL goal <100  Remains on atorvastatin.  Lipids and LFT's checked in April.    Hypothyroidism due to acquired atrophy of thyroid  No recent change to levothyroxine dose.  TSH in May was 3.35/    Stable central retinal vein occlusion of right eye  Ophthalmology added latanoprost to right eye in July.  He continues to often hold his  right eye closed.     ALLERGIES:No known allergies  PAST MEDICAL HISTORY:   has a past medical history of Acute, but ill-defined, cerebrovascular disease (1-2008), Atrial fibrillation (H), Benign hypertension with CKD (chronic kidney disease) stage III (H) (6/5/2017), Cancer (H), Cardiac pacemaker in situ, Placed on 7/20/18 (7/23/2018), Central retinal vein occlusion, right eye (11/15/2017), Chronic atrial fibrillation (H) (6/5/2017), Chronic ischemic heart disease, unspecified, CKD (chronic kidney disease) stage 3, GFR 30-59 ml/min, est GFR: 10/30/17: 46,, 12/14/17: 59 (2/29/2012), Cognitive deficits as late effect of cerebrovascular disease (6/5/2017), Coronary artery disease, CVA (cerebral infarction), Dysphagia due to recent cerebrovascular accident (CVA) (6/5/2017), Elevated cholesterol, Essential hypertension, benign, H/O prostate cancer (7/6/2017), H/O: CVA (cerebrovascular accident) (6/5/2017), Hemiparesis affecting left side as late effect of cerebrovascular accident (H) (6/5/2017), Hyperlipidaemia, Loss of weight (10/2/2018), MEDICAL HISTORY OF - (1- 2008), Mobitz type I Wenckebach atrioventricular block, 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  PAST SURGICAL HISTORY:   has a past surgical history that includes seed implantation (2002); Phacoemulsification clear cornea with standard intraocular lens implant (Right, 10/7/2014); and Vitrectomy anterior (Right, 10/7/2014).  FAMILY HISTORY: family history is not on file.  SOCIAL  HISTORY:  reports that he quit smoking about 46 years ago. His smoking use included cigarettes. He started smoking about 66 years ago. He has a 20.00 pack-year smoking history. He has never used smokeless tobacco. He reports that he drinks about 0.6 oz of alcohol per week. He reports that he does not use drugs.    MEDICATIONS:  Current Outpatient Medications   Medication Sig Dispense Refill     ACETAMINOPHEN PO Take 1,000 mg by mouth 3 times daily For pain       Atorvastatin Calcium (LIPITOR PO) Take 40 mg by mouth At Bedtime       atropine 1 % ophthalmic solution Place 1 drop into the right eye 2 times daily       cholecalciferol (VITAMIN D) 1000 UNIT tablet Take 2,000 Units by mouth daily       Fluticasone Propionate (FLONASE NA) Spray 2 sprays into both nostrils daily       INSULIN ASPART SC Inject 2 Units Subcutaneous daily (with breakfast)       Insulin Glargine (BASAGLAR KWIKPEN SC) Inject 24 Units Subcutaneous At Bedtime May use 2 units as needed to prime pen before each dose.       latanoprost (XALATAN) 0.005 % ophthalmic solution Place 1 drop into the right eye At Bedtime       LEVOTHYROXINE SODIUM PO Take 25 mcg by mouth daily       polyethylene glycol (MIRALAX/GLYCOLAX) powder Take 17 g by mouth daily        prednisoLONE acetate (PRED FORTE) 1 % ophthalmic susp Place 1 drop into the right eye 4 times daily        rivaroxaban ANTICOAGULANT (XARELTO) 20 MG TABS tablet Take 1 tablet (20 mg) by mouth daily (with dinner) 30 tablet 1     tamsulosin (FLOMAX) 0.4 MG capsule Take 1 capsule (0.4 mg) by mouth daily 60 capsule 0     timolol, PF, (TIMOPTIC OCUDOSE) 0.5 % ophthalmic solution Place 1 drop into the right eye 2 times daily       traMADol (ULTRAM) 50 MG tablet 50 mg tid and q4h prn pain 270 tablet 0     Patient Active Problem List   Diagnosis     Chronic ischemic heart disease     Personal history of other diseases of circulatory system     HYPERLIPIDEMIA LDL GOAL <100     Advance Care Planning     CKD  (chronic kidney disease) stage 3, GFR 30-59 ml/min, est GFR: 10/30/17: 46,, 12/14/17: 59     Leg weakness     Ataxia     BPH (benign prostatic hyperplasia)     Type 2 diabetes mellitus with diabetic nephropathy (H)     History of actinic keratoses     History of squamous cell carcinoma     S/P Mohs surgery for basal cell carcinoma     H/O: CVA (cerebrovascular accident), 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 Patient's desire to return to the community is present, but is not able due to care needs . Information reviewed:  Medications, vital signs, orders, and nursing notes.    ROS:  Limited secondary to cognitive impairment but today pt reports no headache, chest pain, SOB, abdominal pain, Nausea, joint or back pain or depression.    Vitals:  /72   Pulse 75   Temp 98.2  F (36.8  C)   Resp 20   Wt 78 kg (172 lb)   BMI 24.68 kg/m    Body mass index is 24.68 kg/m .  Exam:  GENERAL APPEARANCE: Calm and pleasant. Resting in bed early afternoon. Alert and conversant.  Alert and able to participate  in conversation.   Head: Normocephalic with slight left sided mouth droop.  Eye:  No redness noted in sclera  No discharge. Right eye is open more than previous visits.   ENIT:  No rhinitis. Moist oral cavity. No exudate. No obvious hearing issues.  CV:  Irregularly irregular rhythm.  No murmur.  DP pulses +1 bilaterally.  Pedal skin cool and dry. No LE edema  RESP:  No cough.  Quiet, effortless respirations.  Clear to auscultation bilaterally.   ABDOMEN:  Soft rounded abdomen.  Bowel sounds positive all four quadrants.  No tenderness with palpation.  NEURO:   Strong hand grasp right hand and only minimal ability to move left  arm.  Wearing left hand splint.  Left sided facial droop.  Strong ability to raise right leg against resistance and moderate left leg strength.   Oriented to person and loosely to place..    MUSC: No pain with ROM of knees, although left leg more resistant to ROM than right..  Pt denies any neck pain at this time.  SKIN:  Elongated thin fingernails on right hand and thickened very elongated fingernails on left hand, curling over fingertip.  PSYCH:  No delusional statements. Pleasant and complementary..       Lab/Diagnostic data:   CBC RESULTS:   Recent Labs   Lab Test 04/11/19 01/10/19 11/19/18 11/01/18 07/20/18  1135 07/03/18  0835   WBC  --   --  6.5 7.2 7.8 6.0   RBC  --   --  3.90* 3.82* 3.79* 3.64*   HGB 11.6* 12.9* 12.1* 12.0* 11.9* 11.3*   HCT  --   --  36.0* 35.5* 34.3* 32.8*   MCV  --   --  92.3 92.9 91 90   MCH  --   --   --   --  31.4 31.0   MCHC  --   --   --   --  34.7 34.5   RDW  --   --  16.0* 15.9* 16.3* 16.0*   PLT  --   --  173 161 171 159       Last Basic Metabolic Panel:  Recent Labs   Lab Test 04/11/19 02/04/19    139   POTASSIUM 4.4 4.1   CHLORIDE 103 103   TRENT 9.4 9.0   CO2 27 26   BUN 26 18   CR 1.08 1.15   * 143*     GFR Estimate   Date Value Ref Range Status   04/11/2019 >60 >60 ml/min/1.73m2 Final   02/04/2019 57 (L) >60 ml/min/1.73m2 Final   01/24/2019  48 (L) >60 ml/min/1.73m2 Final     Liver Function Studies -   Recent Labs   Lab Test 04/11/19 07/01/18  0940   PROTTOTAL 7.0 6.7*   ALBUMIN 3.6 3.1*   BILITOTAL 0.3 0.4   ALKPHOS 68 68   AST 13 10   ALT 10 14       TSH   Date Value Ref Range Status   05/09/2019 3.35 0.30 - 4.50 uIU/mL Final   07/02/2018 2.20 0.40 - 4.00 mU/L Final   ]    Lab Results   Component Value Date    A1C 6.2 04/11/2019    A1C 6.9 01/10/2019     Lab Results   Component Value Date    CHOL 112 04/11/2019     Lab Results   Component Value Date    HDL 32 04/11/2019     Lab Results   Component Value Date    LDL 46 04/11/2019     Lab Results   Component Value Date    TRIG 169 04/11/2019     Lab Results   Component Value Date    CHOLHDLRATIO 5.2 05/29/2014     Family Communication:  Daughter Jazmin updated.      ASSESSMENT/PLAN  (I10) Benign essential hypertension  (primary encounter diagnosis)  Comment: BP goal <150/90, at goal.   Plan: Continue current POC.  Hgb and BMP next lab day.    (I69.354) Hemiparesis affecting left side as late effect of cerebrovascular accident (H)  Comment: Chronic  Plan: Continue with BP management and anticoagulation.  Nsg to file finger nails monthly.  Nursing and nursing manager updated.   Continue atorvastatin for lipid control and check lipids and LFT's annually.    (E11.22,  N18.3,  Z79.4) Type 2 diabetes mellitus with stage 3 chronic kidney disease, with long-term current use of insulin (H)  Comment: Chronic, HgbA1C goal <8%, at goal  Plan: Continue current dose of insulin and current schedule of accuchecks.  Check HgbA1C next lab day    (N18.3) CKD (chronic kidney disease) stage 3, GFR 30-59 ml/min, est GFR: 10/30/17: 46,, 12/14/17: 59  Comment: Chronic, but stable  Plan: Renally adjust dose of medications.  BMP next lab day.    (I48.2) Chronic atrial fibrillation (H)  Comment: Rate controlled  Plan: Monitor rate and continue Xarelto for anticoagulation.    (I69.919) Cognitive deficits as late effect of  cerebrovascular disease  Comment: Chronic executive decision making losses with some short term memory loss  Plan: Continue POC in skilled [facility] facility and monitor for progression    (E78.5) Hyperlipidemia LDL goal <100  Comment: Chronic, but controlled  Plan: Continue atorvastatin and monitor fasting lipids and LFT's annually.    (E03.4) Hypothyroidism due to acquired atrophy of thyroid  Comment: Chronic, but controlled with levothyroxine  Plan: Continue current dose of levothyroxine and check TSH annually.    (H34.7812) Stable central retinal vein occlusion of right eye  Comment: Chronic  Plan: Continue POC as developed by Ophthalmology    Total time spent with patient visit at the [facility] was 39 min including patient visit, review of past records and phone call to patient contact. Greater than 50% of total time spent with counseling and coordinating care due to recent past lab and imaging results and subsequent treatment plan.    Electronically signed by:  TOBY Momin CNP

## 2019-07-23 ENCOUNTER — NURSING HOME VISIT (OUTPATIENT)
Dept: GERIATRICS | Facility: CLINIC | Age: 84
End: 2019-07-23
Payer: COMMERCIAL

## 2019-07-23 VITALS
HEART RATE: 75 BPM | BODY MASS INDEX: 24.68 KG/M2 | TEMPERATURE: 98.2 F | SYSTOLIC BLOOD PRESSURE: 124 MMHG | DIASTOLIC BLOOD PRESSURE: 72 MMHG | RESPIRATION RATE: 20 BRPM | WEIGHT: 172 LBS

## 2019-07-23 DIAGNOSIS — I48.20 CHRONIC ATRIAL FIBRILLATION (H): ICD-10-CM

## 2019-07-23 DIAGNOSIS — I10 BENIGN ESSENTIAL HYPERTENSION: Primary | ICD-10-CM

## 2019-07-23 DIAGNOSIS — E11.22 TYPE 2 DIABETES MELLITUS WITH STAGE 3 CHRONIC KIDNEY DISEASE, WITH LONG-TERM CURRENT USE OF INSULIN (H): ICD-10-CM

## 2019-07-23 DIAGNOSIS — Z79.4 TYPE 2 DIABETES MELLITUS WITH STAGE 3 CHRONIC KIDNEY DISEASE, WITH LONG-TERM CURRENT USE OF INSULIN (H): ICD-10-CM

## 2019-07-23 DIAGNOSIS — N18.30 TYPE 2 DIABETES MELLITUS WITH STAGE 3 CHRONIC KIDNEY DISEASE, WITH LONG-TERM CURRENT USE OF INSULIN (H): ICD-10-CM

## 2019-07-23 DIAGNOSIS — I69.354 HEMIPARESIS AFFECTING LEFT SIDE AS LATE EFFECT OF CEREBROVASCULAR ACCIDENT (H): ICD-10-CM

## 2019-07-23 DIAGNOSIS — E78.5 HYPERLIPIDEMIA LDL GOAL <100: ICD-10-CM

## 2019-07-23 DIAGNOSIS — I69.919 COGNITIVE DEFICITS AS LATE EFFECT OF CEREBROVASCULAR DISEASE: ICD-10-CM

## 2019-07-23 DIAGNOSIS — H34.8112 STABLE CENTRAL RETINAL VEIN OCCLUSION OF RIGHT EYE (H): ICD-10-CM

## 2019-07-23 DIAGNOSIS — N18.30 CKD (CHRONIC KIDNEY DISEASE) STAGE 3, GFR 30-59 ML/MIN (H): ICD-10-CM

## 2019-07-23 DIAGNOSIS — E03.4 HYPOTHYROIDISM DUE TO ACQUIRED ATROPHY OF THYROID: ICD-10-CM

## 2019-07-23 PROCEDURE — 99309 SBSQ NF CARE MODERATE MDM 30: CPT | Mod: GW | Performed by: NURSE PRACTITIONER

## 2019-07-23 PROCEDURE — 99207 ZZC CDG-CODE INCORRECT PER BILLING BASED ON TIME: CPT | Performed by: NURSE PRACTITIONER

## 2019-07-23 RX ORDER — LATANOPROST 50 UG/ML
1 SOLUTION/ DROPS OPHTHALMIC AT BEDTIME
Start: 2019-07-23 | End: 2022-01-01

## 2019-07-23 NOTE — LETTER
7/23/2019        RE: Elias Mckee  [address] [facility] [address]        [place] [facility]  Chief Complaint   Patient presents with     correction Regulatory     [facility] Medical Record Number:  [id number]  [id number]    HPI:    Elias Mckee  is 87 year old (8/10/1931), who is being seen today for a federally mandated E/M visit.  HPI information obtained from: facility chart records, facility staff, patient report and [facility] chart review, Pt is not reliable historian due to cognitive loss. Today's concerns are:  Benign essential hypertension  BP ranges in past two weeks: 99/61 - 169/85.  Pulse ranges: 59-89.  No obvious chest pain.  Dr. Teixeira follows him for his CAD and other cardiac issues.  Last visit was 6/20/19    Hemiparesis affecting left side as late effect of cerebrovascular accident (H)  Ongoing paralysis   Can ambulate with assist and four prong walker.  No new neurological symptoms.    Type 2 diabetes mellitus with stage 3 chronic kidney disease, with long-term current use of insulin (H)  Accuchecks in past week: 0730: 1116-141, 11:30: 130-196, 1700: 113-152.  No recent changes in insulin dose.  HgbA1C was 6.2% in April.     CKD (chronic kidney disease) stage 3, GFR 30-59 ml/min, est GFR: 10/30/17: 46,, 12/14/17: 59  Labs on 4/11 showed a creatinine of 1.08 and an est GFRT of >60.    Chronic atrial fibrillation (H)  Has pacemaker.  No reports of heart rates >100/min.  Remains on Xarelto for anticoagulation.    Cognitive deficits as late effect of cerebrovascular disease  BIMS score is 15, but lacks judgement with complex decision making.    Hyperlipidemia LDL goal <100  Remains on atorvastatin.  Lipids and LFT's checked in April.    Hypothyroidism due to acquired atrophy of thyroid  No recent change to levothyroxine dose.  TSH in May was 3.35/    Stable central retinal vein occlusion of  right eye  Ophthalmology added latanoprost to right eye in July.  He continues to often hold his right eye closed.     ALLERGIES:No known allergies  PAST MEDICAL HISTORY:   has a past medical history of Acute, but ill-defined, cerebrovascular disease (1-2008), Atrial fibrillation (H), Benign hypertension with CKD (chronic kidney disease) stage III (H) (6/5/2017), Cancer (H), Cardiac pacemaker in situ, Placed on 7/20/18 (7/23/2018), Central retinal vein occlusion, right eye (11/15/2017), Chronic atrial fibrillation (H) (6/5/2017), Chronic ischemic heart disease, unspecified, CKD (chronic kidney disease) stage 3, GFR 30-59 ml/min, est GFR: 10/30/17: 46,, 12/14/17: 59 (2/29/2012), Cognitive deficits as late effect of cerebrovascular disease (6/5/2017), Coronary artery disease, CVA (cerebral infarction), Dysphagia due to recent cerebrovascular accident (CVA) (6/5/2017), Elevated cholesterol, Essential hypertension, benign, H/O prostate cancer (7/6/2017), H/O: CVA (cerebrovascular accident) (6/5/2017), Hemiparesis affecting left side as late effect of cerebrovascular accident (H) (6/5/2017), Hyperlipidaemia, Loss of weight (10/2/2018), MEDICAL HISTORY OF - (1- 2008), Mobitz type I Wenckebach atrioventricular block, 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  PAST SURGICAL HISTORY:   has a past surgical history that includes seed implantation (2002); Phacoemulsification clear cornea with standard intraocular lens implant (Right, 10/7/2014);  and Vitrectomy anterior (Right, 10/7/2014).  FAMILY HISTORY: family history is not on file.  SOCIAL HISTORY:  reports that he quit smoking about 46 years ago. His smoking use included cigarettes. He started smoking about 66 years ago. He has a 20.00 pack-year smoking history. He has never used smokeless tobacco. He reports that he drinks about 0.6 oz of alcohol per week. He reports that he does not use drugs.    MEDICATIONS:  Current Outpatient Medications   Medication Sig Dispense Refill     ACETAMINOPHEN PO Take 1,000 mg by mouth 3 times daily For pain       Atorvastatin Calcium (LIPITOR PO) Take 40 mg by mouth At Bedtime       atropine 1 % ophthalmic solution Place 1 drop into the right eye 2 times daily       cholecalciferol (VITAMIN D) 1000 UNIT tablet Take 2,000 Units by mouth daily       Fluticasone Propionate (FLONASE NA) Spray 2 sprays into both nostrils daily       INSULIN ASPART SC Inject 2 Units Subcutaneous daily (with breakfast)       Insulin Glargine (BASAGLAR KWIKPEN SC) Inject 24 Units Subcutaneous At Bedtime May use 2 units as needed to prime pen before each dose.       latanoprost (XALATAN) 0.005 % ophthalmic solution Place 1 drop into the right eye At Bedtime       LEVOTHYROXINE SODIUM PO Take 25 mcg by mouth daily       polyethylene glycol (MIRALAX/GLYCOLAX) powder Take 17 g by mouth daily        prednisoLONE acetate (PRED FORTE) 1 % ophthalmic susp Place 1 drop into the right eye 4 times daily        rivaroxaban ANTICOAGULANT (XARELTO) 20 MG TABS tablet Take 1 tablet (20 mg) by mouth daily (with dinner) 30 tablet 1     tamsulosin (FLOMAX) 0.4 MG capsule Take 1 capsule (0.4 mg) by mouth daily 60 capsule 0     timolol, PF, (TIMOPTIC OCUDOSE) 0.5 % ophthalmic solution Place 1 drop into the right eye 2 times daily       traMADol (ULTRAM) 50 MG tablet 50 mg tid and q4h prn pain 270 tablet 0     Patient Active Problem List   Diagnosis     Chronic ischemic heart disease     Personal history of other  diseases of circulatory system     HYPERLIPIDEMIA LDL GOAL <100     Advance Care Planning     CKD (chronic kidney disease) stage 3, GFR 30-59 ml/min, est GFR: 10/30/17: 46,, 12/14/17: 59     Leg weakness     Ataxia     BPH (benign prostatic hyperplasia)     Type 2 diabetes mellitus with diabetic nephropathy (H)     History of actinic keratoses     History of squamous cell carcinoma     S/P Mohs surgery for basal cell carcinoma     H/O: CVA (cerebrovascular accident), 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 Patient's desire to return to the community is present, but is not able due to care needs . Information reviewed:  Medications, vital signs, orders, and nursing notes.    ROS:  Limited secondary to cognitive impairment but today pt reports no headache, chest pain, SOB, abdominal pain, Nausea, joint or back pain or depression.    Vitals:  /72   Pulse 75   Temp 98.2  F (36.8  C)   Resp 20   Wt 78 kg (172 lb)   BMI 24.68 kg/m     Body mass index is 24.68 kg/m .  Exam:  GENERAL APPEARANCE: Calm  and pleasant. Resting in bed early  afternoon. Alert and conversant.  Alert and able to participate in conversation.   Head: Normocephalic with slight left sided mouth droop.  Eye:  No redness noted in sclera  No discharge. Right eye is open more than previous visits.   ENIT:  No rhinitis. Moist oral cavity. No exudate. No obvious hearing issues.  CV:  Irregularly irregular rhythm.  No murmur.  DP pulses +1 bilaterally.  Pedal skin cool and dry. No LE edema  RESP:  No cough.  Quiet, effortless respirations.  Clear to auscultation bilaterally.   ABDOMEN:  Soft rounded abdomen.  Bowel sounds positive all four quadrants.  No tenderness with palpation.  NEURO:   Strong hand grasp right hand and only minimal ability to move left  arm.  Wearing left hand splint.  Left sided facial droop.  Strong ability to raise right leg against resistance and moderate left leg strength.   Oriented to person and loosely to place..    MUSC: No pain with ROM of knees, although left leg more resistant to ROM than right..  Pt denies any neck pain at this time.  SKIN:  Elongated thin fingernails on right hand and thickened very elongated fingernails on left hand, curling over fingertip.  PSYCH:  No delusional statements. Pleasant and complementary..       Lab/Diagnostic data:   CBC RESULTS:   Recent Labs   Lab Test 04/11/19 01/10/19 11/19/18 11/01/18 07/20/18  1135 07/03/18  0835   WBC  --   --  6.5 7.2 7.8 6.0   RBC  --   --  3.90* 3.82* 3.79* 3.64*   HGB 11.6* 12.9* 12.1* 12.0* 11.9* 11.3*   HCT  --   --  36.0* 35.5* 34.3* 32.8*   MCV  --   --  92.3 92.9 91 90   MCH  --   --   --   --  31.4 31.0   MCHC  --   --   --   --  34.7 34.5   RDW  --   --  16.0* 15.9* 16.3* 16.0*   PLT  --   --  173 161 171 159       Last Basic Metabolic Panel:  Recent Labs   Lab Test 04/11/19 02/04/19    139   POTASSIUM 4.4 4.1   CHLORIDE 103 103   TRENT 9.4 9.0   CO2 27 26   BUN 26 18   CR 1.08 1.15   * 143*     GFR Estimate   Date Value Ref Range  Status   04/11/2019 >60 >60 ml/min/1.73m2 Final   02/04/2019 57 (L) >60 ml/min/1.73m2 Final   01/24/2019 48 (L) >60 ml/min/1.73m2 Final     Liver Function Studies -   Recent Labs   Lab Test 04/11/19 07/01/18  0940   PROTTOTAL 7.0 6.7*   ALBUMIN 3.6 3.1*   BILITOTAL 0.3 0.4   ALKPHOS 68 68   AST 13 10   ALT 10 14       TSH   Date Value Ref Range Status   05/09/2019 3.35 0.30 - 4.50 uIU/mL Final   07/02/2018 2.20 0.40 - 4.00 mU/L Final   ]    Lab Results   Component Value Date    A1C 6.2 04/11/2019    A1C 6.9 01/10/2019     Lab Results   Component Value Date    CHOL 112 04/11/2019     Lab Results   Component Value Date    HDL 32 04/11/2019     Lab Results   Component Value Date    LDL 46 04/11/2019     Lab Results   Component Value Date    TRIG 169 04/11/2019     Lab Results   Component Value Date    CHOLHDLRATIO 5.2 05/29/2014     Family Communication:  Daughter Jazmin updated.      ASSESSMENT/PLAN  (I10) Benign essential hypertension  (primary encounter diagnosis)  Comment: BP goal <150/90, at goal.   Plan: Continue current POC.  Hgb and BMP next lab day.    (I69.354) Hemiparesis affecting left side as late effect of cerebrovascular accident (H)  Comment: Chronic  Plan: Continue with BP management and anticoagulation.  Nsg to file finger nails monthly.  Nursing and nursing manager updated.   Continue atorvastatin for lipid control and check lipids and LFT's annually.    (E11.22,  N18.3,  Z79.4) Type 2 diabetes mellitus with stage 3 chronic kidney disease, with long-term current use of insulin (H)  Comment: Chronic, HgbA1C goal <8%, at goal  Plan: Continue current dose of insulin and current schedule of accuchecks.  Check HgbA1C next lab day    (N18.3) CKD (chronic kidney disease) stage 3, GFR 30-59 ml/min, est GFR: 10/30/17: 46,, 12/14/17: 59  Comment: Chronic, but stable  Plan: Renally adjust dose of medications.  BMP next lab day.    (I48.2) Chronic atrial fibrillation (H)  Comment: Rate controlled  Plan: Monitor  rate and continue Xarelto for anticoagulation.    (I69.919) Cognitive deficits as late effect of cerebrovascular disease  Comment: Chronic executive decision making losses with some short term memory loss  Plan: Continue POC in skilled [facility] [place] facility and monitor for progression    (E78.5) Hyperlipidemia LDL goal <100  Comment: Chronic, but controlled  Plan: Continue atorvastatin and monitor fasting lipids and LFT's annually.    (E03.4) Hypothyroidism due to acquired atrophy of thyroid  Comment: Chronic, but controlled with levothyroxine  Plan: Continue current dose of levothyroxine and check TSH annually.    (H34.8112) Stable central retinal vein occlusion of right eye  Comment: Chronic  Plan: Continue POC as developed by Ophthalmology    Total time spent with patient visit at the [facility] nursing [facility] was 39 min including patient visit, review of past records and phone call to patient contact. Greater than 50% of total time spent with counseling and coordinating care due to recent past lab and imaging results and subsequent treatment plan.    Electronically signed by:  TOBY Momin CNP        Sincerely,        TOBY Momin CNP

## 2019-07-25 ENCOUNTER — TRANSFERRED RECORDS (OUTPATIENT)
Dept: HEALTH INFORMATION MANAGEMENT | Facility: CLINIC | Age: 84
End: 2019-07-25

## 2019-07-25 LAB
ANION GAP SERPL CALCULATED.3IONS-SCNC: 12 MMOL/L (ref 7–16)
BUN SERPL-MCNC: 24 MG/DL (ref 7–26)
CALCIUM SERPL-MCNC: 9.5 MG/DL (ref 8.4–10.4)
CHLORIDE SERPLBLD-SCNC: 104 MMOL/L (ref 98–109)
CO2 SERPL-SCNC: 24 MMOL/L (ref 20–29)
CREAT SERPL-MCNC: 1.13 MG/DL (ref 0.73–1.18)
GFR SERPL CREATININE-BSD FRML MDRD: 58 ML/MIN/1.73M2
GLUCOSE SERPL-MCNC: 54 MG/DL (ref 70–100)
HBA1C MFR BLD: 6.4 % (ref 0–5.7)
HEMOGLOBIN: 12.1 G/DL (ref 13.5–17.5)
POTASSIUM SERPL-SCNC: 4.1 MMOL/L (ref 3.5–5.1)
SODIUM SERPL-SCNC: 140 MMOL/L (ref 136–145)

## 2019-08-05 ENCOUNTER — TELEPHONE (OUTPATIENT)
Dept: GERIATRICS | Facility: CLINIC | Age: 84
End: 2019-08-05

## 2019-08-05 DIAGNOSIS — M54.2 NECK PAIN: ICD-10-CM

## 2019-08-05 RX ORDER — TRAMADOL HYDROCHLORIDE 50 MG/1
TABLET ORAL
Qty: 270 TABLET | Refills: 0 | Status: SHIPPED | OUTPATIENT
Start: 2019-08-05 | End: 2019-08-05

## 2019-08-05 RX ORDER — TRAMADOL HYDROCHLORIDE 50 MG/1
TABLET ORAL
Qty: 180 TABLET | Refills: 0 | Status: SHIPPED | OUTPATIENT
Start: 2019-08-05 | End: 2019-09-26

## 2019-08-16 ENCOUNTER — CLINICAL UPDATE (OUTPATIENT)
Dept: PHARMACY | Facility: CLINIC | Age: 84
End: 2019-08-16

## 2019-08-16 NOTE — Clinical Note
Abdulkadir Glover,Let me know if you would like me to forward the recommendation re Wojciech to .  He has been very responsive in past when I have needed to run something by him.  Thanks!

## 2019-08-16 NOTE — PROGRESS NOTES
This patient's medication list and chart were reviewed as part of the service provided by [facility].    Assessment/Recommendations:  1. (Afib):  Pt with est CrCl 50ml/min (using actual body weight).  Please note that current CrCl is right at cut-off for dose change recommendation of Xarelto (in afib, CrCl 15-50ml/min, recommended dose is 15mg daily.  >50ml/min recommended dose is 20mg daily).  Monitor kidney function closely, and if declines any further, should reduce dose to 15mg daily to reduce risk of bleeding.  Could even consider dose reduction now given current CrCl and likelihood of declining renal function/inc risk of bleed.    2. (Diabetes):  HgA1c goal <8.5-9%, avoiding symptoms of hypo or hyperglycemia reasonable in this elderly patient with limited function, life expectancy, multiple comorbidities, at risk for falls.  Most recent a1c indicating tight control at 6.4%.  Consdier d'c rapid acting breakfast-time insulin.  Adjust Basaglar as appropriate.        Andria Vo, Pharm.D.,[facility]  Board Certified Geriatric Pharmacist  Medication Therapy Management Pharmacist  [phone]

## 2019-08-26 ASSESSMENT — MIFFLIN-ST. JEOR: SCORE: 1436.94

## 2019-08-26 NOTE — PROGRESS NOTES
[place]  [facility] Medical Record Number:  [id number]  Place of Service where encounter took place:  [facility] ([facility] [[id number]]  Chief Complaint   Patient presents with     RECHECK       HPI:    Elias Mckee  is a 88 year old (8/10/1931), who is being seen today for an episodic care visit.  HPI information obtained from: facility chart records, facility staff, patient report and [facility] chart review  Pt is unreliable historian due to short term memory loss. Today's concern is:  Lesion of skin of right ear  Nsg reported raised area in his right ear and this causes pain when hearing aid is inserted. H/o MOH's procedure by Dr. Rivera in 2013 for BCC of the nose.    Benign essential hypertension  BP ranges in past month: 85//54.  No reports of chest pain.    Hemiparesis affecting left side as late effect of cerebrovascular accident (H)  No reports of neurological changes from baseline.  No falls in past month. Continues to attend wellness center three times per week.  Can ambulate with stand by assist and specialty walker     Type 2 diabetes mellitus with stage 3 chronic kidney disease, with long-term current use of insulin (H)  Accuchecks in past week: 0730:  , 11am: 118-211, 1700: 117-177. Morning Novolog was discontinued on 7/22.    CKD (chronic kidney disease) stage 3, GFR 30-59 ml/min, est GFR: 10/30/17: 46,, 12/14/17: 59  Labs on 7/25 showed a creatinine of 1.13 and an est GFR of 58.    Chronic atrial fibrillation (H)  No reports of heart rates >100/min.  Remains on Xarelto    Past Medical and Surgical History reviewed in Epic today.    MEDICATIONS:  Current Outpatient Medications   Medication Sig Dispense Refill     ACETAMINOPHEN PO Take 1,000 mg by mouth 3 times daily For pain       Atorvastatin Calcium (LIPITOR PO) Take 40 mg by mouth At Bedtime       atropine 1 % ophthalmic solution Place 1 drop into the right eye 2 times daily       cholecalciferol  (VITAMIN D) 1000 UNIT tablet Take 2,000 Units by mouth daily       Fluticasone Propionate (FLONASE NA) Spray 2 sprays into both nostrils daily       Insulin Glargine (BASAGLAR KWIKPEN SC) Inject 22 Units Subcutaneous At Bedtime May use 2 units as needed to prime pen before each dose.       latanoprost (XALATAN) 0.005 % ophthalmic solution Place 1 drop into the right eye At Bedtime       LEVOTHYROXINE SODIUM PO Take 25 mcg by mouth daily       polyethylene glycol (MIRALAX/GLYCOLAX) powder Take 17 g by mouth daily        prednisoLONE acetate (PRED FORTE) 1 % ophthalmic susp Place 1 drop into the right eye 4 times daily        rivaroxaban ANTICOAGULANT (XARELTO) 20 MG TABS tablet Take 1 tablet (20 mg) by mouth daily (with dinner) 30 tablet 1     tamsulosin (FLOMAX) 0.4 MG capsule Take 1 capsule (0.4 mg) by mouth daily 60 capsule 0     timolol, PF, (TIMOPTIC OCUDOSE) 0.5 % ophthalmic solution Place 1 drop into the right eye 2 times daily       traMADol (ULTRAM) 50 MG tablet 50 mg tid and q4h prn pain 180 tablet 0     Patient Active Problem List   Diagnosis     Chronic ischemic heart disease     Personal history of other diseases of circulatory system     HYPERLIPIDEMIA LDL GOAL <100     Advance Care Planning     CKD (chronic kidney disease) stage 3, GFR 30-59 ml/min, est GFR: 10/30/17: 46,, 12/14/17: 59     Leg weakness     Ataxia     BPH (benign prostatic hyperplasia)     Type 2 diabetes mellitus with diabetic nephropathy (H)     History of actinic keratoses     History of squamous cell carcinoma     S/P Mohs surgery for basal cell carcinoma     H/O: CVA (cerebrovascular accident), 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 See HPI for reports of ear lesion and pain.    Objective:  /69   Pulse 81   Temp 96.5  F (35.8  C)   Resp 16   Ht 1.753 m (5' 9\")   Wt 77.7 kg (171 lb 3.2 oz)   BMI 25.28 kg/m    Exam:  GENERAL APPEARANCE: Calm and pleasant. Up via EZ to chair. stand for lunch.   Alert and able to participate in conversation.   Head: Normocephalic with slight left sided mouth droop.  Eye:  No redness noted in sclera  No discharge. Right eye is open more than previous visits.   ENIT:  No rhinitis. Moist oral cavity. No exudate. On right ear he has two lesions.  One on the outer ridge of ear (antihelix) approx 0.8 cm raised lesion with white crust.  Medial to that lesion is a smaller lesion (approx 0.25 cm) that is right under his hearing aide.  He reported no pain to the nurse as it was inserted.   CV:  Irregularly irregular rhythm.  No murmur.   No LE edema  RESP:  No cough.  Quiet, effortless respirations.  Clear to auscultation bilaterally.   ABDOMEN:  Soft rounded abdomen.  Bowel sounds positive all four quadrants.  No tenderness with palpation.  NEURO:   Strong hand grasp right hand and only minimal ability to move left  arm.  Wearing left hand splint.  Left sided facial droop.  Strong " ability to raise right leg against resistance and moderate left leg strength.   Oriented to person and loosely to place..    MUSC: No pain with transfer by EZ stand.  SKIN:  Elongated thin fingernails on right hand and thickened very elongated fingernails on left hand, curling over fingertip.  PSYCH:  No delusional statements. Pleasant..     Labs:   CBC RESULTS:   Recent Labs   Lab Test 07/25/19 04/11/19 11/19/18 11/01/18 07/20/18  1135 07/03/18  0835   WBC  --   --  6.5 7.2 7.8 6.0   RBC  --   --  3.90* 3.82* 3.79* 3.64*   HGB 12.1* 11.6* 12.1* 12.0* 11.9* 11.3*   HCT  --   --  36.0* 35.5* 34.3* 32.8*   MCV  --   --  92.3 92.9 91 90   MCH  --   --   --   --  31.4 31.0   MCHC  --   --   --   --  34.7 34.5   RDW  --   --  16.0* 15.9* 16.3* 16.0*   PLT  --   --  173 161 171 159       Last Basic Metabolic Panel:  Recent Labs   Lab Test 07/25/19 04/11/19    138   POTASSIUM 4.1 4.4   CHLORIDE 104 103   TRENT 9.5 9.4   CO2 24 27   BUN 24 26   CR 1.13 1.08   GLC 54* 130*     GFR Estimate   Date Value Ref Range Status   07/25/2019 58 (A) >60 ml/min/1.73m2 Final   04/11/2019 >60 >60 ml/min/1.73m2 Final   02/04/2019 57 (L) >60 ml/min/1.73m2 Final     GFR Estimate If Black   Date Value Ref Range Status   07/25/2019 >60 >60 ml/min/1.73m2 Final   04/11/2019 >60 >60 ml/min/1.73m2 Final   02/04/2019 >60 >60 ml/min/1.73m2 Final     Liver Function Studies -   Recent Labs   Lab Test 04/11/19 07/01/18  0940   PROTTOTAL 7.0 6.7*   ALBUMIN 3.6 3.1*   BILITOTAL 0.3 0.4   ALKPHOS 68 68   AST 13 10   ALT 10 14       TSH   Date Value Ref Range Status   05/09/2019 3.35 0.30 - 4.50 uIU/mL Final   07/02/2018 2.20 0.40 - 4.00 mU/L Final   ]    Lab Results   Component Value Date    A1C 6.4 07/25/2019    A1C 6.2 04/11/2019     Family Communication:  Discussed the ear lesions with daughter Jazmin.  One of the lesions may be caused by the hearing aid, but the other is not near the aide.  Discussed his past h/o BCC requiring MOH's procedure on  the nose.  This was done by Dr. Rivera.  She will call and set up an appt for evaluation.    ASSESSMENT/PLAN:  (L98.9) Lesion of skin of right ear  (primary encounter diagnosis)  Comment: Two lesions  Plan: Daughter Jazmin will set up appt for dermatology for evaluation and tx. recommendations    (I10) Benign essential hypertension  Comment: BP goal; <150/90, at goal  Plan: Continue current POC.     (I69.354) Hemiparesis affecting left side as late effect of cerebrovascular accident (H)  Comment: Chronic  Plan: Continue with arm splint, assist with ambulation and wellness center visits.  Monitor for further neurological changes.    (E11.22,  N18.3,  Z79.4) Type 2 diabetes mellitus with stage 3 chronic kidney disease, with long-term current use of insulin (H)  Comment: Chronic, HgbA1C goal <8%, at goal  Plan: Continue current POC    (N18.3) CKD (chronic kidney disease) stage 3, GFR 30-59 ml/min, est GFR: 10/30/17: 46,, 12/14/17: 59  Comment: Chronic  Plan: Renally adjust dose of medications.  BMP q3m.     (I48.2) Chronic atrial fibrillation (H)  Comment: Rate controlled  Plan: Continue POC as developed by cardiology.    Electronically signed by:  TOBY Momin CNP

## 2019-08-27 ENCOUNTER — NURSING HOME VISIT (OUTPATIENT)
Dept: GERIATRICS | Facility: CLINIC | Age: 84
End: 2019-08-27
Payer: COMMERCIAL

## 2019-08-27 VITALS
WEIGHT: 171.2 LBS | TEMPERATURE: 96.5 F | SYSTOLIC BLOOD PRESSURE: 101 MMHG | BODY MASS INDEX: 25.36 KG/M2 | DIASTOLIC BLOOD PRESSURE: 69 MMHG | HEART RATE: 81 BPM | RESPIRATION RATE: 16 BRPM | HEIGHT: 69 IN

## 2019-08-27 DIAGNOSIS — N18.30 CKD (CHRONIC KIDNEY DISEASE) STAGE 3, GFR 30-59 ML/MIN (H): ICD-10-CM

## 2019-08-27 DIAGNOSIS — Z79.4 TYPE 2 DIABETES MELLITUS WITH STAGE 3 CHRONIC KIDNEY DISEASE, WITH LONG-TERM CURRENT USE OF INSULIN (H): ICD-10-CM

## 2019-08-27 DIAGNOSIS — I10 BENIGN ESSENTIAL HYPERTENSION: ICD-10-CM

## 2019-08-27 DIAGNOSIS — I48.20 CHRONIC ATRIAL FIBRILLATION (H): ICD-10-CM

## 2019-08-27 DIAGNOSIS — N18.30 TYPE 2 DIABETES MELLITUS WITH STAGE 3 CHRONIC KIDNEY DISEASE, WITH LONG-TERM CURRENT USE OF INSULIN (H): ICD-10-CM

## 2019-08-27 DIAGNOSIS — I69.354 HEMIPARESIS AFFECTING LEFT SIDE AS LATE EFFECT OF CEREBROVASCULAR ACCIDENT (H): ICD-10-CM

## 2019-08-27 DIAGNOSIS — E11.22 TYPE 2 DIABETES MELLITUS WITH STAGE 3 CHRONIC KIDNEY DISEASE, WITH LONG-TERM CURRENT USE OF INSULIN (H): ICD-10-CM

## 2019-08-27 DIAGNOSIS — H61.91 LESION OF SKIN OF RIGHT EAR: Primary | ICD-10-CM

## 2019-08-27 PROCEDURE — 99309 SBSQ NF CARE MODERATE MDM 30: CPT | Mod: GW | Performed by: NURSE PRACTITIONER

## 2019-08-27 NOTE — LETTER
8/27/2019        RE: Elias Rhodes  [address] [facility] [address]        [facility]  [facility] Record Number:  [id number]  Place of Service where encounter took place:  [facility] [[id number]]  Chief Complaint   Patient presents with     RECHECK       HPI:    Elias Mckee  is a 88 year old (8/10/1931), who is being seen today for an episodic care visit.  HPI information obtained from: facility chart records, facility staff, patient report and [facility] chart review  Pt is unreliable historian due to short term memory loss. Today's concern is:  Lesion of skin of right ear  Nsg reported raised area in his right ear and this causes pain when hearing aid is inserted. H/o MOH's procedure by Dr. Rivera in 2013 for BCC of the nose.    Benign essential hypertension  BP ranges in past month: 85//54.  No reports of chest pain.    Hemiparesis affecting left side as late effect of cerebrovascular accident (H)  No reports of neurological changes from baseline.  No falls in past month. Continues to attend wellness center three times per week.  Can ambulate with stand by assist and specialty walker     Type 2 diabetes mellitus with stage 3 chronic kidney disease, with long-term current use of insulin (H)  Accuchecks in past week: 0730:  , 11am: 118-211, 1700: 117-177. Morning Novolog was discontinued on 7/22.    CKD (chronic kidney disease) stage 3, GFR 30-59 ml/min, est GFR: 10/30/17: 46,, 12/14/17: 59  Labs on 7/25 showed a creatinine of 1.13 and an est GFR of 58.    Chronic atrial fibrillation (H)  No reports of heart rates >100/min.  Remains on Xarelto    Past Medical and Surgical History reviewed in Epic today.    MEDICATIONS:  Current Outpatient Medications   Medication Sig Dispense Refill     ACETAMINOPHEN PO Take 1,000 mg by mouth 3 times daily For pain       Atorvastatin Calcium (LIPITOR PO) Take 40 mg by mouth At Bedtime        atropine 1 % ophthalmic solution Place 1 drop into the right eye 2 times daily       cholecalciferol (VITAMIN D) 1000 UNIT tablet Take 2,000 Units by mouth daily       Fluticasone Propionate (FLONASE NA) Spray 2 sprays into both nostrils daily       Insulin Glargine (BASAGLAR KWIKPEN SC) Inject 22 Units Subcutaneous At Bedtime May use 2 units as needed to prime pen before each dose.       latanoprost (XALATAN) 0.005 % ophthalmic solution Place 1 drop into the right eye At Bedtime       LEVOTHYROXINE SODIUM PO Take 25 mcg by mouth daily       polyethylene glycol (MIRALAX/GLYCOLAX) powder Take 17 g by mouth daily        prednisoLONE acetate (PRED FORTE) 1 % ophthalmic susp Place 1 drop into the right eye 4 times daily        rivaroxaban ANTICOAGULANT (XARELTO) 20 MG TABS tablet Take 1 tablet (20 mg) by mouth daily (with dinner) 30 tablet 1     tamsulosin (FLOMAX) 0.4 MG capsule Take 1 capsule (0.4 mg) by mouth daily 60 capsule 0     timolol, PF, (TIMOPTIC OCUDOSE) 0.5 % ophthalmic solution Place 1 drop into the right eye 2 times daily       traMADol (ULTRAM) 50 MG tablet 50 mg tid and q4h prn pain 180 tablet 0     Patient Active Problem List   Diagnosis     Chronic ischemic heart disease     Personal history of other diseases of circulatory system     HYPERLIPIDEMIA LDL GOAL <100     Advance Care Planning     CKD (chronic kidney disease) stage 3, GFR 30-59 ml/min, est GFR: 10/30/17: 46,, 12/14/17: 59     Leg weakness     Ataxia     BPH (benign prostatic hyperplasia)     Type 2 diabetes mellitus with diabetic nephropathy (H)     History of actinic keratoses     History of squamous cell carcinoma     S/P Mohs surgery for basal cell carcinoma     H/O: CVA (cerebrovascular accident), 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 See HPI for reports of ear lesion and pain.    Objective:  /69   Pulse 81   Temp 96.5  F (35.8  C)   Resp 16   Ht 1.753 m (5' 9\")   Wt 77.7 kg (171 lb 3.2 oz)   BMI 25.28 kg/m     Exam:  GENERAL APPEARANCE: Calm and pleasant. Up via EZ to chair. stand for lunch.   Alert and able to participate in conversation.   Head: Normocephalic with slight left sided mouth droop.  Eye:  No redness noted in sclera  No discharge. Right eye is open more than previous visits.   ENIT:  No rhinitis. Moist oral cavity. No exudate. On right ear he has two lesions.  One on the outer ridge of ear (antihelix) approx 0.8 cm raised lesion with white crust.  Medial to that lesion is a smaller lesion (approx 0.25 cm) that is right under his hearing aide.  He reported no pain to the nurse as it was inserted.   CV:  Irregularly irregular rhythm.  No murmur.   No LE edema  RESP:  No cough.  Quiet, effortless respirations.  Clear to auscultation bilaterally.   ABDOMEN:  Soft rounded abdomen.  Bowel sounds positive all four quadrants.  No tenderness with palpation.  NEURO:   Strong hand grasp right hand " and only minimal ability to move left  arm.  Wearing left hand splint.  Left sided facial droop.  Strong ability to raise right leg against resistance and moderate left leg strength.   Oriented to person and loosely to place..    MUSC: No pain with transfer by EZ stand.  SKIN:  Elongated thin fingernails on right hand and thickened very elongated fingernails on left hand, curling over fingertip.  PSYCH:  No delusional statements. Pleasant..     Labs:   CBC RESULTS:   Recent Labs   Lab Test 07/25/19 04/11/19 11/19/18 11/01/18 07/20/18  1135 07/03/18  0835   WBC  --   --  6.5 7.2 7.8 6.0   RBC  --   --  3.90* 3.82* 3.79* 3.64*   HGB 12.1* 11.6* 12.1* 12.0* 11.9* 11.3*   HCT  --   --  36.0* 35.5* 34.3* 32.8*   MCV  --   --  92.3 92.9 91 90   MCH  --   --   --   --  31.4 31.0   MCHC  --   --   --   --  34.7 34.5   RDW  --   --  16.0* 15.9* 16.3* 16.0*   PLT  --   --  173 161 171 159       Last Basic Metabolic Panel:  Recent Labs   Lab Test 07/25/19 04/11/19    138   POTASSIUM 4.1 4.4   CHLORIDE 104 103   TRENT 9.5 9.4   CO2 24 27   BUN 24 26   CR 1.13 1.08   GLC 54* 130*     GFR Estimate   Date Value Ref Range Status   07/25/2019 58 (A) >60 ml/min/1.73m2 Final   04/11/2019 >60 >60 ml/min/1.73m2 Final   02/04/2019 57 (L) >60 ml/min/1.73m2 Final     GFR Estimate If Black   Date Value Ref Range Status   07/25/2019 >60 >60 ml/min/1.73m2 Final   04/11/2019 >60 >60 ml/min/1.73m2 Final   02/04/2019 >60 >60 ml/min/1.73m2 Final     Liver Function Studies -   Recent Labs   Lab Test 04/11/19 07/01/18  0940   PROTTOTAL 7.0 6.7*   ALBUMIN 3.6 3.1*   BILITOTAL 0.3 0.4   ALKPHOS 68 68   AST 13 10   ALT 10 14       TSH   Date Value Ref Range Status   05/09/2019 3.35 0.30 - 4.50 uIU/mL Final   07/02/2018 2.20 0.40 - 4.00 mU/L Final   ]    Lab Results   Component Value Date    A1C 6.4 07/25/2019    A1C 6.2 04/11/2019     Family Communication:  Discussed the ear lesions with daughter Jazmin.  One of the lesions may be caused by the  hearing aid, but the other is not near the aide.  Discussed his past h/o BCC requiring MOH's procedure on the nose.  This was done by Dr. Rivera.  She will call and set up an appt for evaluation.    ASSESSMENT/PLAN:  (L98.9) Lesion of skin of right ear  (primary encounter diagnosis)  Comment: Two lesions  Plan: Daughter Jazmin will set up appt for dermatology for evaluation and tx. recommendations    (I10) Benign essential hypertension  Comment: BP goal; <150/90, at goal  Plan: Continue current POC.     (I69.354) Hemiparesis affecting left side as late effect of cerebrovascular accident (H)  Comment: Chronic  Plan: Continue with arm splint, assist with ambulation and wellness center visits.  Monitor for further neurological changes.    (E11.22,  N18.3,  Z79.4) Type 2 diabetes mellitus with stage 3 chronic kidney disease, with long-term current use of insulin (H)  Comment: Chronic, HgbA1C goal <8%, at goal  Plan: Continue current POC    (N18.3) CKD (chronic kidney disease) stage 3, GFR 30-59 ml/min, est GFR: 10/30/17: 46,, 12/14/17: 59  Comment: Chronic  Plan: Renally adjust dose of medications.  BMP q3m.     (I48.2) Chronic atrial fibrillation (H)  Comment: Rate controlled  Plan: Continue POC as developed by cardiology.    Electronically signed by:  TOBY Momin CNP             Sincerely,        TOBY Momin CNP

## 2019-08-27 NOTE — PROGRESS NOTES
Addendum: Checked with  regarding potentially reducing Xarelto to 15mg daily right now due to kidney function as noted. Due to his h/o CVA and PAF,  indicated preference to continue with 20mg daily for now.    In future, if decline in kidney function, may need to reassess dose.

## 2019-09-16 ENCOUNTER — NURSING HOME VISIT (OUTPATIENT)
Dept: GERIATRICS | Facility: CLINIC | Age: 84
End: 2019-09-16

## 2019-09-16 ENCOUNTER — NURSING HOME VISIT (OUTPATIENT)
Dept: GERIATRICS | Facility: CLINIC | Age: 84
End: 2019-09-16
Payer: COMMERCIAL

## 2019-09-16 DIAGNOSIS — I69.354 HEMIPARESIS AFFECTING LEFT SIDE AS LATE EFFECT OF CEREBROVASCULAR ACCIDENT (H): ICD-10-CM

## 2019-09-16 DIAGNOSIS — E11.22 TYPE 2 DIABETES MELLITUS WITH STAGE 3 CHRONIC KIDNEY DISEASE, WITH LONG-TERM CURRENT USE OF INSULIN (H): ICD-10-CM

## 2019-09-16 DIAGNOSIS — I69.354 HEMIPARESIS AFFECTING LEFT SIDE AS LATE EFFECT OF CEREBROVASCULAR ACCIDENT (H): Primary | ICD-10-CM

## 2019-09-16 DIAGNOSIS — I10 BENIGN ESSENTIAL HYPERTENSION: ICD-10-CM

## 2019-09-16 DIAGNOSIS — N18.30 TYPE 2 DIABETES MELLITUS WITH STAGE 3 CHRONIC KIDNEY DISEASE, WITH LONG-TERM CURRENT USE OF INSULIN (H): ICD-10-CM

## 2019-09-16 DIAGNOSIS — I48.20 CHRONIC ATRIAL FIBRILLATION (H): ICD-10-CM

## 2019-09-16 DIAGNOSIS — H61.91 LESION OF SKIN OF RIGHT EAR: Primary | ICD-10-CM

## 2019-09-16 DIAGNOSIS — Z79.4 TYPE 2 DIABETES MELLITUS WITH STAGE 3 CHRONIC KIDNEY DISEASE, WITH LONG-TERM CURRENT USE OF INSULIN (H): ICD-10-CM

## 2019-09-18 NOTE — PROGRESS NOTES
Patient was seen for rotatory long-term care visit.  Course reviewed with nurse practitioner.    Overall status has been stable.  Patient will be seen by dermatology for a lesion right ear.    He denies any specific physical concerns today, reports feeling well.      Vital signs stable  Blood glucoses 100s  Blood pressure stable    Lying in bed, sleepy, easy alerted, pleasant  Lungs clear  CV irregular  Abdomen soft  Left sided weakness, as before.  Raised ulcerated lesion right outer ear without erythema.      Assessment    History CVA with residual left hemiparesis, stable neuro status    Diabetes mellitus type 2, stable on Basaglar insulin    Chronic kidney disease stage III, stable    Chronic atrial fibrillation, heart rate controlled, on Xarelto.    Right ear lesion, to be evaluated by dermatology    Plan  Continue current cares  Routine lab monitoring.

## 2019-09-26 ENCOUNTER — TELEPHONE (OUTPATIENT)
Dept: GERIATRICS | Facility: CLINIC | Age: 84
End: 2019-09-26

## 2019-09-26 ENCOUNTER — ANCILLARY PROCEDURE (OUTPATIENT)
Dept: CARDIOLOGY | Facility: CLINIC | Age: 84
End: 2019-09-26
Attending: INTERNAL MEDICINE
Payer: COMMERCIAL

## 2019-09-26 DIAGNOSIS — I44.1 AV BLOCK, 2ND DEGREE: ICD-10-CM

## 2019-09-26 DIAGNOSIS — M54.2 NECK PAIN: ICD-10-CM

## 2019-09-26 PROCEDURE — 93296 REM INTERROG EVL PM/IDS: CPT | Performed by: INTERNAL MEDICINE

## 2019-09-26 PROCEDURE — 93294 REM INTERROG EVL PM/LDLS PM: CPT | Performed by: INTERNAL MEDICINE

## 2019-09-26 RX ORDER — TRAMADOL HYDROCHLORIDE 50 MG/1
TABLET ORAL
Qty: 180 TABLET | Refills: 0 | Status: SHIPPED | OUTPATIENT
Start: 2019-09-26 | End: 2019-09-26

## 2019-09-26 RX ORDER — TRAMADOL HYDROCHLORIDE 50 MG/1
TABLET ORAL
Qty: 180 TABLET | Refills: 0 | Status: SHIPPED | OUTPATIENT
Start: 2019-09-26 | End: 2019-11-20

## 2019-09-27 DIAGNOSIS — Z95.0 CARDIAC PACEMAKER IN SITU: Primary | ICD-10-CM

## 2019-09-27 PROBLEM — I48.0 PAROXYSMAL ATRIAL FIBRILLATION (H): Status: ACTIVE | Noted: 2017-06-05

## 2019-10-01 LAB
MDC_IDC_EPISODE_DTM: NORMAL
MDC_IDC_EPISODE_DURATION: 1 S
MDC_IDC_EPISODE_DURATION: 20 S
MDC_IDC_EPISODE_DURATION: NORMAL S
MDC_IDC_EPISODE_ID: NORMAL
MDC_IDC_EPISODE_TYPE: NORMAL
MDC_IDC_LEAD_IMPLANT_DT: NORMAL
MDC_IDC_LEAD_IMPLANT_DT: NORMAL
MDC_IDC_LEAD_LOCATION: NORMAL
MDC_IDC_LEAD_LOCATION: NORMAL
MDC_IDC_LEAD_LOCATION_DETAIL_1: NORMAL
MDC_IDC_LEAD_LOCATION_DETAIL_1: NORMAL
MDC_IDC_LEAD_MFG: NORMAL
MDC_IDC_LEAD_MFG: NORMAL
MDC_IDC_LEAD_MODEL: NORMAL
MDC_IDC_LEAD_MODEL: NORMAL
MDC_IDC_LEAD_POLARITY_TYPE: NORMAL
MDC_IDC_LEAD_POLARITY_TYPE: NORMAL
MDC_IDC_LEAD_SERIAL: NORMAL
MDC_IDC_LEAD_SERIAL: NORMAL
MDC_IDC_MSMT_BATTERY_DTM: NORMAL
MDC_IDC_MSMT_BATTERY_REMAINING_LONGEVITY: 102 MO
MDC_IDC_MSMT_BATTERY_REMAINING_PERCENTAGE: 100 %
MDC_IDC_MSMT_BATTERY_STATUS: NORMAL
MDC_IDC_MSMT_LEADCHNL_RA_IMPEDANCE_VALUE: 754 OHM
MDC_IDC_MSMT_LEADCHNL_RA_PACING_THRESHOLD_AMPLITUDE: 0.4 V
MDC_IDC_MSMT_LEADCHNL_RA_PACING_THRESHOLD_PULSEWIDTH: 0.4 MS
MDC_IDC_MSMT_LEADCHNL_RV_IMPEDANCE_VALUE: 801 OHM
MDC_IDC_MSMT_LEADCHNL_RV_PACING_THRESHOLD_AMPLITUDE: 0.7 V
MDC_IDC_MSMT_LEADCHNL_RV_PACING_THRESHOLD_PULSEWIDTH: 0.4 MS
MDC_IDC_PG_IMPLANT_DTM: NORMAL
MDC_IDC_PG_MFG: NORMAL
MDC_IDC_PG_MODEL: NORMAL
MDC_IDC_PG_SERIAL: NORMAL
MDC_IDC_PG_TYPE: NORMAL
MDC_IDC_SESS_CLINIC_NAME: NORMAL
MDC_IDC_SESS_DTM: NORMAL
MDC_IDC_SESS_TYPE: NORMAL
MDC_IDC_SET_BRADY_AT_MODE_SWITCH_MODE: NORMAL
MDC_IDC_SET_BRADY_AT_MODE_SWITCH_RATE: 170 {BEATS}/MIN
MDC_IDC_SET_BRADY_LOWRATE: 60 {BEATS}/MIN
MDC_IDC_SET_BRADY_MAX_SENSOR_RATE: 120 {BEATS}/MIN
MDC_IDC_SET_BRADY_MAX_TRACKING_RATE: 120 {BEATS}/MIN
MDC_IDC_SET_BRADY_MODE: NORMAL
MDC_IDC_SET_BRADY_PAV_DELAY_HIGH: 200 MS
MDC_IDC_SET_BRADY_PAV_DELAY_LOW: 300 MS
MDC_IDC_SET_BRADY_SAV_DELAY_HIGH: 200 MS
MDC_IDC_SET_BRADY_SAV_DELAY_LOW: 300 MS
MDC_IDC_SET_LEADCHNL_RA_PACING_AMPLITUDE: 2 V
MDC_IDC_SET_LEADCHNL_RA_PACING_CAPTURE_MODE: NORMAL
MDC_IDC_SET_LEADCHNL_RA_PACING_POLARITY: NORMAL
MDC_IDC_SET_LEADCHNL_RA_PACING_PULSEWIDTH: 0.4 MS
MDC_IDC_SET_LEADCHNL_RA_SENSING_ADAPTATION_MODE: NORMAL
MDC_IDC_SET_LEADCHNL_RA_SENSING_POLARITY: NORMAL
MDC_IDC_SET_LEADCHNL_RA_SENSING_SENSITIVITY: 0.25 MV
MDC_IDC_SET_LEADCHNL_RV_PACING_AMPLITUDE: 1.2 V
MDC_IDC_SET_LEADCHNL_RV_PACING_CAPTURE_MODE: NORMAL
MDC_IDC_SET_LEADCHNL_RV_PACING_POLARITY: NORMAL
MDC_IDC_SET_LEADCHNL_RV_PACING_PULSEWIDTH: 0.4 MS
MDC_IDC_SET_LEADCHNL_RV_SENSING_ADAPTATION_MODE: NORMAL
MDC_IDC_SET_LEADCHNL_RV_SENSING_POLARITY: NORMAL
MDC_IDC_SET_LEADCHNL_RV_SENSING_SENSITIVITY: 1.5 MV
MDC_IDC_SET_ZONE_DETECTION_INTERVAL: 375 MS
MDC_IDC_SET_ZONE_TYPE: NORMAL
MDC_IDC_SET_ZONE_VENDOR_TYPE: NORMAL
MDC_IDC_STAT_AT_BURDEN_PERCENT: 5 %
MDC_IDC_STAT_AT_DTM_END: NORMAL
MDC_IDC_STAT_AT_DTM_START: NORMAL
MDC_IDC_STAT_BRADY_DTM_END: NORMAL
MDC_IDC_STAT_BRADY_DTM_START: NORMAL
MDC_IDC_STAT_BRADY_RA_PERCENT_PACED: 1 %
MDC_IDC_STAT_BRADY_RV_PERCENT_PACED: 10 %
MDC_IDC_STAT_EPISODE_RECENT_COUNT: 0
MDC_IDC_STAT_EPISODE_RECENT_COUNT: 7
MDC_IDC_STAT_EPISODE_RECENT_COUNT_DTM_END: NORMAL
MDC_IDC_STAT_EPISODE_RECENT_COUNT_DTM_START: NORMAL
MDC_IDC_STAT_EPISODE_TYPE: NORMAL
MDC_IDC_STAT_EPISODE_VENDOR_TYPE: NORMAL

## 2019-10-10 LAB
MDC_IDC_EPISODE_DTM: NORMAL
MDC_IDC_EPISODE_DURATION: 12 S
MDC_IDC_EPISODE_DURATION: 14 S
MDC_IDC_EPISODE_DURATION: 1661 S
MDC_IDC_EPISODE_DURATION: 174 S
MDC_IDC_EPISODE_DURATION: 1805 S
MDC_IDC_EPISODE_DURATION: 4246 S
MDC_IDC_EPISODE_DURATION: 4393 S
MDC_IDC_EPISODE_DURATION: 48 S
MDC_IDC_EPISODE_DURATION: NORMAL S
MDC_IDC_EPISODE_DURATION: NORMAL S
MDC_IDC_EPISODE_ID: NORMAL
MDC_IDC_EPISODE_TYPE: NORMAL
MDC_IDC_LEAD_IMPLANT_DT: NORMAL
MDC_IDC_LEAD_IMPLANT_DT: NORMAL
MDC_IDC_LEAD_LOCATION: NORMAL
MDC_IDC_LEAD_LOCATION: NORMAL
MDC_IDC_LEAD_LOCATION_DETAIL_1: NORMAL
MDC_IDC_LEAD_LOCATION_DETAIL_1: NORMAL
MDC_IDC_LEAD_MFG: NORMAL
MDC_IDC_LEAD_MFG: NORMAL
MDC_IDC_LEAD_MODEL: NORMAL
MDC_IDC_LEAD_MODEL: NORMAL
MDC_IDC_LEAD_POLARITY_TYPE: NORMAL
MDC_IDC_LEAD_POLARITY_TYPE: NORMAL
MDC_IDC_LEAD_SERIAL: NORMAL
MDC_IDC_LEAD_SERIAL: NORMAL
MDC_IDC_MSMT_BATTERY_DTM: NORMAL
MDC_IDC_MSMT_BATTERY_REMAINING_LONGEVITY: 108 MO
MDC_IDC_MSMT_BATTERY_REMAINING_PERCENTAGE: 100 %
MDC_IDC_MSMT_BATTERY_STATUS: NORMAL
MDC_IDC_MSMT_LEADCHNL_RA_IMPEDANCE_VALUE: 756 OHM
MDC_IDC_MSMT_LEADCHNL_RA_PACING_THRESHOLD_AMPLITUDE: 0.4 V
MDC_IDC_MSMT_LEADCHNL_RA_PACING_THRESHOLD_PULSEWIDTH: 0.4 MS
MDC_IDC_MSMT_LEADCHNL_RV_IMPEDANCE_VALUE: 815 OHM
MDC_IDC_MSMT_LEADCHNL_RV_PACING_THRESHOLD_AMPLITUDE: 0.7 V
MDC_IDC_MSMT_LEADCHNL_RV_PACING_THRESHOLD_PULSEWIDTH: 0.4 MS
MDC_IDC_PG_IMPLANT_DTM: NORMAL
MDC_IDC_PG_MFG: NORMAL
MDC_IDC_PG_MODEL: NORMAL
MDC_IDC_PG_SERIAL: NORMAL
MDC_IDC_PG_TYPE: NORMAL
MDC_IDC_SESS_CLINIC_NAME: NORMAL
MDC_IDC_SESS_DTM: NORMAL
MDC_IDC_SESS_TYPE: NORMAL
MDC_IDC_SET_BRADY_AT_MODE_SWITCH_MODE: NORMAL
MDC_IDC_SET_BRADY_AT_MODE_SWITCH_RATE: 170 {BEATS}/MIN
MDC_IDC_SET_BRADY_LOWRATE: 60 {BEATS}/MIN
MDC_IDC_SET_BRADY_MAX_SENSOR_RATE: 120 {BEATS}/MIN
MDC_IDC_SET_BRADY_MAX_TRACKING_RATE: 120 {BEATS}/MIN
MDC_IDC_SET_BRADY_MODE: NORMAL
MDC_IDC_SET_BRADY_PAV_DELAY_HIGH: 200 MS
MDC_IDC_SET_BRADY_PAV_DELAY_LOW: 300 MS
MDC_IDC_SET_BRADY_SAV_DELAY_HIGH: 200 MS
MDC_IDC_SET_BRADY_SAV_DELAY_LOW: 300 MS
MDC_IDC_SET_LEADCHNL_RA_PACING_AMPLITUDE: 2 V
MDC_IDC_SET_LEADCHNL_RA_PACING_CAPTURE_MODE: NORMAL
MDC_IDC_SET_LEADCHNL_RA_PACING_POLARITY: NORMAL
MDC_IDC_SET_LEADCHNL_RA_PACING_PULSEWIDTH: 0.4 MS
MDC_IDC_SET_LEADCHNL_RA_SENSING_ADAPTATION_MODE: NORMAL
MDC_IDC_SET_LEADCHNL_RA_SENSING_POLARITY: NORMAL
MDC_IDC_SET_LEADCHNL_RA_SENSING_SENSITIVITY: 0.25 MV
MDC_IDC_SET_LEADCHNL_RV_PACING_AMPLITUDE: 1.2 V
MDC_IDC_SET_LEADCHNL_RV_PACING_CAPTURE_MODE: NORMAL
MDC_IDC_SET_LEADCHNL_RV_PACING_POLARITY: NORMAL
MDC_IDC_SET_LEADCHNL_RV_PACING_PULSEWIDTH: 0.4 MS
MDC_IDC_SET_LEADCHNL_RV_SENSING_ADAPTATION_MODE: NORMAL
MDC_IDC_SET_LEADCHNL_RV_SENSING_POLARITY: NORMAL
MDC_IDC_SET_LEADCHNL_RV_SENSING_SENSITIVITY: 1.5 MV
MDC_IDC_SET_ZONE_DETECTION_INTERVAL: 375 MS
MDC_IDC_SET_ZONE_TYPE: NORMAL
MDC_IDC_SET_ZONE_VENDOR_TYPE: NORMAL
MDC_IDC_STAT_AT_BURDEN_PERCENT: 4 %
MDC_IDC_STAT_AT_DTM_END: NORMAL
MDC_IDC_STAT_AT_DTM_START: NORMAL
MDC_IDC_STAT_BRADY_DTM_END: NORMAL
MDC_IDC_STAT_BRADY_DTM_START: NORMAL
MDC_IDC_STAT_BRADY_RA_PERCENT_PACED: 1 %
MDC_IDC_STAT_BRADY_RV_PERCENT_PACED: 20 %
MDC_IDC_STAT_EPISODE_RECENT_COUNT: 0
MDC_IDC_STAT_EPISODE_RECENT_COUNT: 1
MDC_IDC_STAT_EPISODE_RECENT_COUNT: 55
MDC_IDC_STAT_EPISODE_RECENT_COUNT_DTM_END: NORMAL
MDC_IDC_STAT_EPISODE_RECENT_COUNT_DTM_START: NORMAL
MDC_IDC_STAT_EPISODE_TYPE: NORMAL
MDC_IDC_STAT_EPISODE_VENDOR_TYPE: NORMAL

## 2019-10-16 NOTE — TELEPHONE ENCOUNTER
Nursing notes ongoing neck pain.  Daughter inquiring as to possibility of lidoderm patch which was discontinued due to noncoverage by insurance.  Will try voltaren cream to area bid.  
normal (ped)...

## 2019-10-29 ENCOUNTER — NURSING HOME VISIT (OUTPATIENT)
Dept: GERIATRICS | Facility: CLINIC | Age: 84
End: 2019-10-29
Payer: COMMERCIAL

## 2019-10-29 VITALS
DIASTOLIC BLOOD PRESSURE: 73 MMHG | TEMPERATURE: 98.2 F | WEIGHT: 171 LBS | HEART RATE: 88 BPM | RESPIRATION RATE: 20 BRPM | BODY MASS INDEX: 25.33 KG/M2 | HEIGHT: 69 IN | SYSTOLIC BLOOD PRESSURE: 115 MMHG

## 2019-10-29 DIAGNOSIS — N18.30 CKD (CHRONIC KIDNEY DISEASE) STAGE 3, GFR 30-59 ML/MIN (H): ICD-10-CM

## 2019-10-29 DIAGNOSIS — I48.20 CHRONIC ATRIAL FIBRILLATION (H): ICD-10-CM

## 2019-10-29 DIAGNOSIS — L53.9 TOE ERYTHEMA: Primary | ICD-10-CM

## 2019-10-29 DIAGNOSIS — Z79.4 TYPE 2 DIABETES MELLITUS WITH STAGE 3 CHRONIC KIDNEY DISEASE, WITH LONG-TERM CURRENT USE OF INSULIN (H): ICD-10-CM

## 2019-10-29 DIAGNOSIS — E11.22 TYPE 2 DIABETES MELLITUS WITH STAGE 3 CHRONIC KIDNEY DISEASE, WITH LONG-TERM CURRENT USE OF INSULIN (H): ICD-10-CM

## 2019-10-29 DIAGNOSIS — I10 BENIGN ESSENTIAL HYPERTENSION: ICD-10-CM

## 2019-10-29 DIAGNOSIS — I69.919 COGNITIVE DEFICITS AS LATE EFFECT OF CEREBROVASCULAR DISEASE: ICD-10-CM

## 2019-10-29 DIAGNOSIS — N18.30 TYPE 2 DIABETES MELLITUS WITH STAGE 3 CHRONIC KIDNEY DISEASE, WITH LONG-TERM CURRENT USE OF INSULIN (H): ICD-10-CM

## 2019-10-29 DIAGNOSIS — I73.9 PVD (PERIPHERAL VASCULAR DISEASE) (H): ICD-10-CM

## 2019-10-29 PROCEDURE — 99309 SBSQ NF CARE MODERATE MDM 30: CPT | Mod: GV | Performed by: NURSE PRACTITIONER

## 2019-10-29 ASSESSMENT — MIFFLIN-ST. JEOR: SCORE: 1436.03

## 2019-10-29 NOTE — LETTER
10/29/2019        RE: Elias Rhodes  [address]  [facility] [address]        [facility]  [facility] Record Number:  [id number]  Place of Service where encounter took place:  [facility] [[id number]]  Chief Complaint   Patient presents with     Nursing Home Acute       HPI:    Elias Mckee  is a 88 year old (8/10/1931), who is being seen today for an episodic care visit.  HPI information obtained from: facility chart records, facility staff and [facility] chart review. Pt unable to participate in ROS due to cognitive loss. Today's concern is:  Toe erythema  Nsg reported last evening right great toe redness of unclear cause.  No wounds.    PVD (peripheral vascular disease) (H)  Known PVD.  Pt has severe onychomycosis.  Sees podiatry routinely with last visit reported as 10/9.  Note reflects that pt was not cooperative and full debridement was not possible.    Type 2 diabetes mellitus with stage 3 chronic kidney disease, with long-term current use of insulin (H)  Accuchecks in past week: 0700: , 11am: 129-298, 1700: 120-240.  No recent changes to insulin dose.    Chronic atrial fibrillation  No reports of heart rates >100/min.  Pulse ranges last week: 65-91.  Remains on Xarelto for anticoagulation.    CKD (chronic kidney disease) stage 3, GFR 30-59 ml/min, est GFR: 10/30/17: 46,, 12/14/17: 59  Labs on 7/25 showed a creatinine of 1.13 and an est GFR of 58    Benign essential hypertension  BP ranges in past week: 106/62 - 157/92 with majority of systolics in the 100-120 range/    Past Medical and Surgical History reviewed in Epic today.    MEDICATIONS:  Current Outpatient Medications   Medication Sig Dispense Refill     ACETAMINOPHEN PO Take 1,000 mg by mouth 3 times daily For pain       Atorvastatin Calcium (LIPITOR PO) Take 40 mg by mouth At Bedtime       atropine 1 % ophthalmic solution Place 1 drop into the right eye 2  times daily       cholecalciferol (VITAMIN D) 1000 UNIT tablet Take 2,000 Units by mouth daily       Fluticasone Propionate (FLONASE NA) Spray 2 sprays into both nostrils daily       Insulin Glargine (BASAGLAR KWIKPEN SC) Inject 22 Units Subcutaneous At Bedtime May use 2 units as needed to prime pen before each dose.       latanoprost (XALATAN) 0.005 % ophthalmic solution Place 1 drop into the right eye At Bedtime       LEVOTHYROXINE SODIUM PO Take 25 mcg by mouth daily       polyethylene glycol (MIRALAX/GLYCOLAX) powder Take 17 g by mouth daily        prednisoLONE acetate (PRED FORTE) 1 % ophthalmic susp Place 1 drop into the right eye 4 times daily        rivaroxaban ANTICOAGULANT (XARELTO) 20 MG TABS tablet Take 1 tablet (20 mg) by mouth daily (with dinner) 30 tablet 1     tamsulosin (FLOMAX) 0.4 MG capsule Take 1 capsule (0.4 mg) by mouth daily 60 capsule 0     timolol, PF, (TIMOPTIC OCUDOSE) 0.5 % ophthalmic solution Place 1 drop into the right eye 2 times daily       traMADol (ULTRAM) 50 MG tablet 50 mg tid and q4h prn pain 180 tablet 0     Patient Active Problem List   Diagnosis     Chronic ischemic heart disease     Personal history of other diseases of circulatory system     HYPERLIPIDEMIA LDL GOAL <100     Advance Care Planning     CKD (chronic kidney disease) stage 3, GFR 30-59 ml/min, est GFR: 10/30/17: 46,, 12/14/17: 59     Leg weakness     Ataxia     BPH (benign prostatic hyperplasia)     Type 2 diabetes mellitus with diabetic nephropathy (H)     History of actinic keratoses     History of squamous cell carcinoma     S/P Mohs surgery for basal cell carcinoma     H/O: CVA (cerebrovascular accident), 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 See HPI for reports of right great toe redness.    Objective:  /73   Pulse 88   Temp 98.2  F (36.8  C)   Resp 20   Ht 1.753 m (5' 9\")   Wt 77.6 kg (171 lb)   BMI 25.25 kg/m     Exam:  GENERAL APPEARANCE: Calm and pleasant. Up via EZ to chair. stand for lunch.   Alert and able to participate in conversation.   Head: Normocephalic with slight left sided mouth droop.  Eye:  No redness noted in sclera  No discharge. Right eye is open more than previous visits.   ENIT:  No rhinitis. Moist oral cavity. No exudate.   CV:  Irregularly irregular rhythm.  No murmur.   No LE edema  RESP:  No cough.  Quiet, effortless respirations.  Clear to auscultation bilaterally.   ABDOMEN:  Soft rounded abdomen.  Bowel sounds positive all four quadrants.  No tenderness with palpation.  NEURO:   Strong hand grasp right hand and only minimal ability to move left  arm.  Wearing left hand splint.  Left sided facial droop.  Strong ability to raise right leg against resistance and moderate left leg strength.   Oriented to person and loosely to place..    MUSC: No pain with transfer by EZ stand.  SKIN: Thickened toenails significantly on both great toes.  No tenderness " to touch of either toe.  Only very mild redness on both great toes around nails.   PSYCH:  No delusional statements. Pleasant..     Labs:   CBC RESULTS:   Recent Labs   Lab Test 07/25/19 04/11/19 11/19/18 11/01/18 07/20/18  1135 07/03/18  0835   WBC  --   --  6.5 7.2 7.8 6.0   RBC  --   --  3.90* 3.82* 3.79* 3.64*   HGB 12.1* 11.6* 12.1* 12.0* 11.9* 11.3*   HCT  --   --  36.0* 35.5* 34.3* 32.8*   MCV  --   --  92.3 92.9 91 90   MCH  --   --   --   --  31.4 31.0   MCHC  --   --   --   --  34.7 34.5   RDW  --   --  16.0* 15.9* 16.3* 16.0*   PLT  --   --  173 161 171 159    < > = values in this interval not displayed.       Last Basic Metabolic Panel:  Recent Labs   Lab Test 07/25/19 04/11/19    138   POTASSIUM 4.1 4.4   CHLORIDE 104 103   TRENT 9.5 9.4   CO2 24 27   BUN 24 26   CR 1.13 1.08   GLC 54* 130*     GFR Estimate   Date Value Ref Range Status   07/25/2019 58 (A) >60 ml/min/1.73m2 Final   04/11/2019 >60 >60 ml/min/1.73m2 Final   02/04/2019 57 (L) >60 ml/min/1.73m2 Final     Liver Function Studies -   Recent Labs   Lab Test 04/11/19 07/01/18  0940   PROTTOTAL 7.0 6.7*   ALBUMIN 3.6 3.1*   BILITOTAL 0.3 0.4   ALKPHOS 68 68   AST 13 10   ALT 10 14       TSH   Date Value Ref Range Status   05/09/2019 3.35 0.30 - 4.50 uIU/mL Final   07/02/2018 2.20 0.40 - 4.00 mU/L Final   ]    Lab Results   Component Value Date    A1C 6.4 07/25/2019    A1C 6.2 04/11/2019     ASSESSMENT/PLAN:  (L53.9) Toe erythema  (primary encounter diagnosis)  Comment: Likely local irritation from footwear and severe onychmycosis  Plan: Podiatry to see pt at next visit to [facility] and nsg to give tramadol prior to appt.    (I73.9) PVD (peripheral vascular disease) (H)  Comment: Chronic  Plan: Monitor pedal skin carefully.     (E11.22,  N18.3,  Z79.4) Type 2 diabetes mellitus with stage 3 chronic kidney disease, with long-term current use of insulin (H)  Comment: CHronic, HgbA1C goal <8%, at goal  Plan: Continue current POC.  HgbA1C at next  lab day.    (I48.20) Chronic atrial fibrillation  Comment: Rate controlled  Plan: Continue xarelto for anticoagulation.  Monitor    (N18.3) CKD (chronic kidney disease) stage 3, GFR 30-59 ml/min, est GFR: 10/30/17: 46,, 12/14/17: 59  Comment: Chronic but stable  Plan: Renally adjust dose of medications and BMP next lab day.    (I10) Benign essential hypertension  Comment: BP goal; <150/90, at goal  Plan: Continue current pOC    (I69.919) Cognitive deficits as late effect of cerebrovascular disease  Comment: Chronic  Plan:Continue 24 hour skilled nursing care.     Electronically signed by:  TOBY Momin CNP             Sincerely,        TOBY Momin CNP

## 2019-10-29 NOTE — PROGRESS NOTES
[facility]  [place] [facility] Record Number:  [id number]  Place of Service where encounter took place:  [facility] ([facility]) [[id number]]  Chief Complaint   Patient presents with     Nursing Home Acute       HPI:    Elias Mckee  is a 88 year old (8/10/1931), who is being seen today for an episodic care visit.  HPI information obtained from: facility chart records, facility staff and [facility] chart review. Pt unable to participate in ROS due to cognitive loss. Today's concern is:  Toe erythema  Nsg reported last evening right great toe redness of unclear cause.  No wounds.    PVD (peripheral vascular disease) (H)  Known PVD.  Pt has severe onychomycosis.  Sees podiatry routinely with last visit reported as 10/9.  Note reflects that pt was not cooperative and full debridement was not possible.    Type 2 diabetes mellitus with stage 3 chronic kidney disease, with long-term current use of insulin (H)  Accuchecks in past week: 0700: , 11am: 129-298, 1700: 120-240.  No recent changes to insulin dose.    Chronic atrial fibrillation  No reports of heart rates >100/min.  Pulse ranges last week: 65-91.  Remains on Xarelto for anticoagulation.    CKD (chronic kidney disease) stage 3, GFR 30-59 ml/min, est GFR: 10/30/17: 46,, 12/14/17: 59  Labs on 7/25 showed a creatinine of 1.13 and an est GFR of 58    Benign essential hypertension  BP ranges in past week: 106/62 - 157/92 with majority of systolics in the 100-120 range/    Past Medical and Surgical History reviewed in Epic today.    MEDICATIONS:  Current Outpatient Medications   Medication Sig Dispense Refill     ACETAMINOPHEN PO Take 1,000 mg by mouth 3 times daily For pain       Atorvastatin Calcium (LIPITOR PO) Take 40 mg by mouth At Bedtime       atropine 1 % ophthalmic solution Place 1 drop into the right eye 2 times daily       cholecalciferol (VITAMIN D) 1000 UNIT tablet Take 2,000 Units by mouth daily       Fluticasone Propionate  (FLONASE NA) Spray 2 sprays into both nostrils daily       Insulin Glargine (BASAGLAR KWIKPEN SC) Inject 22 Units Subcutaneous At Bedtime May use 2 units as needed to prime pen before each dose.       latanoprost (XALATAN) 0.005 % ophthalmic solution Place 1 drop into the right eye At Bedtime       LEVOTHYROXINE SODIUM PO Take 25 mcg by mouth daily       polyethylene glycol (MIRALAX/GLYCOLAX) powder Take 17 g by mouth daily        prednisoLONE acetate (PRED FORTE) 1 % ophthalmic susp Place 1 drop into the right eye 4 times daily        rivaroxaban ANTICOAGULANT (XARELTO) 20 MG TABS tablet Take 1 tablet (20 mg) by mouth daily (with dinner) 30 tablet 1     tamsulosin (FLOMAX) 0.4 MG capsule Take 1 capsule (0.4 mg) by mouth daily 60 capsule 0     timolol, PF, (TIMOPTIC OCUDOSE) 0.5 % ophthalmic solution Place 1 drop into the right eye 2 times daily       traMADol (ULTRAM) 50 MG tablet 50 mg tid and q4h prn pain 180 tablet 0     Patient Active Problem List   Diagnosis     Chronic ischemic heart disease     Personal history of other diseases of circulatory system     HYPERLIPIDEMIA LDL GOAL <100     Advance Care Planning     CKD (chronic kidney disease) stage 3, GFR 30-59 ml/min, est GFR: 10/30/17: 46,, 12/14/17: 59     Leg weakness     Ataxia     BPH (benign prostatic hyperplasia)     Type 2 diabetes mellitus with diabetic nephropathy (H)     History of actinic keratoses     History of squamous cell carcinoma     S/P Mohs surgery for basal cell carcinoma     H/O: CVA (cerebrovascular accident), 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 See HPI for reports of right great toe redness.    Objective:  /73   Pulse 88   Temp 98.2  F (36.8  C)   Resp 20   Ht 1.753 m (5' 9\")   Wt 77.6 kg (171 lb)   BMI 25.25 kg/m    Exam:  GENERAL APPEARANCE: Calm and pleasant. Up via EZ to chair. stand for lunch.   Alert and able to participate in conversation.   Head: Normocephalic with slight left sided mouth droop.  Eye:  No redness noted in sclera  No discharge. Right eye is open more than previous visits.   ENIT:  No rhinitis. Moist oral cavity. No exudate.   CV:  Irregularly irregular rhythm.  No murmur.   No LE edema  RESP:  No cough.  Quiet, effortless respirations.  Clear to auscultation bilaterally.   ABDOMEN:  Soft rounded abdomen.  Bowel sounds positive all four quadrants.  No tenderness with palpation.  NEURO:   Strong hand grasp right hand and only minimal ability to move left  arm.  Wearing left hand splint.  Left sided facial droop.  Strong ability to raise right leg against resistance and moderate left leg strength.   Oriented to person and loosely to place..    MUSC: No pain with transfer by EZ stand.  SKIN: Thickened toenails significantly on both great toes.  No tenderness to touch of either toe.  Only very mild redness on both great toes around nails.   PSYCH:  No delusional statements. " Pleasant..     Labs:   CBC RESULTS:   Recent Labs   Lab Test 07/25/19 04/11/19 11/19/18 11/01/18 07/20/18  1135 07/03/18  0835   WBC  --   --  6.5 7.2 7.8 6.0   RBC  --   --  3.90* 3.82* 3.79* 3.64*   HGB 12.1* 11.6* 12.1* 12.0* 11.9* 11.3*   HCT  --   --  36.0* 35.5* 34.3* 32.8*   MCV  --   --  92.3 92.9 91 90   MCH  --   --   --   --  31.4 31.0   MCHC  --   --   --   --  34.7 34.5   RDW  --   --  16.0* 15.9* 16.3* 16.0*   PLT  --   --  173 161 171 159    < > = values in this interval not displayed.       Last Basic Metabolic Panel:  Recent Labs   Lab Test 07/25/19 04/11/19    138   POTASSIUM 4.1 4.4   CHLORIDE 104 103   TRENT 9.5 9.4   CO2 24 27   BUN 24 26   CR 1.13 1.08   GLC 54* 130*     GFR Estimate   Date Value Ref Range Status   07/25/2019 58 (A) >60 ml/min/1.73m2 Final   04/11/2019 >60 >60 ml/min/1.73m2 Final   02/04/2019 57 (L) >60 ml/min/1.73m2 Final     Liver Function Studies -   Recent Labs   Lab Test 04/11/19 07/01/18  0940   PROTTOTAL 7.0 6.7*   ALBUMIN 3.6 3.1*   BILITOTAL 0.3 0.4   ALKPHOS 68 68   AST 13 10   ALT 10 14       TSH   Date Value Ref Range Status   05/09/2019 3.35 0.30 - 4.50 uIU/mL Final   07/02/2018 2.20 0.40 - 4.00 mU/L Final   ]    Lab Results   Component Value Date    A1C 6.4 07/25/2019    A1C 6.2 04/11/2019     ASSESSMENT/PLAN:  (L53.9) Toe erythema  (primary encounter diagnosis)  Comment: Likely local irritation from footwear and severe onychmycosis  Plan: Podiatry to see pt at next visit to building and nsg to give tramadol prior to appt.    (I73.9) PVD (peripheral vascular disease) (H)  Comment: Chronic  Plan: Monitor pedal skin carefully.     (E11.22,  N18.3,  Z79.4) Type 2 diabetes mellitus with stage 3 chronic kidney disease, with long-term current use of insulin (H)  Comment: CHronic, HgbA1C goal <8%, at goal  Plan: Continue current POC.  HgbA1C at next lab day.    (I48.20) Chronic atrial fibrillation  Comment: Rate controlled  Plan: Continue xarelto for anticoagulation.   Monitor    (N18.3) CKD (chronic kidney disease) stage 3, GFR 30-59 ml/min, est GFR: 10/30/17: 46,, 12/14/17: 59  Comment: Chronic but stable  Plan: Renally adjust dose of medications and BMP next lab day.    (I10) Benign essential hypertension  Comment: BP goal; <150/90, at goal  Plan: Continue current pOC    (I69.919) Cognitive deficits as late effect of cerebrovascular disease  Comment: Chronic  Plan:Continue 24 hour skilled nursing care.     Electronically signed by:  TOBY Momin CNP

## 2019-10-31 ENCOUNTER — TRANSFERRED RECORDS (OUTPATIENT)
Dept: HEALTH INFORMATION MANAGEMENT | Facility: CLINIC | Age: 84
End: 2019-10-31

## 2019-10-31 LAB
ANION GAP SERPL CALCULATED.3IONS-SCNC: 8 MMOL/L (ref 7–16)
BUN SERPL-MCNC: 19 MG/DL (ref 7–26)
CALCIUM SERPL-MCNC: 9.4 MG/DL (ref 8.4–10.4)
CHLORIDE SERPLBLD-SCNC: 106 MMOL/L (ref 98–109)
CO2 SERPL-SCNC: 26 MMOL/L (ref 20–29)
CREAT SERPL-MCNC: 1.06 MG/DL (ref 0.73–1.18)
GFR SERPL CREATININE-BSD FRML MDRD: >60 ML/MIN/1.73M2
GLUCOSE SERPL-MCNC: 87 MG/DL (ref 70–100)
HBA1C MFR BLD: 6.6 % (ref 0–5.7)
HEMOGLOBIN: 11.9 G/DL (ref 13.5–17.5)
POTASSIUM SERPL-SCNC: 4.2 MMOL/L (ref 3.5–5.1)
SODIUM SERPL-SCNC: 140 MMOL/L (ref 136–145)

## 2019-11-11 ENCOUNTER — NURSING HOME VISIT (OUTPATIENT)
Dept: GERIATRICS | Facility: CLINIC | Age: 84
End: 2019-11-11
Payer: COMMERCIAL

## 2019-11-11 VITALS
BODY MASS INDEX: 25.18 KG/M2 | SYSTOLIC BLOOD PRESSURE: 129 MMHG | HEIGHT: 69 IN | HEART RATE: 89 BPM | DIASTOLIC BLOOD PRESSURE: 81 MMHG | WEIGHT: 170 LBS

## 2019-11-11 DIAGNOSIS — I69.919 COGNITIVE DEFICITS AS LATE EFFECT OF CEREBROVASCULAR DISEASE: ICD-10-CM

## 2019-11-11 DIAGNOSIS — I25.9 CHRONIC ISCHEMIC HEART DISEASE: ICD-10-CM

## 2019-11-11 DIAGNOSIS — I73.9 PVD (PERIPHERAL VASCULAR DISEASE) (H): ICD-10-CM

## 2019-11-11 DIAGNOSIS — N18.30 CKD (CHRONIC KIDNEY DISEASE) STAGE 3, GFR 30-59 ML/MIN (H): ICD-10-CM

## 2019-11-11 DIAGNOSIS — N40.0 BENIGN PROSTATIC HYPERPLASIA WITHOUT LOWER URINARY TRACT SYMPTOMS: ICD-10-CM

## 2019-11-11 DIAGNOSIS — I48.0 PAROXYSMAL ATRIAL FIBRILLATION (H): ICD-10-CM

## 2019-11-11 DIAGNOSIS — N18.30 TYPE 2 DIABETES MELLITUS WITH STAGE 3 CHRONIC KIDNEY DISEASE, WITH LONG-TERM CURRENT USE OF INSULIN (H): ICD-10-CM

## 2019-11-11 DIAGNOSIS — Z71.85 VACCINE COUNSELING: ICD-10-CM

## 2019-11-11 DIAGNOSIS — I69.391 DYSPHAGIA DUE TO RECENT CEREBROVASCULAR ACCIDENT (CVA): ICD-10-CM

## 2019-11-11 DIAGNOSIS — Z00.00 VISIT FOR ANNUAL HEALTH EXAMINATION: Primary | ICD-10-CM

## 2019-11-11 DIAGNOSIS — I12.9 BENIGN HYPERTENSION WITH CKD (CHRONIC KIDNEY DISEASE) STAGE III (H): ICD-10-CM

## 2019-11-11 DIAGNOSIS — I69.354 HEMIPARESIS AFFECTING LEFT SIDE AS LATE EFFECT OF CEREBROVASCULAR ACCIDENT (H): ICD-10-CM

## 2019-11-11 DIAGNOSIS — Z86.73 H/O: CVA (CEREBROVASCULAR ACCIDENT): ICD-10-CM

## 2019-11-11 DIAGNOSIS — K59.01 SLOW TRANSIT CONSTIPATION: ICD-10-CM

## 2019-11-11 DIAGNOSIS — N18.30 BENIGN HYPERTENSION WITH CKD (CHRONIC KIDNEY DISEASE) STAGE III (H): ICD-10-CM

## 2019-11-11 DIAGNOSIS — E03.4 HYPOTHYROIDISM DUE TO ACQUIRED ATROPHY OF THYROID: ICD-10-CM

## 2019-11-11 DIAGNOSIS — Z85.46 H/O PROSTATE CANCER: ICD-10-CM

## 2019-11-11 DIAGNOSIS — E11.22 TYPE 2 DIABETES MELLITUS WITH STAGE 3 CHRONIC KIDNEY DISEASE, WITH LONG-TERM CURRENT USE OF INSULIN (H): ICD-10-CM

## 2019-11-11 DIAGNOSIS — E78.5 HYPERLIPIDEMIA LDL GOAL <100: ICD-10-CM

## 2019-11-11 DIAGNOSIS — Z79.4 TYPE 2 DIABETES MELLITUS WITH STAGE 3 CHRONIC KIDNEY DISEASE, WITH LONG-TERM CURRENT USE OF INSULIN (H): ICD-10-CM

## 2019-11-11 DIAGNOSIS — Z71.89 ADVANCED DIRECTIVES, COUNSELING/DISCUSSION: ICD-10-CM

## 2019-11-11 PROCEDURE — 99318 ZZC ANNUAL NURSING FAC ASSESSMNT, STABLE: CPT | Mod: GV | Performed by: NURSE PRACTITIONER

## 2019-11-11 ASSESSMENT — MIFFLIN-ST. JEOR: SCORE: 1431.49

## 2019-11-11 NOTE — PROGRESS NOTES
[facility]  Chief Complaint   Patient presents with     Annual Comprehensive Nursing Home     [facility] Medical Record Number:  [id number]  Place of Service where encounter took place:  [facility] ([facility]) [[id number]]    HPI:    Elias Mckee  is a 88 year old  (8/10/1931), who is being seen today for an annual comprehensive visit. HPI information obtained from: facility chart records, facility staff, patient report, [facility] chart review and family/first contact daughter Jazmin report.      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 Wheelchair bound - working on steps/standing in therapies at this time for strengthening. No changes in swallowing/diet recently. Continues xarelto.     Slow transit constipation  Denies bowel problems - regular stools and no n/v reported.     Hyperlipidemia LDL goal <100  On statins. Well controlled, no side effects.   Lab Results   Component Value Date    LDL 46 04/11/2019    LDL 56 04/19/2018       Type 2 diabetes mellitus with stage 3 chronic kidney disease, with long-term current use of insulin (H)  Note BG well managed and range is  in AM, 188-236 at noon and 105-138 in PM. Continues long acting insulin 22 units daily.   Lab Results   Component Value Date    A1C 6.6 10/31/2019        Hypothyroidism due to acquired atrophy of thyroid  Appears euthyroid on current synthroid dose.   Lab Results   Component Value Date    TSH 3.35 05/09/2019    TSH 2.20 07/02/2018       Chronic ischemic heart disease  Benign hypertension with CKD (chronic kidney disease) stage III  (H)  Paroxysmal atrial fibrillation (H)  PVD (peripheral vascular disease) (H)  CKD (chronic kidney disease) stage 3, GFR 30-59 ml/min, est GFR: 10/30/17: 46,, 12/14/17: 59  Denies headaches, dizziness, chest pain or palpitations. No bleeding or bruising. BP range 118-149 systolic but typically under 140 systolic. Based on JNC-8 goals,  patients age of 88 year old, presence of diabetes or CKD, and goals of care goal BP is  <140/90 mm Hg. Patient is stable with current plan of care and routine assessment.    Benign prostatic hyperplasia without lower urinary tract symptoms  H/O prostate cancer, brachytherapy seeds.   Denies difficulty urinating. No dysuria.     Vaccine counseling  Reviewed vaccines - due for Tdap booster and gives consent (family also in agreement).     Advanced directives, counseling/discussion  Reviewed POLST - Full Code, no changes.     ALLERGIES: No known allergies  PAST MEDICAL HISTORY:  has a past medical history of Acute, but ill-defined, cerebrovascular disease (1-2008), Atrial fibrillation (H), Benign hypertension with CKD (chronic kidney disease) stage III (H) (6/5/2017), Cancer (H), Cardiac pacemaker in situ, Placed on 7/20/18 (7/23/2018), Central retinal vein occlusion, right eye (11/15/2017), Chronic atrial fibrillation (6/5/2017), Chronic ischemic heart disease, unspecified, CKD (chronic kidney disease) stage 3, GFR 30-59 ml/min, est GFR: 10/30/17: 46,, 12/14/17: 59 (2/29/2012), Cognitive deficits as late effect of cerebrovascular disease (6/5/2017), Coronary artery disease, CVA (cerebral infarction), Dysphagia due to recent cerebrovascular accident (CVA) (6/5/2017), Elevated cholesterol, Essential hypertension, benign, H/O prostate cancer (7/6/2017), H/O: CVA (cerebrovascular accident) (6/5/2017), Hemiparesis affecting left side as late effect of cerebrovascular accident (H) (6/5/2017), Hyperlipidaemia, Loss of weight (10/2/2018), MEDICAL HISTORY OF - (1- 2008), Mobitz type I  Wenckebach atrioventricular block, 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  PAST SURGICAL HISTORY:  has a past surgical history that includes seed implantation (2002); Phacoemulsification clear cornea with standard intraocular lens implant (Right, 10/7/2014); and Vitrectomy anterior (Right, 10/7/2014).  IMMUNIZATIONS:  Immunization History   Administered Date(s) Administered     Influenza (High Dose) 3 valent vaccine 10/05/2017     Influenza (IIV3) PF 01/22/2013, 10/15/2018, 10/16/2019     Pneumo Conj 13-V (2010&after) 10/19/2017     Pneumococcal 23 valent 09/10/2008     TD (ADULT, 7+) 09/10/2008     Above immunizations pulled from [facility]. [facility] and facility records also reconciled. Outstanding information sent to  to update [facility] .  Future immunizations needed:  TDAP    Current Outpatient Medications   Medication Sig Dispense Refill     ACETAMINOPHEN PO Take 1,000 mg by mouth 3 times daily For pain       Atorvastatin Calcium (LIPITOR PO) Take 40 mg by mouth At Bedtime       atropine 1 % ophthalmic solution Place 1 drop into the right eye 2 times daily       cholecalciferol (VITAMIN D) 1000 UNIT tablet Take 2,000 Units by mouth daily       Fluticasone Propionate (FLONASE NA) Spray 2 sprays into both nostrils daily       Insulin Glargine (BASAGLAR KWIKPEN SC) Inject 22 Units Subcutaneous At Bedtime May use 2 units as needed to prime pen before each dose.       latanoprost (XALATAN) 0.005 % ophthalmic  solution Place 1 drop into the right eye At Bedtime       LEVOTHYROXINE SODIUM PO Take 25 mcg by mouth daily       polyethylene glycol (MIRALAX/GLYCOLAX) powder Take 17 g by mouth daily        prednisoLONE acetate (PRED FORTE) 1 % ophthalmic susp Place 1 drop into the right eye 4 times daily        rivaroxaban ANTICOAGULANT (XARELTO) 20 MG TABS tablet Take 1 tablet (20 mg) by mouth daily (with dinner) 30 tablet 1     tamsulosin (FLOMAX) 0.4 MG capsule Take 1 capsule (0.4 mg) by mouth daily 60 capsule 0     timolol, PF, (TIMOPTIC OCUDOSE) 0.5 % ophthalmic solution Place 1 drop into the right eye 2 times daily       traMADol (ULTRAM) 50 MG tablet 50 mg tid and q4h prn pain 180 tablet 0       Case Management:  I have reviewed the facility/SNF care plan/MDS, including the falls risk, nutrition and pain screening. I also reviewed the current immunizations, and preventive care. .Future cancer screening is not clinically indicated secondary to age/goals of care Patient's desire to return to the community is not present. Current Level of Care is appropriate.    Advance Directive Discussion:    I reviewed the current advanced directives as reflected in EPIC, the POLST and the facility chart, and verified the congruency of orders. I contacted the first party patient Elias and also his daughter Jazmin (by phone) and discussed the plan of Care.  I did review the advance directives with the resident.     Team Discussion:  I communicated with the appropriate disciplines involved with the Plan of Care:   Nursing    Patient's goal is pain control and comfort and appropriate treatment of medical conditions  Information reviewed:  Medications, vital signs, orders, and nursing notes.    ROS:  10 point ROS of systems including Constitutional, Eyes, Respiratory, Cardiovascular, Gastroenterology, Genitourinary, Integumentary, Musculoskeletal, Psychiatric were all negative except for pertinent positives noted in my HPI.    Vitals:  BP  "129/81   Pulse 89   Ht 1.753 m (5' 9\")   Wt 77.1 kg (170 lb)   BMI 25.10 kg/m   Body mass index is 25.1 kg/m .  Exam:  GENERAL APPEARANCE:  Alert, in no distress, pleasant, cooperative, oriented x self, month, place  EYES:  EOM, lids, pupils and irises normal, sclera clear and conjunctiva normal, no discharge or mattering on lids or lashes noted  ENT:  Mouth normal, moist mucous membranes, nose normal without drainage or crusting, external ears without lesions, hearing acuity impaired  NECK: supple, symmetrical, trachea midline  RESP:  respiratory effort normal, no chest wall tenderness, no respiratory distress, Lung sounds clear, patient is on room air  CV:  Auscultation of heart done, rate and rhythm controlled and sounds regular but distant, no murmur, no rub or gallop. Edema none bilateral lower extremities.   ABDOMEN:  normal bowel sounds, soft, nontender, no palpable masses.  M/S:   Gait and station wheelchair bound, no tenderness or swelling of the joints; able to move all extremities generalized weakness right side and hemiparesis left side, digits normal but onychomycosis of nails  SKIN:  Inspection and palpation of skin and subcutaneous tissue: skin on extremities warm, dry and intact without rashes  NEURO: cranial nerves 2-12 grossly intact, no facial asymmetry, no speech deficits able to follow directions, left side weakness and no sensation left hand   PSYCH:  insight and judgement and memory impaired but at baseline, affect and mood normal     Lab/Diagnostic data:   Most Recent 3 CBC's:  Recent Labs   Lab Test 10/31/19 07/25/19 04/11/19  11/19/18 11/01/18  08/15/18   WBC  --   --   --   --  6.5 7.2  --  5.3   HGB 11.9* 12.1* 11.6*   < > 12.1* 12.0*   < > 11.7*   MCV  --   --   --   --  92.3 92.9  --  95.0   PLT  --   --   --   --  173 161  --  146    < > = values in this interval not displayed.     Most Recent 3 BMP's:  Recent Labs   Lab Test 10/31/19 07/25/19 04/11/19    140 138   POTASSIUM " 4.2 4.1 4.4   CHLORIDE 106 104 103   CO2 26 24 27   BUN 19 24 26   CR 1.06 1.13 1.08   ANIONGAP 8 12 8   TRENT 9.4 9.5 9.4   GLC 87 54* 130*     Most Recent 2 LFT's:  Recent Labs   Lab Test 04/11/19 07/01/18  0940   AST 13 10   ALT 10 14   ALKPHOS 68 68   BILITOTAL 0.3 0.4     Most Recent TSH and T4:  Recent Labs   Lab Test 05/09/19   TSH 3.35     Most Recent 6 glucoses:  Recent Labs   Lab Test 10/31/19 07/25/19 04/11/19 02/04/19 01/24/19 01/10/19   GLC 87 54* 130* 143* 85 182*       ASSESSMENT/PLAN  Visit for annual health examination  Completed today 11/11/19    Hemiparesis affecting left side as late effect of cerebrovascular accident (H)  Cognitive deficits as late effect of cerebrovascular disease  Dysphagia due to recent cerebrovascular accident (CVA)  H/O: CVA (cerebrovascular accident), 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 Therapies as needed for strengthening. Continue xarelto 20 mg daily.     Slow transit constipation  By history, no current complaints. Monitor.     Hyperlipidemia LDL goal <100  Chronic, well managed and no side effects. Continue yearly lipid panel checks and Lipitor 40 mg daily.     Type 2 diabetes mellitus with stage 3 chronic kidney disease, with long-term current use of insulin (H)  Chronic, well managed with current insulin order. F/U next routine visit and PRN. BG checks as ordered.     Hypothyroidism due to acquired atrophy of thyroid  Chronic, well managed. Yearly TSH checks and continue synthroid 25 mcg daily    Chronic ischemic heart disease  Benign hypertension with CKD (chronic kidney disease) stage III (H)  Paroxysmal atrial fibrillation (H)  PVD (peripheral vascular disease) (H)  CKD (chronic kidney disease) stage 3, GFR 30-59 ml/min, est GFR: 10/30/17: 46,, 12/14/17: 59  Chronic, stable. Meds, vs, wt as ordered. Cards f/u as needed. Based on JNC-8 goals,  patients age of 88 year old, presence of diabetes or CKD, and goals of care goal BP is  <140/90 mm Hg.  Patient is stable with current plan of care and routine assessment..    Benign prostatic hyperplasia without lower urinary tract symptoms  H/O prostate cancer, brachytherapy seeds.   Chronic with hx cancer; takes Flomax daily effective. F/U urology as needed.    Vaccine counseling  Due for Tdap booster and gives consent.     Advanced directives, counseling/discussion  Reviewed POLST with patient and daughter Jazmin - Full Code, unchanged.     Orders written by provider at facility:  1. Tdap vaccine - update provider with date of administration.     Electronically signed by:  TOBY Jarquin CNP

## 2019-11-11 NOTE — LETTER
11/11/2019        RE: Elias Rhodes  [address]  [facility] [address]        [facility]  Chief Complaint   Patient presents with     Annual Comprehensive Nursing Home     [place] Medical Record Number:  [id number]  Place of Service where encounter took place:  [facility] [[id number]]    HPI:    Elias Mckee  is a 88 year old  (8/10/1931), who is being seen today for an annual comprehensive visit. HPI information obtained from: facility chart records, facility staff, patient report, [facility] chart review and family/first contact daughter Jazmin report.      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 Wheelchair bound - working on steps/standing in therapies at this time for strengthening. No changes in swallowing/diet recently. Continues xarelto.     Slow transit constipation  Denies bowel problems - regular stools and no n/v reported.     Hyperlipidemia LDL goal <100  On statins. Well controlled, no side effects.   Lab Results   Component Value Date    LDL 46 04/11/2019    LDL 56 04/19/2018       Type 2 diabetes mellitus with stage 3 chronic kidney disease, with long-term current use of insulin (H)  Note BG well managed and range is  in AM, 188-236 at noon and 105-138 in PM. Continues long acting insulin 22 units daily.   Lab Results   Component Value Date    A1C 6.6 10/31/2019        Hypothyroidism due to acquired atrophy of thyroid  Appears euthyroid on current synthroid dose.   Lab Results   Component Value Date    TSH 3.35 05/09/2019    TSH 2.20  07/02/2018       Chronic ischemic heart disease  Benign hypertension with CKD (chronic kidney disease) stage III (H)  Paroxysmal atrial fibrillation (H)  PVD (peripheral vascular disease) (H)  CKD (chronic kidney disease) stage 3, GFR 30-59 ml/min, est GFR: 10/30/17: 46,, 12/14/17: 59  Denies headaches, dizziness, chest pain or palpitations. No bleeding or bruising. BP range 118-149 systolic but typically under 140 systolic. Based on JNC-8 goals,  patients age of 88 year old, presence of diabetes or CKD, and goals of care goal BP is  <140/90 mm Hg. Patient is stable with current plan of care and routine assessment.    Benign prostatic hyperplasia without lower urinary tract symptoms  H/O prostate cancer, brachytherapy seeds.   Denies difficulty urinating. No dysuria.     Vaccine counseling  Reviewed vaccines - due for Tdap booster and gives consent (family also in agreement).     Advanced directives, counseling/discussion  Reviewed POLST - Full Code, no changes.     ALLERGIES: No known allergies  PAST MEDICAL HISTORY:  has a past medical history of Acute, but ill-defined, cerebrovascular disease (1-2008), Atrial fibrillation (H), Benign hypertension with CKD (chronic kidney disease) stage III (H) (6/5/2017), Cancer (H), Cardiac pacemaker in situ, Placed on 7/20/18 (7/23/2018), Central retinal vein occlusion, right eye (11/15/2017), Chronic atrial fibrillation (6/5/2017), Chronic ischemic heart disease, unspecified, CKD (chronic kidney disease) stage 3, GFR 30-59 ml/min, est GFR: 10/30/17: 46,, 12/14/17: 59 (2/29/2012), Cognitive deficits as late effect of cerebrovascular disease (6/5/2017), Coronary artery disease, CVA (cerebral infarction), Dysphagia due to recent cerebrovascular accident (CVA) (6/5/2017), Elevated cholesterol, Essential hypertension, benign, H/O prostate cancer (7/6/2017), H/O: CVA (cerebrovascular accident) (6/5/2017), Hemiparesis affecting left side as late effect of cerebrovascular accident (H)  (6/5/2017), Hyperlipidaemia, Loss of weight (10/2/2018), MEDICAL HISTORY OF - (1- 2008), Mobitz type I Wenckebach atrioventricular block, 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  PAST SURGICAL HISTORY:  has a past surgical history that includes seed implantation (2002); Phacoemulsification clear cornea with standard intraocular lens implant (Right, 10/7/2014); and Vitrectomy anterior (Right, 10/7/2014).  IMMUNIZATIONS:  Immunization History   Administered Date(s) Administered     Influenza (High Dose) 3 valent vaccine 10/05/2017     Influenza (IIV3) PF 01/22/2013, 10/15/2018, 10/16/2019     Pneumo Conj 13-V (2010&after) 10/19/2017     Pneumococcal 23 valent 09/10/2008     TD (ADULT, 7+) 09/10/2008     Above immunizations pulled from [facility]. [facility] and facility records also reconciled. Outstanding information sent to  to update [facility] .  Future immunizations needed:  TDAP    Current Outpatient Medications   Medication Sig Dispense Refill     ACETAMINOPHEN PO Take 1,000 mg by mouth 3 times daily For pain       Atorvastatin Calcium (LIPITOR PO) Take 40 mg by mouth At Bedtime       atropine 1 % ophthalmic solution Place 1 drop into the right eye 2 times daily       cholecalciferol (VITAMIN D) 1000 UNIT tablet Take 2,000 Units by mouth daily       Fluticasone Propionate (FLONASE NA) Spray 2 sprays into both nostrils daily       Insulin Glargine (BASAGLAR KWIKPEN SC) Inject 22 Units Subcutaneous At Bedtime May  use 2 units as needed to prime pen before each dose.       latanoprost (XALATAN) 0.005 % ophthalmic solution Place 1 drop into the right eye At Bedtime       LEVOTHYROXINE SODIUM PO Take 25 mcg by mouth daily       polyethylene glycol (MIRALAX/GLYCOLAX) powder Take 17 g by mouth daily        prednisoLONE acetate (PRED FORTE) 1 % ophthalmic susp Place 1 drop into the right eye 4 times daily        rivaroxaban ANTICOAGULANT (XARELTO) 20 MG TABS tablet Take 1 tablet (20 mg) by mouth daily (with dinner) 30 tablet 1     tamsulosin (FLOMAX) 0.4 MG capsule Take 1 capsule (0.4 mg) by mouth daily 60 capsule 0     timolol, PF, (TIMOPTIC OCUDOSE) 0.5 % ophthalmic solution Place 1 drop into the right eye 2 times daily       traMADol (ULTRAM) 50 MG tablet 50 mg tid and q4h prn pain 180 tablet 0       Case Management:  I have reviewed the facility/SNF care plan/MDS, including the falls risk, nutrition and pain screening. I also reviewed the current immunizations, and preventive care. .Future cancer screening is not clinically indicated secondary to age/goals of care Patient's desire to return to the community is not present. Current Level of Care is appropriate.    Advance Directive Discussion:    I reviewed the current advanced directives as reflected in EPIC, the POLST and the facility chart, and verified the congruency of orders. I contacted the first party patient Elias and also his daughter Jazmin (by phone) and discussed the plan of Care.  I did review the advance directives with the resident.     Team Discussion:  I communicated with the appropriate disciplines involved with the Plan of Care:   Nursing    Patient's goal is pain control and comfort and appropriate treatment of medical conditions  Information reviewed:  Medications, vital signs, orders, and nursing notes.    ROS:  10 point ROS of systems including Constitutional, Eyes, Respiratory, Cardiovascular, Gastroenterology, Genitourinary, Integumentary,  "Musculoskeletal, Psychiatric were all negative except for pertinent positives noted in my HPI.    Vitals:  /81   Pulse 89   Ht 1.753 m (5' 9\")   Wt 77.1 kg (170 lb)   BMI 25.10 kg/m    Body mass index is 25.1 kg/m .  Exam:  GENERAL APPEARANCE:  Alert, in no distress, pleasant, cooperative, oriented x self, month, place  EYES:  EOM, lids, pupils and irises normal, sclera clear and conjunctiva normal, no discharge or mattering on lids or lashes noted  ENT:  Mouth normal, moist mucous membranes, nose normal without drainage or crusting, external ears without lesions, hearing acuity impaired  NECK: supple, symmetrical, trachea midline  RESP:  respiratory effort normal, no chest wall tenderness, no respiratory distress, Lung sounds clear, patient is on room air  CV:  Auscultation of heart done, rate and rhythm controlled and sounds regular but distant, no murmur, no rub or gallop. Edema none bilateral lower extremities.   ABDOMEN:  normal bowel sounds, soft, nontender, no palpable masses.  M/S:   Gait and station wheelchair bound, no tenderness or swelling of the joints; able to move all extremities generalized weakness right side and hemiparesis left side, digits normal but onychomycosis of nails  SKIN:  Inspection and palpation of skin and subcutaneous tissue: skin on extremities warm, dry and intact without rashes  NEURO: cranial nerves 2-12 grossly intact, no facial asymmetry, no speech deficits able to follow directions, left side weakness and no sensation left hand   PSYCH:  insight and judgement and memory impaired but at baseline, affect and mood normal     Lab/Diagnostic data:   Most Recent 3 CBC's:  Recent Labs   Lab Test 10/31/19 07/25/19 04/11/19  11/19/18 11/01/18  08/15/18   WBC  --   --   --   --  6.5 7.2  --  5.3   HGB 11.9* 12.1* 11.6*   < > 12.1* 12.0*   < > 11.7*   MCV  --   --   --   --  92.3 92.9  --  95.0   PLT  --   --   --   --  173 161  --  146    < > = values in this interval not " displayed.     Most Recent 3 BMP's:  Recent Labs   Lab Test 10/31/19 07/25/19 04/11/19    140 138   POTASSIUM 4.2 4.1 4.4   CHLORIDE 106 104 103   CO2 26 24 27   BUN 19 24 26   CR 1.06 1.13 1.08   ANIONGAP 8 12 8   TRENT 9.4 9.5 9.4   GLC 87 54* 130*     Most Recent 2 LFT's:  Recent Labs   Lab Test 04/11/19 07/01/18  0940   AST 13 10   ALT 10 14   ALKPHOS 68 68   BILITOTAL 0.3 0.4     Most Recent TSH and T4:  Recent Labs   Lab Test 05/09/19   TSH 3.35     Most Recent 6 glucoses:  Recent Labs   Lab Test 10/31/19 07/25/19 04/11/19 02/04/19 01/24/19 01/10/19   GLC 87 54* 130* 143* 85 182*       ASSESSMENT/PLAN  Visit for annual health examination  Completed today 11/11/19    Hemiparesis affecting left side as late effect of cerebrovascular accident (H)  Cognitive deficits as late effect of cerebrovascular disease  Dysphagia due to recent cerebrovascular accident (CVA)  H/O: CVA (cerebrovascular accident), 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 Therapies as needed for strengthening. Continue xarelto 20 mg daily.     Slow transit constipation  By history, no current complaints. Monitor.     Hyperlipidemia LDL goal <100  Chronic, well managed and no side effects. Continue yearly lipid panel checks and Lipitor 40 mg daily.     Type 2 diabetes mellitus with stage 3 chronic kidney disease, with long-term current use of insulin (H)  Chronic, well managed with current insulin order. F/U next routine visit and PRN. BG checks as ordered.     Hypothyroidism due to acquired atrophy of thyroid  Chronic, well managed. Yearly TSH checks and continue synthroid 25 mcg daily    Chronic ischemic heart disease  Benign hypertension with CKD (chronic kidney disease) stage III (H)  Paroxysmal atrial fibrillation (H)  PVD (peripheral vascular disease) (H)  CKD (chronic kidney disease) stage 3, GFR 30-59 ml/min, est GFR: 10/30/17: 46,, 12/14/17: 59  Chronic, stable. Meds, vs, wt as ordered. Cards f/u as needed. Based on  JNC-8 goals,  patients age of 88 year old, presence of diabetes or CKD, and goals of care goal BP is  <140/90 mm Hg. Patient is stable with current plan of care and routine assessment..    Benign prostatic hyperplasia without lower urinary tract symptoms  H/O prostate cancer, brachytherapy seeds.   Chronic with hx cancer; takes Flomax daily effective. F/U urology as needed.    Vaccine counseling  Due for Tdap booster and gives consent.     Advanced directives, counseling/discussion  Reviewed POLST with patient and daughter Jazmin - Full Code, unchanged.     Orders written by provider at facility:  1. Tdap vaccine - update provider with date of administration.     Electronically signed by:  TOBY Jarquin CNP             Sincerely,        TOBY Jarquin CNP

## 2019-11-20 DIAGNOSIS — M54.2 NECK PAIN: ICD-10-CM

## 2019-11-20 RX ORDER — TRAMADOL HYDROCHLORIDE 50 MG/1
TABLET ORAL
Qty: 60 TABLET | Refills: 0 | Status: SHIPPED | OUTPATIENT
Start: 2019-11-20 | End: 2019-12-06

## 2019-12-06 ENCOUNTER — TELEPHONE (OUTPATIENT)
Dept: GERIATRICS | Facility: CLINIC | Age: 84
End: 2019-12-06

## 2019-12-06 DIAGNOSIS — M54.2 NECK PAIN: ICD-10-CM

## 2019-12-06 RX ORDER — TRAMADOL HYDROCHLORIDE 50 MG/1
TABLET ORAL
Qty: 180 TABLET | Refills: 0 | Status: SHIPPED | OUTPATIENT
Start: 2019-12-06 | End: 2019-12-09

## 2019-12-06 RX ORDER — TRAMADOL HYDROCHLORIDE 50 MG/1
TABLET ORAL
Qty: 180 TABLET | Refills: 0 | Status: SHIPPED | OUTPATIENT
Start: 2019-12-06 | End: 2019-12-06

## 2019-12-09 ENCOUNTER — TELEPHONE (OUTPATIENT)
Dept: GERIATRICS | Facility: CLINIC | Age: 84
End: 2019-12-09

## 2019-12-09 DIAGNOSIS — M54.2 NECK PAIN: ICD-10-CM

## 2019-12-09 RX ORDER — TRAMADOL HYDROCHLORIDE 50 MG/1
TABLET ORAL
Qty: 180 TABLET | Refills: 0 | Status: SHIPPED | OUTPATIENT
Start: 2019-12-09 | End: 2019-12-09

## 2019-12-09 RX ORDER — TRAMADOL HYDROCHLORIDE 50 MG/1
TABLET ORAL
Qty: 180 TABLET | Refills: 0 | Status: SHIPPED | OUTPATIENT
Start: 2019-12-09 | End: 2019-12-10

## 2019-12-10 ENCOUNTER — TELEPHONE (OUTPATIENT)
Dept: GERIATRICS | Facility: CLINIC | Age: 84
End: 2019-12-10

## 2019-12-10 DIAGNOSIS — M54.2 NECK PAIN: ICD-10-CM

## 2019-12-10 RX ORDER — TRAMADOL HYDROCHLORIDE 100 MG/1
TABLET, EXTENDED RELEASE ORAL
Qty: 180 TABLET | Refills: 0 | Status: SHIPPED | OUTPATIENT
Start: 2019-12-10 | End: 2019-12-10

## 2019-12-10 RX ORDER — TRAMADOL HYDROCHLORIDE 100 MG/1
TABLET, EXTENDED RELEASE ORAL
Qty: 180 TABLET | Refills: 0 | Status: SHIPPED | OUTPATIENT
Start: 2019-12-10 | End: 2020-02-04

## 2019-12-10 NOTE — TELEPHONE ENCOUNTER
Insurance will only cover med if it is prescribed as half tab of 100mg, so new script eprescribed to [facility].

## 2020-01-09 ENCOUNTER — ANCILLARY PROCEDURE (OUTPATIENT)
Dept: CARDIOLOGY | Facility: CLINIC | Age: 85
End: 2020-01-09
Attending: INTERNAL MEDICINE
Payer: COMMERCIAL

## 2020-01-09 DIAGNOSIS — Z95.0 CARDIAC PACEMAKER IN SITU: ICD-10-CM

## 2020-01-09 PROCEDURE — 93294 REM INTERROG EVL PM/LDLS PM: CPT | Performed by: INTERNAL MEDICINE

## 2020-01-13 ASSESSMENT — MIFFLIN-ST. JEOR: SCORE: 1440.57

## 2020-01-13 NOTE — PROGRESS NOTES
[facility]  [place] Medical Record Number:  [id number]  Place of Service where encounter took place:  [facility] ([facility] [[id number]]  Chief Complaint   Patient presents with     RECHECK     HPI:    Elias Mckee  is a 88 year old (8/10/1931), who is being seen today for an episodic care visit.  HPI information obtained from: facility chart records, facility staff and [facility] chart review.  Pt is unreliable historian due to cognitive loss. Today's concern is:  Hemiparesis affecting left side as late effect of cerebrovascular accident (H)  No recent increase to left sided weakness. No changes in level of activity.    Type 2 diabetes mellitus with stage 3 chronic kidney disease, with long-term current use of insulin (H)  Recent change from Lantus to Basaglar due to insurance issues.  Accuchecks in past week; 0730: 89-14, 11am: 172-247, 1700: 130-210.  HgbA1C was 6.6% on 10/31/19.     PVD (peripheral vascular disease) (H)  No reports of pedal wounds. Last podiatry visit was 10/2019.    Paroxysmal atrial fibrillation (H)  No reports of rates >100/min.  Remains on Xarelto.    CKD (chronic kidney disease) stage 3, GFR 30-59 ml/min (H)  Labs on 10/31/19 showed a creatinine of 1.06 and an est GFR >60.    Cognitive deficits as late effect of cerebrovascular disease  No noted decrease in recent cognitive function.  After his CVA has issues with impulse control and understanding of complex decision making.  Also short term recall was impacted.    Past Medical and Surgical History reviewed in Epic today.    MEDICATIONS:  Current Outpatient Medications   Medication Sig Dispense Refill     ACETAMINOPHEN PO Take 1,000 mg by mouth 3 times daily For pain       Atorvastatin Calcium (LIPITOR PO) Take 40 mg by mouth At Bedtime       atropine 1 % ophthalmic solution Place 1 drop into the right eye 2 times daily       cholecalciferol (VITAMIN D) 1000 UNIT tablet Take 2,000 Units by mouth daily        Fluticasone Propionate (FLONASE NA) Spray 2 sprays into both nostrils daily       Insulin Glargine (BASAGLAR KWIKPEN SC) Inject 22 Units Subcutaneous At Bedtime May use 2 units as needed to prime pen before each dose.       latanoprost (XALATAN) 0.005 % ophthalmic solution Place 1 drop into the right eye At Bedtime       LEVOTHYROXINE SODIUM PO Take 25 mcg by mouth daily       polyethylene glycol (MIRALAX/GLYCOLAX) powder Take 17 g by mouth daily        prednisoLONE acetate (PRED FORTE) 1 % ophthalmic susp Place 1 drop into the right eye 4 times daily        rivaroxaban ANTICOAGULANT (XARELTO) 20 MG TABS tablet Take 1 tablet (20 mg) by mouth daily (with dinner) 30 tablet 1     tamsulosin (FLOMAX) 0.4 MG capsule Take 1 capsule (0.4 mg) by mouth daily 60 capsule 0     timolol, PF, (TIMOPTIC OCUDOSE) 0.5 % ophthalmic solution Place 1 drop into the right eye 2 times daily       traMADol (ULTRAM-ER) 100 MG 24 hr tablet 50 mg (1/2 tab) tid orally and and 50 mg every four hours as needed for pain . 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     Objective:  /67   Pulse 72   Temp 96.5  F (35.8  C)   Resp 18   Ht 1.753 m (5' 9\")   Wt 78 kg (172 lb)   BMI 25.40 kg/m    Exam:  GENERAL APPEARANCE: Calm and pleasant. Resting in bed before lunch. Alert and able to participate in conversation.   Head: Normocephalic with slight left sided mouth droop.  Eye:  No redness noted in sclera  No discharge. Right eye held closed during visit, which is baseline for pt.  Denies pain  ENIT:  No rhinitis. Moist oral cavity. No exudate.   CV:  Irregularly irregular rhythm.  No murmur.   No LE edema  RESP:  No cough.  Quiet, effortless respirations.  Clear to auscultation bilaterally.   ABDOMEN:  Soft rounded abdomen.  Bowel sounds positive all four quadrants.  No tenderness with palpation.  NEURO:   Strong hand grasp right hand and only minimal ability to move left  arm.  Wearing left hand splint.  Left sided facial droop.  Strong ability to raise right leg against resistance and moderate left leg strength.   Oriented to person and loosely to place..    MUSC: No pain with transfer by EZ stand.  SKIN: Thickened toenails .   PSYCH:  No delusional statements. " Pleasant..    Labs:   CBC RESULTS:   Recent Labs   Lab Test 10/31/19 07/25/19 11/19/18 11/01/18 07/20/18  1135 07/03/18  0835   WBC  --   --  6.5 7.2 7.8 6.0   RBC  --   --  3.90* 3.82* 3.79* 3.64*   HGB 11.9* 12.1* 12.1* 12.0* 11.9* 11.3*   HCT  --   --  36.0* 35.5* 34.3* 32.8*   MCV  --   --  92.3 92.9 91 90   MCH  --   --   --   --  31.4 31.0   MCHC  --   --   --   --  34.7 34.5   RDW  --   --  16.0* 15.9* 16.3* 16.0*   PLT  --   --  173 161 171 159       Last Basic Metabolic Panel:  Recent Labs   Lab Test 10/31/19 07/25/19    140   POTASSIUM 4.2 4.1   CHLORIDE 106 104   TRENT 9.4 9.5   CO2 26 24   BUN 19 24   CR 1.06 1.13   GLC 87 54*     GFR Estimate   Date Value Ref Range Status   10/31/2019 >60 >60 ml/min/1.73m2 Final   07/25/2019 58 (A) >60 ml/min/1.73m2 Final   04/11/2019 >60 >60 ml/min/1.73m2 Final     Liver Function Studies -   Recent Labs   Lab Test 04/11/19 07/01/18  0940   PROTTOTAL 7.0 6.7*   ALBUMIN 3.6 3.1*   BILITOTAL 0.3 0.4   ALKPHOS 68 68   AST 13 10   ALT 10 14     Lab Results   Component Value Date    CHOL 112 04/11/2019     Lab Results   Component Value Date    HDL 32 04/11/2019     Lab Results   Component Value Date    LDL 46 04/11/2019     Lab Results   Component Value Date    TRIG 169 04/11/2019     Lab Results   Component Value Date    CHOLHDLRATIO 5.2 05/29/2014       TSH   Date Value Ref Range Status   05/09/2019 3.35 0.30 - 4.50 uIU/mL Final   07/02/2018 2.20 0.40 - 4.00 mU/L Final   ]    Lab Results   Component Value Date    A1C 6.6 10/31/2019    A1C 6.4 07/25/2019     ASSESSMENT/PLAN:  (I69.919) Cognitive deficits as late effect of cerebrovascular disease  (primary encounter diagnosis)  Comment: Chronic  Plan: Continue 24 hour skilled nursing care.      (I69.354) Hemiparesis affecting left side as late effect of cerebrovascular accident (H)  Comment: Chronic, but no recent changes  Plan: Continue current POC.  Continue anticoagulation for atrial fibrillation as well as lipids  with annual lipid and LFT's.  Due in April    (E11.22,  N18.3,  Z79.4) Type 2 diabetes mellitus with stage 3 chronic kidney disease, with long-term current use of insulin (H)  Comment: Chronic, HgbA1C goal <8%, at goal  Plan: Continue current dose of basaglar as well as current schedule of accuchecks.  HgbA1C at next lab day.    (I73.9) PVD (peripheral vascular disease) (H)  Comment: Chronic  Plan: Monitor and continue with podiatry care.    (I48.0) Paroxysmal atrial fibrillation (H)  Comment: Chronic, rate controlled  Plan: Continue current POC including anticoagulation    (N18.3) CKD (chronic kidney disease) stage 3, GFR 30-59 ml/min (H)  Comment: Chronic, but stable  Plan: Renally adjust dose of medications.  BMP next lab day and then q3m or with acute change in condition.    Electronically signed by:  TOBY Momin CNP

## 2020-01-14 ENCOUNTER — NURSING HOME VISIT (OUTPATIENT)
Dept: GERIATRICS | Facility: CLINIC | Age: 85
End: 2020-01-14
Payer: COMMERCIAL

## 2020-01-14 VITALS
BODY MASS INDEX: 25.48 KG/M2 | RESPIRATION RATE: 18 BRPM | SYSTOLIC BLOOD PRESSURE: 123 MMHG | WEIGHT: 172 LBS | HEART RATE: 72 BPM | TEMPERATURE: 96.5 F | DIASTOLIC BLOOD PRESSURE: 67 MMHG | HEIGHT: 69 IN

## 2020-01-14 DIAGNOSIS — I73.9 PVD (PERIPHERAL VASCULAR DISEASE) (H): ICD-10-CM

## 2020-01-14 DIAGNOSIS — E11.22 TYPE 2 DIABETES MELLITUS WITH STAGE 3 CHRONIC KIDNEY DISEASE, WITH LONG-TERM CURRENT USE OF INSULIN (H): ICD-10-CM

## 2020-01-14 DIAGNOSIS — Z79.4 TYPE 2 DIABETES MELLITUS WITH STAGE 3 CHRONIC KIDNEY DISEASE, WITH LONG-TERM CURRENT USE OF INSULIN (H): ICD-10-CM

## 2020-01-14 DIAGNOSIS — I69.919 COGNITIVE DEFICITS AS LATE EFFECT OF CEREBROVASCULAR DISEASE: Primary | ICD-10-CM

## 2020-01-14 DIAGNOSIS — N18.30 TYPE 2 DIABETES MELLITUS WITH STAGE 3 CHRONIC KIDNEY DISEASE, WITH LONG-TERM CURRENT USE OF INSULIN (H): ICD-10-CM

## 2020-01-14 DIAGNOSIS — N18.30 CKD (CHRONIC KIDNEY DISEASE) STAGE 3, GFR 30-59 ML/MIN (H): ICD-10-CM

## 2020-01-14 DIAGNOSIS — I69.354 HEMIPARESIS AFFECTING LEFT SIDE AS LATE EFFECT OF CEREBROVASCULAR ACCIDENT (H): ICD-10-CM

## 2020-01-14 DIAGNOSIS — I48.0 PAROXYSMAL ATRIAL FIBRILLATION (H): ICD-10-CM

## 2020-01-14 PROCEDURE — 99309 SBSQ NF CARE MODERATE MDM 30: CPT | Mod: GV | Performed by: NURSE PRACTITIONER

## 2020-01-14 NOTE — LETTER
1/14/2020        RE: Elias Rhodes  [address]  [facility] [address]        [place] [facility]  [place] [facility] Record Number:  [id number]  Place of Service where encounter took place:  [facility] [[id number]]  Chief Complaint   Patient presents with     RECHECK     HPI:    Elias Mckee  is a 88 year old (8/10/1931), who is being seen today for an episodic care visit.  HPI information obtained from: facility chart records, facility staff and [facility] chart review.  Pt is unreliable historian due to cognitive loss. Today's concern is:  Hemiparesis affecting left side as late effect of cerebrovascular accident (H)  No recent increase to left sided weakness. No changes in level of activity.    Type 2 diabetes mellitus with stage 3 chronic kidney disease, with long-term current use of insulin (H)  Recent change from Lantus to Basaglar due to insurance issues.  Accuchecks in past week; 0730: 89-14, 11am: 172-247, 1700: 130-210.  HgbA1C was 6.6% on 10/31/19.     PVD (peripheral vascular disease) (H)  No reports of pedal wounds. Last podiatry visit was 10/2019.    Paroxysmal atrial fibrillation (H)  No reports of rates >100/min.  Remains on Xarelto.    CKD (chronic kidney disease) stage 3, GFR 30-59 ml/min (H)  Labs on 10/31/19 showed a creatinine of 1.06 and an est GFR >60.    Cognitive deficits as late effect of cerebrovascular disease  No noted decrease in recent cognitive function.  After his CVA has issues with impulse control and understanding of complex decision making.  Also short term recall was impacted.    Past Medical and Surgical History reviewed in Epic today.    MEDICATIONS:  Current Outpatient Medications   Medication Sig Dispense Refill     ACETAMINOPHEN PO Take 1,000 mg by mouth 3 times daily For pain       Atorvastatin Calcium (LIPITOR PO) Take 40 mg by mouth At Bedtime       atropine 1 % ophthalmic solution Place 1 drop  into the right eye 2 times daily       cholecalciferol (VITAMIN D) 1000 UNIT tablet Take 2,000 Units by mouth daily       Fluticasone Propionate (FLONASE NA) Spray 2 sprays into both nostrils daily       Insulin Glargine (BASAGLAR KWIKPEN SC) Inject 22 Units Subcutaneous At Bedtime May use 2 units as needed to prime pen before each dose.       latanoprost (XALATAN) 0.005 % ophthalmic solution Place 1 drop into the right eye At Bedtime       LEVOTHYROXINE SODIUM PO Take 25 mcg by mouth daily       polyethylene glycol (MIRALAX/GLYCOLAX) powder Take 17 g by mouth daily        prednisoLONE acetate (PRED FORTE) 1 % ophthalmic susp Place 1 drop into the right eye 4 times daily        rivaroxaban ANTICOAGULANT (XARELTO) 20 MG TABS tablet Take 1 tablet (20 mg) by mouth daily (with dinner) 30 tablet 1     tamsulosin (FLOMAX) 0.4 MG capsule Take 1 capsule (0.4 mg) by mouth daily 60 capsule 0     timolol, PF, (TIMOPTIC OCUDOSE) 0.5 % ophthalmic solution Place 1 drop into the right eye 2 times daily       traMADol (ULTRAM-ER) 100 MG 24 hr tablet 50 mg (1/2 tab) tid orally and and 50 mg every four hours as needed for pain . 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     Objective:  /67   Pulse 72   Temp 96.5  F (35.8  C)   Resp 18   Ht 1.753 m (5' 9\")   Wt 78 kg (172 lb)   BMI 25.40 kg/m     Exam:  GENERAL APPEARANCE: Calm and pleasant. Resting in bed before lunch. Alert and able to participate in conversation.   Head: Normocephalic with slight left sided mouth droop.  Eye:  No redness noted in sclera  No discharge. Right eye held closed during visit, which is baseline for pt.  Denies pain  ENIT:  No rhinitis. Moist oral cavity. No exudate.   CV:  Irregularly irregular rhythm.  No murmur.   No LE edema  RESP:  No cough.  Quiet, effortless respirations.  Clear to auscultation bilaterally.   ABDOMEN:  Soft rounded abdomen.  Bowel sounds positive all four quadrants.  No tenderness with palpation.  NEURO:   Strong hand grasp right hand and only minimal ability to move left  arm.  Wearing left hand splint.  Left sided facial droop.  Strong ability to raise right leg against resistance and moderate left leg strength.   Oriented to person and loosely to " place..    MUSC: No pain with transfer by EZ stand.  SKIN: Thickened toenails .   PSYCH:  No delusional statements. Pleasant..    Labs:   CBC RESULTS:   Recent Labs   Lab Test 10/31/19 07/25/19 11/19/18 11/01/18 07/20/18  1135 07/03/18  0835   WBC  --   --  6.5 7.2 7.8 6.0   RBC  --   --  3.90* 3.82* 3.79* 3.64*   HGB 11.9* 12.1* 12.1* 12.0* 11.9* 11.3*   HCT  --   --  36.0* 35.5* 34.3* 32.8*   MCV  --   --  92.3 92.9 91 90   MCH  --   --   --   --  31.4 31.0   MCHC  --   --   --   --  34.7 34.5   RDW  --   --  16.0* 15.9* 16.3* 16.0*   PLT  --   --  173 161 171 159       Last Basic Metabolic Panel:  Recent Labs   Lab Test 10/31/19 07/25/19    140   POTASSIUM 4.2 4.1   CHLORIDE 106 104   TRENT 9.4 9.5   CO2 26 24   BUN 19 24   CR 1.06 1.13   GLC 87 54*     GFR Estimate   Date Value Ref Range Status   10/31/2019 >60 >60 ml/min/1.73m2 Final   07/25/2019 58 (A) >60 ml/min/1.73m2 Final   04/11/2019 >60 >60 ml/min/1.73m2 Final     Liver Function Studies -   Recent Labs   Lab Test 04/11/19 07/01/18  0940   PROTTOTAL 7.0 6.7*   ALBUMIN 3.6 3.1*   BILITOTAL 0.3 0.4   ALKPHOS 68 68   AST 13 10   ALT 10 14     Lab Results   Component Value Date    CHOL 112 04/11/2019     Lab Results   Component Value Date    HDL 32 04/11/2019     Lab Results   Component Value Date    LDL 46 04/11/2019     Lab Results   Component Value Date    TRIG 169 04/11/2019     Lab Results   Component Value Date    CHOLHDLRATIO 5.2 05/29/2014       TSH   Date Value Ref Range Status   05/09/2019 3.35 0.30 - 4.50 uIU/mL Final   07/02/2018 2.20 0.40 - 4.00 mU/L Final   ]    Lab Results   Component Value Date    A1C 6.6 10/31/2019    A1C 6.4 07/25/2019     ASSESSMENT/PLAN:  (I69.919) Cognitive deficits as late effect of cerebrovascular disease  (primary encounter diagnosis)  Comment: Chronic  Plan: Continue 24 hour skilled nursing care.      (I69.218) Hemiparesis affecting left side as late effect of cerebrovascular accident (H)  Comment: Chronic, but no  recent changes  Plan: Continue current POC.  Continue anticoagulation for atrial fibrillation as well as lipids with annual lipid and LFT's.  Due in April    (E11.22,  N18.3,  Z79.4) Type 2 diabetes mellitus with stage 3 chronic kidney disease, with long-term current use of insulin (H)  Comment: Chronic, HgbA1C goal <8%, at goal  Plan: Continue current dose of basaglar as well as current schedule of accuchecks.  HgbA1C at next lab day.    (I73.9) PVD (peripheral vascular disease) (H)  Comment: Chronic  Plan: Monitor and continue with podiatry care.    (I48.0) Paroxysmal atrial fibrillation (H)  Comment: Chronic, rate controlled  Plan: Continue current POC including anticoagulation    (N18.3) CKD (chronic kidney disease) stage 3, GFR 30-59 ml/min (H)  Comment: Chronic, but stable  Plan: Renally adjust dose of medications.  BMP next lab day and then q3m or with acute change in condition.    Electronically signed by:  TOBY Momin CNP             Sincerely,        TOBY Momin CNP

## 2020-01-15 LAB
MDC_IDC_EPISODE_DTM: NORMAL
MDC_IDC_EPISODE_DURATION: 1 S
MDC_IDC_EPISODE_DURATION: 3 S
MDC_IDC_EPISODE_DURATION: NORMAL S
MDC_IDC_EPISODE_ID: NORMAL
MDC_IDC_EPISODE_TYPE: NORMAL
MDC_IDC_LEAD_IMPLANT_DT: NORMAL
MDC_IDC_LEAD_IMPLANT_DT: NORMAL
MDC_IDC_LEAD_LOCATION: NORMAL
MDC_IDC_LEAD_LOCATION: NORMAL
MDC_IDC_LEAD_LOCATION_DETAIL_1: NORMAL
MDC_IDC_LEAD_LOCATION_DETAIL_1: NORMAL
MDC_IDC_LEAD_MFG: NORMAL
MDC_IDC_LEAD_MFG: NORMAL
MDC_IDC_LEAD_MODEL: NORMAL
MDC_IDC_LEAD_MODEL: NORMAL
MDC_IDC_LEAD_POLARITY_TYPE: NORMAL
MDC_IDC_LEAD_POLARITY_TYPE: NORMAL
MDC_IDC_LEAD_SERIAL: NORMAL
MDC_IDC_LEAD_SERIAL: NORMAL
MDC_IDC_MSMT_BATTERY_DTM: NORMAL
MDC_IDC_MSMT_BATTERY_REMAINING_LONGEVITY: 102 MO
MDC_IDC_MSMT_BATTERY_REMAINING_PERCENTAGE: 100 %
MDC_IDC_MSMT_BATTERY_STATUS: NORMAL
MDC_IDC_MSMT_LEADCHNL_RA_IMPEDANCE_VALUE: 703 OHM
MDC_IDC_MSMT_LEADCHNL_RA_PACING_THRESHOLD_AMPLITUDE: 0.4 V
MDC_IDC_MSMT_LEADCHNL_RA_PACING_THRESHOLD_PULSEWIDTH: 0.4 MS
MDC_IDC_MSMT_LEADCHNL_RV_IMPEDANCE_VALUE: 702 OHM
MDC_IDC_MSMT_LEADCHNL_RV_PACING_THRESHOLD_AMPLITUDE: 0.8 V
MDC_IDC_MSMT_LEADCHNL_RV_PACING_THRESHOLD_PULSEWIDTH: 0.4 MS
MDC_IDC_PG_IMPLANT_DTM: NORMAL
MDC_IDC_PG_MFG: NORMAL
MDC_IDC_PG_MODEL: NORMAL
MDC_IDC_PG_SERIAL: NORMAL
MDC_IDC_PG_TYPE: NORMAL
MDC_IDC_SESS_CLINIC_NAME: NORMAL
MDC_IDC_SESS_DTM: NORMAL
MDC_IDC_SESS_TYPE: NORMAL
MDC_IDC_SET_BRADY_AT_MODE_SWITCH_MODE: NORMAL
MDC_IDC_SET_BRADY_AT_MODE_SWITCH_RATE: 170 {BEATS}/MIN
MDC_IDC_SET_BRADY_LOWRATE: 60 {BEATS}/MIN
MDC_IDC_SET_BRADY_MAX_TRACKING_RATE: 120 {BEATS}/MIN
MDC_IDC_SET_BRADY_MODE: NORMAL
MDC_IDC_SET_BRADY_PAV_DELAY_HIGH: 200 MS
MDC_IDC_SET_BRADY_PAV_DELAY_LOW: 300 MS
MDC_IDC_SET_BRADY_SAV_DELAY_HIGH: 200 MS
MDC_IDC_SET_BRADY_SAV_DELAY_LOW: 300 MS
MDC_IDC_SET_LEADCHNL_RA_PACING_AMPLITUDE: 2 V
MDC_IDC_SET_LEADCHNL_RA_PACING_CAPTURE_MODE: NORMAL
MDC_IDC_SET_LEADCHNL_RA_PACING_POLARITY: NORMAL
MDC_IDC_SET_LEADCHNL_RA_PACING_PULSEWIDTH: 0.4 MS
MDC_IDC_SET_LEADCHNL_RA_SENSING_ADAPTATION_MODE: NORMAL
MDC_IDC_SET_LEADCHNL_RA_SENSING_POLARITY: NORMAL
MDC_IDC_SET_LEADCHNL_RA_SENSING_SENSITIVITY: 0.25 MV
MDC_IDC_SET_LEADCHNL_RV_PACING_AMPLITUDE: 1.3 V
MDC_IDC_SET_LEADCHNL_RV_PACING_CAPTURE_MODE: NORMAL
MDC_IDC_SET_LEADCHNL_RV_PACING_POLARITY: NORMAL
MDC_IDC_SET_LEADCHNL_RV_PACING_PULSEWIDTH: 0.4 MS
MDC_IDC_SET_LEADCHNL_RV_SENSING_ADAPTATION_MODE: NORMAL
MDC_IDC_SET_LEADCHNL_RV_SENSING_POLARITY: NORMAL
MDC_IDC_SET_LEADCHNL_RV_SENSING_SENSITIVITY: 1.5 MV
MDC_IDC_SET_ZONE_DETECTION_INTERVAL: 375 MS
MDC_IDC_SET_ZONE_TYPE: NORMAL
MDC_IDC_SET_ZONE_VENDOR_TYPE: NORMAL
MDC_IDC_STAT_AT_BURDEN_PERCENT: 4 %
MDC_IDC_STAT_AT_DTM_END: NORMAL
MDC_IDC_STAT_AT_DTM_START: NORMAL
MDC_IDC_STAT_BRADY_DTM_END: NORMAL
MDC_IDC_STAT_BRADY_DTM_START: NORMAL
MDC_IDC_STAT_BRADY_RA_PERCENT_PACED: 1 %
MDC_IDC_STAT_BRADY_RV_PERCENT_PACED: 11 %
MDC_IDC_STAT_EPISODE_RECENT_COUNT: 0
MDC_IDC_STAT_EPISODE_RECENT_COUNT: 16
MDC_IDC_STAT_EPISODE_RECENT_COUNT_DTM_END: NORMAL
MDC_IDC_STAT_EPISODE_RECENT_COUNT_DTM_START: NORMAL
MDC_IDC_STAT_EPISODE_TYPE: NORMAL
MDC_IDC_STAT_EPISODE_VENDOR_TYPE: NORMAL

## 2020-01-16 ENCOUNTER — TRANSFERRED RECORDS (OUTPATIENT)
Dept: HEALTH INFORMATION MANAGEMENT | Facility: CLINIC | Age: 85
End: 2020-01-16

## 2020-01-16 LAB
ANION GAP SERPL CALCULATED.3IONS-SCNC: 9 MMOL/L (ref 7–16)
BUN SERPL-MCNC: 24 MG/DL (ref 7–26)
CALCIUM SERPL-MCNC: 8.8 MG/DL (ref 8.4–10.4)
CHLORIDE SERPLBLD-SCNC: 104 MMOL/L (ref 98–109)
CO2 SERPL-SCNC: 26 MMOL/L (ref 20–29)
CREAT SERPL-MCNC: 1.2 MG/DL (ref 0.73–1.18)
GFR SERPL CREATININE-BSD FRML MDRD: 54 ML/MIN/1.73M2
GLUCOSE SERPL-MCNC: 221 MG/DL (ref 70–100)
HEMOGLOBIN: 10.5 G/DL (ref 13.5–17.5)
POTASSIUM SERPL-SCNC: 4.3 MMOL/L (ref 3.5–5.1)
SODIUM SERPL-SCNC: 139 MMOL/L (ref 136–145)

## 2020-01-20 ENCOUNTER — NURSING HOME VISIT (OUTPATIENT)
Dept: GERIATRICS | Facility: CLINIC | Age: 85
End: 2020-01-20
Payer: COMMERCIAL

## 2020-01-20 DIAGNOSIS — I69.354 HEMIPARESIS AFFECTING LEFT SIDE AS LATE EFFECT OF CEREBROVASCULAR ACCIDENT (H): Primary | ICD-10-CM

## 2020-01-20 DIAGNOSIS — Z79.4 TYPE 2 DIABETES MELLITUS WITH STAGE 3 CHRONIC KIDNEY DISEASE, WITH LONG-TERM CURRENT USE OF INSULIN (H): ICD-10-CM

## 2020-01-20 DIAGNOSIS — E11.22 TYPE 2 DIABETES MELLITUS WITH STAGE 3 CHRONIC KIDNEY DISEASE, WITH LONG-TERM CURRENT USE OF INSULIN (H): ICD-10-CM

## 2020-01-20 DIAGNOSIS — D64.9 ANEMIA, UNSPECIFIED TYPE: ICD-10-CM

## 2020-01-20 DIAGNOSIS — N18.30 TYPE 2 DIABETES MELLITUS WITH STAGE 3 CHRONIC KIDNEY DISEASE, WITH LONG-TERM CURRENT USE OF INSULIN (H): ICD-10-CM

## 2020-02-04 ENCOUNTER — TELEPHONE (OUTPATIENT)
Dept: GERIATRICS | Facility: CLINIC | Age: 85
End: 2020-02-04

## 2020-02-04 DIAGNOSIS — M54.2 NECK PAIN: ICD-10-CM

## 2020-02-04 DIAGNOSIS — M54.2 NECK PAIN: Primary | ICD-10-CM

## 2020-02-04 RX ORDER — TRAMADOL HYDROCHLORIDE 50 MG/1
TABLET ORAL
Qty: 180 TABLET | Refills: 0 | Status: SHIPPED | OUTPATIENT
Start: 2020-02-04 | End: 2020-02-04

## 2020-02-04 RX ORDER — TRAMADOL HYDROCHLORIDE 50 MG/1
TABLET ORAL
Qty: 180 TABLET | Refills: 0 | Status: SHIPPED | OUTPATIENT
Start: 2020-02-04 | End: 2020-09-08

## 2020-03-05 ENCOUNTER — TRANSFERRED RECORDS (OUTPATIENT)
Dept: HEALTH INFORMATION MANAGEMENT | Facility: CLINIC | Age: 85
End: 2020-03-05

## 2020-03-05 LAB
ANION GAP SERPL CALCULATED.3IONS-SCNC: 7 MMOL/L (ref 7–16)
BUN SERPL-MCNC: 21 MG/DL (ref 7–26)
CALCIUM SERPL-MCNC: 9.1 MG/DL (ref 8.4–10.4)
CHLORIDE SERPLBLD-SCNC: 103 MMOL/L (ref 98–109)
CO2 SERPL-SCNC: 28 MMOL/L (ref 20–29)
CREAT SERPL-MCNC: 1.06 MG/DL (ref 0.73–1.18)
DIFFERENTIAL: ABNORMAL
ERYTHROCYTE [DISTWIDTH] IN BLOOD BY AUTOMATED COUNT: 16.5 % (ref 11.9–15.5)
GFR SERPL CREATININE-BSD FRML MDRD: >60 ML/MIN/1.73M2
GLUCOSE SERPL-MCNC: 136 MG/DL (ref 70–100)
HBA1C MFR BLD: 6.5 % (ref 0–5.7)
HCT VFR BLD AUTO: 34.9 % (ref 38.8–50)
HEMOGLOBIN: 11.6 G/DL (ref 13.5–17.5)
MCH RBC QN AUTO: 30.5 PG (ref 27.6–33.3)
MCHC RBC AUTO-ENTMCNC: 33.2 G/DL (ref 31.5–35.2)
MCV RBC AUTO: 91.8 FL (ref 80–100)
PLATELET # BLD AUTO: 198 10^9/L (ref 150–450)
POTASSIUM SERPL-SCNC: 4.3 MMOL/L (ref 3.5–5.1)
RBC # BLD AUTO: 3.8 10^12/L (ref 4.32–5.72)
SODIUM SERPL-SCNC: 138 MMOL/L (ref 136–145)
TSH SERPL-ACNC: 3.26 UIU/ML (ref 0.3–4.5)
WBC # BLD AUTO: 5.8 10^9/L (ref 3.5–10.5)

## 2020-03-06 ENCOUNTER — NURSING HOME VISIT (OUTPATIENT)
Dept: GERIATRICS | Facility: CLINIC | Age: 85
End: 2020-03-06
Payer: COMMERCIAL

## 2020-03-06 VITALS
SYSTOLIC BLOOD PRESSURE: 123 MMHG | HEIGHT: 69 IN | DIASTOLIC BLOOD PRESSURE: 67 MMHG | BODY MASS INDEX: 25.48 KG/M2 | WEIGHT: 172 LBS | TEMPERATURE: 96.5 F | HEART RATE: 72 BPM | RESPIRATION RATE: 18 BRPM

## 2020-03-06 DIAGNOSIS — E11.21 TYPE 2 DIABETES MELLITUS WITH DIABETIC NEPHROPATHY, WITH LONG-TERM CURRENT USE OF INSULIN (H): ICD-10-CM

## 2020-03-06 DIAGNOSIS — R44.3 HALLUCINATIONS: Primary | ICD-10-CM

## 2020-03-06 DIAGNOSIS — Z79.4 TYPE 2 DIABETES MELLITUS WITH DIABETIC NEPHROPATHY, WITH LONG-TERM CURRENT USE OF INSULIN (H): ICD-10-CM

## 2020-03-06 DIAGNOSIS — N18.30 CKD (CHRONIC KIDNEY DISEASE) STAGE 3, GFR 30-59 ML/MIN (H): ICD-10-CM

## 2020-03-06 DIAGNOSIS — E03.4 HYPOTHYROIDISM DUE TO ACQUIRED ATROPHY OF THYROID: ICD-10-CM

## 2020-03-06 PROCEDURE — 99309 SBSQ NF CARE MODERATE MDM 30: CPT | Mod: GV | Performed by: NURSE PRACTITIONER

## 2020-03-06 ASSESSMENT — MIFFLIN-ST. JEOR: SCORE: 1440.57

## 2020-03-06 NOTE — PROGRESS NOTES
"[place] [facility]  Chief Complaint   Patient presents with     residential Regulatory     [place] Medical Record Number:  [id number]  Place of Service where encounter took place:  [facility] [[id number]]    HPI:    Elias Mckee  is 88 year old (8/10/1931), who is being seen today for a federally mandated E/M visit.  HPI information obtained from: facility chart records, facility staff, patient report, [facility] chart review and family/first contact Jazmin report. Today's concerns are:     Hallucinations  Type 2 diabetes mellitus with diabetic nephropathy, with long-term current use of insulin (H)  CKD (chronic kidney disease) stage 3, GFR 30-59 ml/min (H)  Hypothyroidism due to acquired atrophy of thyroid     Patient having increased hallucinations reported by family, work up of labs and flu and CXR negative findings, patient today presents as stable, admits to seeing creatures crawling around at night where light \"peeks\" through window and door, not afraid nor scared, understands they may not be real but wants a camera to record their actions, pleasantly confused, no physical concerns, not disturbing at this juncture, overall status is stable.    ALLERGIES:No known allergies  PAST MEDICAL HISTORY:   has a past medical history of Acute, but ill-defined, cerebrovascular disease (1-2008), Atrial fibrillation (H), Benign hypertension with CKD (chronic kidney disease) stage III (H) (6/5/2017), Cancer (H), Cardiac pacemaker in situ, Placed on 7/20/18 (7/23/2018), Central retinal vein occlusion, right eye (11/15/2017), Chronic atrial fibrillation (6/5/2017), Chronic ischemic heart disease, unspecified, CKD (chronic kidney disease) stage 3, GFR 30-59 ml/min, est GFR: 10/30/17: 46,, 12/14/17: 59 (2/29/2012), Cognitive deficits as late effect of cerebrovascular disease (6/5/2017), Coronary artery disease, CVA (cerebral infarction), Dysphagia due to recent cerebrovascular accident (CVA) " (6/5/2017), Elevated cholesterol, Essential hypertension, benign, H/O prostate cancer (7/6/2017), H/O: CVA (cerebrovascular accident) (6/5/2017), Hemiparesis affecting left side as late effect of cerebrovascular accident (H) (6/5/2017), Hyperlipidaemia, Loss of weight (10/2/2018), MEDICAL HISTORY OF - (1- 2008), Mobitz type I Wenckebach atrioventricular block, 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  PAST SURGICAL HISTORY:   has a past surgical history that includes seed implantation (2002); Phacoemulsification clear cornea with standard intraocular lens implant (Right, 10/7/2014); and Vitrectomy anterior (Right, 10/7/2014).  FAMILY HISTORY: family history is not on file.  SOCIAL HISTORY:  reports that he quit smoking about 46 years ago. His smoking use included cigarettes. He started smoking about 66 years ago. He has a 20.00 pack-year smoking history. He has never used smokeless tobacco. He reports current alcohol use of about 1.0 standard drinks of alcohol per week. He reports that he does not use drugs.    MEDICATIONS:  Current Outpatient Medications   Medication Sig Dispense Refill     ACETAMINOPHEN PO Take 1,000 mg by mouth 3 times daily For pain       Atorvastatin Calcium (LIPITOR PO) Take 40 mg by mouth At Bedtime       atropine 1 % ophthalmic solution Place 1 drop into the right eye 2 times daily       cholecalciferol (VITAMIN D) 1000 UNIT tablet Take 2,000 Units by mouth daily       Fluticasone Propionate (FLONASE NA) Spray  "2 sprays into both nostrils daily       Insulin Glargine (BASAGLAR KWIKPEN SC) Inject 22 Units Subcutaneous At Bedtime May use 2 units as needed to prime pen before each dose.       latanoprost (XALATAN) 0.005 % ophthalmic solution Place 1 drop into the right eye At Bedtime       LEVOTHYROXINE SODIUM PO Take 25 mcg by mouth daily       polyethylene glycol (MIRALAX/GLYCOLAX) powder Take 17 g by mouth daily        prednisoLONE acetate (PRED FORTE) 1 % ophthalmic susp Place 1 drop into the right eye 4 times daily        rivaroxaban ANTICOAGULANT (XARELTO) 20 MG TABS tablet Take 1 tablet (20 mg) by mouth daily (with dinner) 30 tablet 1     tamsulosin (FLOMAX) 0.4 MG capsule Take 1 capsule (0.4 mg) by mouth daily 60 capsule 0     timolol, PF, (TIMOPTIC OCUDOSE) 0.5 % ophthalmic solution Place 1 drop into the right eye 2 times daily       traMADol (ULTRAM) 50 MG tablet One tablet orally three times daily and one tablet orally every four hours as needed for pain. 180 tablet 0         Case Management:  I have reviewed the care plan and MDS and do agree with the plan. Patient's desire to return to the community is present, but is not able due to care needs . Information reviewed:  Medications, vital signs, orders, and nursing notes.    ROS:  4 point ROS including Respiratory, CV, GI and , other than that noted in the HPI,  is negative    Vitals:  /67   Pulse 72   Temp 96.5  F (35.8  C)   Resp 18   Ht 1.753 m (5' 9\")   Wt 78 kg (172 lb)   BMI 25.40 kg/m    Body mass index is 25.4 kg/m .  Exam:  GENERAL APPEARANCE:  in no distress, appears healthy  ENT:  Mouth and posterior oropharynx normal, moist mucous membranes  RESP:  lungs clear to auscultation , no respiratory distress  CV:  regular rate and rhythm, no murmur, rub, or gallop, no edema  ABDOMEN:  bowel sounds normal  M/S:   Gait and station abnormal non-ambulatory  SKIN:  Inspection of skin and subcutaneous tissue baseline  NEURO:   Examination of sensation " by touch normal  PSYCH:  oriented to self    Lab/Diagnostic data:   Labs done in SNF are in [place] EPIC. Please refer to them using [employer]/Care Everywhere.    ASSESSMENT/PLAN  Hallucinations  -generally Qpm, strange animals and creatures, not distressed  -labs, flu and CXR negative findings  -spoke with daughter Jazmin today regarding plan, will monitor for now and if causing distress consider seroquel 12.5mg Qhs    Type 2 diabetes mellitus with diabetic nephropathy, with long-term current use of insulin (H)  -A1C today was 6.5, well below goal of <9  -continue glargine 22u daily  -staff to check BG's as scheduled    CKD (chronic kidney disease) stage 3, GFR 30-59 ml/min, est GFR: 10/30/17: 46,, 12/14/17: 59  -labs today creat 1.06, GFR >60  -dose medications renally as possible  -BMP's periodically    Hypothyroidism due to acquired atrophy of thyroid  -TSH today 3.26  -continue levothyroxine 25mg  -follow up TSH in 3-6 months  -consider discontinue of levothyroxine if TSH <6        Electronically signed by:  TOBY Alejandro CNP

## 2020-03-24 ENCOUNTER — VIRTUAL VISIT (OUTPATIENT)
Dept: GERIATRICS | Facility: CLINIC | Age: 85
End: 2020-03-24
Payer: COMMERCIAL

## 2020-03-24 ENCOUNTER — TELEPHONE (OUTPATIENT)
Dept: GERIATRICS | Facility: CLINIC | Age: 85
End: 2020-03-24

## 2020-03-24 DIAGNOSIS — Z71.89 ACP (ADVANCE CARE PLANNING): Primary | ICD-10-CM

## 2020-03-24 PROCEDURE — 99207 ZZC NO CHARGE LOS: CPT | Performed by: NURSE PRACTITIONER

## 2020-03-24 PROCEDURE — 99442 ZZC PHYSICIAN TELEPHONE EVALUATION 11-20 MIN: CPT | Mod: TEL | Performed by: NURSE PRACTITIONER

## 2020-04-03 NOTE — PROGRESS NOTES
[place] [facility] TELEPHONE ENCOUNTER    No chief complaint on file.      Elias Mckee is a 88 year old  (8/10/1931),Nurse called today to report:     Spoke with family/PORODNEY Wu via phone for regarding ACP and current POLST status of Full code, time of conversation 11 minutes    ASSESSMENT/PLAN      Continue full code status until further notice    Vitaliy Champagne, APRN CNP

## 2020-04-16 ENCOUNTER — ANCILLARY PROCEDURE (OUTPATIENT)
Dept: CARDIOLOGY | Facility: CLINIC | Age: 85
End: 2020-04-16
Attending: INTERNAL MEDICINE
Payer: COMMERCIAL

## 2020-04-16 DIAGNOSIS — Z95.0 CARDIAC PACEMAKER IN SITU: ICD-10-CM

## 2020-04-16 PROCEDURE — 93296 REM INTERROG EVL PM/IDS: CPT | Performed by: INTERNAL MEDICINE

## 2020-04-16 PROCEDURE — 93294 REM INTERROG EVL PM/LDLS PM: CPT | Performed by: INTERNAL MEDICINE

## 2020-04-20 LAB
MDC_IDC_EPISODE_DTM: NORMAL
MDC_IDC_EPISODE_ID: NORMAL
MDC_IDC_EPISODE_TYPE: NORMAL
MDC_IDC_LEAD_IMPLANT_DT: NORMAL
MDC_IDC_LEAD_IMPLANT_DT: NORMAL
MDC_IDC_LEAD_LOCATION: NORMAL
MDC_IDC_LEAD_LOCATION: NORMAL
MDC_IDC_LEAD_LOCATION_DETAIL_1: NORMAL
MDC_IDC_LEAD_LOCATION_DETAIL_1: NORMAL
MDC_IDC_LEAD_MFG: NORMAL
MDC_IDC_LEAD_MFG: NORMAL
MDC_IDC_LEAD_MODEL: NORMAL
MDC_IDC_LEAD_MODEL: NORMAL
MDC_IDC_LEAD_POLARITY_TYPE: NORMAL
MDC_IDC_LEAD_POLARITY_TYPE: NORMAL
MDC_IDC_LEAD_SERIAL: NORMAL
MDC_IDC_LEAD_SERIAL: NORMAL
MDC_IDC_MSMT_BATTERY_DTM: NORMAL
MDC_IDC_MSMT_BATTERY_REMAINING_LONGEVITY: 102 MO
MDC_IDC_MSMT_BATTERY_REMAINING_PERCENTAGE: 100 %
MDC_IDC_MSMT_BATTERY_STATUS: NORMAL
MDC_IDC_MSMT_LEADCHNL_RA_IMPEDANCE_VALUE: 768 OHM
MDC_IDC_MSMT_LEADCHNL_RA_PACING_THRESHOLD_AMPLITUDE: 0.6 V
MDC_IDC_MSMT_LEADCHNL_RA_PACING_THRESHOLD_PULSEWIDTH: 0.4 MS
MDC_IDC_MSMT_LEADCHNL_RV_IMPEDANCE_VALUE: 682 OHM
MDC_IDC_MSMT_LEADCHNL_RV_PACING_THRESHOLD_AMPLITUDE: 0.8 V
MDC_IDC_MSMT_LEADCHNL_RV_PACING_THRESHOLD_PULSEWIDTH: 0.4 MS
MDC_IDC_PG_IMPLANT_DTM: NORMAL
MDC_IDC_PG_MFG: NORMAL
MDC_IDC_PG_MODEL: NORMAL
MDC_IDC_PG_SERIAL: NORMAL
MDC_IDC_PG_TYPE: NORMAL
MDC_IDC_SESS_CLINIC_NAME: NORMAL
MDC_IDC_SESS_DTM: NORMAL
MDC_IDC_SESS_TYPE: NORMAL
MDC_IDC_SET_BRADY_AT_MODE_SWITCH_MODE: NORMAL
MDC_IDC_SET_BRADY_AT_MODE_SWITCH_RATE: 170 {BEATS}/MIN
MDC_IDC_SET_BRADY_LOWRATE: 60 {BEATS}/MIN
MDC_IDC_SET_BRADY_MAX_TRACKING_RATE: 120 {BEATS}/MIN
MDC_IDC_SET_BRADY_MODE: NORMAL
MDC_IDC_SET_BRADY_PAV_DELAY_HIGH: 200 MS
MDC_IDC_SET_BRADY_PAV_DELAY_LOW: 300 MS
MDC_IDC_SET_BRADY_SAV_DELAY_HIGH: 200 MS
MDC_IDC_SET_BRADY_SAV_DELAY_LOW: 300 MS
MDC_IDC_SET_LEADCHNL_RA_PACING_AMPLITUDE: 2 V
MDC_IDC_SET_LEADCHNL_RA_PACING_CAPTURE_MODE: NORMAL
MDC_IDC_SET_LEADCHNL_RA_PACING_POLARITY: NORMAL
MDC_IDC_SET_LEADCHNL_RA_PACING_PULSEWIDTH: 0.4 MS
MDC_IDC_SET_LEADCHNL_RA_SENSING_ADAPTATION_MODE: NORMAL
MDC_IDC_SET_LEADCHNL_RA_SENSING_POLARITY: NORMAL
MDC_IDC_SET_LEADCHNL_RA_SENSING_SENSITIVITY: 0.25 MV
MDC_IDC_SET_LEADCHNL_RV_PACING_AMPLITUDE: 1.2 V
MDC_IDC_SET_LEADCHNL_RV_PACING_CAPTURE_MODE: NORMAL
MDC_IDC_SET_LEADCHNL_RV_PACING_POLARITY: NORMAL
MDC_IDC_SET_LEADCHNL_RV_PACING_PULSEWIDTH: 0.4 MS
MDC_IDC_SET_LEADCHNL_RV_SENSING_ADAPTATION_MODE: NORMAL
MDC_IDC_SET_LEADCHNL_RV_SENSING_POLARITY: NORMAL
MDC_IDC_SET_LEADCHNL_RV_SENSING_SENSITIVITY: 1.5 MV
MDC_IDC_SET_ZONE_DETECTION_INTERVAL: 375 MS
MDC_IDC_SET_ZONE_TYPE: NORMAL
MDC_IDC_SET_ZONE_VENDOR_TYPE: NORMAL
MDC_IDC_STAT_AT_BURDEN_PERCENT: 5 %
MDC_IDC_STAT_AT_DTM_END: NORMAL
MDC_IDC_STAT_AT_DTM_START: NORMAL
MDC_IDC_STAT_BRADY_DTM_END: NORMAL
MDC_IDC_STAT_BRADY_DTM_START: NORMAL
MDC_IDC_STAT_BRADY_RA_PERCENT_PACED: 1 %
MDC_IDC_STAT_BRADY_RV_PERCENT_PACED: 10 %
MDC_IDC_STAT_EPISODE_RECENT_COUNT: 0
MDC_IDC_STAT_EPISODE_RECENT_COUNT: 1
MDC_IDC_STAT_EPISODE_RECENT_COUNT: 20
MDC_IDC_STAT_EPISODE_RECENT_COUNT_DTM_END: NORMAL
MDC_IDC_STAT_EPISODE_RECENT_COUNT_DTM_START: NORMAL
MDC_IDC_STAT_EPISODE_TYPE: NORMAL
MDC_IDC_STAT_EPISODE_VENDOR_TYPE: NORMAL

## 2020-05-29 ENCOUNTER — NURSING HOME VISIT (OUTPATIENT)
Dept: GERIATRICS | Facility: CLINIC | Age: 85
End: 2020-05-29
Payer: COMMERCIAL

## 2020-05-29 DIAGNOSIS — Z79.4 TYPE 2 DIABETES MELLITUS WITH STAGE 3 CHRONIC KIDNEY DISEASE, WITH LONG-TERM CURRENT USE OF INSULIN (H): ICD-10-CM

## 2020-05-29 DIAGNOSIS — I69.354 HEMIPARESIS AFFECTING LEFT SIDE AS LATE EFFECT OF CEREBROVASCULAR ACCIDENT (H): Primary | ICD-10-CM

## 2020-05-29 DIAGNOSIS — E11.22 TYPE 2 DIABETES MELLITUS WITH STAGE 3 CHRONIC KIDNEY DISEASE, WITH LONG-TERM CURRENT USE OF INSULIN (H): ICD-10-CM

## 2020-05-29 DIAGNOSIS — N18.30 TYPE 2 DIABETES MELLITUS WITH STAGE 3 CHRONIC KIDNEY DISEASE, WITH LONG-TERM CURRENT USE OF INSULIN (H): ICD-10-CM

## 2020-05-30 NOTE — PROGRESS NOTES
"Pt was seen for a tele-med regulatory LTC visit.        The patient and/or legal representative have been notified of following:  \"This video visit will be conducted via a call between you and your provider. We have found that certain health care needs can be provided without the need for an in-person physical exam. This service lets us provide the care you need with a video conversation. If during the course of the call the provider feels a video visit is not appropriate, you will not be charged for this service.\"   The provider has received verbal consent for a Video Visit from the patient or first contact? Yes  Video Time: [id number]  Provider's Distant Location: [facility]      Pt's recent course was reviewed.  Exam and interview limited by COVID-19 restrictions    Staff unaware of acute medical concerns  Vitals have been stable      Assessment    S/p CVA with cognitive deficits, L hemiparesis    DM type 2, controlled on glargine    Chronic afib, HR controlled, on Xarelto    Plan  Continue current tx  Routine BG, lab monitoring                "

## 2020-06-19 ENCOUNTER — CLINICAL UPDATE (OUTPATIENT)
Dept: PHARMACY | Facility: CLINIC | Age: 85
End: 2020-06-19
Payer: COMMERCIAL

## 2020-06-19 DIAGNOSIS — H57.11 OCULAR PAIN, RIGHT EYE: ICD-10-CM

## 2020-06-19 DIAGNOSIS — N18.30 CKD (CHRONIC KIDNEY DISEASE) STAGE 3, GFR 30-59 ML/MIN (H): ICD-10-CM

## 2020-06-19 DIAGNOSIS — E11.21 TYPE 2 DIABETES MELLITUS WITH DIABETIC NEPHROPATHY, WITH LONG-TERM CURRENT USE OF INSULIN (H): ICD-10-CM

## 2020-06-19 DIAGNOSIS — Z79.4 TYPE 2 DIABETES MELLITUS WITH DIABETIC NEPHROPATHY, WITH LONG-TERM CURRENT USE OF INSULIN (H): ICD-10-CM

## 2020-06-19 DIAGNOSIS — R52 PAIN: ICD-10-CM

## 2020-06-19 DIAGNOSIS — N40.0 BENIGN PROSTATIC HYPERPLASIA WITHOUT LOWER URINARY TRACT SYMPTOMS: ICD-10-CM

## 2020-06-19 DIAGNOSIS — E03.4 HYPOTHYROIDISM DUE TO ACQUIRED ATROPHY OF THYROID: Primary | ICD-10-CM

## 2020-06-19 DIAGNOSIS — R44.3 HALLUCINATIONS: ICD-10-CM

## 2020-06-19 DIAGNOSIS — Z86.73 H/O: CVA (CEREBROVASCULAR ACCIDENT): ICD-10-CM

## 2020-06-19 PROCEDURE — 99207 ZZC NO CHARGE LOS: CPT | Performed by: PHARMACIST

## 2020-06-19 NOTE — PROGRESS NOTES
This patient's medication list and chart were reviewed as part of the service provided by [facility].    Assessment/Recommendations:  1. (Pain):  Doesn't appear Tramadol trial reduction has been attempted, and pt may benefit from this.  Med may contribute to confusion, CNS side effects, hallucinations (does have h/o non-distressing hallucinations), falls, gi adverse effects, etc.  Consider reduction in Tramadol to 25mg tid and monitor.  If necessary, may trial topical Diclofenac gel to painful area to help reduce use of Tramadol.  2. (Ocular pain):  Appears pt has been on qid Pred Forte drops for quite some time.  Consider reduction to tid dosing and monitor.  If tolerates reduction without increase in symptoms, may consider further taper.      Andria Vo, Pharm.D.,[facility]  Board Certified Geriatric Pharmacist  Medication Therapy Management Pharmacist  [phone]

## 2020-07-03 ENCOUNTER — VIRTUAL VISIT (OUTPATIENT)
Dept: GERIATRICS | Facility: CLINIC | Age: 85
End: 2020-07-03
Payer: COMMERCIAL

## 2020-07-03 VITALS — HEART RATE: 72 BPM | TEMPERATURE: 96.5 F | RESPIRATION RATE: 18 BRPM | OXYGEN SATURATION: 95 %

## 2020-07-03 DIAGNOSIS — E03.4 HYPOTHYROIDISM DUE TO ACQUIRED ATROPHY OF THYROID: ICD-10-CM

## 2020-07-03 DIAGNOSIS — M54.2 NECK PAIN: ICD-10-CM

## 2020-07-03 DIAGNOSIS — I69.354 HEMIPARESIS AFFECTING LEFT SIDE AS LATE EFFECT OF CEREBROVASCULAR ACCIDENT (H): Primary | ICD-10-CM

## 2020-07-03 DIAGNOSIS — Z86.73 H/O: CVA (CEREBROVASCULAR ACCIDENT): ICD-10-CM

## 2020-07-03 DIAGNOSIS — E11.51 TYPE II DIABETES MELLITUS WITH PERIPHERAL CIRCULATORY DISORDER (H): ICD-10-CM

## 2020-07-03 PROCEDURE — 99309 SBSQ NF CARE MODERATE MDM 30: CPT | Mod: GV | Performed by: NURSE PRACTITIONER

## 2020-07-03 NOTE — PROGRESS NOTES
"[place] [facility] Regulatory   Elias Mckee is being evaluated via a billable video visit due to the restrictions of the Covid-19 pandemic.   The patient has been notified of following:  \"This video visit will be conducted via a call between you and your provider. We have found that certain health care needs can be provided without the need for an in-person physical exam.  This service lets us provide the care you need with a video conversation. If during the course of the call the provider feels a video visit is not appropriate, you will not be charged for this service.\"   The provider has received verbal consent for a Video Visit from the patient or first contact? Yes  Patient or facility staff would like the video invitation sent by: N/A   Video Start Time: 1052    [place] Medical Record Number:  [id number]  Place of Location at the time of visit: [facility].   Chief Complaint   Patient presents with     intermediate Regulatory     HPI:  Elias Mckee  is a 88 year old (8/10/1931), who is being seen today for a Regulatory visit.  HPI information obtained from: facility chart records, facility staff, patient report and [facility] chart review. Today's concern is:     H/O: CVA (cerebrovascular accident)  Hemiparesis affecting left side as late effect of cerebrovascular accident (H)  Hypothyroidism due to acquired atrophy of thyroid  Type II diabetes mellitus with peripheral circulatory disorder (H)  Neck pain     Patient seen today with staff member and Ipad, lying in bed, looks well, VS stable, labs on 3/5 WNL, A1C 6.5, pharmacy has reviewed medications, requires assist transfers, complaint today of constant neck pain which the staff nurse concurs, overall status stable.      Past Medical and Surgical History reviewed in Epic today.  MEDICATIONS:    Current Outpatient Medications   Medication Sig Dispense Refill     ACETAMINOPHEN PO Take 1,000 mg by mouth 3 times daily For pain   "     Atorvastatin Calcium (LIPITOR PO) Take 40 mg by mouth At Bedtime       atropine 1 % ophthalmic solution Place 1 drop into the right eye 2 times daily       cholecalciferol (VITAMIN D) 1000 UNIT tablet Take 2,000 Units by mouth daily       Fluticasone Propionate (FLONASE NA) Spray 2 sprays into both nostrils daily       Insulin Glargine (BASAGLAR KWIKPEN SC) Inject 22 Units Subcutaneous At Bedtime May use 2 units as needed to prime pen before each dose.       latanoprost (XALATAN) 0.005 % ophthalmic solution Place 1 drop into the right eye At Bedtime       LEVOTHYROXINE SODIUM PO Take 25 mcg by mouth daily       polyethylene glycol (MIRALAX/GLYCOLAX) powder Take 17 g by mouth daily        prednisoLONE acetate (PRED FORTE) 1 % ophthalmic susp Place 1 drop into the right eye 3 times daily       rivaroxaban ANTICOAGULANT (XARELTO) 20 MG TABS tablet Take 1 tablet (20 mg) by mouth daily (with dinner) 30 tablet 1     tamsulosin (FLOMAX) 0.4 MG capsule Take 1 capsule (0.4 mg) by mouth daily 60 capsule 0     timolol, PF, (TIMOPTIC OCUDOSE) 0.5 % ophthalmic solution Place 1 drop into the right eye 2 times daily       traMADol (ULTRAM) 50 MG tablet One tablet orally three times daily and one tablet orally every four hours as needed for pain. 180 tablet 0     REVIEW OF SYSTEMS: 4 point ROS including Respiratory, CV, GI and , other than that noted in the HPI,  is negative  Objective: Pulse 72   Temp 96.5  F (35.8  C)   Resp 18   SpO2 95%   Limited visit exam done given COVID-19 precautions.   GENERAL APPEARANCE:  in no distress, appears healthy  ENT:  normal hearing acuity  RESP:  no respiratory distress  CV:  peripheral edema scant+ in lower legs  ABDOMEN:  no distension  M/S:   Gait and station abnormal requires assist  SKIN:  Inspection of skin and subcutaneous tissue baseline  NEURO:   Examination of sensation by touch normal  PSYCH:  oriented to self  Labs:   Labs done in SNF are in [place] EPIC. Please refer to  them using EPIC/Care Everywhere.    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  Hemiparesis affecting left side as late effect of cerebrovascular accident (H)  -no recent s/s CVA/TIA  -continue xarelto 20mg  -staff to support as indicated    Hypothyroidism due to acquired atrophy of thyroid  -TSH on 3/5/20 was 3.26  -continue levothyroxine 25mcg  -periodic TSH's    Type II diabetes mellitus with peripheral circulatory disorder (H)  -A1C on 3/5/20 was 6.5  -goal range <8  -continue glargine 22u daily  -staff to check BG's daily   -follow up A1C in 3-6 months     Neck pain  -chronic complaint along with bilateral arms  -continue tramadol 50mg TID for now  -consider adding voltaren gel with any exacerbation      Electronically signed by:  TOBY Alejandro CNP     Video-Visit Details  Type of service:  Video Visit  Video End Time (time video stopped): 1102  Distant Location (provider location):  [place] [facility]

## 2020-07-28 ENCOUNTER — ANCILLARY PROCEDURE (OUTPATIENT)
Dept: CARDIOLOGY | Facility: CLINIC | Age: 85
End: 2020-07-28
Attending: INTERNAL MEDICINE
Payer: COMMERCIAL

## 2020-07-28 DIAGNOSIS — I44.1 MOBITZ TYPE I WENCKEBACH ATRIOVENTRICULAR BLOCK: Primary | ICD-10-CM

## 2020-07-28 DIAGNOSIS — Z95.0 CARDIAC PACEMAKER IN SITU: ICD-10-CM

## 2020-07-28 PROCEDURE — 93296 REM INTERROG EVL PM/IDS: CPT | Performed by: INTERNAL MEDICINE

## 2020-07-28 PROCEDURE — 93294 REM INTERROG EVL PM/LDLS PM: CPT | Performed by: INTERNAL MEDICINE

## 2020-07-30 LAB
MDC_IDC_EPISODE_DTM: NORMAL
MDC_IDC_EPISODE_DURATION: 1 S
MDC_IDC_EPISODE_ID: NORMAL
MDC_IDC_EPISODE_TYPE: NORMAL
MDC_IDC_LEAD_IMPLANT_DT: NORMAL
MDC_IDC_LEAD_IMPLANT_DT: NORMAL
MDC_IDC_LEAD_LOCATION: NORMAL
MDC_IDC_LEAD_LOCATION: NORMAL
MDC_IDC_LEAD_LOCATION_DETAIL_1: NORMAL
MDC_IDC_LEAD_LOCATION_DETAIL_1: NORMAL
MDC_IDC_LEAD_MFG: NORMAL
MDC_IDC_LEAD_MFG: NORMAL
MDC_IDC_LEAD_MODEL: NORMAL
MDC_IDC_LEAD_MODEL: NORMAL
MDC_IDC_LEAD_POLARITY_TYPE: NORMAL
MDC_IDC_LEAD_POLARITY_TYPE: NORMAL
MDC_IDC_LEAD_SERIAL: NORMAL
MDC_IDC_LEAD_SERIAL: NORMAL
MDC_IDC_MSMT_BATTERY_DTM: NORMAL
MDC_IDC_MSMT_BATTERY_REMAINING_LONGEVITY: 102 MO
MDC_IDC_MSMT_BATTERY_REMAINING_PERCENTAGE: 100 %
MDC_IDC_MSMT_BATTERY_STATUS: NORMAL
MDC_IDC_MSMT_LEADCHNL_RA_IMPEDANCE_VALUE: 758 OHM
MDC_IDC_MSMT_LEADCHNL_RA_PACING_THRESHOLD_AMPLITUDE: 0.5 V
MDC_IDC_MSMT_LEADCHNL_RA_PACING_THRESHOLD_PULSEWIDTH: 0.4 MS
MDC_IDC_MSMT_LEADCHNL_RV_IMPEDANCE_VALUE: 723 OHM
MDC_IDC_MSMT_LEADCHNL_RV_PACING_THRESHOLD_AMPLITUDE: 0.8 V
MDC_IDC_MSMT_LEADCHNL_RV_PACING_THRESHOLD_PULSEWIDTH: 0.4 MS
MDC_IDC_PG_IMPLANT_DTM: NORMAL
MDC_IDC_PG_MFG: NORMAL
MDC_IDC_PG_MODEL: NORMAL
MDC_IDC_PG_SERIAL: NORMAL
MDC_IDC_PG_TYPE: NORMAL
MDC_IDC_SESS_CLINIC_NAME: NORMAL
MDC_IDC_SESS_DTM: NORMAL
MDC_IDC_SESS_TYPE: NORMAL
MDC_IDC_SET_BRADY_AT_MODE_SWITCH_MODE: NORMAL
MDC_IDC_SET_BRADY_AT_MODE_SWITCH_RATE: 170 {BEATS}/MIN
MDC_IDC_SET_BRADY_LOWRATE: 60 {BEATS}/MIN
MDC_IDC_SET_BRADY_MAX_TRACKING_RATE: 120 {BEATS}/MIN
MDC_IDC_SET_BRADY_MODE: NORMAL
MDC_IDC_SET_BRADY_PAV_DELAY_HIGH: 200 MS
MDC_IDC_SET_BRADY_PAV_DELAY_LOW: 300 MS
MDC_IDC_SET_BRADY_SAV_DELAY_HIGH: 200 MS
MDC_IDC_SET_BRADY_SAV_DELAY_LOW: 300 MS
MDC_IDC_SET_LEADCHNL_RA_PACING_AMPLITUDE: 2 V
MDC_IDC_SET_LEADCHNL_RA_PACING_CAPTURE_MODE: NORMAL
MDC_IDC_SET_LEADCHNL_RA_PACING_POLARITY: NORMAL
MDC_IDC_SET_LEADCHNL_RA_PACING_PULSEWIDTH: 0.4 MS
MDC_IDC_SET_LEADCHNL_RA_SENSING_ADAPTATION_MODE: NORMAL
MDC_IDC_SET_LEADCHNL_RA_SENSING_POLARITY: NORMAL
MDC_IDC_SET_LEADCHNL_RA_SENSING_SENSITIVITY: 0.25 MV
MDC_IDC_SET_LEADCHNL_RV_PACING_AMPLITUDE: 1.3 V
MDC_IDC_SET_LEADCHNL_RV_PACING_CAPTURE_MODE: NORMAL
MDC_IDC_SET_LEADCHNL_RV_PACING_POLARITY: NORMAL
MDC_IDC_SET_LEADCHNL_RV_PACING_PULSEWIDTH: 0.4 MS
MDC_IDC_SET_LEADCHNL_RV_SENSING_ADAPTATION_MODE: NORMAL
MDC_IDC_SET_LEADCHNL_RV_SENSING_POLARITY: NORMAL
MDC_IDC_SET_LEADCHNL_RV_SENSING_SENSITIVITY: 1.5 MV
MDC_IDC_SET_ZONE_DETECTION_INTERVAL: 375 MS
MDC_IDC_SET_ZONE_TYPE: NORMAL
MDC_IDC_SET_ZONE_VENDOR_TYPE: NORMAL
MDC_IDC_STAT_AT_BURDEN_PERCENT: 4 %
MDC_IDC_STAT_AT_DTM_END: NORMAL
MDC_IDC_STAT_AT_DTM_START: NORMAL
MDC_IDC_STAT_BRADY_DTM_END: NORMAL
MDC_IDC_STAT_BRADY_DTM_START: NORMAL
MDC_IDC_STAT_BRADY_RA_PERCENT_PACED: 1 %
MDC_IDC_STAT_BRADY_RV_PERCENT_PACED: 10 %
MDC_IDC_STAT_EPISODE_RECENT_COUNT: 0
MDC_IDC_STAT_EPISODE_RECENT_COUNT: 1
MDC_IDC_STAT_EPISODE_RECENT_COUNT: 26
MDC_IDC_STAT_EPISODE_RECENT_COUNT_DTM_END: NORMAL
MDC_IDC_STAT_EPISODE_RECENT_COUNT_DTM_START: NORMAL
MDC_IDC_STAT_EPISODE_TYPE: NORMAL
MDC_IDC_STAT_EPISODE_VENDOR_TYPE: NORMAL

## 2020-09-08 DIAGNOSIS — M54.2 NECK PAIN: ICD-10-CM

## 2020-09-08 RX ORDER — TRAMADOL HYDROCHLORIDE 50 MG/1
TABLET ORAL
Qty: 120 TABLET | Refills: 5 | Status: SHIPPED | OUTPATIENT
Start: 2020-09-08 | End: 2020-12-02

## 2020-09-14 PROBLEM — F52.8 PSYCHOSEXUAL DYSFUNCTION WITH INHIBITED SEXUAL EXCITEMENT: Status: ACTIVE | Noted: 2020-09-14

## 2020-09-14 PROBLEM — N39.41 URGE INCONTINENCE OF URINE: Status: ACTIVE | Noted: 2020-09-14

## 2020-09-14 PROBLEM — I67.89 ACUTE ILL-DEFINED CEREBROVASCULAR DISEASE: Status: ACTIVE | Noted: 2017-02-20

## 2020-09-14 PROBLEM — H93.19 TINNITUS: Status: ACTIVE | Noted: 2020-09-14

## 2020-09-14 PROBLEM — L60.8 OTHER SPECIFIED DISEASE OF NAIL: Status: ACTIVE | Noted: 2020-09-14

## 2020-09-14 PROBLEM — E87.5 HYPERKALEMIA: Status: ACTIVE | Noted: 2020-09-14

## 2020-09-14 PROBLEM — M10.9 GOUTY ARTHROPATHY: Status: ACTIVE | Noted: 2020-09-14

## 2020-09-14 PROBLEM — I46.9 CARDIAC ARREST (H): Status: ACTIVE | Noted: 2020-09-14

## 2020-09-21 ENCOUNTER — NURSING HOME VISIT (OUTPATIENT)
Dept: GERIATRICS | Facility: CLINIC | Age: 85
End: 2020-09-21
Payer: COMMERCIAL

## 2020-09-21 DIAGNOSIS — E11.51 TYPE II DIABETES MELLITUS WITH PERIPHERAL CIRCULATORY DISORDER (H): ICD-10-CM

## 2020-09-21 DIAGNOSIS — Z86.73 H/O: CVA (CEREBROVASCULAR ACCIDENT): Primary | ICD-10-CM

## 2020-09-22 NOTE — PROGRESS NOTES
"Pt was seen for a regulatory LTC visit    The patient and/or legal representative have been notified of following:  \"This video visit will be conducted via a call between you and your provider. We have found that certain health care needs can be provided without the need for an in-person physical exam. This service lets us provide the care you need with a video conversation. If during the course of the call the provider feels a video visit is not appropriate, you will not be charged for this service.\"   The provider has received verbal consent for a Video Visit from the patient or first contact? Yes  Video Time: [id number]  Provider's Distant Location: [facility] Office.    Pt was seen with NP.    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 Pt remains on Xarelto, statin    DM type 2, stable on glargine    History of chronic neck pain, appears stable on tramadol    Hypothyroidism, on replacement    Plan  Continue current medications  Routine lab monitoring    "

## 2020-10-29 NOTE — PROGRESS NOTES
[facility]  Chief Complaint   Patient presents with     Annual Comprehensive Nursing Home     [place] [facility] Medical Record Number: [id number]  Place of Service where encounter took place: [facility] [[id number]]    HPI:    Elias Mckee is a 89 year old (8/10/1931), who is being seen today for an annual comprehensive visit. HPI information obtained from: facility chart records, facility staff, patient report and [place] [facility] Epic chart review. Today's concerns are:     Cardiac arrest (H)  Malignant neoplasm of prostate (H)  Type 2 diabetes mellitus with stage 3a chronic kidney disease, without long-term current use of insulin (H)  Hypothyroidism due to acquired atrophy of thyroid  Hypertension, unspecified type     Patient seen today lying in bed, per staff has intermittent outbursts but generally redirectable, labs reviewed and recent A1C 6.5, TSH 3.26, GFR still >60, medications reviewed as appropriate, no recent use of nitroglycerin, no apparent chest pain, SBP's in the 110-160 range.      ALLERGIES: Terazosin  PAST MEDICAL HISTORY:  has a past medical history of Acute, but ill-defined, cerebrovascular disease (1-2008), Atrial fibrillation (H), Benign hypertension with CKD (chronic kidney disease) stage III (H) (6/5/2017), Cancer (H), Cardiac pacemaker in situ, Placed on 7/20/18 (7/23/2018), Central retinal vein occlusion, right eye (11/15/2017), Chronic atrial fibrillation (6/5/2017), Chronic ischemic heart disease, unspecified, CKD (chronic kidney disease) stage 3, GFR 30-59 ml/min, est GFR: 10/30/17: 46,, 12/14/17: 59 (2/29/2012), Cognitive deficits as late effect of cerebrovascular disease (6/5/2017), Coronary artery disease, CVA (cerebral infarction), Dysphagia due to recent cerebrovascular accident (CVA) (6/5/2017), Elevated cholesterol, Essential hypertension, benign, H/O prostate cancer (7/6/2017), H/O: CVA (cerebrovascular accident) (6/5/2017), Hemiparesis affecting left side  as late effect of cerebrovascular accident (H) (6/5/2017), Hyperlipidaemia, Loss of weight (10/2/2018), MEDICAL HISTORY OF - (1- 2008), Mobitz type I Wenckebach atrioventricular block, 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  PAST SURGICAL HISTORY:  has a past surgical history that includes seed implantation (2002); Phacoemulsification clear cornea with standard intraocular lens implant (Right, 10/7/2014); and Vitrectomy anterior (Right, 10/7/2014).  IMMUNIZATIONS:  Immunization History   Administered Date(s) Administered     Flu, Unspecified 12/13/2006     Influenza (High Dose) 3 valent vaccine 10/05/2017     Influenza (IIV3) PF 01/22/2013, 10/15/2018, 10/16/2019     Pneumo Conj 13-V (2010&after) 10/19/2017     Pneumococcal 23 valent 09/10/2008     Pneumococcal, Unspecified 04/23/2004     TD (ADULT, 7+) 09/10/2008     Tdap (Adult) Unspecified Formulation 04/23/2004, 11/12/2019     Above immunizations pulled from [facility]. MIIC and facility records also reconciled. Outstanding information sent to  to update [facility].  Future immunizations needed:  yearly influenza per facility protocol    Current Outpatient Medications   Medication Sig Dispense Refill     ACETAMINOPHEN PO Take 1,000 mg by mouth 3 times daily For pain       Atorvastatin Calcium (LIPITOR PO) Take 40 mg by mouth At Bedtime       atropine 1 % ophthalmic solution Place 1 drop into the right eye 2 times daily       cholecalciferol (VITAMIN D) 1000  UNIT tablet Take 2,000 Units by mouth daily       Fluticasone Propionate (FLONASE NA) Spray 2 sprays into both nostrils daily       Insulin Glargine (BASAGLAR KWIKPEN SC) Inject 22 Units Subcutaneous At Bedtime May use 2 units as needed to prime pen before each dose.       latanoprost (XALATAN) 0.005 % ophthalmic solution Place 1 drop into the right eye At Bedtime       LEVOTHYROXINE SODIUM PO Take 25 mcg by mouth daily       polyethylene glycol (MIRALAX/GLYCOLAX) powder Take 17 g by mouth daily        prednisoLONE acetate (PRED FORTE) 1 % ophthalmic susp Place 1 drop into the right eye 3 times daily       rivaroxaban ANTICOAGULANT (XARELTO) 20 MG TABS tablet Take 1 tablet (20 mg) by mouth daily (with dinner) 30 tablet 1     tamsulosin (FLOMAX) 0.4 MG capsule Take 1 capsule (0.4 mg) by mouth daily 60 capsule 0     timolol, PF, (TIMOPTIC OCUDOSE) 0.5 % ophthalmic solution Place 1 drop into the right eye 2 times daily       traMADol (ULTRAM) 50 MG tablet TAKE 1 TAB BY MOUTH THREE TIMES DAILY AND EVERY 4 HOURS AS NEEDED FOR PAIN 120 tablet 5         Case Management:  I have reviewed the facility/SNF care plan/MDS, including the falls risk, nutrition and pain screening. I also reviewed the current immunizations, and preventive care. .Future cancer screening is not clinically indicated secondary to age/goals of care Patient's desire to return to the community is present, but is not able due to care needs . Current Level of Care is appropriate.    Advance Directive Discussion:    I reviewed the current advanced directives as reflected in EPIC, the POLST and the facility chart, and verified the congruency of orders.   I did not due to cognitive impairment review the advance directives with the resident.     Team Discussion:  I communicated with the appropriate disciplines involved with the Plan of Care:   Nursing    Patient's goal is pain control and comfort.  Information reviewed:  Medications, vital signs, orders, and  "nursing notes.    ROS:  Unobtainable secondary to cognitive impairment.     Vitals:  BP (!) 169/78   Pulse 75   Temp 97.5  F (36.4  C)   Resp 20   Ht 1.753 m (5' 9\")   Wt 72.1 kg (159 lb)   SpO2 96%   BMI 23.48 kg/m   Body mass index is 23.48 kg/m .  Exam:  GENERAL APPEARANCE:  in no distress, appears healthy  ENT:  Mouth and posterior oropharynx normal, moist mucous membranes, [place]  RESP:  lungs clear to auscultation , no respiratory distress  CV:  no edema, rate-normal  ABDOMEN:  no distension, no guarding or rebound  M/S:   Gait and station abnormal transfer assist, WC mobility  SKIN:  Inspection of skin and subcutaneous tissue baseline, few nail beds thickened  NEURO:   Cranial nerves 2-12 are normal tested and grossly at patient's baseline, Examination of sensation by touch normal  PSYCH:  oriented to self, affect and mood normal     Lab/Diagnostic data:   Labs done in SNF are in [facility]. Please refer to them using [facility]/Care Everywhere.    ASSESSMENT/PLAN  Cardiac arrest (H)  -no report of CP, VS stable  -continue lipitor, xarelto  -staff to follow up cards/pacer checks as scheduled    Malignant neoplasm of prostate (H)  -no urinary issues, urge incontinence  -continue tamsulosin  -follow up urology as indicated      Type 2 diabetes mellitus with stage 3a chronic kidney disease, without long-term current use of insulin (H)  -A1C on 3/5/20 was 6.5  -continue glargine 22u daily  -recheck A1C in 3-6 months    Hypothyroidism due to acquired atrophy of thyroid  -TSH on 3/5/20 was 3.26  -continue levothyroxine 25mcg daily  -follow up TSH in 3-6 months    Hypertension, unspecified type  -SBP's in the 110-160 range, average 130ish  -HR's in the 60-80 range  -no medication intervention indicated  -staff to check VS as scheduled        Electronically signed by:  TBOY Alejandro CNP       " Yes - the patient is able to be screened

## 2020-11-02 ENCOUNTER — NURSING HOME VISIT (OUTPATIENT)
Dept: GERIATRICS | Facility: CLINIC | Age: 85
End: 2020-11-02
Payer: COMMERCIAL

## 2020-11-02 VITALS
HEIGHT: 69 IN | HEART RATE: 75 BPM | RESPIRATION RATE: 20 BRPM | WEIGHT: 159 LBS | TEMPERATURE: 97.5 F | BODY MASS INDEX: 23.55 KG/M2 | SYSTOLIC BLOOD PRESSURE: 169 MMHG | DIASTOLIC BLOOD PRESSURE: 78 MMHG | OXYGEN SATURATION: 96 %

## 2020-11-02 DIAGNOSIS — I46.9 CARDIAC ARREST (H): Primary | ICD-10-CM

## 2020-11-02 DIAGNOSIS — E11.22 TYPE 2 DIABETES MELLITUS WITH STAGE 3A CHRONIC KIDNEY DISEASE, WITHOUT LONG-TERM CURRENT USE OF INSULIN (H): ICD-10-CM

## 2020-11-02 DIAGNOSIS — C61 MALIGNANT NEOPLASM OF PROSTATE (H): ICD-10-CM

## 2020-11-02 DIAGNOSIS — E03.4 HYPOTHYROIDISM DUE TO ACQUIRED ATROPHY OF THYROID: ICD-10-CM

## 2020-11-02 DIAGNOSIS — N18.31 TYPE 2 DIABETES MELLITUS WITH STAGE 3A CHRONIC KIDNEY DISEASE, WITHOUT LONG-TERM CURRENT USE OF INSULIN (H): ICD-10-CM

## 2020-11-02 DIAGNOSIS — I10 HYPERTENSION, UNSPECIFIED TYPE: ICD-10-CM

## 2020-11-02 PROCEDURE — 99309 SBSQ NF CARE MODERATE MDM 30: CPT | Mod: GV | Performed by: NURSE PRACTITIONER

## 2020-11-02 ASSESSMENT — MIFFLIN-ST. JEOR: SCORE: 1376.6

## 2020-11-02 NOTE — LETTER
11/2/2020        RE: [facility]  [address]  [address]  [place] [address]        [facility]  Chief Complaint   Patient presents with     Annual Comprehensive Nursing Home     [facility] Medical Record Number: [id number]  Place of Service where encounter took place[facility] [[id number]]    HPI:    Elias Mckee is a 89 year old (8/10/1931), who is being seen today for an annual comprehensive visit. HPI information obtained from: facility chart records, facility staff, patient report and [facility] chart review. Today's concerns are:     Cardiac arrest (H)  Malignant neoplasm of prostate (H)  Type 2 diabetes mellitus with stage 3a chronic kidney disease, without long-term current use of insulin (H)  Hypothyroidism due to acquired atrophy of thyroid  Hypertension, unspecified type     Patient seen today lying in bed, per staff has intermittent outbursts but generally redirectable, labs reviewed and recent A1C 6.5, TSH 3.26, GFR still >60, medications reviewed as appropriate, no recent use of nitroglycerin, no apparent chest pain, SBP's in the 110-160 range.      ALLERGIES: Terazosin  PAST MEDICAL HISTORY:  has a past medical history of Acute, but ill-defined, cerebrovascular disease (1-2008), Atrial fibrillation (H), Benign hypertension with CKD (chronic kidney disease) stage III (H) (6/5/2017), Cancer (H), Cardiac pacemaker in situ, Placed on 7/20/18 (7/23/2018), Central retinal vein occlusion, right eye (11/15/2017), Chronic atrial fibrillation (6/5/2017), Chronic ischemic heart disease, unspecified, CKD (chronic kidney disease) stage 3, GFR 30-59 ml/min, est GFR: 10/30/17: 46,, 12/14/17: 59 (2/29/2012), Cognitive deficits as late effect of cerebrovascular disease (6/5/2017), Coronary artery disease, CVA (cerebral infarction), Dysphagia due to recent cerebrovascular accident (CVA) (6/5/2017), Elevated cholesterol, Essential  hypertension, benign, H/O prostate cancer (7/6/2017), H/O: CVA (cerebrovascular accident) (6/5/2017), Hemiparesis affecting left side as late effect of cerebrovascular accident (H) (6/5/2017), Hyperlipidaemia, Loss of weight (10/2/2018), MEDICAL HISTORY OF - (1- 2008), Mobitz type I Wenckebach atrioventricular block, 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  PAST SURGICAL HISTORY:  has a past surgical history that includes seed implantation (2002); Phacoemulsification clear cornea with standard intraocular lens implant (Right, 10/7/2014); and Vitrectomy anterior (Right, 10/7/2014).  IMMUNIZATIONS:  Immunization History   Administered Date(s) Administered     Flu, Unspecified 12/13/2006     Influenza (High Dose) 3 valent vaccine 10/05/2017     Influenza (IIV3) PF 01/22/2013, 10/15/2018, 10/16/2019     Pneumo Conj 13-V (2010&after) 10/19/2017     Pneumococcal 23 valent 09/10/2008     Pneumococcal, Unspecified 04/23/2004     TD (ADULT, 7+) 09/10/2008     Tdap (Adult) Unspecified Formulation 04/23/2004, 11/12/2019     Above immunizations pulled from [email or web address]. MIIC and facility records also reconciled. Outstanding information sent to  to update [facility].  Future immunizations needed:  yearly influenza per facility protocol    Current Outpatient Medications   Medication Sig Dispense Refill     ACETAMINOPHEN PO Take 1,000 mg by mouth 3 times daily For pain       Atorvastatin Calcium (LIPITOR PO) Take 40 mg by mouth  At Bedtime       atropine 1 % ophthalmic solution Place 1 drop into the right eye 2 times daily       cholecalciferol (VITAMIN D) 1000 UNIT tablet Take 2,000 Units by mouth daily       Fluticasone Propionate (FLONASE NA) Spray 2 sprays into both nostrils daily       Insulin Glargine (BASAGLAR KWIKPEN SC) Inject 22 Units Subcutaneous At Bedtime May use 2 units as needed to prime pen before each dose.       latanoprost (XALATAN) 0.005 % ophthalmic solution Place 1 drop into the right eye At Bedtime       LEVOTHYROXINE SODIUM PO Take 25 mcg by mouth daily       polyethylene glycol (MIRALAX/GLYCOLAX) powder Take 17 g by mouth daily        prednisoLONE acetate (PRED FORTE) 1 % ophthalmic susp Place 1 drop into the right eye 3 times daily       rivaroxaban ANTICOAGULANT (XARELTO) 20 MG TABS tablet Take 1 tablet (20 mg) by mouth daily (with dinner) 30 tablet 1     tamsulosin (FLOMAX) 0.4 MG capsule Take 1 capsule (0.4 mg) by mouth daily 60 capsule 0     timolol, PF, (TIMOPTIC OCUDOSE) 0.5 % ophthalmic solution Place 1 drop into the right eye 2 times daily       traMADol (ULTRAM) 50 MG tablet TAKE 1 TAB BY MOUTH THREE TIMES DAILY AND EVERY 4 HOURS AS NEEDED FOR PAIN 120 tablet 5         Case Management:  I have reviewed the facility/SNF care plan/MDS, including the falls risk, nutrition and pain screening. I also reviewed the current immunizations, and preventive care. .Future cancer screening is not clinically indicated secondary to age/goals of care Patient's desire to return to the community is present, but is not able due to care needs . Current Level of Care is appropriate.    Advance Directive Discussion:    I reviewed the current advanced directives as reflected in EPIC, the POLST and the facility chart, and verified the congruency of orders.   I did not due to cognitive impairment review the advance directives with the resident.     Team Discussion:  I communicated with the appropriate disciplines involved with the  "Plan of Care:   Nursing    Patient's goal is pain control and comfort.  Information reviewed:  Medications, vital signs, orders, and nursing notes.    ROS:  Unobtainable secondary to cognitive impairment.     Vitals:  BP (!) 169/78   Pulse 75   Temp 97.5  F (36.4  C)   Resp 20   Ht 1.753 m (5' 9\")   Wt 72.1 kg (159 lb)   SpO2 96%   BMI 23.48 kg/m   Body mass index is 23.48 kg/m .  Exam:  GENERAL APPEARANCE:  in no distress, appears healthy  ENT:  Mouth and posterior oropharynx normal, moist mucous membranes, [place]  RESP:  lungs clear to auscultation , no respiratory distress  CV:  no edema, rate-normal  ABDOMEN:  no distension, no guarding or rebound  M/S:   Gait and station abnormal transfer assist, WC mobility  SKIN:  Inspection of skin and subcutaneous tissue baseline, few nail beds thickened  NEURO:   Cranial nerves 2-12 are normal tested and grossly at patient's baseline, Examination of sensation by touch normal  PSYCH:  oriented to self, affect and mood normal     Lab/Diagnostic data:   Labs done in SNF are in [place] EPIC. Please refer to them using [facility]/Care Everywhere.    ASSESSMENT/PLAN  Cardiac arrest (H)  -no report of CP, VS stable  -continue lipitor, xarelto  -staff to follow up cards/pacer checks as scheduled    Malignant neoplasm of prostate (H)  -no urinary issues, urge incontinence  -continue tamsulosin  -follow up urology as indicated      Type 2 diabetes mellitus with stage 3a chronic kidney disease, without long-term current use of insulin (H)  -A1C on 3/5/20 was 6.5  -continue glargine 22u daily  -recheck A1C in 3-6 months    Hypothyroidism due to acquired atrophy of thyroid  -TSH on 3/5/20 was 3.26  -continue levothyroxine 25mcg daily  -follow up TSH in 3-6 months    Hypertension, unspecified type  -SBP's in the 110-160 range, average 130ish  -HR's in the 60-80 range  -no medication intervention indicated  -staff to check VS as scheduled        Electronically signed by:  Vitaliy Trejo " TOBY Champagne CNP             Sincerely,        TOBY Alejandro CNP

## 2020-11-12 ENCOUNTER — DOCUMENTATION ONLY (OUTPATIENT)
Dept: CARDIOLOGY | Facility: CLINIC | Age: 85
End: 2020-11-12

## 2020-11-16 ENCOUNTER — ANCILLARY PROCEDURE (OUTPATIENT)
Dept: CARDIOLOGY | Facility: CLINIC | Age: 85
End: 2020-11-16
Attending: INTERNAL MEDICINE
Payer: COMMERCIAL

## 2020-11-16 DIAGNOSIS — I44.1 MOBITZ TYPE I WENCKEBACH ATRIOVENTRICULAR BLOCK: ICD-10-CM

## 2020-11-16 DIAGNOSIS — Z95.0 CARDIAC PACEMAKER IN SITU: ICD-10-CM

## 2020-11-16 ASSESSMENT — MIFFLIN-ST. JEOR: SCORE: 1376.6

## 2020-11-17 PROCEDURE — 93294 REM INTERROG EVL PM/LDLS PM: CPT | Performed by: INTERNAL MEDICINE

## 2020-11-17 PROCEDURE — 93296 REM INTERROG EVL PM/IDS: CPT | Performed by: INTERNAL MEDICINE

## 2020-11-23 ENCOUNTER — NURSING HOME VISIT (OUTPATIENT)
Dept: GERIATRICS | Facility: CLINIC | Age: 85
End: 2020-11-23
Payer: COMMERCIAL

## 2020-11-23 VITALS
BODY MASS INDEX: 23.55 KG/M2 | SYSTOLIC BLOOD PRESSURE: 151 MMHG | TEMPERATURE: 98 F | OXYGEN SATURATION: 94 % | RESPIRATION RATE: 20 BRPM | HEART RATE: 89 BPM | WEIGHT: 159 LBS | HEIGHT: 69 IN | DIASTOLIC BLOOD PRESSURE: 71 MMHG

## 2020-11-23 DIAGNOSIS — M24.562 FLEXION CONTRACTURE OF JOINT OF LOWER LEG, LEFT: ICD-10-CM

## 2020-11-23 DIAGNOSIS — I69.354 HEMIPARESIS AFFECTING LEFT SIDE AS LATE EFFECT OF CEREBROVASCULAR ACCIDENT (H): Primary | ICD-10-CM

## 2020-11-23 DIAGNOSIS — R44.3 HALLUCINATIONS: ICD-10-CM

## 2020-11-23 DIAGNOSIS — R52 GENERALIZED PAIN: ICD-10-CM

## 2020-11-23 PROCEDURE — 99309 SBSQ NF CARE MODERATE MDM 30: CPT | Mod: GV | Performed by: NURSE PRACTITIONER

## 2020-11-23 RX ORDER — QUETIAPINE FUMARATE 25 MG/1
12.5 TABLET, FILM COATED ORAL AT BEDTIME
Qty: 30 TABLET | Refills: 0
Start: 2020-11-23 | End: 2021-01-18

## 2020-11-23 ASSESSMENT — MIFFLIN-ST. JEOR: SCORE: 1353.92

## 2020-11-23 NOTE — LETTER
11/23/2020        RE: [facility]  [address]  [address]  [place] [address]        [facility]  [place] Medical Record Number: [id number]  [facility] where encounter took place[facility] [[id number]]  Chief Complaint   Patient presents with     RECHECK       HPI:    Elias Mckee is a 89 year old (8/10/1931), who is being seen today for an episodic care visit. HPI information obtained from: facility chart records, facility staff, patient report and [facility] chart review. 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     Past Medical and Surgical History reviewed in Epic today.    MEDICATIONS:    Current Outpatient Medications   Medication Sig Dispense Refill     QUEtiapine (SEROQUEL) 25 MG tablet Take 0.5 tablets (12.5 mg) by mouth At Bedtime 30 tablet 0     ACETAMINOPHEN PO Take 1,000 mg by mouth 3 times daily For pain       Atorvastatin Calcium (LIPITOR PO) Take 40 mg by mouth At Bedtime       atropine 1 % ophthalmic solution Place 1 drop into the right eye 2 times daily       cholecalciferol (VITAMIN D) 1000 UNIT tablet Take 2,000 Units by mouth daily       Fluticasone Propionate (FLONASE NA) Spray 2 sprays into both nostrils daily       Insulin Glargine (BASAGLAR KWIKPEN SC) Inject 22 Units Subcutaneous At Bedtime May use 2 units as needed to prime pen before each dose.       latanoprost (XALATAN) 0.005 %  "ophthalmic solution Place 1 drop into the right eye At Bedtime       LEVOTHYROXINE SODIUM PO Take 25 mcg by mouth daily       polyethylene glycol (MIRALAX/GLYCOLAX) powder Take 17 g by mouth daily        prednisoLONE acetate (PRED FORTE) 1 % ophthalmic susp Place 1 drop into the right eye 3 times daily       rivaroxaban ANTICOAGULANT (XARELTO) 20 MG TABS tablet Take 1 tablet (20 mg) by mouth daily (with dinner) 30 tablet 1     tamsulosin (FLOMAX) 0.4 MG capsule Take 1 capsule (0.4 mg) by mouth daily 60 capsule 0     timolol, PF, (TIMOPTIC OCUDOSE) 0.5 % ophthalmic solution Place 1 drop into the right eye 2 times daily       traMADol (ULTRAM) 50 MG tablet TAKE 1 TAB BY MOUTH THREE TIMES DAILY AND EVERY 4 HOURS AS NEEDED FOR PAIN 120 tablet 5     REVIEW OF SYSTEMS:  4 point ROS including Respiratory, CV, GI and , other than that noted in the HPI,  is negative    Objective:  BP (!) 151/71   Pulse 89   Temp 98  F (36.7  C)   Resp 20   Ht 1.753 m (5' 9\")   Wt 72.1 kg (159 lb)   SpO2 94%   BMI 23.48 kg/m    Exam:  GENERAL APPEARANCE:  in no distress, appears healthy  ENT:  Mouth and posterior oropharynx normal, moist mucous membranes  RESP:  lungs clear to auscultation   CV:  no edema, rate-normal  ABDOMEN:  bowel sounds normal  M/S:   Gait and station abnormal full transfer assist  SKIN:  Inspection of skin and subcutaneous tissue baseline  NEURO:   Examination of sensation by touch normal  PSYCH:  oriented X 3, memory impaired     Labs:   Labs done in SNF are in [place] EPIC. Please refer to them using [facility]/Care Everywhere.    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

## 2020-11-23 NOTE — PROGRESS NOTES
[place] [facility]  [place] [facility] Record Number: [id number]  Place of Service where encounter took place: [facility] ([facility]) [[id number]]  Chief Complaint   Patient presents with     RECHECK       HPI:    Elias Mckee is a 89 year old (8/10/1931), who is being seen today for an episodic care visit. HPI information obtained from: facility chart records, facility staff, patient report and [facility] chart review. 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     Past Medical and Surgical History reviewed in Epic today.    MEDICATIONS:    Current Outpatient Medications   Medication Sig Dispense Refill     QUEtiapine (SEROQUEL) 25 MG tablet Take 0.5 tablets (12.5 mg) by mouth At Bedtime 30 tablet 0     ACETAMINOPHEN PO Take 1,000 mg by mouth 3 times daily For pain       Atorvastatin Calcium (LIPITOR PO) Take 40 mg by mouth At Bedtime       atropine 1 % ophthalmic solution Place 1 drop into the right eye 2 times daily       cholecalciferol (VITAMIN D) 1000 UNIT tablet Take 2,000 Units by mouth daily       Fluticasone Propionate (FLONASE NA) Spray 2 sprays into both nostrils daily       Insulin Glargine (BASAGLAR KWIKPEN SC) Inject 22 Units Subcutaneous At Bedtime May use 2 units as needed to prime pen before each dose.       latanoprost (XALATAN) 0.005 % ophthalmic solution Place 1 drop into the right eye At Bedtime       LEVOTHYROXINE SODIUM PO Take 25 mcg by mouth daily        "polyethylene glycol (MIRALAX/GLYCOLAX) powder Take 17 g by mouth daily        prednisoLONE acetate (PRED FORTE) 1 % ophthalmic susp Place 1 drop into the right eye 3 times daily       rivaroxaban ANTICOAGULANT (XARELTO) 20 MG TABS tablet Take 1 tablet (20 mg) by mouth daily (with dinner) 30 tablet 1     tamsulosin (FLOMAX) 0.4 MG capsule Take 1 capsule (0.4 mg) by mouth daily 60 capsule 0     timolol, PF, (TIMOPTIC OCUDOSE) 0.5 % ophthalmic solution Place 1 drop into the right eye 2 times daily       traMADol (ULTRAM) 50 MG tablet TAKE 1 TAB BY MOUTH THREE TIMES DAILY AND EVERY 4 HOURS AS NEEDED FOR PAIN 120 tablet 5     REVIEW OF SYSTEMS:  4 point ROS including Respiratory, CV, GI and , other than that noted in the HPI,  is negative    Objective:  BP (!) 151/71   Pulse 89   Temp 98  F (36.7  C)   Resp 20   Ht 1.753 m (5' 9\")   Wt 72.1 kg (159 lb)   SpO2 94%   BMI 23.48 kg/m    Exam:  GENERAL APPEARANCE:  in no distress, appears healthy  ENT:  Mouth and posterior oropharynx normal, moist mucous membranes  RESP:  lungs clear to auscultation   CV:  no edema, rate-normal  ABDOMEN:  bowel sounds normal  M/S:   Gait and station abnormal full transfer assist  SKIN:  Inspection of skin and subcutaneous tissue baseline  NEURO:   Examination of sensation by touch normal  PSYCH:  oriented X 3, memory impaired     Labs:   Labs done in SNF are in [place] EPIC. Please refer to them using [facility]/Care Everywhere.    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

## 2020-11-24 LAB
MDC_IDC_EPISODE_DTM: NORMAL
MDC_IDC_EPISODE_ID: NORMAL
MDC_IDC_EPISODE_TYPE: NORMAL
MDC_IDC_LEAD_IMPLANT_DT: NORMAL
MDC_IDC_LEAD_IMPLANT_DT: NORMAL
MDC_IDC_LEAD_LOCATION: NORMAL
MDC_IDC_LEAD_LOCATION: NORMAL
MDC_IDC_LEAD_LOCATION_DETAIL_1: NORMAL
MDC_IDC_LEAD_LOCATION_DETAIL_1: NORMAL
MDC_IDC_LEAD_MFG: NORMAL
MDC_IDC_LEAD_MFG: NORMAL
MDC_IDC_LEAD_MODEL: NORMAL
MDC_IDC_LEAD_MODEL: NORMAL
MDC_IDC_LEAD_POLARITY_TYPE: NORMAL
MDC_IDC_LEAD_POLARITY_TYPE: NORMAL
MDC_IDC_LEAD_SERIAL: NORMAL
MDC_IDC_LEAD_SERIAL: NORMAL
MDC_IDC_MSMT_BATTERY_DTM: NORMAL
MDC_IDC_MSMT_BATTERY_REMAINING_LONGEVITY: 96 MO
MDC_IDC_MSMT_BATTERY_REMAINING_PERCENTAGE: 100 %
MDC_IDC_MSMT_BATTERY_STATUS: NORMAL
MDC_IDC_MSMT_LEADCHNL_RA_IMPEDANCE_VALUE: 682 OHM
MDC_IDC_MSMT_LEADCHNL_RA_PACING_THRESHOLD_AMPLITUDE: 0.6 V
MDC_IDC_MSMT_LEADCHNL_RA_PACING_THRESHOLD_PULSEWIDTH: 0.4 MS
MDC_IDC_MSMT_LEADCHNL_RV_IMPEDANCE_VALUE: 736 OHM
MDC_IDC_MSMT_LEADCHNL_RV_PACING_THRESHOLD_AMPLITUDE: 0.9 V
MDC_IDC_MSMT_LEADCHNL_RV_PACING_THRESHOLD_PULSEWIDTH: 0.4 MS
MDC_IDC_PG_IMPLANT_DTM: NORMAL
MDC_IDC_PG_MFG: NORMAL
MDC_IDC_PG_MODEL: NORMAL
MDC_IDC_PG_SERIAL: NORMAL
MDC_IDC_PG_TYPE: NORMAL
MDC_IDC_SESS_CLINIC_NAME: NORMAL
MDC_IDC_SESS_DTM: NORMAL
MDC_IDC_SESS_TYPE: NORMAL
MDC_IDC_SET_BRADY_AT_MODE_SWITCH_MODE: NORMAL
MDC_IDC_SET_BRADY_AT_MODE_SWITCH_RATE: 170 {BEATS}/MIN
MDC_IDC_SET_BRADY_LOWRATE: 60 {BEATS}/MIN
MDC_IDC_SET_BRADY_MAX_TRACKING_RATE: 120 {BEATS}/MIN
MDC_IDC_SET_BRADY_MODE: NORMAL
MDC_IDC_SET_BRADY_PAV_DELAY_HIGH: 200 MS
MDC_IDC_SET_BRADY_PAV_DELAY_LOW: 300 MS
MDC_IDC_SET_BRADY_SAV_DELAY_HIGH: 200 MS
MDC_IDC_SET_BRADY_SAV_DELAY_LOW: 300 MS
MDC_IDC_SET_LEADCHNL_RA_PACING_AMPLITUDE: 2 V
MDC_IDC_SET_LEADCHNL_RA_PACING_CAPTURE_MODE: NORMAL
MDC_IDC_SET_LEADCHNL_RA_PACING_POLARITY: NORMAL
MDC_IDC_SET_LEADCHNL_RA_PACING_PULSEWIDTH: 0.4 MS
MDC_IDC_SET_LEADCHNL_RA_SENSING_ADAPTATION_MODE: NORMAL
MDC_IDC_SET_LEADCHNL_RA_SENSING_POLARITY: NORMAL
MDC_IDC_SET_LEADCHNL_RA_SENSING_SENSITIVITY: 0.25 MV
MDC_IDC_SET_LEADCHNL_RV_PACING_AMPLITUDE: 1.3 V
MDC_IDC_SET_LEADCHNL_RV_PACING_CAPTURE_MODE: NORMAL
MDC_IDC_SET_LEADCHNL_RV_PACING_POLARITY: NORMAL
MDC_IDC_SET_LEADCHNL_RV_PACING_PULSEWIDTH: 0.4 MS
MDC_IDC_SET_LEADCHNL_RV_SENSING_ADAPTATION_MODE: NORMAL
MDC_IDC_SET_LEADCHNL_RV_SENSING_POLARITY: NORMAL
MDC_IDC_SET_LEADCHNL_RV_SENSING_SENSITIVITY: 1.5 MV
MDC_IDC_SET_ZONE_DETECTION_INTERVAL: 375 MS
MDC_IDC_SET_ZONE_TYPE: NORMAL
MDC_IDC_SET_ZONE_VENDOR_TYPE: NORMAL
MDC_IDC_STAT_AT_BURDEN_PERCENT: 3 %
MDC_IDC_STAT_AT_DTM_END: NORMAL
MDC_IDC_STAT_AT_DTM_START: NORMAL
MDC_IDC_STAT_BRADY_DTM_END: NORMAL
MDC_IDC_STAT_BRADY_DTM_START: NORMAL
MDC_IDC_STAT_BRADY_RA_PERCENT_PACED: 1 %
MDC_IDC_STAT_BRADY_RV_PERCENT_PACED: 10 %
MDC_IDC_STAT_EPISODE_RECENT_COUNT: 0
MDC_IDC_STAT_EPISODE_RECENT_COUNT: 2
MDC_IDC_STAT_EPISODE_RECENT_COUNT: 28
MDC_IDC_STAT_EPISODE_RECENT_COUNT_DTM_END: NORMAL
MDC_IDC_STAT_EPISODE_RECENT_COUNT_DTM_START: NORMAL
MDC_IDC_STAT_EPISODE_TYPE: NORMAL
MDC_IDC_STAT_EPISODE_VENDOR_TYPE: NORMAL

## 2020-11-30 ENCOUNTER — NURSING HOME VISIT (OUTPATIENT)
Dept: GERIATRICS | Facility: CLINIC | Age: 85
End: 2020-11-30
Payer: COMMERCIAL

## 2020-11-30 VITALS
OXYGEN SATURATION: 94 % | HEIGHT: 69 IN | RESPIRATION RATE: 20 BRPM | WEIGHT: 154 LBS | SYSTOLIC BLOOD PRESSURE: 131 MMHG | HEART RATE: 90 BPM | TEMPERATURE: 97.5 F | DIASTOLIC BLOOD PRESSURE: 62 MMHG | BODY MASS INDEX: 22.81 KG/M2

## 2020-11-30 DIAGNOSIS — R44.3 HALLUCINATIONS: ICD-10-CM

## 2020-11-30 DIAGNOSIS — M24.562: ICD-10-CM

## 2020-11-30 DIAGNOSIS — I69.354 HEMIPARESIS AFFECTING LEFT SIDE AS LATE EFFECT OF CEREBROVASCULAR ACCIDENT (H): Primary | ICD-10-CM

## 2020-11-30 PROCEDURE — 99309 SBSQ NF CARE MODERATE MDM 30: CPT | Mod: GW | Performed by: NURSE PRACTITIONER

## 2020-11-30 ASSESSMENT — MIFFLIN-ST. JEOR: SCORE: 1353.92

## 2020-11-30 NOTE — PROGRESS NOTES
[place] [facility]  [place] Medical Record Number: [id number]  Place of Service where encounter took place: [facility] ([facility]) [[id number]]  Chief Complaint   Patient presents with     RECHECK       HPI:    Elias Mckee is a 89 year old (8/10/1931), who is being seen today for an episodic care visit. HPI information obtained from: facility chart records, facility staff and patient report. 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    Past Medical and Surgical History reviewed in Epic today.    MEDICATIONS:    Current Outpatient Medications   Medication Sig Dispense Refill     ACETAMINOPHEN PO Take 1,000 mg by mouth 3 times daily For pain       Atorvastatin Calcium (LIPITOR PO) Take 40 mg by mouth At Bedtime       atropine 1 % ophthalmic solution Place 1 drop into the right eye 2 times daily       cholecalciferol (VITAMIN D) 1000 UNIT tablet Take 2,000 Units by mouth daily       Fluticasone Propionate (FLONASE NA) Spray 2 sprays into both nostrils daily       Insulin Glargine (BASAGLAR KWIKPEN SC) Inject 22 Units Subcutaneous At Bedtime May use 2 units as needed to prime pen before each dose.       latanoprost (XALATAN) 0.005 % ophthalmic solution Place 1 drop into the right eye At Bedtime       LEVOTHYROXINE SODIUM PO Take 25 mcg by mouth daily       polyethylene glycol (MIRALAX/GLYCOLAX) powder Take 17 g by mouth daily        prednisoLONE acetate (PRED FORTE) 1 % ophthalmic susp Place 1 drop into the right eye 3 times daily       QUEtiapine (SEROQUEL) 25 MG tablet Take  "0.5 tablets (12.5 mg) by mouth At Bedtime 30 tablet 0     rivaroxaban ANTICOAGULANT (XARELTO) 20 MG TABS tablet Take 1 tablet (20 mg) by mouth daily (with dinner) 30 tablet 1     tamsulosin (FLOMAX) 0.4 MG capsule Take 1 capsule (0.4 mg) by mouth daily 60 capsule 0     timolol, PF, (TIMOPTIC OCUDOSE) 0.5 % ophthalmic solution Place 1 drop into the right eye 2 times daily       traMADol (ULTRAM) 50 MG tablet TAKE 1 TAB BY MOUTH THREE TIMES DAILY AND EVERY 4 HOURS AS NEEDED FOR PAIN 120 tablet 5     REVIEW OF SYSTEMS:  4 point ROS including Respiratory, CV, GI and , other than that noted in the HPI,  is negative    Objective:  /62   Pulse 90   Temp 97.5  F (36.4  C)   Resp 20   Ht 1.753 m (5' 9\")   Wt 69.9 kg (154 lb)   SpO2 94%   BMI 22.74 kg/m    Exam:  GENERAL APPEARANCE:  in no distress, appears healthy  ENT:  Mouth and posterior oropharynx normal, moist mucous membranes  RESP:  lungs clear to auscultation   CV:  peripheral edema scant+ in R lower leg, rate-normal  ABDOMEN:  bowel sounds normal  M/S:   Gait and station abnormal full transfer assist  SKIN:  Inspection of skin and subcutaneous tissue baseline  NEURO:   Examination of sensation by touch normal  PSYCH:  oriented X 3, memory impaired     Labs:   Labs done in SNF are in [place] EPIC. Please refer to them using EPIC/Care Everywhere.    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

## 2020-11-30 NOTE — LETTER
11/30/2020        RE: [facility]  [address]  [address]  [place] [address]        [place] [facility]  [place] Medical Record Number: [id number]  [facility] of Service where encounter took place[facility] [[id number]]  Chief Complaint   Patient presents with     RECHECK       HPI:    Elias Mckee is a 89 year old (8/10/1931), who is being seen today for an episodic care visit. HPI information obtained from: facility chart records, facility staff and patient report. 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    Past Medical and Surgical History reviewed in Epic today.    MEDICATIONS:    Current Outpatient Medications   Medication Sig Dispense Refill     ACETAMINOPHEN PO Take 1,000 mg by mouth 3 times daily For pain       Atorvastatin Calcium (LIPITOR PO) Take 40 mg by mouth At Bedtime       atropine 1 % ophthalmic solution Place 1 drop into the right eye 2 times daily       cholecalciferol (VITAMIN D) 1000 UNIT tablet Take 2,000 Units by mouth daily       Fluticasone Propionate (FLONASE NA) Spray 2 sprays into both nostrils daily       Insulin Glargine (BASAGLAR KWIKPEN SC) Inject 22 Units Subcutaneous At Bedtime May use 2 units as needed to prime pen before each dose.       latanoprost (XALATAN) 0.005 % ophthalmic solution Place 1 drop into the right eye At Bedtime       LEVOTHYROXINE SODIUM PO Take 25 mcg by mouth daily       polyethylene glycol (MIRALAX/GLYCOLAX) powder Take 17 g by mouth daily         "prednisoLONE acetate (PRED FORTE) 1 % ophthalmic susp Place 1 drop into the right eye 3 times daily       QUEtiapine (SEROQUEL) 25 MG tablet Take 0.5 tablets (12.5 mg) by mouth At Bedtime 30 tablet 0     rivaroxaban ANTICOAGULANT (XARELTO) 20 MG TABS tablet Take 1 tablet (20 mg) by mouth daily (with dinner) 30 tablet 1     tamsulosin (FLOMAX) 0.4 MG capsule Take 1 capsule (0.4 mg) by mouth daily 60 capsule 0     timolol, PF, (TIMOPTIC OCUDOSE) 0.5 % ophthalmic solution Place 1 drop into the right eye 2 times daily       traMADol (ULTRAM) 50 MG tablet TAKE 1 TAB BY MOUTH THREE TIMES DAILY AND EVERY 4 HOURS AS NEEDED FOR PAIN 120 tablet 5     REVIEW OF SYSTEMS:  4 point ROS including Respiratory, CV, GI and , other than that noted in the HPI,  is negative    Objective:  /62   Pulse 90   Temp 97.5  F (36.4  C)   Resp 20   Ht 1.753 m (5' 9\")   Wt 69.9 kg (154 lb)   SpO2 94%   BMI 22.74 kg/m    Exam:  GENERAL APPEARANCE:  in no distress, appears healthy  ENT:  Mouth and posterior oropharynx normal, moist mucous membranes  RESP:  lungs clear to auscultation   CV:  peripheral edema scant+ in R lower leg, rate-normal  ABDOMEN:  bowel sounds normal  M/S:   Gait and station abnormal full transfer assist  SKIN:  Inspection of skin and subcutaneous tissue baseline  NEURO:   Examination of sensation by touch normal  PSYCH:  oriented X 3, memory impaired     Labs:   Labs done in SNF are in [place] EPIC. Please refer to them using EPIC/Care Everywhere.    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

## 2020-12-01 ENCOUNTER — TRANSFERRED RECORDS (OUTPATIENT)
Dept: HEALTH INFORMATION MANAGEMENT | Facility: CLINIC | Age: 85
End: 2020-12-01

## 2020-12-02 ENCOUNTER — NURSING HOME VISIT (OUTPATIENT)
Dept: GERIATRICS | Facility: CLINIC | Age: 85
End: 2020-12-02
Payer: COMMERCIAL

## 2020-12-02 ENCOUNTER — TELEPHONE (OUTPATIENT)
Dept: GERIATRICS | Facility: CLINIC | Age: 85
End: 2020-12-02

## 2020-12-02 VITALS
OXYGEN SATURATION: 97 % | WEIGHT: 154 LBS | SYSTOLIC BLOOD PRESSURE: 124 MMHG | TEMPERATURE: 97.1 F | DIASTOLIC BLOOD PRESSURE: 67 MMHG | RESPIRATION RATE: 20 BRPM | HEIGHT: 69 IN | BODY MASS INDEX: 22.81 KG/M2 | HEART RATE: 94 BPM

## 2020-12-02 DIAGNOSIS — M79.662 PAIN OF LEFT LOWER LEG: ICD-10-CM

## 2020-12-02 DIAGNOSIS — M24.562 FLEXION CONTRACTURE OF JOINT OF LOWER LEG, LEFT: Primary | ICD-10-CM

## 2020-12-02 DIAGNOSIS — M54.2 NECK PAIN: ICD-10-CM

## 2020-12-02 DIAGNOSIS — S82.51XG: ICD-10-CM

## 2020-12-02 PROCEDURE — 99309 SBSQ NF CARE MODERATE MDM 30: CPT | Mod: GW | Performed by: NURSE PRACTITIONER

## 2020-12-02 RX ORDER — TRAMADOL HYDROCHLORIDE 50 MG/1
50 TABLET ORAL 3 TIMES DAILY
Qty: 120 TABLET | Refills: 5
Start: 2020-12-02 | End: 2021-01-18

## 2020-12-02 NOTE — TELEPHONE ENCOUNTER
Ortho consult:    Elias Mckee is a 89 year old male who resides at [facility]    Patient has hs of increased Left LE pain, with noted contracture: Hs of CVA; x-rays were taken to R/O any acute findings related to increased pain>      Past Medical History:   Diagnosis Date     Acute, but ill-defined, cerebrovascular disease 1-2008    Left hemiparesis, left side neglect     Atrial fibrillation (H)      Benign hypertension with CKD (chronic kidney disease) stage III 6/5/2017     Cancer (H)      Cardiac pacemaker in situ, Placed on 7/20/18 7/23/2018     Central retinal vein occlusion, right eye 11/15/2017     Chronic atrial fibrillation (H) 6/5/2017     Chronic ischemic heart disease, unspecified      CKD (chronic kidney disease) stage 3, GFR 30-59 ml/min, est GFR: 10/30/17: 46,, 12/14/17: 59 2/29/2012     Cognitive deficits as late effect of cerebrovascular disease 6/5/2017     Coronary artery disease      CVA (cerebral infarction)      Dysphagia due to recent cerebrovascular accident (CVA) 6/5/2017     Elevated cholesterol      Essential hypertension, benign      H/O prostate cancer 7/6/2017     H/O: CVA (cerebrovascular accident) 6/5/2017     Hemiparesis affecting left side as late effect of cerebrovascular accident (H) 6/5/2017     Hyperlipidaemia      Loss of weight 10/2/2018     MEDICAL HISTORY OF - 1- 2008    V fib arrest      Mobitz type I Wenckebach atrioventricular block, 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     Myocardial infarction (H)      Onychomycosis 6/5/2017     Other and unspecified hyperlipidemia      PVD (peripheral vascular disease) (H) 12/12/2017     Type 2 diabetes mellitus with diabetic peripheral angiopathy with gangrene (H) 12/12/2017     Type 2 diabetes mellitus with stage 3 chronic kidney disease (H) 6/5/2017     Type II diabetes mellitus with peripheral circulatory disorder (H) 12/12/2017     Type II or unspecified type diabetes mellitus without mention of complication, not stated as uncontrolled  1-2008      Past Surgical History:   Procedure Laterality Date     PHACOEMULSIFICATION CLEAR CORNEA WITH STANDARD INTRAOCULAR LENS IMPLANT Right 10/7/2014    Procedure: PHACOEMULSIFICATION CLEAR CORNEA WITH STANDARD INTRAOCULAR LENS IMPLANT;  Surgeon: German Thomas MD;  Location:  [facility]     SEED IMPLANTATION  2002    prostate cancer     VITRECTOMY ANTERIOR Right 10/7/2014    Procedure: VITRECTOMY ANTERIOR;  Surgeon: German Thomas MD;  Location:  [facility]        Allergies   Allergen Reactions     Terazosin Other (See Comments)     Impotence      There were no vitals taken for this visit.           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

## 2020-12-02 NOTE — PROGRESS NOTES
[place] [facility]  [place] Medical Record Number: [id number]  Place of Service where encounter took place: [facility] ([facility]) [[id number]]  Chief Complaint   Patient presents with     RECHECK       HPI:    Elias Mckee is a 89 year old (8/10/1931), who is being seen today for an episodic care visit. HPI information obtained from: facility chart records, facility staff, patient report and [facility] chart review. 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, left  Pain of left lower leg  Closed avulsion fracture of medial malleolus of right tibia with delayed healing, subsequent encounter  Neck pain     Patient seen today in room, PT was going to work with L leg contractures but thought XR's indicated due to pain during therapy, XR's bony pelvis/hips WNL, l knee mild DJD, L ankle tiny chronic conunited avulsion Fx fragment at inferior surface of medial malleolus, confirmed with orthopedics this should not limit therapy, today no pain with moderate palpation of ankle, nor with pressure on tight leg tendons at knee, moved leg around, no pain faces scale but was still groggy from sleeping, no s/s distress and did not scream out today, neck able to move head without issues.    Past Medical and Surgical History reviewed in Epic today.    MEDICATIONS:    Current Outpatient Medications   Medication Sig Dispense Refill     traMADol (ULTRAM) 50 MG tablet Take 1 tablet (50 mg) by mouth 3 times daily 120 tablet 5     ACETAMINOPHEN PO Take 1,000 mg by mouth 3 times daily For pain       Atorvastatin Calcium (LIPITOR PO) Take 40 mg by mouth At Bedtime       atropine 1 % ophthalmic solution Place 1 drop into the right eye 2 times daily       cholecalciferol (VITAMIN D) 1000 UNIT tablet Take 2,000 Units by mouth daily       Fluticasone Propionate (FLONASE NA) Spray 2 sprays into both nostrils daily       Insulin Glargine (BASAGLAR KWIKPEN SC) Inject 22 Units Subcutaneous At Bedtime  "May use 2 units as needed to prime pen before each dose.       latanoprost (XALATAN) 0.005 % ophthalmic solution Place 1 drop into the right eye At Bedtime       LEVOTHYROXINE SODIUM PO Take 25 mcg by mouth daily       polyethylene glycol (MIRALAX/GLYCOLAX) powder Take 17 g by mouth daily        prednisoLONE acetate (PRED FORTE) 1 % ophthalmic susp Place 1 drop into the right eye 3 times daily       QUEtiapine (SEROQUEL) 25 MG tablet Take 0.5 tablets (12.5 mg) by mouth At Bedtime 30 tablet 0     rivaroxaban ANTICOAGULANT (XARELTO) 20 MG TABS tablet Take 1 tablet (20 mg) by mouth daily (with dinner) 30 tablet 1     tamsulosin (FLOMAX) 0.4 MG capsule Take 1 capsule (0.4 mg) by mouth daily 60 capsule 0     timolol, PF, (TIMOPTIC OCUDOSE) 0.5 % ophthalmic solution Place 1 drop into the right eye 2 times daily       REVIEW OF SYSTEMS:  Unobtainable secondary to cognitive impairment.     Objective:  /67   Pulse 94   Temp 97.1  F (36.2  C)   Resp 20   Ht 1.753 m (5' 9\")   Wt 69.9 kg (154 lb)   SpO2 97%   BMI 22.74 kg/m    Exam:  GENERAL APPEARANCE:  in no distress, appears healthy  ENT:  Mouth and posterior oropharynx normal, moist mucous membranes  RESP:  no respiratory distress  CV:  no edema  ABDOMEN:  no distension  M/S:   Gait and station abnormal transfer assist  SKIN:  Inspection of skin and subcutaneous tissue baseline  NEURO:   Examination of sensation by touch normal  PSYCH:  oriented X 3, memory impaired     Labs:   Labs done in SNF are in [place] EPIC. Please refer to them using [facility]/Care Everywhere.    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

## 2020-12-02 NOTE — LETTER
12/2/2020        RE: [facility]  [address]  [address]  [place] [address]        [place] [facility]  [place] Medical Record Number: [id number]  [facility] took place[facility] [[id number]]  Chief Complaint   Patient presents with     RECHECK       HPI:    Elias Mckee is a 89 year old (8/10/1931), who is being seen today for an episodic care visit. HPI information obtained from: facility chart records, facility staff, patient report and [facility] chart review. 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, left  Pain of left lower leg  Closed avulsion fracture of medial malleolus of right tibia with delayed healing, subsequent encounter  Neck pain     Patient seen today in room, PT was going to work with L leg contractures but thought XR's indicated due to pain during therapy, XR's bony pelvis/hips WNL, l knee mild DJD, L ankle tiny chronic conunited avulsion Fx fragment at inferior surface of medial malleolus, confirmed with orthopedics this should not limit therapy, today no pain with moderate palpation of ankle, nor with pressure on tight leg tendons at knee, moved leg around, no pain faces scale but was still groggy from sleeping, no s/s distress and did not scream out today, neck able to move head without issues.    Past Medical and Surgical History reviewed in Epic today.    MEDICATIONS:    Current Outpatient Medications   Medication Sig Dispense Refill     traMADol (ULTRAM) 50 MG tablet Take 1 tablet (50 mg) by mouth 3 times daily 120 tablet 5     ACETAMINOPHEN PO Take 1,000 mg by mouth 3 times daily For pain       Atorvastatin Calcium (LIPITOR PO) Take 40 mg by mouth At Bedtime       atropine 1 % ophthalmic solution Place 1 drop into the right eye 2 times daily       cholecalciferol (VITAMIN D) 1000 UNIT tablet Take 2,000 Units by mouth daily       Fluticasone Propionate  "(FLONASE NA) Spray 2 sprays into both nostrils daily       Insulin Glargine (BASAGLAR KWIKPEN SC) Inject 22 Units Subcutaneous At Bedtime May use 2 units as needed to prime pen before each dose.       latanoprost (XALATAN) 0.005 % ophthalmic solution Place 1 drop into the right eye At Bedtime       LEVOTHYROXINE SODIUM PO Take 25 mcg by mouth daily       polyethylene glycol (MIRALAX/GLYCOLAX) powder Take 17 g by mouth daily        prednisoLONE acetate (PRED FORTE) 1 % ophthalmic susp Place 1 drop into the right eye 3 times daily       QUEtiapine (SEROQUEL) 25 MG tablet Take 0.5 tablets (12.5 mg) by mouth At Bedtime 30 tablet 0     rivaroxaban ANTICOAGULANT (XARELTO) 20 MG TABS tablet Take 1 tablet (20 mg) by mouth daily (with dinner) 30 tablet 1     tamsulosin (FLOMAX) 0.4 MG capsule Take 1 capsule (0.4 mg) by mouth daily 60 capsule 0     timolol, PF, (TIMOPTIC OCUDOSE) 0.5 % ophthalmic solution Place 1 drop into the right eye 2 times daily       REVIEW OF SYSTEMS:  Unobtainable secondary to cognitive impairment.     Objective:  /67   Pulse 94   Temp 97.1  F (36.2  C)   Resp 20   Ht 1.753 m (5' 9\")   Wt 69.9 kg (154 lb)   SpO2 97%   BMI 22.74 kg/m    Exam:  GENERAL APPEARANCE:  in no distress, appears healthy  ENT:  Mouth and posterior oropharynx normal, moist mucous membranes  RESP:  no respiratory distress  CV:  no edema  ABDOMEN:  no distension  M/S:   Gait and station abnormal transfer assist  SKIN:  Inspection of skin and subcutaneous tissue baseline  NEURO:   Examination of sensation by touch normal  PSYCH:  oriented X 3, memory impaired     Labs:   Labs done in SNF are in [place] EPIC. Please refer to them using [facility]/Care Everywhere.    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

## 2020-12-22 NOTE — PROGRESS NOTES
[place] [facility]  [place] Medical Record Number: [id number]  Place of Service where encounter took place: [facility] ([facility]) [[id number]]  Chief Complaint   Patient presents with     RECHECK       HPI:    Elias Mckee is a 89 year old (8/10/1931), who is being seen today for an episodic care visit. HPI information obtained from: facility chart records, facility staff, patient report and [facility] chart review. 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    Past Medical and Surgical History reviewed in Epic today.    MEDICATIONS:    Current Outpatient Medications   Medication Sig Dispense Refill     ACETAMINOPHEN PO Take 1,000 mg by mouth 3 times daily For pain       Atorvastatin Calcium (LIPITOR PO) Take 40 mg by mouth At Bedtime       atropine 1 % ophthalmic solution Place 1 drop into the right eye 2 times daily       cholecalciferol (VITAMIN D) 1000 UNIT tablet Take 2,000 Units by mouth daily       Fluticasone Propionate (FLONASE NA) Spray 2 sprays into both nostrils daily       Insulin Glargine (BASAGLAR KWIKPEN SC) Inject 22 Units Subcutaneous At Bedtime May use 2 units as needed to prime pen before each dose.       latanoprost (XALATAN) 0.005 % ophthalmic solution Place 1 drop into the right eye At Bedtime       LEVOTHYROXINE SODIUM PO Take 25 mcg by mouth daily       polyethylene glycol (MIRALAX/GLYCOLAX) powder Take 17 g by mouth daily        prednisoLONE acetate (PRED FORTE) 1 % ophthalmic susp Place 1 drop into the right eye 3 times daily       QUEtiapine  "(SEROQUEL) 25 MG tablet Take 0.5 tablets (12.5 mg) by mouth At Bedtime 30 tablet 0     rivaroxaban ANTICOAGULANT (XARELTO) 20 MG TABS tablet Take 1 tablet (20 mg) by mouth daily (with dinner) 30 tablet 1     tamsulosin (FLOMAX) 0.4 MG capsule Take 1 capsule (0.4 mg) by mouth daily 60 capsule 0     timolol, PF, (TIMOPTIC OCUDOSE) 0.5 % ophthalmic solution Place 1 drop into the right eye 2 times daily       traMADol (ULTRAM) 50 MG tablet Take 1 tablet (50 mg) by mouth 3 times daily 120 tablet 5     REVIEW OF SYSTEMS:  4 point ROS including Respiratory, CV, GI and , other than that noted in the HPI,  is negative    Objective:  BP (!) 163/90   Pulse 77   Temp 97.4  F (36.3  C)   Resp 20   Ht 1.753 m (5' 9\")   Wt 70.8 kg (156 lb)   SpO2 95%   BMI 23.04 kg/m    Exam:  GENERAL APPEARANCE:  in no distress, appears healthy  ENT:  Mouth and posterior oropharynx normal, moist mucous membranes  RESP:  lungs clear to auscultation   CV:  no edema, rate-normal  ABDOMEN:  bowel sounds normal  M/S:   Gait and station abnormal WC mobility  SKIN:  Inspection of skin and subcutaneous tissue baseline  NEURO:   Examination of sensation by touch normal  PSYCH:  oriented X 3, memory impaired     Labs:   Labs done in SNF are in [place] EPIC. Please refer to them using EPIC/Care Everywhere.    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

## 2020-12-23 ENCOUNTER — NURSING HOME VISIT (OUTPATIENT)
Dept: GERIATRICS | Facility: CLINIC | Age: 85
End: 2020-12-23
Payer: COMMERCIAL

## 2020-12-23 VITALS
HEIGHT: 69 IN | DIASTOLIC BLOOD PRESSURE: 90 MMHG | WEIGHT: 156 LBS | SYSTOLIC BLOOD PRESSURE: 163 MMHG | RESPIRATION RATE: 20 BRPM | OXYGEN SATURATION: 95 % | BODY MASS INDEX: 23.11 KG/M2 | TEMPERATURE: 97.4 F | HEART RATE: 77 BPM

## 2020-12-23 DIAGNOSIS — Z91.89 AT RISK FOR ASPIRATION: Primary | ICD-10-CM

## 2020-12-23 DIAGNOSIS — I69.919 COGNITIVE DEFICITS AS LATE EFFECT OF CEREBROVASCULAR DISEASE: ICD-10-CM

## 2020-12-23 DIAGNOSIS — M24.562 FLEXION CONTRACTURE OF JOINT OF LOWER LEG, LEFT: ICD-10-CM

## 2020-12-23 DIAGNOSIS — R45.1 AGITATION: ICD-10-CM

## 2020-12-23 PROCEDURE — 99309 SBSQ NF CARE MODERATE MDM 30: CPT | Mod: GW | Performed by: NURSE PRACTITIONER

## 2020-12-23 ASSESSMENT — MIFFLIN-ST. JEOR: SCORE: 1362.99

## 2021-01-01 ENCOUNTER — TELEPHONE (OUTPATIENT)
Dept: CARDIOLOGY | Facility: CLINIC | Age: 86
End: 2021-01-01

## 2021-01-01 ENCOUNTER — ANCILLARY PROCEDURE (OUTPATIENT)
Dept: CARDIOLOGY | Facility: CLINIC | Age: 86
End: 2021-01-01
Attending: INTERNAL MEDICINE
Payer: COMMERCIAL

## 2021-01-01 ENCOUNTER — NURSING HOME VISIT (OUTPATIENT)
Dept: GERIATRICS | Facility: CLINIC | Age: 86
End: 2021-01-01
Payer: COMMERCIAL

## 2021-01-01 VITALS
WEIGHT: 147 LBS | HEART RATE: 80 BPM | DIASTOLIC BLOOD PRESSURE: 81 MMHG | BODY MASS INDEX: 21.77 KG/M2 | RESPIRATION RATE: 19 BRPM | OXYGEN SATURATION: 96 % | HEIGHT: 69 IN | TEMPERATURE: 97.4 F | SYSTOLIC BLOOD PRESSURE: 140 MMHG

## 2021-01-01 VITALS
TEMPERATURE: 98 F | OXYGEN SATURATION: 92 % | DIASTOLIC BLOOD PRESSURE: 66 MMHG | BODY MASS INDEX: 22.81 KG/M2 | WEIGHT: 154 LBS | HEIGHT: 69 IN | RESPIRATION RATE: 18 BRPM | HEART RATE: 92 BPM | SYSTOLIC BLOOD PRESSURE: 141 MMHG

## 2021-01-01 VITALS
TEMPERATURE: 97.5 F | DIASTOLIC BLOOD PRESSURE: 64 MMHG | BODY MASS INDEX: 21.92 KG/M2 | SYSTOLIC BLOOD PRESSURE: 128 MMHG | RESPIRATION RATE: 18 BRPM | OXYGEN SATURATION: 90 % | HEART RATE: 98 BPM | HEIGHT: 69 IN | WEIGHT: 148 LBS

## 2021-01-01 VITALS
SYSTOLIC BLOOD PRESSURE: 133 MMHG | RESPIRATION RATE: 18 BRPM | HEIGHT: 69 IN | OXYGEN SATURATION: 95 % | DIASTOLIC BLOOD PRESSURE: 77 MMHG | HEART RATE: 88 BPM | TEMPERATURE: 98.5 F | BODY MASS INDEX: 21.92 KG/M2 | WEIGHT: 148 LBS

## 2021-01-01 DIAGNOSIS — E44.0 MODERATE PROTEIN-CALORIE MALNUTRITION (H): Primary | ICD-10-CM

## 2021-01-01 DIAGNOSIS — Z95.0 CARDIAC PACEMAKER IN SITU: Primary | ICD-10-CM

## 2021-01-01 DIAGNOSIS — I69.354 HEMIPARESIS AFFECTING LEFT SIDE AS LATE EFFECT OF CEREBROVASCULAR ACCIDENT (H): ICD-10-CM

## 2021-01-01 DIAGNOSIS — K12.0 ORAL APHTHOUS ULCER: ICD-10-CM

## 2021-01-01 DIAGNOSIS — M54.2 NECK PAIN: ICD-10-CM

## 2021-01-01 DIAGNOSIS — R45.1 AGITATION: ICD-10-CM

## 2021-01-01 DIAGNOSIS — I10 HYPERTENSION, UNSPECIFIED TYPE: Primary | ICD-10-CM

## 2021-01-01 DIAGNOSIS — E03.4 HYPOTHYROIDISM DUE TO ACQUIRED ATROPHY OF THYROID: ICD-10-CM

## 2021-01-01 DIAGNOSIS — I44.1 AV BLOCK, 2ND DEGREE: ICD-10-CM

## 2021-01-01 DIAGNOSIS — E11.51 TYPE II DIABETES MELLITUS WITH PERIPHERAL CIRCULATORY DISORDER (H): ICD-10-CM

## 2021-01-01 DIAGNOSIS — K08.9 POOR DENTITION: ICD-10-CM

## 2021-01-01 DIAGNOSIS — I48.0 PAROXYSMAL ATRIAL FIBRILLATION (H): Primary | ICD-10-CM

## 2021-01-01 DIAGNOSIS — Z95.0 CARDIAC PACEMAKER IN SITU: ICD-10-CM

## 2021-01-01 DIAGNOSIS — F03.918 SENILE DEMENTIA WITH BEHAVIORAL DISTURBANCE (H): ICD-10-CM

## 2021-01-01 DIAGNOSIS — F03.918 SENILE DEMENTIA WITH BEHAVIORAL DISTURBANCE (H): Primary | ICD-10-CM

## 2021-01-01 DIAGNOSIS — R52 GENERALIZED PAIN: ICD-10-CM

## 2021-01-01 LAB
MDC_IDC_EPISODE_DTM: NORMAL
MDC_IDC_EPISODE_ID: NORMAL
MDC_IDC_EPISODE_TYPE: NORMAL
MDC_IDC_LEAD_IMPLANT_DT: NORMAL
MDC_IDC_LEAD_IMPLANT_DT: NORMAL
MDC_IDC_LEAD_LOCATION: NORMAL
MDC_IDC_LEAD_LOCATION: NORMAL
MDC_IDC_LEAD_LOCATION_DETAIL_1: NORMAL
MDC_IDC_LEAD_LOCATION_DETAIL_1: NORMAL
MDC_IDC_LEAD_MFG: NORMAL
MDC_IDC_LEAD_MFG: NORMAL
MDC_IDC_LEAD_MODEL: NORMAL
MDC_IDC_LEAD_MODEL: NORMAL
MDC_IDC_LEAD_POLARITY_TYPE: NORMAL
MDC_IDC_LEAD_POLARITY_TYPE: NORMAL
MDC_IDC_LEAD_SERIAL: NORMAL
MDC_IDC_LEAD_SERIAL: NORMAL
MDC_IDC_MSMT_BATTERY_DTM: NORMAL
MDC_IDC_MSMT_BATTERY_REMAINING_LONGEVITY: 90 MO
MDC_IDC_MSMT_BATTERY_REMAINING_PERCENTAGE: 100 %
MDC_IDC_MSMT_BATTERY_STATUS: NORMAL
MDC_IDC_MSMT_LEADCHNL_RA_IMPEDANCE_VALUE: 671 OHM
MDC_IDC_MSMT_LEADCHNL_RA_PACING_THRESHOLD_AMPLITUDE: 0.6 V
MDC_IDC_MSMT_LEADCHNL_RA_PACING_THRESHOLD_PULSEWIDTH: 0.4 MS
MDC_IDC_MSMT_LEADCHNL_RV_IMPEDANCE_VALUE: 777 OHM
MDC_IDC_MSMT_LEADCHNL_RV_PACING_THRESHOLD_AMPLITUDE: 1 V
MDC_IDC_MSMT_LEADCHNL_RV_PACING_THRESHOLD_PULSEWIDTH: 0.4 MS
MDC_IDC_PG_IMPLANT_DTM: NORMAL
MDC_IDC_PG_MFG: NORMAL
MDC_IDC_PG_MODEL: NORMAL
MDC_IDC_PG_SERIAL: NORMAL
MDC_IDC_PG_TYPE: NORMAL
MDC_IDC_SESS_CLINIC_NAME: NORMAL
MDC_IDC_SESS_DTM: NORMAL
MDC_IDC_SESS_TYPE: NORMAL
MDC_IDC_SET_BRADY_AT_MODE_SWITCH_MODE: NORMAL
MDC_IDC_SET_BRADY_AT_MODE_SWITCH_RATE: 170 {BEATS}/MIN
MDC_IDC_SET_BRADY_LOWRATE: 60 {BEATS}/MIN
MDC_IDC_SET_BRADY_MAX_TRACKING_RATE: 120 {BEATS}/MIN
MDC_IDC_SET_BRADY_MODE: NORMAL
MDC_IDC_SET_BRADY_PAV_DELAY_HIGH: 200 MS
MDC_IDC_SET_BRADY_PAV_DELAY_LOW: 300 MS
MDC_IDC_SET_BRADY_SAV_DELAY_HIGH: 200 MS
MDC_IDC_SET_BRADY_SAV_DELAY_LOW: 300 MS
MDC_IDC_SET_LEADCHNL_RA_PACING_AMPLITUDE: 2 V
MDC_IDC_SET_LEADCHNL_RA_PACING_CAPTURE_MODE: NORMAL
MDC_IDC_SET_LEADCHNL_RA_PACING_POLARITY: NORMAL
MDC_IDC_SET_LEADCHNL_RA_PACING_PULSEWIDTH: 0.4 MS
MDC_IDC_SET_LEADCHNL_RA_SENSING_ADAPTATION_MODE: NORMAL
MDC_IDC_SET_LEADCHNL_RA_SENSING_POLARITY: NORMAL
MDC_IDC_SET_LEADCHNL_RA_SENSING_SENSITIVITY: 0.25 MV
MDC_IDC_SET_LEADCHNL_RV_PACING_AMPLITUDE: 1.5 V
MDC_IDC_SET_LEADCHNL_RV_PACING_CAPTURE_MODE: NORMAL
MDC_IDC_SET_LEADCHNL_RV_PACING_POLARITY: NORMAL
MDC_IDC_SET_LEADCHNL_RV_PACING_PULSEWIDTH: 0.4 MS
MDC_IDC_SET_LEADCHNL_RV_SENSING_ADAPTATION_MODE: NORMAL
MDC_IDC_SET_LEADCHNL_RV_SENSING_POLARITY: NORMAL
MDC_IDC_SET_LEADCHNL_RV_SENSING_SENSITIVITY: 1.5 MV
MDC_IDC_SET_ZONE_DETECTION_INTERVAL: 375 MS
MDC_IDC_SET_ZONE_TYPE: NORMAL
MDC_IDC_SET_ZONE_VENDOR_TYPE: NORMAL
MDC_IDC_STAT_AT_BURDEN_PERCENT: 5 %
MDC_IDC_STAT_AT_DTM_END: NORMAL
MDC_IDC_STAT_AT_DTM_START: NORMAL
MDC_IDC_STAT_BRADY_DTM_END: NORMAL
MDC_IDC_STAT_BRADY_DTM_START: NORMAL
MDC_IDC_STAT_BRADY_RA_PERCENT_PACED: 1 %
MDC_IDC_STAT_BRADY_RV_PERCENT_PACED: 10 %
MDC_IDC_STAT_EPISODE_RECENT_COUNT: 0
MDC_IDC_STAT_EPISODE_RECENT_COUNT: 3
MDC_IDC_STAT_EPISODE_RECENT_COUNT: 51
MDC_IDC_STAT_EPISODE_RECENT_COUNT_DTM_END: NORMAL
MDC_IDC_STAT_EPISODE_RECENT_COUNT_DTM_START: NORMAL
MDC_IDC_STAT_EPISODE_TYPE: NORMAL
MDC_IDC_STAT_EPISODE_VENDOR_TYPE: NORMAL

## 2021-01-01 PROCEDURE — 99309 SBSQ NF CARE MODERATE MDM 30: CPT | Mod: GW | Performed by: NURSE PRACTITIONER

## 2021-01-01 PROCEDURE — 99309 SBSQ NF CARE MODERATE MDM 30: CPT | Performed by: NURSE PRACTITIONER

## 2021-01-01 PROCEDURE — 93296 REM INTERROG EVL PM/IDS: CPT | Performed by: INTERNAL MEDICINE

## 2021-01-01 PROCEDURE — 93294 REM INTERROG EVL PM/LDLS PM: CPT | Performed by: INTERNAL MEDICINE

## 2021-01-01 PROCEDURE — 99309 SBSQ NF CARE MODERATE MDM 30: CPT | Mod: GV | Performed by: NURSE PRACTITIONER

## 2021-01-01 RX ORDER — TRAMADOL HYDROCHLORIDE 50 MG/1
100 TABLET ORAL 3 TIMES DAILY
Qty: 120 TABLET | Refills: 5
Start: 2021-01-01 | End: 2022-01-01

## 2021-01-01 ASSESSMENT — MIFFLIN-ST. JEOR
SCORE: 1321.7
SCORE: 1348.92
SCORE: 1322.17
SCORE: 1321.7

## 2021-01-05 ENCOUNTER — NURSING HOME VISIT (OUTPATIENT)
Dept: GERIATRICS | Facility: CLINIC | Age: 86
End: 2021-01-05
Payer: COMMERCIAL

## 2021-01-05 DIAGNOSIS — I69.354 HEMIPARESIS AFFECTING LEFT SIDE AS LATE EFFECT OF CEREBROVASCULAR ACCIDENT (H): ICD-10-CM

## 2021-01-05 DIAGNOSIS — I69.919 COGNITIVE DEFICITS AS LATE EFFECT OF CEREBROVASCULAR DISEASE: Primary | ICD-10-CM

## 2021-01-06 NOTE — PROGRESS NOTES
Pt was seen for a regulatory LTC visit    Case reviewed with NP      Pt had an episode of choking with food 2 weeks, ago, has been evaluated by [facility]  He has been started on low dose seroquel for agitation and aggression directed toward staff with some benefit    Pt denies specific physical concerns today  He denies pain, cough, SOB    Last HgbA1C 6.5 on 3/5/20    VSS  Alert, lying in bed, very pleasant this am  Oriented to self  L sided weakness with contractures, particularly L LE      Assessment    Hx CVA with L hemiplegia, cognitive deficits, stable    History CAD with v fib arrest, history of PAF, on chronic Xarelto    DM type 2, on glargine, stable    Plan  Continue current tx  Monitor behaviors on seroquel  Routine lab monitoring, including HgbA1c

## 2021-01-18 ENCOUNTER — NURSING HOME VISIT (OUTPATIENT)
Dept: GERIATRICS | Facility: CLINIC | Age: 86
End: 2021-01-18
Payer: COMMERCIAL

## 2021-01-18 VITALS
OXYGEN SATURATION: 97 % | SYSTOLIC BLOOD PRESSURE: 128 MMHG | TEMPERATURE: 97.4 F | BODY MASS INDEX: 22.51 KG/M2 | HEIGHT: 69 IN | HEART RATE: 66 BPM | WEIGHT: 152 LBS | DIASTOLIC BLOOD PRESSURE: 60 MMHG | RESPIRATION RATE: 18 BRPM

## 2021-01-18 DIAGNOSIS — F03.918 SENILE DEMENTIA WITH BEHAVIORAL DISTURBANCE (H): ICD-10-CM

## 2021-01-18 DIAGNOSIS — R44.3 HALLUCINATIONS: ICD-10-CM

## 2021-01-18 DIAGNOSIS — M54.2 NECK PAIN: ICD-10-CM

## 2021-01-18 DIAGNOSIS — I48.0 PAROXYSMAL ATRIAL FIBRILLATION (H): ICD-10-CM

## 2021-01-18 DIAGNOSIS — E11.22 TYPE 2 DIABETES MELLITUS WITH STAGE 3A CHRONIC KIDNEY DISEASE, WITHOUT LONG-TERM CURRENT USE OF INSULIN (H): ICD-10-CM

## 2021-01-18 DIAGNOSIS — E03.4 HYPOTHYROIDISM DUE TO ACQUIRED ATROPHY OF THYROID: Primary | ICD-10-CM

## 2021-01-18 DIAGNOSIS — C61 MALIGNANT NEOPLASM OF PROSTATE (H): ICD-10-CM

## 2021-01-18 DIAGNOSIS — N18.31 TYPE 2 DIABETES MELLITUS WITH STAGE 3A CHRONIC KIDNEY DISEASE, WITHOUT LONG-TERM CURRENT USE OF INSULIN (H): ICD-10-CM

## 2021-01-18 DIAGNOSIS — R52 GENERALIZED PAIN: ICD-10-CM

## 2021-01-18 PROBLEM — I46.9 CARDIAC ARREST (H): Status: RESOLVED | Noted: 2020-09-14 | Resolved: 2021-01-18

## 2021-01-18 PROBLEM — I73.9 PVD (PERIPHERAL VASCULAR DISEASE) (H): Status: RESOLVED | Noted: 2017-12-12 | Resolved: 2021-01-18

## 2021-01-18 PROCEDURE — 99309 SBSQ NF CARE MODERATE MDM 30: CPT | Performed by: NURSE PRACTITIONER

## 2021-01-18 RX ORDER — TRAMADOL HYDROCHLORIDE 50 MG/1
50 TABLET ORAL 3 TIMES DAILY
Qty: 120 TABLET | Refills: 5
Start: 2021-01-18 | End: 2021-01-20

## 2021-01-18 RX ORDER — QUETIAPINE FUMARATE 25 MG/1
25 TABLET, FILM COATED ORAL AT BEDTIME
Qty: 30 TABLET | Refills: 11
Start: 2021-01-18 | End: 2021-06-04

## 2021-01-18 ASSESSMENT — MIFFLIN-ST. JEOR: SCORE: 1344.85

## 2021-01-18 NOTE — LETTER
"    1/18/2021        RE: [facility]  [address]  [address]  [place] [address]        [place] [facility]  [place] Medical Record Number: [id number]  Place of Service where encounter took place[facility] [[id number]]  Chief Complaint   Patient presents with     RECHECK       HPI:    Elias Mckee is a 89 year old (8/10/1931), who is being seen today for an episodic care visit. HPI information obtained from: facility chart records, facility staff, patient report and [facility] chart review. Today's concern is:     Type 2 diabetes mellitus with stage 3a chronic kidney disease, without long-term current use of insulin (H)  Paroxysmal atrial fibrillation (H)  Malignant neoplasm of prostate (H)  Hypothyroidism due to acquired atrophy of thyroid  Senile dementia with behavioral disturbance (H)  Generalized pain  Neck pain  Hallucinations     Patient seen today on request, moderate cognitive limitations, over past few weeks increased agitation and complaints of pain, today per patient having pain shoulders, \"balls\", knee, neck and back, asks me go please help him, be merciful, to be his friend, and cant stand it anymore, these statements were repetitive between bites of food, unable to transfer subjects, is fidgety, mild distress, yells and swears during community mealtimes, also recent labs reviewed with GFR >60, A1C 6.5, TSH 3.26, no s/s Afib.    Past Medical and Surgical History reviewed in Epic today.    MEDICATIONS:    Current Outpatient Medications   Medication Sig Dispense Refill     QUEtiapine (SEROQUEL) 25 MG tablet Take 1 tablet (25 mg) by mouth At Bedtime 30 tablet 11     traMADol (ULTRAM) 50 MG tablet Take 1 tablet (50 mg) by mouth 3 times daily And 50mg Q6h  tablet 5     ACETAMINOPHEN PO Take 1,000 mg by mouth 3 times daily For pain       Atorvastatin Calcium (LIPITOR PO) Take 40 mg by mouth At Bedtime       atropine 1 % " "ophthalmic solution Place 1 drop into the right eye 2 times daily       cholecalciferol (VITAMIN D) 1000 UNIT tablet Take 2,000 Units by mouth daily       Fluticasone Propionate (FLONASE NA) Spray 2 sprays into both nostrils daily       Insulin Glargine (BASAGLAR KWIKPEN SC) Inject 22 Units Subcutaneous At Bedtime May use 2 units as needed to prime pen before each dose.       latanoprost (XALATAN) 0.005 % ophthalmic solution Place 1 drop into the right eye At Bedtime       LEVOTHYROXINE SODIUM PO Take 25 mcg by mouth daily       polyethylene glycol (MIRALAX/GLYCOLAX) powder Take 17 g by mouth daily        prednisoLONE acetate (PRED FORTE) 1 % ophthalmic susp Place 1 drop into the right eye 3 times daily       rivaroxaban ANTICOAGULANT (XARELTO) 20 MG TABS tablet Take 1 tablet (20 mg) by mouth daily (with dinner) 30 tablet 1     tamsulosin (FLOMAX) 0.4 MG capsule Take 1 capsule (0.4 mg) by mouth daily 60 capsule 0     timolol, PF, (TIMOPTIC OCUDOSE) 0.5 % ophthalmic solution Place 1 drop into the right eye 2 times daily       REVIEW OF SYSTEMS:  4 point ROS including Respiratory, CV, GI and , other than that noted in the HPI,  is negative    Objective:  /60   Pulse 66   Temp 97.4  F (36.3  C)   Resp 18   Ht 1.753 m (5' 9\")   Wt 68.9 kg (152 lb)   SpO2 97%   BMI 22.45 kg/m    Exam:  GENERAL APPEARANCE:  appears healthy, in mild distress due to pain  ENT:  Mouth and posterior oropharynx normal, moist mucous membranes  RESP:  lungs clear to auscultation   CV:  no edema, rate-normal  ABDOMEN:  bowel sounds normal  M/S:   Gait and station abnormal transfer assist  SKIN:  Inspection of skin and subcutaneous tissue baseline  NEURO:   Examination of sensation by touch normal  PSYCH:  oriented X 3, memory impaired     Labs:   Labs done in SNF are in [place] EPIC. Please refer to them using [facility] [facility]/Care Everywhere.    ASSESSMENT/PLAN:  Type 2 diabetes mellitus with stage 3a chronic kidney disease, without " long-term current use of insulin (H)  -labs 3/5/20 GFR >60, A1C 6.5  -dose medications renally as possible  -continue glargine 22u Qpm  -BMP, A1C on 1/25    Paroxysmal atrial fibrillation (H)  -today RRR  -has pacemaker, reader in room  -continue xarelto 20mg Qpm  -follow up cardiology as indicated (June 2021?)    Malignant neoplasm of prostate (H)  -no report of urinary issues  -staff to change/transfer PRN  -continue tamsulosin 0.4mg daily    Hypothyroidism due to acquired atrophy of thyroid  -TSH on 3/5/20 was 3.26  -continue levothyroxine 25mcg  -TSH on 1/25  -if still <4 then will discontinue levothyroxine    Senile dementia with behavioral disturbance (H)  Generalized pain  Neck pain  Hallucinations  -difficult to ascertain whether psychiatric issue or experiencing high levels of pain  -continue tramadol 50mg BID scheduled  -add tramadol 50mg Q6h PRN  -increase seroquel from 12.5 to 25mg Qpm  -will plan to follow up later this/early next week          Electronically signed by:  TOBY Alejandro CNP               Sincerely,        TOBY Alejandro CNP

## 2021-01-18 NOTE — PROGRESS NOTES
"[place] [facility]  [place] Medical Record Number: [id number]  Place of Service where encounter took place: [facility] ([facility]) [[id number]]  Chief Complaint   Patient presents with     RECHECK       HPI:    Elias Mckee is a 89 year old (8/10/1931), who is being seen today for an episodic care visit. HPI information obtained from: facility chart records, facility staff, patient report and [facility] chart review. Today's concern is:     Type 2 diabetes mellitus with stage 3a chronic kidney disease, without long-term current use of insulin (H)  Paroxysmal atrial fibrillation (H)  Malignant neoplasm of prostate (H)  Hypothyroidism due to acquired atrophy of thyroid  Senile dementia with behavioral disturbance (H)  Generalized pain  Neck pain  Hallucinations     Patient seen today on request, moderate cognitive limitations, over past few weeks increased agitation and complaints of pain, today per patient having pain shoulders, \"balls\", knee, neck and back, asks me go please help him, be merciful, to be his friend, and cant stand it anymore, these statements were repetitive between bites of food, unable to transfer subjects, is fidgety, mild distress, yells and swears during community mealtimes, also recent labs reviewed with GFR >60, A1C 6.5, TSH 3.26, no s/s Afib.    Past Medical and Surgical History reviewed in Epic today.    MEDICATIONS:    Current Outpatient Medications   Medication Sig Dispense Refill     QUEtiapine (SEROQUEL) 25 MG tablet Take 1 tablet (25 mg) by mouth At Bedtime 30 tablet 11     traMADol (ULTRAM) 50 MG tablet Take 1 tablet (50 mg) by mouth 3 times daily And 50mg Q6h  tablet 5     ACETAMINOPHEN PO Take 1,000 mg by mouth 3 times daily For pain       Atorvastatin Calcium (LIPITOR PO) Take 40 mg by mouth At Bedtime       atropine 1 % ophthalmic solution Place 1 drop into the right eye 2 times daily       cholecalciferol (VITAMIN D) 1000 UNIT tablet Take 2,000 Units " "by mouth daily       Fluticasone Propionate (FLONASE NA) Spray 2 sprays into both nostrils daily       Insulin Glargine (BASAGLAR KWIKPEN SC) Inject 22 Units Subcutaneous At Bedtime May use 2 units as needed to prime pen before each dose.       latanoprost (XALATAN) 0.005 % ophthalmic solution Place 1 drop into the right eye At Bedtime       LEVOTHYROXINE SODIUM PO Take 25 mcg by mouth daily       polyethylene glycol (MIRALAX/GLYCOLAX) powder Take 17 g by mouth daily        prednisoLONE acetate (PRED FORTE) 1 % ophthalmic susp Place 1 drop into the right eye 3 times daily       rivaroxaban ANTICOAGULANT (XARELTO) 20 MG TABS tablet Take 1 tablet (20 mg) by mouth daily (with dinner) 30 tablet 1     tamsulosin (FLOMAX) 0.4 MG capsule Take 1 capsule (0.4 mg) by mouth daily 60 capsule 0     timolol, PF, (TIMOPTIC OCUDOSE) 0.5 % ophthalmic solution Place 1 drop into the right eye 2 times daily       REVIEW OF SYSTEMS:  4 point ROS including Respiratory, CV, GI and , other than that noted in the HPI,  is negative    Objective:  /60   Pulse 66   Temp 97.4  F (36.3  C)   Resp 18   Ht 1.753 m (5' 9\")   Wt 68.9 kg (152 lb)   SpO2 97%   BMI 22.45 kg/m    Exam:  GENERAL APPEARANCE:  appears healthy, in mild distress due to pain  ENT:  Mouth and posterior oropharynx normal, moist mucous membranes  RESP:  lungs clear to auscultation   CV:  no edema, rate-normal  ABDOMEN:  bowel sounds normal  M/S:   Gait and station abnormal transfer assist  SKIN:  Inspection of skin and subcutaneous tissue baseline  NEURO:   Examination of sensation by touch normal  PSYCH:  oriented X 3, memory impaired     Labs:   Labs done in SNF are in [place] EPIC. Please refer to them using [facility]/Care Everywhere.    ASSESSMENT/PLAN:  Type 2 diabetes mellitus with stage 3a chronic kidney disease, without long-term current use of insulin (H)  -labs 3/5/20 GFR >60, A1C 6.5  -dose medications renally as possible  -continue glargine 22u Qpm  -BMP, " A1C on 1/25    Paroxysmal atrial fibrillation (H)  -today RRR  -has pacemaker, reader in room  -continue xarelto 20mg Qpm  -follow up cardiology as indicated (June 2021?)    Malignant neoplasm of prostate (H)  -no report of urinary issues  -staff to change/transfer PRN  -continue tamsulosin 0.4mg daily    Hypothyroidism due to acquired atrophy of thyroid  -TSH on 3/5/20 was 3.26  -continue levothyroxine 25mcg  -TSH on 1/25  -if still <4 then will discontinue levothyroxine    Senile dementia with behavioral disturbance (H)  Generalized pain  Neck pain  Hallucinations  -difficult to ascertain whether psychiatric issue or experiencing high levels of pain  -continue tramadol 50mg BID scheduled  -add tramadol 50mg Q6h PRN  -increase seroquel from 12.5 to 25mg Qpm  -will plan to follow up later this/early next week          Electronically signed by:  TOBY Alejandro CNP

## 2021-01-20 DIAGNOSIS — M54.2 NECK PAIN: ICD-10-CM

## 2021-01-20 RX ORDER — TRAMADOL HYDROCHLORIDE 50 MG/1
TABLET ORAL
Qty: 120 TABLET | Refills: 5 | Status: SHIPPED | OUTPATIENT
Start: 2021-01-20 | End: 2021-06-04

## 2021-01-21 VITALS
BODY MASS INDEX: 22.51 KG/M2 | HEIGHT: 69 IN | RESPIRATION RATE: 18 BRPM | WEIGHT: 152 LBS | SYSTOLIC BLOOD PRESSURE: 120 MMHG | DIASTOLIC BLOOD PRESSURE: 80 MMHG | HEART RATE: 79 BPM | TEMPERATURE: 97 F | OXYGEN SATURATION: 95 %

## 2021-01-21 ASSESSMENT — MIFFLIN-ST. JEOR: SCORE: 1344.85

## 2021-01-21 NOTE — PROGRESS NOTES
[place] [facility]  [place] Medical Record Number: [id number]  Place of Service where encounter took place: [facility] ([facility]) [[id number]]  Chief Complaint   Patient presents with     RECHECK       HPI:    Elias Mckee is a 89 year old (8/10/1931), who is being seen today for an episodic care visit. HPI information obtained from: facility chart records, facility staff, patient report and [facility] chart review. Today's concern is:     Senile dementia with behavioral disturbance (H)  Agitation  Generalized pain     Patient seen today for follow up, also left message with Jazmin RE: status and plan, in past few weeks increased agitation, yelling out, pain, discomfort, labs on 3/5 WNL, therapies started, is poor historian with moderate cognitive imitations, attempting to find out if related to psych, physical or both, today appears more stable, able to get through meal in dining area without screaming or swearing, asks me quietly to be his friend, change apparent since Monday.    Past Medical and Surgical History reviewed in Epic today.    MEDICATIONS:    Current Outpatient Medications   Medication Sig Dispense Refill     ACETAMINOPHEN PO Take 1,000 mg by mouth 3 times daily For pain       Atorvastatin Calcium (LIPITOR PO) Take 40 mg by mouth At Bedtime       atropine 1 % ophthalmic solution Place 1 drop into the right eye 2 times daily       cholecalciferol (VITAMIN D) 1000 UNIT tablet Take 2,000 Units by mouth daily       Fluticasone Propionate (FLONASE NA) Spray 2 sprays into both nostrils daily       Insulin Glargine (BASAGLAR KWIKPEN SC) Inject 22 Units Subcutaneous At Bedtime May use 2 units as needed to prime pen before each dose.       latanoprost (XALATAN) 0.005 % ophthalmic solution Place 1 drop into the right eye At Bedtime       LEVOTHYROXINE SODIUM PO Take 25 mcg by mouth daily       polyethylene glycol (MIRALAX/GLYCOLAX) powder Take 17 g by mouth daily        prednisoLONE  "acetate (PRED FORTE) 1 % ophthalmic susp Place 1 drop into the right eye 3 times daily       QUEtiapine (SEROQUEL) 25 MG tablet Take 1 tablet (25 mg) by mouth At Bedtime 30 tablet 11     rivaroxaban ANTICOAGULANT (XARELTO) 20 MG TABS tablet Take 1 tablet (20 mg) by mouth daily (with dinner) 30 tablet 1     tamsulosin (FLOMAX) 0.4 MG capsule Take 1 capsule (0.4 mg) by mouth daily 60 capsule 0     timolol, PF, (TIMOPTIC OCUDOSE) 0.5 % ophthalmic solution Place 1 drop into the right eye 2 times daily       traMADol (ULTRAM) 50 MG tablet TAKE 1 TAB BY MOUTH THREE TIMES DAILY;TAKE 1 TAB BY MOUTH EVERY 6 HOURS AS NEEDED FOR CHRONIC PAIN 120 tablet 5     REVIEW OF SYSTEMS:  Limited secondary to cognitive impairment but today pt reports I'm OK, will you be my friend?    Objective:  /80   Pulse 79   Temp 97  F (36.1  C)   Resp 18   Ht 1.753 m (5' 9\")   Wt 68.9 kg (152 lb)   SpO2 95%   BMI 22.45 kg/m    Exam:  GENERAL APPEARANCE:  in no distress, appears healthy  ENT:  Mouth and posterior oropharynx normal, moist mucous membranes  RESP:  lungs clear to auscultation   CV:  no edema, rate-normal  ABDOMEN:  bowel sounds normal  M/S:   Gait and station abnormal WC mobility  SKIN:  Inspection of skin and subcutaneous tissue baseline  NEURO:   Examination of sensation by touch normal  PSYCH:  oriented X 3, memory impaired     Labs:   Labs done in SNF are in [place] EPIC. Please refer to them using [facility]/Care Everywhere.    ASSESSMENT/PLAN:  Senile dementia with behavioral disturbance (H)  Agitation  Generalized pain  -patient status slightly improved since Monday  -continue increased seroquel at 25mg at bedtime  -continue tramadol TID as previously had but with additional 25mg Q6h PRN (has not been using PRN)  -PT/OT to eval for leg contractures and pain  -BMP, A1C, TSH on 1/25      Electronically signed by:  TOBY Alejandro CNP         "

## 2021-01-22 ENCOUNTER — NURSING HOME VISIT (OUTPATIENT)
Dept: GERIATRICS | Facility: CLINIC | Age: 86
End: 2021-01-22
Payer: COMMERCIAL

## 2021-01-22 DIAGNOSIS — R45.1 AGITATION: ICD-10-CM

## 2021-01-22 DIAGNOSIS — F03.918 SENILE DEMENTIA WITH BEHAVIORAL DISTURBANCE (H): Primary | ICD-10-CM

## 2021-01-22 DIAGNOSIS — R52 GENERALIZED PAIN: ICD-10-CM

## 2021-01-22 PROCEDURE — 99309 SBSQ NF CARE MODERATE MDM 30: CPT | Mod: GV | Performed by: NURSE PRACTITIONER

## 2021-01-22 NOTE — LETTER
1/22/2021        RE: [facility]  [address]  [address]  [place] [address]        [place] [facility]  [place] Medical Record Number: [id number]  [facility] Service where encounter took place[facility] [[id number]]  Chief Complaint   Patient presents with     RECHECK       HPI:    Elias Mckee is a 89 year old (8/10/1931), who is being seen today for an episodic care visit. HPI information obtained from: facility chart records, facility staff, patient report and [facility] chart review. Today's concern is:     Senile dementia with behavioral disturbance (H)  Agitation  Generalized pain     Patient seen today for follow up, also left message with Jazmin RE: status and plan, in past few weeks increased agitation, yelling out, pain, discomfort, labs on 3/5 WNL, therapies started, is poor historian with moderate cognitive imitations, attempting to find out if related to psych, physical or both, today appears more stable, able to get through meal in dining area without screaming or swearing, asks me quietly to be his friend, change apparent since Monday.    Past Medical and Surgical History reviewed in Epic today.    MEDICATIONS:    Current Outpatient Medications   Medication Sig Dispense Refill     ACETAMINOPHEN PO Take 1,000 mg by mouth 3 times daily For pain       Atorvastatin Calcium (LIPITOR PO) Take 40 mg by mouth At Bedtime       atropine 1 % ophthalmic solution Place 1 drop into the right eye 2 times daily       cholecalciferol (VITAMIN D) 1000 UNIT tablet Take 2,000 Units by mouth daily       Fluticasone Propionate (FLONASE NA) Spray 2 sprays into both nostrils daily       Insulin Glargine (BASAGLAR KWIKPEN SC) Inject 22 Units Subcutaneous At Bedtime May use 2 units as needed to prime pen before each dose.       latanoprost (XALATAN) 0.005 % ophthalmic solution Place 1 drop into the right eye At Bedtime       LEVOTHYROXINE  "SODIUM PO Take 25 mcg by mouth daily       polyethylene glycol (MIRALAX/GLYCOLAX) powder Take 17 g by mouth daily        prednisoLONE acetate (PRED FORTE) 1 % ophthalmic susp Place 1 drop into the right eye 3 times daily       QUEtiapine (SEROQUEL) 25 MG tablet Take 1 tablet (25 mg) by mouth At Bedtime 30 tablet 11     rivaroxaban ANTICOAGULANT (XARELTO) 20 MG TABS tablet Take 1 tablet (20 mg) by mouth daily (with dinner) 30 tablet 1     tamsulosin (FLOMAX) 0.4 MG capsule Take 1 capsule (0.4 mg) by mouth daily 60 capsule 0     timolol, PF, (TIMOPTIC OCUDOSE) 0.5 % ophthalmic solution Place 1 drop into the right eye 2 times daily       traMADol (ULTRAM) 50 MG tablet TAKE 1 TAB BY MOUTH THREE TIMES DAILY;TAKE 1 TAB BY MOUTH EVERY 6 HOURS AS NEEDED FOR CHRONIC PAIN 120 tablet 5     REVIEW OF SYSTEMS:  Limited secondary to cognitive impairment but today pt reports I'm OK, will you be my friend?    Objective:  /80   Pulse 79   Temp 97  F (36.1  C)   Resp 18   Ht 1.753 m (5' 9\")   Wt 68.9 kg (152 lb)   SpO2 95%   BMI 22.45 kg/m    Exam:  GENERAL APPEARANCE:  in no distress, appears healthy  ENT:  Mouth and posterior oropharynx normal, moist mucous membranes  RESP:  lungs clear to auscultation   CV:  no edema, rate-normal  ABDOMEN:  bowel sounds normal  M/S:   Gait and station abnormal WC mobility  SKIN:  Inspection of skin and subcutaneous tissue baseline  NEURO:   Examination of sensation by touch normal  PSYCH:  oriented X 3, memory impaired     Labs:   Labs done in SNF are in [place] EPIC. Please refer to them using [facility]/Care Everywhere.    ASSESSMENT/PLAN:  Senile dementia with behavioral disturbance (H)  Agitation  Generalized pain  -patient status slightly improved since Monday  -continue increased seroquel at 25mg at bedtime  -continue tramadol TID as previously had but with additional 25mg Q6h PRN (has not been using PRN)  -PT/OT to eval for leg contractures and pain  -BMP, A1C, TSH on " 1/25      Electronically signed by:  TOBY Alejandro CNP               Sincerely,        TOBY Alejandro CNP

## 2021-01-24 VITALS
WEIGHT: 152.6 LBS | OXYGEN SATURATION: 96 % | RESPIRATION RATE: 16 BRPM | SYSTOLIC BLOOD PRESSURE: 147 MMHG | DIASTOLIC BLOOD PRESSURE: 67 MMHG | BODY MASS INDEX: 22.6 KG/M2 | TEMPERATURE: 95.8 F | HEART RATE: 85 BPM | HEIGHT: 69 IN

## 2021-01-24 ASSESSMENT — MIFFLIN-ST. JEOR: SCORE: 1347.57

## 2021-01-24 NOTE — PROGRESS NOTES
"[facility]  [place] Medical Record Number: [id number]  Place of Service where encounter took place: [facility] ([facility]) [[id number]]  Chief Complaint   Patient presents with     RECHECK       HPI:    Elias Mckee is a 89 year old (8/10/1931), who is being seen today for an episodic care visit. HPI information obtained from: facility chart records, facility staff, patient report and [facility] chart review. Today's concern is:     Senile dementia with behavioral disturbance (H)  Type II diabetes mellitus with peripheral circulatory disorder (H)  Hypothyroidism due to acquired atrophy of thyroid     Patient seen for follow up, also had left a message with daughter Sabina ANDREWS; status and plan, recent increase in yelling, swearing, complaints of pain, stating repeatedly \"I can't stand it\", no pain faces scale, moderately redirectable, seroquel increased with moderate efficacy, able to be in community area now without scaring other residents, more pleasant, no distress, labs reviewed, BG range 100-220.    Past Medical and Surgical History reviewed in Epic today.    MEDICATIONS:    Current Outpatient Medications   Medication Sig Dispense Refill     ACETAMINOPHEN PO Take 1,000 mg by mouth 3 times daily For pain       Atorvastatin Calcium (LIPITOR PO) Take 40 mg by mouth At Bedtime       atropine 1 % ophthalmic solution Place 1 drop into the right eye 2 times daily       cholecalciferol (VITAMIN D) 1000 UNIT tablet Take 2,000 Units by mouth daily       Fluticasone Propionate (FLONASE NA) Spray 2 sprays into both nostrils daily       Insulin Glargine (BASAGLAR KWIKPEN SC) Inject 22 Units Subcutaneous At Bedtime May use 2 units as needed to prime pen before each dose.       latanoprost (XALATAN) 0.005 % ophthalmic solution Place 1 drop into the right eye At Bedtime       LEVOTHYROXINE SODIUM PO Take 25 mcg by mouth daily       polyethylene glycol (MIRALAX/GLYCOLAX) powder Take 17 g by mouth " "daily        prednisoLONE acetate (PRED FORTE) 1 % ophthalmic susp Place 1 drop into the right eye 3 times daily       QUEtiapine (SEROQUEL) 25 MG tablet Take 1 tablet (25 mg) by mouth At Bedtime 30 tablet 11     rivaroxaban ANTICOAGULANT (XARELTO) 20 MG TABS tablet Take 1 tablet (20 mg) by mouth daily (with dinner) 30 tablet 1     tamsulosin (FLOMAX) 0.4 MG capsule Take 1 capsule (0.4 mg) by mouth daily 60 capsule 0     timolol, PF, (TIMOPTIC OCUDOSE) 0.5 % ophthalmic solution Place 1 drop into the right eye 2 times daily       traMADol (ULTRAM) 50 MG tablet TAKE 1 TAB BY MOUTH THREE TIMES DAILY;TAKE 1 TAB BY MOUTH EVERY 6 HOURS AS NEEDED FOR CHRONIC PAIN 120 tablet 5     REVIEW OF SYSTEMS:  Limited secondary to cognitive impairment but today pt reports Oh, I'm doing pretty well    Objective:  BP (!) 147/67   Pulse 85   Temp 95.8  F (35.4  C)   Resp 16   Ht 1.753 m (5' 9\")   Wt 69.2 kg (152 lb 9.6 oz)   SpO2 96%   BMI 22.54 kg/m    Exam:  GENERAL APPEARANCE:  in no distress, appears healthy  ENT:  Mouth and posterior oropharynx normal, moist mucous membranes  RESP:  lungs clear to auscultation   CV:  no edema, rate-normal  ABDOMEN:  bowel sounds normal  M/S:   Gait and station abnormal transfer assist  SKIN:  Inspection of skin and subcutaneous tissue baseline  NEURO:   Examination of sensation by touch normal  PSYCH:  oriented X 3, memory impaired     Labs:   Labs done in SNF are in [place] EPIC. Please refer to them using [facility]/Care Everywhere.    ASSESSMENT/PLAN:  Senile dementia with behavioral disturbance (H)  -continue tramadol 50mg TID along with newly added Q6h PRN  -seroquel increase from 12.5 to 25mg at bedtime has been beneficial; continue with no plan to attempt GDR in near future  -staff to support as indicated    Type II diabetes mellitus with peripheral circulatory disorder (H)  -BG range 100-220, A1C on 3/5/20 was 6.5  -A1C today 6.0  -continue glargine 22u daily  -recheck A1C in 6-12 " months    Hypothyroidism due to acquired atrophy of thyroid  -TSH on 3/5/20 was 3.26  -taking levothyroxine 25mcg  -TSH today 3.68  -continue levothyroxine 25mcg for now  -recheck TSH in 6-12 months        Electronically signed by:  TOBY Alejandro CNP

## 2021-01-25 ENCOUNTER — TRANSFERRED RECORDS (OUTPATIENT)
Dept: HEALTH INFORMATION MANAGEMENT | Facility: CLINIC | Age: 86
End: 2021-01-25

## 2021-01-25 ENCOUNTER — NURSING HOME VISIT (OUTPATIENT)
Dept: GERIATRICS | Facility: CLINIC | Age: 86
End: 2021-01-25
Payer: COMMERCIAL

## 2021-01-25 DIAGNOSIS — E03.4 HYPOTHYROIDISM DUE TO ACQUIRED ATROPHY OF THYROID: ICD-10-CM

## 2021-01-25 DIAGNOSIS — E11.51 TYPE II DIABETES MELLITUS WITH PERIPHERAL CIRCULATORY DISORDER (H): ICD-10-CM

## 2021-01-25 DIAGNOSIS — F03.918 SENILE DEMENTIA WITH BEHAVIORAL DISTURBANCE (H): Primary | ICD-10-CM

## 2021-01-25 LAB
ANION GAP SERPL CALCULATED.3IONS-SCNC: 7 MMOL/L (ref 7–16)
BUN SERPL-MCNC: 26 MG/DL (ref 7–26)
CALCIUM SERPL-MCNC: 9.1 MG/DL (ref 8.4–10.4)
CHLORIDE SERPLBLD-SCNC: 106 MMOL/L (ref 98–109)
CO2 SERPL-SCNC: 27 MMOL/L (ref 20–29)
CREAT SERPL-MCNC: 0.99 MG/DL (ref 0.73–1.18)
GFR SERPL CREATININE-BSD FRML MDRD: >60 ML/MIN/1.73M2
GLUCOSE SERPL-MCNC: 75 MG/DL (ref 70–100)
HBA1C MFR BLD: 6 % (ref 0–5.7)
POTASSIUM SERPL-SCNC: 4.4 MMOL/L (ref 3.5–5.1)
SODIUM SERPL-SCNC: 140 MMOL/L (ref 136–145)
TSH SERPL-ACNC: 3.68 UIU/ML (ref 0.3–4.5)

## 2021-01-25 PROCEDURE — 99309 SBSQ NF CARE MODERATE MDM 30: CPT | Performed by: NURSE PRACTITIONER

## 2021-01-25 NOTE — LETTER
"    1/25/2021        RE: [facility]  [address]  [address]  [place] [address]        [facility]  [place] Medical Record Number: [id number]  Place of Service where encounter took place[facility] ([facility] [[id number]]  Chief Complaint   Patient presents with     RECHECK       HPI:    Elias Mckee is a 89 year old (8/10/1931), who is being seen today for an episodic care visit. HPI information obtained from: facility chart records, facility staff, patient report and [facility] chart review. Today's concern is:     Senile dementia with behavioral disturbance (H)  Type II diabetes mellitus with peripheral circulatory disorder (H)  Hypothyroidism due to acquired atrophy of thyroid     Patient seen for follow up, also had left a message with daughter Sabina RE; status and plan, recent increase in yelling, swearing, complaints of pain, stating repeatedly \"I can't stand it\", no pain faces scale, moderately redirectable, seroquel increased with moderate efficacy, able to be in community area now without scaring other residents, more pleasant, no distress, labs reviewed, BG range 100-220.    Past Medical and Surgical History reviewed in Epic today.    MEDICATIONS:    Current Outpatient Medications   Medication Sig Dispense Refill     ACETAMINOPHEN PO Take 1,000 mg by mouth 3 times daily For pain       Atorvastatin Calcium (LIPITOR PO) Take 40 mg by mouth At Bedtime       atropine 1 % ophthalmic solution Place 1 drop into the right eye 2 times daily       cholecalciferol (VITAMIN D) 1000 UNIT tablet Take 2,000 Units by mouth daily       Fluticasone Propionate (FLONASE NA) Spray 2 sprays into both nostrils daily       Insulin Glargine (BASAGLAR KWIKPEN SC) Inject 22 Units Subcutaneous At Bedtime May use 2 units as needed to prime pen before each dose.       latanoprost (XALATAN) 0.005 % ophthalmic solution Place 1 drop into the right eye At " "Bedtime       LEVOTHYROXINE SODIUM PO Take 25 mcg by mouth daily       polyethylene glycol (MIRALAX/GLYCOLAX) powder Take 17 g by mouth daily        prednisoLONE acetate (PRED FORTE) 1 % ophthalmic susp Place 1 drop into the right eye 3 times daily       QUEtiapine (SEROQUEL) 25 MG tablet Take 1 tablet (25 mg) by mouth At Bedtime 30 tablet 11     rivaroxaban ANTICOAGULANT (XARELTO) 20 MG TABS tablet Take 1 tablet (20 mg) by mouth daily (with dinner) 30 tablet 1     tamsulosin (FLOMAX) 0.4 MG capsule Take 1 capsule (0.4 mg) by mouth daily 60 capsule 0     timolol, PF, (TIMOPTIC OCUDOSE) 0.5 % ophthalmic solution Place 1 drop into the right eye 2 times daily       traMADol (ULTRAM) 50 MG tablet TAKE 1 TAB BY MOUTH THREE TIMES DAILY;TAKE 1 TAB BY MOUTH EVERY 6 HOURS AS NEEDED FOR CHRONIC PAIN 120 tablet 5     REVIEW OF SYSTEMS:  Limited secondary to cognitive impairment but today pt reports Oh, I'm doing pretty well    Objective:  BP (!) 147/67   Pulse 85   Temp 95.8  F (35.4  C)   Resp 16   Ht 1.753 m (5' 9\")   Wt 69.2 kg (152 lb 9.6 oz)   SpO2 96%   BMI 22.54 kg/m    Exam:  GENERAL APPEARANCE:  in no distress, appears healthy  ENT:  Mouth and posterior oropharynx normal, moist mucous membranes  RESP:  lungs clear to auscultation   CV:  no edema, rate-normal  ABDOMEN:  bowel sounds normal  M/S:   Gait and station abnormal transfer assist  SKIN:  Inspection of skin and subcutaneous tissue baseline  NEURO:   Examination of sensation by touch normal  PSYCH:  oriented X 3, memory impaired     Labs:   Labs done in SNF are in [place] EPIC. Please refer to them using [facility]/Care Everywhere.    ASSESSMENT/PLAN:  Senile dementia with behavioral disturbance (H)  -continue tramadol 50mg TID along with newly added Q6h PRN  -seroquel increase from 12.5 to 25mg at bedtime has been beneficial; continue with no plan to attempt GDR in near future  -staff to support as indicated    Type II diabetes mellitus with peripheral " circulatory disorder (H)  -BG range 100-220, A1C on 3/5/20 was 6.5  -A1C today 6.0  -continue glargine 22u daily  -recheck A1C in 6-12 months    Hypothyroidism due to acquired atrophy of thyroid  -TSH on 3/5/20 was 3.26  -taking levothyroxine 25mcg  -TSH today 3.68  -continue levothyroxine 25mcg for now  -recheck TSH in 6-12 months        Electronically signed by:  TOBY Alejandro CNP               Sincerely,        TOBY Alejandro CNP

## 2021-01-28 VITALS
HEIGHT: 69 IN | RESPIRATION RATE: 16 BRPM | SYSTOLIC BLOOD PRESSURE: 128 MMHG | TEMPERATURE: 96.4 F | WEIGHT: 155 LBS | BODY MASS INDEX: 22.96 KG/M2 | HEART RATE: 64 BPM | DIASTOLIC BLOOD PRESSURE: 62 MMHG | OXYGEN SATURATION: 97 %

## 2021-01-28 ASSESSMENT — MIFFLIN-ST. JEOR: SCORE: 1358.46

## 2021-01-28 NOTE — PROGRESS NOTES
[facility]  [place] Medical Record Number: [id number]  Place of Service where encounter took place: [facility] ([facility]) [[id number]]  Chief Complaint   Patient presents with     RECHECK       HPI:    Elias Mckee is a 89 year old (8/10/1931), who is being seen today for an episodic care visit. HPI information obtained from: facility chart records, facility staff, patient report and [facility] chart review. Today's concern is:     Senile dementia with behavioral disturbance (H)  Agitation  Hallucinations  Generalized pain     Patient seen today for follow up, since Nov has had increase in both agitation and pain, will yell and swear repetitively with complaint to be put to bed and pain in multiple areas, also dislikes being in WC and prefers to spend day in bed, today was redirectable and pleasant but when I touched his shoulder he cried out and told me to stop touching him, then started eating again as if nothing happened, does have probable pain present but limited historian and unable to explain pain status understandably, labs on 1/25 were WNL, of note I have left messages with daughter Jazmin previously on 1/19, 1/22 and 1/25 RE; status and plan, has not called facility for updates as suggested.    Past Medical and Surgical History reviewed in Epic today.    MEDICATIONS:    Current Outpatient Medications   Medication Sig Dispense Refill     ACETAMINOPHEN PO Take 1,000 mg by mouth 3 times daily For pain       Atorvastatin Calcium (LIPITOR PO) Take 40 mg by mouth At Bedtime       atropine 1 % ophthalmic solution Place 1 drop into the right eye 2 times daily       cholecalciferol (VITAMIN D) 1000 UNIT tablet Take 2,000 Units by mouth daily       Fluticasone Propionate (FLONASE NA) Spray 2 sprays into both nostrils daily       Insulin Glargine (BASAGLAR KWIKPEN SC) Inject 22 Units Subcutaneous At Bedtime May use 2 units as needed to prime pen before each dose.       latanoprost  "(XALATAN) 0.005 % ophthalmic solution Place 1 drop into the right eye At Bedtime       LEVOTHYROXINE SODIUM PO Take 25 mcg by mouth daily       polyethylene glycol (MIRALAX/GLYCOLAX) powder Take 17 g by mouth daily        prednisoLONE acetate (PRED FORTE) 1 % ophthalmic susp Place 1 drop into the right eye 3 times daily       QUEtiapine (SEROQUEL) 25 MG tablet Take 1 tablet (25 mg) by mouth At Bedtime 30 tablet 11     rivaroxaban ANTICOAGULANT (XARELTO) 20 MG TABS tablet Take 1 tablet (20 mg) by mouth daily (with dinner) 30 tablet 1     tamsulosin (FLOMAX) 0.4 MG capsule Take 1 capsule (0.4 mg) by mouth daily 60 capsule 0     timolol, PF, (TIMOPTIC OCUDOSE) 0.5 % ophthalmic solution Place 1 drop into the right eye 2 times daily       traMADol (ULTRAM) 50 MG tablet TAKE 1 TAB BY MOUTH THREE TIMES DAILY;TAKE 1 TAB BY MOUTH EVERY 6 HOURS AS NEEDED FOR CHRONIC PAIN 120 tablet 5     REVIEW OF SYSTEMS:  Limited secondary to cognitive impairment but today pt reports I'm burak Carr    Objective:  /62   Pulse 64   Temp 96.4  F (35.8  C)   Resp 16   Ht 1.753 m (5' 9\")   Wt 70.3 kg (155 lb)   SpO2 97%   BMI 22.89 kg/m    Exam:  GENERAL APPEARANCE:  Alert, appears healthy  ENT:  Mouth and posterior oropharynx normal, moist mucous membranes  RESP:  lungs clear to auscultation   CV:  regular rate and rhythm, no murmur, rub, or gallop, no edema  ABDOMEN:  bowel sounds normal  M/S:   Gait and station abnormal transfer assist  SKIN:  Inspection of skin and subcutaneous tissue baseline  NEURO:   Examination of sensation by touch normal  PSYCH:  oriented X 3, memory impaired     Labs:   Labs done in SNF are in [place] EPIC. Please refer to them using [facility]/Care Everywhere.    ASSESSMENT/PLAN:  Senile dementia with behavioral disturbance (H)  Agitation  Hallucinations  Generalized pain  -unable to ascertain if mental decline or physical pain or both  -increased seroquel from 12.5 to 25mg  -increased tramadol from TID to " 25mg TID and Q6h PRN  -both above appear to have improved overall status at this time  -staff encouraged to use PRN  -consider addition of seroquel scheduled in am if agitation and hallucinations worsen  -consider increasing scheduled tramadol if increase in pain complaints        Electronically signed by:  TOBY Alejandro CNP

## 2021-01-29 ENCOUNTER — NURSING HOME VISIT (OUTPATIENT)
Dept: GERIATRICS | Facility: CLINIC | Age: 86
End: 2021-01-29
Payer: COMMERCIAL

## 2021-01-29 DIAGNOSIS — R44.3 HALLUCINATIONS: ICD-10-CM

## 2021-01-29 DIAGNOSIS — R45.1 AGITATION: ICD-10-CM

## 2021-01-29 DIAGNOSIS — R52 GENERALIZED PAIN: ICD-10-CM

## 2021-01-29 DIAGNOSIS — F03.918 SENILE DEMENTIA WITH BEHAVIORAL DISTURBANCE (H): Primary | ICD-10-CM

## 2021-01-29 PROCEDURE — 99309 SBSQ NF CARE MODERATE MDM 30: CPT | Performed by: NURSE PRACTITIONER

## 2021-01-29 NOTE — LETTER
1/29/2021        RE: [facility]  [address]  [address]  [place] [address]        [facility]  [place] Medical Record Number: [id number]  [facility] of Service where encounter took place[facility] [[id number]]  Chief Complaint   Patient presents with     RECHECK       HPI:    Elias Mckee is a 89 year old (8/10/1931), who is being seen today for an episodic care visit. HPI information obtained from: facility chart records, facility staff, patient report and [facility] chart review. Today's concern is:     Senile dementia with behavioral disturbance (H)  Agitation  Hallucinations  Generalized pain     Patient seen today for follow up, since Nov has had increase in both agitation and pain, will yell and swear repetitively with complaint to be put to bed and pain in multiple areas, also dislikes being in WC and prefers to spend day in bed, today was redirectable and pleasant but when I touched his shoulder he cried out and told me to stop touching him, then started eating again as if nothing happened, does have probable pain present but limited historian and unable to explain pain status understandably, labs on 1/25 were WNL, of note I have left messages with daughter Jazmin previously on 1/19, 1/22 and 1/25 RE; status and plan, has not called facility for updates as suggested.    Past Medical and Surgical History reviewed in Epic today.    MEDICATIONS:    Current Outpatient Medications   Medication Sig Dispense Refill     ACETAMINOPHEN PO Take 1,000 mg by mouth 3 times daily For pain       Atorvastatin Calcium (LIPITOR PO) Take 40 mg by mouth At Bedtime       atropine 1 % ophthalmic solution Place 1 drop into the right eye 2 times daily       cholecalciferol (VITAMIN D) 1000 UNIT tablet Take 2,000 Units by mouth daily       Fluticasone Propionate (FLONASE NA) Spray 2 sprays into both nostrils daily       Insulin Glargine  "(LOBOSMITHMIKEY CHURCH SC) Inject 22 Units Subcutaneous At Bedtime May use 2 units as needed to prime pen before each dose.       latanoprost (XALATAN) 0.005 % ophthalmic solution Place 1 drop into the right eye At Bedtime       LEVOTHYROXINE SODIUM PO Take 25 mcg by mouth daily       polyethylene glycol (MIRALAX/GLYCOLAX) powder Take 17 g by mouth daily        prednisoLONE acetate (PRED FORTE) 1 % ophthalmic susp Place 1 drop into the right eye 3 times daily       QUEtiapine (SEROQUEL) 25 MG tablet Take 1 tablet (25 mg) by mouth At Bedtime 30 tablet 11     rivaroxaban ANTICOAGULANT (XARELTO) 20 MG TABS tablet Take 1 tablet (20 mg) by mouth daily (with dinner) 30 tablet 1     tamsulosin (FLOMAX) 0.4 MG capsule Take 1 capsule (0.4 mg) by mouth daily 60 capsule 0     timolol, PF, (TIMOPTIC OCUDOSE) 0.5 % ophthalmic solution Place 1 drop into the right eye 2 times daily       traMADol (ULTRAM) 50 MG tablet TAKE 1 TAB BY MOUTH THREE TIMES DAILY;TAKE 1 TAB BY MOUTH EVERY 6 HOURS AS NEEDED FOR CHRONIC PAIN 120 tablet 5     REVIEW OF SYSTEMS:  Limited secondary to cognitive impairment but today pt reports I'm burak BOWLES'lexie    Objective:  /62   Pulse 64   Temp 96.4  F (35.8  C)   Resp 16   Ht 1.753 m (5' 9\")   Wt 70.3 kg (155 lb)   SpO2 97%   BMI 22.89 kg/m    Exam:  GENERAL APPEARANCE:  Alert, appears healthy  ENT:  Mouth and posterior oropharynx normal, moist mucous membranes  RESP:  lungs clear to auscultation   CV:  regular rate and rhythm, no murmur, rub, or gallop, no edema  ABDOMEN:  bowel sounds normal  M/S:   Gait and station abnormal transfer assist  SKIN:  Inspection of skin and subcutaneous tissue baseline  NEURO:   Examination of sensation by touch normal  PSYCH:  oriented X 3, memory impaired     Labs:   Labs done in SNF are in [place] EPIC. Please refer to them using [email or web address] Everywhere.    ASSESSMENT/PLAN:  Senile dementia with behavioral disturbance (H)  Agitation  Hallucinations  Generalized " pain  -unable to ascertain if mental decline or physical pain or both  -increased seroquel from 12.5 to 25mg  -increased tramadol from TID to 25mg TID and Q6h PRN  -both above appear to have improved overall status at this time  -staff encouraged to use PRN  -consider addition of seroquel scheduled in am if agitation and hallucinations worsen  -consider increasing scheduled tramadol if increase in pain complaints        Electronically signed by:  TOBY Alejandro CNP               Sincerely,        TOBY Alejandro CNP

## 2021-03-01 ENCOUNTER — ANCILLARY PROCEDURE (OUTPATIENT)
Dept: CARDIOLOGY | Facility: CLINIC | Age: 86
End: 2021-03-01
Attending: INTERNAL MEDICINE
Payer: COMMERCIAL

## 2021-03-01 DIAGNOSIS — Z95.0 CARDIAC PACEMAKER IN SITU: ICD-10-CM

## 2021-03-01 DIAGNOSIS — I45.9 HEART BLOCK: Primary | ICD-10-CM

## 2021-03-01 LAB
MDC_IDC_EPISODE_DTM: NORMAL
MDC_IDC_EPISODE_DURATION: 1 S
MDC_IDC_EPISODE_DURATION: 3 S
MDC_IDC_EPISODE_DURATION: NORMAL S
MDC_IDC_EPISODE_ID: NORMAL
MDC_IDC_EPISODE_TYPE: NORMAL
MDC_IDC_LEAD_IMPLANT_DT: NORMAL
MDC_IDC_LEAD_IMPLANT_DT: NORMAL
MDC_IDC_LEAD_LOCATION: NORMAL
MDC_IDC_LEAD_LOCATION: NORMAL
MDC_IDC_LEAD_LOCATION_DETAIL_1: NORMAL
MDC_IDC_LEAD_LOCATION_DETAIL_1: NORMAL
MDC_IDC_LEAD_MFG: NORMAL
MDC_IDC_LEAD_MFG: NORMAL
MDC_IDC_LEAD_MODEL: NORMAL
MDC_IDC_LEAD_MODEL: NORMAL
MDC_IDC_LEAD_POLARITY_TYPE: NORMAL
MDC_IDC_LEAD_POLARITY_TYPE: NORMAL
MDC_IDC_LEAD_SERIAL: NORMAL
MDC_IDC_LEAD_SERIAL: NORMAL
MDC_IDC_MSMT_BATTERY_DTM: NORMAL
MDC_IDC_MSMT_BATTERY_REMAINING_LONGEVITY: 96 MO
MDC_IDC_MSMT_BATTERY_REMAINING_PERCENTAGE: 100 %
MDC_IDC_MSMT_BATTERY_STATUS: NORMAL
MDC_IDC_MSMT_LEADCHNL_RA_IMPEDANCE_VALUE: 693 OHM
MDC_IDC_MSMT_LEADCHNL_RA_PACING_THRESHOLD_AMPLITUDE: 0.5 V
MDC_IDC_MSMT_LEADCHNL_RA_PACING_THRESHOLD_PULSEWIDTH: 0.4 MS
MDC_IDC_MSMT_LEADCHNL_RV_IMPEDANCE_VALUE: 723 OHM
MDC_IDC_MSMT_LEADCHNL_RV_PACING_THRESHOLD_AMPLITUDE: 0.8 V
MDC_IDC_MSMT_LEADCHNL_RV_PACING_THRESHOLD_PULSEWIDTH: 0.4 MS
MDC_IDC_PG_IMPLANT_DTM: NORMAL
MDC_IDC_PG_MFG: NORMAL
MDC_IDC_PG_MODEL: NORMAL
MDC_IDC_PG_SERIAL: NORMAL
MDC_IDC_PG_TYPE: NORMAL
MDC_IDC_SESS_CLINIC_NAME: NORMAL
MDC_IDC_SESS_DTM: NORMAL
MDC_IDC_SESS_TYPE: NORMAL
MDC_IDC_SET_BRADY_AT_MODE_SWITCH_MODE: NORMAL
MDC_IDC_SET_BRADY_AT_MODE_SWITCH_RATE: 170 {BEATS}/MIN
MDC_IDC_SET_BRADY_LOWRATE: 60 {BEATS}/MIN
MDC_IDC_SET_BRADY_MAX_TRACKING_RATE: 120 {BEATS}/MIN
MDC_IDC_SET_BRADY_MODE: NORMAL
MDC_IDC_SET_BRADY_PAV_DELAY_HIGH: 200 MS
MDC_IDC_SET_BRADY_PAV_DELAY_LOW: 300 MS
MDC_IDC_SET_BRADY_SAV_DELAY_HIGH: 200 MS
MDC_IDC_SET_BRADY_SAV_DELAY_LOW: 300 MS
MDC_IDC_SET_LEADCHNL_RA_PACING_AMPLITUDE: 2 V
MDC_IDC_SET_LEADCHNL_RA_PACING_CAPTURE_MODE: NORMAL
MDC_IDC_SET_LEADCHNL_RA_PACING_POLARITY: NORMAL
MDC_IDC_SET_LEADCHNL_RA_PACING_PULSEWIDTH: 0.4 MS
MDC_IDC_SET_LEADCHNL_RA_SENSING_ADAPTATION_MODE: NORMAL
MDC_IDC_SET_LEADCHNL_RA_SENSING_POLARITY: NORMAL
MDC_IDC_SET_LEADCHNL_RA_SENSING_SENSITIVITY: 0.25 MV
MDC_IDC_SET_LEADCHNL_RV_PACING_AMPLITUDE: 1.3 V
MDC_IDC_SET_LEADCHNL_RV_PACING_CAPTURE_MODE: NORMAL
MDC_IDC_SET_LEADCHNL_RV_PACING_POLARITY: NORMAL
MDC_IDC_SET_LEADCHNL_RV_PACING_PULSEWIDTH: 0.4 MS
MDC_IDC_SET_LEADCHNL_RV_SENSING_ADAPTATION_MODE: NORMAL
MDC_IDC_SET_LEADCHNL_RV_SENSING_POLARITY: NORMAL
MDC_IDC_SET_LEADCHNL_RV_SENSING_SENSITIVITY: 1.5 MV
MDC_IDC_SET_ZONE_DETECTION_INTERVAL: 375 MS
MDC_IDC_SET_ZONE_TYPE: NORMAL
MDC_IDC_SET_ZONE_VENDOR_TYPE: NORMAL
MDC_IDC_STAT_AT_BURDEN_PERCENT: 3 %
MDC_IDC_STAT_AT_DTM_END: NORMAL
MDC_IDC_STAT_AT_DTM_START: NORMAL
MDC_IDC_STAT_BRADY_DTM_END: NORMAL
MDC_IDC_STAT_BRADY_DTM_START: NORMAL
MDC_IDC_STAT_BRADY_RA_PERCENT_PACED: 1 %
MDC_IDC_STAT_BRADY_RV_PERCENT_PACED: 10 %
MDC_IDC_STAT_EPISODE_RECENT_COUNT: 0
MDC_IDC_STAT_EPISODE_RECENT_COUNT: 2
MDC_IDC_STAT_EPISODE_RECENT_COUNT: 36
MDC_IDC_STAT_EPISODE_RECENT_COUNT_DTM_END: NORMAL
MDC_IDC_STAT_EPISODE_RECENT_COUNT_DTM_START: NORMAL
MDC_IDC_STAT_EPISODE_TYPE: NORMAL
MDC_IDC_STAT_EPISODE_VENDOR_TYPE: NORMAL

## 2021-03-01 PROCEDURE — 93296 REM INTERROG EVL PM/IDS: CPT | Performed by: INTERNAL MEDICINE

## 2021-03-01 PROCEDURE — 93294 REM INTERROG EVL PM/LDLS PM: CPT | Performed by: INTERNAL MEDICINE

## 2021-03-25 VITALS
HEART RATE: 73 BPM | WEIGHT: 154 LBS | RESPIRATION RATE: 18 BRPM | SYSTOLIC BLOOD PRESSURE: 130 MMHG | BODY MASS INDEX: 22.74 KG/M2 | DIASTOLIC BLOOD PRESSURE: 63 MMHG | OXYGEN SATURATION: 93 % | TEMPERATURE: 98.6 F

## 2021-03-25 NOTE — PROGRESS NOTES
[facility]  Chief Complaint   Patient presents with     long term Regulatory     [place] Medical Record Number:  [id number]  [facility] of Service where encounter took place[facility] [[id number]]    HPI:    Elias Mckee  is 89 year old (8/10/1931), who is being seen today for a federally mandated E/M visit.  HPI information obtained from: facility chart records, facility staff, patient report and [place] Epic chart review. Today's concerns are:     Diabetes mellitus due to underlying condition with hyperosmolarity without coma, with long-term current use of insulin (H)  Generalized pain  Hypothyroidism due to acquired atrophy of thyroid     Patient seen on TLC unit, pleasantly confused, no distress, per staff redirectable, assist with most ADL's, weights slight decline, BG's in the 133-202 range, recent TSH 3.68, reports generalized joint and muscle pains, taking tramadol scheduled.    ALLERGIES:Terazosin  PAST MEDICAL HISTORY:   has a past medical history of Acute, but ill-defined, cerebrovascular disease (1-2008), Atrial fibrillation (H), Benign hypertension with CKD (chronic kidney disease) stage III (6/5/2017), Cancer (H), Cardiac pacemaker in situ, Placed on 7/20/18 (7/23/2018), Central retinal vein occlusion, right eye (11/15/2017), Chronic atrial fibrillation (H) (6/5/2017), Chronic ischemic heart disease, unspecified, CKD (chronic kidney disease) stage 3, GFR 30-59 ml/min, est GFR: 10/30/17: 46,, 12/14/17: 59 (2/29/2012), Cognitive deficits as late effect of cerebrovascular disease (6/5/2017), Coronary artery disease, CVA (cerebral infarction), Dysphagia due to recent cerebrovascular accident (CVA) (6/5/2017), Elevated cholesterol, Essential hypertension, benign, H/O prostate cancer (7/6/2017), H/O: CVA (cerebrovascular accident) (6/5/2017), Hemiparesis affecting left side as late effect of cerebrovascular accident (H) (6/5/2017), Hyperlipidaemia, Loss of weight  (10/2/2018), MEDICAL HISTORY OF - (1- 2008), Mobitz type I Wenckebach atrioventricular block, 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  PAST SURGICAL HISTORY:   has a past surgical history that includes seed implantation (2002); Phacoemulsification clear cornea with standard intraocular lens implant (Right, 10/7/2014); and Vitrectomy anterior (Right, 10/7/2014).  FAMILY HISTORY: family history is not on file.  SOCIAL HISTORY:  reports that he quit smoking about 47 years ago. His smoking use included cigarettes. He started smoking about 67 years ago. He has a 20.00 pack-year smoking history. He has never used smokeless tobacco. He reports current alcohol use of about 1.0 standard drinks of alcohol per week. He reports that he does not use drugs.    MEDICATIONS:  Current Outpatient Medications   Medication Sig Dispense Refill     ACETAMINOPHEN PO Take 1,000 mg by mouth 3 times daily For pain       Atorvastatin Calcium (LIPITOR PO) Take 40 mg by mouth At Bedtime       atropine 1 % ophthalmic solution Place 1 drop into the right eye 2 times daily       cholecalciferol (VITAMIN D) 1000 UNIT tablet Take 2,000 Units by mouth daily       Fluticasone Propionate (FLONASE NA) Spray 2 sprays into both nostrils daily       Insulin Glargine (BASAGLAR KWIKPEN SC) Inject 22 Units Subcutaneous At Bedtime May use 2 units as needed to prime pen before each dose.       latanoprost (XALATAN) 0.005 % ophthalmic solution Place 1 drop into the right eye At  Bedtime       LEVOTHYROXINE SODIUM PO Take 25 mcg by mouth daily       polyethylene glycol (MIRALAX/GLYCOLAX) powder Take 17 g by mouth daily        prednisoLONE acetate (PRED FORTE) 1 % ophthalmic susp Place 1 drop into the right eye 3 times daily       QUEtiapine (SEROQUEL) 25 MG tablet Take 1 tablet (25 mg) by mouth At Bedtime 30 tablet 11     rivaroxaban ANTICOAGULANT (XARELTO) 20 MG TABS tablet Take 1 tablet (20 mg) by mouth daily (with dinner) 30 tablet 1     tamsulosin (FLOMAX) 0.4 MG capsule Take 1 capsule (0.4 mg) by mouth daily 60 capsule 0     timolol, PF, (TIMOPTIC OCUDOSE) 0.5 % ophthalmic solution Place 1 drop into the right eye 2 times daily       traMADol (ULTRAM) 50 MG tablet TAKE 1 TAB BY MOUTH THREE TIMES DAILY;TAKE 1 TAB BY MOUTH EVERY 6 HOURS AS NEEDED FOR CHRONIC PAIN 120 tablet 5         Case Management:  I have reviewed the care plan and MDS and do agree with the plan. Patient's desire to return to the community is present, but is not able due to care needs . Information reviewed:  Medications, vital signs, orders, and nursing notes.    ROS:  4 point ROS including Respiratory, CV, GI and , other than that noted in the HPI,  is negative    Vitals:  /63   Pulse 73   Temp 98.6  F (37  C)   Resp 18   Wt 69.9 kg (154 lb)   SpO2 93%   BMI 22.74 kg/m    Body mass index is 22.74 kg/m .  Exam:  GENERAL APPEARANCE:  in no distress, appears healthy  ENT:  Mouth and posterior oropharynx normal, moist mucous membranes  RESP:  lungs clear to auscultation   CV:  no edema, rate-normal  ABDOMEN:  bowel sounds normal  M/S:   Gait and station abnormal transfer assist  SKIN:  Inspection of skin and subcutaneous tissue baseline  NEURO:   Examination of sensation by touch normal  PSYCH:  oriented X 3, memory impaired     Lab/Diagnostic data:   Labs done in SNF are in [place] EPIC. Please refer to them using [facility]/Care Everywhere.    ASSESSMENT/PLAN  Diabetes mellitus due to underlying condition with  hyperosmolarity without coma, with long-term current use of insulin (H)  -A1C on 1/25/21 was 6.0  -BG range 133-202  -continue glargine 22u Qpm  -staff to check BG's Qpm  -recheck A1C in 3-6 months    Generalized pain  -joint and soft tissue  -continue APAP 1000mg TID  -continue tramadol 50mg TID, per staff attempt to decrease will not be successful     Hypothyroidism due to acquired atrophy of thyroid  -TSH on 1/25 was 3.68  -continue levothyroxine 25mcg  -recheck TSH in 3-6 months        Electronically signed by:  TOBY Alejandro CNP

## 2021-03-26 ENCOUNTER — NURSING HOME VISIT (OUTPATIENT)
Dept: GERIATRICS | Facility: CLINIC | Age: 86
End: 2021-03-26
Payer: COMMERCIAL

## 2021-03-26 DIAGNOSIS — Z79.4 DIABETES MELLITUS DUE TO UNDERLYING CONDITION WITH HYPEROSMOLARITY WITHOUT COMA, WITH LONG-TERM CURRENT USE OF INSULIN (H): Primary | ICD-10-CM

## 2021-03-26 DIAGNOSIS — R52 GENERALIZED PAIN: ICD-10-CM

## 2021-03-26 DIAGNOSIS — E03.4 HYPOTHYROIDISM DUE TO ACQUIRED ATROPHY OF THYROID: ICD-10-CM

## 2021-03-26 DIAGNOSIS — E08.00 DIABETES MELLITUS DUE TO UNDERLYING CONDITION WITH HYPEROSMOLARITY WITHOUT COMA, WITH LONG-TERM CURRENT USE OF INSULIN (H): Primary | ICD-10-CM

## 2021-03-26 PROCEDURE — 99309 SBSQ NF CARE MODERATE MDM 30: CPT | Mod: GW | Performed by: NURSE PRACTITIONER

## 2021-03-26 NOTE — LETTER
3/26/2021        RE: [facility]  [address]  [address]  [place] [address]        [facility]  Chief Complaint   Patient presents with     alf Regulatory     [place] Medical Record Number:  [id number]  Place of Service where encounter took place:  [facility] (LTC) [[id number]]    HPI:    Elias Mckee  is 89 year old (8/10/1931), who is being seen today for a federally mandated E/M visit.  HPI information obtained from: facility chart records, facility staff, patient report and [facility] chart review. Today's concerns are:     Diabetes mellitus due to underlying condition with hyperosmolarity without coma, with long-term current use of insulin (H)  Generalized pain  Hypothyroidism due to acquired atrophy of thyroid     Patient seen on TLC unit, pleasantly confused, no distress, per staff redirectable, assist with most ADL's, weights slight decline, BG's in the 133-202 range, recent TSH 3.68, reports generalized joint and muscle pains, taking tramadol scheduled.    ALLERGIES:Terazosin  PAST MEDICAL HISTORY:   has a past medical history of Acute, but ill-defined, cerebrovascular disease (1-2008), Atrial fibrillation (H), Benign hypertension with CKD (chronic kidney disease) stage III (6/5/2017), Cancer (H), Cardiac pacemaker in situ, Placed on 7/20/18 (7/23/2018), Central retinal vein occlusion, right eye (11/15/2017), Chronic atrial fibrillation (H) (6/5/2017), Chronic ischemic heart disease, unspecified, CKD (chronic kidney disease) stage 3, GFR 30-59 ml/min, est GFR: 10/30/17: 46,, 12/14/17: 59 (2/29/2012), Cognitive deficits as late effect of cerebrovascular disease (6/5/2017), Coronary artery disease, CVA (cerebral infarction), Dysphagia due to recent cerebrovascular accident (CVA) (6/5/2017), Elevated cholesterol, Essential hypertension, benign, H/O prostate cancer (7/6/2017), H/O: CVA (cerebrovascular accident)  (6/5/2017), Hemiparesis affecting left side as late effect of cerebrovascular accident (H) (6/5/2017), Hyperlipidaemia, Loss of weight (10/2/2018), MEDICAL HISTORY OF - (1- 2008), Mobitz type I Wenckebach atrioventricular block, 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  PAST SURGICAL HISTORY:   has a past surgical history that includes seed implantation (2002); Phacoemulsification clear cornea with standard intraocular lens implant (Right, 10/7/2014); and Vitrectomy anterior (Right, 10/7/2014).  FAMILY HISTORY: family history is not on file.  SOCIAL HISTORY:  reports that he quit smoking about 47 years ago. His smoking use included cigarettes. He started smoking about 67 years ago. He has a 20.00 pack-year smoking history. He has never used smokeless tobacco. He reports current alcohol use of about 1.0 standard drinks of alcohol per week. He reports that he does not use drugs.    MEDICATIONS:  Current Outpatient Medications   Medication Sig Dispense Refill     ACETAMINOPHEN PO Take 1,000 mg by mouth 3 times daily For pain       Atorvastatin Calcium (LIPITOR PO) Take 40 mg by mouth At Bedtime       atropine 1 % ophthalmic solution Place 1 drop into the right eye 2 times daily       cholecalciferol (VITAMIN D) 1000 UNIT tablet Take 2,000 Units by mouth daily       Fluticasone Propionate (FLONASE NA) Spray 2 sprays into both nostrils daily       Insulin Glargine (BASAGLAR KWIKPEN SC) Inject 22 Units Subcutaneous At Bedtime May use 2 units  as needed to prime pen before each dose.       latanoprost (XALATAN) 0.005 % ophthalmic solution Place 1 drop into the right eye At Bedtime       LEVOTHYROXINE SODIUM PO Take 25 mcg by mouth daily       polyethylene glycol (MIRALAX/GLYCOLAX) powder Take 17 g by mouth daily        prednisoLONE acetate (PRED FORTE) 1 % ophthalmic susp Place 1 drop into the right eye 3 times daily       QUEtiapine (SEROQUEL) 25 MG tablet Take 1 tablet (25 mg) by mouth At Bedtime 30 tablet 11     rivaroxaban ANTICOAGULANT (XARELTO) 20 MG TABS tablet Take 1 tablet (20 mg) by mouth daily (with dinner) 30 tablet 1     tamsulosin (FLOMAX) 0.4 MG capsule Take 1 capsule (0.4 mg) by mouth daily 60 capsule 0     timolol, PF, (TIMOPTIC OCUDOSE) 0.5 % ophthalmic solution Place 1 drop into the right eye 2 times daily       traMADol (ULTRAM) 50 MG tablet TAKE 1 TAB BY MOUTH THREE TIMES DAILY;TAKE 1 TAB BY MOUTH EVERY 6 HOURS AS NEEDED FOR CHRONIC PAIN 120 tablet 5         Case Management:  I have reviewed the care plan and MDS and do agree with the plan. Patient's desire to return to the community is present, but is not able due to care needs . Information reviewed:  Medications, vital signs, orders, and nursing notes.    ROS:  4 point ROS including Respiratory, CV, GI and , other than that noted in the HPI,  is negative    Vitals:  /63   Pulse 73   Temp 98.6  F (37  C)   Resp 18   Wt 69.9 kg (154 lb)   SpO2 93%   BMI 22.74 kg/m    Body mass index is 22.74 kg/m .  Exam:  GENERAL APPEARANCE:  in no distress, appears healthy  ENT:  Mouth and posterior oropharynx normal, moist mucous membranes  RESP:  lungs clear to auscultation   CV:  no edema, rate-normal  ABDOMEN:  bowel sounds normal  M/S:   Gait and station abnormal transfer assist  SKIN:  Inspection of skin and subcutaneous tissue baseline  NEURO:   Examination of sensation by touch normal  PSYCH:  oriented X 3, memory impaired     Lab/Diagnostic data:   Labs done in SNF are in  [facility]. Please refer to them using [employer]/Care Everywhere.    ASSESSMENT/PLAN  Diabetes mellitus due to underlying condition with hyperosmolarity without coma, with long-term current use of insulin (H)  -A1C on 1/25/21 was 6.0  -BG range 133-202  -continue glargine 22u Qpm  -staff to check BG's Qpm  -recheck A1C in 3-6 months    Generalized pain  -joint and soft tissue  -continue APAP 1000mg TID  -continue tramadol 50mg TID, per staff attempt to decrease will not be successful     Hypothyroidism due to acquired atrophy of thyroid  -TSH on 1/25 was 3.68  -continue levothyroxine 25mcg  -recheck TSH in 3-6 months        Electronically signed by:  TOBY Alejandro CNP                Sincerely,        TOBY Alejandro CNP

## 2021-05-11 ENCOUNTER — NURSING HOME VISIT (OUTPATIENT)
Dept: GERIATRICS | Facility: CLINIC | Age: 86
End: 2021-05-11
Payer: COMMERCIAL

## 2021-05-11 DIAGNOSIS — I48.0 PAROXYSMAL ATRIAL FIBRILLATION (H): ICD-10-CM

## 2021-05-11 DIAGNOSIS — E11.51 TYPE II DIABETES MELLITUS WITH PERIPHERAL CIRCULATORY DISORDER (H): ICD-10-CM

## 2021-05-11 DIAGNOSIS — E08.00 DIABETES MELLITUS DUE TO UNDERLYING CONDITION WITH HYPEROSMOLARITY WITHOUT COMA, WITH LONG-TERM CURRENT USE OF INSULIN (H): ICD-10-CM

## 2021-05-11 DIAGNOSIS — I69.354 HEMIPARESIS AFFECTING LEFT SIDE AS LATE EFFECT OF CEREBROVASCULAR ACCIDENT (H): Primary | ICD-10-CM

## 2021-05-11 DIAGNOSIS — Z79.4 DIABETES MELLITUS DUE TO UNDERLYING CONDITION WITH HYPEROSMOLARITY WITHOUT COMA, WITH LONG-TERM CURRENT USE OF INSULIN (H): ICD-10-CM

## 2021-05-12 NOTE — PROGRESS NOTES
Pt was seen for a regulatory LTC visit.    Pt c/o L LE pain, tightness    He denies cough, chest pain, SOB    VSS  BG 100s-200s    Lying in bed, appears uncomfortable, complaining of L LE pain  L facial droop  Lungs clear  CV irreg  Abd soft  L hemiplegia, spasticity, with marked flexion contracture L LE    Assessment    S/p CVA with L hemiplegia, spasticity.  Pt currently is receiving tramadol.  Pt has received Baclofen in the past (2017), unclear if not helpful or if caused side effects. He was hospitalized on Acute Rehab 2017.    History of afib.  HR controlled  Pt remains on xarelto    DM type 2, well controlled, with HgbA1C 6.0 on 1/25/21    Plan  Review with family Pt 's past experience with muscle relaxant  Consider PT eval for spasticity and pain L LE

## 2021-05-27 ENCOUNTER — CLINICAL UPDATE (OUTPATIENT)
Dept: PHARMACY | Facility: CLINIC | Age: 86
End: 2021-05-27
Payer: COMMERCIAL

## 2021-05-27 DIAGNOSIS — R52 PAIN: ICD-10-CM

## 2021-05-27 DIAGNOSIS — E11.21 TYPE 2 DIABETES MELLITUS WITH DIABETIC NEPHROPATHY, WITH LONG-TERM CURRENT USE OF INSULIN (H): ICD-10-CM

## 2021-05-27 DIAGNOSIS — N40.0 BENIGN PROSTATIC HYPERPLASIA WITHOUT LOWER URINARY TRACT SYMPTOMS: ICD-10-CM

## 2021-05-27 DIAGNOSIS — Z79.4 TYPE 2 DIABETES MELLITUS WITH DIABETIC NEPHROPATHY, WITH LONG-TERM CURRENT USE OF INSULIN (H): ICD-10-CM

## 2021-05-27 DIAGNOSIS — Z86.73 H/O: CVA (CEREBROVASCULAR ACCIDENT): ICD-10-CM

## 2021-05-27 DIAGNOSIS — I48.0 PAROXYSMAL ATRIAL FIBRILLATION (H): ICD-10-CM

## 2021-05-27 DIAGNOSIS — R44.3 HALLUCINATIONS: ICD-10-CM

## 2021-05-27 DIAGNOSIS — F03.918 SENILE DEMENTIA WITH BEHAVIORAL DISTURBANCE (H): ICD-10-CM

## 2021-05-27 DIAGNOSIS — E03.4 HYPOTHYROIDISM DUE TO ACQUIRED ATROPHY OF THYROID: Primary | ICD-10-CM

## 2021-05-27 PROCEDURE — 99207 PR NO CHARGE LOS: CPT | Performed by: PHARMACIST

## 2021-05-27 NOTE — PROGRESS NOTES
This patient's medication list and chart were reviewed as part of the service provided by [facility].    Assessment/Recommendations:  1. (Dementia w/ hallucinations, agitation & aggression):  If pt without active disturbing hallucinations, and is not presenting as threat to self or others, may consider trial reduction in Quetiapine at this time to 12.5mg nightly and monitor.  Quetiapine may increase rate of cognitive decline, orthostasis, dizziness, falls, and mortality in dementia pts.  Appears Tramadol has been increased for pain since quetiapine increase, and possible that pain has been a contributor to agitation/anxiety.  The increase in tramadol may be helpful for reducing use of antipsychotic if helping with pain.  Please note, however, that current tramadol order is written such that if pt was taking prn doses every 6hr, pt would exceed max recommended daily tramadol dose of 300mg.  Please consider changing prn tramadol to 50mg every 8hr as needed.  2. (Afib):  Pt with est CrCl 50ml/min (Cockroft-Gault, using actual body weight, Scr 0.99).  Please note that current CrCl is right at cut-off for dose change recommendation of Xarelto (in afib, CrCl 15-50ml/min, recommended dose is 15mg daily.  >50ml/min recommended dose is 20mg daily).  May benefit from reduction in dose to 15mg daily. Xarelto is on Beers List in pt's 75 and older to use with caution due to inc risk of gi bleed compared with Warfarin and other DOACs.  Other consideration would be to switch Xarelto to Eliquis, which may be safer option due to his kidney function and lower risk of gi bleed associated with it. (would recommend BMP prior to potential switch).  Of note, on 8/16/19, I did check with  regarding potential reduction of dose in Xarelto to 15mg daily due to kidney function similar at that time.  Due to pt's h/o CVA and PAF, he indicated preference to continue with 20mg daily at that time.  However, would  be of benefit to readdress this again as noted above.  Pt with dementia, and is a fall risk.      Andria Vo, Pharm.D.,[facility]  Board Certified Geriatric Pharmacist  Medication Therapy Management Pharmacist  [phone]

## 2021-06-03 NOTE — PROGRESS NOTES
"[facility]  [place] Medical Record Number: [id number]  Place of Service where encounter took place: [facility] ([facility]) [[id number]]  Chief Complaint   Patient presents with     RECHECK     HPI:    Elias Mckee is a 89 year old (8/10/1931), who is being seen today for an episodic care visit. HPI information obtained from: facility chart records, facility staff, patient report and [facility] chart review. Today's concern is:     Dementia without behavioral disturbance, unspecified dementia type (H)  Agitation  Paroxysmal atrial fibrillation (H)  Neck pain  Hallucinations     Patient seen today for pharmacy med review, seen today in room, states \"help me please\", just wants to get back into bed, has been on seroquel 25mg with moderate efficacy of hallucinations and agitation, also taking tramadol but the dosing including PRN is above the max recommended daily dose, today RRR but has Afib, on xarelto 20mg but is on Beers list for risk of GI impairment and bleeding, overall patient has dementia and is fall risk, no distress.    Past Medical and Surgical History reviewed in Epic today.    MEDICATIONS:  Current Outpatient Medications   Medication Sig Dispense Refill     QUEtiapine (SEROQUEL) 25 MG tablet Take 0.5 tablets (12.5 mg) by mouth At Bedtime 30 tablet 11     rivaroxaban ANTICOAGULANT (XARELTO ANTICOAGULANT) 15 MG TABS tablet Take 1 tablet (15 mg) by mouth daily (with dinner) 30 tablet 0     traMADol (ULTRAM) 50 MG tablet Take 1 tablet (50 mg) by mouth 2 times daily And 50mg PO BID  tablet 5     ACETAMINOPHEN PO Take 1,000 mg by mouth 3 times daily For pain       Atorvastatin Calcium (LIPITOR PO) Take 40 mg by mouth At Bedtime       atropine 1 % ophthalmic solution Place 1 drop into the right eye 2 times daily       cholecalciferol (VITAMIN D) 1000 UNIT tablet Take 2,000 Units by mouth daily       Fluticasone Propionate (FLONASE NA) Spray 2 sprays into both nostrils " "daily       Insulin Glargine (BASAGLAR KWIKPEN SC) Inject 22 Units Subcutaneous At Bedtime May use 2 units as needed to prime pen before each dose.       latanoprost (XALATAN) 0.005 % ophthalmic solution Place 1 drop into the right eye At Bedtime       LEVOTHYROXINE SODIUM PO Take 25 mcg by mouth daily       polyethylene glycol (MIRALAX/GLYCOLAX) powder Take 17 g by mouth daily        prednisoLONE acetate (PRED FORTE) 1 % ophthalmic susp Place 1 drop into the right eye 3 times daily       tamsulosin (FLOMAX) 0.4 MG capsule Take 1 capsule (0.4 mg) by mouth daily 60 capsule 0     timolol, PF, (TIMOPTIC OCUDOSE) 0.5 % ophthalmic solution Place 1 drop into the right eye 2 times daily       REVIEW OF SYSTEMS:  Limited secondary to cognitive impairment but today pt reports I'm fine    Objective:  /66   Pulse 80   Temp 98.6  F (37  C)   Resp 16   Ht 1.753 m (5' 9\")   Wt 71.2 kg (157 lb)   SpO2 93%   BMI 23.18 kg/m    Exam:  GENERAL APPEARANCE:  in no distress, appears healthy  ENT:  Mouth and posterior oropharynx normal, moist mucous membranes  RESP:  lungs clear to auscultation   CV:  no edema, rate-normal  ABDOMEN:  bowel sounds normal  M/S:   Gait and station abnormal transfer assist  SKIN:  Inspection of skin and subcutaneous tissue baseline  NEURO:   Examination of sensation by touch normal  PSYCH:  oriented X 3, memory impaired     Labs:   Labs done in SNF are in [place] EPIC. Please refer to them using [employer]/Care Everywhere.    ASSESSMENT/PLAN:  Dementia without behavioral disturbance, unspecified dementia type (H)  Hallucinations  Agitation  -no change in status, due for GDR  -reduce seroquel from 25 to 12.5mg Qpm  -staff to support as indicated    Paroxysmal atrial fibrillation (H)  -denies CP/palpitations  -decrease xarelto from 20 to 15mg daily    Neck pain  -change tramadol from 50mg TID plus PRN to 50mg BID and 50mg BID PRN  -staff to follow up on efficacy      Electronically signed by:  Vitaliy " Neil Champagne, TOBY CNP

## 2021-06-04 ENCOUNTER — NURSING HOME VISIT (OUTPATIENT)
Dept: GERIATRICS | Facility: CLINIC | Age: 86
End: 2021-06-04
Payer: COMMERCIAL

## 2021-06-04 ENCOUNTER — ANCILLARY PROCEDURE (OUTPATIENT)
Dept: CARDIOLOGY | Facility: CLINIC | Age: 86
End: 2021-06-04
Attending: INTERNAL MEDICINE
Payer: COMMERCIAL

## 2021-06-04 VITALS
HEIGHT: 69 IN | WEIGHT: 157 LBS | BODY MASS INDEX: 23.25 KG/M2 | SYSTOLIC BLOOD PRESSURE: 105 MMHG | OXYGEN SATURATION: 93 % | HEART RATE: 80 BPM | DIASTOLIC BLOOD PRESSURE: 66 MMHG | RESPIRATION RATE: 16 BRPM | TEMPERATURE: 98.6 F

## 2021-06-04 DIAGNOSIS — I48.0 PAROXYSMAL ATRIAL FIBRILLATION (H): ICD-10-CM

## 2021-06-04 DIAGNOSIS — R44.3 HALLUCINATIONS: ICD-10-CM

## 2021-06-04 DIAGNOSIS — M54.2 NECK PAIN: ICD-10-CM

## 2021-06-04 DIAGNOSIS — R45.1 AGITATION: ICD-10-CM

## 2021-06-04 DIAGNOSIS — F03.90 DEMENTIA WITHOUT BEHAVIORAL DISTURBANCE, UNSPECIFIED DEMENTIA TYPE: Primary | ICD-10-CM

## 2021-06-04 DIAGNOSIS — I45.9 HEART BLOCK: ICD-10-CM

## 2021-06-04 PROCEDURE — 93296 REM INTERROG EVL PM/IDS: CPT | Performed by: INTERNAL MEDICINE

## 2021-06-04 PROCEDURE — 93294 REM INTERROG EVL PM/LDLS PM: CPT | Performed by: INTERNAL MEDICINE

## 2021-06-04 PROCEDURE — 99309 SBSQ NF CARE MODERATE MDM 30: CPT | Mod: GV | Performed by: NURSE PRACTITIONER

## 2021-06-04 RX ORDER — TRAMADOL HYDROCHLORIDE 50 MG/1
50 TABLET ORAL
Qty: 120 TABLET | Refills: 5
Start: 2021-06-04 | End: 2021-06-21

## 2021-06-04 RX ORDER — QUETIAPINE FUMARATE 25 MG/1
12.5 TABLET, FILM COATED ORAL AT BEDTIME
Qty: 30 TABLET | Refills: 11
Start: 2021-06-04 | End: 2021-06-07

## 2021-06-04 ASSESSMENT — MIFFLIN-ST. JEOR: SCORE: 1367.53

## 2021-06-07 ENCOUNTER — NURSING HOME VISIT (OUTPATIENT)
Dept: GERIATRICS | Facility: CLINIC | Age: 86
End: 2021-06-07
Payer: COMMERCIAL

## 2021-06-07 VITALS
SYSTOLIC BLOOD PRESSURE: 105 MMHG | TEMPERATURE: 98.4 F | OXYGEN SATURATION: 95 % | BODY MASS INDEX: 23.18 KG/M2 | HEART RATE: 70 BPM | RESPIRATION RATE: 16 BRPM | DIASTOLIC BLOOD PRESSURE: 66 MMHG | WEIGHT: 157 LBS

## 2021-06-07 DIAGNOSIS — R44.3 HALLUCINATIONS: ICD-10-CM

## 2021-06-07 DIAGNOSIS — F03.918 SENILE DEMENTIA WITH BEHAVIORAL DISTURBANCE (H): Primary | ICD-10-CM

## 2021-06-07 DIAGNOSIS — R45.1 AGITATION: ICD-10-CM

## 2021-06-07 PROCEDURE — 99309 SBSQ NF CARE MODERATE MDM 30: CPT | Mod: GV | Performed by: NURSE PRACTITIONER

## 2021-06-07 RX ORDER — QUETIAPINE FUMARATE 25 MG/1
12.5 TABLET, FILM COATED ORAL 2 TIMES DAILY
Qty: 30 TABLET | Refills: 11
Start: 2021-06-07 | End: 2021-06-14

## 2021-06-07 NOTE — PROGRESS NOTES
[place] [facility]  [place] Medical Record Number:  [id number]  Place of Service where encounter took place:  [facility] ([facility]) [[id number]]  Chief Complaint   Patient presents with     RECHECK       HPI:    Elias Mckee  is a 89 year old (8/10/1931), who is being seen today for an episodic care visit.  HPI information obtained from: facility chart records, facility staff, patient report and [facility] chart review. 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    Past Medical and Surgical History reviewed in Epic today.    MEDICATIONS:    Current Outpatient Medications   Medication Sig Dispense Refill     QUEtiapine (SEROQUEL) 25 MG tablet Take 0.5 tablets (12.5 mg) by mouth 2 times daily 30 tablet 11     ACETAMINOPHEN PO Take 1,000 mg by mouth 3 times daily For pain       Atorvastatin Calcium (LIPITOR PO) Take 40 mg by mouth At Bedtime       atropine 1 % ophthalmic solution Place 1 drop into the right eye 2 times daily       cholecalciferol (VITAMIN D) 1000 UNIT tablet Take 2,000 Units by mouth daily       Fluticasone Propionate (FLONASE NA) Spray 2 sprays into both nostrils daily       Insulin Glargine (BASAGLAR KWIKPEN SC) Inject 22 Units Subcutaneous At Bedtime May use 2 units as needed to prime pen before each dose.       latanoprost (XALATAN) 0.005 % ophthalmic solution Place 1 drop into the right eye At Bedtime       LEVOTHYROXINE SODIUM PO Take 25 mcg by mouth daily       polyethylene glycol (MIRALAX/GLYCOLAX) powder Take 17 g by mouth daily        prednisoLONE acetate (PRED FORTE) 1 % ophthalmic susp Place 1 drop into the right eye 3 times daily       rivaroxaban ANTICOAGULANT (XARELTO ANTICOAGULANT) 15 MG  TABS tablet Take 1 tablet (15 mg) by mouth daily (with dinner) 30 tablet 0     tamsulosin (FLOMAX) 0.4 MG capsule Take 1 capsule (0.4 mg) by mouth daily 60 capsule 0     timolol, PF, (TIMOPTIC OCUDOSE) 0.5 % ophthalmic solution Place 1 drop into the right eye 2 times daily       traMADol (ULTRAM) 50 MG tablet Take 1 tablet (50 mg) by mouth 2 times daily And 50mg PO BID  tablet 5       REVIEW OF SYSTEMS:  Limited secondary to cognitive impairment but today pt reports as above    Objective:  /66   Pulse 70   Temp 98.4  F (36.9  C)   Resp 16   Wt 71.2 kg (157 lb)   SpO2 95%   BMI 23.18 kg/m    Exam:  GENERAL APPEARANCE:  appears healthy, anxious  ENT:  Mouth and posterior oropharynx normal, moist mucous membranes  RESP:  lungs clear to auscultation   CV:  no edema, rate-normal  ABDOMEN:  bowel sounds normal  M/S:   Gait and station abnormal transfer assist  SKIN:  Inspection of skin and subcutaneous tissue baseline  NEURO:   Examination of sensation by touch normal  PSYCH:  oriented to self    Labs:   Labs done in SNF are in [place] EPIC. Please refer to them using [employer]/Care Everywhere.    ASSESSMENT/PLAN:  Hallucinations  Senile dementia with behavioral disturbance (H)  Agitation  -no pain present, moderate agitation as above  -continue reduced tramadol dosing to BID  -increase seroquel to 12.5mg BID  -staff to support as indicated  -plan to reduce tramadol as possible with continued use of seroquel, may need to titrate dose upwards if indicated         Electronically signed by:  TOBY Alejandro CNP

## 2021-06-10 VITALS
DIASTOLIC BLOOD PRESSURE: 76 MMHG | TEMPERATURE: 98.2 F | HEART RATE: 81 BPM | WEIGHT: 157 LBS | SYSTOLIC BLOOD PRESSURE: 139 MMHG | RESPIRATION RATE: 16 BRPM | BODY MASS INDEX: 23.18 KG/M2 | OXYGEN SATURATION: 94 %

## 2021-06-10 NOTE — PROGRESS NOTES
[place] [facility]  [place] Medical Record Number:  [id number]  Place of Service where encounter took place:  [facility] ([facility]) [[id number]]  Chief Complaint   Patient presents with     RECHECK       HPI:    Elias Mckee  is a 89 year old (8/10/1931), who is being seen today for an episodic care visit.  HPI information obtained from: facility chart records, facility staff, patient report and [facility] chart review. Today's concern is:     Senile dementia with behavioral disturbance (H)  Agitation  Generalized pain     Patient seen for follow up, also spoke with staff nurses and managers plus 2 recent pm phone calls regarding status, difficult to ascertain if patient having overt pain or psychosis: intermittent bouts of yelling out, swearing, hitting/kicking staff, then pleasant, today was in room screaming to be put back into bed after breakfast, limited historian and unable to complete ROS, does have leg contractures that seem to be worse now that therapy is no longer stretching, also recent reduction in both seroquel and tramadol status has worsened.     Past Medical and Surgical History reviewed in Epic today.    MEDICATIONS:    Current Outpatient Medications   Medication Sig Dispense Refill     ACETAMINOPHEN PO Take 1,000 mg by mouth 3 times daily For pain       Atorvastatin Calcium (LIPITOR PO) Take 40 mg by mouth At Bedtime       atropine 1 % ophthalmic solution Place 1 drop into the right eye 2 times daily       cholecalciferol (VITAMIN D) 1000 UNIT tablet Take 2,000 Units by mouth daily       Fluticasone Propionate (FLONASE NA) Spray 2 sprays into both nostrils daily       Insulin Glargine (BASAGLAR KWIKPEN SC) Inject 22 Units Subcutaneous At Bedtime May use 2 units as needed to prime pen before each dose.       latanoprost (XALATAN) 0.005 % ophthalmic solution Place 1 drop into the right eye At Bedtime       LEVOTHYROXINE SODIUM PO Take 25 mcg by mouth daily       polyethylene  glycol (MIRALAX/GLYCOLAX) powder Take 17 g by mouth daily        prednisoLONE acetate (PRED FORTE) 1 % ophthalmic susp Place 1 drop into the right eye 3 times daily       QUEtiapine (SEROQUEL) 25 MG tablet Take 0.5 tablets (12.5 mg) by mouth 2 times daily 30 tablet 11     rivaroxaban ANTICOAGULANT (XARELTO ANTICOAGULANT) 15 MG TABS tablet Take 1 tablet (15 mg) by mouth daily (with dinner) 30 tablet 0     tamsulosin (FLOMAX) 0.4 MG capsule Take 1 capsule (0.4 mg) by mouth daily 60 capsule 0     timolol, PF, (TIMOPTIC OCUDOSE) 0.5 % ophthalmic solution Place 1 drop into the right eye 2 times daily       traMADol (ULTRAM) 50 MG tablet Take 1 tablet (50 mg) by mouth 2 times daily And 50mg PO BID  tablet 5       REVIEW OF SYSTEMS:  Unobtainable secondary to cognitive impairment.     Objective:  /76   Pulse 81   Temp 98.2  F (36.8  C)   Resp 16   Wt 71.2 kg (157 lb)   SpO2 94%   BMI 23.18 kg/m    Exam:  GENERAL APPEARANCE:  appears healthy, thin  ENT:  Mouth and posterior oropharynx normal, moist mucous membranes  RESP:  no respiratory distress  CV:  no edema  ABDOMEN:  no distension  M/S:   Gait and station abnormal transfer assist  SKIN:  Inspection of skin and subcutaneous tissueWNL, misc extremity bruising  NEURO:   Examination of sensation by touch normal  PSYCH:  oriented X 3, memory impaired     Labs:   Labs done in SNF are in [place] EPIC. Please refer to them using [employer]IC/Care Everywhere.    ASSESSMENT/PLAN:  Senile dementia with behavioral disturbance (H)  Agitation  Generalized pain  -attempting to balance dementia with potential pain  -seroquel changed from 25mg at bedtime to 12.5mg at bedtime, then changed to 12.5mg BID  -tramadol changed from 50mg TID to BID  -today; change both tramadol and seroquel dosing to 5am and 5pm  -staff to monitor  -will adjust either again next week as indicated        Electronically signed by:  TOBY Alejandro CNP

## 2021-06-11 ENCOUNTER — NURSING HOME VISIT (OUTPATIENT)
Dept: GERIATRICS | Facility: CLINIC | Age: 86
End: 2021-06-11
Payer: COMMERCIAL

## 2021-06-11 DIAGNOSIS — R45.1 AGITATION: ICD-10-CM

## 2021-06-11 DIAGNOSIS — R52 GENERALIZED PAIN: ICD-10-CM

## 2021-06-11 DIAGNOSIS — F03.918 SENILE DEMENTIA WITH BEHAVIORAL DISTURBANCE (H): Primary | ICD-10-CM

## 2021-06-11 PROCEDURE — 99309 SBSQ NF CARE MODERATE MDM 30: CPT | Performed by: NURSE PRACTITIONER

## 2021-06-11 NOTE — LETTER
6/11/2021        RE: [facility]  [address]  [address]  [facility] [address]        [place] [facility]  [place] Medical Record Number:  [id number]  Place of Service where encounter took place:  [facility] [[id number]]  Chief Complaint   Patient presents with     RECHECK       HPI:    Elias Mckee  is a 89 year old (8/10/1931), who is being seen today for an episodic care visit.  HPI information obtained from: facility chart records, facility staff, patient report and [facility] chart review. Today's concern is:     Senile dementia with behavioral disturbance (H)  Agitation  Generalized pain     Patient seen for follow up, also spoke with staff nurses and managers plus 2 recent pm phone calls regarding status, difficult to ascertain if patient having overt pain or psychosis: intermittent bouts of yelling out, swearing, hitting/kicking staff, then pleasant, today was in room screaming to be put back into bed after breakfast, limited historian and unable to complete ROS, does have leg contractures that seem to be worse now that therapy is no longer stretching, also recent reduction in both seroquel and tramadol status has worsened.     Past Medical and Surgical History reviewed in Epic today.    MEDICATIONS:    Current Outpatient Medications   Medication Sig Dispense Refill     ACETAMINOPHEN PO Take 1,000 mg by mouth 3 times daily For pain       Atorvastatin Calcium (LIPITOR PO) Take 40 mg by mouth At Bedtime       atropine 1 % ophthalmic solution Place 1 drop into the right eye 2 times daily       cholecalciferol (VITAMIN D) 1000 UNIT tablet Take 2,000 Units by mouth daily       Fluticasone Propionate (FLONASE NA) Spray 2 sprays into both nostrils daily       Insulin Glargine (BASAGLAR KWIKPEN SC) Inject 22 Units Subcutaneous At Bedtime May use 2 units as needed to prime pen before each dose.       latanoprost (XALATAN) 0.005 %  ophthalmic solution Place 1 drop into the right eye At Bedtime       LEVOTHYROXINE SODIUM PO Take 25 mcg by mouth daily       polyethylene glycol (MIRALAX/GLYCOLAX) powder Take 17 g by mouth daily        prednisoLONE acetate (PRED FORTE) 1 % ophthalmic susp Place 1 drop into the right eye 3 times daily       QUEtiapine (SEROQUEL) 25 MG tablet Take 0.5 tablets (12.5 mg) by mouth 2 times daily 30 tablet 11     rivaroxaban ANTICOAGULANT (XARELTO ANTICOAGULANT) 15 MG TABS tablet Take 1 tablet (15 mg) by mouth daily (with dinner) 30 tablet 0     tamsulosin (FLOMAX) 0.4 MG capsule Take 1 capsule (0.4 mg) by mouth daily 60 capsule 0     timolol, PF, (TIMOPTIC OCUDOSE) 0.5 % ophthalmic solution Place 1 drop into the right eye 2 times daily       traMADol (ULTRAM) 50 MG tablet Take 1 tablet (50 mg) by mouth 2 times daily And 50mg PO BID  tablet 5       REVIEW OF SYSTEMS:  Unobtainable secondary to cognitive impairment.     Objective:  /76   Pulse 81   Temp 98.2  F (36.8  C)   Resp 16   Wt 71.2 kg (157 lb)   SpO2 94%   BMI 23.18 kg/m    Exam:  GENERAL APPEARANCE:  appears healthy, thin  ENT:  Mouth and posterior oropharynx normal, moist mucous membranes  RESP:  no respiratory distress  CV:  no edema  ABDOMEN:  no distension  M/S:   Gait and station abnormal transfer assist  SKIN:  Inspection of skin and subcutaneous tissueWNL, misc extremity bruising  NEURO:   Examination of sensation by touch normal  PSYCH:  oriented X 3, memory impaired     Labs:   Labs done in SNF are in [place] EPIC. Please refer to them using [facility]/Care Everywhere.    ASSESSMENT/PLAN:  Senile dementia with behavioral disturbance (H)  Agitation  Generalized pain  -attempting to balance dementia with potential pain  -seroquel changed from 25mg at bedtime to 12.5mg at bedtime, then changed to 12.5mg BID  -tramadol changed from 50mg TID to BID  -today; change both tramadol and seroquel dosing to 5am and 5pm  -staff to monitor  -will adjust  either again next week as indicated        Electronically signed by:  TOBY Alejandro CNP                 Sincerely,        TOBY Alejandro CNP

## 2021-06-14 ENCOUNTER — NURSING HOME VISIT (OUTPATIENT)
Dept: GERIATRICS | Facility: CLINIC | Age: 86
End: 2021-06-14
Payer: COMMERCIAL

## 2021-06-14 VITALS
WEIGHT: 151 LBS | OXYGEN SATURATION: 95 % | HEIGHT: 69 IN | SYSTOLIC BLOOD PRESSURE: 157 MMHG | RESPIRATION RATE: 18 BRPM | TEMPERATURE: 98.6 F | HEART RATE: 85 BPM | DIASTOLIC BLOOD PRESSURE: 66 MMHG | BODY MASS INDEX: 22.36 KG/M2

## 2021-06-14 DIAGNOSIS — R52 GENERALIZED PAIN: ICD-10-CM

## 2021-06-14 DIAGNOSIS — R45.1 AGITATION: ICD-10-CM

## 2021-06-14 DIAGNOSIS — F03.918 SENILE DEMENTIA WITH BEHAVIORAL DISTURBANCE (H): Primary | ICD-10-CM

## 2021-06-14 PROCEDURE — 99309 SBSQ NF CARE MODERATE MDM 30: CPT | Mod: GV | Performed by: NURSE PRACTITIONER

## 2021-06-14 RX ORDER — QUETIAPINE FUMARATE 25 MG/1
25 TABLET, FILM COATED ORAL 2 TIMES DAILY
Qty: 30 TABLET | Refills: 11
Start: 2021-06-14 | End: 2022-01-01

## 2021-06-14 ASSESSMENT — MIFFLIN-ST. JEOR: SCORE: 1340.31

## 2021-06-14 NOTE — PROGRESS NOTES
"[place] [facility]  [place] Medical Record Number:  [id number]  Place of Service where encounter took place:  [facility] ([facility]) [[id number]]  Chief Complaint   Patient presents with     Nursing Home Acute       HPI:    Elias Mckee  is a 89 year old (8/10/1931), who is being seen today for an episodic care visit.  HPI information obtained from: facility chart records, facility staff, patient report and [facility] chart review. Today's concern is:     Senile dementia with behavioral disturbance (H)  Generalized pain  Agitation     Patient seen today on request, attempted GDR seroquel and tramadol have not been successful, difficult to ascertain exactly what is going on with limited cognition, increased yelling/screaming, hitting and kicking at staff during cares, unknown if cognitive issue versus pain, statements like \"I fucking hurt all over\" and \"get the hell out of here\" among other quotes, potential for pain yet current thought is more of a cognitive issue.    Past Medical and Surgical History reviewed in Epic today.    MEDICATIONS:    Current Outpatient Medications   Medication Sig Dispense Refill     QUEtiapine (SEROQUEL) 25 MG tablet Take 1 tablet (25 mg) by mouth 2 times daily 30 tablet 11     ACETAMINOPHEN PO Take 1,000 mg by mouth 3 times daily For pain       Atorvastatin Calcium (LIPITOR PO) Take 40 mg by mouth At Bedtime       atropine 1 % ophthalmic solution Place 1 drop into the right eye 2 times daily       cholecalciferol (VITAMIN D) 1000 UNIT tablet Take 2,000 Units by mouth daily       Fluticasone Propionate (FLONASE NA) Spray 2 sprays into both nostrils daily       Insulin Glargine (BASAGLAR KWIKPEN SC) Inject 22 Units Subcutaneous At Bedtime May use 2 units as needed to prime pen before each dose.       latanoprost (XALATAN) 0.005 % ophthalmic solution Place 1 drop into the right eye At Bedtime       LEVOTHYROXINE SODIUM PO Take 25 mcg by mouth daily       polyethylene " "glycol (MIRALAX/GLYCOLAX) powder Take 17 g by mouth daily        prednisoLONE acetate (PRED FORTE) 1 % ophthalmic susp Place 1 drop into the right eye 3 times daily       rivaroxaban ANTICOAGULANT (XARELTO ANTICOAGULANT) 15 MG TABS tablet Take 1 tablet (15 mg) by mouth daily (with dinner) 30 tablet 0     tamsulosin (FLOMAX) 0.4 MG capsule Take 1 capsule (0.4 mg) by mouth daily 60 capsule 0     timolol, PF, (TIMOPTIC OCUDOSE) 0.5 % ophthalmic solution Place 1 drop into the right eye 2 times daily       traMADol (ULTRAM) 50 MG tablet Take 1 tablet (50 mg) by mouth 2 times daily And 50mg PO BID  tablet 5       REVIEW OF SYSTEMS:  Limited secondary to cognitive impairment but today pt reports not feeling well    Objective:  BP (!) 157/66   Pulse 85   Temp 98.6  F (37  C)   Resp 18   Ht 1.753 m (5' 9\")   Wt 68.5 kg (151 lb)   SpO2 95%   BMI 22.30 kg/m    Exam:  GENERAL APPEARANCE:  appears healthy, in moderate distress due to unknown  ENT:  Mouth and posterior oropharynx normal, moist mucous membranes  RESP:  lungs clear to auscultation   CV:  regular rate and rhythm, no murmur, rub, or gallop, no edema  ABDOMEN:  bowel sounds normal  M/S:   Gait and station abnormal WC mobility  SKIN:  Inspection of skin and subcutaneous tissueWNL, misc extremity bruising  NEURO:   Examination of sensation by touch normal  PSYCH:  oriented to self    Labs:   Labs done in SNF are in [place] EPIC. Please refer to them using [facility]/Care Everywhere.    ASSESSMENT/PLAN:  Senile dementia with behavioral disturbance (H)  Generalized pain  Agitation  -possible pain and/or progression of disease  -increase seroquel from 12.5mg BID to 25mg BID  -continue tramadol 50mg BID scheduled plus PRN  -staff to support as indicated  -if seroquel is not effective plan to reduce and start increased tramadol dosing  -of note, may consider haldol also        Electronically signed by:  TOBY Alejandro CNP           "

## 2021-06-14 NOTE — LETTER
"    6/14/2021        RE: [facility]  [address]  [address]  [facility] [address]        [place] [facility]  [place] Medical Record Number:  [id number]  Place of Service where encounter took place:  [facility] ([facility]) [[id number]]  Chief Complaint   Patient presents with     Nursing Home Acute       HPI:    Elias Mckee  is a 89 year old (8/10/1931), who is being seen today for an episodic care visit.  HPI information obtained from: facility chart records, facility staff, patient report and [facility] chart review. Today's concern is:     Senile dementia with behavioral disturbance (H)  Generalized pain  Agitation     Patient seen today on request, attempted GDR seroquel and tramadol have not been successful, difficult to ascertain exactly what is going on with limited cognition, increased yelling/screaming, hitting and kicking at staff during cares, unknown if cognitive issue versus pain, statements like \"I fucking hurt all over\" and \"get the hell out of here\" among other quotes, potential for pain yet current thought is more of a cognitive issue.    Past Medical and Surgical History reviewed in Epic today.    MEDICATIONS:    Current Outpatient Medications   Medication Sig Dispense Refill     QUEtiapine (SEROQUEL) 25 MG tablet Take 1 tablet (25 mg) by mouth 2 times daily 30 tablet 11     ACETAMINOPHEN PO Take 1,000 mg by mouth 3 times daily For pain       Atorvastatin Calcium (LIPITOR PO) Take 40 mg by mouth At Bedtime       atropine 1 % ophthalmic solution Place 1 drop into the right eye 2 times daily       cholecalciferol (VITAMIN D) 1000 UNIT tablet Take 2,000 Units by mouth daily       Fluticasone Propionate (FLONASE NA) Spray 2 sprays into both nostrils daily       Insulin Glargine (BASAGLAR KWIKPEN SC) Inject 22 Units Subcutaneous At Bedtime May use 2 units as needed to prime pen before each dose.       latanoprost (XALATAN) 0.005 % " "ophthalmic solution Place 1 drop into the right eye At Bedtime       LEVOTHYROXINE SODIUM PO Take 25 mcg by mouth daily       polyethylene glycol (MIRALAX/GLYCOLAX) powder Take 17 g by mouth daily        prednisoLONE acetate (PRED FORTE) 1 % ophthalmic susp Place 1 drop into the right eye 3 times daily       rivaroxaban ANTICOAGULANT (XARELTO ANTICOAGULANT) 15 MG TABS tablet Take 1 tablet (15 mg) by mouth daily (with dinner) 30 tablet 0     tamsulosin (FLOMAX) 0.4 MG capsule Take 1 capsule (0.4 mg) by mouth daily 60 capsule 0     timolol, PF, (TIMOPTIC OCUDOSE) 0.5 % ophthalmic solution Place 1 drop into the right eye 2 times daily       traMADol (ULTRAM) 50 MG tablet Take 1 tablet (50 mg) by mouth 2 times daily And 50mg PO BID  tablet 5       REVIEW OF SYSTEMS:  Limited secondary to cognitive impairment but today pt reports not feeling well    Objective:  BP (!) 157/66   Pulse 85   Temp 98.6  F (37  C)   Resp 18   Ht 1.753 m (5' 9\")   Wt 68.5 kg (151 lb)   SpO2 95%   BMI 22.30 kg/m    Exam:  GENERAL APPEARANCE:  appears healthy, in moderate distress due to unknown  ENT:  Mouth and posterior oropharynx normal, moist mucous membranes  RESP:  lungs clear to auscultation   CV:  regular rate and rhythm, no murmur, rub, or gallop, no edema  ABDOMEN:  bowel sounds normal  M/S:   Gait and station abnormal WC mobility  SKIN:  Inspection of skin and subcutaneous tissueWNL, misc extremity bruising  NEURO:   Examination of sensation by touch normal  PSYCH:  oriented to self    Labs:   Labs done in SNF are in [place] EPIC. Please refer to them using EPIC/Care Everywhere.    ASSESSMENT/PLAN:  Senile dementia with behavioral disturbance (H)  Generalized pain  Agitation  -possible pain and/or progression of disease  -increase seroquel from 12.5mg BID to 25mg BID  -continue tramadol 50mg BID scheduled plus PRN  -staff to support as indicated  -if seroquel is not effective plan to reduce and start increased tramadol " dosing  -of note, may consider haldol also        Electronically signed by:  TOBY Alejandro CNP                 Sincerely,        TOBY Alejandro CNP

## 2021-06-15 LAB
MDC_IDC_EPISODE_DTM: NORMAL
MDC_IDC_EPISODE_DURATION: 1 S
MDC_IDC_EPISODE_DURATION: 18 S
MDC_IDC_EPISODE_DURATION: 49 S
MDC_IDC_EPISODE_DURATION: 9157 S
MDC_IDC_EPISODE_DURATION: NORMAL S
MDC_IDC_EPISODE_ID: NORMAL
MDC_IDC_EPISODE_TYPE: NORMAL
MDC_IDC_LEAD_IMPLANT_DT: NORMAL
MDC_IDC_LEAD_IMPLANT_DT: NORMAL
MDC_IDC_LEAD_LOCATION: NORMAL
MDC_IDC_LEAD_LOCATION: NORMAL
MDC_IDC_LEAD_LOCATION_DETAIL_1: NORMAL
MDC_IDC_LEAD_LOCATION_DETAIL_1: NORMAL
MDC_IDC_LEAD_MFG: NORMAL
MDC_IDC_LEAD_MFG: NORMAL
MDC_IDC_LEAD_MODEL: NORMAL
MDC_IDC_LEAD_MODEL: NORMAL
MDC_IDC_LEAD_POLARITY_TYPE: NORMAL
MDC_IDC_LEAD_POLARITY_TYPE: NORMAL
MDC_IDC_LEAD_SERIAL: NORMAL
MDC_IDC_LEAD_SERIAL: NORMAL
MDC_IDC_MSMT_BATTERY_DTM: NORMAL
MDC_IDC_MSMT_BATTERY_REMAINING_LONGEVITY: 90 MO
MDC_IDC_MSMT_BATTERY_REMAINING_PERCENTAGE: 100 %
MDC_IDC_MSMT_BATTERY_STATUS: NORMAL
MDC_IDC_MSMT_LEADCHNL_RA_IMPEDANCE_VALUE: 652 OHM
MDC_IDC_MSMT_LEADCHNL_RA_PACING_THRESHOLD_AMPLITUDE: 0.5 V
MDC_IDC_MSMT_LEADCHNL_RA_PACING_THRESHOLD_PULSEWIDTH: 0.4 MS
MDC_IDC_MSMT_LEADCHNL_RV_IMPEDANCE_VALUE: 738 OHM
MDC_IDC_MSMT_LEADCHNL_RV_PACING_THRESHOLD_AMPLITUDE: 0.8 V
MDC_IDC_MSMT_LEADCHNL_RV_PACING_THRESHOLD_PULSEWIDTH: 0.4 MS
MDC_IDC_PG_IMPLANT_DTM: NORMAL
MDC_IDC_PG_MFG: NORMAL
MDC_IDC_PG_MODEL: NORMAL
MDC_IDC_PG_SERIAL: NORMAL
MDC_IDC_PG_TYPE: NORMAL
MDC_IDC_SESS_CLINIC_NAME: NORMAL
MDC_IDC_SESS_DTM: NORMAL
MDC_IDC_SESS_TYPE: NORMAL
MDC_IDC_SET_BRADY_AT_MODE_SWITCH_MODE: NORMAL
MDC_IDC_SET_BRADY_AT_MODE_SWITCH_RATE: 170 {BEATS}/MIN
MDC_IDC_SET_BRADY_LOWRATE: 60 {BEATS}/MIN
MDC_IDC_SET_BRADY_MAX_TRACKING_RATE: 120 {BEATS}/MIN
MDC_IDC_SET_BRADY_MODE: NORMAL
MDC_IDC_SET_BRADY_PAV_DELAY_HIGH: 200 MS
MDC_IDC_SET_BRADY_PAV_DELAY_LOW: 300 MS
MDC_IDC_SET_BRADY_SAV_DELAY_HIGH: 200 MS
MDC_IDC_SET_BRADY_SAV_DELAY_LOW: 300 MS
MDC_IDC_SET_LEADCHNL_RA_PACING_AMPLITUDE: 2 V
MDC_IDC_SET_LEADCHNL_RA_PACING_CAPTURE_MODE: NORMAL
MDC_IDC_SET_LEADCHNL_RA_PACING_POLARITY: NORMAL
MDC_IDC_SET_LEADCHNL_RA_PACING_PULSEWIDTH: 0.4 MS
MDC_IDC_SET_LEADCHNL_RA_SENSING_ADAPTATION_MODE: NORMAL
MDC_IDC_SET_LEADCHNL_RA_SENSING_POLARITY: NORMAL
MDC_IDC_SET_LEADCHNL_RA_SENSING_SENSITIVITY: 0.25 MV
MDC_IDC_SET_LEADCHNL_RV_PACING_AMPLITUDE: 1.3 V
MDC_IDC_SET_LEADCHNL_RV_PACING_CAPTURE_MODE: NORMAL
MDC_IDC_SET_LEADCHNL_RV_PACING_POLARITY: NORMAL
MDC_IDC_SET_LEADCHNL_RV_PACING_PULSEWIDTH: 0.4 MS
MDC_IDC_SET_LEADCHNL_RV_SENSING_ADAPTATION_MODE: NORMAL
MDC_IDC_SET_LEADCHNL_RV_SENSING_POLARITY: NORMAL
MDC_IDC_SET_LEADCHNL_RV_SENSING_SENSITIVITY: 1.5 MV
MDC_IDC_SET_ZONE_DETECTION_INTERVAL: 375 MS
MDC_IDC_SET_ZONE_TYPE: NORMAL
MDC_IDC_SET_ZONE_VENDOR_TYPE: NORMAL
MDC_IDC_STAT_AT_BURDEN_PERCENT: 4 %
MDC_IDC_STAT_AT_DTM_END: NORMAL
MDC_IDC_STAT_AT_DTM_START: NORMAL
MDC_IDC_STAT_BRADY_DTM_END: NORMAL
MDC_IDC_STAT_BRADY_DTM_START: NORMAL
MDC_IDC_STAT_BRADY_RA_PERCENT_PACED: 1 %
MDC_IDC_STAT_BRADY_RV_PERCENT_PACED: 10 %
MDC_IDC_STAT_EPISODE_RECENT_COUNT: 0
MDC_IDC_STAT_EPISODE_RECENT_COUNT: 2
MDC_IDC_STAT_EPISODE_RECENT_COUNT: 46
MDC_IDC_STAT_EPISODE_RECENT_COUNT_DTM_END: NORMAL
MDC_IDC_STAT_EPISODE_RECENT_COUNT_DTM_START: NORMAL
MDC_IDC_STAT_EPISODE_TYPE: NORMAL
MDC_IDC_STAT_EPISODE_VENDOR_TYPE: NORMAL

## 2021-06-21 ENCOUNTER — NURSING HOME VISIT (OUTPATIENT)
Dept: GERIATRICS | Facility: CLINIC | Age: 86
End: 2021-06-21
Payer: COMMERCIAL

## 2021-06-21 VITALS
BODY MASS INDEX: 22.36 KG/M2 | SYSTOLIC BLOOD PRESSURE: 145 MMHG | TEMPERATURE: 97.9 F | DIASTOLIC BLOOD PRESSURE: 64 MMHG | HEIGHT: 69 IN | OXYGEN SATURATION: 90 % | HEART RATE: 98 BPM | WEIGHT: 151 LBS | RESPIRATION RATE: 18 BRPM

## 2021-06-21 DIAGNOSIS — F03.918 SENILE DEMENTIA WITH BEHAVIORAL DISTURBANCE (H): Primary | ICD-10-CM

## 2021-06-21 DIAGNOSIS — R52 GENERALIZED PAIN: ICD-10-CM

## 2021-06-21 DIAGNOSIS — M24.562 FLEXION CONTRACTURE OF JOINT OF LOWER LEG, LEFT: ICD-10-CM

## 2021-06-21 DIAGNOSIS — M54.2 NECK PAIN: ICD-10-CM

## 2021-06-21 PROCEDURE — 99309 SBSQ NF CARE MODERATE MDM 30: CPT | Mod: GV | Performed by: NURSE PRACTITIONER

## 2021-06-21 RX ORDER — TRAMADOL HYDROCHLORIDE 50 MG/1
50 TABLET ORAL 3 TIMES DAILY
Qty: 120 TABLET | Refills: 5
Start: 2021-06-21 | End: 2021-01-01

## 2021-06-21 ASSESSMENT — MIFFLIN-ST. JEOR: SCORE: 1340.31

## 2021-06-21 NOTE — LETTER
6/21/2021        RE: [facility]  [address]  [address]  [facility] [address]        [place] [facility]  [place] Medical Record Number:  [id number]  Place of Service where encounter took place:  [facility] ([facility]) [[id number]]  Chief Complaint   Patient presents with     Nursing Home Acute       HPI:    Elias Mckee  is a 89 year old (8/10/1931), who is being seen today for an episodic care visit.  HPI information obtained from: facility chart records, facility staff, patient report and [facility] chart review. 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     Past Medical and Surgical History reviewed in Epic today.    MEDICATIONS:    Current Outpatient Medications   Medication Sig Dispense Refill     traMADol (ULTRAM) 50 MG tablet Take 1 tablet (50 mg) by mouth 3 times daily 120 tablet 5     ACETAMINOPHEN PO Take 1,000 mg by mouth 3 times daily For pain       Atorvastatin Calcium (LIPITOR PO) Take 40 mg by mouth At Bedtime       atropine 1 % ophthalmic solution Place 1 drop into the right eye 2 times daily       cholecalciferol (VITAMIN D) 1000 UNIT tablet Take 2,000 Units by mouth daily       Fluticasone Propionate (FLONASE NA) Spray 2 sprays into both nostrils daily       Insulin Glargine (BASAGLAR KWIKPEN SC) Inject 22 Units Subcutaneous At Bedtime  "May use 2 units as needed to prime pen before each dose.       latanoprost (XALATAN) 0.005 % ophthalmic solution Place 1 drop into the right eye At Bedtime       LEVOTHYROXINE SODIUM PO Take 25 mcg by mouth daily       polyethylene glycol (MIRALAX/GLYCOLAX) powder Take 17 g by mouth daily        prednisoLONE acetate (PRED FORTE) 1 % ophthalmic susp Place 1 drop into the right eye 3 times daily       QUEtiapine (SEROQUEL) 25 MG tablet Take 1 tablet (25 mg) by mouth 2 times daily 30 tablet 11     rivaroxaban ANTICOAGULANT (XARELTO ANTICOAGULANT) 15 MG TABS tablet Take 1 tablet (15 mg) by mouth daily (with dinner) 30 tablet 0     tamsulosin (FLOMAX) 0.4 MG capsule Take 1 capsule (0.4 mg) by mouth daily 60 capsule 0     timolol, PF, (TIMOPTIC OCUDOSE) 0.5 % ophthalmic solution Place 1 drop into the right eye 2 times daily         REVIEW OF SYSTEMS:  Limited secondary to cognitive impairment but today pt reports yeah, I'm OK    Objective:  BP (!) 145/64   Pulse 98   Temp 97.9  F (36.6  C)   Resp 18   Ht 1.753 m (5' 9\")   Wt 68.5 kg (151 lb)   SpO2 90%   BMI 22.30 kg/m    Exam:  GENERAL APPEARANCE:  in no distress, appears healthy  ENT:  Mouth and posterior oropharynx normal, moist mucous membranes  RESP:  lungs clear to auscultation   CV:  regular rate and rhythm, no murmur, rub, or gallop, no edema  ABDOMEN:  bowel sounds normal  M/S:   Gait and station abnormal ADL assist  SKIN:  Inspection of skin and subcutaneous tissue baseline  NEURO:   Examination of sensation by touch normal  PSYCH:  oriented X 3, memory impaired     Labs:   Labs done in SNF are in [place] EPIC. Please refer to them using EPIC/Care Everywhere.    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

## 2021-06-21 NOTE — PROGRESS NOTES
[place] [facility]  [place] Medical Record Number:  [id number]  Place of Service where encounter took place:  [facility] ([facility]) [[id number]]  Chief Complaint   Patient presents with     Nursing Home Acute       HPI:    Elias Mckee  is a 89 year old (8/10/1931), who is being seen today for an episodic care visit.  HPI information obtained from: facility chart records, facility staff, patient report and [facility] chart review. 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     Past Medical and Surgical History reviewed in Epic today.    MEDICATIONS:    Current Outpatient Medications   Medication Sig Dispense Refill     traMADol (ULTRAM) 50 MG tablet Take 1 tablet (50 mg) by mouth 3 times daily 120 tablet 5     ACETAMINOPHEN PO Take 1,000 mg by mouth 3 times daily For pain       Atorvastatin Calcium (LIPITOR PO) Take 40 mg by mouth At Bedtime       atropine 1 % ophthalmic solution Place 1 drop into the right eye 2 times daily       cholecalciferol (VITAMIN D) 1000 UNIT tablet Take 2,000 Units by mouth daily       Fluticasone Propionate (FLONASE NA) Spray 2 sprays into both nostrils daily       Insulin Glargine (BASAGLAR KWIKPEN SC) Inject 22 Units Subcutaneous At Bedtime May use 2 units as needed to prime pen before each dose.       latanoprost (XALATAN) 0.005 % ophthalmic solution Place 1 drop into  "the right eye At Bedtime       LEVOTHYROXINE SODIUM PO Take 25 mcg by mouth daily       polyethylene glycol (MIRALAX/GLYCOLAX) powder Take 17 g by mouth daily        prednisoLONE acetate (PRED FORTE) 1 % ophthalmic susp Place 1 drop into the right eye 3 times daily       QUEtiapine (SEROQUEL) 25 MG tablet Take 1 tablet (25 mg) by mouth 2 times daily 30 tablet 11     rivaroxaban ANTICOAGULANT (XARELTO ANTICOAGULANT) 15 MG TABS tablet Take 1 tablet (15 mg) by mouth daily (with dinner) 30 tablet 0     tamsulosin (FLOMAX) 0.4 MG capsule Take 1 capsule (0.4 mg) by mouth daily 60 capsule 0     timolol, PF, (TIMOPTIC OCUDOSE) 0.5 % ophthalmic solution Place 1 drop into the right eye 2 times daily         REVIEW OF SYSTEMS:  Limited secondary to cognitive impairment but today pt reports kadeem, I'm OK    Objective:  BP (!) 145/64   Pulse 98   Temp 97.9  F (36.6  C)   Resp 18   Ht 1.753 m (5' 9\")   Wt 68.5 kg (151 lb)   SpO2 90%   BMI 22.30 kg/m    Exam:  GENERAL APPEARANCE:  in no distress, appears healthy  ENT:  Mouth and posterior oropharynx normal, moist mucous membranes  RESP:  lungs clear to auscultation   CV:  regular rate and rhythm, no murmur, rub, or gallop, no edema  ABDOMEN:  bowel sounds normal  M/S:   Gait and station abnormal ADL assist  SKIN:  Inspection of skin and subcutaneous tissue baseline  NEURO:   Examination of sensation by touch normal  PSYCH:  oriented X 3, memory impaired     Labs:   Labs done in SNF are in [place] EPIC. Please refer to them using [facility]/Care Everywhere.    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

## 2021-07-05 ENCOUNTER — NURSING HOME VISIT (OUTPATIENT)
Dept: GERIATRICS | Facility: CLINIC | Age: 86
End: 2021-07-05
Payer: COMMERCIAL

## 2021-07-05 VITALS
RESPIRATION RATE: 19 BRPM | SYSTOLIC BLOOD PRESSURE: 138 MMHG | DIASTOLIC BLOOD PRESSURE: 85 MMHG | TEMPERATURE: 98.1 F | BODY MASS INDEX: 20.14 KG/M2 | OXYGEN SATURATION: 92 % | WEIGHT: 136 LBS | HEART RATE: 73 BPM | HEIGHT: 69 IN

## 2021-07-05 DIAGNOSIS — M24.562 FLEXION CONTRACTURE OF JOINT OF LOWER LEG, LEFT: ICD-10-CM

## 2021-07-05 DIAGNOSIS — R45.1 AGITATION: ICD-10-CM

## 2021-07-05 DIAGNOSIS — I10 HYPERTENSION, UNSPECIFIED TYPE: ICD-10-CM

## 2021-07-05 DIAGNOSIS — R52 GENERALIZED PAIN: ICD-10-CM

## 2021-07-05 DIAGNOSIS — E44.0 MODERATE PROTEIN-CALORIE MALNUTRITION (H): ICD-10-CM

## 2021-07-05 DIAGNOSIS — F03.918 SENILE DEMENTIA WITH BEHAVIORAL DISTURBANCE (H): Primary | ICD-10-CM

## 2021-07-05 PROBLEM — E46 PROTEIN-CALORIE MALNUTRITION (H): Status: ACTIVE | Noted: 2021-07-05

## 2021-07-05 PROCEDURE — 99309 SBSQ NF CARE MODERATE MDM 30: CPT | Mod: GV | Performed by: NURSE PRACTITIONER

## 2021-07-05 ASSESSMENT — MIFFLIN-ST. JEOR: SCORE: 1272.27

## 2021-07-05 NOTE — LETTER
"    7/5/2021        RE: [facility]  [address]  [address]  [place] [address]        [place] [facility]  Chief Complaint   Patient presents with     Annual Comprehensive Nursing Home     [place] Medical Record Number:  [id number]  [id number]    HPI:    Elias Mckee  is a 89 year old  (8/10/1931), who is being seen today for an annual comprehensive visit. HPI information obtained from: facility chart records, facility staff, patient report and [facility] chart review.  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, left  Hypertension, unspecified type     Patient seen today in room, BMI 20.09, thin habitus, intake appropriate, moderate cognitive limitations, difficult to ascertain reason for yelling out, swearing, physical aggression, statement today \"hurts something terrible\" and \"I'm in f'ing pain\" but unable to describe or provide location, with redirection today was pleasant and appreciative, no pain on faces scale, does have contractures L leg, unsure if physical or cognitive issue, trial wean seroquel and tramadol did cause status to worsen, currently on tramadol 50mg TID and seroquel 25mg BID with moderate improvement, SBP's in [id number] range.      ALLERGIES: Terazosin  PAST MEDICAL HISTORY:  has a past medical history of Acute, but ill-defined, cerebrovascular disease (1-2008), Atrial fibrillation (H), Benign hypertension with CKD (chronic kidney disease) stage III (6/5/2017), Cancer (H), Cardiac pacemaker in situ, Placed on 7/20/18 (7/23/2018), Central retinal vein occlusion, right eye (11/15/2017), Chronic atrial fibrillation (H) (6/5/2017), Chronic ischemic heart disease, unspecified, CKD (chronic kidney disease) stage 3, GFR " 30-59 ml/min, est GFR: 10/30/17: 46,, 12/14/17: 59 (2/29/2012), Cognitive deficits as late effect of cerebrovascular disease (6/5/2017), Coronary artery disease, CVA (cerebral infarction), Dysphagia due to recent cerebrovascular accident (CVA) (6/5/2017), Elevated cholesterol, Essential hypertension, benign, H/O prostate cancer (7/6/2017), H/O: CVA (cerebrovascular accident) (6/5/2017), Hemiparesis affecting left side as late effect of cerebrovascular accident (H) (6/5/2017), Hyperlipidaemia, Loss of weight (10/2/2018), MEDICAL HISTORY OF - (1- 2008), Mobitz type I Wenckebach atrioventricular block, 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  PAST SURGICAL HISTORY:  has a past surgical history that includes seed implantation (2002); Phacoemulsification clear cornea with standard intraocular lens implant (Right, 10/7/2014); and Vitrectomy anterior (Right, 10/7/2014).  IMMUNIZATIONS:  Immunization History   Administered Date(s) Administered     COVID-19,PF,Moderna 01/08/2021, 02/05/2021     Flu, Unspecified 12/13/2006     Influenza (High Dose) 3 valent vaccine 10/05/2017     Influenza (IIV3) PF 01/22/2013, 10/15/2018, 10/16/2019, 11/03/2020     Pneumo Conj 13-V (2010&after) 10/19/2017     Pneumococcal 23 valent 09/10/2008     Pneumococcal, Unspecified 04/23/2004     TD (ADULT, 7+) 09/10/2008     Tdap (Adult) Unspecified Formulation 04/23/2004, 11/12/2019     Above immunizations pulled from [facility]. [facility] and facility records also  reconciled. Outstanding information sent to  to update [facility].  Future immunizations needed:  yearly influenza per facility protocol    Current Outpatient Medications   Medication Sig Dispense Refill     ACETAMINOPHEN PO Take 1,000 mg by mouth 3 times daily For pain       Atorvastatin Calcium (LIPITOR PO) Take 40 mg by mouth At Bedtime       atropine 1 % ophthalmic solution Place 1 drop into the right eye 2 times daily       cholecalciferol (VITAMIN D) 1000 UNIT tablet Take 2,000 Units by mouth daily       Fluticasone Propionate (FLONASE NA) Spray 2 sprays into both nostrils daily       Insulin Glargine (BASAGLAR KWIKPEN SC) Inject 22 Units Subcutaneous At Bedtime May use 2 units as needed to prime pen before each dose.       latanoprost (XALATAN) 0.005 % ophthalmic solution Place 1 drop into the right eye At Bedtime       LEVOTHYROXINE SODIUM PO Take 25 mcg by mouth daily       polyethylene glycol (MIRALAX/GLYCOLAX) powder Take 17 g by mouth daily        prednisoLONE acetate (PRED FORTE) 1 % ophthalmic susp Place 1 drop into the right eye 3 times daily       QUEtiapine (SEROQUEL) 25 MG tablet Take 1 tablet (25 mg) by mouth 2 times daily 30 tablet 11     rivaroxaban ANTICOAGULANT (XARELTO ANTICOAGULANT) 15 MG TABS tablet Take 1 tablet (15 mg) by mouth daily (with dinner) 30 tablet 0     tamsulosin (FLOMAX) 0.4 MG capsule Take 1 capsule (0.4 mg) by mouth daily 60 capsule 0     timolol, PF, (TIMOPTIC OCUDOSE) 0.5 % ophthalmic solution Place 1 drop into the right eye 2 times daily       traMADol (ULTRAM) 50 MG tablet Take 1 tablet (50 mg) by mouth 3 times daily 120 tablet 5         Case Management:  I have reviewed the facility/SNF care plan/MDS, including the falls risk, nutrition and pain screening. I also reviewed the current immunizations, and preventive care. .Future cancer screening is not clinically indicated secondary to age/goals of care Patient's desire to return to the community is  "present, but is not able due to care needs . Current Level of Care is appropriate.    Advance Directive Discussion:    I reviewed the current advanced directives as reflected in EPIC, the POLST and the facility chart, and verified the congruency of orders.   I did not due to cognitive impairment review the advance directives with the resident.     Team Discussion:  I communicated with the appropriate disciplines involved with the Plan of Care:   Nursing    Patient's goal is pain control and comfort.  Information reviewed:  Medications, vital signs, orders, and nursing notes.    ROS:  Limited secondary to cognitive impairment but today pt reports I'm cold, cover me up    Vitals:  /85   Pulse 73   Temp 98.1  F (36.7  C)   Resp 19   Ht 1.753 m (5' 9\")   Wt 61.7 kg (136 lb)   SpO2 92%   BMI 20.08 kg/m   Body mass index is 20.08 kg/m .  Exam:  GENERAL APPEARANCE:  in no distress, appears healthy, thin  ENT:  Mouth and posterior oropharynx normal, moist mucous membranes, normal hearing acuity  RESP:  lungs clear to auscultation , no respiratory distress  CV:  regular rate and rhythm, no murmur, rub, or gallop, no edema  ABDOMEN:  no guarding or rebound, bowel sounds normal  M/S:   Gait and station abnormal WC mobility, transfer assist  SKIN:  Inspection of skin and subcutaneous tissue baseline, Palpation of skin and subcutaneous tissue baseline  NEURO:   Cranial nerves 2-12 are normal tested and grossly at patient's baseline, Examination of sensation by touch normal  PSYCH:  oriented X 3, memory impaired      Lab/Diagnostic data:   Labs done in SNF are in [facility]. Please refer to them using [employer]/Care Everywhere.    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

## 2021-07-05 NOTE — PROGRESS NOTES
"[place] [facility]  Chief Complaint   Patient presents with     Annual Comprehensive Nursing Home     [place] Medical Record Number:  [id number]  Place of Service where encounter took place[facility]  [facility] [[id number]]    HPI:    Elias Mckee  is a 89 year old  (8/10/1931), who is being seen today for an annual comprehensive visit. HPI information obtained from: facility chart records, facility staff, patient report and [place] [facility] chart review.  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, left  Hypertension, unspecified type     Patient seen today in room, BMI 20.09, thin habitus, intake appropriate, moderate cognitive limitations, difficult to ascertain reason for yelling out, swearing, physical aggression, statement today \"hurts something terrible\" and \"I'm in f'ing pain\" but unable to describe or provide location, with redirection today was pleasant and appreciative, no pain on faces scale, does have contractures L leg, unsure if physical or cognitive issue, trial wean seroquel and tramadol did cause status to worsen, currently on tramadol 50mg TID and seroquel 25mg BID with moderate improvement, SBP's in [id number] range.      ALLERGIES: Terazosin  PAST MEDICAL HISTORY:  has a past medical history of Acute, but ill-defined, cerebrovascular disease (1-2008), Atrial fibrillation (H), Benign hypertension with CKD (chronic kidney disease) stage III (6/5/2017), Cancer (H), Cardiac pacemaker in situ, Placed on 7/20/18 (7/23/2018), Central retinal vein occlusion, right eye (11/15/2017), Chronic atrial fibrillation (H) (6/5/2017), Chronic ischemic heart disease, unspecified, CKD (chronic kidney disease) stage 3, GFR 30-59 ml/min, est GFR: 10/30/17: 46,, 12/14/17: 59 (2/29/2012), Cognitive deficits as late effect of cerebrovascular disease " (6/5/2017), Coronary artery disease, CVA (cerebral infarction), Dysphagia due to recent cerebrovascular accident (CVA) (6/5/2017), Elevated cholesterol, Essential hypertension, benign, H/O prostate cancer (7/6/2017), H/O: CVA (cerebrovascular accident) (6/5/2017), Hemiparesis affecting left side as late effect of cerebrovascular accident (H) (6/5/2017), Hyperlipidaemia, Loss of weight (10/2/2018), MEDICAL HISTORY OF - (1- 2008), Mobitz type I Wenckebach atrioventricular block, 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  PAST SURGICAL HISTORY:  has a past surgical history that includes seed implantation (2002); Phacoemulsification clear cornea with standard intraocular lens implant (Right, 10/7/2014); and Vitrectomy anterior (Right, 10/7/2014).  IMMUNIZATIONS:  Immunization History   Administered Date(s) Administered     COVID-19,PF,Moderna 01/08/2021, 02/05/2021     Flu, Unspecified 12/13/2006     Influenza (High Dose) 3 valent vaccine 10/05/2017     Influenza (IIV3) PF 01/22/2013, 10/15/2018, 10/16/2019, 11/03/2020     Pneumo Conj 13-V (2010&after) 10/19/2017     Pneumococcal 23 valent 09/10/2008     Pneumococcal, Unspecified 04/23/2004     TD (ADULT, 7+) 09/10/2008     Tdap (Adult) Unspecified Formulation 04/23/2004, 11/12/2019     Above immunizations pulled from [facility] [employer]. [facility] and facility records also reconciled. Outstanding information sent to  to update [facility].  Future immunizations needed:  yearly  influenza per facility protocol    Current Outpatient Medications   Medication Sig Dispense Refill     ACETAMINOPHEN PO Take 1,000 mg by mouth 3 times daily For pain       Atorvastatin Calcium (LIPITOR PO) Take 40 mg by mouth At Bedtime       atropine 1 % ophthalmic solution Place 1 drop into the right eye 2 times daily       cholecalciferol (VITAMIN D) 1000 UNIT tablet Take 2,000 Units by mouth daily       Fluticasone Propionate (FLONASE NA) Spray 2 sprays into both nostrils daily       Insulin Glargine (BASAGLAR KWIKPEN SC) Inject 22 Units Subcutaneous At Bedtime May use 2 units as needed to prime pen before each dose.       latanoprost (XALATAN) 0.005 % ophthalmic solution Place 1 drop into the right eye At Bedtime       LEVOTHYROXINE SODIUM PO Take 25 mcg by mouth daily       polyethylene glycol (MIRALAX/GLYCOLAX) powder Take 17 g by mouth daily        prednisoLONE acetate (PRED FORTE) 1 % ophthalmic susp Place 1 drop into the right eye 3 times daily       QUEtiapine (SEROQUEL) 25 MG tablet Take 1 tablet (25 mg) by mouth 2 times daily 30 tablet 11     rivaroxaban ANTICOAGULANT (XARELTO ANTICOAGULANT) 15 MG TABS tablet Take 1 tablet (15 mg) by mouth daily (with dinner) 30 tablet 0     tamsulosin (FLOMAX) 0.4 MG capsule Take 1 capsule (0.4 mg) by mouth daily 60 capsule 0     timolol, PF, (TIMOPTIC OCUDOSE) 0.5 % ophthalmic solution Place 1 drop into the right eye 2 times daily       traMADol (ULTRAM) 50 MG tablet Take 1 tablet (50 mg) by mouth 3 times daily 120 tablet 5         Case Management:  I have reviewed the facility/SNF care plan/MDS, including the falls risk, nutrition and pain screening. I also reviewed the current immunizations, and preventive care. .Future cancer screening is not clinically indicated secondary to age/goals of care Patient's desire to return to the community is present, but is not able due to care needs . Current Level of Care is appropriate.    Advance Directive Discussion:    I  "reviewed the current advanced directives as reflected in EPIC, the POLST and the facility chart, and verified the congruency of orders.   I did not due to cognitive impairment review the advance directives with the resident.     Team Discussion:  I communicated with the appropriate disciplines involved with the Plan of Care:   Nursing    Patient's goal is pain control and comfort.  Information reviewed:  Medications, vital signs, orders, and nursing notes.    ROS:  Limited secondary to cognitive impairment but today pt reports I'm cold, cover me up    Vitals:  /85   Pulse 73   Temp 98.1  F (36.7  C)   Resp 19   Ht 1.753 m (5' 9\")   Wt 61.7 kg (136 lb)   SpO2 92%   BMI 20.08 kg/m   Body mass index is 20.08 kg/m .  Exam:  GENERAL APPEARANCE:  in no distress, appears healthy, thin  ENT:  Mouth and posterior oropharynx normal, moist mucous membranes, normal hearing acuity  RESP:  lungs clear to auscultation , no respiratory distress  CV:  regular rate and rhythm, no murmur, rub, or gallop, no edema  ABDOMEN:  no guarding or rebound, bowel sounds normal  M/S:   Gait and station abnormal WC mobility, transfer assist  SKIN:  Inspection of skin and subcutaneous tissue baseline, Palpation of skin and subcutaneous tissue baseline  NEURO:   Cranial nerves 2-12 are normal tested and grossly at patient's baseline, Examination of sensation by touch normal  PSYCH:  oriented X 3, memory impaired      Lab/Diagnostic data:   Labs done in SNF are in [facility]. Please refer to them using EPIC/Care Everywhere.    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

## 2021-08-04 NOTE — PROGRESS NOTES
"[facility]    Chief Complaint   Patient presents with     Nursing Home Acute     HPI:  Elias Mckee is a 89 year old  (8/10/1931), who is being seen today for an episodic care visit at: [facility] (S) [[id number]]. Today's concern is:   1. Moderate protein-calorie malnutrition (H)    2. Senile dementia with behavioral disturbance (H)    3. Agitation      Patient seen today for follow up, trail wean of both tramadol and seroquel was not effective, as doses were reduced patient was having increased agitation, screaming out, swearing, refusing cares, after restart and titrating up slightly now still not always pleasant but not in distress, of note weights trending down with 156 in Jan to most recent 147.     Allergies, and PMH/PSH reviewed in EPIC today.  REVIEW OF SYSTEMS:  Limited secondary to cognitive impairment but today pt reports for me to go away    Objective:   BP (!) 140/81   Pulse 80   Temp 97.4  F (36.3  C)   Resp 19   Ht 1.753 m (5' 9\")   Wt 66.7 kg (147 lb)   SpO2 96%   BMI 21.71 kg/m    GENERAL APPEARANCE:  in no distress, appears healthy  ENT:  Mouth and posterior oropharynx normal, moist mucous membranes  RESP:  lungs clear to auscultation   CV:  no edema, rate-normal  ABDOMEN:  bowel sounds normal  M/S:   Gait and station abnormal transfer assist  SKIN:  Inspection of skin and subcutaneous tissue baseline  NEURO:   Examination of sensation by touch normal  PSYCH:  oriented X 3, memory impaired     Labs done in SNF are in [place] EPIC. Please refer to them using [employer]/Care Everywhere.    Assessment/Plan:  Moderate protein-calorie malnutrition (H)  Senile dementia with behavioral disturbance (H)  Agitation  -restarted tramadol, continue 50mg TID  -restarted seroquel, continue 25mg BID  -weight loss 10lbs in 8 months  -dietary to follow status  -nutritional supplements PRN  -potential hospice candidate with continued weight loss  -plan to follow up in 1-2 months " RE: status and plan          Electronically signed by: TOBY Alejandro CNP

## 2021-08-30 NOTE — PROGRESS NOTES
"[facility] GERIATRIC SERVICES    Chief Complaint   Patient presents with     Nursing Home Acute     HPI:  Elias Mckee is a 89 year old  (8/10/1931), who is being seen today for an episodic care visit at: [facility] (S) [[id number]]. Today's concern is:   1. Paroxysmal atrial fibrillation (H)    2. Oral aphthous ulcer    3. Poor dentition      Patient seen today for follow up, seen by dental on 8/27, states having had 3 oral blood clots with one removed, on xarelto 15mg daily for afib, dentist wanted INR checked but declined, exam today R lower rear ulcer area with mild hematoma, L middle upper gum small open area, no discharge, no overt s/s infection, intake appropriate, mild pain with palpation of areas, limited ROS as patient has dementia.    Allergies, and PMH/PSH reviewed in EPIC today.  REVIEW OF SYSTEMS:  Limited secondary to cognitive impairment but today pt reports I am OK    Objective:   /64   Pulse 98   Temp 97.5  F (36.4  C)   Resp 18   Ht 1.753 m (5' 9\")   Wt 67.1 kg (148 lb)   SpO2 90%   BMI 21.86 kg/m    GENERAL APPEARANCE:  in no distress, appears healthy  ENT:  Mouth and posterior oropharynx normal, moist mucous membranes  RESP:  lungs clear to auscultation   CV:  no edema, rate-normal  ABDOMEN:  bowel sounds normal  M/S:   Gait and station abnormal transfer assist  SKIN:  Inspection of skin and subcutaneous tissue baseline  NEURO:   Examination of sensation by touch normal  PSYCH:  oriented X 3, memory impaired     Labs done in SNF are in [place] EPIC. Please refer to them using [employer]/Care Everywhere.    Assessment/Plan:  (I48.0) Paroxysmal atrial fibrillation (H)  (primary encounter diagnosis)  Comment: today RRR, denies CP/palpitations  Plan: continue xarelto 15mg daily  -follow up cardiology PRN    (K12.0) Oral aphthous ulcer  (K08.9) Poor dentition  Comment: dental 8/27, ulcers found, lower denture broken, has Hx of clicking dentures around in mouth  Plan: hold " denture use and change to puree diet x5 days  -nurse informed to contact PCP if ulcers not healed, may need to continue diet alteration until healed        Electronically signed by: TOBY Alejandro CNP

## 2021-09-11 NOTE — PROGRESS NOTES
Patient was seen for regulatory long-term care visit.      Course was reviewed in [facility].    Patient's overall status has been stable.  He is recently had dental work performed secondary to bleeding while on Xarelto.    Elias currently denies oral discomfort, difficulty swallowing.  He states he feels fine, denies pain.      Vital signs stable  Blood glucoses have been stable  Lying in bed, sleepy, is alerts to name, is oriented to person, very pleasant  No facial swelling  Lungs clear  CV heart rate 70  Abdomen soft  Left-sided weakness with spasticity.      Assessment    Status post CVA with left-sided weakness, spasticity and cognitive deficits, stable  Patient remains on anticoagulation and statin.  History of agitation, paranoia, stable on recently reduced dose of Seroquel    History atrial fibrillation, heart rate controlled, on chronic Xarelto    Recent oral bleeding, resolved    Diabetes mellitus type 2, stable on glargine    Hypothyroidism, on replacement    Plan  Continue current cares  Monitor behaviors with recent Seroquel reduction.  Routine lab monitoring.

## 2021-09-13 NOTE — PROGRESS NOTES
"[facility] GERIATRIC SERVICES    Chief Complaint   Patient presents with     Nursing Home Acute     HPI:  Elias Mckee is a 89 year old  (8/10/1931), who is being seen today for an episodic care visit at: [facility] () [[id number]]. Today's concern is:   1. Senile dementia with behavioral disturbance (H)    2. Generalized pain    3. Neck pain    4. Agitation      Patient seen today on request, moderate to severe dementia, chronic pain, will yell out and swear when needs are not met, today moderately pleasant, wants to take a nap, dislikes cares at times, pain+ and when exacerbates causes distress and agitation, overall status no distress when left alone and in bed.    Allergies, and PMH/PSH reviewed in EPIC today.  REVIEW OF SYSTEMS:  Limited secondary to cognitive impairment but today pt reports I am OK, fucking neck hurts, put me in bed    Objective:   /77   Pulse 88   Temp 98.5  F (36.9  C)   Resp 18   Ht 1.753 m (5' 9\")   Wt 67.1 kg (148 lb)   SpO2 95%   BMI 21.86 kg/m    GENERAL APPEARANCE:  in no distress, appears healthy  ENT:  Mouth and posterior oropharynx normal, moist mucous membranes  RESP:  lungs clear to auscultation   CV:  regular rate and rhythm, no murmur, rub, or gallop, no edema  ABDOMEN:  bowel sounds normal  M/S:   Gait and station abnormal transfer assist  SKIN:  Inspection of skin and subcutaneous tissue baseline  NEURO:   Examination of sensation by touch normal  PSYCH:  oriented X 3    [facility] Please refer to them using [employer]/Care Everywhere.    Assessment/Plan:  (F03.91) Senile dementia with behavioral disturbance (H)  (primary encounter diagnosis)  (R52) Generalized pain  (M54.2) Neck pain  (R45.1) Agitation  Comment: attempting to balance agitation, dementia and pain  Plan: increase tramadol from 50 to 100mg TID  -continue seroquel 25mg BID  -staff to support as indicated  -plan to follow up in 1-2 weeks  -may need to change " tramadol to oxycodone inf not effective        Electronically signed by: TOBY Alejandro CNP

## 2021-09-13 NOTE — TELEPHONE ENCOUNTER
Pts daughter called stating that patient is very immobile and can barely be in a wheel chair for a short period of time. Pt is currently set up for device check and OV with Dr. Teixeira on 9/21/21. She was able to change the OV to virtual but pts daughter is wondering if they even need a virtual OV as patient has been stable cardiac wise and the long term nursing home patient is living in has doctors rounding periodically. They have no cardiac concerns at this time. Pts daughter would like to change the device check to remote and cancel OV with Dr. Teixeira. She stated she would call if they develop cardiac concerns but at this stage they do not plan to do aggressive therapy. Pts wife would like to make sure Dr. Teixeira is ok with patient doing remote device check and holding off on OV at this time.     Will route to Dr. Teixeira to review

## 2021-09-14 NOTE — TELEPHONE ENCOUNTER
"Spoke with patient's daughter Jazmin. Patient was scheduled for in clinic device check on 9/21/21, however patient is \"bed bound\" and unable to come in. Checked [employer] website it appears that patient is set to auto thresholds and lead measurements are stable.     Will continue with remote monitoring. Remote check scheduled for 9/16/21.   "

## 2021-09-14 NOTE — TELEPHONE ENCOUNTER
Ip, Félix Wang MD  You 10 minutes ago (9:42 AM)   BI  I can see him prn..No need for regular clinic visit.  Thanks.        Spoke to patients daughter and she agrees and will call if they feel they need to see cardiology. Message sent to device nurses to discuss possibility of doing remote device check instead of in clinic.  The device nurses will be in contact with daughter.

## 2021-11-03 NOTE — PROGRESS NOTES
[facility] GERIATRIC SERVICES  Chief Complaint   Patient presents with     group home Regulatory     [place] Medical Record Number:  [id number]  Place of Service where encounter took place:  [facility] () [[id number]]    HPI:    Elias Mckee  is 89 year old (8/10/1931), who is being seen today for a federally mandated E/M visit. Today's concerns are:     Paroxysmal atrial fibrillation (H)  Type II diabetes mellitus with peripheral circulatory disorder (H)  Hypothyroidism due to acquired atrophy of thyroid     Patient seen in room, moderate dementia, prefers to lie in bed most of day and will scream out if needs are not met, limited verbal and wants me to go, today RRR,deneis pain, recent A1C 6.0, TSH 3.68, no distress.     ALLERGIES:Terazosin  PAST MEDICAL HISTORY:   Past Medical History:   Diagnosis Date     Acute, but ill-defined, cerebrovascular disease 1-2008    Left hemiparesis, left side neglect     Atrial fibrillation (H)      Benign hypertension with CKD (chronic kidney disease) stage III 6/5/2017     Cancer (H)      Cardiac pacemaker in situ, Placed on 7/20/18 7/23/2018     Central retinal vein occlusion, right eye 11/15/2017     Chronic atrial fibrillation (H) 6/5/2017     Chronic ischemic heart disease, unspecified      CKD (chronic kidney disease) stage 3, GFR 30-59 ml/min, est GFR: 10/30/17: 46,, 12/14/17: 59 2/29/2012     Cognitive deficits as late effect of cerebrovascular disease 6/5/2017     Coronary artery disease      CVA (cerebral infarction)      Dysphagia due to recent cerebrovascular accident (CVA) 6/5/2017     Elevated cholesterol      Essential hypertension, benign      H/O prostate cancer 7/6/2017     H/O: CVA (cerebrovascular accident) 6/5/2017     Hemiparesis affecting left side as late effect of cerebrovascular accident (H) 6/5/2017     Hyperlipidaemia      Loss of weight 10/2/2018     MEDICAL HISTORY OF - 1- 2008    V fib arrest      Troyitz type I Olimpia  atrioventricular block, 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     Myocardial infarction (H)      Onychomycosis 6/5/2017     Other and unspecified hyperlipidemia      PVD (peripheral vascular disease) (H) 12/12/2017     Type 2 diabetes mellitus with diabetic peripheral angiopathy with gangrene (H) 12/12/2017     Type 2 diabetes mellitus with stage 3 chronic kidney disease (H) 6/5/2017     Type II diabetes mellitus with peripheral circulatory disorder (H) 12/12/2017     Type II or unspecified type diabetes mellitus without mention of complication, not stated as uncontrolled 1-2008     PAST SURGICAL HISTORY:   has a past surgical history that includes seed implantation (2002); Phacoemulsification clear cornea with standard intraocular lens implant (Right, 10/7/2014); and Vitrectomy anterior (Right, 10/7/2014).  FAMILY HISTORY: family history is not on file.  SOCIAL HISTORY:  reports that he quit smoking about 48 years ago. His smoking use included cigarettes. He started smoking about 68 years ago. He has a 20.00 pack-year smoking history. He has never used smokeless tobacco. He reports current alcohol use of about 1.0 standard drinks of alcohol per week. He reports that he does not use drugs.    MEDICATIONS:     Review of your medicines          Accurate as of November 3, 2021  4:23 PM. If you have any questions, ask your nurse or doctor.            CONTINUE these medicines which have NOT CHANGED      Dose / Directions   ACETAMINOPHEN PO      Dose: 1,000 mg  Take 1,000 mg by mouth 3 times daily For pain  Refills: 0     atropine 1 % ophthalmic solution      Dose: 1 drop  Place 1 drop into the right eye 2 times daily  Refills: 0     BASAGLAR KWIKPEN SC      Dose: 22 Units  Inject 22 Units Subcutaneous At Bedtime May use 2 units as needed to prime pen before each dose.  Refills: 0     FLONASE NA      Dose: 2 spray  Spray 2 sprays into both nostrils daily  Refills: 0     latanoprost 0.005 % ophthalmic solution  Commonly known as: XALATAN  Used  for: Stable central retinal vein occlusion of right eye      Dose: 1 drop  Place 1 drop into the right eye At Bedtime  Refills: 0     LEVOTHYROXINE SODIUM PO      Dose: 25 mcg  Take 25 mcg by mouth daily  Refills: 0     LIPITOR PO      Dose: 40 mg  Take 40 mg by mouth At Bedtime  Refills: 0     polyethylene glycol 17 GM/Dose powder  Commonly known as: MIRALAX      Dose: 17 g  Take 17 g by mouth daily  Refills: 0     prednisoLONE acetate 1 % ophthalmic suspension  Commonly known as: PRED FORTE      Dose: 1 drop  Place 1 drop into the right eye 3 times daily  Refills: 0     QUEtiapine 25 MG tablet  Commonly known as: SEROquel      Dose: 25 mg  Take 1 tablet (25 mg) by mouth 2 times daily  Quantity: 30 tablet  Refills: 11     rivaroxaban ANTICOAGULANT 15 MG Tabs tablet  Commonly known as: XARELTO ANTICOAGULANT  Used for: Paroxysmal atrial fibrillation (H)      Dose: 15 mg  Take 1 tablet (15 mg) by mouth daily (with dinner)  Quantity: 30 tablet  Refills: 0     tamsulosin 0.4 MG capsule  Commonly known as: FLOMAX  Used for: Benign prostatic hyperplasia, presence of lower urinary tract symptoms unspecified, unspecified morphology      Dose: 0.4 mg  Take 1 capsule (0.4 mg) by mouth daily  Quantity: 60 capsule  Refills: 0     timolol (PF) 0.5 % ophthalmic solution  Commonly known as: TIMOPTIC OCUDOSE  Used for: Central retinal vein occlusion, right eye      Dose: 1 drop  Place 1 drop into the right eye 2 times daily  Refills: 0     traMADol 50 MG tablet  Commonly known as: ULTRAM  Used for: Neck pain      Dose: 100 mg  Take 2 tablets (100 mg) by mouth 3 times daily  Quantity: 120 tablet  Refills: 5     vitamin D3 1000 units (25 mcg) tablet  Commonly known as: CHOLECALCIFEROL      Dose: 2,000 Units  Take 2,000 Units by mouth daily  Refills: 0           Case Management:  I have reviewed the care plan and MDS and do agree with the plan. Patient's desire to return to the community is present, but is not able due to care needs .  "Information reviewed:  Medications, vital signs, orders, and nursing notes.    ROS:  Limited secondary to cognitive impairment but today pt reports leave me alone    Vitals:  BP (!) 141/66   Pulse 92   Temp 98  F (36.7  C)   Resp 18   Ht 1.753 m (5' 9\")   Wt 69.9 kg (154 lb)   SpO2 92%   BMI 22.74 kg/m    Body mass index is 22.74 kg/m .  Exam:  GENERAL APPEARANCE:  in no distress, appears healthy  ENT:  Mouth and posterior oropharynx normal, moist mucous membranes  RESP:  no respiratory distress  CV:  no edema  ABDOMEN:  bowel sounds normal  M/S:   Gait and station abnormal WC mobility  SKIN:  Inspection of skin and subcutaneous tissue baseline  NEURO:   Examination of sensation by touch normal  PSYCH:  oriented X 3, memory impaired     Lab/Diagnostic data:   Labs done in SNF are in [place] EPIC. Please refer to them using [employer]/Care Everywhere.    ASSESSMENT/PLAN  (I48.0) Paroxysmal atrial fibrillation (H)  (primary encounter diagnosis)  Comment: RRR, denies pain  Plan: continue xarelto 15, lipitor  -follow up cardiology PRN    (E11.51) Type II diabetes mellitus with peripheral circulatory disorder (H)  Comment: A1C on 1/25/21 was 6.0, BG's range 124-315  Plan: continue glargine 22u at bedtime  -recheck A1C in 3-6 months    (E03.4) Hypothyroidism due to acquired atrophy of thyroid  Comment: TSH on 1/25 was 3.68  Plan: continue levothyroxine 25mcg  -recheck TSH in 3-6 months  -if TSH is <4 will consider discontinue of levo        Electronically signed by:  TOBY Alejandro CNP        "

## 2021-11-03 NOTE — LETTER
11/3/2021        RE: [facility]  [address]  [address]  [place] [address]         HEALTH GERIATRIC SERVICES  Chief Complaint   Patient presents with     half-way [facility] Medical Record Number:  [id number]  Place of Service where encounter took place:  [facility] [[id number]]    HPI:    Elias Mckee  is 89 year old (8/10/1931), who is being seen today for a federally mandated E/M visit. Today's concerns are:     Paroxysmal atrial fibrillation (H)  Type II diabetes mellitus with peripheral circulatory disorder (H)  Hypothyroidism due to acquired atrophy of thyroid     Patient seen in room, moderate dementia, prefers to lie in bed most of day and will scream out if needs are not met, limited verbal and wants me to go, today RRR,deneis pain, recent A1C 6.0, TSH 3.68, no distress.     ALLERGIES:Terazosin  PAST MEDICAL HISTORY:   Past Medical History:   Diagnosis Date     Acute, but ill-defined, cerebrovascular disease 1-2008    Left hemiparesis, left side neglect     Atrial fibrillation (H)      Benign hypertension with CKD (chronic kidney disease) stage III 6/5/2017     Cancer (H)      Cardiac pacemaker in situ, Placed on 7/20/18 7/23/2018     Central retinal vein occlusion, right eye 11/15/2017     Chronic atrial fibrillation (H) 6/5/2017     Chronic ischemic heart disease, unspecified      CKD (chronic kidney disease) stage 3, GFR 30-59 ml/min, est GFR: 10/30/17: 46,, 12/14/17: 59 2/29/2012     Cognitive deficits as late effect of cerebrovascular disease 6/5/2017     Coronary artery disease      CVA (cerebral infarction)      Dysphagia due to recent cerebrovascular accident (CVA) 6/5/2017     Elevated cholesterol      Essential hypertension, benign      H/O prostate cancer 7/6/2017     H/O: CVA (cerebrovascular accident) 6/5/2017     Hemiparesis affecting left side as late effect of cerebrovascular accident (H) 6/5/2017      Hyperlipidaemia      Loss of weight 10/2/2018     MEDICAL HISTORY OF - 1- 2008    V fib arrest      Mobitz type I Wenckebach atrioventricular block, 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     Myocardial infarction (H)      Onychomycosis 6/5/2017     Other and unspecified hyperlipidemia      PVD (peripheral vascular disease) (H) 12/12/2017     Type 2 diabetes mellitus with diabetic peripheral angiopathy with gangrene (H) 12/12/2017     Type 2 diabetes mellitus with stage 3 chronic kidney disease (H) 6/5/2017     Type II diabetes mellitus with peripheral circulatory disorder (H) 12/12/2017     Type II or unspecified type diabetes mellitus without mention of complication, not stated as uncontrolled 1-2008     PAST SURGICAL HISTORY:   has a past surgical history that includes seed implantation (2002); Phacoemulsification clear cornea with standard intraocular lens implant (Right, 10/7/2014); and Vitrectomy anterior (Right, 10/7/2014).  FAMILY HISTORY: family history is not on file.  SOCIAL HISTORY:  reports that he quit smoking about 48 years ago. His smoking use included cigarettes. He started smoking about 68 years ago. He has a 20.00 pack-year smoking history. He has never used smokeless tobacco. He reports current alcohol use of about 1.0 standard drinks of alcohol per week. He reports that he does not use drugs.    MEDICATIONS:     Review of your medicines          Accurate as of November 3, 2021  4:23 PM. If you have any questions, ask your nurse or doctor.            CONTINUE these medicines which have NOT CHANGED      Dose / Directions   ACETAMINOPHEN PO      Dose: 1,000 mg  Take 1,000 mg by mouth 3 times daily For pain  Refills: 0     atropine 1 % ophthalmic solution      Dose: 1 drop  Place 1 drop into the right eye 2 times daily  Refills: 0     BASAGLAR KWIKPEN SC      Dose: 22 Units  Inject 22 Units Subcutaneous At Bedtime May use 2 units as needed to prime pen before each dose.  Refills: 0     FLONASE NA      Dose: 2 spray  Spray 2  sprays into both nostrils daily  Refills: 0     latanoprost 0.005 % ophthalmic solution  Commonly known as: XALATAN  Used for: Stable central retinal vein occlusion of right eye      Dose: 1 drop  Place 1 drop into the right eye At Bedtime  Refills: 0     LEVOTHYROXINE SODIUM PO      Dose: 25 mcg  Take 25 mcg by mouth daily  Refills: 0     LIPITOR PO      Dose: 40 mg  Take 40 mg by mouth At Bedtime  Refills: 0     polyethylene glycol 17 GM/Dose powder  Commonly known as: MIRALAX      Dose: 17 g  Take 17 g by mouth daily  Refills: 0     prednisoLONE acetate 1 % ophthalmic suspension  Commonly known as: PRED FORTE      Dose: 1 drop  Place 1 drop into the right eye 3 times daily  Refills: 0     QUEtiapine 25 MG tablet  Commonly known as: SEROquel      Dose: 25 mg  Take 1 tablet (25 mg) by mouth 2 times daily  Quantity: 30 tablet  Refills: 11     rivaroxaban ANTICOAGULANT 15 MG Tabs tablet  Commonly known as: XARELTO ANTICOAGULANT  Used for: Paroxysmal atrial fibrillation (H)      Dose: 15 mg  Take 1 tablet (15 mg) by mouth daily (with dinner)  Quantity: 30 tablet  Refills: 0     tamsulosin 0.4 MG capsule  Commonly known as: FLOMAX  Used for: Benign prostatic hyperplasia, presence of lower urinary tract symptoms unspecified, unspecified morphology      Dose: 0.4 mg  Take 1 capsule (0.4 mg) by mouth daily  Quantity: 60 capsule  Refills: 0     timolol (PF) 0.5 % ophthalmic solution  Commonly known as: TIMOPTIC OCUDOSE  Used for: Central retinal vein occlusion, right eye      Dose: 1 drop  Place 1 drop into the right eye 2 times daily  Refills: 0     traMADol 50 MG tablet  Commonly known as: ULTRAM  Used for: Neck pain      Dose: 100 mg  Take 2 tablets (100 mg) by mouth 3 times daily  Quantity: 120 tablet  Refills: 5     vitamin D3 1000 units (25 mcg) tablet  Commonly known as: CHOLECALCIFEROL      Dose: 2,000 Units  Take 2,000 Units by mouth daily  Refills: 0           Case Management:  I have reviewed the care plan and  "MDS and do agree with the plan. Patient's desire to return to the community is present, but is not able due to care needs . Information reviewed:  Medications, vital signs, orders, and nursing notes.    ROS:  Limited secondary to cognitive impairment but today pt reports leave me alone    Vitals:  BP (!) 141/66   Pulse 92   Temp 98  F (36.7  C)   Resp 18   Ht 1.753 m (5' 9\")   Wt 69.9 kg (154 lb)   SpO2 92%   BMI 22.74 kg/m    Body mass index is 22.74 kg/m .  Exam:  GENERAL APPEARANCE:  in no distress, appears healthy  ENT:  Mouth and posterior oropharynx normal, moist mucous membranes  RESP:  no respiratory distress  CV:  no edema  ABDOMEN:  bowel sounds normal  M/S:   Gait and station abnormal WC mobility  SKIN:  Inspection of skin and subcutaneous tissue baseline  NEURO:   Examination of sensation by touch normal  PSYCH:  oriented X 3, memory impaired     Lab/Diagnostic data:   Labs done in SNF are in [facility]. Please refer to them using [employer] Everywhere.    ASSESSMENT/PLAN  (I48.0) Paroxysmal atrial fibrillation (H)  (primary encounter diagnosis)  Comment: RRR, denies pain  Plan: continue xarelto 15, lipitor  -follow up cardiology PRN    (E11.51) Type II diabetes mellitus with peripheral circulatory disorder (H)  Comment: A1C on 1/25/21 was 6.0, BG's range 124-315  Plan: continue glargine 22u at bedtime  -recheck A1C in 3-6 months    (E03.4) Hypothyroidism due to acquired atrophy of thyroid  Comment: TSH on 1/25 was 3.68  Plan: continue levothyroxine 25mcg  -recheck TSH in 3-6 months  -if TSH is <4 will consider discontinue of levo        Electronically signed by:  TOBY Alejandro CNP              Sincerely,        TOBY Alejandro CNP    "

## 2022-01-01 ENCOUNTER — MEDICAL CORRESPONDENCE (OUTPATIENT)
Dept: HEALTH INFORMATION MANAGEMENT | Facility: CLINIC | Age: 87
End: 2022-01-01

## 2022-01-01 ENCOUNTER — NURSING HOME VISIT (OUTPATIENT)
Dept: GERIATRICS | Facility: CLINIC | Age: 87
End: 2022-01-01
Payer: COMMERCIAL

## 2022-01-01 ENCOUNTER — ANCILLARY PROCEDURE (OUTPATIENT)
Dept: CARDIOLOGY | Facility: CLINIC | Age: 87
End: 2022-01-01
Attending: INTERNAL MEDICINE
Payer: COMMERCIAL

## 2022-01-01 ENCOUNTER — HOSPITAL ENCOUNTER (INPATIENT)
Facility: CLINIC | Age: 87
LOS: 1 days | Discharge: HOSPICE | DRG: 871 | End: 2022-07-12
Attending: EMERGENCY MEDICINE | Admitting: STUDENT IN AN ORGANIZED HEALTH CARE EDUCATION/TRAINING PROGRAM
Payer: COMMERCIAL

## 2022-01-01 ENCOUNTER — TELEPHONE (OUTPATIENT)
Dept: GERIATRICS | Facility: CLINIC | Age: 87
End: 2022-01-01
Payer: MEDICARE

## 2022-01-01 ENCOUNTER — APPOINTMENT (OUTPATIENT)
Dept: GENERAL RADIOLOGY | Facility: CLINIC | Age: 87
DRG: 871 | End: 2022-01-01
Attending: EMERGENCY MEDICINE
Payer: COMMERCIAL

## 2022-01-01 ENCOUNTER — HOSPITAL ENCOUNTER (EMERGENCY)
Facility: CLINIC | Age: 87
Discharge: OTHER INSTITUTION | DRG: 871 | End: 2022-07-11
Attending: EMERGENCY MEDICINE | Admitting: EMERGENCY MEDICINE
Payer: COMMERCIAL

## 2022-01-01 ENCOUNTER — HOSPITAL ENCOUNTER (INPATIENT)
Facility: CLINIC | Age: 87
LOS: 3 days | End: 2022-07-15
Attending: STUDENT IN AN ORGANIZED HEALTH CARE EDUCATION/TRAINING PROGRAM | Admitting: HOSPITALIST
Payer: COMMERCIAL

## 2022-01-01 ENCOUNTER — APPOINTMENT (OUTPATIENT)
Dept: CT IMAGING | Facility: CLINIC | Age: 87
DRG: 871 | End: 2022-01-01
Attending: EMERGENCY MEDICINE
Payer: COMMERCIAL

## 2022-01-01 ENCOUNTER — APPOINTMENT (OUTPATIENT)
Dept: ULTRASOUND IMAGING | Facility: CLINIC | Age: 87
DRG: 871 | End: 2022-01-01
Attending: STUDENT IN AN ORGANIZED HEALTH CARE EDUCATION/TRAINING PROGRAM
Payer: COMMERCIAL

## 2022-01-01 ENCOUNTER — TELEPHONE (OUTPATIENT)
Dept: GERIATRICS | Facility: CLINIC | Age: 87
End: 2022-01-01

## 2022-01-01 ENCOUNTER — PATIENT OUTREACH (OUTPATIENT)
Dept: GERIATRIC MEDICINE | Facility: CLINIC | Age: 87
End: 2022-01-01
Payer: MEDICARE

## 2022-01-01 ENCOUNTER — PATIENT OUTREACH (OUTPATIENT)
Dept: GERIATRIC MEDICINE | Facility: CLINIC | Age: 87
End: 2022-01-01

## 2022-01-01 ENCOUNTER — NURSING HOME VISIT (OUTPATIENT)
Dept: GERIATRICS | Facility: CLINIC | Age: 87
End: 2022-01-01
Payer: MEDICARE

## 2022-01-01 ENCOUNTER — CLINICAL UPDATE (OUTPATIENT)
Dept: PHARMACY | Facility: CLINIC | Age: 87
End: 2022-01-01
Payer: MEDICARE

## 2022-01-01 ENCOUNTER — DOCUMENTATION ONLY (OUTPATIENT)
Dept: OTHER | Facility: CLINIC | Age: 87
End: 2022-01-01

## 2022-01-01 VITALS
HEART RATE: 75 BPM | WEIGHT: 148 LBS | OXYGEN SATURATION: 96 % | HEIGHT: 69 IN | BODY MASS INDEX: 21.92 KG/M2 | TEMPERATURE: 97.5 F | SYSTOLIC BLOOD PRESSURE: 116 MMHG | RESPIRATION RATE: 18 BRPM | DIASTOLIC BLOOD PRESSURE: 74 MMHG

## 2022-01-01 VITALS
WEIGHT: 149 LBS | OXYGEN SATURATION: 95 % | BODY MASS INDEX: 22.07 KG/M2 | HEIGHT: 69 IN | HEART RATE: 64 BPM | TEMPERATURE: 97.9 F | RESPIRATION RATE: 18 BRPM | DIASTOLIC BLOOD PRESSURE: 83 MMHG | SYSTOLIC BLOOD PRESSURE: 125 MMHG

## 2022-01-01 VITALS
SYSTOLIC BLOOD PRESSURE: 123 MMHG | WEIGHT: 147 LBS | TEMPERATURE: 97.7 F | DIASTOLIC BLOOD PRESSURE: 70 MMHG | HEART RATE: 76 BPM | HEIGHT: 69 IN | BODY MASS INDEX: 21.77 KG/M2 | RESPIRATION RATE: 16 BRPM | OXYGEN SATURATION: 94 %

## 2022-01-01 VITALS
BODY MASS INDEX: 21.77 KG/M2 | RESPIRATION RATE: 16 BRPM | SYSTOLIC BLOOD PRESSURE: 123 MMHG | DIASTOLIC BLOOD PRESSURE: 70 MMHG | HEIGHT: 69 IN | WEIGHT: 147 LBS | HEART RATE: 90 BPM | OXYGEN SATURATION: 90 % | TEMPERATURE: 97.3 F

## 2022-01-01 VITALS
TEMPERATURE: 97.7 F | WEIGHT: 153 LBS | DIASTOLIC BLOOD PRESSURE: 78 MMHG | SYSTOLIC BLOOD PRESSURE: 164 MMHG | HEIGHT: 69 IN | BODY MASS INDEX: 22.66 KG/M2 | HEART RATE: 76 BPM | OXYGEN SATURATION: 94 % | RESPIRATION RATE: 18 BRPM

## 2022-01-01 VITALS
HEART RATE: 75 BPM | TEMPERATURE: 97.3 F | DIASTOLIC BLOOD PRESSURE: 74 MMHG | RESPIRATION RATE: 16 BRPM | BODY MASS INDEX: 22.36 KG/M2 | WEIGHT: 151 LBS | SYSTOLIC BLOOD PRESSURE: 137 MMHG | OXYGEN SATURATION: 95 % | HEIGHT: 69 IN

## 2022-01-01 VITALS
HEIGHT: 69 IN | DIASTOLIC BLOOD PRESSURE: 70 MMHG | OXYGEN SATURATION: 96 % | HEART RATE: 87 BPM | RESPIRATION RATE: 18 BRPM | SYSTOLIC BLOOD PRESSURE: 130 MMHG | TEMPERATURE: 97.7 F | BODY MASS INDEX: 22.07 KG/M2 | WEIGHT: 149 LBS

## 2022-01-01 VITALS
SYSTOLIC BLOOD PRESSURE: 130 MMHG | RESPIRATION RATE: 26 BRPM | HEART RATE: 77 BPM | TEMPERATURE: 97.6 F | DIASTOLIC BLOOD PRESSURE: 54 MMHG | OXYGEN SATURATION: 96 %

## 2022-01-01 VITALS
HEART RATE: 88 BPM | SYSTOLIC BLOOD PRESSURE: 118 MMHG | BODY MASS INDEX: 22.66 KG/M2 | RESPIRATION RATE: 18 BRPM | DIASTOLIC BLOOD PRESSURE: 76 MMHG | HEIGHT: 69 IN | TEMPERATURE: 98.1 F | OXYGEN SATURATION: 94 % | WEIGHT: 153 LBS

## 2022-01-01 VITALS
DIASTOLIC BLOOD PRESSURE: 85 MMHG | RESPIRATION RATE: 18 BRPM | HEART RATE: 96 BPM | OXYGEN SATURATION: 86 % | TEMPERATURE: 97.4 F | SYSTOLIC BLOOD PRESSURE: 135 MMHG

## 2022-01-01 VITALS
BODY MASS INDEX: 22.22 KG/M2 | RESPIRATION RATE: 18 BRPM | SYSTOLIC BLOOD PRESSURE: 136 MMHG | WEIGHT: 150 LBS | HEIGHT: 69 IN | OXYGEN SATURATION: 96 % | DIASTOLIC BLOOD PRESSURE: 75 MMHG | TEMPERATURE: 97.5 F | HEART RATE: 70 BPM

## 2022-01-01 VITALS — RESPIRATION RATE: 30 BRPM

## 2022-01-01 DIAGNOSIS — F03.90 SENILE DEMENTIA WITHOUT BEHAVIORAL DISTURBANCE (H): Primary | ICD-10-CM

## 2022-01-01 DIAGNOSIS — Z95.0 CARDIAC PACEMAKER IN SITU: Primary | ICD-10-CM

## 2022-01-01 DIAGNOSIS — N18.30 STAGE 3 CHRONIC KIDNEY DISEASE, UNSPECIFIED WHETHER STAGE 3A OR 3B CKD (H): ICD-10-CM

## 2022-01-01 DIAGNOSIS — N39.0 URINARY TRACT INFECTION WITHOUT HEMATURIA, SITE UNSPECIFIED: ICD-10-CM

## 2022-01-01 DIAGNOSIS — Z86.73 H/O: CVA (CEREBROVASCULAR ACCIDENT): ICD-10-CM

## 2022-01-01 DIAGNOSIS — N18.30 BENIGN HYPERTENSION WITH CKD (CHRONIC KIDNEY DISEASE) STAGE III (H): ICD-10-CM

## 2022-01-01 DIAGNOSIS — I44.1 AV BLOCK, 2ND DEGREE: ICD-10-CM

## 2022-01-01 DIAGNOSIS — H34.8112 CENTRAL RETINAL VEIN OCCLUSION OF RIGHT EYE, UNSPECIFIED COMPLICATION STATUS (H): ICD-10-CM

## 2022-01-01 DIAGNOSIS — M54.2 NECK PAIN: ICD-10-CM

## 2022-01-01 DIAGNOSIS — F03.90 SENILE DEMENTIA, WITHOUT BEHAVIORAL DISTURBANCE (H): Primary | ICD-10-CM

## 2022-01-01 DIAGNOSIS — F29 PSYCHOSIS, UNSPECIFIED PSYCHOSIS TYPE (H): Primary | ICD-10-CM

## 2022-01-01 DIAGNOSIS — R52 GENERALIZED PAIN: ICD-10-CM

## 2022-01-01 DIAGNOSIS — E11.22 TYPE 2 DIABETES MELLITUS WITH STAGE 3A CHRONIC KIDNEY DISEASE, WITHOUT LONG-TERM CURRENT USE OF INSULIN (H): ICD-10-CM

## 2022-01-01 DIAGNOSIS — E03.4 HYPOTHYROIDISM DUE TO ACQUIRED ATROPHY OF THYROID: Primary | ICD-10-CM

## 2022-01-01 DIAGNOSIS — F03.918 SENILE DEMENTIA WITH BEHAVIORAL DISTURBANCE (H): ICD-10-CM

## 2022-01-01 DIAGNOSIS — C61 MALIGNANT NEOPLASM OF PROSTATE (H): ICD-10-CM

## 2022-01-01 DIAGNOSIS — A41.9 SEVERE SEPSIS (H): ICD-10-CM

## 2022-01-01 DIAGNOSIS — E44.0 MODERATE PROTEIN-CALORIE MALNUTRITION (H): Primary | ICD-10-CM

## 2022-01-01 DIAGNOSIS — I63.9 CEREBROVASCULAR ACCIDENT (CVA), UNSPECIFIED MECHANISM (H): Primary | ICD-10-CM

## 2022-01-01 DIAGNOSIS — I10 HYPERTENSION, UNSPECIFIED TYPE: ICD-10-CM

## 2022-01-01 DIAGNOSIS — I69.354 HEMIPARESIS AFFECTING LEFT SIDE AS LATE EFFECT OF CEREBROVASCULAR ACCIDENT (H): ICD-10-CM

## 2022-01-01 DIAGNOSIS — I48.0 PAROXYSMAL ATRIAL FIBRILLATION (H): ICD-10-CM

## 2022-01-01 DIAGNOSIS — E03.9 HYPOTHYROIDISM, UNSPECIFIED TYPE: ICD-10-CM

## 2022-01-01 DIAGNOSIS — E11.51 TYPE II DIABETES MELLITUS WITH PERIPHERAL CIRCULATORY DISORDER (H): ICD-10-CM

## 2022-01-01 DIAGNOSIS — R45.1 AGITATION: ICD-10-CM

## 2022-01-01 DIAGNOSIS — R44.3 HALLUCINATIONS: ICD-10-CM

## 2022-01-01 DIAGNOSIS — N40.0 BENIGN PROSTATIC HYPERPLASIA WITHOUT LOWER URINARY TRACT SYMPTOMS: ICD-10-CM

## 2022-01-01 DIAGNOSIS — Z79.4 TYPE 2 DIABETES MELLITUS WITH DIABETIC NEPHROPATHY, WITH LONG-TERM CURRENT USE OF INSULIN (H): ICD-10-CM

## 2022-01-01 DIAGNOSIS — U07.1 INFECTION DUE TO 2019 NOVEL CORONAVIRUS: ICD-10-CM

## 2022-01-01 DIAGNOSIS — H57.11 OCULAR PAIN, RIGHT EYE: ICD-10-CM

## 2022-01-01 DIAGNOSIS — F03.918 SENILE DEMENTIA WITH BEHAVIORAL DISTURBANCE (H): Primary | ICD-10-CM

## 2022-01-01 DIAGNOSIS — Z95.0 CARDIAC PACEMAKER IN SITU: ICD-10-CM

## 2022-01-01 DIAGNOSIS — E03.4 HYPOTHYROIDISM DUE TO ACQUIRED ATROPHY OF THYROID: ICD-10-CM

## 2022-01-01 DIAGNOSIS — I12.9 BENIGN HYPERTENSION WITH CKD (CHRONIC KIDNEY DISEASE) STAGE III (H): ICD-10-CM

## 2022-01-01 DIAGNOSIS — R52 PAIN: ICD-10-CM

## 2022-01-01 DIAGNOSIS — U07.1 INFECTION DUE TO 2019 NOVEL CORONAVIRUS: Primary | ICD-10-CM

## 2022-01-01 DIAGNOSIS — E11.21 TYPE 2 DIABETES MELLITUS WITH DIABETIC NEPHROPATHY, WITH LONG-TERM CURRENT USE OF INSULIN (H): ICD-10-CM

## 2022-01-01 DIAGNOSIS — N18.31 TYPE 2 DIABETES MELLITUS WITH STAGE 3A CHRONIC KIDNEY DISEASE, WITHOUT LONG-TERM CURRENT USE OF INSULIN (H): ICD-10-CM

## 2022-01-01 DIAGNOSIS — R65.20 SEVERE SEPSIS (H): ICD-10-CM

## 2022-01-01 DIAGNOSIS — I69.354 HEMIPARESIS AFFECTING LEFT SIDE AS LATE EFFECT OF CEREBROVASCULAR ACCIDENT (H): Primary | ICD-10-CM

## 2022-01-01 LAB
ALBUMIN SERPL-MCNC: 3.3 G/DL (ref 3.4–5)
ALBUMIN SERPL-MCNC: 3.4 G/DL (ref 3.4–5)
ALBUMIN UR-MCNC: 10 MG/DL
ALBUMIN UR-MCNC: 30 MG/DL
ALP SERPL-CCNC: 60 U/L (ref 40–150)
ALP SERPL-CCNC: 68 U/L (ref 40–150)
ALT SERPL W P-5'-P-CCNC: 15 U/L (ref 0–70)
ALT SERPL W P-5'-P-CCNC: 15 U/L (ref 0–70)
ANION GAP SERPL CALCULATED.3IONS-SCNC: 10 MMOL/L (ref 3–14)
ANION GAP SERPL CALCULATED.3IONS-SCNC: 5 MMOL/L (ref 3–14)
APPEARANCE UR: ABNORMAL
APPEARANCE UR: ABNORMAL
AST SERPL W P-5'-P-CCNC: 15 U/L (ref 0–45)
AST SERPL W P-5'-P-CCNC: 20 U/L (ref 0–45)
ATRIAL RATE - MUSE: 109 BPM
BACTERIA #/AREA URNS HPF: ABNORMAL /HPF
BACTERIA #/AREA URNS HPF: ABNORMAL /HPF
BACTERIA UR CULT: ABNORMAL
BACTERIA UR CULT: ABNORMAL
BACTERIA UR CULT: NORMAL
BASE EXCESS BLDV CALC-SCNC: -2.2 MMOL/L (ref -7.7–1.9)
BASOPHILS # BLD AUTO: 0 10E3/UL (ref 0–0.2)
BASOPHILS # BLD MANUAL: 0.2 10E3/UL (ref 0–0.2)
BASOPHILS NFR BLD AUTO: 0 %
BASOPHILS NFR BLD MANUAL: 2 %
BILIRUB SERPL-MCNC: 0.2 MG/DL (ref 0.2–1.3)
BILIRUB SERPL-MCNC: 0.4 MG/DL (ref 0.2–1.3)
BILIRUB UR QL STRIP: NEGATIVE
BILIRUB UR QL STRIP: NEGATIVE
BUN SERPL-MCNC: 23 MG/DL (ref 7–30)
BUN SERPL-MCNC: 27 MG/DL (ref 7–30)
BURR CELLS BLD QL SMEAR: SLIGHT
CALCIUM SERPL-MCNC: 8.6 MG/DL (ref 8.5–10.1)
CALCIUM SERPL-MCNC: 9.1 MG/DL (ref 8.5–10.1)
CHLORIDE BLD-SCNC: 104 MMOL/L (ref 94–109)
CHLORIDE BLD-SCNC: 106 MMOL/L (ref 94–109)
CO2 SERPL-SCNC: 23 MMOL/L (ref 20–32)
CO2 SERPL-SCNC: 29 MMOL/L (ref 20–32)
COLOR UR AUTO: ABNORMAL
COLOR UR AUTO: YELLOW
CREAT SERPL-MCNC: 1.12 MG/DL (ref 0.66–1.25)
CREAT SERPL-MCNC: 1.46 MG/DL (ref 0.66–1.25)
DIASTOLIC BLOOD PRESSURE - MUSE: NORMAL MMHG
ELLIPTOCYTES BLD QL SMEAR: ABNORMAL
EOSINOPHIL # BLD AUTO: 0 10E3/UL (ref 0–0.7)
EOSINOPHIL # BLD MANUAL: 0.1 10E3/UL (ref 0–0.7)
EOSINOPHIL NFR BLD AUTO: 0 %
EOSINOPHIL NFR BLD MANUAL: 1 %
ERYTHROCYTE [DISTWIDTH] IN BLOOD BY AUTOMATED COUNT: 18.9 % (ref 10–15)
ERYTHROCYTE [DISTWIDTH] IN BLOOD BY AUTOMATED COUNT: 19.6 % (ref 10–15)
FLUAV RNA SPEC QL NAA+PROBE: NEGATIVE
FLUBV RNA RESP QL NAA+PROBE: NEGATIVE
GFR SERPL CREATININE-BSD FRML MDRD: 45 ML/MIN/1.73M2
GFR SERPL CREATININE-BSD FRML MDRD: 62 ML/MIN/1.73M2
GLUCOSE BLD-MCNC: 102 MG/DL (ref 70–99)
GLUCOSE BLD-MCNC: 175 MG/DL (ref 70–99)
GLUCOSE UR STRIP-MCNC: NEGATIVE MG/DL
GLUCOSE UR STRIP-MCNC: NEGATIVE MG/DL
HCO3 BLDV-SCNC: 25 MMOL/L (ref 21–28)
HCT VFR BLD AUTO: 32.5 % (ref 40–53)
HCT VFR BLD AUTO: 34.3 % (ref 40–53)
HGB BLD-MCNC: 10.3 G/DL (ref 13.3–17.7)
HGB BLD-MCNC: 11.1 G/DL (ref 13.3–17.7)
HGB UR QL STRIP: ABNORMAL
HGB UR QL STRIP: NEGATIVE
HOLD SPECIMEN: NORMAL
HYALINE CASTS: 1 /LPF
IMM GRANULOCYTES # BLD: 0.4 10E3/UL
IMM GRANULOCYTES NFR BLD: 1 %
INR PPP: 1.47 (ref 0.85–1.15)
INTERPRETATION ECG - MUSE: NORMAL
KETONES UR STRIP-MCNC: NEGATIVE MG/DL
KETONES UR STRIP-MCNC: NEGATIVE MG/DL
LACTATE SERPL-SCNC: 6 MMOL/L (ref 0.7–2)
LACTATE SERPL-SCNC: 8.2 MMOL/L (ref 0.7–2)
LEUKOCYTE ESTERASE UR QL STRIP: ABNORMAL
LEUKOCYTE ESTERASE UR QL STRIP: ABNORMAL
LYMPHOCYTES # BLD AUTO: 0.3 10E3/UL (ref 0.8–5.3)
LYMPHOCYTES # BLD MANUAL: 1.4 10E3/UL (ref 0.8–5.3)
LYMPHOCYTES NFR BLD AUTO: 1 %
LYMPHOCYTES NFR BLD MANUAL: 19 %
MCH RBC QN AUTO: 29.3 PG (ref 26.5–33)
MCH RBC QN AUTO: 30.1 PG (ref 26.5–33)
MCHC RBC AUTO-ENTMCNC: 31.7 G/DL (ref 31.5–36.5)
MCHC RBC AUTO-ENTMCNC: 32.4 G/DL (ref 31.5–36.5)
MCV RBC AUTO: 93 FL (ref 78–100)
MCV RBC AUTO: 93 FL (ref 78–100)
MDC_IDC_EPISODE_DTM: NORMAL
MDC_IDC_EPISODE_DURATION: 1 S
MDC_IDC_EPISODE_DURATION: 2 S
MDC_IDC_EPISODE_DURATION: 2 S
MDC_IDC_EPISODE_DURATION: NORMAL S
MDC_IDC_EPISODE_ID: NORMAL
MDC_IDC_EPISODE_TYPE: NORMAL
MDC_IDC_LEAD_IMPLANT_DT: NORMAL
MDC_IDC_LEAD_LOCATION: NORMAL
MDC_IDC_LEAD_LOCATION_DETAIL_1: NORMAL
MDC_IDC_LEAD_MFG: NORMAL
MDC_IDC_LEAD_MODEL: NORMAL
MDC_IDC_LEAD_POLARITY_TYPE: NORMAL
MDC_IDC_LEAD_SERIAL: NORMAL
MDC_IDC_MSMT_BATTERY_DTM: NORMAL
MDC_IDC_MSMT_BATTERY_DTM: NORMAL
MDC_IDC_MSMT_BATTERY_REMAINING_LONGEVITY: 78 MO
MDC_IDC_MSMT_BATTERY_REMAINING_LONGEVITY: 84 MO
MDC_IDC_MSMT_BATTERY_REMAINING_PERCENTAGE: 100 %
MDC_IDC_MSMT_BATTERY_REMAINING_PERCENTAGE: 100 %
MDC_IDC_MSMT_BATTERY_STATUS: NORMAL
MDC_IDC_MSMT_BATTERY_STATUS: NORMAL
MDC_IDC_MSMT_LEADCHNL_RA_IMPEDANCE_VALUE: 609 OHM
MDC_IDC_MSMT_LEADCHNL_RA_IMPEDANCE_VALUE: 731 OHM
MDC_IDC_MSMT_LEADCHNL_RA_PACING_THRESHOLD_AMPLITUDE: 0.5 V
MDC_IDC_MSMT_LEADCHNL_RA_PACING_THRESHOLD_AMPLITUDE: 0.6 V
MDC_IDC_MSMT_LEADCHNL_RA_PACING_THRESHOLD_PULSEWIDTH: 0.4 MS
MDC_IDC_MSMT_LEADCHNL_RA_PACING_THRESHOLD_PULSEWIDTH: 0.4 MS
MDC_IDC_MSMT_LEADCHNL_RV_IMPEDANCE_VALUE: 735 OHM
MDC_IDC_MSMT_LEADCHNL_RV_IMPEDANCE_VALUE: 837 OHM
MDC_IDC_MSMT_LEADCHNL_RV_PACING_THRESHOLD_AMPLITUDE: 0.9 V
MDC_IDC_MSMT_LEADCHNL_RV_PACING_THRESHOLD_AMPLITUDE: 0.9 V
MDC_IDC_MSMT_LEADCHNL_RV_PACING_THRESHOLD_PULSEWIDTH: 0.4 MS
MDC_IDC_MSMT_LEADCHNL_RV_PACING_THRESHOLD_PULSEWIDTH: 0.4 MS
MDC_IDC_PG_IMPLANT_DTM: NORMAL
MDC_IDC_PG_IMPLANT_DTM: NORMAL
MDC_IDC_PG_MFG: NORMAL
MDC_IDC_PG_MFG: NORMAL
MDC_IDC_PG_MODEL: NORMAL
MDC_IDC_PG_MODEL: NORMAL
MDC_IDC_PG_SERIAL: NORMAL
MDC_IDC_PG_SERIAL: NORMAL
MDC_IDC_PG_TYPE: NORMAL
MDC_IDC_PG_TYPE: NORMAL
MDC_IDC_SESS_CLINIC_NAME: NORMAL
MDC_IDC_SESS_CLINIC_NAME: NORMAL
MDC_IDC_SESS_DTM: NORMAL
MDC_IDC_SESS_DTM: NORMAL
MDC_IDC_SESS_TYPE: NORMAL
MDC_IDC_SESS_TYPE: NORMAL
MDC_IDC_SET_BRADY_AT_MODE_SWITCH_MODE: NORMAL
MDC_IDC_SET_BRADY_AT_MODE_SWITCH_MODE: NORMAL
MDC_IDC_SET_BRADY_AT_MODE_SWITCH_RATE: 170 {BEATS}/MIN
MDC_IDC_SET_BRADY_AT_MODE_SWITCH_RATE: 170 {BEATS}/MIN
MDC_IDC_SET_BRADY_LOWRATE: 60 {BEATS}/MIN
MDC_IDC_SET_BRADY_LOWRATE: 60 {BEATS}/MIN
MDC_IDC_SET_BRADY_MAX_TRACKING_RATE: 120 {BEATS}/MIN
MDC_IDC_SET_BRADY_MAX_TRACKING_RATE: 120 {BEATS}/MIN
MDC_IDC_SET_BRADY_MODE: NORMAL
MDC_IDC_SET_BRADY_MODE: NORMAL
MDC_IDC_SET_BRADY_PAV_DELAY_HIGH: 200 MS
MDC_IDC_SET_BRADY_PAV_DELAY_HIGH: 200 MS
MDC_IDC_SET_BRADY_PAV_DELAY_LOW: 300 MS
MDC_IDC_SET_BRADY_PAV_DELAY_LOW: 300 MS
MDC_IDC_SET_BRADY_SAV_DELAY_HIGH: 200 MS
MDC_IDC_SET_BRADY_SAV_DELAY_HIGH: 200 MS
MDC_IDC_SET_BRADY_SAV_DELAY_LOW: 300 MS
MDC_IDC_SET_BRADY_SAV_DELAY_LOW: 300 MS
MDC_IDC_SET_LEADCHNL_RA_PACING_AMPLITUDE: 2 V
MDC_IDC_SET_LEADCHNL_RA_PACING_AMPLITUDE: 2 V
MDC_IDC_SET_LEADCHNL_RA_PACING_CAPTURE_MODE: NORMAL
MDC_IDC_SET_LEADCHNL_RA_PACING_CAPTURE_MODE: NORMAL
MDC_IDC_SET_LEADCHNL_RA_PACING_POLARITY: NORMAL
MDC_IDC_SET_LEADCHNL_RA_PACING_POLARITY: NORMAL
MDC_IDC_SET_LEADCHNL_RA_PACING_PULSEWIDTH: 0.4 MS
MDC_IDC_SET_LEADCHNL_RA_PACING_PULSEWIDTH: 0.4 MS
MDC_IDC_SET_LEADCHNL_RA_SENSING_ADAPTATION_MODE: NORMAL
MDC_IDC_SET_LEADCHNL_RA_SENSING_ADAPTATION_MODE: NORMAL
MDC_IDC_SET_LEADCHNL_RA_SENSING_POLARITY: NORMAL
MDC_IDC_SET_LEADCHNL_RA_SENSING_POLARITY: NORMAL
MDC_IDC_SET_LEADCHNL_RA_SENSING_SENSITIVITY: 0.25 MV
MDC_IDC_SET_LEADCHNL_RA_SENSING_SENSITIVITY: 0.25 MV
MDC_IDC_SET_LEADCHNL_RV_PACING_AMPLITUDE: 1.4 V
MDC_IDC_SET_LEADCHNL_RV_PACING_AMPLITUDE: 1.5 V
MDC_IDC_SET_LEADCHNL_RV_PACING_CAPTURE_MODE: NORMAL
MDC_IDC_SET_LEADCHNL_RV_PACING_CAPTURE_MODE: NORMAL
MDC_IDC_SET_LEADCHNL_RV_PACING_POLARITY: NORMAL
MDC_IDC_SET_LEADCHNL_RV_PACING_POLARITY: NORMAL
MDC_IDC_SET_LEADCHNL_RV_PACING_PULSEWIDTH: 0.4 MS
MDC_IDC_SET_LEADCHNL_RV_PACING_PULSEWIDTH: 0.4 MS
MDC_IDC_SET_LEADCHNL_RV_SENSING_ADAPTATION_MODE: NORMAL
MDC_IDC_SET_LEADCHNL_RV_SENSING_ADAPTATION_MODE: NORMAL
MDC_IDC_SET_LEADCHNL_RV_SENSING_POLARITY: NORMAL
MDC_IDC_SET_LEADCHNL_RV_SENSING_POLARITY: NORMAL
MDC_IDC_SET_LEADCHNL_RV_SENSING_SENSITIVITY: 1.5 MV
MDC_IDC_SET_LEADCHNL_RV_SENSING_SENSITIVITY: 1.5 MV
MDC_IDC_SET_ZONE_DETECTION_INTERVAL: 375 MS
MDC_IDC_SET_ZONE_DETECTION_INTERVAL: 375 MS
MDC_IDC_SET_ZONE_TYPE: NORMAL
MDC_IDC_SET_ZONE_TYPE: NORMAL
MDC_IDC_SET_ZONE_VENDOR_TYPE: NORMAL
MDC_IDC_SET_ZONE_VENDOR_TYPE: NORMAL
MDC_IDC_STAT_AT_BURDEN_PERCENT: 5 %
MDC_IDC_STAT_AT_BURDEN_PERCENT: 5 %
MDC_IDC_STAT_AT_DTM_END: NORMAL
MDC_IDC_STAT_AT_DTM_END: NORMAL
MDC_IDC_STAT_AT_DTM_START: NORMAL
MDC_IDC_STAT_AT_DTM_START: NORMAL
MDC_IDC_STAT_BRADY_DTM_END: NORMAL
MDC_IDC_STAT_BRADY_DTM_END: NORMAL
MDC_IDC_STAT_BRADY_DTM_START: NORMAL
MDC_IDC_STAT_BRADY_DTM_START: NORMAL
MDC_IDC_STAT_BRADY_RA_PERCENT_PACED: 1 %
MDC_IDC_STAT_BRADY_RA_PERCENT_PACED: 1 %
MDC_IDC_STAT_BRADY_RV_PERCENT_PACED: 8 %
MDC_IDC_STAT_BRADY_RV_PERCENT_PACED: 9 %
MDC_IDC_STAT_EPISODE_RECENT_COUNT: 0
MDC_IDC_STAT_EPISODE_RECENT_COUNT: 3
MDC_IDC_STAT_EPISODE_RECENT_COUNT: 3
MDC_IDC_STAT_EPISODE_RECENT_COUNT: 58
MDC_IDC_STAT_EPISODE_RECENT_COUNT: 62
MDC_IDC_STAT_EPISODE_RECENT_COUNT_DTM_END: NORMAL
MDC_IDC_STAT_EPISODE_RECENT_COUNT_DTM_START: NORMAL
MDC_IDC_STAT_EPISODE_TYPE: NORMAL
MDC_IDC_STAT_EPISODE_VENDOR_TYPE: NORMAL
MONOCYTES # BLD AUTO: 5.7 10E3/UL (ref 0–1.3)
MONOCYTES # BLD MANUAL: 0.5 10E3/UL (ref 0–1.3)
MONOCYTES NFR BLD AUTO: 20 %
MONOCYTES NFR BLD MANUAL: 6 %
MUCOUS THREADS #/AREA URNS LPF: PRESENT /LPF
MUCOUS THREADS #/AREA URNS LPF: PRESENT /LPF
NEUTROPHILS # BLD AUTO: 22 10E3/UL (ref 1.6–8.3)
NEUTROPHILS # BLD MANUAL: 5.4 10E3/UL (ref 1.6–8.3)
NEUTROPHILS NFR BLD AUTO: 78 %
NEUTROPHILS NFR BLD MANUAL: 72 %
NITRATE UR QL: NEGATIVE
NITRATE UR QL: NEGATIVE
NRBC # BLD AUTO: 0 10E3/UL
NRBC BLD AUTO-RTO: 0 /100
O2/TOTAL GAS SETTING VFR VENT: 0 %
P AXIS - MUSE: 55 DEGREES
PCO2 BLDV: 50 MM HG (ref 40–50)
PH BLDV: 7.3 [PH] (ref 7.32–7.43)
PH UR STRIP: 5.5 [PH] (ref 5–7)
PH UR STRIP: 6 [PH] (ref 5–7)
PLAT MORPH BLD: ABNORMAL
PLATELET # BLD AUTO: 171 10E3/UL (ref 150–450)
PLATELET # BLD AUTO: 198 10E3/UL (ref 150–450)
PO2 BLDV: 22 MM HG (ref 25–47)
POTASSIUM BLD-SCNC: 4.1 MMOL/L (ref 3.4–5.3)
POTASSIUM BLD-SCNC: 4.3 MMOL/L (ref 3.4–5.3)
PR INTERVAL - MUSE: 290 MS
PROT SERPL-MCNC: 6.9 G/DL (ref 6.8–8.8)
PROT SERPL-MCNC: 7.1 G/DL (ref 6.8–8.8)
QRS DURATION - MUSE: 68 MS
QT - MUSE: 334 MS
QTC - MUSE: 449 MS
R AXIS - MUSE: 40 DEGREES
RBC # BLD AUTO: 3.51 10E6/UL (ref 4.4–5.9)
RBC # BLD AUTO: 3.69 10E6/UL (ref 4.4–5.9)
RBC MORPH BLD: ABNORMAL
RBC URINE: 2 /HPF
RBC URINE: 28 /HPF
RSV RNA SPEC NAA+PROBE: NEGATIVE
SARS-COV-2 RNA RESP QL NAA+PROBE: NEGATIVE
SODIUM SERPL-SCNC: 138 MMOL/L (ref 133–144)
SODIUM SERPL-SCNC: 139 MMOL/L (ref 133–144)
SP GR UR STRIP: 1.01 (ref 1–1.03)
SP GR UR STRIP: 1.02 (ref 1–1.03)
SQUAMOUS EPITHELIAL: 1 /HPF
SYSTOLIC BLOOD PRESSURE - MUSE: NORMAL MMHG
T AXIS - MUSE: -85 DEGREES
TROPONIN I SERPL HS-MCNC: 13 NG/L
TROPONIN I SERPL HS-MCNC: 7 NG/L
UROBILINOGEN UR STRIP-MCNC: NORMAL MG/DL
UROBILINOGEN UR STRIP-MCNC: NORMAL MG/DL
VENTRICULAR RATE- MUSE: 109 BPM
WBC # BLD AUTO: 28.5 10E3/UL (ref 4–11)
WBC # BLD AUTO: 7.5 10E3/UL (ref 4–11)
WBC CLUMPS #/AREA URNS HPF: PRESENT /HPF
WBC URINE: 172 /HPF
WBC URINE: 46 /HPF

## 2022-01-01 PROCEDURE — 250N000011 HC RX IP 250 OP 636: Performed by: EMERGENCY MEDICINE

## 2022-01-01 PROCEDURE — 250N000013 HC RX MED GY IP 250 OP 250 PS 637: Performed by: PHYSICIAN ASSISTANT

## 2022-01-01 PROCEDURE — 93294 REM INTERROG EVL PM/LDLS PM: CPT | Performed by: INTERNAL MEDICINE

## 2022-01-01 PROCEDURE — 250N000011 HC RX IP 250 OP 636: Performed by: PHYSICIAN ASSISTANT

## 2022-01-01 PROCEDURE — 250N000009 HC RX 250: Performed by: EMERGENCY MEDICINE

## 2022-01-01 PROCEDURE — 85610 PROTHROMBIN TIME: CPT | Performed by: EMERGENCY MEDICINE

## 2022-01-01 PROCEDURE — 82040 ASSAY OF SERUM ALBUMIN: CPT | Performed by: EMERGENCY MEDICINE

## 2022-01-01 PROCEDURE — 110N000005 HC R&B HOSPICE, ACCENT

## 2022-01-01 PROCEDURE — 87086 URINE CULTURE/COLONY COUNT: CPT | Performed by: EMERGENCY MEDICINE

## 2022-01-01 PROCEDURE — 99309 SBSQ NF CARE MODERATE MDM 30: CPT | Performed by: NURSE PRACTITIONER

## 2022-01-01 PROCEDURE — 87637 SARSCOV2&INF A&B&RSV AMP PRB: CPT | Performed by: EMERGENCY MEDICINE

## 2022-01-01 PROCEDURE — 99451 NTRPROF PH1/NTRNET/EHR 5/>: CPT | Performed by: STUDENT IN AN ORGANIZED HEALTH CARE EDUCATION/TRAINING PROGRAM

## 2022-01-01 PROCEDURE — 85007 BL SMEAR W/DIFF WBC COUNT: CPT | Performed by: EMERGENCY MEDICINE

## 2022-01-01 PROCEDURE — 96361 HYDRATE IV INFUSION ADD-ON: CPT

## 2022-01-01 PROCEDURE — 36415 COLL VENOUS BLD VENIPUNCTURE: CPT | Performed by: EMERGENCY MEDICINE

## 2022-01-01 PROCEDURE — C9803 HOPD COVID-19 SPEC COLLECT: HCPCS

## 2022-01-01 PROCEDURE — 99285 EMERGENCY DEPT VISIT HI MDM: CPT | Mod: 25

## 2022-01-01 PROCEDURE — 250N000009 HC RX 250: Performed by: PHYSICIAN ASSISTANT

## 2022-01-01 PROCEDURE — 82803 BLOOD GASES ANY COMBINATION: CPT | Performed by: EMERGENCY MEDICINE

## 2022-01-01 PROCEDURE — 80053 COMPREHEN METABOLIC PANEL: CPT | Performed by: EMERGENCY MEDICINE

## 2022-01-01 PROCEDURE — 96367 TX/PROPH/DG ADDL SEQ IV INF: CPT

## 2022-01-01 PROCEDURE — 96365 THER/PROPH/DIAG IV INF INIT: CPT | Mod: 59

## 2022-01-01 PROCEDURE — 250N000011 HC RX IP 250 OP 636: Performed by: STUDENT IN AN ORGANIZED HEALTH CARE EDUCATION/TRAINING PROGRAM

## 2022-01-01 PROCEDURE — 99223 1ST HOSP IP/OBS HIGH 75: CPT | Mod: AI | Performed by: STUDENT IN AN ORGANIZED HEALTH CARE EDUCATION/TRAINING PROGRAM

## 2022-01-01 PROCEDURE — 0042T CT HEAD PERFUSION W CONTRAST: CPT

## 2022-01-01 PROCEDURE — 99309 SBSQ NF CARE MODERATE MDM 30: CPT | Mod: GW | Performed by: NURSE PRACTITIONER

## 2022-01-01 PROCEDURE — 96375 TX/PRO/DX INJ NEW DRUG ADDON: CPT

## 2022-01-01 PROCEDURE — 93005 ELECTROCARDIOGRAM TRACING: CPT

## 2022-01-01 PROCEDURE — 99207 PR NO BILLABLE SERVICE THIS VISIT: CPT | Mod: GV | Performed by: PHYSICIAN ASSISTANT

## 2022-01-01 PROCEDURE — 99207 PR APP CREDIT; MD BILLING SHARED VISIT: CPT | Mod: GV | Performed by: HOSPITALIST

## 2022-01-01 PROCEDURE — 99207 PR NO CHARGE LOS: CPT | Performed by: PHARMACIST

## 2022-01-01 PROCEDURE — 83605 ASSAY OF LACTIC ACID: CPT | Performed by: EMERGENCY MEDICINE

## 2022-01-01 PROCEDURE — 81001 URINALYSIS AUTO W/SCOPE: CPT | Performed by: EMERGENCY MEDICINE

## 2022-01-01 PROCEDURE — 93296 REM INTERROG EVL PM/IDS: CPT | Performed by: INTERNAL MEDICINE

## 2022-01-01 PROCEDURE — 250N000013 HC RX MED GY IP 250 OP 250 PS 637: Performed by: HOSPITALIST

## 2022-01-01 PROCEDURE — 99239 HOSP IP/OBS DSCHRG MGMT >30: CPT | Performed by: PHYSICIAN ASSISTANT

## 2022-01-01 PROCEDURE — 70450 CT HEAD/BRAIN W/O DYE: CPT

## 2022-01-01 PROCEDURE — 99309 SBSQ NF CARE MODERATE MDM 30: CPT | Performed by: INTERNAL MEDICINE

## 2022-01-01 PROCEDURE — 76770 US EXAM ABDO BACK WALL COMP: CPT

## 2022-01-01 PROCEDURE — 85027 COMPLETE CBC AUTOMATED: CPT | Performed by: EMERGENCY MEDICINE

## 2022-01-01 PROCEDURE — 999N000157 HC STATISTIC RCP TIME EA 10 MIN

## 2022-01-01 PROCEDURE — 87040 BLOOD CULTURE FOR BACTERIA: CPT | Performed by: EMERGENCY MEDICINE

## 2022-01-01 PROCEDURE — 258N000003 HC RX IP 258 OP 636: Performed by: EMERGENCY MEDICINE

## 2022-01-01 PROCEDURE — 84484 ASSAY OF TROPONIN QUANT: CPT | Performed by: EMERGENCY MEDICINE

## 2022-01-01 PROCEDURE — 71045 X-RAY EXAM CHEST 1 VIEW: CPT

## 2022-01-01 PROCEDURE — 96365 THER/PROPH/DIAG IV INF INIT: CPT

## 2022-01-01 PROCEDURE — 99318 PR ANNUAL NURSING FAC ASSESSMNT, STABLE: CPT | Performed by: NURSE PRACTITIONER

## 2022-01-01 PROCEDURE — 120N000001 HC R&B MED SURG/OB

## 2022-01-01 PROCEDURE — 70496 CT ANGIOGRAPHY HEAD: CPT

## 2022-01-01 PROCEDURE — 99238 HOSP IP/OBS DSCHRG MGMT 30/<: CPT | Mod: GV | Performed by: NURSE PRACTITIONER

## 2022-01-01 PROCEDURE — 99231 SBSQ HOSP IP/OBS SF/LOW 25: CPT | Mod: GV | Performed by: HOSPITALIST

## 2022-01-01 PROCEDURE — 250N000013 HC RX MED GY IP 250 OP 250 PS 637: Performed by: EMERGENCY MEDICINE

## 2022-01-01 PROCEDURE — 85025 COMPLETE CBC W/AUTO DIFF WBC: CPT | Performed by: EMERGENCY MEDICINE

## 2022-01-01 RX ORDER — ACETAMINOPHEN 650 MG/1
650 SUPPOSITORY RECTAL EVERY 6 HOURS PRN
Status: DISCONTINUED | OUTPATIENT
Start: 2022-01-01 | End: 2022-01-01 | Stop reason: HOSPADM

## 2022-01-01 RX ORDER — ATROPINE SULFATE 10 MG/ML
2 SOLUTION/ DROPS OPHTHALMIC EVERY 4 HOURS PRN
Status: DISCONTINUED | OUTPATIENT
Start: 2022-01-01 | End: 2022-01-01 | Stop reason: HOSPADM

## 2022-01-01 RX ORDER — LIDOCAINE 40 MG/G
CREAM TOPICAL
Status: CANCELLED | OUTPATIENT
Start: 2022-01-01

## 2022-01-01 RX ORDER — ATROPINE SULFATE 10 MG/ML
2 SOLUTION/ DROPS OPHTHALMIC EVERY 4 HOURS PRN
Status: CANCELLED | OUTPATIENT
Start: 2022-01-01

## 2022-01-01 RX ORDER — LORAZEPAM 0.5 MG/1
1 TABLET ORAL
Status: DISCONTINUED | OUTPATIENT
Start: 2022-01-01 | End: 2022-01-01 | Stop reason: HOSPADM

## 2022-01-01 RX ORDER — ONDANSETRON 4 MG/1
4 TABLET, ORALLY DISINTEGRATING ORAL EVERY 6 HOURS PRN
Status: DISCONTINUED | OUTPATIENT
Start: 2022-01-01 | End: 2022-01-01 | Stop reason: HOSPADM

## 2022-01-01 RX ORDER — AMOXICILLIN 250 MG
2 CAPSULE ORAL 2 TIMES DAILY PRN
Status: DISCONTINUED | OUTPATIENT
Start: 2022-01-01 | End: 2022-01-01 | Stop reason: HOSPADM

## 2022-01-01 RX ORDER — PROCHLORPERAZINE 25 MG
12.5 SUPPOSITORY, RECTAL RECTAL EVERY 12 HOURS PRN
Status: DISCONTINUED | OUTPATIENT
Start: 2022-01-01 | End: 2022-01-01

## 2022-01-01 RX ORDER — TRAMADOL HYDROCHLORIDE 100 MG/1
100 TABLET, COATED ORAL 4 TIMES DAILY
COMMUNITY

## 2022-01-01 RX ORDER — PROCHLORPERAZINE 25 MG
12.5 SUPPOSITORY, RECTAL RECTAL EVERY 12 HOURS PRN
Status: CANCELLED | OUTPATIENT
Start: 2022-01-01

## 2022-01-01 RX ORDER — ONDANSETRON 2 MG/ML
4 INJECTION INTRAMUSCULAR; INTRAVENOUS EVERY 6 HOURS PRN
Status: DISCONTINUED | OUTPATIENT
Start: 2022-01-01 | End: 2022-01-01 | Stop reason: HOSPADM

## 2022-01-01 RX ORDER — AMOXICILLIN 250 MG
1 CAPSULE ORAL 2 TIMES DAILY PRN
Status: DISCONTINUED | OUTPATIENT
Start: 2022-01-01 | End: 2022-01-01 | Stop reason: HOSPADM

## 2022-01-01 RX ORDER — BISACODYL 10 MG
10 SUPPOSITORY, RECTAL RECTAL DAILY PRN
Status: DISCONTINUED | OUTPATIENT
Start: 2022-01-01 | End: 2022-01-01 | Stop reason: HOSPADM

## 2022-01-01 RX ORDER — IOPAMIDOL 755 MG/ML
125 INJECTION, SOLUTION INTRAVASCULAR ONCE
Status: COMPLETED | OUTPATIENT
Start: 2022-01-01 | End: 2022-01-01

## 2022-01-01 RX ORDER — CARBOXYMETHYLCELLULOSE SODIUM 5 MG/ML
1 SOLUTION/ DROPS OPHTHALMIC
Status: DISCONTINUED | OUTPATIENT
Start: 2022-01-01 | End: 2022-01-01 | Stop reason: HOSPADM

## 2022-01-01 RX ORDER — ACETAMINOPHEN 325 MG/1
650 TABLET ORAL EVERY 6 HOURS PRN
Status: DISCONTINUED | OUTPATIENT
Start: 2022-01-01 | End: 2022-01-01 | Stop reason: HOSPADM

## 2022-01-01 RX ORDER — VANCOMYCIN HYDROCHLORIDE 1 G/200ML
1000 INJECTION, SOLUTION INTRAVENOUS ONCE
Status: DISCONTINUED | OUTPATIENT
Start: 2022-01-01 | End: 2022-01-01 | Stop reason: DRUGHIGH

## 2022-01-01 RX ORDER — NALOXONE HYDROCHLORIDE 0.4 MG/ML
0.1 INJECTION, SOLUTION INTRAMUSCULAR; INTRAVENOUS; SUBCUTANEOUS
Status: CANCELLED | OUTPATIENT
Start: 2022-01-01

## 2022-01-01 RX ORDER — QUETIAPINE FUMARATE 50 MG/1
50 TABLET, FILM COATED ORAL 2 TIMES DAILY
COMMUNITY

## 2022-01-01 RX ORDER — LORAZEPAM 0.5 MG/1
1 TABLET ORAL
Status: CANCELLED | OUTPATIENT
Start: 2022-01-01

## 2022-01-01 RX ORDER — CEFTRIAXONE 2 G/1
2 INJECTION, POWDER, FOR SOLUTION INTRAMUSCULAR; INTRAVENOUS ONCE
Status: COMPLETED | OUTPATIENT
Start: 2022-01-01 | End: 2022-01-01

## 2022-01-01 RX ORDER — NALOXONE HYDROCHLORIDE 0.4 MG/ML
0.1 INJECTION, SOLUTION INTRAMUSCULAR; INTRAVENOUS; SUBCUTANEOUS
Status: DISCONTINUED | OUTPATIENT
Start: 2022-01-01 | End: 2022-01-01 | Stop reason: HOSPADM

## 2022-01-01 RX ORDER — MORPHINE SULFATE 20 MG/ML
5-10 SOLUTION ORAL
Status: CANCELLED | OUTPATIENT
Start: 2022-01-01

## 2022-01-01 RX ORDER — NALOXONE HYDROCHLORIDE 0.4 MG/ML
0.2 INJECTION, SOLUTION INTRAMUSCULAR; INTRAVENOUS; SUBCUTANEOUS
Status: DISCONTINUED | OUTPATIENT
Start: 2022-01-01 | End: 2022-01-01 | Stop reason: HOSPADM

## 2022-01-01 RX ORDER — PIPERACILLIN SODIUM, TAZOBACTAM SODIUM 4; .5 G/20ML; G/20ML
4.5 INJECTION, POWDER, LYOPHILIZED, FOR SOLUTION INTRAVENOUS EVERY 6 HOURS
Status: CANCELLED | OUTPATIENT
Start: 2022-01-01

## 2022-01-01 RX ORDER — OLANZAPINE 5 MG/1
5 TABLET, ORALLY DISINTEGRATING ORAL EVERY 6 HOURS PRN
Status: CANCELLED | OUTPATIENT
Start: 2022-01-01

## 2022-01-01 RX ORDER — HYDROMORPHONE HCL IN WATER/PF 6 MG/30 ML
.2-.3 PATIENT CONTROLLED ANALGESIA SYRINGE INTRAVENOUS
Status: DISCONTINUED | OUTPATIENT
Start: 2022-01-01 | End: 2022-01-01 | Stop reason: HOSPADM

## 2022-01-01 RX ORDER — LORAZEPAM 2 MG/ML
1 INJECTION INTRAMUSCULAR
Status: DISCONTINUED | OUTPATIENT
Start: 2022-01-01 | End: 2022-01-01 | Stop reason: HOSPADM

## 2022-01-01 RX ORDER — OLANZAPINE 5 MG/1
5 TABLET, ORALLY DISINTEGRATING ORAL EVERY 6 HOURS PRN
Status: DISCONTINUED | OUTPATIENT
Start: 2022-01-01 | End: 2022-01-01 | Stop reason: HOSPADM

## 2022-01-01 RX ORDER — AMOXICILLIN 250 MG
1 CAPSULE ORAL 2 TIMES DAILY PRN
Status: CANCELLED | OUTPATIENT
Start: 2022-01-01

## 2022-01-01 RX ORDER — ACETAMINOPHEN 650 MG/1
650 SUPPOSITORY RECTAL EVERY 6 HOURS PRN
Status: CANCELLED | OUTPATIENT
Start: 2022-01-01

## 2022-01-01 RX ORDER — PROCHLORPERAZINE 25 MG
12.5 SUPPOSITORY, RECTAL RECTAL EVERY 12 HOURS PRN
Status: DISCONTINUED | OUTPATIENT
Start: 2022-01-01 | End: 2022-01-01 | Stop reason: HOSPADM

## 2022-01-01 RX ORDER — NALOXONE HYDROCHLORIDE 0.4 MG/ML
0.2 INJECTION, SOLUTION INTRAMUSCULAR; INTRAVENOUS; SUBCUTANEOUS
Status: CANCELLED | OUTPATIENT
Start: 2022-01-01

## 2022-01-01 RX ORDER — POLYETHYLENE GLYCOL 3350 17 G/17G
17 POWDER, FOR SOLUTION ORAL DAILY PRN
Status: DISCONTINUED | OUTPATIENT
Start: 2022-01-01 | End: 2022-01-01 | Stop reason: HOSPADM

## 2022-01-01 RX ORDER — PROCHLORPERAZINE MALEATE 5 MG
5 TABLET ORAL EVERY 6 HOURS PRN
Status: CANCELLED | OUTPATIENT
Start: 2022-01-01

## 2022-01-01 RX ORDER — ONDANSETRON 4 MG/1
4 TABLET, ORALLY DISINTEGRATING ORAL EVERY 6 HOURS PRN
Status: DISCONTINUED | OUTPATIENT
Start: 2022-01-01 | End: 2022-01-01

## 2022-01-01 RX ORDER — GLYCOPYRROLATE 0.2 MG/ML
0.2 INJECTION, SOLUTION INTRAMUSCULAR; INTRAVENOUS EVERY 4 HOURS PRN
Status: CANCELLED | OUTPATIENT
Start: 2022-01-01

## 2022-01-01 RX ORDER — LORAZEPAM 2 MG/ML
1 INJECTION INTRAMUSCULAR
Status: CANCELLED | OUTPATIENT
Start: 2022-01-01

## 2022-01-01 RX ORDER — SALIVA STIMULANT COMB. NO.3
2 SPRAY, NON-AEROSOL (ML) MUCOUS MEMBRANE
Status: DISCONTINUED | OUTPATIENT
Start: 2022-01-01 | End: 2022-01-01 | Stop reason: HOSPADM

## 2022-01-01 RX ORDER — NITROGLYCERIN 0.4 MG/1
0.4 TABLET SUBLINGUAL EVERY 5 MIN PRN
Status: DISCONTINUED | OUTPATIENT
Start: 2022-01-01 | End: 2022-01-01 | Stop reason: HOSPADM

## 2022-01-01 RX ORDER — LIDOCAINE 40 MG/G
CREAM TOPICAL
Status: DISCONTINUED | OUTPATIENT
Start: 2022-01-01 | End: 2022-01-01 | Stop reason: HOSPADM

## 2022-01-01 RX ORDER — TRAMADOL HYDROCHLORIDE 50 MG/1
100 TABLET ORAL 4 TIMES DAILY
Qty: 120 TABLET | Refills: 5 | Status: SHIPPED | OUTPATIENT
Start: 2022-01-01 | End: 2022-01-01

## 2022-01-01 RX ORDER — PROCHLORPERAZINE MALEATE 5 MG
5 TABLET ORAL EVERY 6 HOURS PRN
Status: DISCONTINUED | OUTPATIENT
Start: 2022-01-01 | End: 2022-01-01

## 2022-01-01 RX ORDER — TRAMADOL HYDROCHLORIDE 50 MG/1
100 TABLET ORAL 4 TIMES DAILY
Qty: 120 TABLET | Refills: 5
Start: 2022-01-01 | End: 2022-01-01

## 2022-01-01 RX ORDER — LORAZEPAM 1 MG/1
1 TABLET ORAL
Status: DISCONTINUED | OUTPATIENT
Start: 2022-01-01 | End: 2022-01-01 | Stop reason: HOSPADM

## 2022-01-01 RX ORDER — PROCHLORPERAZINE MALEATE 5 MG
5 TABLET ORAL EVERY 6 HOURS PRN
Status: DISCONTINUED | OUTPATIENT
Start: 2022-01-01 | End: 2022-01-01 | Stop reason: HOSPADM

## 2022-01-01 RX ORDER — QUETIAPINE FUMARATE 25 MG/1
50 TABLET, FILM COATED ORAL 2 TIMES DAILY
Qty: 30 TABLET | Refills: 11
Start: 2022-01-01 | End: 2022-01-01

## 2022-01-01 RX ORDER — ONDANSETRON 4 MG/1
4 TABLET, ORALLY DISINTEGRATING ORAL EVERY 6 HOURS PRN
Status: CANCELLED | OUTPATIENT
Start: 2022-01-01

## 2022-01-01 RX ORDER — SODIUM CHLORIDE 9 MG/ML
INJECTION, SOLUTION INTRAVENOUS CONTINUOUS
Status: DISCONTINUED | OUTPATIENT
Start: 2022-01-01 | End: 2022-01-01

## 2022-01-01 RX ORDER — ACETAMINOPHEN 325 MG/1
650 TABLET ORAL EVERY 6 HOURS PRN
Status: CANCELLED | OUTPATIENT
Start: 2022-01-01

## 2022-01-01 RX ORDER — ONDANSETRON 2 MG/ML
4 INJECTION INTRAMUSCULAR; INTRAVENOUS EVERY 6 HOURS PRN
Status: DISCONTINUED | OUTPATIENT
Start: 2022-01-01 | End: 2022-01-01

## 2022-01-01 RX ORDER — GLYCOPYRROLATE 0.2 MG/ML
0.2 INJECTION, SOLUTION INTRAMUSCULAR; INTRAVENOUS EVERY 4 HOURS PRN
Status: DISCONTINUED | OUTPATIENT
Start: 2022-01-01 | End: 2022-01-01 | Stop reason: HOSPADM

## 2022-01-01 RX ORDER — CEPHALEXIN 500 MG/1
500 CAPSULE ORAL 3 TIMES DAILY
Qty: 21 CAPSULE | Refills: 0 | Status: SHIPPED | OUTPATIENT
Start: 2022-01-01 | End: 2022-07-18

## 2022-01-01 RX ORDER — SALIVA STIMULANT COMB. NO.3
2 SPRAY, NON-AEROSOL (ML) MUCOUS MEMBRANE
Status: DISCONTINUED | OUTPATIENT
Start: 2022-01-01 | End: 2022-01-01

## 2022-01-01 RX ORDER — MORPHINE SULFATE 20 MG/ML
5-10 SOLUTION ORAL
Status: DISCONTINUED | OUTPATIENT
Start: 2022-01-01 | End: 2022-01-01 | Stop reason: HOSPADM

## 2022-01-01 RX ORDER — SALIVA STIMULANT COMB. NO.3
2 SPRAY, NON-AEROSOL (ML) MUCOUS MEMBRANE
Status: CANCELLED | OUTPATIENT
Start: 2022-01-01

## 2022-01-01 RX ORDER — DEXTROSE MONOHYDRATE 25 G/50ML
25-50 INJECTION, SOLUTION INTRAVENOUS
Status: CANCELLED | OUTPATIENT
Start: 2022-01-01

## 2022-01-01 RX ORDER — ACETAMINOPHEN 650 MG/1
650 SUPPOSITORY RECTAL ONCE
Status: COMPLETED | OUTPATIENT
Start: 2022-01-01 | End: 2022-01-01

## 2022-01-01 RX ORDER — NITROGLYCERIN 0.4 MG/1
0.4 TABLET SUBLINGUAL EVERY 5 MIN PRN
Status: CANCELLED | OUTPATIENT
Start: 2022-01-01

## 2022-01-01 RX ORDER — BISACODYL 10 MG
10 SUPPOSITORY, RECTAL RECTAL DAILY PRN
Status: CANCELLED | OUTPATIENT
Start: 2022-01-01

## 2022-01-01 RX ORDER — HYDROMORPHONE HCL IN WATER/PF 6 MG/30 ML
0.2 PATIENT CONTROLLED ANALGESIA SYRINGE INTRAVENOUS
Status: CANCELLED | OUTPATIENT
Start: 2022-01-01

## 2022-01-01 RX ORDER — AMOXICILLIN 250 MG
2 CAPSULE ORAL 2 TIMES DAILY PRN
Status: CANCELLED | OUTPATIENT
Start: 2022-01-01

## 2022-01-01 RX ORDER — ONDANSETRON 2 MG/ML
4 INJECTION INTRAMUSCULAR; INTRAVENOUS EVERY 6 HOURS PRN
Status: CANCELLED | OUTPATIENT
Start: 2022-01-01

## 2022-01-01 RX ORDER — PIPERACILLIN SODIUM, TAZOBACTAM SODIUM 4; .5 G/20ML; G/20ML
4.5 INJECTION, POWDER, LYOPHILIZED, FOR SOLUTION INTRAVENOUS ONCE
Status: COMPLETED | OUTPATIENT
Start: 2022-01-01 | End: 2022-01-01

## 2022-01-01 RX ORDER — CEFTRIAXONE 2 G/1
2 INJECTION, POWDER, FOR SOLUTION INTRAMUSCULAR; INTRAVENOUS EVERY 24 HOURS
Status: CANCELLED | OUTPATIENT
Start: 2022-01-01

## 2022-01-01 RX ORDER — CARBOXYMETHYLCELLULOSE SODIUM 5 MG/ML
1 SOLUTION/ DROPS OPHTHALMIC
Status: CANCELLED | OUTPATIENT
Start: 2022-01-01

## 2022-01-01 RX ORDER — TRAMADOL HYDROCHLORIDE 50 MG/1
100 TABLET ORAL 4 TIMES DAILY
Qty: 6 TABLET | Refills: 0 | Status: SHIPPED | OUTPATIENT
Start: 2022-01-01 | End: 2022-01-01

## 2022-01-01 RX ORDER — CARBOXYMETHYLCELLULOSE SODIUM 5 MG/ML
1-2 SOLUTION/ DROPS OPHTHALMIC
Status: DISCONTINUED | OUTPATIENT
Start: 2022-01-01 | End: 2022-01-01

## 2022-01-01 RX ORDER — POLYETHYLENE GLYCOL 3350 17 G/17G
17 POWDER, FOR SOLUTION ORAL DAILY PRN
Status: CANCELLED | OUTPATIENT
Start: 2022-01-01

## 2022-01-01 RX ORDER — LIDOCAINE 40 MG/G
CREAM TOPICAL
Status: DISCONTINUED | OUTPATIENT
Start: 2022-01-01 | End: 2022-01-01

## 2022-01-01 RX ORDER — HYDROMORPHONE HCL IN WATER/PF 6 MG/30 ML
.2-.3 PATIENT CONTROLLED ANALGESIA SYRINGE INTRAVENOUS
Status: CANCELLED | OUTPATIENT
Start: 2022-01-01

## 2022-01-01 RX ORDER — NICOTINE POLACRILEX 4 MG
15-30 LOZENGE BUCCAL
Status: CANCELLED | OUTPATIENT
Start: 2022-01-01

## 2022-01-01 RX ADMIN — MORPHINE SULFATE 10 MG: 10 SOLUTION ORAL at 17:55

## 2022-01-01 RX ADMIN — PIPERACILLIN AND TAZOBACTAM 4.5 G: 4; .5 INJECTION, POWDER, FOR SOLUTION INTRAVENOUS at 13:57

## 2022-01-01 RX ADMIN — MORPHINE SULFATE 10 MG: 10 SOLUTION ORAL at 20:34

## 2022-01-01 RX ADMIN — MORPHINE SULFATE 10 MG: 10 SOLUTION ORAL at 08:47

## 2022-01-01 RX ADMIN — HYDROMORPHONE HYDROCHLORIDE 0.2 MG: 0.2 INJECTION, SOLUTION INTRAMUSCULAR; INTRAVENOUS; SUBCUTANEOUS at 04:07

## 2022-01-01 RX ADMIN — MORPHINE SULFATE 10 MG: 10 SOLUTION ORAL at 14:08

## 2022-01-01 RX ADMIN — HYDROMORPHONE HYDROCHLORIDE 0.2 MG: 0.2 INJECTION, SOLUTION INTRAMUSCULAR; INTRAVENOUS; SUBCUTANEOUS at 09:48

## 2022-01-01 RX ADMIN — MORPHINE SULFATE 10 MG: 10 SOLUTION ORAL at 16:26

## 2022-01-01 RX ADMIN — CEFTRIAXONE SODIUM 2 G: 2 INJECTION, POWDER, FOR SOLUTION INTRAMUSCULAR; INTRAVENOUS at 01:22

## 2022-01-01 RX ADMIN — MORPHINE SULFATE 10 MG: 10 SOLUTION ORAL at 14:00

## 2022-01-01 RX ADMIN — MORPHINE SULFATE 10 MG: 10 SOLUTION ORAL at 04:29

## 2022-01-01 RX ADMIN — SODIUM CHLORIDE 1000 ML: 9 INJECTION, SOLUTION INTRAVENOUS at 18:09

## 2022-01-01 RX ADMIN — HYDROMORPHONE HYDROCHLORIDE 0.2 MG: 0.2 INJECTION, SOLUTION INTRAMUSCULAR; INTRAVENOUS; SUBCUTANEOUS at 06:43

## 2022-01-01 RX ADMIN — HYDROMORPHONE HYDROCHLORIDE 0.2 MG: 0.2 INJECTION, SOLUTION INTRAMUSCULAR; INTRAVENOUS; SUBCUTANEOUS at 22:50

## 2022-01-01 RX ADMIN — LORAZEPAM 1 MG: 2 INJECTION INTRAMUSCULAR at 20:05

## 2022-01-01 RX ADMIN — LORAZEPAM 1 MG: 1 TABLET ORAL at 08:43

## 2022-01-01 RX ADMIN — VANCOMYCIN HYDROCHLORIDE 1250 MG: 5 INJECTION, POWDER, LYOPHILIZED, FOR SOLUTION INTRAVENOUS at 18:09

## 2022-01-01 RX ADMIN — LORAZEPAM 1 MG: 2 INJECTION INTRAMUSCULAR at 06:01

## 2022-01-01 RX ADMIN — LORAZEPAM 1 MG: 2 INJECTION INTRAMUSCULAR at 12:40

## 2022-01-01 RX ADMIN — LORAZEPAM 1 MG: 1 TABLET ORAL at 12:30

## 2022-01-01 RX ADMIN — HYDROMORPHONE HYDROCHLORIDE 0.2 MG: 0.2 INJECTION, SOLUTION INTRAMUSCULAR; INTRAVENOUS; SUBCUTANEOUS at 19:05

## 2022-01-01 RX ADMIN — MORPHINE SULFATE 10 MG: 10 SOLUTION ORAL at 07:47

## 2022-01-01 RX ADMIN — ATROPINE SULFATE 2 DROP: 10 SOLUTION OPHTHALMIC at 16:03

## 2022-01-01 RX ADMIN — LORAZEPAM 1 MG: 2 INJECTION INTRAMUSCULAR at 16:26

## 2022-01-01 RX ADMIN — ATROPINE SULFATE 2 DROP: 10 SOLUTION OPHTHALMIC at 08:24

## 2022-01-01 RX ADMIN — ATROPINE SULFATE 2 DROP: 10 SOLUTION OPHTHALMIC at 02:47

## 2022-01-01 RX ADMIN — SODIUM CHLORIDE 1000 ML: 9 INJECTION, SOLUTION INTRAVENOUS at 13:47

## 2022-01-01 RX ADMIN — MORPHINE SULFATE 10 MG: 10 SOLUTION ORAL at 03:56

## 2022-01-01 RX ADMIN — LORAZEPAM 1 MG: 2 INJECTION INTRAMUSCULAR at 04:38

## 2022-01-01 RX ADMIN — HYDROMORPHONE HYDROCHLORIDE 0.2 MG: 0.2 INJECTION, SOLUTION INTRAMUSCULAR; INTRAVENOUS; SUBCUTANEOUS at 08:34

## 2022-01-01 RX ADMIN — LORAZEPAM 1 MG: 2 INJECTION INTRAMUSCULAR at 08:24

## 2022-01-01 RX ADMIN — LORAZEPAM 1 MG: 2 INJECTION INTRAMUSCULAR at 00:52

## 2022-01-01 RX ADMIN — HYDROMORPHONE HYDROCHLORIDE 0.2 MG: 0.2 INJECTION, SOLUTION INTRAMUSCULAR; INTRAVENOUS; SUBCUTANEOUS at 11:57

## 2022-01-01 RX ADMIN — MORPHINE SULFATE 10 MG: 10 SOLUTION ORAL at 19:57

## 2022-01-01 RX ADMIN — ATROPINE SULFATE 2 DROP: 10 SOLUTION OPHTHALMIC at 01:09

## 2022-01-01 RX ADMIN — MORPHINE SULFATE 10 MG: 10 SOLUTION ORAL at 17:37

## 2022-01-01 RX ADMIN — LORAZEPAM 1 MG: 2 INJECTION INTRAMUSCULAR at 02:27

## 2022-01-01 RX ADMIN — ACETAMINOPHEN 650 MG: 650 SUPPOSITORY RECTAL at 15:30

## 2022-01-01 RX ADMIN — MORPHINE SULFATE 10 MG: 10 SOLUTION ORAL at 09:41

## 2022-01-01 RX ADMIN — IOPAMIDOL 125 ML: 755 INJECTION, SOLUTION INTRAVENOUS at 21:53

## 2022-01-01 RX ADMIN — MORPHINE SULFATE 10 MG: 10 SOLUTION ORAL at 00:43

## 2022-01-01 RX ADMIN — MORPHINE SULFATE 10 MG: 10 SOLUTION ORAL at 19:37

## 2022-01-01 RX ADMIN — ATROPINE SULFATE 2 DROP: 10 SOLUTION OPHTHALMIC at 06:46

## 2022-01-01 RX ADMIN — MORPHINE SULFATE 10 MG: 10 SOLUTION ORAL at 15:20

## 2022-01-01 RX ADMIN — ATROPINE SULFATE 2 DROP: 10 SOLUTION OPHTHALMIC at 10:33

## 2022-01-01 RX ADMIN — MORPHINE SULFATE 10 MG: 10 SOLUTION ORAL at 11:12

## 2022-01-01 RX ADMIN — SODIUM CHLORIDE 1000 ML: 9 INJECTION, SOLUTION INTRAVENOUS at 15:32

## 2022-01-01 RX ADMIN — MORPHINE SULFATE 10 MG: 10 SOLUTION ORAL at 07:04

## 2022-01-01 RX ADMIN — MORPHINE SULFATE 10 MG: 10 SOLUTION ORAL at 02:47

## 2022-01-01 RX ADMIN — MORPHINE SULFATE 10 MG: 10 SOLUTION ORAL at 05:09

## 2022-01-01 RX ADMIN — MORPHINE SULFATE 10 MG: 10 SOLUTION ORAL at 23:33

## 2022-01-01 RX ADMIN — SODIUM CHLORIDE 100 ML: 900 INJECTION INTRAVENOUS at 21:53

## 2022-01-01 RX ADMIN — LORAZEPAM 1 MG: 2 INJECTION INTRAMUSCULAR at 23:12

## 2022-01-01 RX ADMIN — MORPHINE SULFATE 10 MG: 10 SOLUTION ORAL at 00:40

## 2022-01-01 RX ADMIN — LORAZEPAM 1 MG: 2 INJECTION INTRAMUSCULAR at 19:48

## 2022-01-01 RX ADMIN — MORPHINE SULFATE 10 MG: 10 SOLUTION ORAL at 21:27

## 2022-01-01 RX ADMIN — LORAZEPAM 1 MG: 2 INJECTION INTRAMUSCULAR at 15:59

## 2022-01-01 RX ADMIN — LORAZEPAM 1 MG: 2 INJECTION INTRAMUSCULAR at 01:44

## 2022-01-01 RX ADMIN — MORPHINE SULFATE 10 MG: 10 SOLUTION ORAL at 10:32

## 2022-01-01 RX ADMIN — MORPHINE SULFATE 10 MG: 10 SOLUTION ORAL at 22:18

## 2022-01-01 RX ADMIN — MORPHINE SULFATE 10 MG: 10 SOLUTION ORAL at 01:49

## 2022-01-01 ASSESSMENT — ACTIVITIES OF DAILY LIVING (ADL)
ADLS_ACUITY_SCORE: 43
ADLS_ACUITY_SCORE: 43
ADLS_ACUITY_SCORE: 45
ADLS_ACUITY_SCORE: 47
ADLS_ACUITY_SCORE: 35
ADLS_ACUITY_SCORE: 47
ADLS_ACUITY_SCORE: 47
ADLS_ACUITY_SCORE: 45
ADLS_ACUITY_SCORE: 35
ADLS_ACUITY_SCORE: 47
ADLS_ACUITY_SCORE: 35
ADLS_ACUITY_SCORE: 43
ADLS_ACUITY_SCORE: 43
ADLS_ACUITY_SCORE: 47
ADLS_ACUITY_SCORE: 43
ADLS_ACUITY_SCORE: 45
ADLS_ACUITY_SCORE: 43
ADLS_ACUITY_SCORE: 35
ADLS_ACUITY_SCORE: 47
ADLS_ACUITY_SCORE: 45
ADLS_ACUITY_SCORE: 47
ADLS_ACUITY_SCORE: 45
ADLS_ACUITY_SCORE: 47
ADLS_ACUITY_SCORE: 47
ADLS_ACUITY_SCORE: 45
ADLS_ACUITY_SCORE: 35
ADLS_ACUITY_SCORE: 47
ADLS_ACUITY_SCORE: 43
ADLS_ACUITY_SCORE: 47
ADLS_ACUITY_SCORE: 47
ADLS_ACUITY_SCORE: 45
ADLS_ACUITY_SCORE: 47
ADLS_ACUITY_SCORE: 45
ADLS_ACUITY_SCORE: 35
ADLS_ACUITY_SCORE: 43
ADLS_ACUITY_SCORE: 35
ADLS_ACUITY_SCORE: 43
ADLS_ACUITY_SCORE: 35
ADLS_ACUITY_SCORE: 47
ADLS_ACUITY_SCORE: 43
ADLS_ACUITY_SCORE: 45
ADLS_ACUITY_SCORE: 47
ADLS_ACUITY_SCORE: 43
ADLS_ACUITY_SCORE: 43
ADLS_ACUITY_SCORE: 47

## 2022-01-01 ASSESSMENT — MIFFLIN-ST. JEOR
SCORE: 1344.38
SCORE: 1344.38

## 2022-01-10 PROBLEM — U07.1 INFECTION DUE TO 2019 NOVEL CORONAVIRUS: Status: ACTIVE | Noted: 2022-01-01

## 2022-01-10 NOTE — PROGRESS NOTES
"[facility] GERIATRIC SERVICES    Chief Complaint   Patient presents with     Nursing Home Acute     HPI:  Elias Mckee is a 89 year old  (8/10/1931), who is being seen today for an episodic care visit at: [facility] [[id number]]. Today's concern is:   1. Senile dementia with behavioral disturbance (H)    2. Malignant neoplasm of prostate (H)    3. Infection due to 2019 novel coronavirus      Patient seen today for follow up, last weeks PCR test found covid+, fairly asymptomatic, sats WNL, T 100.1, no distress, on room air, limited historian due to moderate cognitive limitations, states feeling great, wants to sleep, incontinent urine with no s/s retention.    Allergies, and PMH/PSH reviewed in EPIC today.  REVIEW OF SYSTEMS:  Limited secondary to cognitive impairment but today pt reports I feel great      Objective:   BP (!) 164/78   Pulse 76   Temp 97.7  F (36.5  C)   Resp 18   Ht 1.753 m (5' 9\")   Wt 69.4 kg (153 lb)   SpO2 94%   BMI 22.59 kg/m    GENERAL APPEARANCE:  in no distress, appears healthy  ENT:  Mouth and posterior oropharynx normal, moist mucous membranes  RESP:  lungs clear to auscultation   CV:  no edema  ABDOMEN:  bowel sounds normal  M/S:   Gait and station abnormal WC mobility  SKIN:  Inspection of skin and subcutaneous tissue baseline  NEURO:   Examination of sensation by touch normal  PSYCH:  oriented to self    [facility]. Please refer to them using [facility]Care Everywhere.    Assessment/Plan:  (F03.91) Senile dementia with behavioral disturbance (H)  (primary encounter diagnosis)  Comment: moderate cognitive limitations  Plan: staff to support as possible  -continue seroquel 25mg BID  -of note, trial wean was not successful    (C61) Malignant neoplasm of prostate (H)  Comment: historical Dx, incontinent  Plan: staff to change PRN  -plan for bladder scans if having abdominal pain    (U07.1) Infection due to 2019 novel coronavirus  Comment: new " Dx, asymptomatic except T 100.1  Plan: quarantine as possible  -considering to be self limiting at this time        Electronically signed by: TOBY Alejandro CNP

## 2022-01-10 NOTE — LETTER
"    1/10/2022        RE: Elias Mckee  [facility]  [address]  [address]  [place] [address]        [facility]    Chief Complaint   Patient presents with     Nursing Home Acute     HPI:  Elias Mckee is a 89 year old  (8/10/1931), who is being seen today for an episodic care visit at: [facility] [[id number]]. Today's concern is:   1. Senile dementia with behavioral disturbance (H)    2. Malignant neoplasm of prostate (H)    3. Infection due to 2019 novel coronavirus      Patient seen today for follow up, last weeks PCR test found covid+, fairly asymptomatic, sats WNL, T 100.1, no distress, on room air, limited historian due to moderate cognitive limitations, states feeling great, wants to sleep, incontinent urine with no s/s retention.    Allergies, and PMH/PSH reviewed in EPIC today.  REVIEW OF SYSTEMS:  Limited secondary to cognitive impairment but today pt reports I feel great      Objective:   BP (!) 164/78   Pulse 76   Temp 97.7  F (36.5  C)   Resp 18   Ht 1.753 m (5' 9\")   Wt 69.4 kg (153 lb)   SpO2 94%   BMI 22.59 kg/m    GENERAL APPEARANCE:  in no distress, appears healthy  ENT:  Mouth and posterior oropharynx normal, moist mucous membranes  RESP:  lungs clear to auscultation   CV:  no edema  ABDOMEN:  bowel sounds normal  M/S:   Gait and station abnormal WC mobility  SKIN:  Inspection of skin and subcutaneous tissue baseline  NEURO:   Examination of sensation by touch normal  PSYCH:  oriented to self    Labs done in SNF are in [facility]. Please refer to them using [employer]/Care Everywhere.    Assessment/Plan:  (F03.91) Senile dementia with behavioral disturbance (H)  (primary encounter diagnosis)  Comment: moderate cognitive limitations  Plan: staff to support as possible  -continue seroquel 25mg BID  -of note, trial wean was not successful    (C61) Malignant neoplasm of prostate (H)  Comment: historical Dx, incontinent  Plan: " staff to change PRN  -plan for bladder scans if having abdominal pain    (U07.1) Infection due to 2019 novel coronavirus  Comment: new Dx, asymptomatic except T 100.1  Plan: quarantine as possible  -considering to be self limiting at this time        Electronically signed by: TOBY Alejandro CNP             Sincerely,        TOBY Alejandro CNP

## 2022-01-14 NOTE — PROGRESS NOTES
Pt was seen for a regulatory LTC visit    Pt was diagnosed with COVID 19 one week ago  He did have a low grade to last week  He has generally been asymptomatic  Staff notes no acute concersn    This afternoon, he is seen sleeping in bed  He was not awakened by me  Respirations unlabored      Assessment     COVID 19 infection, diagnosed one week ago. Pt appears stable    Hs CVA with L hemiparesis    Dementia    PAF, s/p pacemaker placement, on chronic AC    DM type 2, stable, with recent HgbA1c of 6.0    Plan  Continue supportive tx  Monitor resp status  Routine lab monitoring

## 2022-01-24 NOTE — PROGRESS NOTES
"[facility] GERIATRICS    Chief Complaint   Patient presents with     Nursing Home Acute     HPI:  Elias Mckee is a 89 year old  (8/10/1931), who is being seen today for an episodic care visit at: [facility] ([facility]) [[id number]]. Today's concern is:   1. Moderate protein-calorie malnutrition (H)    2. Infection due to 2019 novel coronavirus    3. Senile dementia with behavioral disturbance (H)      Patient seen today for follow up, BMI 22.59, limited intake, moderate cognitive limitations, generally dislikes having cares given and briefs changed, prefers to sit in the dark and sleep, recent covid+ on 1/7 with temp 100.1, today no distress, on covid unit, afebrile, appears to be self resolving.    Allergies, and PMH/PSH reviewed in EPIC today.  REVIEW OF SYSTEMS:  Limited secondary to cognitive impairment but today pt reports Ahhh, I am fine    Objective:   /76   Pulse 88   Temp 98.1  F (36.7  C)   Resp 18   Ht 1.753 m (5' 9\")   Wt 69.4 kg (153 lb)   SpO2 94%   BMI 22.59 kg/m    GENERAL APPEARANCE:  in no distress, appears healthy  ENT:  Mouth and posterior oropharynx normal, moist mucous membranes  RESP:  lungs clear to auscultation   CV:  no edema  ABDOMEN:  bowel sounds normal  M/S:   Gait and station abnormal WC mobility  SKIN:  Inspection of skin and subcutaneous tissue baseline  NEURO:   Examination of sensation by touch normal  PSYCH:  oriented to self    Labs done in SNF are in [facility]. Please refer to them using [facility]/Care Everywhere.    Assessment/Plan:  (E44.0) Moderate protein-calorie malnutrition (H)  (primary encounter diagnosis)  Comment: BMI 22.59  Plan: staff to encourage intake  -dietary to follow weights    (U07.1) Infection due to 2019 novel coronavirus  Comment: covid+ on 1/7  Plan: asymptomatic, fever resolving  -plan to transfer back to own room on 3rd floor this week    (F03.91) Senile dementia with behavioral disturbance (H)  Comment: moderate " limitations, cycles from pleasant to agitatated  Plan: staff to support as will allow  -continue tramadol and seroquel  -follow up labs in 2-3 months      Electronically signed by: TOBY Alejandro CNP

## 2022-03-07 PROBLEM — I63.9 CVA (CEREBRAL VASCULAR ACCIDENT) (H): Status: ACTIVE | Noted: 2022-01-01

## 2022-03-07 NOTE — PROGRESS NOTES
"[facility] GERIATRICS  [place] Medical Record Number: [id number]  Chief Complaint   Patient presents with     RECHECK     HPI: Elias Mckee is a 89 year old (8/10/1931), who is being seen today for an episodic care visit at: [facility] ([facility]) [[id number]]. Today's concern is:   1. Hemiparesis affecting left side as late effect of cerebrovascular accident (H)    2. Type 2 diabetes mellitus with stage 3a chronic kidney disease, without long-term current use of insulin (H)    3. Benign hypertension with CKD (chronic kidney disease) stage III (H)    4. Neck pain      Patient seen today on request, chronic L mild hemiparesis, recent A1C 6.0, SBP's in the 130 range without medication control, now having increased complaint of pain in neck again, repeats \"ow, ow\" over and over, increased agitation with cares, of note increased tramadol back in Sept 2021 with good results.     Allergies and PMH/PSH reviewed in EPIC today.    REVIEW OF SYSTEMS:  Unobtainable secondary to cognitive impairment.     Objective:   /74   Pulse 75   Temp 97.3  F (36.3  C)   Resp 16   Ht 1.753 m (5' 9\")   Wt 68.5 kg (151 lb)   SpO2 95%   BMI 22.30 kg/m    Exam:  GENERAL APPEARANCE:  appears healthy, oriented  ENT:  Mouth and posterior oropharynx normal, moist mucous membranes  RESP:  lungs clear to auscultation   CV:  no edema  ABDOMEN:  bowel sounds normal  M/S:   Gait and station abnormal WC mobility  SKIN:  Inspection of skin and subcutaneous tissue baseline  NEURO:   Examination of sensation by touch normal  PSYCH:  oriented X 3, memory impaired     Labs:   Labs done in SNF are in [facility]. Please refer to them using [employer]/Care Everywhere.    Assessment/Plan:  (I69.354) Hemiparesis affecting left side as late effect of cerebrovascular accident (H)  (primary encounter diagnosis)  Comment: mild L hemiparesis  Plan: staff to support as indicated  -continue xarelto  -follow up neurology PRN    (E11.22,  N18.31) " Type 2 diabetes mellitus with stage 3a chronic kidney disease, without long-term current use of insulin (H)  Comment: A1C on 1/25 was 6.0  Plan: follow up A1C in 3-6 months  -continue glargine 22u at bedtime, BG checks as scheduled    (I12.9,  N18.30) Benign hypertension with CKD (chronic kidney disease) stage III (H)  Comment: SBP's in the 130 range  Plan: continue without medication intervention  -staff to check VS as scheduled    (M54.2) Neck pain  Comment: previous complaint now exacerbating  Plan:   -change tramadol from TID to 100mg QID scheduled  -if not improving may need to change to oxycodone        Electronically signed by: TOBY Alejandro CNP

## 2022-03-11 NOTE — LETTER
3/11/2022        RE: [facility]  [address]  [address]  [place] [address]        [facility] GERIATRICS  Chief Complaint   Patient presents with     Annual Comprehensive Nursing Home     [place] Medical Record Number:  [id number]  Place of Service where encounter took place:  [facility] (LTC) [[id number]]    HPI:    Elias Mckee  is a 89 year old  (8/10/1931), who is being seen today for an annual comprehensive visit. HPI information obtained from: facility chart records, facility staff, patient report and [facility] chart review.   1. Cerebrovascular accident (CVA), unspecified mechanism (H)    2. Hemiparesis affecting left side as late effect of cerebrovascular accident (H)    3. Type II diabetes mellitus with peripheral circulatory disorder (H)    4. Hypothyroidism due to acquired atrophy of thyroid    5. Hypertension, unspecified type      Patient seen today in room, pleasant, recently increased tramadol from TID to QID for neck pain, no longer yelling out, more calm and relaxed, previous CVA with L hemiparesis, at baseline with limited cognition and L side strength, recent A1C 6.0, TSH 3.68, SBP's in the 120's, overall appears healthy, no distress when not in pain.     ALLERGIES: Terazosin  PAST MEDICAL HISTORY:   Past Medical History:   Diagnosis Date     Acute, but ill-defined, cerebrovascular disease 1-2008    Left hemiparesis, left side neglect     Atrial fibrillation (H)      Benign hypertension with CKD (chronic kidney disease) stage III (H) 6/5/2017     Cancer (H)      Cardiac pacemaker in situ, Placed on 7/20/18 7/23/2018     Central retinal vein occlusion, right eye 11/15/2017     Chronic atrial fibrillation (H) 6/5/2017     Chronic ischemic heart disease, unspecified      CKD (chronic kidney disease) stage 3, GFR 30-59 ml/min, est GFR: 10/30/17: 46,, 12/14/17: 59 2/29/2012     Cognitive deficits as late effect of  cerebrovascular disease 6/5/2017     Coronary artery disease      CVA (cerebral infarction)      Dysphagia due to recent cerebrovascular accident (CVA) 6/5/2017     Elevated cholesterol      Essential hypertension, benign      H/O prostate cancer 7/6/2017     H/O: CVA (cerebrovascular accident) 6/5/2017     Hemiparesis affecting left side as late effect of cerebrovascular accident (H) 6/5/2017     Hyperlipidaemia      Loss of weight 10/2/2018     MEDICAL HISTORY OF - 1- 2008    V fib arrest      Mobitz type I Wenckebach atrioventricular block, 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     Myocardial infarction (H)      Onychomycosis 6/5/2017     Other and unspecified hyperlipidemia      PVD (peripheral vascular disease) (H) 12/12/2017     Type 2 diabetes mellitus with diabetic peripheral angiopathy with gangrene (H) 12/12/2017     Type 2 diabetes mellitus with stage 3 chronic kidney disease (H) 6/5/2017     Type II diabetes mellitus with peripheral circulatory disorder (H) 12/12/2017     Type II or unspecified type diabetes mellitus without mention of complication, not stated as uncontrolled 1-2008      PAST SURGICAL HISTORY:  has a past surgical history that includes seed implantation (2002); Phacoemulsification clear cornea with standard intraocular lens implant (Right, 10/7/2014); and Vitrectomy anterior (Right, 10/7/2014).  IMMUNIZATIONS:  Immunization History   Administered Date(s) Administered     COVID-19,PF,Moderna 01/08/2021, 02/05/2021, 10/29/2021     Flu, Unspecified 12/13/2006     Influenza (High Dose) 3 valent vaccine 10/05/2017, 11/10/2021     Influenza (IIV3) PF 01/22/2013, 10/15/2018, 10/16/2019, 11/03/2020     Pneumo Conj 13-V (2010&after) 10/19/2017     Pneumococcal 23 valent 09/10/2008     Pneumococcal, Unspecified 04/23/2004     TD (ADULT, 7+) 09/10/2008     Tdap (Adult) Unspecified Formulation 04/23/2004, 11/12/2019     Above immunizations pulled from [facility]. MIIC and facility records also reconciled. Outstanding  information sent to  to update [facility].  Future immunizations are not needed at this point as all recommended immunizations are up to date.       Current Outpatient Medications:      ACE/ARB/ARNI NOT PRESCRIBED (INTENTIONAL), Please choose reason not prescribed from choices below., Disp: , Rfl:      ACETAMINOPHEN PO, Take 1,000 mg by mouth 3 times daily For pain, Disp: , Rfl:      Atorvastatin Calcium (LIPITOR PO), Take 40 mg by mouth At Bedtime, Disp: , Rfl:      atropine 1 % ophthalmic solution, Place 1 drop into the right eye 2 times daily, Disp: , Rfl:      cholecalciferol (VITAMIN D) 1000 UNIT tablet, Take 2,000 Units by mouth daily, Disp: , Rfl:      Fluticasone Propionate (FLONASE NA), Spray 2 sprays into both nostrils daily, Disp: , Rfl:      Insulin Glargine (BASAGLAR KWIKPEN SC), Inject 22 Units Subcutaneous At Bedtime May use 2 units as needed to prime pen before each dose., Disp: , Rfl:      latanoprost (XALATAN) 0.005 % ophthalmic solution, Place 1 drop into the right eye At Bedtime, Disp: , Rfl:      LEVOTHYROXINE SODIUM PO, Take 25 mcg by mouth daily, Disp: , Rfl:      polyethylene glycol (MIRALAX/GLYCOLAX) powder, Take 17 g by mouth daily , Disp: , Rfl:      prednisoLONE acetate (PRED FORTE) 1 % ophthalmic susp, Place 1 drop into the right eye 3 times daily, Disp: , Rfl:      QUEtiapine (SEROQUEL) 25 MG tablet, Take 1 tablet (25 mg) by mouth 2 times daily, Disp: 30 tablet, Rfl: 11     rivaroxaban ANTICOAGULANT (XARELTO ANTICOAGULANT) 15 MG TABS tablet, Take 1 tablet (15 mg) by mouth daily (with dinner), Disp: 30 tablet, Rfl: 0     tamsulosin (FLOMAX) 0.4 MG capsule, Take 1 capsule (0.4 mg) by mouth daily, Disp: 60 capsule, Rfl: 0     timolol, PF, (TIMOPTIC OCUDOSE) 0.5 % ophthalmic solution, Place 1 drop into the right eye 2 times daily, Disp: , Rfl:      traMADol (ULTRAM) 50 MG tablet, Take 2 tablets (100 mg) by mouth 4 times daily, Disp: 120 tablet, Rfl: 5     Case  "Management:  I have reviewed the facility/SNF care plan/MDS, including the falls risk, nutrition and pain screening. I also reviewed the current immunizations, and preventive care.. Future cancer screening is not clinically indicated secondary to age/goals of care Patient's desire to return to the community is present, but is not able due to care needs . Current Level of Care is appropriate.    Advance Directive Discussion:    I reviewed the current advanced directives as reflected in EPIC, the POLST and the facility chart, and verified the congruency of orders.   I did not due to cognitive impairment review the advance directives with the resident. Patient's goal is pain control and comfort.    Team Discussion:  I communicated with the appropriate disciplines involved with the Plan of Care:   Nursing    Information reviewed:  Medications, vital signs, orders, and nursing notes.    ROS:  4 point ROS including Respiratory, CV, GI and , other than that noted in the HPI,  is negative    Vitals:  /70   Pulse 90   Temp 97.3  F (36.3  C)   Resp 16   Ht 1.753 m (5' 9\")   Wt 66.7 kg (147 lb)   SpO2 90%   BMI 21.71 kg/m   Body mass index is 21.71 kg/m .  Exam:  GENERAL APPEARANCE:  in no distress, appears healthy  ENT:  Mouth and posterior oropharynx normal, moist mucous membranes, [place]  RESP:  lungs clear to auscultation , no respiratory distress  CV:  regular rate and rhythm, no murmur, rub, or gallop, no edema  ABDOMEN:  no guarding or rebound, bowel sounds normal  M/S:   Gait and station abnormal transfer assist, WC mobility  SKIN:  Inspection of skin and subcutaneous tissue baseline, Palpation of skin and subcutaneous tissue baseline  NEURO:   Cranial nerves 2-12 are normal tested and grossly at patient's baseline, Examination of sensation by touch normal  PSYCH:  oriented X 3, memory impaired      Lab/Diagnostic data:   Labs done in SNF are in [place] EPIC. Please refer to them using EPIC/Care " Everywhere.    ASSESSMENT/PLAN  (I63.9) Cerebrovascular accident (CVA), unspecified mechanism (H)  (primary encounter diagnosis)  (I69.354) Hemiparesis affecting left side as late effect of cerebrovascular accident (H)  Comment: post CVA, L side weakness  Plan: staff to assist with ADL's  -continue lipitor and xarelto  -follow up neurology PRN    (E11.51) Type II diabetes mellitus with peripheral circulatory disorder (H)  Comment: a1C 6.0  Plan: continue glargine 22u   -recheck A1C on 3/14    (E03.4) Hypothyroidism due to acquired atrophy of thyroid  Comment: TSH was 3.68  Plan: continue levothyroxine 25mcg  -TSH on 3/14; goal to discontinue levo is possible    (I10) Hypertension, unspecified type  Comment: SBP's in the 120's  Plan: no medication intervention indicated  -staff to check VS as scheduled    Electronically signed by:  TOBY Alejandro CNP             Sincerely,        TOBY Alejandro CNP

## 2022-03-11 NOTE — PROGRESS NOTES
[facility] GERIATRICS  Chief Complaint   Patient presents with     Annual Comprehensive Nursing Home     [place] Medical Record Number:  [id number]  Place of Service where encounter took place:  [facility] ([facility]) [[id number]]    HPI:    Elias Mckee  is a 89 year old  (8/10/1931), who is being seen today for an annual comprehensive visit. HPI information obtained from: facility chart records, facility staff, patient report and [facility] chart review.   1. Cerebrovascular accident (CVA), unspecified mechanism (H)    2. Hemiparesis affecting left side as late effect of cerebrovascular accident (H)    3. Type II diabetes mellitus with peripheral circulatory disorder (H)    4. Hypothyroidism due to acquired atrophy of thyroid    5. Hypertension, unspecified type      Patient seen today in room, pleasant, recently increased tramadol from TID to QID for neck pain, no longer yelling out, more calm and relaxed, previous CVA with L hemiparesis, at baseline with limited cognition and L side strength, recent A1C 6.0, TSH 3.68, SBP's in the 120's, overall appears healthy, no distress when not in pain.     ALLERGIES: Terazosin  PAST MEDICAL HISTORY:   Past Medical History:   Diagnosis Date     Acute, but ill-defined, cerebrovascular disease 1-2008    Left hemiparesis, left side neglect     Atrial fibrillation (H)      Benign hypertension with CKD (chronic kidney disease) stage III (H) 6/5/2017     Cancer (H)      Cardiac pacemaker in situ, Placed on 7/20/18 7/23/2018     Central retinal vein occlusion, right eye 11/15/2017     Chronic atrial fibrillation (H) 6/5/2017     Chronic ischemic heart disease, unspecified      CKD (chronic kidney disease) stage 3, GFR 30-59 ml/min, est GFR: 10/30/17: 46,, 12/14/17: 59 2/29/2012     Cognitive deficits as late effect of cerebrovascular disease 6/5/2017     Coronary artery disease      CVA (cerebral infarction)      Dysphagia due to recent cerebrovascular accident  (CVA) 6/5/2017     Elevated cholesterol      Essential hypertension, benign      H/O prostate cancer 7/6/2017     H/O: CVA (cerebrovascular accident) 6/5/2017     Hemiparesis affecting left side as late effect of cerebrovascular accident (H) 6/5/2017     Hyperlipidaemia      Loss of weight 10/2/2018     MEDICAL HISTORY OF - 1- 2008    V fib arrest      Mobitz type I Wenckebach atrioventricular block, 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     Myocardial infarction (H)      Onychomycosis 6/5/2017     Other and unspecified hyperlipidemia      PVD (peripheral vascular disease) (H) 12/12/2017     Type 2 diabetes mellitus with diabetic peripheral angiopathy with gangrene (H) 12/12/2017     Type 2 diabetes mellitus with stage 3 chronic kidney disease (H) 6/5/2017     Type II diabetes mellitus with peripheral circulatory disorder (H) 12/12/2017     Type II or unspecified type diabetes mellitus without mention of complication, not stated as uncontrolled 1-2008      PAST SURGICAL HISTORY:  has a past surgical history that includes seed implantation (2002); Phacoemulsification clear cornea with standard intraocular lens implant (Right, 10/7/2014); and Vitrectomy anterior (Right, 10/7/2014).  IMMUNIZATIONS:  Immunization History   Administered Date(s) Administered     COVID-19,PF,Moderna 01/08/2021, 02/05/2021, 10/29/2021     Flu, Unspecified 12/13/2006     Influenza (High Dose) 3 valent vaccine 10/05/2017, 11/10/2021     Influenza (IIV3) PF 01/22/2013, 10/15/2018, 10/16/2019, 11/03/2020     Pneumo Conj 13-V (2010&after) 10/19/2017     Pneumococcal 23 valent 09/10/2008     Pneumococcal, Unspecified 04/23/2004     TD (ADULT, 7+) 09/10/2008     Tdap (Adult) Unspecified Formulation 04/23/2004, 11/12/2019     Above immunizations pulled from [facility]. MIIC and facility records also reconciled. Outstanding information sent to  to update [facility].  Future immunizations are not needed at this point as all recommended immunizations  are up to date.       Current Outpatient Medications:      ACE/ARB/ARNI NOT PRESCRIBED (INTENTIONAL), Please choose reason not prescribed from choices below., Disp: , Rfl:      ACETAMINOPHEN PO, Take 1,000 mg by mouth 3 times daily For pain, Disp: , Rfl:      Atorvastatin Calcium (LIPITOR PO), Take 40 mg by mouth At Bedtime, Disp: , Rfl:      atropine 1 % ophthalmic solution, Place 1 drop into the right eye 2 times daily, Disp: , Rfl:      cholecalciferol (VITAMIN D) 1000 UNIT tablet, Take 2,000 Units by mouth daily, Disp: , Rfl:      Fluticasone Propionate (FLONASE NA), Spray 2 sprays into both nostrils daily, Disp: , Rfl:      Insulin Glargine (BASAGLAR KWIKPEN SC), Inject 22 Units Subcutaneous At Bedtime May use 2 units as needed to prime pen before each dose., Disp: , Rfl:      latanoprost (XALATAN) 0.005 % ophthalmic solution, Place 1 drop into the right eye At Bedtime, Disp: , Rfl:      LEVOTHYROXINE SODIUM PO, Take 25 mcg by mouth daily, Disp: , Rfl:      polyethylene glycol (MIRALAX/GLYCOLAX) powder, Take 17 g by mouth daily , Disp: , Rfl:      prednisoLONE acetate (PRED FORTE) 1 % ophthalmic susp, Place 1 drop into the right eye 3 times daily, Disp: , Rfl:      QUEtiapine (SEROQUEL) 25 MG tablet, Take 1 tablet (25 mg) by mouth 2 times daily, Disp: 30 tablet, Rfl: 11     rivaroxaban ANTICOAGULANT (XARELTO ANTICOAGULANT) 15 MG TABS tablet, Take 1 tablet (15 mg) by mouth daily (with dinner), Disp: 30 tablet, Rfl: 0     tamsulosin (FLOMAX) 0.4 MG capsule, Take 1 capsule (0.4 mg) by mouth daily, Disp: 60 capsule, Rfl: 0     timolol, PF, (TIMOPTIC OCUDOSE) 0.5 % ophthalmic solution, Place 1 drop into the right eye 2 times daily, Disp: , Rfl:      traMADol (ULTRAM) 50 MG tablet, Take 2 tablets (100 mg) by mouth 4 times daily, Disp: 120 tablet, Rfl: 5     Case Management:  I have reviewed the facility/SNF care plan/MDS, including the falls risk, nutrition and pain screening. I also reviewed the current  "immunizations, and preventive care.. Future cancer screening is not clinically indicated secondary to age/goals of care Patient's desire to return to the community is present, but is not able due to care needs . Current Level of Care is appropriate.    Advance Directive Discussion:    I reviewed the current advanced directives as reflected in EPIC, the POLST and the facility chart, and verified the congruency of orders.   I did not due to cognitive impairment review the advance directives with the resident. Patient's goal is pain control and comfort.    Team Discussion:  I communicated with the appropriate disciplines involved with the Plan of Care:   Nursing    Information reviewed:  Medications, vital signs, orders, and nursing notes.    ROS:  4 point ROS including Respiratory, CV, GI and , other than that noted in the HPI,  is negative    Vitals:  /70   Pulse 90   Temp 97.3  F (36.3  C)   Resp 16   Ht 1.753 m (5' 9\")   Wt 66.7 kg (147 lb)   SpO2 90%   BMI 21.71 kg/m   Body mass index is 21.71 kg/m .  Exam:  GENERAL APPEARANCE:  in no distress, appears healthy  ENT:  Mouth and posterior oropharynx normal, moist mucous membranes, [place]  RESP:  lungs clear to auscultation , no respiratory distress  CV:  regular rate and rhythm, no murmur, rub, or gallop, no edema  ABDOMEN:  no guarding or rebound, bowel sounds normal  M/S:   Gait and station abnormal transfer assist, WC mobility  SKIN:  Inspection of skin and subcutaneous tissue baseline, Palpation of skin and subcutaneous tissue baseline  NEURO:   Cranial nerves 2-12 are normal tested and grossly at patient's baseline, Examination of sensation by touch normal  PSYCH:  oriented X 3, memory impaired      Lab/Diagnostic data:   [facility]. Please refer to them using [facility] [employer]/Care Everywhere.    ASSESSMENT/PLAN  (I63.9) Cerebrovascular accident (CVA), unspecified mechanism (H)  (primary encounter diagnosis)  (I69.354) Hemiparesis " affecting left side as late effect of cerebrovascular accident (H)  Comment: post CVA, L side weakness  Plan: staff to assist with ADL's  -continue lipitor and xarelto  -follow up neurology PRN    (E11.51) Type II diabetes mellitus with peripheral circulatory disorder (H)  Comment: a1C 6.0  Plan: continue glargine 22u   -recheck A1C on 3/14    (E03.4) Hypothyroidism due to acquired atrophy of thyroid  Comment: TSH was 3.68  Plan: continue levothyroxine 25mcg  -TSH on 3/14; goal to discontinue levo is possible    (I10) Hypertension, unspecified type  Comment: SBP's in the 120's  Plan: no medication intervention indicated  -staff to check VS as scheduled    Electronically signed by:  TOBY Alejandro CNP

## 2022-03-14 PROBLEM — F03.90 SENILE DEMENTIA, WITHOUT BEHAVIORAL DISTURBANCE (H): Status: ACTIVE | Noted: 2021-01-18

## 2022-03-14 NOTE — PROGRESS NOTES
"[facility] GERIATRICS    Chief Complaint   Patient presents with     Nursing Home Acute     HPI:  Elias Mckee is a 89 year old  (8/10/1931), who is being seen today for an episodic care visit at: [facility] ([facility]) [[id number]]. Today's concern is:   1. 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 Generalized pain    3. Hypothyroidism due to acquired atrophy of thyroid      Patient seen today with staff nurse, lying in broda chair, requires feeding, intake appropriate, moderate to severe cognitive limitations, verbal response limited to a few words, states pain in back and L shoulder, will say ow, sometimes yells out a string of profanities if displeased with level of assistance, TSH today 3.43, overall appears healthy, mild cognitive distress at times.    Allergies, and PMH/PSH reviewed in EPIC today.  REVIEW OF SYSTEMS:  4 point ROS including Respiratory, CV, GI and , other than that noted in the HPI,  is negative    Objective:   /70   Pulse 76   Temp 97.7  F (36.5  C)   Resp 16   Ht 1.753 m (5' 9\")   Wt 66.7 kg (147 lb)   SpO2 94%   BMI 21.71 kg/m    GENERAL APPEARANCE:  in no distress, appears healthy  ENT:  Mouth and posterior oropharynx normal, moist mucous membranes  RESP:  lungs clear to auscultation   CV:  no edema  ABDOMEN:  bowel sounds normal  M/S:   Gait and station abnormal WC mobiltiy  SKIN:  Inspection of skin and subcutaneous tissue baseline  NEURO:   Examination of sensation by touch normal  PSYCH:  oriented X 3, memory impaired     Labs done in SNF are in [facility]. Please refer to them using [facility]/Care Everywhere.    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)  (primary encounter diagnosis)  Comment: moderate to severe stage  Plan: staff to support as possible  -continue seroquel 25mg BID    (R52) Generalized pain  Comment: chronic complaint of pain, does appear that pain is present  Plan: already increased " tramadol from TID to QID  -staff to support as possible  -may need to trade tramadol for opioid    (E03.4) Hypothyroidism due to acquired atrophy of thyroid  Comment: TSH today 3.43, was 3.68 a year ago  Plan: discontinue levothyroxine   -follow up TSH in 6-12 months        Electronically signed by: TOBY Alejandro CNP

## 2022-03-14 NOTE — LETTER
"    3/14/2022        RE: Elias Mckee  [facility]  [address]  [address]  [place] [address]        [facility] GERIATRICS    Chief Complaint   Patient presents with     Nursing Home Acute     HPI:  Elias Mckee is a 89 year old  (8/10/1931), who is being seen today for an episodic care visit at: [facility] ([facility]) [[id number]]. Today's concern is:   1. 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 Generalized pain    3. Hypothyroidism due to acquired atrophy of thyroid      Patient seen today with staff nurse, lying in broda chair, requires feeding, intake appropriate, moderate to severe cognitive limitations, verbal response limited to a few words, states pain in back and L shoulder, will say ow, sometimes yells out a string of profanities if displeased with level of assistance, TSH today 3.43, overall appears healthy, mild cognitive distress at times.    Allergies, and PMH/PSH reviewed in EPIC today.  REVIEW OF SYSTEMS:  4 point ROS including Respiratory, CV, GI and , other than that noted in the HPI,  is negative    Objective:   /70   Pulse 76   Temp 97.7  F (36.5  C)   Resp 16   Ht 1.753 m (5' 9\")   Wt 66.7 kg (147 lb)   SpO2 94%   BMI 21.71 kg/m    GENERAL APPEARANCE:  in no distress, appears healthy  ENT:  Mouth and posterior oropharynx normal, moist mucous membranes  RESP:  lungs clear to auscultation   CV:  no edema  ABDOMEN:  bowel sounds normal  M/S:   Gait and station abnormal WC mobiltiy  SKIN:  Inspection of skin and subcutaneous tissue baseline  NEURO:   Examination of sensation by touch normal  PSYCH:  oriented X 3, memory impaired     Labs done in SNF are in [facility]. Please refer to them using [employer]/Care Everywhere.    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)  (primary encounter diagnosis)  Comment: moderate to severe stage  Plan: staff to support as possible  -continue seroquel " 25mg BID    (R52) Generalized pain  Comment: chronic complaint of pain, does appear that pain is present  Plan: already increased tramadol from TID to QID  -staff to support as possible  -may need to trade tramadol for opioid    (E03.4) Hypothyroidism due to acquired atrophy of thyroid  Comment: TSH today 3.43, was 3.68 a year ago  Plan: discontinue levothyroxine   -follow up TSH in 6-12 months        Electronically signed by: TOBY Alejandro CNP             Sincerely,        TOBY Alejandro CNP

## 2022-04-25 NOTE — PROGRESS NOTES
This patient's medication list and chart were reviewed as part of the service provided by [facility].    Assessment/Recommendations:  1. (Pain):  Noted patient is currently receiving > than recommended max tramadol daily dose due to his age (for patients >74yo, recommended max is 300mg daily).  Per chart review, pain has contributed to agitation.  Agree with NP to consider switch to alternative opioid, such as oxycodone and monitor pain, agitation, potential side effects.  2. (Hypothyroidism):  Noted d'c of levothyroxine on 3/14/22.  Consider recheck TSH in 8 weeks from d'c (around May 9th) to follow-up on this change (vs waiting 3-6mo).  3. (Dementia w/ behavioral disturbance):  Following potential change in tramadol to oxycodone, in future, if patient without upsetting hallucinations/delusions, and not at risk of harming self or others, may consider reduction in quetiapine to 12.5mg in the morning and 25mg in the evening and monitor target behaviors.      Andria Vo, Pharm.D.,[facility]  Board Certified Geriatric Pharmacist  Medication Therapy Management Pharmacist  [phone]

## 2022-04-27 NOTE — LETTER
April 27, 2022    Important [employer] Information    [facility]  [address] [place] [address]   2  [facility] [address]    Your New Care Coordinator  Dear Miranda,  My name is Yolanda Sandoval RN and I am your new Care Coordinator. You may reach me by calling [phone]. I will be in touch with you shortly to address any questions you may have.   I have also been in contact with Vitaliy Champagne NP, your previous care coordinator, to ensure a smooth transition.  Questions?  Call me at [phone] Monday-Friday between 8am and 5pm. TTY: [phone]. I look forward to working with you as a [employer]  member.  Sincerely,    [email or web address]  Phone: [phone]      [employer]    cc: member records                                                                                                                        CB5 ([facility]) (5-2020)    Civil Rights Notice  Discrimination is against the law. [employer] does not discriminate on the basis of any of the following:    Race    Color    National Origin    Creed    Mormonism    Age    Public Assistance Status    Receipt of Health Care Services    Disability (including physical or mental impairment)    Sex (including sex stereotypes and gender identity)    Marital Status    Political Beliefs    Medical Condition    Genetic Information    Sexual Orientation    Claims Experience    Medical History    Health Status    Auxiliary Aids and Services:  [employer] provides auxiliary aids and services, like qualified interpreters or information in accessible formats, free of charge and in a timely manner, to ensure an equal opportunity to participate in our health care programs. Contact [employer] at [employer]/contact medicaid or call [phone] (toll free); TTY:711 or at [employer]/contactmedicaid.    Language Assistance Services:  [employer] provides translated documents and spoken language  interpreting, free of charge and in a timely manner, when language assistance services are necessary to ensure limited English speakers have meaningful access to our information and services. Contact [employer] at [phone] (toll free); TTY: 711 or [email or web address].     Civil Rights Complaints  You have the right to file a discrimination complaint if you believe you were treated in a discriminatory way by [employer]. You may contact any of the following four agencies directly to file a discrimination complaint.        U.S. Department of Health and Human Services  Office for Civil Rights (OCR)  You have the right to file a complaint with the OCR, a federal agency, if you believe you have been discriminated against because of any of the following:    Race    Disability    Color    Sex    National Origin    Age    Yazdanism (in some cases)    Contact the OCR directly to file a complaint:         Director         U.S. Department of Health and Human Services  Office for Civil Rights         [address] [place] [address]         Customer Response Center: Toll-free: [phone]          TDD: [phone]         Email: [email or web address]    [place] Department of Human Rights (MDHR)  In [place], you have the right to file a complaint with the MDHR if you believe you have been discriminated against because of any of the following:      Race    Color    National Origin    Yazdanism    Creed    Sex    Sexual Orientation    Marital Status    Public Assistance Status    Disability    Contact the MDHR directly to file a complaint:         [place] Department of Human Rights         [address] [place] [address]         [phone] (voice)          [phone] (toll free)         718 or [phone] ([place] Relay)         [phone] (Fax)         [email or web address] (Email)     [place] Department of Human Services  (DHS)  You have the right to file a complaint with [facility] if you believe you have been discriminated against in our health care programs because of any of the following:    Race    Color    National Origin    Creed    Restorationist    Age    Public Assistance Status    Receipt of Health Care Services    Disability (including physical or mental impairment)    Sex (including sex stereotypes and gender identity)    Marital Status    Political Beliefs    Medical Condition    Genetic Information    Sexual Orientation    Claims Experience    Medical History    Health Status    Complaints must be in writing and filed within 180 days of the date you discovered the alleged discrimination. The complaint must contain your name and address and describe the discrimination you are complaining about. After we get your complaint, we will review it and notify you in writing about whether we have authority to investigate. If we do, we will investigate the complaint.      [facility] will notify you in writing of the investigation s outcome. You have a right to appeal the outcome if you disagree with the decision. To appeal, you must send a written request to have [facility] review the investigation outcome. Be brief and state why you disagree with the decision. Include additional information you think is important.      If you file a complaint in this way, the people who work for the agency named in the complaint cannot retaliate against you. This means they cannot punish you in any way for filing a complaint. Filing a complaint in this way does not stop you from seeking out other legal or administration actions.     Contact [facility] directly to file a discrimination complaint:        Civil Rights Coordinator        [place] Department of Human Services        Equal Opportunity and Access Division        [address] [address]        [place] [address] [phone] (voice) or use your preferred relay service     [employer] Complaint Notice   You have the right  to file a complaint with [employer] if you believe you have been discriminated against because of any of the following:       Medical condition    Health status    Receipt of health care services    Claims experience    Medical history    Genetic information    Disability (including mental or physical impairment)    Marital status    Age    Sex (including sex stereotypes and gender identity)    Sexual orientation    National origin    Race    Color    Anglican    Creed    Public assistance status    Political beliefs    You can file a complaint and ask for help in filing a complaint in person or by mail, phone, fax, or email at:     [employer] Civil Rights Coordinator  [facility] [employer]  [address], [place] [address] [phone] (voice and fax) or JKH:[email or web address]  Email: [email or web address]    American Indians can begin or continue to use [facility] and [facility] ([facility]) clinics. We will not require prior approval or impose any conditions for you to get services at these clinics. For elders age 65 years and older this includes Elderly Waiver (EW) services accessed through the [place]. If a doctor or other provider in a [place] or [facility] clinic refers you to a provider in our network, we will not require you to see your primary care provider prior to the referral.

## 2022-04-27 NOTE — TELEPHONE ENCOUNTER
[facility] Care Coordination Contact    Internal CC change effective 05/01/2022.  Mailed member CC Change letter.  Additional tasks to be completed by CMS include: update database & Epic and create member files on [employer].    Saskia Almonte  Care Management Specialist  [facility]  [phone]

## 2022-04-27 NOTE — LETTER
"    4/27/2022        RE: Elias Mckee  [facility]  [address]  [address]  [place] [address]        [facility] GERIATRICS    Chief Complaint   Patient presents with     Nursing Home Acute     HPI:  Elias Mckee is a 89 year old  (8/10/1931), who is being seen today for an episodic care visit at: [facility] ([facility]) [[id number]]. Today's concern is:   1. Senile dementia without behavioral disturbance (H)    2. Generalized pain    3. Hypothyroidism, unspecified type      Patient seen for follow up and pharmacy recommendations, moderate to sever cognitive limitations, attempt to separate mental health from potential pain difficult, trial reduction in tramadol caused increased screaming out and complaints to stay in bed, also previously titrated seroquel down and was not effective for aggression and hospitality, considering oxycodone but waiting for exacerbation first, also recent TSH was 3.43 and DC'd levothyroxine, overall no distress this am.    Allergies, and PMH/PSH reviewed in EPIC today.  REVIEW OF SYSTEMS:  Limited secondary to cognitive impairment but today pt reports for me to kiss off    Objective:   /75   Pulse 70   Temp 97.5  F (36.4  C)   Resp 18   Ht 1.753 m (5' 9\")   Wt 68 kg (150 lb)   SpO2 96%   BMI 22.15 kg/m    GENERAL APPEARANCE:  in no distress, appears healthy  ENT:  Mouth and posterior oropharynx normal, moist mucous membranes  RESP:  lungs clear to auscultation   CV:  no edema  ABDOMEN:  bowel sounds normal  M/S:   Gait and station abnormal full ADL assist  SKIN:  Inspection of skin and subcutaneous tissue baseline  NEURO:   Examination of sensation by touch normal  PSYCH:  oriented to self    Labs done in SNF are in [facility]. Please refer to them using [employer]/Care Everywhere.    Assessment/Plan:  (F03.90) Senile dementia without behavioral disturbance (H)  (primary encounter diagnosis)  (R52) Generalized pain  Comment: pain vs " dementia; separate trial reductions of tramadol resulted complaints of pain and seroquel resulted in hostility/aggression  Plan: continue tramadol 100mg QID  -consider changing to oxycodone when not effective  -continue seroquel 25mg BID  -staff to monitor    (E03.9) Hypothyroidism, unspecified type  Comment: TSH on 3/11 was 3.43  Plan: DC'd levothryoxine  -BMP, TSH on 5/9  -plan to follow up in 2-3 weeks RE: lab results and status        Electronically signed by: TOBY Alejandro CNP             Sincerely,        TOBY Alejandro CNP

## 2022-04-27 NOTE — PROGRESS NOTES
"[facility] GERIATRICS    Chief Complaint   Patient presents with     Nursing Home Acute     HPI:  Elias Mckee is a 89 year old  (8/10/1931), who is being seen today for an episodic care visit at: [facility] ([facility]) [[id number]]. Today's concern is:   1. Senile dementia without behavioral disturbance (H)    2. Generalized pain    3. Hypothyroidism, unspecified type      Patient seen for follow up and pharmacy recommendations, moderate to sever cognitive limitations, attempt to separate mental health from potential pain difficult, trial reduction in tramadol caused increased screaming out and complaints to stay in bed, also previously titrated seroquel down and was not effective for aggression and hospitality, considering oxycodone but waiting for exacerbation first, also recent TSH was 3.43 and DC'd levothyroxine, overall no distress this am.    Allergies, and PMH/PSH reviewed in EPIC today.  REVIEW OF SYSTEMS:  Limited secondary to cognitive impairment but today pt reports for me to kiss off    Objective:   /75   Pulse 70   Temp 97.5  F (36.4  C)   Resp 18   Ht 1.753 m (5' 9\")   Wt 68 kg (150 lb)   SpO2 96%   BMI 22.15 kg/m    GENERAL APPEARANCE:  in no distress, appears healthy  ENT:  Mouth and posterior oropharynx normal, moist mucous membranes  RESP:  lungs clear to auscultation   CV:  no edema  ABDOMEN:  bowel sounds normal  M/S:   Gait and station abnormal full ADL assist  SKIN:  Inspection of skin and subcutaneous tissue baseline  NEURO:   Examination of sensation by touch normal  PSYCH:  oriented to self    Labs done in SNF are in [facility]. Please refer to them using [facility]/Care Everywhere.    Assessment/Plan:  (F03.90) Senile dementia without behavioral disturbance (H)  (primary encounter diagnosis)  (R52) Generalized pain  Comment: pain vs dementia; separate trial reductions of tramadol resulted complaints of pain and seroquel resulted in hostility/aggression  Plan: " continue tramadol 100mg QID  -consider changing to oxycodone when not effective  -continue seroquel 25mg BID  -staff to monitor    (E03.9) Hypothyroidism, unspecified type  Comment: TSH on 3/11 was 3.43  Plan: STEVEN'd levothryoxine  -BMP, TSH on 5/9  -plan to follow up in 2-3 weeks RE: lab results and status        Electronically signed by: TOBY Alejandro CNP

## 2022-05-06 NOTE — PROGRESS NOTES
"[facility] GERIATRICS  Chief Complaint   Patient presents with     [facility] Medical Record Number:  [id number]  Place of Service where encounter took place:  [facility] ([facility])     HPI:    Elias Mceke  is 89 year old (8/10/1931), who is being seen today for a federally mandated E/M visit.     Course was reviewed with nursing staff  There have been no acute concerns  L sided pain improved with restoration of tramadol dose    Pt denies pain but states, \"I can't straighten my leg\"   history is limited by cognitive deficits  BP stable        ALLERGIES:Terazosin  PAST MEDICAL HISTORY:   Past Medical History:   Diagnosis Date     Acute, but ill-defined, cerebrovascular disease 1-2008    Left hemiparesis, left side neglect     Atrial fibrillation (H)      Benign hypertension with CKD (chronic kidney disease) stage III (H) 6/5/2017     Cancer (H)      Cardiac pacemaker in situ, Placed on 7/20/18 7/23/2018     Central retinal vein occlusion, right eye 11/15/2017     Chronic atrial fibrillation (H) 6/5/2017     Chronic ischemic heart disease, unspecified      CKD (chronic kidney disease) stage 3, GFR 30-59 ml/min, est GFR: 10/30/17: 46,, 12/14/17: 59 2/29/2012     Cognitive deficits as late effect of cerebrovascular disease 6/5/2017     Coronary artery disease      CVA (cerebral infarction)      Dysphagia due to recent cerebrovascular accident (CVA) 6/5/2017     Elevated cholesterol      Essential hypertension, benign      H/O prostate cancer 7/6/2017     H/O: CVA (cerebrovascular accident) 6/5/2017     Hemiparesis affecting left side as late effect of cerebrovascular accident (H) 6/5/2017     Hyperlipidaemia      Loss of weight 10/2/2018     MEDICAL HISTORY OF - 1- 2008    V fib arrest      Mobitz type I Wenckebach atrioventricular block, 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     Myocardial infarction (H)      Onychomycosis 6/5/2017     Other and unspecified hyperlipidemia      PVD (peripheral " vascular disease) (H) 12/12/2017     Type 2 diabetes mellitus with diabetic peripheral angiopathy with gangrene (H) 12/12/2017     Type 2 diabetes mellitus with stage 3 chronic kidney disease (H) 6/5/2017     Type II diabetes mellitus with peripheral circulatory disorder (H) 12/12/2017     Type II or unspecified type diabetes mellitus without mention of complication, not stated as uncontrolled 1-2008     PAST SURGICAL HISTORY:   has a past surgical history that includes seed implantation (2002); Phacoemulsification clear cornea with standard intraocular lens implant (Right, 10/7/2014); and Vitrectomy anterior (Right, 10/7/2014).  FAMILY HISTORY: family history is not on file.  SOCIAL HISTORY:  reports that he quit smoking about 48 years ago. His smoking use included cigarettes. He started smoking about 68 years ago. He has a 20.00 pack-year smoking history. He has never used smokeless tobacco. He reports current alcohol use of about 1.0 standard drink of alcohol per week. He reports that he does not use drugs.    MEDICATIONS:     Review of your medicines          Accurate as of May 6, 2022  7:34 AM. If you have any questions, ask your nurse or doctor.            CONTINUE these medicines which have NOT CHANGED      Dose / Directions   ACE/ARB/ARNI NOT PRESCRIBED  Commonly known as: INTENTIONAL  Used for: Benign hypertension with CKD (chronic kidney disease) stage III (H)      Please choose reason not prescribed from choices below.  Refills: 0     ACETAMINOPHEN PO      Dose: 1,000 mg  Take 1,000 mg by mouth 3 times daily For pain  Refills: 0     atropine 1 % ophthalmic solution      Dose: 1 drop  Place 1 drop into the right eye 2 times daily  Refills: 0     BASAGLAR KWIKPEN SC      Dose: 22 Units  Inject 22 Units Subcutaneous At Bedtime May use 2 units as needed to prime pen before each dose.  Refills: 0     FLONASE NA      Dose: 2 spray  Spray 2 sprays into both nostrils daily  Refills: 0     latanoprost 0.005 %  "ophthalmic solution  Commonly known as: XALATAN  Used for: Stable central retinal vein occlusion of right eye      Dose: 1 drop  Place 1 drop into the right eye At Bedtime  Refills: 0     LIPITOR PO      Dose: 40 mg  Take 40 mg by mouth At Bedtime  Refills: 0     polyethylene glycol 17 GM/Dose powder  Commonly known as: MIRALAX      Dose: 17 g  Take 17 g by mouth daily  Refills: 0     prednisoLONE acetate 1 % ophthalmic suspension  Commonly known as: PRED FORTE      Dose: 1 drop  Place 1 drop into the right eye 3 times daily  Refills: 0     QUEtiapine 25 MG tablet  Commonly known as: SEROquel      Dose: 25 mg  Take 1 tablet (25 mg) by mouth 2 times daily  Quantity: 30 tablet  Refills: 11     rivaroxaban ANTICOAGULANT 15 MG Tabs tablet  Commonly known as: XARELTO ANTICOAGULANT  Used for: Paroxysmal atrial fibrillation (H)      Dose: 15 mg  Take 1 tablet (15 mg) by mouth daily (with dinner)  Quantity: 30 tablet  Refills: 0     tamsulosin 0.4 MG capsule  Commonly known as: FLOMAX  Used for: Benign prostatic hyperplasia, presence of lower urinary tract symptoms unspecified, unspecified morphology      Dose: 0.4 mg  Take 1 capsule (0.4 mg) by mouth daily  Quantity: 60 capsule  Refills: 0     timolol (PF) 0.5 % ophthalmic solution  Commonly known as: TIMOPTIC OCUDOSE  Used for: Central retinal vein occlusion, right eye      Dose: 1 drop  Place 1 drop into the right eye 2 times daily  Refills: 0     traMADol 50 MG tablet  Commonly known as: ULTRAM  Used for: Neck pain      Dose: 100 mg  Take 2 tablets (100 mg) by mouth 4 times daily  Quantity: 120 tablet  Refills: 5     vitamin D3 1000 units (25 mcg) tablet  Commonly known as: CHOLECALCIFEROL      Dose: 2,000 Units  Take 2,000 Units by mouth daily  Refills: 0          ROS:  Unobtainable secondary to cognitive impairment.     Vitals:  /70   Pulse 87   Temp 97.7  F (36.5  C)   Resp 18   Ht 1.753 m (5' 9\")   Wt 67.6 kg (149 lb)   SpO2 96%   BMI 22.00 kg/m    Body " mass index is 22 kg/m .  Exam:  Lying in a recliner in the common area, wearing sunglasses  Alert, oriented to self  Lungs clear  CV RRR  Abd soft  NEURO alert, oriented to self  L hemiplegia  Feet are in soft wraps    Lab/Diagnostic data:     Most Recent 3 CBC's:Recent Labs   Lab Test 03/05/20  0000 01/16/20  0000 10/31/19  0000 01/10/19  0000 11/19/18  0000 11/01/18  0000   WBC 5.8  --   --   --  6.5 7.2   HGB 11.6* 10.5* 11.9*   < > 12.1* 12.0*   MCV 91.8  --   --   --  92.3 92.9     --   --   --  173 161    < > = values in this interval not displayed.     Most Recent 3 BMP's:Recent Labs   Lab Test 01/25/21  0000 03/05/20  0000 01/16/20  0000    138 139   POTASSIUM 4.4 4.3 4.3   CHLORIDE 106 103 104   CO2 27 28 26   BUN 26 21 24   CR 0.99 1.06 1.20*   ANIONGAP 7 7 9   TRENT 9.1 9.1 8.8   GLC 75 136* 221*     Most Recent Hemoglobin A1c:Recent Labs   Lab Test 01/25/21  0000   A1C 6.0*       ASSESSMENT/PLAN    History of CVA with L hemiplegia, chronic L LE pain  Neuro status stable  Pain control appears adequate on tramadol  Plan continue statin,, AC    Dementia with behavioral disturbance  Stable on quetiapine    Chronic afib,   S/p PM placement  Plan continue Rivorxaban    DM type 2  Stable on Lantus insulin  Plan monitor BG, repeat HgbA1c    Hypothyroidism, historically requiring replacement  Levothyroxine has been discontinued  F/u TFTs will be ordered.    Elijah Boyd MD

## 2022-05-06 NOTE — LETTER
"    5/6/2022        RE: Elias Mckee  [facility]  [address]  [address]  [place] [address]        [facility] GERIATRICS  Chief Complaint   Patient presents with     long term Regulatory     [place] Medical Record Number:  [id number]  Place of Service where encounter took place:  [facility] (LTC)     HPI:    Elias Mckee  is 89 year old (8/10/1931), who is being seen today for a federally mandated E/M visit.     Course was reviewed with nursing staff  There have been no acute concerns  L sided pain improved with restoration of tramadol dose    Pt denies pain but states, \"I can't straighten my leg\"   history is limited by cognitive deficits  BP stable        ALLERGIES:Terazosin  PAST MEDICAL HISTORY:   Past Medical History:   Diagnosis Date     Acute, but ill-defined, cerebrovascular disease 1-2008    Left hemiparesis, left side neglect     Atrial fibrillation (H)      Benign hypertension with CKD (chronic kidney disease) stage III (H) 6/5/2017     Cancer (H)      Cardiac pacemaker in situ, Placed on 7/20/18 7/23/2018     Central retinal vein occlusion, right eye 11/15/2017     Chronic atrial fibrillation (H) 6/5/2017     Chronic ischemic heart disease, unspecified      CKD (chronic kidney disease) stage 3, GFR 30-59 ml/min, est GFR: 10/30/17: 46,, 12/14/17: 59 2/29/2012     Cognitive deficits as late effect of cerebrovascular disease 6/5/2017     Coronary artery disease      CVA (cerebral infarction)      Dysphagia due to recent cerebrovascular accident (CVA) 6/5/2017     Elevated cholesterol      Essential hypertension, benign      H/O prostate cancer 7/6/2017     H/O: CVA (cerebrovascular accident) 6/5/2017     Hemiparesis affecting left side as late effect of cerebrovascular accident (H) 6/5/2017     Hyperlipidaemia      Loss of weight 10/2/2018     MEDICAL HISTORY OF - 1- 2008    V fib arrest      Mobitz type I Wenckebach atrioventricular block, " 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     Myocardial infarction (H)      Onychomycosis 6/5/2017     Other and unspecified hyperlipidemia      PVD (peripheral vascular disease) (H) 12/12/2017     Type 2 diabetes mellitus with diabetic peripheral angiopathy with gangrene (H) 12/12/2017     Type 2 diabetes mellitus with stage 3 chronic kidney disease (H) 6/5/2017     Type II diabetes mellitus with peripheral circulatory disorder (H) 12/12/2017     Type II or unspecified type diabetes mellitus without mention of complication, not stated as uncontrolled 1-2008     PAST SURGICAL HISTORY:   has a past surgical history that includes seed implantation (2002); Phacoemulsification clear cornea with standard intraocular lens implant (Right, 10/7/2014); and Vitrectomy anterior (Right, 10/7/2014).  FAMILY HISTORY: family history is not on file.  SOCIAL HISTORY:  reports that he quit smoking about 48 years ago. His smoking use included cigarettes. He started smoking about 68 years ago. He has a 20.00 pack-year smoking history. He has never used smokeless tobacco. He reports current alcohol use of about 1.0 standard drink of alcohol per week. He reports that he does not use drugs.    MEDICATIONS:     Review of your medicines          Accurate as of May 6, 2022  7:34 AM. If you have any questions, ask your nurse or doctor.            CONTINUE these medicines which have NOT CHANGED      Dose / Directions   ACE/ARB/ARNI NOT PRESCRIBED  Commonly known as: INTENTIONAL  Used for: Benign hypertension with CKD (chronic kidney disease) stage III (H)      Please choose reason not prescribed from choices below.  Refills: 0     ACETAMINOPHEN PO      Dose: 1,000 mg  Take 1,000 mg by mouth 3 times daily For pain  Refills: 0     atropine 1 % ophthalmic solution      Dose: 1 drop  Place 1 drop into the right eye 2 times daily  Refills: 0     BASAGLAR KWIKPEN SC      Dose: 22 Units  Inject 22 Units Subcutaneous At Bedtime May use 2 units as needed to prime pen before each  dose.  Refills: 0     FLONASE NA      Dose: 2 spray  Spray 2 sprays into both nostrils daily  Refills: 0     latanoprost 0.005 % ophthalmic solution  Commonly known as: XALATAN  Used for: Stable central retinal vein occlusion of right eye      Dose: 1 drop  Place 1 drop into the right eye At Bedtime  Refills: 0     LIPITOR PO      Dose: 40 mg  Take 40 mg by mouth At Bedtime  Refills: 0     polyethylene glycol 17 GM/Dose powder  Commonly known as: MIRALAX      Dose: 17 g  Take 17 g by mouth daily  Refills: 0     prednisoLONE acetate 1 % ophthalmic suspension  Commonly known as: PRED FORTE      Dose: 1 drop  Place 1 drop into the right eye 3 times daily  Refills: 0     QUEtiapine 25 MG tablet  Commonly known as: SEROquel      Dose: 25 mg  Take 1 tablet (25 mg) by mouth 2 times daily  Quantity: 30 tablet  Refills: 11     rivaroxaban ANTICOAGULANT 15 MG Tabs tablet  Commonly known as: XARELTO ANTICOAGULANT  Used for: Paroxysmal atrial fibrillation (H)      Dose: 15 mg  Take 1 tablet (15 mg) by mouth daily (with dinner)  Quantity: 30 tablet  Refills: 0     tamsulosin 0.4 MG capsule  Commonly known as: FLOMAX  Used for: Benign prostatic hyperplasia, presence of lower urinary tract symptoms unspecified, unspecified morphology      Dose: 0.4 mg  Take 1 capsule (0.4 mg) by mouth daily  Quantity: 60 capsule  Refills: 0     timolol (PF) 0.5 % ophthalmic solution  Commonly known as: TIMOPTIC OCUDOSE  Used for: Central retinal vein occlusion, right eye      Dose: 1 drop  Place 1 drop into the right eye 2 times daily  Refills: 0     traMADol 50 MG tablet  Commonly known as: ULTRAM  Used for: Neck pain      Dose: 100 mg  Take 2 tablets (100 mg) by mouth 4 times daily  Quantity: 120 tablet  Refills: 5     vitamin D3 1000 units (25 mcg) tablet  Commonly known as: CHOLECALCIFEROL      Dose: 2,000 Units  Take 2,000 Units by mouth daily  Refills: 0          ROS:  Unobtainable secondary to cognitive impairment.     Vitals:  /70    "Pulse 87   Temp 97.7  F (36.5  C)   Resp 18   Ht 1.753 m (5' 9\")   Wt 67.6 kg (149 lb)   SpO2 96%   BMI 22.00 kg/m    Body mass index is 22 kg/m .  Exam:  Lying in a recliner in the common area, wearing sunglasses  Alert, oriented to self  Lungs clear  CV RRR  Abd soft  NEURO alert, oriented to self  L hemiplegia  Feet are in soft wraps    Lab/Diagnostic data:     Most Recent 3 CBC's:Recent Labs   Lab Test 03/05/20  0000 01/16/20  0000 10/31/19  0000 01/10/19  0000 11/19/18  0000 11/01/18  0000   WBC 5.8  --   --   --  6.5 7.2   HGB 11.6* 10.5* 11.9*   < > 12.1* 12.0*   MCV 91.8  --   --   --  92.3 92.9     --   --   --  173 161    < > = values in this interval not displayed.     Most Recent 3 BMP's:Recent Labs   Lab Test 01/25/21  0000 03/05/20  0000 01/16/20  0000    138 139   POTASSIUM 4.4 4.3 4.3   CHLORIDE 106 103 104   CO2 27 28 26   BUN 26 21 24   CR 0.99 1.06 1.20*   ANIONGAP 7 7 9   TRENT 9.1 9.1 8.8   GLC 75 136* 221*     Most Recent Hemoglobin A1c:Recent Labs   Lab Test 01/25/21  0000   A1C 6.0*       ASSESSMENT/PLAN    History of CVA with L hemiplegia, chronic L LE pain  Neuro status stable  Pain control appears adequate on tramadol  Plan continue statin,, AC    Dementia with behavioral disturbance  Stable on quetiapine    Chronic afib,   S/p PM placement  Plan continue Rivorxaban    DM type 2  Stable on Lantus insulin  Plan monitor BG, repeat HgbA1c    Hypothyroidism, historically requiring replacement  Levothyroxine has been discontinued  F/u TFTs will be ordered.    Elijah Boyd MD              Sincerely,        Elijah Boyd MD      "

## 2022-05-11 NOTE — PROGRESS NOTES
"[facility] GERIATRICS    Chief Complaint   Patient presents with     Nursing Home Acute     HPI:  Elias Mckee is a 89 year old  (8/10/1931), who is being seen today for an episodic care visit at: [facility] ([facility]) [[id number]]. Today's concern is:   1. Senile dementia without behavioral disturbance (H)    2. Central retinal vein occlusion of right eye, unspecified complication status    3. Stage 3 chronic kidney disease, unspecified whether stage 3a or 3b CKD (H)      Patient seen today for follow up on labs and polypharmacy review, has intermittent periods of pleasantness mixed with outbursts, spitting out meds, yelling obscenities, declining eyedrops, dislikes nasal spray, today resting in recliner, wants to get back into bed, VS WNL, no distress this am after breakfast, no vision R eye, labs today only issue creat 1.21, GFR 57, otherwise BMP WNL and TSH 4.74.    Allergies, and PMH/PSH reviewed in EPIC today.  REVIEW OF SYSTEMS:  Limited secondary to cognitive impairment but today pt reports I'm fine, get me in bed    Objective:   /83   Pulse 64   Temp 97.9  F (36.6  C)   Resp 18   Ht 1.753 m (5' 9\")   Wt 67.6 kg (149 lb)   SpO2 95%   BMI 22.00 kg/m    GENERAL APPEARANCE:  in no distress, appears healthy  ENT:  Mouth and posterior oropharynx normal, moist mucous membranes  RESP:  lungs clear to auscultation   CV:  no edema  ABDOMEN:  bowel sounds normal  M/S:   Gait and station abnormal transfer assist  SKIN:  Inspection of skin and subcutaneous tissue baseline  NEURO:   Examination of sensation by touch normal  PSYCH:  oriented to self    Labs done in SNF are in [facility]. Please refer to them using [employer]/Care Everywhere.    Assessment/Plan:  (F03.90) Senile dementia without behavioral disturbance (H)  (primary encounter diagnosis)  Comment: moderate cognitive limitations, intermittent outbursts thought to be pain related  Plan: continue tramadol QID and seroquel 25mg " BID  -no plan to change above medication unless status declines, considering oxycodone    (H34.8112) Central retinal vein occlusion of right eye, unspecified complication status  Comment: no vision R eye, declines drops 80% of time  Plan: discontinue timolol, prednisone and atropine    (N18.30) Stage 3 chronic kidney disease, unspecified whether stage 3a or 3b CKD (H)  Comment: Creat 1.21, GFR 57  Plan: dose medications renally as possible  -follow up BMP in 6-12 months        Electronically signed by: TOBY Alejandro CNP

## 2022-05-11 NOTE — LETTER
"    5/11/2022        RE: Elias Mckee  [facility]  [address]  [address]  [place] [address]        [facility] GERIATRICS    Chief Complaint   Patient presents with     Nursing Home Acute     HPI:  Elias Mckee is a 89 year old  (8/10/1931), who is being seen today for an episodic care visit at: [facility] ([facility]) [[id number]]. Today's concern is:   1. Senile dementia without behavioral disturbance (H)    2. Central retinal vein occlusion of right eye, unspecified complication status    3. Stage 3 chronic kidney disease, unspecified whether stage 3a or 3b CKD (H)      Patient seen today for follow up on labs and polypharmacy review, has intermittent periods of pleasantness mixed with outbursts, spitting out meds, yelling obscenities, declining eyedrops, dislikes nasal spray, today resting in recliner, wants to get back into bed, VS WNL, no distress this am after breakfast, no vision R eye, labs today only issue creat 1.21, GFR 57, otherwise BMP WNL and TSH 4.74.    Allergies, and PMH/PSH reviewed in EPIC today.  REVIEW OF SYSTEMS:  Limited secondary to cognitive impairment but today pt reports I'm fine, get me in bed    Objective:   /83   Pulse 64   Temp 97.9  F (36.6  C)   Resp 18   Ht 1.753 m (5' 9\")   Wt 67.6 kg (149 lb)   SpO2 95%   BMI 22.00 kg/m    GENERAL APPEARANCE:  in no distress, appears healthy  ENT:  Mouth and posterior oropharynx normal, moist mucous membranes  RESP:  lungs clear to auscultation   CV:  no edema  ABDOMEN:  bowel sounds normal  M/S:   Gait and station abnormal transfer assist  SKIN:  Inspection of skin and subcutaneous tissue baseline  NEURO:   Examination of sensation by touch normal  PSYCH:  oriented to self    Labs done in SNF are in [facility]. Please refer to them using [employer]/Care Everywhere.    Assessment/Plan:  (F03.90) Senile dementia without behavioral disturbance (H)  (primary encounter diagnosis)  Comment: " moderate cognitive limitations, intermittent outbursts thought to be pain related  Plan: continue tramadol QID and seroquel 25mg BID  -no plan to change above medication unless status declines, considering oxycodone    (H34.8112) Central retinal vein occlusion of right eye, unspecified complication status  Comment: no vision R eye, declines drops 80% of time  Plan: discontinue timolol, prednisone and atropine    (N18.30) Stage 3 chronic kidney disease, unspecified whether stage 3a or 3b CKD (H)  Comment: Creat 1.21, GFR 57  Plan: dose medications renally as possible  -follow up BMP in 6-12 months        Electronically signed by: TOBY Alejnadro CNP             Sincerely,        TOBY Alejandro CNP

## 2022-06-30 NOTE — PROGRESS NOTES
"[facility] GERIATRICS    Chief Complaint   Patient presents with     Nursing Home Acute     HPI:  Elias Mckee is a 89 year old  (8/10/1931), who is being seen today for an episodic care visit at: [facility] ([facility]) [[id number]]. Today's concern is:   1. Psychosis, unspecified psychosis type (H)    2. Agitation    3. Stage 3 chronic kidney disease, unspecified whether stage 3a or 3b CKD (H)      Patient seen today on request, having increased psychotic behaviors with agitation, previously started on seroquel and tramadol increased and was fine for a time, now ramping up with screaming, agitation, high level of difficulty with caregiving, unable to redirect, requires full ADL assistance including feeding, overall progresive decline with Hx of CVA and covid, also renal function decline with labs on 5/9 creat 1.21 and GFR 57.    Allergies, and PMH/PSH reviewed in EPIC today.  REVIEW OF SYSTEMS:  Unobtainable secondary to cognitive impairment.     Objective:   /74   Pulse 75   Temp 97.5  F (36.4  C)   Resp 18   Ht 1.753 m (5' 9\")   Wt 67.1 kg (148 lb)   SpO2 96%   BMI 21.86 kg/m    GENERAL APPEARANCE:  appears healthy  ENT:  Mouth and posterior oropharynx normal, moist mucous membranes  RESP:  lungs clear to auscultation   CV:  no edema  ABDOMEN:  bowel sounds normal  M/S:   Gait and station abnormal ADL assist  SKIN:  Inspection of skin and subcutaneous tissue baseline  NEURO:   Examination of sensation by touch normal  PSYCH:  oriented to unknown    Labs done in SNF are in [facility]. Please refer to them using [employer]/Care Everywhere.    Assessment/Plan:  (F29) Psychosis, unspecified psychosis type (H)  (primary encounter diagnosis)  (R45.1) Agitation  Comment: increase in agitation/behaviors, daughter contacted RE: change in plan and concurs  Plan: increase seroquel from 25 to 50mg BID  -if still having concerns may consider trial depakote  -staff to support as indicated  -consider " hospice with continued decline    (N18.30) Stage 3 chronic kidney disease, unspecified whether stage 3a or 3b CKD (H)  Comment: labs 5/9 creat 1.21, GFR 57  Plan: dose medications renally as possible  -follow up BMP in 3-6 months      Electronically signed by: TOBY Alejandro CNP

## 2022-06-30 NOTE — LETTER
"    6/30/2022        RE: Elias Mckee  [facility]  [address]  [address]  [facility] [address]        [facility] GERIATRICS    Chief Complaint   Patient presents with     Nursing Home Acute     HPI:  Elias Mckee is a 89 year old  (8/10/1931), who is being seen today for an episodic care visit at: [facility] [[id number]]. Today's concern is:   1. Psychosis, unspecified psychosis type (H)    2. Agitation    3. Stage 3 chronic kidney disease, unspecified whether stage 3a or 3b CKD (H)      Patient seen today on request, having increased psychotic behaviors with agitation, previously started on seroquel and tramadol increased and was fine for a time, now ramping up with screaming, agitation, high level of difficulty with caregiving, unable to redirect, requires full ADL assistance including feeding, overall progresive decline with Hx of CVA and covid, also renal function decline with labs on 5/9 creat 1.21 and GFR 57.    Allergies, and PMH/PSH reviewed in EPIC today.  REVIEW OF SYSTEMS:  Unobtainable secondary to cognitive impairment.     Objective:   /74   Pulse 75   Temp 97.5  F (36.4  C)   Resp 18   Ht 1.753 m (5' 9\")   Wt 67.1 kg (148 lb)   SpO2 96%   BMI 21.86 kg/m    GENERAL APPEARANCE:  appears healthy  ENT:  Mouth and posterior oropharynx normal, moist mucous membranes  RESP:  lungs clear to auscultation   CV:  no edema  ABDOMEN:  bowel sounds normal  M/S:   Gait and station abnormal ADL assist  SKIN:  Inspection of skin and subcutaneous tissue baseline  NEURO:   Examination of sensation by touch normal  PSYCH:  oriented to unknown    Labs done in SNF are in [facility]. Please refer to them using [facility]/Care Everywhere.    Assessment/Plan:  (F29) Psychosis, unspecified psychosis type (H)  (primary encounter diagnosis)  (R45.1) Agitation  Comment: increase in agitation/behaviors, daughter contacted RE: change in plan and concurs  Plan: " increase seroquel from 25 to 50mg BID  -if still having concerns may consider trial depakote  -staff to support as indicated  -consider hospice with continued decline    (N18.30) Stage 3 chronic kidney disease, unspecified whether stage 3a or 3b CKD (H)  Comment: labs 5/9 creat 1.21, GFR 57  Plan: dose medications renally as possible  -follow up BMP in 3-6 months      Electronically signed by: TOBY Alejandro CNP             Sincerely,        TOBY Alejandro CNP

## 2022-07-11 PROBLEM — A41.9 SEVERE SEPSIS (H): Status: ACTIVE | Noted: 2022-01-01

## 2022-07-11 PROBLEM — N39.0 URINARY TRACT INFECTION WITHOUT HEMATURIA, SITE UNSPECIFIED: Status: ACTIVE | Noted: 2022-01-01

## 2022-07-11 PROBLEM — Z51.5 END OF LIFE CARE: Status: ACTIVE | Noted: 2022-01-01

## 2022-07-11 PROBLEM — R65.20 SEVERE SEPSIS (H): Status: ACTIVE | Noted: 2022-01-01

## 2022-07-11 NOTE — ED NOTES
[facility]  ED Nurse Handoff Report    ED Chief complaint: UTI      ED Diagnosis:   Final diagnoses:   Severe sepsis (H)   Urinary tract infection without hematuria, site unspecified       Code Status: DNR / DNI    Allergies:   Allergies   Allergen Reactions     Terazosin Other (See Comments)     Impotence       Patient Story: Torey comes to the ER for a UTI and altered loc. He was seen her overnight and discharged with oral antibiotics however he is unable to take them due to his decreased loc. He responds to verbal and painful stimuli. His heart rate drops into the 30's at times. He received IV zosyn for his uti and his lactic acid was 6, he will be getting 2 liters of NS.      Treatments and/or interventions provided: antibiotics and ivf  Patient's response to treatments and/or interventions: fair    To be done/followed up on inpatient unit:  cont to monitor    Does this patient have any cognitive concerns?: altered loc, non verbal    Activity level - Baseline/Home:  Stand with Assist  Activity Level - Current:   Total Care    Patient's Preferred language: English   Needed?: No    Isolation: None  Infection: Not Applicable  Patient tested for COVID 19 prior to admission: YES  Bariatric?: No    Vital Signs:   Vitals:    07/11/22 1324 07/11/22 1325   BP: 126/66    Pulse: 59    Resp: 14    Temp: 100  F (37.8  C)    TempSrc: Rectal    SpO2:  92%       Cardiac Rhythm:     Was the PSS-3 completed:   Yes  What interventions are required if any?               Family Comments: daughter at bedside  OBS brochure/video discussed/provided to patient/family: N/A              Name of person given brochure if not patient: na              Relationship to patient: na    For the majority of the shift this patient's behavior was Green.   Behavioral interventions performed were na.    ED NURSE PHONE NUMBER: [phone]

## 2022-07-11 NOTE — ED TRIAGE NOTES
Patient comes in via EMS from assisted living.  They report the patient was last seen well at  4pm today.  Since than he has had increased lethargy and left sided weakness.  Unknown if the weakness is new or not as he has had a previous stroke that left him with left sided deficits.  Patient will no squeeze with left hand or move left leg.  Left facial droop as well.       Triage Assessment     Row Name 07/10/22 4377       Triage Assessment (Adult)    Airway WDL WDL       Respiratory WDL    Respiratory WDL WDL       Cardiac WDL    Cardiac WDL WDL       Cognitive/Neuro/Behavioral WDL    Cognitive/Neuro/Behavioral WDL --  patient has had a previous stroke with left sided deficits.  increased lethargy today.  blown left pupil, unknown if that is new.  no strength on left side

## 2022-07-11 NOTE — ED NOTES
Bed: ST02  Expected date:   Expected time:   Means of arrival:   Comments:  [address] 89M stroke alert

## 2022-07-11 NOTE — TELEPHONE ENCOUNTER
"[facility] Triage Nurse Telephone Encounter    Provider: TOBY Bear CNP   Facility: [facility]  Facility Type:  LTC    Caller: Karen  Call Back Number: [phone]    Allergies:    Allergies   Allergen Reactions     Terazosin Other (See Comments)     Impotence        Reason for call: Pt went to the ED yesterday for \"stroke symptoms\". He was found to have a UTI and sent back to facility. Since returning, pt has remained verbally unresponsive, eye glazed, no oral intake. Pt has been declining prior to ED visit but no hospice eval at this time. Vitals: /67, T 99.5, P 122, O2 92%, R 22.    Verbal Order/Direction given by Provider: Send back to ED for IV antibiotics and closer observation.    Provider giving Order:  TOBY West CNP     Verbal Order given to: Karen Bowser RN      "

## 2022-07-11 NOTE — PROGRESS NOTES
TRANSITIONS OF CARE (FELICE) LOG   FELICE tasks should be completed by the CC within one (1) business day of notification of each transition. Follow up contact with member is required after return to their usual care setting.  Note:  If CC finds out about the transitions fifteen (15) days or more after the member has returned to their usual care setting, no FELICE log is needed. However, the CC should check in with the member to discuss the transition process, any changes needed to the care plan and document it in a case note.    Member Name:  Elias EDUARDO Rylee [facility] Name:  [employer] Member ID#: [id number]   Product: [facility] Care Coordinator Contact:  Yolanda Sandoval RN Agency/[facility]/Care System: [facility]   Transition Communication Actions from Care Management Contact   Transition #1   Notification Date: 22 Transition Date:   22 Transition From: [facility] this the member s usual care setting?               yes Transition To: Hospital, [facility]   Transition Type:  Unplanned  Reason for Admission/Comments:  Severe Sepsis---member  7-  Contact member/responsible party to offer assistance with transition Date completed:   Notes from conversation with the member/responsible party, provider, discharging and receiving facility (as applicable):      Shared CC contact info, care plan/services with receiving setting--Date completed: 22   Name & Title of receiving setting contact: [facility]   Notified PCP of transition--Date completed:       via  (OR) Members PCP was the admitting physician  Name of PCP: Vitaliy Champagne

## 2022-07-11 NOTE — ED NOTES
Pt sats are beginning to drop to the upper 80's on 2L nasal cannula. Non rebreather mask placed on pt at 10L and sats 93%, hospitalist at bedside talking with family. Comfort care is to be started. RT at bedside suctioning.

## 2022-07-11 NOTE — PHARMACY-ADMISSION MEDICATION HISTORY
Pharmacy Medication History  Admission medication history interview status for the 7/11/2022  admission is complete. See EPIC admission navigator for prior to admission medications     Location of Interview: Outside patient room but on unit  [facility]    Significant changes made to the medication list:  none    In the past week, patient estimated taking medication this percent of the time: greater than 90%    Additional medication history information:   Keflex has not started yet, was suppose to start today    Medication reconciliation completed by provider prior to medication history? No    Time spent in this activity: 15 min    Prior to Admission medications    Medication Sig Last Dose Taking? Auth Provider Long Term End Date   ACETAMINOPHEN PO Take 1,000 mg by mouth 3 times daily For pain 7/11/2022 at 0800 Yes Unknown, Entered By History     Atorvastatin Calcium (LIPITOR PO) Take 40 mg by mouth At Bedtime 7/10/2022 at hs Yes Reported, Patient No    cholecalciferol (VITAMIN D) 1000 UNIT tablet Take 2,000 Units by mouth daily 7/11/2022 at 0800 Yes Reported, Patient     Insulin Glargine (BASAGLAR KWIKPEN SC) Inject 22 Units Subcutaneous At Bedtime May use 2 units as needed to prime pen before each dose. 7/10/2022 at hs Yes Reported, Patient No    polyethylene glycol (MIRALAX/GLYCOLAX) powder Take 17 g by mouth daily as needed for constipation Unknown at prn Yes Unknown, Entered By History     QUEtiapine (SEROQUEL) 50 MG tablet Take 50 mg by mouth 2 times daily 7/11/2022 at 0500 Yes Unknown, Entered By History No    rivaroxaban ANTICOAGULANT (XARELTO ANTICOAGULANT) 15 MG TABS tablet Take 1 tablet (15 mg) by mouth daily (with dinner) 7/10/2022 at 1600 Yes Vitaliy Champagne APRN CNP Yes    tamsulosin (FLOMAX) 0.4 MG capsule Take 1 capsule (0.4 mg) by mouth daily 7/10/2022 at hs Yes Kevin Bond MD     traMADol HCl 100 MG TABS Take 100 mg by mouth 4  times daily 7/11/2022 at am Yes Unknown, Entered By History No    ACE/ARB/ARNI NOT PRESCRIBED (INTENTIONAL) Please choose reason not prescribed from choices below.   Vitaliy Champagne, APRN CNP Yes    cephALEXin (KEFLEX) 500 MG capsule Take 1 capsule (500 mg) by mouth 3 times daily for 7 days not started yet  Nadine Le MD  7/18/22       The information provided in this note is only as accurate as the sources available at the time of update(s)

## 2022-07-11 NOTE — CONSULTS
[facility]    Stroke Telephone Note    I was called by Nadine Le on 07/10/22 regarding patient Elias Mckee. The patient is a 89 year old male with history of R MCA and PCA, and bilateral cerebellar infarcts, pAF on rivaroxaban, bilateral carotid stenoses, HTN, and HLD, who presents as a stroke code for L sided weakness and confusion. He is also noted to have asymmetric pupils, R>L. He lives at a facility where his medications are managed for him. He also has history of R lens replacement, R CRAO, and R anterior vitrectomy.    Stroke Code Data (for stroke code without tele)  Stroke code activated 07/10/22   2141   Stroke provider first response  07/10/22   2146            Last known normal 07/10/22   1600        Time of discovery   (or onset of symptoms) 07/10/22   1700   Head CT read by Stroke Neuro Dr/Provider 07/10/22   2152   Was stroke code de-escalated? Yes 07/10/22 2218          Imaging Findings   CT large R MCA and PCA territory encephalomalacia, no hemorrhage or early ischemic changes  CTA mixed density atherosclerotic plaque bilateral cervical and intracranial ICAs, diminutive L vert, terminates in PICA (chronic per 2008 MRA)    Intravenous Thrombolysis  Not given due to:   - DOAC dose within 48 hours or INR > 1.7    Endovascular Treatment  Not initiated due to absence of acute proximal vessel occlusion    Impression  L hemiparesis and confusion, etiology indeterminate, cannot exclude acute cerebral ischemia, however these deficits can be explained by prior infarcts, so perhaps this focality represents worsening of baseline in the setting of a systemic stressor.    R pupil enlargement- likely baseline due to multiple prior R eye surgeries    Recommendations   No acute stroke intervention   Brain MRI w/ and w/o contrast if possible, if not, repeat head CT in 24 hours  Simultaneous investigation of possible infectious or toxic metabolic encephalopathy  Okay to  "continue rivaroxaban if safe to swallow    My recommendations are based on the information provided over the phone by Elias Mckee's in-person providers. I spent >5 minutes in consultation with them. They are not intended to replace the clinical judgment of his in-person providers. I was not requested to personally see or examine the patient at this time.    Kmaryn Lui MD  Vascular Neurology  To page me or covering stroke neurology team member, click here: AMCOM  Choose \"On Call\" tab at top, then search dropdown box for \"Neurology Adult\", select location, press Enter, then look for stroke/neuro ICU/Telestroke.  "

## 2022-07-11 NOTE — ED PROVIDER NOTES
History   Chief Complaint:  UTI       History limited by: altered mental status.      Elias Mckee is a 89 year old male with history of dementia, hypertension, type II diabetes mellitus, CVA, chronic ischemic heart disease who presents via EMS from nursing facility with altered mental status since last night. He was seen here and discharged with a UTI. He was given antibiotics but has not taken them. At baseline he is usually responsive and communicates with staff.    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    Physical Exam     Patient Vitals for the past 24 hrs:   BP Temp Temp src Pulse Resp SpO2   07/11/22 1545 (!) 146/67 -- -- 111 14 93 %   07/11/22 1446 -- -- -- 103 20 90 %   07/11/22 1325 -- -- -- -- -- 92 %   07/11/22 1324 126/66 100  F (37.8  C) Rectal 59 14 --       Physical Exam  Constitutional: Elderly white male, supine. No respiratory distress.   HENT: No signs of trauma. Oropharynx dry.  Eyes: Unable to cooperate with EOM. Right pupil dilated cornea scared. Left pupil 2 mm non responsive.  Neck: Normal range  of motion. No JVD present. No cervical adenopathy.  Cardiovascular: Regular rhythm.  Exam reveals no gallop and no friction rub.    No murmur heard.  Pulmonary/Chest: Bilateral breath sounds normal. No wheezes, rhonchi or rales. Pacemaker left chest.  Abdominal: Soft. Tender lower abdomen with palpation. No rebound or guarding. Bowel sounds present. Distended. 1+ femoral pulses.  : Bilateral distended testes. Normal penis. Small amount of blood on scrotum.   Rectal: impacted soft mushy brown stool.  Musculoskeletal: No edema. No tenderness. Left arm and left leg contracted.   Lymphadenopathy: No lymphadenopathy.   Neurological: Patient appears awake obtunded nonverbal. Move right arm and right leg. No facial asymmetry. Moans to pain.  Skin: Skin is warm and dry. No rash noted. No erythema.     Emergency Department Course   ECG  ECG taken at 1414  Sinus tachycardia with 1st degree AV block with premature ventricular complexes or fusion complexes. Septal infarct, age undetermined. ST & T wave abnormality, consider inferior ischemia. ST & T wave abnormality, consider anterior ischemia.    Rate 109 bpm. KS interval 290 ms. QRS duration 68 ms. QT/QTc 334/449 ms. P-R-T axes 55 40 -85.     Imaging:  US Renal Complete   Final Result   IMPRESSION: No hydronephrosis.      JEYSON CALABRESE MD            SYSTEM ID:  [id number]      XR Chest Port 1 View   Final Result   IMPRESSION: No focal infiltrate, pleural effusion or pneumothorax.   Normal heart size. Pacemaker. Coronary stents. Stable tiny metallic   density projecting over the right upper lobe.      IVETTE BAUMAN MD            SYSTEM ID:  [id number]        Report per radiology    Laboratory:  Labs Ordered and Resulted from Time of ED Arrival to Time of ED Departure   INR - Abnormal       Result Value    INR 1.47 (*)    COMPREHENSIVE METABOLIC PANEL - Abnormal    Sodium 139      Potassium 4.3      Chloride 106      Carbon Dioxide (CO2) 23      Anion Gap 10      Urea Nitrogen  27      Creatinine 1.46 (*)     Calcium 9.1      Glucose 102 (*)     Alkaline Phosphatase 60      AST 20      ALT 15      Protein Total 7.1      Albumin 3.4      Bilirubin Total 0.4      GFR Estimate 45 (*)    ROUTINE UA WITH MICROSCOPIC REFLEX TO CULTURE - Abnormal    Color Urine Yellow      Appearance Urine Slightly Cloudy (*)     Glucose Urine Negative      Bilirubin Urine Negative      Ketones Urine Negative      Specific Gravity Urine 1.015      Blood Urine Moderate (*)     pH Urine 5.5      Protein Albumin Urine 30  (*)     Urobilinogen Urine Normal      Nitrite Urine Negative      Leukocyte Esterase Urine Large (*)     Bacteria Urine Many (*)     WBC Clumps Urine Present (*)     Mucus Urine Present (*)     RBC Urine 28 (*)     WBC Urine 172 (*)     Squamous Epithelials Urine 1     BLOOD GAS VENOUS - Abnormal    pH Venous 7.30 (*)     pCO2 Venous 50      pO2 Venous 22 (*)     Bicarbonate Venous 25      Base Excess/Deficit (+/-) -2.2      FIO2 0     LACTIC ACID WHOLE BLOOD - Abnormal    Lactic Acid 6.0 (*)    CBC WITH PLATELETS AND DIFFERENTIAL - Abnormal    WBC Count 28.5 (*)     RBC Count 3.69 (*)     Hemoglobin 11.1 (*)     Hematocrit 34.3 (*)     MCV 93      MCH 30.1      MCHC 32.4      RDW 19.6 (*)     Platelet Count 171      % Neutrophils 78      % Lymphocytes 1      % Monocytes 20      % Eosinophils 0      % Basophils 0      % Immature Granulocytes 1      NRBCs per 100 WBC 0      Absolute Neutrophils 22.0 (*)     Absolute Lymphocytes 0.3 (*)     Absolute Monocytes 5.7 (*)     Absolute Eosinophils 0.0      Absolute Basophils 0.0      Absolute Immature Granulocytes 0.4      Absolute NRBCs 0.0     TROPONIN I - Normal    Troponin I High Sensitivity 13     INFLUENZA A/B & SARS-COV2 PCR MULTIPLEX - Normal    Influenza A PCR Negative      Influenza B PCR Negative      RSV PCR Negative      SARS CoV2 PCR Negative     LACTIC ACID WHOLE BLOOD   BLOOD CULTURE   BLOOD CULTURE   URINE CULTURE          Emergency  Department Course:       Reviewed:  I reviewed nursing notes, vitals, past medical history and Care Everywhere    Assessments:  1312 I obtained history and examined the patient as noted above.   1520 I rechecked the patient and explained findings.     Consults:  1438 I spoke to Dr. Joseph, hospitalist, who accepts the patient.    Interventions:  1336 Initial lactate  1347 NS bolus 1L IV  1357 Zosyn 4.5 g IV  1530 Tylenol 650 mg Rectal  1532 NS bolus 1L IV  1645 Repeat lactate drawn    Disposition:  The patient was admitted to the hospital under the care of Dr. Joseph.     Impression & Plan     Medical Decision Makin year old who presents to the emergency department with decreased responsiveness and fever. He was seen here yesterday for altered mentation. He was thought to have a UTI and discharged back to his nursing facility with antibiotics. His CT was unremarkable. Nursing home states that he continues to be altered and is unable to take oral meds and have sent him back. On exam the patient looks dry he is warm his bladder is distended and he is full of stool in his rectum. Labs have returned with a very high white count, dirty urine and elevated lactate. Patient appears to be in severe sepsis although hemodynamically he remains stable. We are giving him IV fluids and zosyn. Repeat lactate has gone up so will add an  additional liter of saline and start vancomycin    Critical care time exclusive of procedures 35 minutes    Diagnosis:    ICD-10-CM    1. Severe sepsis (H)  A41.9     R65.20    2. Urinary tract infection without hematuria, site unspecified  N39.0          Scribe Disclosure:  I, Shyanne Roberts, am serving as a scribe at 1:12 PM on 2022 to document services personally performed by Marcio Blackwell MD based on my observations and the provider's statements to me.              Marcio Blackwell MD  22 0614

## 2022-07-11 NOTE — ED TRIAGE NOTES
Pt was seen here and discharged overnight with a uti, was given antibiotics but pt has been unable to take them due to altered loc

## 2022-07-11 NOTE — H&P
[facility]    History and Physical - Hospitalist Service       Date of Admission:  7/11/2022    Assessment & Plan      Elias Mckee is a 89 year old male admitted on 7/11/2022.     Addendum 1900H  Lactate continued to rise from 6 to 8.2 despite adequate fluid resuscitation, and a normal blood pressure by sphygmomanometer.  Reassessed patient, and found him to be appearing even more acutely ill.  He was no longer protecting his airway with gurgling respirations.  He is now febrile to 100.5.  Respiratory rate has increased, and oxygen saturation has decreased.    I had a very janes discussion with daughter and granddaughter again.  I was very clear that I believe the patient is dying.  I believe he is beginning to enter the active phase of dying.  I gave them an estimate of several hours to a very few short days.  They were receptive to this information.  We have decided to pursue comfort cares.  We will stop all antibiotics.  We will stop IV fluids.  We will focus solely on comfort.  I have discussed this with the nurse, the attending emergency room physician, and the patient's family.    -Comfort cares    Severe sepsis 2/2 UTI  UTI  Lactic acidosis  - inpatient  - zosyn  - IVF  - recheck lactate  - IMC  - no escalation of cares to ICU. No pressors. DNR/DNI    Acute encephalopathy 2/2 sepsis  - monitor    MARIA DEL CARMEN  - pascal  - renal usg  - monitor  - IVF    CVAs with resultant L hemiplegia  *stroke code 7/10, CT imaging stable and discharged home  - monitor  - hold xarelto while NPO    DMT2  - hold PTA glargine  - medium sliding scale insulin  - q4h BG    Goals of Care  -DNR/DNI  - Continue with limited care plan which includes IV fluids and IV antibiotics.  No plan to escalate to ICU.  No plan for IV pressors.  If patient does become progressively more ill with unstable blood pressure, or other changes, family will consider transition to comfort cares.  This was discussed on admission.        Diet:   NPO 2/2 AMS  DVT Prophylaxis: Pneumatic Compression Devices  Ogden Catheter: PRESENT, indication:    Central Lines: None  Cardiac Monitoring: None  Code Status:   DNR/DNI    Clinically Significant Risk Factors Present on Admission               # Coagulation Defect: home medication list includes an anticoagulant medication   # Hypertension: home medication list includes antihypertensive(s)          Disposition Plan         The patient's care was discussed with the Bedside Nurse, Patient and ED Team.    Joshua Joseph MD  Hospitalist Service  [facility]  Securely message with the Vocera Web Console (learn more here)  Text page via [employer] Paging/Directory         ______________________________________________________________________    Chief Complaint   AMS    History is obtained from the patient's daughter and granddaughter, electronic health record and emergency department physician    History of Present Illness   Elias Mckee is a 89 year old male who presents to the hospital on 7/11/2022 with altered mental status.  On 7/10/2022 he presented to the hospital with altered mental status concerning for stroke.  At that time stroke work-up was negative and he was found to have a urinary tract infection.  He was subsequently discharged home with oral Keflex.  On 7/11/2022, nursing home staff found him unresponsive and called 911.  No other symptoms were reported to ED staff.    At baseline patient has a pleasant disposition and lives every day per his daughter.  He does have episodes of soreness and agitation intermittently, but these have been well controlled by medication.  Family does note that his world has gotten progressively smaller since his strokes.  Daughter was able to meet with patient on Saturday, 7/9/2022 and states that he was at his baseline and then.  Not able to say much, but was able to respond appropriately to her questions and interact.    In the  emergency department, patient in the care of Dr. Tyler.  Case was discussed with Dr. Tyler.  Vitals notable for tachycardia, temperature 100.0.  Nurse reports that he does have intermittent episodes of bradycardia.  He is hypoxic.  He is completely unresponsive to voice, but does withdraw to pain.  Labs are notable for creatinine 1.46 up from 1.12 on 7/10.  Lactic acid is 6.0 initially.  pH 7.3, PCO2 50, PO2 22, bicarb 25.  WBC 28.5 up from 7.5 on 7/10.  Urine appears grossly infected with moderate blood, large leukocyte esterase, 172 WBCs, 28 RBCs, and many bacteria seen.  He was given Zosyn and IV fluids.  Lactate recheck to be done.      Review of Systems    The 10 point Review of Systems is negative other than noted in the HPI or here.     Past Medical History    I have reviewed this patient's medical history and updated it with pertinent information if needed.   Past Medical History:   Diagnosis Date     Acute, but ill-defined, cerebrovascular disease 1-2008    Left hemiparesis, left side neglect     Atrial fibrillation (H)      Benign hypertension with CKD (chronic kidney disease) stage III (H) 6/5/2017     Cancer (H)      Cardiac pacemaker in situ, Placed on 7/20/18 7/23/2018     Central retinal vein occlusion, right eye 11/15/2017     Chronic atrial fibrillation (H) 6/5/2017     Chronic ischemic heart disease, unspecified      CKD (chronic kidney disease) stage 3, GFR 30-59 ml/min, est GFR: 10/30/17: 46,, 12/14/17: 59 2/29/2012     Cognitive deficits as late effect of cerebrovascular disease 6/5/2017     Coronary artery disease      CVA (cerebral infarction)      Dysphagia due to recent cerebrovascular accident (CVA) 6/5/2017     Elevated cholesterol      Essential hypertension, benign      H/O prostate cancer 7/6/2017     H/O: CVA (cerebrovascular accident) 6/5/2017     Hemiparesis affecting left side as late effect of cerebrovascular accident (H) 6/5/2017     Hyperlipidaemia      Loss of weight  10/2/2018     MEDICAL HISTORY OF - -     V fib arrest      Mobitz type I Wenckebach atrioventricular block, 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     Myocardial infarction (H)      Onychomycosis 2017     Other and unspecified hyperlipidemia      PVD (peripheral vascular disease) (H) 2017     Type 2 diabetes mellitus with diabetic peripheral angiopathy with gangrene (H) 2017     Type 2 diabetes mellitus with stage 3 chronic kidney disease (H) 2017     Type II diabetes mellitus with peripheral circulatory disorder (H) 2017     Type II or unspecified type diabetes mellitus without mention of complication, not stated as uncontrolled        Past Surgical History   I have reviewed this patient's surgical history and updated it with pertinent information if needed.  Past Surgical History:   Procedure Laterality Date     PHACOEMULSIFICATION CLEAR CORNEA WITH STANDARD INTRAOCULAR LENS IMPLANT Right 10/7/2014    Procedure: PHACOEMULSIFICATION CLEAR CORNEA WITH STANDARD INTRAOCULAR LENS IMPLANT;  Surgeon: German Thomas MD;  Location: [facility]     SEED IMPLANTATION      prostate cancer     VITRECTOMY ANTERIOR Right 10/7/2014    Procedure: VITRECTOMY ANTERIOR;  Surgeon: German Thomas MD;  Location: [facility]       Social History   I have reviewed this patient's social history and updated it with pertinent information if needed.  Social History     Tobacco Use     Smoking status: Former Smoker     Packs/day: 1.00     Years: 20.00     Pack years: 20.00     Types: Cigarettes     Start date: 1953     Quit date: 1973     Years since quittin.1     Smokeless tobacco: Never Used   Substance Use Topics     Alcohol use: Yes     Alcohol/week: 1.0 standard drink     Types: 1 Cans of beer per week     Comment: Occasional     Drug use: No       Family History     Unable to obtain due to: AMS    Prior to Admission Medications   Prior to Admission Medications   Prescriptions Last Dose Informant Patient  Reported? Taking?   ACE/ARB/ARNI NOT PRESCRIBED (INTENTIONAL)   No No   Sig: Please choose reason not prescribed from choices below.   ACETAMINOPHEN PO  Nursing Home Yes No   Sig: Take 1,000 mg by mouth 3 times daily For pain   Atorvastatin Calcium (LIPITOR PO)  Nursing Home Yes No   Sig: Take 40 mg by mouth At Bedtime   Insulin Glargine (BASAGLAR KWIKPEN SC)   Yes No   Sig: Inject 22 Units Subcutaneous At Bedtime May use 2 units as needed to prime pen before each dose.   QUEtiapine (SEROQUEL) 25 MG tablet   No No   Sig: Take 2 tablets (50 mg) by mouth 2 times daily   cephALEXin (KEFLEX) 500 MG capsule   No No   Sig: Take 1 capsule (500 mg) by mouth 3 times daily for 7 days   cholecalciferol (VITAMIN D) 1000 UNIT tablet  Nursing Home Yes No   Sig: Take 2,000 Units by mouth daily   polyethylene glycol (MIRALAX/GLYCOLAX) powder  Nursing Home Yes No   Sig: Take 17 g by mouth daily    prednisoLONE acetate (PRED FORTE) 1 % ophthalmic susp   Yes No   Sig: Place 1 drop into the right eye 3 times daily   rivaroxaban ANTICOAGULANT (XARELTO ANTICOAGULANT) 15 MG TABS tablet   No No   Sig: Take 1 tablet (15 mg) by mouth daily (with dinner)   tamsulosin (FLOMAX) 0.4 MG capsule  Nursing Home No No   Sig: Take 1 capsule (0.4 mg) by mouth daily   traMADol (ULTRAM) 50 MG tablet   No No   Sig: Take 2 tablets (100 mg) by mouth 4 times daily      Facility-Administered Medications: None     Allergies   Allergies   Allergen Reactions     Terazosin Other (See Comments)     Impotence       Physical Exam   Vital Signs: Temp: 100  F (37.8  C) Temp src: Rectal BP: 126/66 Pulse: 59   Resp: 14 SpO2: 92 % O2 Device: None (Room air)    Weight: 0 lbs 0 oz    Constitutional: Appears to be acutely ill.  Elderly in appearance, approximately stated age.  Nonresponsive to verbal stimuli.  Eyes: Eyes are held slightly closed.  Chronic right pupillary defect  HEENT: Moist mucous membranes, normal dentition.  Respiratory: Clear to auscultation  bilaterally, no crackles or wheezing.  Cardiovascular: Tachycardic rate and rhythm, normal S1 and S2, and no murmur noted.  GI: Soft, non-distended, non-tender, normal bowel sounds.  Lymph/Hematologic: No anterior cervical or supraclavicular adenopathy.  Skin: No rashes, no cyanosis, no edema noted on exposed skin.  Musculoskeletal: No joint swelling, erythema or tenderness. No gross bony abnormalities  Neurologic: Left-sided hemiplegia.  Psychiatric: Nonresponsive, somnolent.      Data   Data reviewed today: I reviewed all medications, new labs and imaging results over the last 24 hours. I personally reviewed the chest x-ray image(s) showing Clear lungs.    Recent Labs   Lab 07/11/22  1318 07/10/22  2149   WBC 28.5* 7.5   HGB 11.1* 10.3*   MCV 93 93    198   INR 1.47*  --     138   POTASSIUM 4.3 4.1   CHLORIDE 106 104   CO2 23 29   BUN 27 23   CR 1.46* 1.12   ANIONGAP 10 5   TRENT 9.1 8.6   * 175*   ALBUMIN 3.4 3.3*   PROTTOTAL 7.1 6.9   BILITOTAL 0.4 0.2   ALKPHOS 60 68   ALT 15 15   AST 20 15     Recent Results (from the past 24 hour(s))   CT Head w/o Contrast    Narrative    EXAM: CT HEAD W/O CONTRAST, CTA HEAD NECK W CONTRAST  LOCATION: [facility]  DATE/TIME: 7/10/2022 9:47 PM    INDICATION: Left-sided weakness and left gaze perference with a history of prior stroke.  COMPARISON: 02/26/2017.  CONTRAST: 75  ml Isovue 370  TECHNIQUE: Head and neck CT angiogram with IV contrast. Noncontrast head CT followed by axial helical CT images of the head and neck vessels obtained during the arterial phase of intravenous contrast administration. Axial 2D reconstructed images and   multiplanar 3D MIP reconstructed images of the head and neck vessels were performed by the technologist. Dose reduction techniques were used. All stenosis measurements made according to NASCET criteria unless otherwise specified.    FINDINGS:   NONCONTRAST HEAD CT:   INTRACRANIAL CONTENTS: No  intracranial hemorrhage, extraaxial collection, or mass effect.  No CT evidence of acute infarct. There is scattered diffuse low attenuation within the periventricular and subcortical white matter consistent with diffuse small   vessel ischemic disease. There are old lacunar infarcts involving the basal ganglia bilaterally. There are focal areas of encephalomalacia involving the left cerebellar hemisphere. There is also encephalomalacia involving the medial posterior right   temporal to right occipital lobes and also encephalomalacia posterior right frontal to right parietal lobes. The ventricular system, basal cisterns and the cortical sulci are consistent with diffuse volume loss.     VISUALIZED ORBITS/SINUSES/MASTOIDS: No intraorbital abnormality. No paranasal sinus mucosal disease. No middle ear or mastoid effusion.    BONES/SOFT TISSUES: No acute abnormality.    HEAD CTA:  ANTERIOR CIRCULATION: There is no discrete large vessel occlusion, aneurysm or high flow vascular malformation involving the arteries of the [place]. There are multiple mild stenoses involving the middle and the anterior cerebral arteries   bilaterally consistent with atherosclerotic disease. Standard [place] anatomy.    POSTERIOR CIRCULATION: There are multiple mild stenoses involving the posterior cerebral arteries bilaterally most likely related to atherosclerotic disease. There is no discrete vessel occlusion, aneurysm or high flow vascular malformation. The right   vertebral artery is dominant. There is prominent vascular calcification involving the distal segments of the vertebral arteries bilaterally. There is again stable reflux of contrast within the fourth segment of the left vertebral artery. This was seen   previously.     DURAL VENOUS SINUSES: Expected enhancement of the major dural venous sinuses.    NECK CTA:  RIGHT CAROTID: There is approximately 30% stenosis involving the origin of the right internal carotid  artery by NASCET criteria. This is stable in the interval.    LEFT CAROTID: There is approximately 50% stenosis of their origin of the left internal carotid artery by NASCET criteria. Stable in the interval and visualized on axial image #203.    VERTEBRAL ARTERIES: The right vertebral artery is dominant. Minimal contrast is seen within the left vertebral artery. This is stable in the interval.     AORTIC ARCH: Classic aortic arch anatomy with no significant stenosis at the origin of the great vessels.    NONVASCULAR STRUCTURES: The lung apices are clear. There are diffuse degenerative changes of the cervical spine. Pacemaker wires are visualized.      Impression    IMPRESSION:   HEAD CT:  1.  No discrete mass lesion, hemorrhage or focal area suggestive of acute infarct by CT.  2.  Stable diffuse age related changes along with old lacunar infarcts basal ganglia bilaterally and encephalomalacia involving the right cerebral hemisphere and the left cerebellar hemisphere.    HEAD CTA:   1.  No discrete large vessel occlusion, aneurysm or high flow vascular malformation involving the arteries of the [place].  2.  Multiple stenoses involving the anterior, middle and posterior cerebral arteries bilaterally most likely atherosclerotic in nature.    NECK CTA:  1.  Normal configuration of the great vessels off the aortic arch with no significant stenosis of their origins.  2.  Stable approximately 30% stenosis involving origin of the right internal carotid artery by NASCET criteria.  3.  Approximately 50% stenosis origin of left internal carotid artery by NASCET criteria.  4.  Right vertebral artery dominant and stable minimal contrast within left vertebral artery.    Head CT findings communicated to Dr. Le at 10:05 PM. CTA results communicated to Dr. Le at 10:18 PM on 07/10/2022.   CTA Head Neck w Contrast    Narrative    EXAM: CT HEAD W/O CONTRAST, CTA HEAD NECK W CONTRAST  LOCATION: [facility]  DATE/TIME: 7/10/2022 9:47 PM    INDICATION: Left-sided weakness and left gaze perference with a history of prior stroke.  COMPARISON: 02/26/2017.  CONTRAST: 75  ml Isovue 370  TECHNIQUE: Head and neck CT angiogram with IV contrast. Noncontrast head CT followed by axial helical CT images of the head and neck vessels obtained during the arterial phase of intravenous contrast administration. Axial 2D reconstructed images and   multiplanar 3D MIP reconstructed images of the head and neck vessels were performed by the technologist. Dose reduction techniques were used. All stenosis measurements made according to NASCET criteria unless otherwise specified.    FINDINGS:   NONCONTRAST HEAD CT:   INTRACRANIAL CONTENTS: No intracranial hemorrhage, extraaxial collection, or mass effect.  No CT evidence of acute infarct. There is scattered diffuse low attenuation within the periventricular and subcortical white matter consistent with diffuse small   vessel ischemic disease. There are old lacunar infarcts involving the basal ganglia bilaterally. There are focal areas of encephalomalacia involving the left cerebellar hemisphere. There is also encephalomalacia involving the medial posterior right   temporal to right occipital lobes and also encephalomalacia posterior right frontal to right parietal lobes. The ventricular system, basal cisterns and the cortical sulci are consistent with diffuse volume loss.     VISUALIZED ORBITS/SINUSES/MASTOIDS: No intraorbital abnormality. No paranasal sinus mucosal disease. No middle ear or mastoid effusion.    BONES/SOFT TISSUES: No acute abnormality.    HEAD CTA:  ANTERIOR CIRCULATION: There is no discrete large vessel occlusion, aneurysm or high flow vascular malformation involving the arteries of the [place]. There are multiple mild stenoses involving the middle and the anterior cerebral arteries   bilaterally consistent with atherosclerotic disease. Standard  [place] anatomy.    POSTERIOR CIRCULATION: There are multiple mild stenoses involving the posterior cerebral arteries bilaterally most likely related to atherosclerotic disease. There is no discrete vessel occlusion, aneurysm or high flow vascular malformation. The right   vertebral artery is dominant. There is prominent vascular calcification involving the distal segments of the vertebral arteries bilaterally. There is again stable reflux of contrast within the fourth segment of the left vertebral artery. This was seen   previously.     DURAL VENOUS SINUSES: Expected enhancement of the major dural venous sinuses.    NECK CTA:  RIGHT CAROTID: There is approximately 30% stenosis involving the origin of the right internal carotid artery by NASCET criteria. This is stable in the interval.    LEFT CAROTID: There is approximately 50% stenosis of their origin of the left internal carotid artery by NASCET criteria. Stable in the interval and visualized on axial image #203.    VERTEBRAL ARTERIES: The right vertebral artery is dominant. Minimal contrast is seen within the left vertebral artery. This is stable in the interval.     AORTIC ARCH: Classic aortic arch anatomy with no significant stenosis at the origin of the great vessels.    NONVASCULAR STRUCTURES: The lung apices are clear. There are diffuse degenerative changes of the cervical spine. Pacemaker wires are visualized.      Impression    IMPRESSION:   HEAD CT:  1.  No discrete mass lesion, hemorrhage or focal area suggestive of acute infarct by CT.  2.  Stable diffuse age related changes along with old lacunar infarcts basal ganglia bilaterally and encephalomalacia involving the right cerebral hemisphere and the left cerebellar hemisphere.    HEAD CTA:   1.  No discrete large vessel occlusion, aneurysm or high flow vascular malformation involving the arteries of the [place].  2.  Multiple stenoses involving the anterior, middle and posterior  cerebral arteries bilaterally most likely atherosclerotic in nature.    NECK CTA:  1.  Normal configuration of the great vessels off the aortic arch with no significant stenosis of their origins.  2.  Stable approximately 30% stenosis involving origin of the right internal carotid artery by NASCET criteria.  3.  Approximately 50% stenosis origin of left internal carotid artery by NASCET criteria.  4.  Right vertebral artery dominant and stable minimal contrast within left vertebral artery.    Head CT findings communicated to Dr. Le at 10:05 PM. CTA results communicated to Dr. Le at 10:18 PM on 07/10/2022.   CT Head Perfusion w Contrast    Narrative    EXAM: CT HEAD PERFUSION W CONTRAST  LOCATION: [facility]  DATE/TIME: 7/10/2022 9:47 PM    INDICATION: History of prior stroke now with left-sided weakness left gaze preference.  COMPARISON: 02/21/2017.  TECHNIQUE: CT cerebral perfusion was performed utilizing a second contrast bolus. Perfusion data were post processed with generation of standard perfusion maps and estimation of ischemic/infarcted volumes utilizing standard threshold values. Dose   reduction techniques were used. All stenosis measurements made according to NASCET criteria unless otherwise specified.  CONTRAST: 50  ml Isovue 370  .    FINDINGS:   CT PERFUSION:  PERFUSION MAPS: There is diffuse asymmetric cortical cerebral perfusion due to the multiple areas of encephalomalacia involving the right cerebral hemisphere and left cerebellar hemisphere.    RAPID ANALYSIS:  CBF<30%: 34 mL.  Tmax>6sec: 171 mL.  Mismatch volume: 137 mL.  Mismatch ratio: 5.0      Impression    IMPRESSION:   CT PERFUSION:  1.  Diffuse asymmetrical cerebral perfusion due to multiple areas of encephalomalacia involving right cerebral hemisphere and the left cerebellar hemisphere leading to mismatch ratio and mismatch volume. There is also diffuse atherosclerotic disease   involving both cerebral  hemispheres vessels as described in recent CTA.  2.  MRI may be helpful for further evaluation of focal areas of acute ischemia.   XR Chest Port 1 View    Narrative    XR CHEST PORT 1 VIEW 7/11/2022 1:56 PM    HISTORY: fever    COMPARISON: 7/20/18.      Impression    IMPRESSION: No focal infiltrate, pleural effusion or pneumothorax.  Normal heart size. Pacemaker. Coronary stents. Stable tiny metallic  density projecting over the right upper lobe.    IVETTE BAUMAN MD         SYSTEM ID:  [id number]

## 2022-07-11 NOTE — TELEPHONE ENCOUNTER
[place] [facility] TELEPHONE ENCOUNTER    Chief Complaint   Patient presents with     Patient Request       Elias Mckee is a 89 year old  (8/10/1931),Nurse called today to report: Found to have stroke like symptoms with weakness to one side along with non reactive pupil to right eye. Registered Nurse updated family and family is requesting patient to be sent into hospital for further evaluation needs.     ASSESSMENT/PLAN      OK to send to [facility] hospital for further evaluation per family request.     Electronically signed by:   TOBY Stoner CNP

## 2022-07-11 NOTE — ED PROVIDER NOTES
History   Chief Complaint:  Stroke Symptoms       The history is provided by the EMS personnel. The history is limited by the condition of the patient.      Elias Mckee is a 89 year old male with history of CVA, hypertension, hyperlipidemia, prostate cancer, type II diabetes, and paroxysmal atrial fibrillation, on Xarelto, who presents with altered mental status. The patient's last know well was at 1600 today. He is typically alert and conversant at baseline. They noticed him this evening to be lethargic, more weak, and not speaking with staff. When EMS arrived, they found the patient to be sating at 90% and he was placed on 10 L O2 which elevated his SpO2 to 99%. He was otherwise vitally stable for EMS. His blood sugar was 138. They also noticed he was having left sided arm weakness and did not move either of his legs. This is somewhat his baseline. Patient is DNR/DNI.       Review of Systems   Unable to perform ROS: Mental status change     Allergies:  Terazosin    Medications:  Lipitor  Insulin glargine  Miralax  Seroquel  Xarelto  Flomax  Tramadol    Past Medical History:     Chronic ischemic heart disease  Malignant neoplasm of prostate  Hypertension  Type II diabetes  Hyperlipidemia  CKD, stage II  Ataxia   Leg weakness  BPH  Actinic keratoses  Squamous cell carcinoma   Basal cell carcinoma  CVA  Hypothyroidism  Paroxysmal atrial fibrillation   Onychomycosis  Central retinal vein occlusion   Mobitz type I Wenckebach atrioventricular block  Hyperkalemia   Senile dementia     Past Surgical History:    Right phacoemulsification clear cornea with standard intraocular lens implant  Seed implantation   Right vitrectomy anterior      Family History:    The patient has no known family history.    Social History:  The patient presents to the ED alone via EMS  He lives at [facility].   PCP: TOBY Bear CNP      Physical Exam     Patient Vitals for the past 24 hrs:   BP Pulse  Resp SpO2   07/10/22 2202 (!) 176/87 98 14 99 %   07/10/22 2144 (!) 157/92 93 14 98 %       Physical Exam  General: Resting on the gurney, appears comfortable  Head:  The scalp, face, and head appear normal  Mouth/Throat: Mucus membranes are moist  CV:  Regular rate    Normal S1 and S2  No pathological murmur   Resp:  Breath sounds clear and equal bilaterally    Non-labored, no retractions or accessory muscle use    No coarseness    No wheezing   GI:  Abdomen is soft, no rigidity    No tenderness to palpation  MS:  Normal motor assessment of all extremities.    Good capillary refill noted.  Skin:   No rash or lesions noted.  Neuro: Chronic left-sided deficit.  Right pupil is nonreactive though review of records and get this is likely surgical and at the patient's baseline.  Left-sided facial droop.  Slight dysarthria.  Psych:  Awake. Alert.  Normal affect.      Appropriate interactions.      Emergency Department Course     Imaging:  CT Head Perfusion w Contrast   Final Result   IMPRESSION:    CT PERFUSION:   1.  Diffuse asymmetrical cerebral perfusion due to multiple areas of encephalomalacia involving right cerebral hemisphere and the left cerebellar hemisphere leading to mismatch ratio and mismatch volume. There is also diffuse atherosclerotic disease    involving both cerebral hemispheres vessels as described in recent CTA.   2.  MRI may be helpful for further evaluation of focal areas of acute ischemia.      CTA Head Neck w Contrast   Final Result   IMPRESSION:    HEAD CT:   1.  No discrete mass lesion, hemorrhage or focal area suggestive of acute infarct by CT.   2.  Stable diffuse age related changes along with old lacunar infarcts basal ganglia bilaterally and encephalomalacia involving the right cerebral hemisphere and the left cerebellar hemisphere.      HEAD CTA:    1.  No discrete large vessel occlusion, aneurysm or high flow vascular malformation involving the arteries of the [place].   2.   Multiple stenoses involving the anterior, middle and posterior cerebral arteries bilaterally most likely atherosclerotic in nature.      NECK CTA:   1.  Normal configuration of the great vessels off the aortic arch with no significant stenosis of their origins.   2.  Stable approximately 30% stenosis involving origin of the right internal carotid artery by NASCET criteria.   3.  Approximately 50% stenosis origin of left internal carotid artery by NASCET criteria.   4.  Right vertebral artery dominant and stable minimal contrast within left vertebral artery.      Head CT findings communicated to Dr. Le at 10:05 PM. CTA results communicated to Dr. Le at 10:18 PM on 07/10/2022.      CT Head w/o Contrast   Final Result   IMPRESSION:    HEAD CT:   1.  No discrete mass lesion, hemorrhage or focal area suggestive of acute infarct by CT.   2.  Stable diffuse age related changes along with old lacunar infarcts basal ganglia bilaterally and encephalomalacia involving the right cerebral hemisphere and the left cerebellar hemisphere.      HEAD CTA:    1.  No discrete large vessel occlusion, aneurysm or high flow vascular malformation involving the arteries of the [place].   2.  Multiple stenoses involving the anterior, middle and posterior cerebral arteries bilaterally most likely atherosclerotic in nature.      NECK CTA:   1.  Normal configuration of the great vessels off the aortic arch with no significant stenosis of their origins.   2.  Stable approximately 30% stenosis involving origin of the right internal carotid artery by NASCET criteria.   3.  Approximately 50% stenosis origin of left internal carotid artery by NASCET criteria.   4.  Right vertebral artery dominant and stable minimal contrast within left vertebral artery.      Head CT findings communicated to Dr. Le at 10:05 PM. CTA results communicated to Dr. Le at 10:18 PM on 07/10/2022.        Report per radiology    Laboratory:  Labs  Ordered and Resulted from Time of ED Arrival to Time of ED Departure   COMPREHENSIVE METABOLIC PANEL - Abnormal       Result Value    Sodium 138      Potassium 4.1      Chloride 104      Carbon Dioxide (CO2) 29      Anion Gap 5      Urea Nitrogen 23      Creatinine 1.12      Calcium 8.6      Glucose 175 (*)     Alkaline Phosphatase 68      AST 15      ALT 15      Protein Total 6.9      Albumin 3.3 (*)     Bilirubin Total 0.2      GFR Estimate 62     ROUTINE UA WITH MICROSCOPIC REFLEX TO CULTURE - Abnormal    Color Urine Light Yellow      Appearance Urine Slightly Cloudy (*)     Glucose Urine Negative      Bilirubin Urine Negative      Ketones Urine Negative      Specific Gravity Urine 1.024      Blood Urine Negative      pH Urine 6.0      Protein Albumin Urine 10  (*)     Urobilinogen Urine Normal      Nitrite Urine Negative      Leukocyte Esterase Urine Large (*)     Bacteria Urine Few (*)     Mucus Urine Present (*)     RBC Urine 2      WBC Urine 46 (*)     Hyaline Casts Urine 1     CBC WITH PLATELETS AND DIFFERENTIAL - Abnormal    WBC Count 7.5      RBC Count 3.51 (*)     Hemoglobin 10.3 (*)     Hematocrit 32.5 (*)     MCV 93      MCH 29.3      MCHC 31.7      RDW 18.9 (*)     Platelet Count 198     DIFFERENTIAL - Abnormal    % Neutrophils 72      % Lymphocytes 19      % Monocytes 6      % Eosinophils 1      % Basophils 2      Absolute Neutrophils 5.4      Absolute Lymphocytes 1.4      Absolute Monocytes 0.5      Absolute Eosinophils 0.1      Absolute Basophils 0.2      RBC Morphology Confirmed RBC Indices      Platelet Assessment        Value: Automated Count Confirmed. Platelet morphology is normal.    [place] Cells Slight (*)     Elliptocytes Moderate (*)    TROPONIN I - Normal    Troponin I High Sensitivity 7     URINE CULTURE        Emergency Department Course:    Reviewed:  I reviewed nursing notes, vitals and past medical history    Assessments:  2138 I obtained history from EMS and examined the patient as  noted above.   2140 Tier 2 Code Stroke   I rechecked the patient and explained findings.     Consults:  2145 I spoke with Dr. Kamryn Lui, stroke-neurology, to discuss the patient's presentation.   2204 I spoke with radiology, to discuss the patient's CT results.    I spoke with Dr. Kamryn Lui, code stroke deescalated.    I spoke with radiology, to discuss the patient's angiography    Interventions:  Medications   iopamidol (ISOVUE-370) solution 125 mL (125 mLs Intravenous Given 7/10/22 2153)   100mL Saline Flush (100 mLs Intravenous Given 7/10/22 2153)       Disposition:  The patient was discharged to home.     Impression & Plan       Medical Decision Making:  Elias Mckee is a 89 year old male who presents for evaluation of weakness.  Associated symptoms today have included decreased interaction, which prompted his care facility to perform a neuro exam and notice that his right pupil was non rective.  He was then transferred here out of concern for acute stroke.  By my exam and the patient's history this is most likely chronic in nature.  CT imaging was unremarkable.  Laboratory evaluation was undertaken as well and was largely unremarkable other than evidence of UTI.  I believe UTI is the most likely contributing factor to his increased weakness.  A dose of Rocephin was given in the emergency department and he will be prescribed Keflex.  In addition, I do not believe symptoms are due to CVA, TIA, myasthesia gravis, myopathy or acute coronary syndromes and there are no indications at this point for a further workup with a benign history, exam and laboratory tests.  He currently lives in a care facility and will have assistance with his ADLs.  I believe supportive outpatient management is indicated; will have them followup with their primary care doctor in 1-2 days for a recheck. Return for any additional symptoms or worsening weakness.        Diagnosis:    ICD-10-CM    1. Urinary tract infection without  hematuria, site unspecified  N39.0        Discharge Medications:  New Prescriptions    CEPHALEXIN (KEFLEX) 500 MG CAPSULE    Take 1 capsule (500 mg) by mouth 3 times daily for 7 days       Scribe Disclosure:  I, Kary Tellez, am serving as a scribe on 7/10/2022 at 9:39 PM to personally document services performed by Nadine Le MD based on my observations and the provider's statements to me.            Nadine Le MD  07/11/22 0136

## 2022-07-12 NOTE — CONSULTS
[facility]    Consult Note - [facility]    ______________________________________________________________________    [facility] 24/7 Contact Number: [phone]    - Providers: Please contact [facility] with changes in orders or clinical plan of care   - Nursing: Please contact [facility] with significant changes in patient condition    Hospice will notify the care team (including the hospitalist) to confirm date of inpatient hospice (GIP) admission.    New Epic encounter will not be created until hospice completes admission.   ______________________________________________________________________        Hospice Diagnosis: Severe Sepsis    Indication for Inpatient Hospice: Patient appears to be actively dying. Patient is eligible for hospital GIP for management of pain, not stable for discharge, at this time. Receiving IV pain med medication, PRN medications not yet stabilized. Patient also has increased respiratory effort AEB RR 25. Patient has symptom management needs that will require frequent medication adjustments, and/or skilled nursing assessment and intervention.       Goals for Hospital Discharge: Pt is not stable for transport at this time     Plan of Care Discussed with the Following:   - Nurse: CORNELIO Becerril  - Hospitalist/Rounding Provider: AUTUMN Silva    - Peter's Family/Preferred Contact: Kendra Wu  - Hospice Provider: Dr. Austin Mcmillan    Summary of Visit (includes assessment, medications and any new orders):   Writer met with daughter Jazmin and son Neil regarding hospice care and EOL. Family is in agreement with hospice care and admission to GIP for EOL care and symptom management.  Daughter/JARON Wu signed hospice consent forms. Patient is eligible for GIP hospice for pain management. Patient currently utilizing PRN Roxanol and lorazepam for pain and anxiety. Hospice would recommend discontinuing IV and transitioning to PO/SL comfort  medications. Hospice will continue to visit patient daily, and consult with hospital staff regarding medication recommendations for optimal comfort.    Letitia Saavedra RN

## 2022-07-12 NOTE — PROGRESS NOTES
Pt on comfort cares, non-responsive to voice. Arouses to pain and stimulation. Tolerating 2L NC. All non comfort assessments deferred.   Increased respiratory effort and discomfort managed by PRN medications. Family at bedside. 1PIV on left forearm SL. Adequate UO via pascal, loose soft BM prior to transport. Q2 turn/repo as tolerated, frequent oral cares.     Report given to Denae, station 88 who will be taking over pt care.

## 2022-07-12 NOTE — PLAN OF CARE
Pt up to unit at 1820. On 2 L O2. Pt settled, sleeping comfortably w/o signs of pain. Periods of apnea. Oral cares done without arousal. Ogden in place. Plan to continue comfort measures only. No family at bedside now.

## 2022-07-12 NOTE — PROGRESS NOTES
[facility]    Medicine Progress Note - Hospitalist Service    Date of Admission:  7/11/2022    Assessment & Plan   Elias Mckee is a 89 year old male with PMH signficant for dementia living in care facility, Afib on DOAC, CKD3, hx prostate cancer, recurrent strokes, CAD, HTN, HLD, IDDM2, who presents to the hospital on 7/11/2022 with altered mental status and was found to have acute cystitis with severe sepsis. He continued to decompensate on admission and was transitioned to comfort measures only the evening of admission.    Summary of hospitalization  Presented to ED 7/10 with altered mental status concerning for stroke. At that time, stroke work-up was negative and he was found to have UTI, and he was subsequently discharged back to his facility with oral Keflex. At baseline patient has a pleasant disposition and lives every day per his daughter. She notes episodes of agitation intermittently, but these have been well controlled by medication. Last visited by daughter 7/9/22 who reported patient was at baseline at that time, he was not able to say much, but was able to respond appropriately to her questions and interact.     On 7/11/2022, nursing home staff found him unresponsive and called 911. No other symptoms were reported to ED staff. In the ED, the patient was tachycardic with temp 100'F, intermittent episodes of bradycardia per bedside RN in the ED. He was hypoxic requiring 2L NC and completely unresponsive to verbal stimuli, however withdraws to painful stimuli. Noted to have MARIA DEL CARMEN with Cr 1.46 up from 1.12 on 7/10. Lactic acid 6 initially. pH 7.3, PCO2 50, PO2 22, bicarb 25. WBC 28.5 up from 7.5 on 7/10. UA appears grossly infected with moderate blood, large leukocyte esterase, 172 WBCs, 28 RBCs, and many bacteria. He was initiated on Zosyn and IV fluids. He was admitted to [facility], and family confirmed DNR/DNI and no escalation of cares to ICU or pressors. Lactate was rechecked  which increased to 8.2 despite fluid resuscitation. BP was stable however the patient appeared progressively acutely ill and no longer protecting his airway, noted to have gurgling respirations with tachypnea, increased work of breathing and worsening hypoxia up to 10-15L NRB oximask. Given medical decompensation the plan was again discussed with family and ultimately a comfort care approach was determined.     Comfort measures only (CMO)  Appears to be actively dying, estimated time hours to days.  - Comfort cares orderset ordered   - Pain control per comfort care orderset, Raxanol added  - O2 and secretion management for comfort  - Allow family at bedside per protocol  - Acute medical treatment including antimicrobial therapy and IV fluid resusitation discontinued  - All non comfort focused medications and monitoring discontinued including DOAC, VS, glucose checks, laboratory draws  - SW/CC consultation for hospice  - Expect patient will pass while in house, GIP Hospice consultation pending       Hospital Problems  Severe sepsis 2/2 Klebsiella UTI  UTI  - Initially fluid resuscitated with IV fluids, started on Zosyn, and lactate trended up from 6-8.2. Admitted to [facility] however after medical decompensation the patient was changed to comfort measures only, no pressures or escalation of cares to ICU. Patient is DNR/DNI status.   - UCx growing >100K Klebsiella, sensitivities pending  - BCx pending     Acute encephalopathy 2/2 sepsis     MARIA DEL CARMEN on CKD stage 3  - Ogden in place, continue for CMO  - No additional monitoring necessary     Chronic stable diagnoses: Discontinue all PTA medications that are not comfort focused.  Dementia: Living in care facility    CVAs with resultant L hemiplegia  *stroke code 7/10, CT imaging stable and discharged home.     Paroxysmal atrial fibrillation: Discontinued PTA DOAC due to comfort care approach    Insulin dependent type 2 diabetes  PTA Regimen: Basaglar insulin 22 units  HS  - No  fingerstick checks necessary due to CMO    Recent Labs   Lab 07/11/22  1318 07/10/22  2149   * 175*       Coronary artery disease    Hyperlipidemia    Hx Prostate cancer    COVID-19 PCR Results    COVID-19 PCR Results 7/11/22   COVID-19 Virus by PCR (External Result)    SARS CoV2 PCR Negative             Diet: None  DVT Prophylaxis: Pneumatic Compression Devices  Ogden Catheter: PRESENT, indication: End of Life  Central Lines: None  Cardiac Monitoring: None  Code Status: No CPR- Do NOT Intubate      Disposition Plan      Expected Discharge Date: 07/13/2022        Discharge Comments: 7/12 End of Life care.        The patient's care was discussed with the Attending Physician, Dr. Pena, Bedside Nurse and Patient's Family.    Zohra Velez PA-C  Hospitalist Service  [facility]  Securely message with the Vocera Web Console (learn more here)  Text page via [employer] Paging/Directory         Clinically Significant Risk Factors Present on Admission               # Coagulation Defect: home medication list includes an anticoagulant medication   # Hypertension: home medication list includes antihypertensive(s)          ______________________________________________________________________    Interval History   Seen and examined. Changed to comfort cares shortly after admission due to rapid declining medical status. IV fluids and IV antibiotic were discontinued. Oxygenneeds up to 15LNRB from 1-2L, replaced on NC O2 during my visit. Son at bedside, patient with intermittent soft moaning, starting on Roxanol for better pain control. Hospice consultations pending. All questions answered to son, provided support. He requests I revisit later today as able when patient's daughter is available.     Data reviewed today: I reviewed all medications, new labs and imaging results over the last 24 hours. I personally reviewed no images or EKG's today.    Physical Exam   Vital Signs: Temp: 97.6  F (36.4   C) Temp src: Axillary BP: 130/54 Pulse: 77   Resp: 26 SpO2: 100 % O2 Device: Non-rebreather mask Oxygen Delivery: 15 LPM  Weight: 0 lbs 0 oz    Physical Exam  Vitals and nursing note reviewed.   Constitutional:       Appearance: He is ill-appearing.      Comments: Does not wake to verbal stimuli. Intermittent soft moaning, gurgling breath sounds.    HENT:      Head: Normocephalic and atraumatic.   Cardiovascular:      Rate and Rhythm: Normal rate. Rhythm irregular.      Pulses: Normal pulses.      Heart sounds: No murmur heard.  Pulmonary:      Comments: Course sounding breath sounds. No overt rhonchi or wheezing. 15L Nonrebreather replaced by NC during visit. Variable breathing pattern, cheyne claros pattern at times.  Abdominal:      Comments: Nontender, nondistended.   Skin:     General: Skin is warm and dry.      Coloration: Skin is ashen.   Neurological:      Mental Status: He is lethargic and disoriented.           Data   Recent Labs   Lab 07/11/22  1318 07/10/22  2149   WBC 28.5* 7.5   HGB 11.1* 10.3*   MCV 93 93    198   INR 1.47*  --     138   POTASSIUM 4.3 4.1   CHLORIDE 106 104   CO2 23 29   BUN 27 23   CR 1.46* 1.12   ANIONGAP 10 5   TRENT 9.1 8.6   * 175*   ALBUMIN 3.4 3.3*   PROTTOTAL 7.1 6.9   BILITOTAL 0.4 0.2   ALKPHOS 60 68   ALT 15 15   AST 20 15     Recent Results (from the past 24 hour(s))   XR Chest Port 1 View    Narrative    XR CHEST PORT 1 VIEW 7/11/2022 1:56 PM    HISTORY: fever    COMPARISON: 7/20/18.      Impression    IMPRESSION: No focal infiltrate, pleural effusion or pneumothorax.  Normal heart size. Pacemaker. Coronary stents. Stable tiny metallic  density projecting over the right upper lobe.    IVETTE BAUMAN MD         SYSTEM ID:  [id number]   US Renal Complete    Narrative    US RENAL COMPLETE   7/11/2022 4:27 PM     HISTORY: UTI, now with MARIA DEL CARMEN eval for hydro    COMPARISON: MRI 7/17/2018    FINDINGS:    Right Kidney: Normal in size measuring up to 9.7 cm.  No  hydronephrosis.    Left Kidney: Normal in size measuring up to 9.4 cm. No hydronephrosis.    Bladder: Decompressed by Ogden catheter.      Impression    IMPRESSION: No hydronephrosis.    JEYSON CALABRESE MD         SYSTEM ID:  [id number]     Medications       sodium chloride (PF)  3 mL Intracatheter Q8H

## 2022-07-12 NOTE — DISCHARGE SUMMARY
[facility]  Hospitalist Discharge Summary      Date of Admission:  7/11/2022  Date of Discharge:  7/12/2022  Discharging Provider: Zohra Velez PA-C    Discharge Diagnoses   Severe sepsis   Klebsiella UTI  Acute encephalopathy 2/2 sepsis  MARIA DEL CARMEN on CKD stage 3    Follow-ups Needed After Discharge   Elias cMkee is being readmitted to inpatient hospice.    Discharge Disposition   Discharged to inpatient hospice  Condition at discharge: Guarded    Hospital Course   Elias Mckee is a 89 year old male with PMH signficant for dementia living in care facility, Afib on DOAC, CKD3, hx prostate cancer, recurrent strokes, CAD, HTN, HLD, IDDM2, who presents to the hospital on 7/11/2022 with altered mental status and was found to have acute cystitis with severe sepsis. He continued to decompensate on admission and was transitioned to comfort measures only the evening of admission. For full H&P please see admission H&P from Dr. Joshua Joseph dated 7/11/22. He is being discharged from the current hospital encounter and will be readmitted to inpatient hospice.     Summary of hospitalization  Presented to ED 7/10 with altered mental status concerning for stroke. At that time, stroke work-up was negative and he was found to have UTI, and he was subsequently discharged back to his facility with oral Keflex. At baseline patient has a pleasant disposition and lives every day per his daughter. She notes episodes of agitation intermittently, but these have been well controlled by medication. Last visited by daughter 7/9/22 who reported patient was at baseline at that time, he was not able to say much, but was able to respond appropriately to her questions and interact.     On 7/11/2022, nursing home staff found him unresponsive and called 911. No other symptoms were reported to ED staff. In the ED, the patient was tachycardic with temp 100'F, intermittent episodes of bradycardia per bedside  RN in the ED. He was hypoxic requiring 2L NC and completely unresponsive to verbal stimuli, however withdraws to painful stimuli. Noted to have MARIA DEL CARMEN with Cr 1.46 up from 1.12 on 7/10. Lactic acid 6 initially. pH 7.3, PCO2 50, PO2 22, bicarb 25. WBC 28.5 up from 7.5 on 7/10. UA appears grossly infected with moderate blood, large leukocyte esterase, 172 WBCs, 28 RBCs, and many bacteria. He was initiated on Zosyn and IV fluids. He was admitted to [facility], and family confirmed DNR/DNI and no escalation of cares to ICU or pressors. Lactate was rechecked which increased to 8.2 despite fluid resuscitation. BP was stable however the patient appeared progressively acutely ill and no longer protecting his airway, noted to have gurgling respirations with tachypnea, increased work of breathing and worsening hypoxia up to 10-15L NRB oximask. Given medical decompensation the plan was again discussed with family and ultimately a comfort care approach was determined.     Comfort measures only (CMO)  Appears to be actively dying, estimated time hours to days. Comfort cares orderset ordered and pain control per comfort care orderset, Raxanol added. O2 and secretion management for comfort. Allowing family at bedside per protocol. Acute medical treatment including antimicrobial therapy and IV fluid resusitation discontinued. All non comfort focused medications and monitoring discontinued including DOAC, VS, glucose checks, laboratory draws. [facility] consulted who felt patient was an acceptable candidate. He was approved to transition to [facility] on 7/12/22.     Hospital Problems  Severe sepsis 2/2 Klebsiella UTI  UTI  Initially fluid resuscitated with IV fluids, started on Zosyn, and lactate trended up from 6-8.2. Admitted to [facility] however after medical decompensation the patient was changed to comfort measures only, no pressures or escalation of cares to ICU. Patient is DNR/DNI status. UCx growing >100K Klebsiella, sensitivities  pending. BCx pending.     Acute encephalopathy 2/2 sepsis     MARIA DEL CARMEN on CKD stage 3  Ogden in place, continue for CMO. No additional monitoring necessary     Chronic stable diagnoses: Discontinue all PTA medications that are not comfort focused.  Dementia: Living in care facility     CVAs with resultant L hemiplegia  *stroke code 7/10, CT imaging stable and discharged home.      Paroxysmal atrial fibrillation: Discontinued PTA DOAC due to comfort care approach     Insulin dependent type 2 diabetes  PTA Regimen: Basaglar insulin 22 units Q HS. No fingerstick checks necessary due to CMO          Recent Labs   Lab 07/11/22  1318 07/10/22  2149   * 175*         Coronary artery disease     Hyperlipidemia     Hx Prostate cancer         COVID-19 PCR Results    COVID-19 PCR Results 7/11/22   COVID-19 Virus by PCR (External Result)     SARS CoV2 PCR Negative              Consultations This Hospital Stay   SPIRITUAL HEALTH SERVICES IP CONSULT  [facility] INPATIENT HOSPICE ADULT CONSULT  CARE MANAGEMENT / SOCIAL WORK IP CONSULT  [facility] INPATIENT HOSPICE ADULT CONSULT    Code Status   No CPR- Do NOT Intubate    Time Spent on this Encounter   I, Zohra Velez PA-C, personally saw the patient today and spent greater than 30 minutes discharging this patient.       Zohra Velez PA-C  [place] [address] [address] [place] [address]  Phone: [phone]  ______________________________________________________________________  Discharge Medications   Discharge Medication List as of 7/12/2022  3:39 PM      CONTINUE these medications which have NOT CHANGED    Details   ACE/ARB/ARNI NOT PRESCRIBED (INTENTIONAL) Reason ACE/ARB was Not Prescribed: Other (see comments)No Print Out      ACETAMINOPHEN PO Take 1,000 mg by mouth 3 times daily For pain, Historical      Atorvastatin Calcium (LIPITOR PO) Take 40 mg by mouth At Bedtime, Historical      cephALEXin (KEFLEX) 500 MG capsule Take 1 capsule  (500 mg) by mouth 3 times daily for 7 days, Disp-21 capsule, R-0, Local Print      cholecalciferol (VITAMIN D) 1000 UNIT tablet Take 2,000 Units by mouth daily, Historical      Insulin Glargine (BASAGLAR KWIKPEN SC) Inject 22 Units Subcutaneous At Bedtime May use 2 units as needed to prime pen before each dose., Historical      polyethylene glycol (MIRALAX/GLYCOLAX) powder Take 17 g by mouth daily as needed for constipation, Historical      QUEtiapine (SEROQUEL) 50 MG tablet Take 50 mg by mouth 2 times daily, Historical      rivaroxaban ANTICOAGULANT (XARELTO ANTICOAGULANT) 15 MG TABS tablet Take 1 tablet (15 mg) by mouth daily (with dinner), Disp-30 tablet, R-0, No Print Out      tamsulosin (FLOMAX) 0.4 MG capsule Take 1 capsule (0.4 mg) by mouth daily, Disp-60 capsule, R-0, Transitional      traMADol HCl 100 MG TABS Take 100 mg by mouth 4 times daily, Historical             Physical Exam   Vital Signs: Temp: 97.6  F (36.4  C) Temp src: Axillary BP: 130/54 Pulse: 77   Resp: 26 SpO2: 96 % O2 Device: Nasal cannula Oxygen Delivery: 5 LPM  Weight: 0 lbs 0 oz    Please see physical exam from my PROGRESS NOTE on the same date       Primary Care Physician   Vitaliy Champagne    Discharge Orders   No discharge procedures on file.    Significant Results and Procedures   Results for orders placed or performed during the hospital encounter of 07/11/22   XR Chest Port 1 View    Narrative    XR CHEST PORT 1 VIEW 7/11/2022 1:56 PM    HISTORY: fever    COMPARISON: 7/20/18.      Impression    IMPRESSION: No focal infiltrate, pleural effusion or pneumothorax.  Normal heart size. Pacemaker. Coronary stents. Stable tiny metallic  density projecting over the right upper lobe.    IVETTE BAUMAN MD         SYSTEM ID:  [id number]   US Renal Complete    Narrative    US RENAL COMPLETE   7/11/2022 4:27 PM     HISTORY: UTI, now with MARIA DEL CARMEN eval for hydro    COMPARISON: MRI 7/17/2018    FINDINGS:    Right Kidney: Normal in size measuring up  to 9.7 cm. No  hydronephrosis.    Left Kidney: Normal in size measuring up to 9.4 cm. No hydronephrosis.    Bladder: Decompressed by Ogden catheter.      Impression    IMPRESSION: No hydronephrosis.    JEYSON CALABRESE MD         SYSTEM ID:  [id number]       Allergies   Allergies   Allergen Reactions     Terazosin Other (See Comments)     Impotence

## 2022-07-12 NOTE — ED NOTES
Dr. Joseph reports that patient will no longer need C level of care - pursuing comfort care.  Dr. Joseph will change the bed request to reflect these changes.

## 2022-07-12 NOTE — H&P
[facility]    History and Physical - Hospitalist Service       Date of Admission:  7/12/2022    Assessment & Plan   Elias Mckee is a 89 year old male with PMH signficant for dementia living in care facility, Afib on DOAC, CKD3, hx prostate cancer, recurrent strokes, CAD, HTN, HLD, IDDM2, who presents to the hospital on 7/11/2022 with altered mental status and was found to have acute cystitis with severe sepsis. He continued to decompensate on admission and was transitioned to comfort measures only the evening of admission.    He was transitioned to inpatient hospice on 7/12/2022. Please see the discharge summary from the same date for more details of his hospital course.     [facility] Hospice status - Comfort measures only (CMO)  Appears to be actively dying, estimated time hours to days.  - GIP Hospice status, Hospice following  - Pain control per comfort care orderset, Raxanol added, adjust per Hospice recommendations  - O2 and secretion management for comfort  - Allow family at bedside per protocol  - Acute medical treatment including antimicrobial therapy and IV fluid resusitation discontinued  - All non comfort focused medications and monitoring discontinued including DOAC, VS, glucose checks, laboratory draws  - Ogden in place, ok to continue  - Expect patient will pass while in house     Summary of hospitalization  Presented to ED 7/10 with altered mental status concerning for stroke. At that time, stroke work-up was negative and he was found to have UTI, and he was subsequently discharged back to his facility with oral Keflex. At baseline patient has a pleasant disposition and lives every day per his daughter. She notes episodes of agitation intermittently, but these have been well controlled by medication. Last visited by daughter 7/9/22 who reported patient was at baseline at that time, he was not able to say much, but was able to respond appropriately to her questions and  interact.     On 7/11/2022, nursing home staff found him unresponsive and called 911. No other symptoms were reported to ED staff. In the ED, the patient was tachycardic with temp 100'F, intermittent episodes of bradycardia per bedside RN in the ED. He was hypoxic requiring 2L NC and completely unresponsive to verbal stimuli, however withdraws to painful stimuli. Noted to have MARIA DEL CARMEN with Cr 1.46 up from 1.12 on 7/10. Lactic acid 6 initially. pH 7.3, PCO2 50, PO2 22, bicarb 25. WBC 28.5 up from 7.5 on 7/10. UA appears grossly infected with moderate blood, large leukocyte esterase, 172 WBCs, 28 RBCs, and many bacteria. He was initiated on Zosyn and IV fluids. He was admitted to [facility], and family confirmed DNR/DNI and no escalation of cares to ICU or pressors. Lactate was rechecked which increased to 8.2 despite fluid resuscitation. BP was stable however the patient appeared progressively acutely ill and no longer protecting his airway, noted to have gurgling respirations with tachypnea, increased work of breathing and worsening hypoxia up to 10-15L NRB oximask. Given medical decompensation the plan was again discussed with family and ultimately a comfort care approach was determined.       Diet:  None appropriate currently  Ogden Catheter: PRESENT, indication:    Central Lines: None  Code Status: No CPR- Do NOT Intubate  Expected Discharge: working on discharge with hospice    Zohra Velez PA-C  Hospitalist Service  [facility]  Securely message with the [employer] Web Console (learn more here)  Text page via [email or web address] Paging/Directory         ______________________________________________________________________    Chief Complaint   Transitioning to inpatient hospice    History of Present Illness   Elias Mckee is a 89 year old male who is being transitioned to inpatient hospice.  Please see the discharge summary from the same date for more details; a brief summary of his current symptoms  is included here.    89 year old male with PMH signficant for dementia living in care facility, Afib on DOAC, CKD3, hx prostate cancer, recurrent strokes, CAD, HTN, HLD, IDDM2, who presented to ED 7/10 with altered mental status concerning for stroke. At that time, stroke work-up was negative and he was found to have UTI, and he was subsequently discharged back to his facility with oral Keflex. On 7/11/2022, nursing home staff found him unresponsive and called 911. No other symptoms were reported to ED staff. In the ED, the patient was tachycardic with temp 100'F, intermittent episodes of bradycardia per bedside RN in the ED. He was hypoxic requiring 2L NC and completely unresponsive to verbal stimuli, however withdrawed to painful stimuli. Noted to have MARIA DEL CARMEN with Cr 1.46 up from 1.12 on 7/10. Lactic acid 6 initially. pH 7.3, PCO2 50, PO2 22, bicarb 25. WBC 28.5 up from 7.5 on 7/10. UA appears grossly infected with moderate blood, large leukocyte esterase, 172 WBCs, 28 RBCs, and many bacteria. He was initiated on Zosyn and IV fluids.     He was admitted to [facility], and family confirmed DNR/DNI and no escalation of cares to ICU or pressors. Lactate was rechecked which increased to 8.2 despite fluid resuscitation. BP was stable however the patient appeared progressively acutely ill and no longer protecting his airway, noted to have gurgling respirations with tachypnea, increased work of breathing and worsening hypoxia up to 10-15L NRB oximask. Given medical decompensation the plan was again discussed with family and ultimately a comfort care approach was determined. Hospice was consulted on 7/12 and he was accepted and transitioned to the [facility] program.     During my visit he appeared to be actively dying. Requiring oxygen, eyes closed, and minimally interactive with son. Variable breathing pattern, cheyne claros type pattern at times. Intermittent soft moaning, receiving analgesia. Secretion management as needed.      Review of Systems    Review of systems was not needed on this patient    Past Medical History    Please see the medical & surgical history outlined in the H&P from the previous hospital encounter    Social History   Please see the social history outlined in the H&P from the previous hospital encounter    Family History   Please see the family history outlined in the H&P from the previous hospital encounter    Prior to Admission Medications   Prior to Admission Medications   Prescriptions Last Dose Informant Patient Reported? Taking?   ACE/ARB/ARNI NOT PRESCRIBED (INTENTIONAL)  Nursing Home No No   Sig: Please choose reason not prescribed from choices below.   ACETAMINOPHEN PO  Nursing Home Yes No   Sig: Take 1,000 mg by mouth 3 times daily For pain   Atorvastatin Calcium (LIPITOR PO)  Nursing Home Yes No   Sig: Take 40 mg by mouth At Bedtime   Insulin Glargine (BASAGLAR KWIKPEN SC)  Nursing Home Yes No   Sig: Inject 22 Units Subcutaneous At Bedtime May use 2 units as needed to prime pen before each dose.   QUEtiapine (SEROQUEL) 50 MG tablet  Nursing Home Yes No   Sig: Take 50 mg by mouth 2 times daily   cephALEXin (KEFLEX) 500 MG capsule  Nursing Home No No   Sig: Take 1 capsule (500 mg) by mouth 3 times daily for 7 days   cholecalciferol (VITAMIN D) 1000 UNIT tablet  Nursing Home Yes No   Sig: Take 2,000 Units by mouth daily   polyethylene glycol (MIRALAX/GLYCOLAX) powder  Nursing Home Yes No   Sig: Take 17 g by mouth daily as needed for constipation   rivaroxaban ANTICOAGULANT (XARELTO ANTICOAGULANT) 15 MG TABS tablet  Nursing Home No No   Sig: Take 1 tablet (15 mg) by mouth daily (with dinner)   tamsulosin (FLOMAX) 0.4 MG capsule  Nursing Home No No   Sig: Take 1 capsule (0.4 mg) by mouth daily   traMADol HCl 100 MG TABS  Nursing Home Yes No   Sig: Take 100 mg by mouth 4 times daily      Facility-Administered Medications: None     Allergies   Allergies   Allergen Reactions     Terazosin Other (See Comments)      Impotence       Physical Exam   Vital Signs:                    Weight: 0 lbs 0 oz    Please see physical exam from my PROGRESS NOTE on the same date

## 2022-07-12 NOTE — PROGRESS NOTES
"SPIRITUAL HEALTH SERVICES Progress Note  [facility]    Saw pt Elias Mckee and family members (daughter, son, and granddaughter) per consult for comfort cares.     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      Illness Narrative - Elias has had a two major strokes in the past and was living at an [facility]. \"He was doing well until Sunday night when he declined very quickly\" and was brought into the ED. Family has decided to move to comfort measures.      Distress - Jazmin shared that \"it's all happened so fast\" as she reflected on her dad visiting with a cousin outdoors on Saturday and coming to the hospital on Sunday evening.      Coping -     The family is very supportive of one another and sitting at the bedside talking with Torey, reading devotions, saying prayers, and holding his hand. They reflected on the support of the extended family and friends.    Torey was raised [employer] and when he  became Congregation. Prior to Covid, Jazmin would go to Orthodoxy services at the [facility] together.      Meaning-Making - the love, attentiveness, and care that Torey has given to his family and trust that he will be \"gathered into God's love.\"    I offered spiritual and emotional support through reflective listening that affirmed emotions, experience, and meaning. Offered assurance through prayer which incorporated conversational themes.      Plan - Will continue to follow. [facility] remains available for support.    Melania Bauman MDiv  Associate   [phone] (pager)    "

## 2022-07-12 NOTE — PLAN OF CARE
Goal Outcome Evaluation:    Plan of Care Reviewed With: daughter, grandchild(seymour)     Overall Patient Progress: no change    Outcome Evaluation: pt remains comfort care, no change on overnight.    Pt non-responsive to voice, response to pain. Full head to toe and vital signs deferred due to comfort care status.PRN dilaudid for pain management, pt appears comfortable at this time. on 15L nonrebreather mask.adequate urine output via pascal catheter.  Family at bedside.

## 2022-07-13 NOTE — PROGRESS NOTES
[facility]  Medicine Progress Note - Hospitalist Service    Date of Admission:  7/12/2022    Assessment & Plan            Elias Mckee is a 89 year old male with PMH signficant for dementia living in care facility, Afib on DOAC, CKD3, hx prostate cancer, recurrent strokes, CAD, HTN, HLD, IDDM2, who presents to the hospital on 7/11/2022 with altered mental status and was found to have acute cystitis with severe sepsis. He continued to decompensate on admission and was transitioned to comfort measures only the evening of admission.     He was transitioned to inpatient hospice on 7/12/2022. Please see the discharge summary from the same date for more details of his hospital course.     GIP Hospice status - Comfort measures only (CMO)  Appears to be actively dying, estimated time hours to days.  - GIP Hospice status, Hospice following  - Pain control per comfort care orderset, Raxanol added, adjust per Hospice recommendations  - O2 and secretion management for comfort  - Allow family at bedside per protocol  - Acute medical treatment including antimicrobial therapy and IV fluid resusitation discontinued  - All non comfort focused medications and monitoring discontinued including DOAC, VS, glucose checks, laboratory draws  - Ok to continue pascal  - Expect patient will pass while in house      Summary of hospitalization  Presented to ED 7/10 with altered mental status concerning for stroke. At that time, stroke work-up was negative and he was found to have UTI, and he was subsequently discharged back to his facility with oral Keflex. At baseline patient has a pleasant disposition and lives every day per his daughter. She notes episodes of agitation intermittently, but these have been well controlled by medication. Last visited by daughter 7/9/22 who reported patient was at baseline at that time, he was not able to say much, but was able to respond appropriately to her questions and  interact.     On 7/11/2022, nursing home staff found him unresponsive and called 911. No other symptoms were reported to ED staff. In the ED, the patient was tachycardic with temp 100'F, intermittent episodes of bradycardia per bedside RN in the ED. He was hypoxic requiring 2L NC and completely unresponsive to verbal stimuli, however withdraws to painful stimuli. Noted to have MARIA DEL CARMEN with Cr 1.46 up from 1.12 on 7/10. Lactic acid 6 initially. pH 7.3, PCO2 50, PO2 22, bicarb 25. WBC 28.5 up from 7.5 on 7/10. UA appears grossly infected with moderate blood, large leukocyte esterase, 172 WBCs, 28 RBCs, and many bacteria. He was initiated on Zosyn and IV fluids. He was admitted to [facility], and family confirmed DNR/DNI and no escalation of cares to ICU or pressors. Lactate was rechecked which increased to 8.2 despite fluid resuscitation. BP was stable however the patient appeared progressively acutely ill and no longer protecting his airway, noted to have gurgling respirations with tachypnea, increased work of breathing and worsening hypoxia up to 10-15L NRB oximask. Given medical decompensation the plan was again discussed with family and ultimately a comfort care approach was determined.         Diet:   for comfort as tolerated  DVT Prophylaxis: not indicated  Ogden Catheter: PRESENT, indication:    Central Lines: None  Cardiac Monitoring: None  Code Status: No CPR- Do NOT Intubate      Disposition Plan      Expected Discharge Date: 07/15/2022    Discharge Delays: Comfort Care/Hospice            The patient's care was discussed with the Bedside Nurse and Patient's Family.    Ghassan Pena MD  Hospitalist Service  [facility]  Securely message with the [employer]oc[employer] Web Console (learn more here)  Text page via [email or web address] Paging/Directory         Clinically Significant Risk Factors Present on Admission               # Coagulation Defect: home medication list includes an anticoagulant medication   #  Hypertension: home medication list includes antihypertensive(s)          ______________________________________________________________________    Interval History   Discussed with RN and visited patient this afternoon. Daughter at bedside, states he is not restless like yesterday, rather somnolent but some increased work of breathing noted. No increased secretions.    Data reviewed today: I reviewed all medications, new labs and imaging results over the last 24 hours. I personally reviewed no images or EKG's today.    Physical Exam   Vital Signs:           Resp: 26        Weight: 0 lbs 0 oz    General: calm, Somnolent.  Respiratory: no upper airway secretions, slightly increased work of breathing  Skin; Warm    Data   Recent Labs   Lab 07/11/22  1318 07/10/22  2149   WBC 28.5* 7.5   HGB 11.1* 10.3*   MCV 93 93    198   INR 1.47*  --     138   POTASSIUM 4.3 4.1   CHLORIDE 106 104   CO2 23 29   BUN 27 23   CR 1.46* 1.12   ANIONGAP 10 5   TRENT 9.1 8.6   * 175*   ALBUMIN 3.4 3.3*   PROTTOTAL 7.1 6.9   BILITOTAL 0.4 0.2   ALKPHOS 60 68   ALT 15 15   AST 20 15     No results found for this or any previous visit (from the past 24 hour(s)).  Medications

## 2022-07-13 NOTE — PHARMACY-ADMISSION MEDICATION HISTORY
Admission medication history completed at [facility] during non-Hospice admission. Please see Pharmacy - Admission Medication History note from 07/11/2022.

## 2022-07-13 NOTE — PLAN OF CARE
Goal Outcome Evaluation:  Unresponsive. On 2 L O2. Pt settled, sleeping comfortably. Labored respirations.Oral cares done. Ogden in place. Plan to continue comfort measures only. Daughter at bedside. PRN ativan, atropine drops and roxanol given multiple times. 1 large BM. Pt expected to die here- in house hospice.

## 2022-07-13 NOTE — PLAN OF CARE
Goal Outcome Evaluation:    Declining    Mobility: A2. Turn and repo for comfort  Pain Management: dilaudid and ativan q 2-3 hours  Diet: oral cares  Bowel/Bladder: pascal-concentrated yellow urine  Drain/Device/Wound: PIV naida MONTEIRO  Consults: [facility]  D/C Day/Goals/Place: expected to pass here    Shift Note:  Occasional labored respirations. Oral cares provide. PRN atropine drops.  Coccyx blanchable redness. Son and daughter at bedside.

## 2022-07-13 NOTE — PROGRESS NOTES
"SPIRITUAL HEALTH SERVICES  SPIRITUAL ASSESSMENT Progress Note  [facility]     REFERRAL SOURCE: follow up visit for end of life      Jazmin engaged on life review and stories of her father. Last evening family members (son and granddaughter in [place], grandson in [place] and family at bedside) were able to have a conference call to shared stories and say goodbye.    Jazmin shared her gratitude for the information, especially \"Gone From My Sight\" that she received from the inpatient hospice nurse and that her dad \"looks comfortable.\"     Jazmin was tearful as she named the importance of her Mu-ism scott as a source of comfort and peace in this time and the conversations she's had with her dad who \"wasn't a regular Restorationist goer, told me that he prayed everyday and prayed for all of us everyday.\"    I offered spiritual and emotional support through reflective listening that affirmed emotions, experience, and meaning.     PLAN: Delivered a comfort blanket. [facility] remains available for support.    Melania Bauman  Associate   Pager [phone]  Phone [phone]    "

## 2022-07-14 NOTE — PROGRESS NOTES
[facility]  Medicine Progress Note - Hospitalist Service    Date of Admission:  7/12/2022    Assessment & Plan            Elias Mckee is a 89 year old male with PMH signficant for dementia living in care facility, Afib on DOAC, CKD3, hx prostate cancer, recurrent strokes, CAD, HTN, HLD, IDDM2, who presents to the hospital on 7/11/2022 with altered mental status and was found to have acute cystitis with severe sepsis. He continued to decompensate on admission and was transitioned to comfort measures only the evening of admission.     He was transitioned to inpatient hospice on 7/12/2022. Please see the discharge summary from the same date for more details of his hospital course.     [facility] Hospice status - Comfort measures only (CMO)  Appears to be actively dying, estimated time hours to days.  - [facility] Hospice status, Hospice following  - Pain control per comfort care orderset, Raxanol and lorazepam available, adjust/administer per hospice guidance   - O2 and secretion management for comfort  - Allow family at bedside per protocol  - Acute medical treatment including antimicrobial therapy and IV fluid resusitation discontinued  - All non comfort focused medications and monitoring discontinued including DOAC, VS, glucose checks, laboratory draws  - Ok to continue pascal  - Expect patient will pass while in house      Summary of hospitalization  Presented to ED 7/10 with altered mental status concerning for stroke. At that time, stroke work-up was negative and he was found to have UTI, and he was subsequently discharged back to his facility with oral Keflex. At baseline patient has a pleasant disposition and lives every day per his daughter. She notes episodes of agitation intermittently, but these have been well controlled by medication. Last visited by daughter 7/9/22 who reported patient was at baseline at that time, he was not able to say much, but was able to respond appropriately to her  questions and interact.     On 7/11/2022, nursing home staff found him unresponsive and called 911. No other symptoms were reported to ED staff. In the ED, the patient was tachycardic with temp 100'F, intermittent episodes of bradycardia per bedside RN in the ED. He was hypoxic requiring 2L NC and completely unresponsive to verbal stimuli, however withdraws to painful stimuli. Noted to have MARIA DEL CARMEN with Cr 1.46 up from 1.12 on 7/10. Lactic acid 6 initially. pH 7.3, PCO2 50, PO2 22, bicarb 25. WBC 28.5 up from 7.5 on 7/10. UA appears grossly infected with moderate blood, large leukocyte esterase, 172 WBCs, 28 RBCs, and many bacteria. He was initiated on Zosyn and IV fluids. He [place] [facility] and family confirmed DNR/DNI and no escalation of cares to ICU or pressors. Lactate was rechecked which increased to 8.2 despite fluid resuscitation. BP was stable however the patient appeared progressively acutely ill and no longer protecting his airway, noted to have gurgling respirations with tachypnea, increased work of breathing and worsening hypoxia up to 10-15L NRB oximask. Given medical decompensation the plan was again discussed with family and ultimately a comfort care approach was determined.         Diet:   for comfort as tolerated  DVT Prophylaxis: not indicated  Ogden Catheter: PRESENT, indication:    Central Lines: None  Cardiac Monitoring: None  Code Status: No CPR- Do NOT Intubate      Disposition Plan      Expected Discharge Date: 07/15/2022    Discharge Delays: Comfort Care/Hospice            The patient's care was discussed with the Bedside Nurse and Patient's Family.    Ghassan Pena MD  Hospitalist Service  [facility]  Securely message with the Vocera Web Console (learn more here)  Text page via Kiosked Paging/Directory         Clinically Significant Risk Factors Present on Admission                       ______________________________________________________________________    Interval History   Discussed with RN and visited patient this morning, Daughter at bedside. Patient is obtunded. Labored breathing. No increased secretions.     Data reviewed today: I reviewed all medications, new labs and imaging results over the last 24 hours. I personally reviewed no images or EKG's today.    Physical Exam   Vital Signs:                    Weight: 0 lbs 0 oz    General: calm, Somnolent.  Respiratory: no upper airway secretions,  Breathing is labored  Skin; Warm, dry and faint cyanosis upper lip    Data   Recent Labs   Lab 07/11/22  1318 07/10/22  2149   WBC 28.5* 7.5   HGB 11.1* 10.3*   MCV 93 93    198   INR 1.47*  --     138   POTASSIUM 4.3 4.1   CHLORIDE 106 104   CO2 23 29   BUN 27 23   CR 1.46* 1.12   ANIONGAP 10 5   TRENT 9.1 8.6   * 175*   ALBUMIN 3.4 3.3*   PROTTOTAL 7.1 6.9   BILITOTAL 0.4 0.2   ALKPHOS 60 68   ALT 15 15   AST 20 15     No results found for this or any previous visit (from the past 24 hour(s)).  Medications

## 2022-07-14 NOTE — CONSULTS
[facility]    Progress Note - [facility]    ______________________________________________________________________    [facility]7 Contact Number: [phone]    - Providers: Please contact [facility] with changes in orders or clinical plan of care   - Nursing: Please contact [facility] with significant changes in patient condition  ______________________________________________________________________        Plan of Care Discussed with the Following:   - Nurse:    - Hospitalist/Rounding Provider: Becky Griffin's Family/Preferred Contact: Jazmin   - Hospice Provider: Dr. Bryan    Summary of Visit (includes assessment, medications and any new orders):   Pt tachypneic upon assessment, open mouth breathing, gasping. Education provided to Daughter at bedside about the use of medications to help decrease the patients respiratory effort. Jazmin understand the importance of keeping his respiratory effort at a manageable level. Bedside RN did educate daughter regarding medications to help with respiratory rate prior to my visit. Appreciate Heathers collorboration to help managed the patients symptoms with current medications.  Discussed about after care and what it will look like after patient has passed away. All questions answered by the end of the visit.        Karen Fishman RN  Clinical Hospice Nurse Liaison  Contact: [phone] call or text

## 2022-07-14 NOTE — PLAN OF CARE
[id number]  Pt remains on comfort care.  Daughter at bedside most of day, helpful/supportive.  Using PRN ativan and roxanol for comfort/breathing.  Obtunded.  Oral swabs for dryness.  Ogden remains in place.  Turn and repo every two hours.  Pt warm to the touch.  Pt appears imminent.

## 2022-07-14 NOTE — PROGRESS NOTES
[facility]    Progress Note - [facility]    ______________________________________________________________________    [facility] Contact Number: [phone]    - Providers: Please contact [facility] with changes in orders or clinical plan of care   - Nursing: Please contact [facility] with significant changes in patient condition  ______________________________________________________________________        Plan of Care Discussed with the Following:     - Hospitalist/Rounding Provider: Dr. Pena   - Hospice Provider: Vineet Leblanc     Summary of Visit (includes assessment, medications and any new orders):     Patient is actively dying, Hospice would recommend administering roxanol 10 mg every 4 hours to ease work of breathing.  If respirations continues to appear labored, please administer PRN lorazepam in addition to roxanol.          Leona Hamm RN  [facility]

## 2022-07-14 NOTE — CONFIDENTIAL NOTE
[facility]    Progress Note - [facility]    ______________________________________________________________________    [facility] 24/7 Contact Number: [phone]    - Providers: Please contact [facility] with changes in orders or clinical plan of care   - Nursing: Please contact [facility] with significant changes in patient condition  ______________________________________________________________________        Plan of Care Discussed with the Following:   - Nurse:    - Hospitalist/Rounding Provider: Becky Griffin's Family/Preferred Contact: Jazmin   - Hospice Provider: Dr. Bryan    Summary of Visit (includes assessment, medications and any new orders):   Pt tachypneic upon assessment, open mouth breathing, gasping. Education provided to Daughter at bedside about the use of medications to help decrease the patients respiratory effort. Jazmin understand the importance of keeping his respiratory effort at a manageable level. Bedside RN did educate daughter regarding medications to help with respiratory rate prior to my visit. Appreciate Heathers collorboration to help managed the patients symptoms with current medications.  Discussed about after care and what it will look like after patient has passed away. All questions answered by the end of the visit.        Karen Fishman RN  Clinical Hospice Nurse Liaison  Contact: [phone] call or text

## 2022-07-14 NOTE — PLAN OF CARE
Goal Outcome Evaluation:        [id number]:  Comfort cares maintained.  Unresponsive.  Family was at the bedside this shift; requested that Roxanol and Ativan be given on a regular basis.  Q2 turn/repostioning.  Ogden in-place; no BM this shift.  Continue to keep comfortable; patient is expected to pass in the hospital.

## 2022-07-14 NOTE — PLAN OF CARE
Goal Outcome Evaluation:    VS and assessments deferred, comfort cares continued. Pt not responsive. T/R Q2H. Ogden in place, no BM this shift. PRN roxanol and ativan given regularly per family request. PRN atropine given 1x for secretions. 2L NC on for comfort. Family at bedside this morning.

## 2022-07-15 PROBLEM — Z51.5 HOSPICE CARE: Status: ACTIVE | Noted: 2022-01-01

## 2022-07-15 NOTE — PLAN OF CARE
[facility]  Death Note          Provider / Nursing Notes:       Elias Mckee  MRN# [id number]   July 15, 2022, 2:03 PM        Death occurred at: July 15, 2022, 10:55 AM   Events preceding: Comfort care patient   Family / Significant Other: Family was present. Daughter Jazmin Cho and granddaughter   Tissue / Organ Donation: Donation was not discussed. [employer] called at 11:33 AM   Autopsy: Autopsy was discussed. Family/daughter declined autopsy.   Belongings: Sent home. Hat, blanket, and ring sent home with daughter Jazmin.     House NP notified. Hospice nurse notified.      Recorded by Hansa Og RN

## 2022-07-15 NOTE — PLAN OF CARE
Goal Outcome Evaluation:    VS and assessments deferred, comfort cares continued. Pt unresponsive, shallow breathing. Daughter at bedside. Prn Roxanol given regularly. T/R Q2H-3H, oral cares done 2x. Ogden in place, no BM.

## 2022-07-15 NOTE — PLAN OF CARE
Goal Outcome Evaluation:        [id number]:  Comfort cares maintained.  Daughter and son at the bedside this shift.  Patient's breathing is very swallow, irregular; PRN Roxanol given.  Obtunded.  Ogden in-place; no BM this shift.  Turn/repositioning as requested by family.  DIMA MONTEIRO.

## 2022-07-15 NOTE — PROGRESS NOTES
[facility]  Hospitalist Progress Note        German Haile MD   07/15/2022        Interval History:        - Patient appears comfortable and seems to be actively dying with prolonged apneic periods in between rapid shallow breathing  - family present at bedside         Assessment and Plan:        Elias Mckee is a 89 year old male with PMH signficant for dementia living in care facility, Afib on DOAC, CKD3, hx prostate cancer, recurrent strokes, CAD, HTN, HLD, IDDM2, who presents to the hospital on 7/11/2022 with altered mental status and was found to have acute cystitis with severe sepsis. He continued to decompensate on admission and was transitioned to comfort measures only the evening of admission.     He was transitioned to inpatient hospice on 7/12/2022. Please see the discharge summary from the same date for more details of his hospital course.     [facility]IP Hospice status - Comfort measures only (CMO)  Appears to be actively dying, comfortable  - [facility]IP Hospice status, Hospice following  - Pain control per comfort care orderset, Raxanol and lorazepam available, adjust/administer per hospice guidance   - O2 and secretion management for comfort  - Allow family at bedside per protocol  - Acute medical treatment including antimicrobial therapy and IV fluid resusitation discontinued  - All non comfort focused medications and monitoring discontinued including DOAC, VS, glucose checks, laboratory draws  - Ok to continue pascal  - Expect patient will pass while in house      Summary of hospitalization  Presented to ED 7/10 with altered mental status concerning for stroke. At that time, stroke work-up was negative and he was found to have UTI, and he was subsequently discharged back to his facility with oral Keflex. At baseline patient has a pleasant disposition and lives every day per his daughter. She notes episodes of agitation intermittently, but these have been well controlled by  medication. Last visited by daughter 7/9/22 who reported patient was at baseline at that time, he was not able to say much, but was able to respond appropriately to her questions and interact.     On 7/11/2022, nursing home staff found him unresponsive and called 911. No other symptoms were reported to ED staff. In the ED, the patient was tachycardic with temp 100'F, intermittent episodes of bradycardia per bedside RN in the ED. He was hypoxic requiring 2L NC and completely unresponsive to verbal stimuli, however withdraws to painful stimuli. Noted to have MARIA DEL CARMEN with Cr 1.46 up from 1.12 on 7/10. Lactic acid 6 initially. pH 7.3, PCO2 50, PO2 22, bicarb 25. WBC 28.5 up from 7.5 on 7/10. UA appears grossly infected with moderate blood, large leukocyte esterase, 172 WBCs, 28 RBCs, and many bacteria. He was initiated on Zosyn and IV fluids. [place] [facility] and family confirmed DNR/DNI and no escalation of cares to ICU or pressors. Lactate was rechecked which increased to 8.2 despite fluid resuscitation. BP was stable however the patient appeared progressively acutely ill and no longer protecting his airway, noted to have gurgling respirations with tachypnea, increased work of breathing and worsening hypoxia up to 10-15L NRB oximask. Given medical decompensation the plan was again discussed with family and ultimately a comfort care approach was determined.              Diet:   for comfort as tolerated  DVT Prophylaxis: not indicated    Code Status: No CPR- Do NOT Intubate          Disposition Plan        Expected Discharge Date:  expect patient to pass away in hospital    Discharge Delays: Comfort Care/Hospice       Clinically Significant Risk Factors Present on Admission                      Page Me (7 am to 6 pm)    Care plan discussed with nursing and family              Physical Exam:      Resp. rate (!) 36.  There were no vitals filed for this visit.  Vital Signs with Ranges  Resp:  [36] 36  I/O's Last 24  hours  I/O last 3 completed shifts:  In: -   Out: 275 [Urine:275]     appears comfortable and seems to be actively dying with prolonged apneic periods in between rapid shallow breathing    Full physical exam deferred due to hospice care           Medications:        PRN Meds: acetaminophen **OR** acetaminophen, artificial saliva, atropine, bisacodyl, artificial tears ophthalmic solution, glycopyrrolate, HYDROmorphone, lidocaine 4%, lidocaine (buffered or not buffered), LORazepam **OR** LORazepam, melatonin, morphine sulfate, naloxone, naloxone, nitroGLYcerin, OLANZapine zydis, ondansetron **OR** ondansetron, polyethylene glycol, prochlorperazine **OR** prochlorperazine **OR** prochlorperazine, senna-docusate **OR** senna-docusate, sodium chloride (PF), sodium phosphate         Data:      All new lab and imaging data was reviewed.   Recent Labs   Lab Test 07/11/22  1318 07/10/22  2149 03/05/20  0000 07/02/18  0622 07/01/18  0940 02/21/17  0410 02/20/17  1905   WBC 28.5* 7.5 5.8   < > 7.5   < > 7.2   HGB 11.1* 10.3* 11.6*   < > 11.4*   < > 12.2*   MCV 93 93 91.8   < > 91   < > 93    198 198   < > 157   < > 136*   INR 1.47*  --   --   --  1.30*  --  0.99    < > = values in this interval not displayed.      Recent Labs   Lab Test 07/11/22  1318 07/10/22  2149 01/25/21  0000    138 140   POTASSIUM 4.3 4.1 4.4   CHLORIDE 106 104 106   CO2 23 29 27   BUN 27 23 26   CR 1.46* 1.12 0.99   ANIONGAP 10 5 7   TRENT 9.1 8.6 9.1   * 175* 75     Recent Labs   Lab Test 07/02/18  0622 07/01/18  0940 02/21/17  1700   TROPI <0.015 <0.015 <0.015  The 99th percentile for upper reference range is 0.045 ug/L.  Troponin values in   the range of 0.045 - 0.120 ug/L may be associated with risks of adverse   clinical events.

## 2022-07-15 NOTE — DEATH PRONOUNCEMENT
MD DEATH PRONOUNCEMENT    Called to pronounce Elias Mckee dead.    Physical Exam: Spontaneous respirations absent, Breath sounds absent, Carotid pulse absent and Heart sounds absent    Patient was pronounced dead at 10:59 AM, July 15, 2022.    Preliminary Cause of Death: cardiopulmonary arrest secondary to sepsis in the setting of acute cystitis    Active Problems:    Hospice care       Infectious disease present?: NO    Communicable disease present? (examples: HIV, chicken pox, TB, Ebola, CJD) :  NO    Multi-drug resistant organism present? (example: MRSA): NO    Please consider an autopsy if any of the following exist:  NO Unexpected or unexplained death during or following any dental, medical, or surgical diagnostic treatment procedures.   NO Death of mother at or up to seven days after delivery.     NO All  and pediatric deaths.     NO Death where the cause is sufficiently obscure to delay completion of the death certificate.   NO Deaths in which autopsy would confirm a suspected illness/condition that would affect surviving family members or recipients of transplanted organs.     The following deaths must be reported to the 's Office:  NO A death that may be due entirely or in part to any factors other than natural disease (recent surgery, recent trauma, suspected abuse/neglect).   NO A death that may be an accident, suicide, or homicide.     NO Any sudden, unexpected death in which there is no prior history of significant heart disease or any other condition associated with sudden death.   NO A death under suspicious, unusual, or unexpected circumstances.    NO Any death which is apparently due to natural causes but in which the  does not have a personal physician familiar with the patient s medical history, social, or environmental situation or the circumstances of the terminal event.   NO Any death apparently due to Sudden Infant Death Syndrome.     NO Deaths that occur during,  in association with, or as consequences of a diagnostic, therapeutic, or anesthetic procedure.   NO Any death in which a fracture of a major bone has occurred within the past (6) six months.   NO A death of persons note seen by their physician within 120 days of demise.     NO Any death in which the  was an inmate of a public institution or was in the custody of Law Enforcement personnel.   NO  All unexpected deaths of children   NO Solid organ donors   NO Unidentified bodies   NO Deaths of persons whose bodies are to be cremated or otherwise disposed of so that the bodies will later be unavailable for examination;   NO Deaths unattended by a physician outside of a licensed healthcare facility or licensed residential hospice program   NO Deaths occurring within 24 hours of arrival to a health care facility if death is unexpected.    NO Deaths associated with the decedent s employment.   NO Deaths attributed to acts of terrorism.   NO Any death in which there is uncertainty as to whether it is a medical examiner s care should be discussed with the medical investigator.        Body disposition: Autopsy was discussed with family member:  Family members in person.  Permission for autopsy was declined.  Body released to the [facility].      TOBY Osborne [facility]  Hospitalist Service  House Officer  Pager: [phone] (8h - 6p)

## 2022-07-15 NOTE — DISCHARGE SUMMARY
Discharge Summary    Date of Admission: 7/11/22  Date of Hospice enrollment: 7/12/22  Date of Death: 7/15/22  Time of Death: 1059    Cause of Death: Severe Sepsis Due to Acute Cystitis     Final Diagnoses:  Severe sepsis   Klebsiella UTI  Acute encephalopathy 2/2 sepsis  MARIA DEL CARMEN on CKD stage 3  Afib on DOAC  hx prostate cancer  recurrent strokes  CAD  HTN  HLD  IDDM2    Hospital Summary:    Elias Mckee was a 89 year old male with PMH signficant for dementia living in care facility, Afib on DOAC, CKD3, hx prostate cancer, recurrent strokes, CAD, HTN, HLD, IDDM2, who presented to the hospital on 7/11/2022 with altered mental status and was found to have acute cystitis with severe sepsis.     He had presented to ED on 7/10 with altered mental status concerning for stroke. At that time, stroke work-up was negative and he was found to have UTI, and he was subsequently discharged back to his facility with oral Keflex. On 7/11/2022, nursing home staff found him unresponsive and called 911. No other symptoms were reported to ED staff. In the ED, the patient was tachycardic with temp 100'F, intermittent episodes of bradycardia per bedside RN in the ED. He was hypoxic requiring 2L NC and completely unresponsive to verbal stimuli. Noted to have MARIA DEL CARMEN with Cr 1.46 up from 1.12 on 7/10. Lactic acid 6 initially. pH 7.3, PCO2 50, PO2 22, bicarb 25. WBC 28.5 up from 7.5 on 7/10. UA appears grossly infected with moderate blood, large leukocyte esterase, 172 WBCs, 28 RBCs, and many bacteria. He was initiated on Zosyn and IV fluids. He was admitted to [facility], and family confirmed DNR/DNI and no escalation of cares to ICU or pressors. Lactate was rechecked which increased to 8.2 despite fluid resuscitation. BP was stable however the patient appeared progressively acutely ill and no longer protecting his airway, noted to have gurgling respirations with tachypnea, increased work of breathing and worsening hypoxia up to 10-15L NRB  oximask.    He continued to decompensate on admission and after discussion with family , was transitioned to comfort measures only the evening of admission. He was enrolled into [facility] on 7/12/22 and patient passed away peacefully on 7/15/22 at 1059. Family were present by the bedside.

## 2022-07-16 LAB
BACTERIA BLD CULT: NO GROWTH
BACTERIA BLD CULT: NO GROWTH

## (undated) RX ORDER — FENTANYL CITRATE 50 UG/ML
INJECTION, SOLUTION INTRAMUSCULAR; INTRAVENOUS
Status: DISPENSED
Start: 2018-07-20

## (undated) RX ORDER — LIDOCAINE HYDROCHLORIDE 10 MG/ML
INJECTION, SOLUTION EPIDURAL; INFILTRATION; INTRACAUDAL; PERINEURAL
Status: DISPENSED
Start: 2018-07-20

## (undated) RX ORDER — CEFAZOLIN SODIUM 2 G/100ML
INJECTION, SOLUTION INTRAVENOUS
Status: DISPENSED
Start: 2018-07-20

## (undated) RX ORDER — BUPIVACAINE HYDROCHLORIDE 2.5 MG/ML
INJECTION, SOLUTION EPIDURAL; INFILTRATION; INTRACAUDAL
Status: DISPENSED
Start: 2018-07-20